# Patient Record
Sex: MALE | Race: BLACK OR AFRICAN AMERICAN | Employment: FULL TIME | URBAN - METROPOLITAN AREA
[De-identification: names, ages, dates, MRNs, and addresses within clinical notes are randomized per-mention and may not be internally consistent; named-entity substitution may affect disease eponyms.]

---

## 2017-07-13 ENCOUNTER — OFFICE VISIT (OUTPATIENT)
Dept: INTERNAL MEDICINE CLINIC | Age: 42
End: 2017-07-13

## 2017-07-13 VITALS
HEIGHT: 64 IN | DIASTOLIC BLOOD PRESSURE: 74 MMHG | WEIGHT: 213.3 LBS | OXYGEN SATURATION: 95 % | RESPIRATION RATE: 18 BRPM | SYSTOLIC BLOOD PRESSURE: 107 MMHG | BODY MASS INDEX: 36.41 KG/M2 | TEMPERATURE: 98 F | HEART RATE: 79 BPM

## 2017-07-13 DIAGNOSIS — G47.33 OBSTRUCTIVE SLEEP APNEA: ICD-10-CM

## 2017-07-13 DIAGNOSIS — E11.69 TYPE 2 DIABETES MELLITUS WITH OTHER SPECIFIED COMPLICATION (HCC): ICD-10-CM

## 2017-07-13 DIAGNOSIS — E29.1 HYPOGONADISM MALE: ICD-10-CM

## 2017-07-13 DIAGNOSIS — I25.10 ASHD (ARTERIOSCLEROTIC HEART DISEASE): ICD-10-CM

## 2017-07-13 DIAGNOSIS — E66.09 NON MORBID OBESITY DUE TO EXCESS CALORIES: Primary | ICD-10-CM

## 2017-07-13 DIAGNOSIS — I10 ESSENTIAL HYPERTENSION: ICD-10-CM

## 2017-07-13 DIAGNOSIS — E78.5 DYSLIPIDEMIA: ICD-10-CM

## 2017-07-13 DIAGNOSIS — Z00.00 PHYSICAL EXAM: ICD-10-CM

## 2017-07-13 RX ORDER — HYDROCHLOROTHIAZIDE 12.5 MG/1
12.5 CAPSULE ORAL DAILY
COMMUNITY
End: 2019-06-03 | Stop reason: SDUPTHER

## 2017-07-13 NOTE — PROGRESS NOTES
Chief Complaint   Patient presents with    Heart Problem     routine follow up     Medication Evaluation     1. Have you been to the ER, urgent care clinic since your last visit? Hospitalized since your last visit? No    2. Have you seen or consulted any other health care providers outside of the [address] [place] [address] since your last visit? Include any pap smears or colon screening.  No

## 2017-07-15 NOTE — PROGRESS NOTES
[facility] and [facility]  [place] [address]  [address] [place] [address]  Phone:  [phone]  Fax: [phone]       Chief Complaint   Patient presents with    Heart Problem     routine follow up     Medication Evaluation   . SUBJECTIVE:    Zoey Jeff is a 38 y.o. male [very poor audio quality - muffled/low volume - please read note carefully for potential errors and/or omissions]     comes in for return visit for general follow-up. Apparently he follows up with Cardiology and pulmonary on a fairly consistent basis. He does have obstructive sleep apnea and is in the process of getting a CPAP machine. He had obstructive sleep apnea several years ago but had a pharyngeal plasty which improved the condition but after weight gain the process returned as expected. He denies any chest pain or shortness of breath. Most recently an echocardiogram revealed a normal ejection fraction as well as a stress test which was entirely normal.     There is a history of steroid induced glucose intolerance. He has a past history of primary hypertension as well as dyslipidemia. Additionally he has a problem with hypogonadism. He was sent to an endocrinologist because he wanted to have additional children. He was told that all he has to do is lose weight which he never did. Current Outpatient Prescriptions   Medication Sig Dispense Refill    hydroCHLOROthiazide (MICROZIDE) 12.5 mg capsule Take 12.5 mg by mouth daily.  atorvastatin (LIPITOR) 20 mg tablet Take 1 Tab by mouth daily. 30 Tab 11    amLODIPine (NORVASC) 10 mg tablet Take 1 Tab by mouth daily. 30 Tab 11    aspirin delayed-release 81 mg tablet Take 1 Tab by mouth daily. 30 Tab 11    clopidogrel (PLAVIX) 75 mg tab Take 1 Tab by mouth daily. 30 Tab 11    spironolactone (ALDACTONE) 25 mg tablet Take 1 Tab by mouth daily.  30 Tab 11    metoprolol succinate (TOPROL-XL) 100 mg tablet Take 1 Tab by mouth daily. 30 Tab 11    omeprazole (PRILOSEC) 40 mg capsule Take 1 Cap by mouth daily. 30 Cap 11    lisinopril (PRINIVIL, ZESTRIL) 40 mg tablet Take 1 Tab by mouth daily. 30 Tab 11    VIAGRA 100 mg tablet       metFORMIN ER (GLUCOPHAGE XR) 500 mg tablet Take 2 Tabs by mouth daily (with dinner).  61 Tab 11     Past Medical History:   Diagnosis Date    Diabetes ([facility] [address]     Headache     Hypercholesterolemia     Hypertension     Hypogonadism male     Liver disease     NAFLD    Obstructive sleep apnea     Obstructive sleep apnea      Past Surgical History:   Procedure Laterality Date    HX HEENT      status post pharyngioplasty     No Known Allergies      REVIEW OF SYSTEMS:  General: negative for - chills or fever  ENT: negative for - headaches, nasal congestion or tinnitus  Respiratory: negative for - cough, hemoptysis, shortness of breath or wheezing  Cardiovascular : negative for - chest pain, edema, palpitations or shortness of breath  Gastrointestinal: negative for - abdominal pain, blood in stools, heartburn or nausea/vomiting  Genito-Urinary: no dysuria, trouble voiding, or hematuria  Musculoskeletal: negative for - gait disturbance, joint pain, joint stiffness or joint swelling  Neurological: no TIA or stroke symptoms  Hematologic: no bruises, no bleeding, no swollen glands  Integument: no lumps, mole changes, nail changes or rash  Endocrine: no malaise/lethargy or unexpected weight changes      Social History     Social History    Marital status:      Spouse name: N/A    Number of children: 2    Years of education: 12     Occupational History          Social History Main Topics    Smoking status: Never Smoker    Smokeless tobacco: Never Used    Alcohol use No    Drug use: No    Sexual activity: Yes     Partners: Female     Other Topics Concern    None     Social History Narrative     Family History   Problem Relation Age of Onset    Heart Disease Father        OBJECTIVE:    Visit Vitals    /74 (BP 1 Location: Right arm, BP Patient Position: Sitting)    Pulse 79    Temp 98 °F (36.7 °C) (Oral)    Resp 18    Ht 5' 4\" (1.626 m)    Wt 213 lb 4.8 oz (96.8 kg)    SpO2 95%    BMI 36.61 kg/m2     CONSTITUTIONAL: well , well nourished, appears age appropriate  EYES: perrla, eom intact  ENMT:moist mucous membranes, pharynx clear  NECK: supple. Thyroid normal  RESPIRATORY: Chest: clear to ascultation and percussion   CARDIOVASCULAR: Heart: regular rate and rhythm  GASTROINTESTINAL: Abdomen: soft, bowel sounds active  HEMATOLOGIC: no pathological lymph nodes palpated  MUSCULOSKELETAL: Extremities: no edema, pulse 1+   INTEGUMENT: No unusual rashes or suspicious skin lesions noted. Nails appear normal.  NEUROLOGIC: non-focal exam   MENTAL STATUS: alert and oriented, appropriate affect      ASSESSMENT:  1. Non morbid obesity due to excess calories    2. Hypogonadism male    3. Dyslipidemia    4. Essential hypertension    5. ASHD (arteriosclerotic heart disease)    6. Obstructive sleep apnea    7. Type 2 diabetes mellitus with other specified complication ([address]    8. Physical exam        PLAN:        1. I encourage the patient to lose weight. This is the driving force behind all of his comorbidities. He has significant insulin resistance as a direct result of his weight gain which is driving his cholesterol values higher. The entity of insulin resistance by itself is a major contributing factor to the development of his premature coronary artery disease. Carbohydrate restriction is emphasized and the avoidance of sweets, white bread, and white potatoes. 2. He does have hypogonadism and the recommendation of weight loss is quite appropriate I think. Unfortunately his insurance never paid for testosterone replacement. 3. He will continue his statin as prescribed and efficacy and compliance will be assessed. Ideally ApoB levels should be less than 70 and probably even lower.    4. His blood pressure is excellent today and no adjustments are made. 5. He does have a history of coronary artery disease having had stent placement. His predisposition to coronary artery disease is quite high and presumably driven primarily by his insulin resistance. .    6. He has obstructive sleep apnea and needs to follow-up with pulmonary. .    7. Historically his diabetes has been doing quite well. . His glucose is the last thing that will go aray in his insulin resistance cascade. .  Orders Placed This Encounter    MICROALB/CREAT RATIO, TIMED UR    LIPID PANEL    HEMOGLOBIN A1C WITH EAG    APOLIPOPROTEIN B    METABOLIC PANEL, BASIC    TESTOSTERONE, FREE & TOTAL    hydroCHLOROthiazide (MICROZIDE) 12.5 mg capsule         Follow-up Disposition:  Return in about 3 months (around 10/13/2017).       Michele Nicolas MD

## 2017-07-17 LAB
APO B SERPL-MCNC: 113 MG/DL (ref 52–135)
BUN SERPL-MCNC: 24 MG/DL (ref 6–24)
BUN/CREAT SERPL: 20 (ref 9–20)
CALCIUM SERPL-MCNC: 10 MG/DL (ref 8.7–10.2)
CHLORIDE SERPL-SCNC: 94 MMOL/L (ref 96–106)
CHOLEST SERPL-MCNC: 212 MG/DL (ref 100–199)
CO2 SERPL-SCNC: 24 MMOL/L (ref 18–29)
COMMENT: ABNORMAL
CREAT SERPL-MCNC: 1.23 MG/DL (ref 0.76–1.27)
EST. AVERAGE GLUCOSE BLD GHB EST-MCNC: 237 MG/DL
GLUCOSE SERPL-MCNC: 208 MG/DL (ref 65–99)
HBA1C MFR BLD: 9.9 % (ref 4.8–5.6)
HDLC SERPL-MCNC: 56 MG/DL
LDLC SERPL CALC-MCNC: 103 MG/DL (ref 0–99)
POTASSIUM SERPL-SCNC: 4.5 MMOL/L (ref 3.5–5.2)
SODIUM SERPL-SCNC: 137 MMOL/L (ref 134–144)
TESTOST FREE SERPL-MCNC: 14.6 PG/ML (ref 6.8–21.5)
TESTOST SERPL-MCNC: 214 NG/DL (ref 348–1197)
TRIGL SERPL-MCNC: 267 MG/DL (ref 0–149)
VLDLC SERPL CALC-MCNC: 53 MG/DL (ref 5–40)

## 2017-07-25 NOTE — TELEPHONE ENCOUNTER
Patient notified that blood sugars are out of control. We tell him per Dr. Uriarte Comes that he nust take metformin xr 500mg 2 tabs bid.

## 2017-07-26 RX ORDER — METFORMIN HYDROCHLORIDE 500 MG/1
TABLET, EXTENDED RELEASE ORAL
Qty: 120 TAB | Refills: 11 | Status: SHIPPED | OUTPATIENT
Start: 2017-07-26 | End: 2018-02-24 | Stop reason: CLARIF

## 2017-10-31 ENCOUNTER — OFFICE VISIT (OUTPATIENT)
Dept: INTERNAL MEDICINE CLINIC | Age: 42
End: 2017-10-31

## 2017-10-31 VITALS
HEIGHT: 64 IN | SYSTOLIC BLOOD PRESSURE: 149 MMHG | DIASTOLIC BLOOD PRESSURE: 105 MMHG | RESPIRATION RATE: 16 BRPM | HEART RATE: 86 BPM | BODY MASS INDEX: 34.72 KG/M2 | WEIGHT: 203.4 LBS | OXYGEN SATURATION: 95 % | TEMPERATURE: 98.2 F

## 2017-10-31 DIAGNOSIS — I10 ESSENTIAL HYPERTENSION: ICD-10-CM

## 2017-10-31 DIAGNOSIS — Z79.4 TYPE 2 DIABETES MELLITUS WITH COMPLICATION, WITH LONG-TERM CURRENT USE OF INSULIN (HCC): Primary | ICD-10-CM

## 2017-10-31 DIAGNOSIS — E29.1 HYPOGONADISM MALE: ICD-10-CM

## 2017-10-31 DIAGNOSIS — G47.33 OBSTRUCTIVE SLEEP APNEA: ICD-10-CM

## 2017-10-31 DIAGNOSIS — E78.5 DYSLIPIDEMIA: ICD-10-CM

## 2017-10-31 DIAGNOSIS — E11.8 TYPE 2 DIABETES MELLITUS WITH COMPLICATION, WITH LONG-TERM CURRENT USE OF INSULIN (HCC): Primary | ICD-10-CM

## 2017-10-31 DIAGNOSIS — E66.9 OBESITY (BMI 30.0-34.9): ICD-10-CM

## 2017-10-31 NOTE — PROGRESS NOTES
Chief Complaint   Patient presents with    Morbid Obesity     routine follow up      1. Have you been to the ER, urgent care clinic since your last visit? Hospitalized since your last visit? No    2. Have you seen or consulted any other health care providers outside of the [address] [place] [address] since your last visit? Include any pap smears or colon screening.  No

## 2017-10-31 NOTE — MR AVS SNAPSHOT
Visit Information Date & Time Provider Department Dept. Phone Encounter #  
 10/31/2017  3:45 PM MD [facility] [id number] Upcoming Health Maintenance Date Due MICROALBUMIN Q1 5/12/2016 EYE EXAM RETINAL OR DILATED Q1 5/12/2016 FOOT EXAM Q1 2/28/2018* HEMOGLOBIN A1C Q6M 1/14/2018 LIPID PANEL Q1 7/14/2018 DTaP/Tdap/Td series (2 - Td) 7/13/2027 *Topic was postponed. The date shown is not the original due date. Allergies as of 10/31/2017  Review Complete On: 10/31/2017 By: Brad Orozco No Known Allergies Current Immunizations  Never Reviewed No immunizations on file. Not reviewed this visit Vitals BP Pulse Temp Resp Height(growth percentile) Weight(growth percentile) (!) 149/105 (BP 1 Location: Left arm, BP Patient Position: Sitting) 86 98.2 °F (36.8 °C) (Oral) 16 5' 4\" (1.626 m) 203 lb 6.4 oz (92.3 kg) SpO2 BMI Smoking Status 95% 34.91 kg/m2 Never Smoker Vitals History BMI and BSA Data Body Mass Index Body Surface Area 34.91 kg/m 2 2.04 m 2 Preferred Pharmacy Pharmacy Name Phone [id number], [address] [place] [address] [phone] Your Updated Medication List  
  
   
This list is accurate as of: 10/31/17  4:39 PM.  Always use your most recent med list. amLODIPine 10 mg tablet Commonly known as:  Jimy Alves Take 1 Tab by mouth daily. aspirin delayed-release 81 mg tablet Take 1 Tab by mouth daily. atorvastatin 20 mg tablet Commonly known as:  LIPITOR Take 1 Tab by mouth daily. clopidogrel 75 mg Tab Commonly known as:  PLAVIX Take 1 Tab by mouth daily. hydroCHLOROthiazide 12.5 mg capsule Commonly known as:  Reyna Ewing Take 12.5 mg by mouth daily. lisinopril 40 mg tablet Commonly known as:  Yolette Woodruff  
 TAKE 1 TABLET BY MOUTH DAILY  
  
 metFORMIN  mg tablet Commonly known as:  GLUCOPHAGE XR Take 2 tablets with breakfast and dinner  
  
 metoprolol succinate 100 mg tablet Commonly known as:  TOPROL-XL Take 1 Tab by mouth daily. omeprazole 40 mg capsule Commonly known as:  PRILOSEC Take 1 Cap by mouth daily. spironolactone 25 mg tablet Commonly known as:  ALDACTONE Take 1 Tab by mouth daily. VIAGRA 100 mg tablet Generic drug:  sildenafil citrate Introducing [facility] SERVICES! Dear Shelby Viramontes: Thank you for requesting a [employer] account. Our records indicate that you already have an active [employer] account. You can access your account anytime at [email or web address] Did you know that you can access your hospital and ER discharge instructions at any time in [employer]? You can also review all of your test results from your hospital stay or ER visit. Additional Information If you have questions, please visit the Frequently Asked Questions section of the [employer] website at [email or web address] Remember, [employer] is NOT to be used for urgent needs. For medical emergencies, dial 911. Now available from your iPhone and Android! Please provide this summary of care documentation to your next provider. Your primary care clinician is listed as Kareem Dupree. If you have any questions after today's visit, please call [phone].

## 2017-11-03 LAB
APO B SERPL-MCNC: 120 MG/DL (ref 52–135)
BUN SERPL-MCNC: 11 MG/DL (ref 6–24)
BUN/CREAT SERPL: 10 (ref 9–20)
CALCIUM SERPL-MCNC: 9.1 MG/DL (ref 8.7–10.2)
CHLORIDE SERPL-SCNC: 99 MMOL/L (ref 96–106)
CO2 SERPL-SCNC: 22 MMOL/L (ref 18–29)
CREAT SERPL-MCNC: 1.09 MG/DL (ref 0.76–1.27)
EST. AVERAGE GLUCOSE BLD GHB EST-MCNC: 361 MG/DL
GFR SERPLBLD CREATININE-BSD FMLA CKD-EPI: 84 ML/MIN/1.73
GFR SERPLBLD CREATININE-BSD FMLA CKD-EPI: 98 ML/MIN/1.73
GLUCOSE SERPL-MCNC: 279 MG/DL (ref 65–99)
HBA1C MFR BLD: 14.2 % (ref 4.8–5.6)
POTASSIUM SERPL-SCNC: 4.1 MMOL/L (ref 3.5–5.2)
SODIUM SERPL-SCNC: 140 MMOL/L (ref 134–144)

## 2017-11-05 PROBLEM — E66.9 OBESITY (BMI 30.0-34.9): Status: ACTIVE | Noted: 2017-11-05

## 2017-11-05 NOTE — PROGRESS NOTES
[address] [place] [address] and Primary Care  [place] [address]  [address] [place] [address]  Phone:  [phone]  Fax: [phone]       Chief Complaint   Patient presents with    Morbid Obesity     routine follow up    . SUBJECTIVE:    Stephie Peralta is a 38 y.o. male Comes in for return visit stating that he has done fairly well. He has lost ten pounds since I last saw him and I congratulate him on this. He needs to continue to lose weight. Ideally at least another 20 to 30 pounds, given his height would be of great benefit as far as reversing his metabolic syndrome. He saw his cardiologist in the last month or so and is doing quite well from this perspective. He has premature cardiovascular disease presumably driven by his severe Insulin resistance. He also has obstructive sleep apnea and needs to follow with a polysomnogram, as ordered by his pulmonary physician. Historically his diabetes has not been a major problem, but will indeed become one if his weight continues to increase. Finally, he does have a past history of primary hypertension and dyslipidemia. His dyslipidemia is secondary in view of his history of coronary artery disease. Current Outpatient Prescriptions   Medication Sig Dispense Refill    lisinopril (PRINIVIL, ZESTRIL) 40 mg tablet TAKE 1 TABLET BY MOUTH DAILY 30 Tab 11    metFORMIN ER (GLUCOPHAGE XR) 500 mg tablet Take 2 tablets with breakfast and dinner 120 Tab 11    hydroCHLOROthiazide (MICROZIDE) 12.5 mg capsule Take 12.5 mg by mouth daily.  atorvastatin (LIPITOR) 20 mg tablet Take 1 Tab by mouth daily. 30 Tab 11    amLODIPine (NORVASC) 10 mg tablet Take 1 Tab by mouth daily. 30 Tab 11    aspirin delayed-release 81 mg tablet Take 1 Tab by mouth daily. 30 Tab 11    clopidogrel (PLAVIX) 75 mg tab Take 1 Tab by mouth daily. 30 Tab 11    spironolactone (ALDACTONE) 25 mg tablet Take 1 Tab by mouth daily.  30 Tab 11    metoprolol succinate (TOPROL-XL) 100 mg tablet Take 1 Tab by mouth daily. 30 Tab 11    omeprazole (PRILOSEC) 40 mg capsule Take 1 Cap by mouth daily.  30 Cap 11    VIAGRA 100 mg tablet        Past Medical History:   Diagnosis Date    Diabetes ([address]     Headache     Hypercholesterolemia     Hypertension     Hypogonadism male     Liver disease     NAFLD    Obstructive sleep apnea     Obstructive sleep apnea      Past Surgical History:   Procedure Laterality Date    HX HEENT      status post pharyngioplasty     No Known Allergies      REVIEW OF SYSTEMS:  General: negative for - chills or fever  ENT: negative for - headaches, nasal congestion or tinnitus  Respiratory: negative for - cough, hemoptysis, shortness of breath or wheezing  Cardiovascular : negative for - chest pain, edema, palpitations or shortness of breath  Gastrointestinal: negative for - abdominal pain, blood in stools, heartburn or nausea/vomiting  Genito-Urinary: no dysuria, trouble voiding, or hematuria  Musculoskeletal: negative for - gait disturbance, joint pain, joint stiffness or joint swelling  Neurological: no TIA or stroke symptoms  Hematologic: no bruises, no bleeding, no swollen glands  Integument: no lumps, mole changes, nail changes or rash  Endocrine: no malaise/lethargy or unexpected weight changes      Social History     Social History    Marital status:      Spouse name: N/A    Number of children: 2    Years of education: 12     Occupational History          Social History Main Topics    Smoking status: Never Smoker    Smokeless tobacco: Never Used    Alcohol use No    Drug use: No    Sexual activity: Yes     Partners: Female     Other Topics Concern    None     Social History Narrative     Family History   Problem Relation Age of Onset    Heart Disease Father        OBJECTIVE:    Visit Vitals    BP (!) 149/105 (BP 1 Location: Left arm, BP Patient Position: Sitting)    Pulse 86    Temp 98.2 °F (36.8 °C) (Oral)    Resp 16    Ht 5' 4\" (1.626 m)    Wt 203 lb 6.4 oz (92.3 kg)    SpO2 95%    BMI 34.91 kg/m2     CONSTITUTIONAL: well , well nourished, appears age appropriate  EYES: perrla, eom intact  ENMT:moist mucous membranes, pharynx clear  NECK: supple. Thyroid normal  RESPIRATORY: Chest: clear to ascultation and percussion   CARDIOVASCULAR: Heart: regular rate and rhythm  GASTROINTESTINAL: Abdomen: soft, bowel sounds active  HEMATOLOGIC: no pathological lymph nodes palpated  MUSCULOSKELETAL: Extremities: no edema, pulse 1+   INTEGUMENT: No unusual rashes or suspicious skin lesions noted. Nails appear normal.  NEUROLOGIC: non-focal exam   MENTAL STATUS: alert and oriented, appropriate affect      ASSESSMENT:  1. Type 2 diabetes mellitus with complication, with long-term current use of insulin ([address]    2. Dyslipidemia    3. Essential hypertension    4. Obesity (BMI 30.0-34.9)    5. Obstructive sleep apnea    6. Hypogonadism male        PLAN:    1. His diabetes will be assessed today. Previously flare ups only occurred with steroids. Again the duration of his obesity, the context of his glucose intolerance is probably pushing him into an overt diabetic range consistently. 2. He will continue statin as prescribed. Apo B level should be less than 70 in this high risk gentleman. 3. Blood pressure is excellent today. No adjustments are made. 4. Obesity continues to be the driving force behind all of his metabolic state. Emphasis is again placed on progressive weight loss via carbohydrate restriction. Importantly he has to eat meals, to minimize snacking, and he has to reduce his consumption of processed carbohydrates. 5. He needs to bundle branch block with his pulmonary physician regarding his obstructive sleep apnea. 6. As far as his hypergonadism is concerned, my suggestion, as was the suggestion of the endocrinologist was weight loss. He does not need any type of supplement particularly given his age.   The potential impression is spermatogenesis. .  Orders Placed This Encounter    HEMOGLOBIN A1C WITH EAG    METABOLIC PANEL, BASIC    APOLIPOPROTEIN B         Follow-up Disposition:  Return in about 3 months (around 1/31/2018).       Cameron Galloway MD

## 2017-11-07 ENCOUNTER — TELEPHONE (OUTPATIENT)
Dept: INTERNAL MEDICINE CLINIC | Age: 42
End: 2017-11-07

## 2017-11-07 NOTE — TELEPHONE ENCOUNTER
Per Dr. Fry Hopping patient notified by phone of elevated A1C 14.2. PATIENT IS TOLD HE IS TO START INSULIN WHICH DR. CRAWLEY WILL PUT IN THE ORDER.

## 2017-11-08 DIAGNOSIS — Z79.4 TYPE 2 DIABETES MELLITUS WITH COMPLICATION, WITH LONG-TERM CURRENT USE OF INSULIN (HCC): Primary | ICD-10-CM

## 2017-11-08 DIAGNOSIS — E11.8 TYPE 2 DIABETES MELLITUS WITH COMPLICATION, WITH LONG-TERM CURRENT USE OF INSULIN (HCC): Primary | ICD-10-CM

## 2017-11-08 RX ORDER — INSULIN GLARGINE 100 [IU]/ML
INJECTION, SOLUTION SUBCUTANEOUS
Qty: 2 PEN | Refills: 11 | Status: SHIPPED | OUTPATIENT
Start: 2017-11-08 | End: 2019-01-16 | Stop reason: SDUPTHER

## 2017-11-08 RX ORDER — PEN NEEDLE, DIABETIC 30 GX3/16"
NEEDLE, DISPOSABLE MISCELLANEOUS
Qty: 60 PACKAGE | Refills: 11 | Status: SHIPPED | OUTPATIENT
Start: 2017-11-08 | End: 2021-01-08 | Stop reason: SDUPTHER

## 2017-11-09 ENCOUNTER — TELEPHONE (OUTPATIENT)
Dept: INTERNAL MEDICINE CLINIC | Age: 42
End: 2017-11-09

## 2017-11-09 NOTE — TELEPHONE ENCOUNTER
After talking to patient about his out of control diabeteshe then tells me he never took his metformin or atorvastatin that was prescribed for him.

## 2018-02-15 ENCOUNTER — APPOINTMENT (OUTPATIENT)
Dept: GENERAL RADIOLOGY | Age: 43
DRG: 563 | End: 2018-02-15
Attending: EMERGENCY MEDICINE
Payer: COMMERCIAL

## 2018-02-15 ENCOUNTER — ANESTHESIA (OUTPATIENT)
Dept: SURGERY | Age: 43
DRG: 563 | End: 2018-02-15
Payer: COMMERCIAL

## 2018-02-15 ENCOUNTER — HOSPITAL ENCOUNTER (INPATIENT)
Age: 43
LOS: 1 days | Discharge: HOME | DRG: 563 | End: 2018-02-15
Attending: EMERGENCY MEDICINE | Admitting: ORTHOPAEDIC SURGERY
Payer: COMMERCIAL

## 2018-02-15 ENCOUNTER — APPOINTMENT (OUTPATIENT)
Dept: GENERAL RADIOLOGY | Age: 43
DRG: 563 | End: 2018-02-15
Attending: PHYSICIAN ASSISTANT
Payer: COMMERCIAL

## 2018-02-15 ENCOUNTER — ANESTHESIA EVENT (OUTPATIENT)
Dept: SURGERY | Age: 43
DRG: 563 | End: 2018-02-15
Payer: COMMERCIAL

## 2018-02-15 VITALS
SYSTOLIC BLOOD PRESSURE: 145 MMHG | HEIGHT: 64 IN | DIASTOLIC BLOOD PRESSURE: 97 MMHG | OXYGEN SATURATION: 94 % | WEIGHT: 209.19 LBS | HEART RATE: 94 BPM | TEMPERATURE: 97.6 F | RESPIRATION RATE: 17 BRPM | BODY MASS INDEX: 35.71 KG/M2

## 2018-02-15 DIAGNOSIS — G89.18 POSTOPERATIVE PAIN OF EXTREMITY: ICD-10-CM

## 2018-02-15 DIAGNOSIS — M79.609 POSTOPERATIVE PAIN OF EXTREMITY: ICD-10-CM

## 2018-02-15 DIAGNOSIS — S52.232B: Primary | ICD-10-CM

## 2018-02-15 PROBLEM — S52.92XB OPEN LEFT FOREARM FRACTURE: Status: ACTIVE | Noted: 2018-02-15

## 2018-02-15 LAB
ALBUMIN SERPL-MCNC: 3.8 G/DL (ref 3.5–5)
ALBUMIN/GLOB SERPL: 1 {RATIO} (ref 1.1–2.2)
ALP SERPL-CCNC: 93 U/L (ref 45–117)
ALT SERPL-CCNC: 37 U/L (ref 12–78)
ANION GAP SERPL CALC-SCNC: 7 MMOL/L (ref 5–15)
AST SERPL-CCNC: 22 U/L (ref 15–37)
ATRIAL RATE: 92 BPM
BASOPHILS # BLD: 0 K/UL (ref 0–0.1)
BASOPHILS NFR BLD: 0 % (ref 0–1)
BILIRUB SERPL-MCNC: 0.3 MG/DL (ref 0.2–1)
BUN SERPL-MCNC: 17 MG/DL (ref 6–20)
BUN/CREAT SERPL: 16 (ref 12–20)
CALCIUM SERPL-MCNC: 8.6 MG/DL (ref 8.5–10.1)
CALCULATED P AXIS, ECG09: 38 DEGREES
CALCULATED R AXIS, ECG10: 2 DEGREES
CALCULATED T AXIS, ECG11: -11 DEGREES
CHLORIDE SERPL-SCNC: 105 MMOL/L (ref 97–108)
CO2 SERPL-SCNC: 25 MMOL/L (ref 21–32)
CREAT SERPL-MCNC: 1.04 MG/DL (ref 0.7–1.3)
DIAGNOSIS, 93000: NORMAL
DIFFERENTIAL METHOD BLD: NORMAL
EOSINOPHIL # BLD: 0.1 K/UL (ref 0–0.4)
EOSINOPHIL NFR BLD: 1 % (ref 0–7)
ERYTHROCYTE [DISTWIDTH] IN BLOOD BY AUTOMATED COUNT: 13.3 % (ref 11.5–14.5)
GLOBULIN SER CALC-MCNC: 4 G/DL (ref 2–4)
GLUCOSE BLD STRIP.AUTO-MCNC: 114 MG/DL (ref 65–100)
GLUCOSE BLD STRIP.AUTO-MCNC: 152 MG/DL (ref 65–100)
GLUCOSE BLD STRIP.AUTO-MCNC: 99 MG/DL (ref 65–100)
GLUCOSE SERPL-MCNC: 133 MG/DL (ref 65–100)
HCT VFR BLD AUTO: 43.1 % (ref 36.6–50.3)
HGB BLD-MCNC: 14.3 G/DL (ref 12.1–17)
IMM GRANULOCYTES # BLD: 0 K/UL (ref 0–0.04)
IMM GRANULOCYTES NFR BLD AUTO: 0 % (ref 0–0.5)
LYMPHOCYTES # BLD: 1.7 K/UL (ref 0.8–3.5)
LYMPHOCYTES NFR BLD: 19 % (ref 12–49)
MCH RBC QN AUTO: 26.9 PG (ref 26–34)
MCHC RBC AUTO-ENTMCNC: 33.2 G/DL (ref 30–36.5)
MCV RBC AUTO: 81.2 FL (ref 80–99)
MONOCYTES # BLD: 0.7 K/UL (ref 0–1)
MONOCYTES NFR BLD: 8 % (ref 5–13)
NEUTS SEG # BLD: 6.5 K/UL (ref 1.8–8)
NEUTS SEG NFR BLD: 72 % (ref 32–75)
NRBC # BLD: 0 K/UL (ref 0–0.01)
NRBC BLD-RTO: 0 PER 100 WBC
P-R INTERVAL, ECG05: 138 MS
PLATELET # BLD AUTO: 196 K/UL (ref 150–400)
PMV BLD AUTO: 11.3 FL (ref 8.9–12.9)
POTASSIUM SERPL-SCNC: 3.9 MMOL/L (ref 3.5–5.1)
PROT SERPL-MCNC: 7.8 G/DL (ref 6.4–8.2)
Q-T INTERVAL, ECG07: 360 MS
QRS DURATION, ECG06: 96 MS
QTC CALCULATION (BEZET), ECG08: 445 MS
RBC # BLD AUTO: 5.31 M/UL (ref 4.1–5.7)
SERVICE CMNT-IMP: ABNORMAL
SERVICE CMNT-IMP: ABNORMAL
SERVICE CMNT-IMP: NORMAL
SODIUM SERPL-SCNC: 137 MMOL/L (ref 136–145)
VENTRICULAR RATE, ECG03: 92 BPM
WBC # BLD AUTO: 9.1 K/UL (ref 4.1–11.1)

## 2018-02-15 PROCEDURE — 71045 X-RAY EXAM CHEST 1 VIEW: CPT

## 2018-02-15 PROCEDURE — 74011000250 HC RX REV CODE- 250: Performed by: ORTHOPAEDIC SURGERY

## 2018-02-15 PROCEDURE — 76210000034 HC AMBSU PH I REC 0.5 TO 1 HR: Performed by: ORTHOPAEDIC SURGERY

## 2018-02-15 PROCEDURE — 74011250636 HC RX REV CODE- 250/636

## 2018-02-15 PROCEDURE — 85025 COMPLETE CBC W/AUTO DIFF WBC: CPT

## 2018-02-15 PROCEDURE — 93005 ELECTROCARDIOGRAM TRACING: CPT

## 2018-02-15 PROCEDURE — 96374 THER/PROPH/DIAG INJ IV PUSH: CPT

## 2018-02-15 PROCEDURE — 77030000032 HC CUF TRNQT ZIMM -B: Performed by: ORTHOPAEDIC SURGERY

## 2018-02-15 PROCEDURE — 77030026438 HC STYL ET INTUB CARD -A: Performed by: NURSE ANESTHETIST, CERTIFIED REGISTERED

## 2018-02-15 PROCEDURE — 77030008684 HC TU ET CUF COVD -B: Performed by: NURSE ANESTHETIST, CERTIFIED REGISTERED

## 2018-02-15 PROCEDURE — 0HBEXZZ EXCISION OF LEFT LOWER ARM SKIN, EXTERNAL APPROACH: ICD-10-PCS | Performed by: ORTHOPAEDIC SURGERY

## 2018-02-15 PROCEDURE — 77030002916 HC SUT ETHLN J&J -A: Performed by: ORTHOPAEDIC SURGERY

## 2018-02-15 PROCEDURE — 76210000057 HC AMBSU PH II REC 1 TO 1.5 HR: Performed by: ORTHOPAEDIC SURGERY

## 2018-02-15 PROCEDURE — 76060000062 HC AMB SURG ANES 1 TO 1.5 HR: Performed by: ORTHOPAEDIC SURGERY

## 2018-02-15 PROCEDURE — 77030028224 HC PDNG CST BSNM -A: Performed by: ORTHOPAEDIC SURGERY

## 2018-02-15 PROCEDURE — 80053 COMPREHEN METABOLIC PANEL: CPT

## 2018-02-15 PROCEDURE — 74011000258 HC RX REV CODE- 258: Performed by: EMERGENCY MEDICINE

## 2018-02-15 PROCEDURE — 74011000272 HC RX REV CODE- 272: Performed by: ORTHOPAEDIC SURGERY

## 2018-02-15 PROCEDURE — 74011000250 HC RX REV CODE- 250

## 2018-02-15 PROCEDURE — 74011250636 HC RX REV CODE- 250/636: Performed by: ORTHOPAEDIC SURGERY

## 2018-02-15 PROCEDURE — 90715 TDAP VACCINE 7 YRS/> IM: CPT | Performed by: EMERGENCY MEDICINE

## 2018-02-15 PROCEDURE — 65270000029 HC RM PRIVATE

## 2018-02-15 PROCEDURE — 74011250637 HC RX REV CODE- 250/637: Performed by: EMERGENCY MEDICINE

## 2018-02-15 PROCEDURE — 73090 X-RAY EXAM OF FOREARM: CPT

## 2018-02-15 PROCEDURE — 74011250637 HC RX REV CODE- 250/637: Performed by: PHYSICIAN ASSISTANT

## 2018-02-15 PROCEDURE — 82962 GLUCOSE BLOOD TEST: CPT

## 2018-02-15 PROCEDURE — 90471 IMMUNIZATION ADMIN: CPT

## 2018-02-15 PROCEDURE — 77030003849 HC BIT DRL S&N -C: Performed by: ORTHOPAEDIC SURGERY

## 2018-02-15 PROCEDURE — 76030000001 HC AMB SURG OR TIME 1 TO 1.5: Performed by: ORTHOPAEDIC SURGERY

## 2018-02-15 PROCEDURE — 36415 COLL VENOUS BLD VENIPUNCTURE: CPT

## 2018-02-15 PROCEDURE — 74011250636 HC RX REV CODE- 250/636: Performed by: EMERGENCY MEDICINE

## 2018-02-15 PROCEDURE — 99285 EMERGENCY DEPT VISIT HI MDM: CPT

## 2018-02-15 PROCEDURE — 77030018836 HC SOL IRR NACL ICUM -A: Performed by: ORTHOPAEDIC SURGERY

## 2018-02-15 RX ORDER — METOPROLOL SUCCINATE 50 MG/1
100 TABLET, EXTENDED RELEASE ORAL DAILY
Status: DISCONTINUED | OUTPATIENT
Start: 2018-02-16 | End: 2018-02-15 | Stop reason: HOSPADM

## 2018-02-15 RX ORDER — NALOXONE HYDROCHLORIDE 0.4 MG/ML
0.4 INJECTION, SOLUTION INTRAMUSCULAR; INTRAVENOUS; SUBCUTANEOUS AS NEEDED
Status: DISCONTINUED | OUTPATIENT
Start: 2018-02-15 | End: 2018-02-15 | Stop reason: HOSPADM

## 2018-02-15 RX ORDER — ASPIRIN 81 MG/1
81 TABLET ORAL DAILY
Status: DISCONTINUED | OUTPATIENT
Start: 2018-02-16 | End: 2018-02-15 | Stop reason: HOSPADM

## 2018-02-15 RX ORDER — FENTANYL CITRATE 50 UG/ML
INJECTION, SOLUTION INTRAMUSCULAR; INTRAVENOUS AS NEEDED
Status: DISCONTINUED | OUTPATIENT
Start: 2018-02-15 | End: 2018-02-15 | Stop reason: HOSPADM

## 2018-02-15 RX ORDER — LISINOPRIL 20 MG/1
40 TABLET ORAL DAILY
Status: DISCONTINUED | OUTPATIENT
Start: 2018-02-16 | End: 2018-02-15 | Stop reason: HOSPADM

## 2018-02-15 RX ORDER — HYDROCHLOROTHIAZIDE 12.5 MG/1
12.5 CAPSULE ORAL DAILY
Status: DISCONTINUED | OUTPATIENT
Start: 2018-02-16 | End: 2018-02-15 | Stop reason: HOSPADM

## 2018-02-15 RX ORDER — INSULIN LISPRO 100 [IU]/ML
INJECTION, SOLUTION INTRAVENOUS; SUBCUTANEOUS
Status: DISCONTINUED | OUTPATIENT
Start: 2018-02-15 | End: 2018-02-15 | Stop reason: HOSPADM

## 2018-02-15 RX ORDER — CLOPIDOGREL BISULFATE 75 MG/1
75 TABLET ORAL DAILY
Status: DISCONTINUED | OUTPATIENT
Start: 2018-02-16 | End: 2018-02-15 | Stop reason: HOSPADM

## 2018-02-15 RX ORDER — SODIUM CHLORIDE 9 MG/ML
75 INJECTION, SOLUTION INTRAVENOUS CONTINUOUS
Status: DISCONTINUED | OUTPATIENT
Start: 2018-02-15 | End: 2018-02-15 | Stop reason: HOSPADM

## 2018-02-15 RX ORDER — DEXAMETHASONE SODIUM PHOSPHATE 4 MG/ML
INJECTION, SOLUTION INTRA-ARTICULAR; INTRALESIONAL; INTRAMUSCULAR; INTRAVENOUS; SOFT TISSUE AS NEEDED
Status: DISCONTINUED | OUTPATIENT
Start: 2018-02-15 | End: 2018-02-15 | Stop reason: HOSPADM

## 2018-02-15 RX ORDER — MAGNESIUM SULFATE 100 %
4 CRYSTALS MISCELLANEOUS AS NEEDED
Status: DISCONTINUED | OUTPATIENT
Start: 2018-02-15 | End: 2018-02-15 | Stop reason: HOSPADM

## 2018-02-15 RX ORDER — HYDROCODONE BITARTRATE AND ACETAMINOPHEN 5; 325 MG/1; MG/1
1 TABLET ORAL
Qty: 25 TAB | Refills: 0 | Status: SHIPPED | OUTPATIENT
Start: 2018-02-15 | End: 2019-12-10 | Stop reason: CLARIF

## 2018-02-15 RX ORDER — SUCCINYLCHOLINE CHLORIDE 20 MG/ML
INJECTION INTRAMUSCULAR; INTRAVENOUS AS NEEDED
Status: DISCONTINUED | OUTPATIENT
Start: 2018-02-15 | End: 2018-02-15 | Stop reason: HOSPADM

## 2018-02-15 RX ORDER — SPIRONOLACTONE 25 MG/1
25 TABLET ORAL DAILY
Status: DISCONTINUED | OUTPATIENT
Start: 2018-02-16 | End: 2018-02-15 | Stop reason: HOSPADM

## 2018-02-15 RX ORDER — MIDAZOLAM HYDROCHLORIDE 1 MG/ML
INJECTION, SOLUTION INTRAMUSCULAR; INTRAVENOUS AS NEEDED
Status: DISCONTINUED | OUTPATIENT
Start: 2018-02-15 | End: 2018-02-15 | Stop reason: HOSPADM

## 2018-02-15 RX ORDER — ATORVASTATIN CALCIUM 20 MG/1
20 TABLET, FILM COATED ORAL DAILY
Status: DISCONTINUED | OUTPATIENT
Start: 2018-02-16 | End: 2018-02-15 | Stop reason: HOSPADM

## 2018-02-15 RX ORDER — PROPOFOL 10 MG/ML
INJECTION, EMULSION INTRAVENOUS AS NEEDED
Status: DISCONTINUED | OUTPATIENT
Start: 2018-02-15 | End: 2018-02-15 | Stop reason: HOSPADM

## 2018-02-15 RX ORDER — ONDANSETRON 2 MG/ML
INJECTION INTRAMUSCULAR; INTRAVENOUS AS NEEDED
Status: DISCONTINUED | OUTPATIENT
Start: 2018-02-15 | End: 2018-02-15 | Stop reason: HOSPADM

## 2018-02-15 RX ORDER — SODIUM CHLORIDE 0.9 % (FLUSH) 0.9 %
5-10 SYRINGE (ML) INJECTION EVERY 8 HOURS
Status: DISCONTINUED | OUTPATIENT
Start: 2018-02-15 | End: 2018-02-15 | Stop reason: HOSPADM

## 2018-02-15 RX ORDER — PANTOPRAZOLE SODIUM 40 MG/1
40 TABLET, DELAYED RELEASE ORAL
Status: DISCONTINUED | OUTPATIENT
Start: 2018-02-16 | End: 2018-02-15 | Stop reason: HOSPADM

## 2018-02-15 RX ORDER — IBUPROFEN 600 MG/1
600 TABLET ORAL
Status: COMPLETED | OUTPATIENT
Start: 2018-02-15 | End: 2018-02-15

## 2018-02-15 RX ORDER — HYDRALAZINE HYDROCHLORIDE 20 MG/ML
10 INJECTION INTRAMUSCULAR; INTRAVENOUS
Status: DISCONTINUED | OUTPATIENT
Start: 2018-02-15 | End: 2018-02-15 | Stop reason: HOSPADM

## 2018-02-15 RX ORDER — AMLODIPINE BESYLATE 5 MG/1
10 TABLET ORAL DAILY
Status: DISCONTINUED | OUTPATIENT
Start: 2018-02-16 | End: 2018-02-15 | Stop reason: HOSPADM

## 2018-02-15 RX ORDER — LIDOCAINE HYDROCHLORIDE 20 MG/ML
INJECTION, SOLUTION EPIDURAL; INFILTRATION; INTRACAUDAL; PERINEURAL AS NEEDED
Status: DISCONTINUED | OUTPATIENT
Start: 2018-02-15 | End: 2018-02-15 | Stop reason: HOSPADM

## 2018-02-15 RX ORDER — OXYCODONE HYDROCHLORIDE 5 MG/1
2.5 TABLET ORAL
Status: DISCONTINUED | OUTPATIENT
Start: 2018-02-15 | End: 2018-02-15 | Stop reason: HOSPADM

## 2018-02-15 RX ORDER — INSULIN GLARGINE 100 [IU]/ML
20 INJECTION, SOLUTION SUBCUTANEOUS
Status: DISCONTINUED | OUTPATIENT
Start: 2018-02-15 | End: 2018-02-15 | Stop reason: HOSPADM

## 2018-02-15 RX ORDER — AMOXICILLIN 250 MG
2 CAPSULE ORAL 2 TIMES DAILY
Status: DISCONTINUED | OUTPATIENT
Start: 2018-02-15 | End: 2018-02-15 | Stop reason: HOSPADM

## 2018-02-15 RX ORDER — CEFAZOLIN SODIUM/WATER 2 G/20 ML
2 SYRINGE (ML) INTRAVENOUS ONCE
Status: COMPLETED | OUTPATIENT
Start: 2018-02-15 | End: 2018-02-15

## 2018-02-15 RX ORDER — AMOXICILLIN AND CLAVULANATE POTASSIUM 875; 125 MG/1; MG/1
1 TABLET, FILM COATED ORAL EVERY 12 HOURS
Qty: 14 TAB | Refills: 0 | Status: SHIPPED | OUTPATIENT
Start: 2018-02-15 | End: 2019-10-03 | Stop reason: ALTCHOICE

## 2018-02-15 RX ORDER — MORPHINE SULFATE 2 MG/ML
4 INJECTION, SOLUTION INTRAMUSCULAR; INTRAVENOUS ONCE
Status: COMPLETED | OUTPATIENT
Start: 2018-02-15 | End: 2018-02-15

## 2018-02-15 RX ORDER — SODIUM CHLORIDE, SODIUM LACTATE, POTASSIUM CHLORIDE, CALCIUM CHLORIDE 600; 310; 30; 20 MG/100ML; MG/100ML; MG/100ML; MG/100ML
INJECTION, SOLUTION INTRAVENOUS
Status: DISCONTINUED | OUTPATIENT
Start: 2018-02-15 | End: 2018-02-15 | Stop reason: HOSPADM

## 2018-02-15 RX ORDER — OXYCODONE HYDROCHLORIDE 5 MG/1
5 TABLET ORAL
Status: DISCONTINUED | OUTPATIENT
Start: 2018-02-15 | End: 2018-02-15 | Stop reason: HOSPADM

## 2018-02-15 RX ORDER — METOPROLOL TARTRATE 5 MG/5ML
INJECTION INTRAVENOUS AS NEEDED
Status: DISCONTINUED | OUTPATIENT
Start: 2018-02-15 | End: 2018-02-15 | Stop reason: HOSPADM

## 2018-02-15 RX ORDER — ACETAMINOPHEN 325 MG/1
650 TABLET ORAL EVERY 6 HOURS
Status: DISCONTINUED | OUTPATIENT
Start: 2018-02-15 | End: 2018-02-15 | Stop reason: HOSPADM

## 2018-02-15 RX ORDER — SODIUM CHLORIDE 0.9 % (FLUSH) 0.9 %
5-10 SYRINGE (ML) INJECTION AS NEEDED
Status: DISCONTINUED | OUTPATIENT
Start: 2018-02-15 | End: 2018-02-15 | Stop reason: HOSPADM

## 2018-02-15 RX ORDER — AMOXICILLIN AND CLAVULANATE POTASSIUM 875; 125 MG/1; MG/1
1 TABLET, FILM COATED ORAL
Status: COMPLETED | OUTPATIENT
Start: 2018-02-15 | End: 2018-02-15

## 2018-02-15 RX ORDER — DEXTROSE 50 % IN WATER (D50W) INTRAVENOUS SYRINGE
12.5-25 AS NEEDED
Status: DISCONTINUED | OUTPATIENT
Start: 2018-02-15 | End: 2018-02-15 | Stop reason: HOSPADM

## 2018-02-15 RX ADMIN — MORPHINE SULFATE 4 MG: 2 INJECTION, SOLUTION INTRAMUSCULAR; INTRAVENOUS at 10:08

## 2018-02-15 RX ADMIN — ONDANSETRON 4 MG: 2 INJECTION INTRAMUSCULAR; INTRAVENOUS at 14:54

## 2018-02-15 RX ADMIN — SUCCINYLCHOLINE CHLORIDE 140 MG: 20 INJECTION INTRAMUSCULAR; INTRAVENOUS at 14:45

## 2018-02-15 RX ADMIN — OXYCODONE HYDROCHLORIDE 5 MG: 5 TABLET ORAL at 17:59

## 2018-02-15 RX ADMIN — AMPICILLIN SODIUM AND SULBACTAM SODIUM 3 G: 2; 1 INJECTION, POWDER, FOR SOLUTION INTRAMUSCULAR; INTRAVENOUS at 10:46

## 2018-02-15 RX ADMIN — DOCUSATE SODIUM AND SENNOSIDES 2 TABLET: 8.6; 5 TABLET, FILM COATED ORAL at 17:59

## 2018-02-15 RX ADMIN — MIDAZOLAM HYDROCHLORIDE 2 MG: 1 INJECTION, SOLUTION INTRAMUSCULAR; INTRAVENOUS at 14:43

## 2018-02-15 RX ADMIN — AMOXICILLIN AND CLAVULANATE POTASSIUM 1 TABLET: 875; 125 TABLET, FILM COATED ORAL at 08:02

## 2018-02-15 RX ADMIN — Medication 10 ML: at 16:47

## 2018-02-15 RX ADMIN — LIDOCAINE HYDROCHLORIDE 60 MG: 20 INJECTION, SOLUTION EPIDURAL; INFILTRATION; INTRACAUDAL; PERINEURAL at 14:45

## 2018-02-15 RX ADMIN — FENTANYL CITRATE 50 MCG: 50 INJECTION, SOLUTION INTRAMUSCULAR; INTRAVENOUS at 14:54

## 2018-02-15 RX ADMIN — DEXAMETHASONE SODIUM PHOSPHATE 8 MG: 4 INJECTION, SOLUTION INTRA-ARTICULAR; INTRALESIONAL; INTRAMUSCULAR; INTRAVENOUS; SOFT TISSUE at 14:54

## 2018-02-15 RX ADMIN — METOPROLOL TARTRATE 2 MG: 5 INJECTION INTRAVENOUS at 15:07

## 2018-02-15 RX ADMIN — FENTANYL CITRATE 50 MCG: 50 INJECTION, SOLUTION INTRAMUSCULAR; INTRAVENOUS at 14:45

## 2018-02-15 RX ADMIN — TETANUS TOXOID, REDUCED DIPHTHERIA TOXOID AND ACELLULAR PERTUSSIS VACCINE, ADSORBED 0.5 ML: 5; 2.5; 8; 8; 2.5 SUSPENSION INTRAMUSCULAR at 08:02

## 2018-02-15 RX ADMIN — ACETAMINOPHEN 650 MG: 325 TABLET ORAL at 17:59

## 2018-02-15 RX ADMIN — IBUPROFEN 600 MG: 600 TABLET, FILM COATED ORAL at 08:02

## 2018-02-15 RX ADMIN — Medication 2 G: at 15:10

## 2018-02-15 RX ADMIN — SODIUM CHLORIDE, SODIUM LACTATE, POTASSIUM CHLORIDE, CALCIUM CHLORIDE: 600; 310; 30; 20 INJECTION, SOLUTION INTRAVENOUS at 14:36

## 2018-02-15 RX ADMIN — PROPOFOL 150 MG: 10 INJECTION, EMULSION INTRAVENOUS at 14:45

## 2018-02-15 NOTE — ED NOTES
TRANSFER - OUT REPORT:    Verbal report given to [facility] (name) on Michael Ee  being transferred to Ortho (unit) for routine progression of care       Report consisted of patients Situation, Background, Assessment and   Recommendations(SBAR). Information from the following report(s) SBAR, Kardex, ED Summary, [facility] and Recent Results was reviewed with the receiving nurse. Lines:   Peripheral IV 02/15/18 Right Hand (Active)   Site Assessment Clean, dry, & intact 2/15/2018 12:10 PM   Phlebitis Assessment 0 2/15/2018 12:10 PM   Infiltration Assessment 0 2/15/2018 12:10 PM   Dressing Status Clean, dry, & intact 2/15/2018 12:10 PM   Hub Color/Line Status Blue 2/15/2018 12:10 PM        Opportunity for questions and clarification was provided.

## 2018-02-15 NOTE — BRIEF OP NOTE
BRIEF OPERATIVE NOTE    Date of Procedure: 2/15/2018   Preoperative Diagnosis:  Left Forearm Open Fracture  Postoperative Diagnosis:  Left Forearm Open Fracture    Procedure(s):  INCISION AND DRAINAGE LEFT FOREARM OPEN FRACTURE  Surgeon(s) and Role:     * Marily Lr MD - Primary         Assistant Staff: None      Surgical Staff:  Circ-1: Candance Quam  Circ-Relief: Michaelle Wiseman RN  Scrub Tech-1: Timbo Lynn  Scrub Tech-Relief: [place]  Event Time In   Incision Start 1507   Incision Close 1536     Anesthesia: General   Estimated Blood Loss: minimal  Specimens: * No specimens in log *   Findings: no open fracture   Complications: none  Implants: * No implants in log *
18

## 2018-02-15 NOTE — OP NOTES
[facility] OP NOTE    Bev Gibbons  MR#: [id number]  : 1975  ACCOUNT #: [de-identified]   DATE OF SERVICE: 02/15/2018    PREOPERATIVE DIAGNOSIS:  Open fracture, left ulna. POSTOPERATIVE DIAGNOSES:  1. Open wound, left forearm. 2.  Closed fracture, left ulnar shaft. PROCEDURE PERFORMED: Irrigation and debridement of open wound (dog bite), left forearm. SURGEON:  Becky Sampson MD     ANESTHESIA:  General anesthesia. ESTIMATED BLOOD LOSS:  Minimal.      SPECIMENS REMOVED: No specimens. COMPLICATIONS: No complications. IMPLANTS: No implant. SUMMARY:  Patient brought into the operating room, placed on the operating table in supine position and general anesthetic administered. Note that the operative site had been identified in preop holding, and appropriate preoperative antibiotic prophylaxis had been administered. The left upper extremity was prepped and draped in the usual manner. After time out, limb was elevated, exsanguinated with Esmarch bandage and tourniquet inflated to 250 mmHg. The open wound on the distal dorsal forearm was opened further medially and laterally. The skin edges of the wound were excised. The area was explored. There were two holes from tooth marks in the forearm fascia. However, after dividing the fascia and excising anything that appeared contaminated, the muscle itself had only very superficial holes in it which did not in any way communicate with the radius or ulna. Therefore, as indicated by the spiral nature of the fracture, this bone must have fractured secondary to stress from the bite and not the bite itself. Again, there was no communication of the bite wound with the bone. Therefore, the area was thoroughly irrigated with 1 liter of bacitracin irrigation. The wound was closed with 4-0 nylon suture, leaving the central portion of the wound open to allow for drainage if necessary.   Xeroform, 4 x 4's, sterile cast padding and a sugar tong splint immobilizing the forearm were placed. The tourniquet had been released prior to wound closure. Total tourniquet time had been 19 minutes. The patient tolerated the procedure well, was taken back to the PACU in stable condition.       MD VIKKI Tom / Shyanne Matson  D: 02/15/2018 16:11     T: 02/15/2018 17:06  JOB #: [id number]

## 2018-02-15 NOTE — ED NOTES
Left message with OSEAS jackson regarding IV access. Awaiting call back. Charge RN to come assess patient for IV site.

## 2018-02-15 NOTE — PROGRESS NOTES
Patient was educated on discharge instructions by ambulatory RN. Gave patient pain medications, removed IV and helped patient to the car.

## 2018-02-15 NOTE — H&P
ORTHOPAEDIC CONSULT/H&P NOTE    Subjective:     Date of Consultation:  February 15, 2018      Leandra Etienne (AUDREY) is a 38 y.o. male who is being seen for left forearm dog bite/fracture. Pt reports injury occurred this morning, states he was bitten breaking up his dog fighting. Workup has revealed - puncture wound left FA- ulnar fracture. Ambulates at baseline unassisted. Pt. Works as a . Pt. Is right hand dominant. Patient Active Problem List    Diagnosis Date Noted    Obesity (BMI 30.0-34.9) 11/05/2017    ASHD (arteriosclerotic heart disease- cath and stent x2) 06/07/2016    Pneumonitis 06/07/2016    Physical deconditioning 06/07/2016    Dyspepsia 06/07/2016    S/P pharyngoplasty 01/06/2015    Diabetes mellitus ([address] 09/30/2014    Hypertension 09/30/2014    Dyslipidemia 09/30/2014    Obstructive sleep apnea 09/30/2014    Hypogonadism male 09/30/2014     Family History   Problem Relation Age of Onset    Heart Disease Father       Social History   Substance Use Topics    Smoking status: Never Smoker    Smokeless tobacco: Never Used    Alcohol use No     Past Medical History:   Diagnosis Date    Diabetes ([address]     Headache     Hypercholesterolemia     Hypertension     Hypogonadism male     Liver disease     NAFLD    Obstructive sleep apnea     Obstructive sleep apnea       Past Surgical History:   Procedure Laterality Date    HX HEENT      status post pharyngioplasty      Prior to Admission medications    Medication Sig Start Date End Date Taking? Authorizing Provider   dulaglutide (TRULICITY) 1.5 OQ/0.8 mL sub-q pen 0.5 mL by SubCUTAneous route every seven (7) days.  11/8/17   Eun Burgos MD   Insulin Needles, Disposable, 31 gauge x 5/16\" ndle As directed 11/8/17   Eun Burgos MD   insulin glargine (LANTUS,BASAGLAR) 100 unit/mL (3 mL) inpn 20 units daily 11/8/17   Eun Burgos MD   lisinopril (PRINIVIL, ZESTRIL) 40 mg tablet TAKE 1 TABLET BY MOUTH DAILY 7/27/17   Jose Cruz Bradley Vania Cowden, MD   metFORMIN ER (GLUCOPHAGE XR) 500 mg tablet Take 2 tablets with breakfast and dinner 7/26/17   Hernesto Mcfadden MD   hydroCHLOROthiazide (MICROZIDE) 12.5 mg capsule Take 12.5 mg by mouth daily. Historical Provider   atorvastatin (LIPITOR) 20 mg tablet Take 1 Tab by mouth daily. 6/26/17   Hernesto Mcfadden MD   amLODIPine (NORVASC) 10 mg tablet Take 1 Tab by mouth daily. 6/23/17   Hernesto Mcfadden MD   aspirin delayed-release 81 mg tablet Take 1 Tab by mouth daily. 6/23/17   Hernesto Mcfadden MD   clopidogrel (PLAVIX) 75 mg tab Take 1 Tab by mouth daily. 6/23/17   Hernesto Mcfadden MD   spironolactone (ALDACTONE) 25 mg tablet Take 1 Tab by mouth daily. 6/23/17   Hernesto Mcfadden MD   metoprolol succinate (TOPROL-XL) 100 mg tablet Take 1 Tab by mouth daily. 6/23/17   Hernesto Mcfadden MD   omeprazole (PRILOSEC) 40 mg capsule Take 1 Cap by mouth daily. 6/7/16   Hernesto Mcfadden MD   VIAGRA 100 mg tablet  11/24/14   Historical Provider     Current Facility-Administered Medications   Medication Dose Route Frequency    ampicillin-sulbactam (UNASYN) 3 g in 0.9% sodium chloride 100 mL IVPB  3 g IntraVENous NOW    hydrALAZINE (APRESOLINE) 20 mg/mL injection 10 mg  10 mg IntraVENous Q6H PRN    insulin lispro (HUMALOG) injection   SubCUTAneous AC&HS    glucose chewable tablet 16 g  4 Tab Oral PRN    dextrose (D50W) injection syrg 12.5-25 g  12.5-25 g IntraVENous PRN    glucagon (GLUCAGEN) injection 1 mg  1 mg IntraMUSCular PRN     Current Outpatient Prescriptions   Medication Sig    dulaglutide (TRULICITY) 1.5 SC/1.3 mL sub-q pen 0.5 mL by SubCUTAneous route every seven (7) days.     Insulin Needles, Disposable, 31 gauge x 5/16\" ndle As directed    insulin glargine (LANTUS,BASAGLAR) 100 unit/mL (3 mL) inpn 20 units daily    lisinopril (PRINIVIL, ZESTRIL) 40 mg tablet TAKE 1 TABLET BY MOUTH DAILY    metFORMIN ER (GLUCOPHAGE XR) 500 mg tablet Take 2 tablets with breakfast and dinner    hydroCHLOROthiazide (MICROZIDE) 12.5 mg capsule Take 12.5 mg by mouth daily.  atorvastatin (LIPITOR) 20 mg tablet Take 1 Tab by mouth daily.  amLODIPine (NORVASC) 10 mg tablet Take 1 Tab by mouth daily.  aspirin delayed-release 81 mg tablet Take 1 Tab by mouth daily.  clopidogrel (PLAVIX) 75 mg tab Take 1 Tab by mouth daily.  spironolactone (ALDACTONE) 25 mg tablet Take 1 Tab by mouth daily.  metoprolol succinate (TOPROL-XL) 100 mg tablet Take 1 Tab by mouth daily.  omeprazole (PRILOSEC) 40 mg capsule Take 1 Cap by mouth daily.  VIAGRA 100 mg tablet       No Known Allergies     Review of Systems:  A comprehensive review of systems was negative except for that written in the HPI. Mental Status: no dementia    Objective:     Patient Vitals for the past 8 hrs:   BP Temp Pulse Resp SpO2 Height Weight   02/15/18 0721 126/89 97.9 °F (36.6 °C) 94 14 98 % 5' 4\" (1.626 m) 94.9 kg (209 lb 3.5 oz)     Temp (24hrs), Av.9 °F (36.6 °C), Min:97.9 °F (36.6 °C), Max:97.9 °F (36.6 °C)      Gen: Well-developed,  in no acute distress   HEENT: Pink conjunctivae, hearing intact to voice, moist mucous membranes   Neck: Supple  Resp: No respiratory distress   Card: RRR, palpable distal pulse-equal bilaterally, birsk cap refill all distal digits   Abd: Soft, non-distended  Musc: left FA with moderate swelling - dorsal puncture wound- composite fist, full flex/ext of the digits, neurovascular exam intact UE bilat.  right volar FA with superficial lacerations. Neuro: Cranial nerves are grossly intact, no focal motor weakness, follows commands appropriately   Psych: Good insight, oriented to person, place and time, alert    Imaging Review: XR FOREARM LT AP/LAT   INDICATION:   bite wound, left forearm swelling.  COMPARISON: None.   FINDINGS: Two views of the left radius and ulna demonstrate a spiral fracture of  the distal ulnar shaft with slight palmar displacement of the distal fracture  fragment by one third shaft width. Localized soft tissue swelling is noted  without radiopaque foreign body.   IMPRESSION:  Mildly displaced distal ulnar fracture. Labs:   Recent Results (from the past 24 hour(s))   CBC WITH AUTOMATED DIFF    Collection Time: 02/15/18  9:41 AM   Result Value Ref Range    WBC 9.1 4.1 - 11.1 K/uL    RBC 5.31 4.10 - 5.70 M/uL    HGB 14.3 12.1 - 17.0 g/dL    HCT 43.1 36.6 - 50.3 %    MCV 81.2 80.0 - 99.0 FL    MCH 26.9 26.0 - 34.0 PG    MCHC 33.2 30.0 - 36.5 g/dL    RDW 13.3 11.5 - 14.5 %    PLATELET 901 204 - 225 K/uL    MPV 11.3 8.9 - 12.9 FL    NRBC 0.0 0  WBC    ABSOLUTE NRBC 0.00 0.00 - 0.01 K/uL    NEUTROPHILS 72 32 - 75 %    LYMPHOCYTES 19 12 - 49 %    MONOCYTES 8 5 - 13 %    EOSINOPHILS 1 0 - 7 %    BASOPHILS 0 0 - 1 %    IMMATURE GRANULOCYTES 0 0.0 - 0.5 %    ABS. NEUTROPHILS 6.5 1.8 - 8.0 K/UL    ABS. LYMPHOCYTES 1.7 0.8 - 3.5 K/UL    ABS. MONOCYTES 0.7 0.0 - 1.0 K/UL    ABS. EOSINOPHILS 0.1 0.0 - 0.4 K/UL    ABS. BASOPHILS 0.0 0.0 - 0.1 K/UL    ABS. IMM. GRANS. 0.0 0.00 - 0.04 K/UL    DF AUTOMATED     METABOLIC PANEL, COMPREHENSIVE    Collection Time: 02/15/18  9:41 AM   Result Value Ref Range    Sodium 137 136 - 145 mmol/L    Potassium 3.9 3.5 - 5.1 mmol/L    Chloride 105 97 - 108 mmol/L    CO2 25 21 - 32 mmol/L    Anion gap 7 5 - 15 mmol/L    Glucose 133 (H) 65 - 100 mg/dL    BUN 17 6 - 20 MG/DL    Creatinine 1.04 0.70 - 1.30 MG/DL    BUN/Creatinine ratio 16 12 - 20      GFR est AA >60 >60 ml/min/1.73m2    GFR est non-AA >60 >60 ml/min/1.73m2    Calcium 8.6 8.5 - 10.1 MG/DL    Bilirubin, total 0.3 0.2 - 1.0 MG/DL    ALT (SGPT) 37 12 - 78 U/L    AST (SGOT) 22 15 - 37 U/L    Alk.  phosphatase 93 45 - 117 U/L    Protein, total 7.8 6.4 - 8.2 g/dL    Albumin 3.8 3.5 - 5.0 g/dL    Globulin 4.0 2.0 - 4.0 g/dL    A-G Ratio 1.0 (L) 1.1 - 2.2           Impression:     Patient Active Problem List    Diagnosis Date Noted    Open left forearm fracture 02/15/2018    Obesity (BMI 30.0-34.9) 11/05/2017    ASHD (arteriosclerotic heart disease) 06/07/2016    Pneumonitis 06/07/2016    Physical deconditioning 06/07/2016    Dyspepsia 06/07/2016    S/P pharyngoplasty 01/06/2015    Diabetes mellitus ([facility] 09/30/2014    Hypertension 09/30/2014    Dyslipidemia 09/30/2014    Obstructive sleep apnea 09/30/2014    Hypogonadism male 09/30/2014     Active Problems:    Open left forearm fracture (2/15/2018)        Plan:   Left forearm dog bite- open distal ulnar fracture. Recommend I&D   IV Unasyn   Hospitalist consult for pre-op eval/med mgt      Dr. Deion Mahoney aware and agrees with plan as above.         Elfego Matias PA-C  [employer]

## 2018-02-15 NOTE — ED TRIAGE NOTES
Chief Complaint: dog bite   Patient states dog tried to eat his other dog's food. Son tried to give dog to patient and dog bit patient  Onset 0632  Bites noted to left forearm, right wrist and forearm, right ankle and leg ankle. American bull dog pit mix. Patient's dog. Believes dogs' vaccines are up to date. Patient's wife has contacted [employer]. Pt arrived via EMS. Unknown last tetanus.

## 2018-02-15 NOTE — PROGRESS NOTES
Attempted to call ED 3 times to receive report on patient. Was unable to get on the phone with patient's nurse. Will attempt again.

## 2018-02-15 NOTE — DISCHARGE INSTRUCTIONS
Discharge Instructions for:    Odalis Rivers / [id number] : 1975    Admitted 2/15/2018 Discharged: 2/15/2018       Postoperative Hand Surgery Instructions    Call for appointment tomorrow 18 to be seen  or  and scheduled for Ulna Fracture Repair [phone]    Call and have sleep study done for CPAP    Elevation:  Elevation is one of the most important things to do following your surgery. Not only does it decrease swelling, but it also decreases pain. For hand or wrist surgery, keep the arm in your sling while up and about, with your hand above your elbow. When lying down, keep your arm propped up on a few pillows, so that your fingers are pointing towards the ceiling. Finger Motion:  **It is very important that you begin moving your fingers immediately after surgery, as often as you can, in order to prevent stiffness. Wiggling your fingers is not the same as moving them - moving your fingers involves bending them until they touch the dressing   (if possible). You should use your other hand to gently move the fingers on the operative hand if necessary. Dressings:  Please leave the surgical dressing in place until you are seen in the office for your first post-operative appointment with Dr Soo Car. The dressing helps to prevent infection and positions the extremity to protect the surgical procedure  that was performed. Bathing and showering are allowed as long as the dressing is kept dry. To keep your dressing dry while bathing, simply place a plastic bag (bread bag, newspaper bag, trash bag or subway sandwich bag) over the dressing and seal it with tape or a rubber band. Medications: You have likely been given a prescription for pain medication. Please follow the instructions closely.   It is safe to take up to 600mg of Ibuprofen (Advil or Motrin) along with the pain prescription as long you are not allergic to it, are not on blood thinners, and do not have gastric reflux or an ulcer.  However, do not take any additional tylenol/acetaminophen if your prescription contains tylenol. Note that my policy is to give only one prescription for pain medication at the time of the surgery - if you use it up quickly, then you will have no more pain medication left after only a few days, and I will not give you another prescription. So use your pain medication sparingly, and only when necessary! If you have new or increasing numbness after any numbing medicine wears off (12-24 hours for regional blocks, 3 hours for local), call the office immediately. If you experience nausea, vomiting or itching with the pain medication, please call the office during regular office hours. Refills for pain medication prescriptions ARE NOT given on the weekend or after regular office hours. If antibiotics have been prescribed, please be sure to complete the entire prescription. Post-Operative Appointments:  Dr. Paramjit Tejada likes to see you back in the office about 10-14 days following your surgery to change the post-operative dressing and remove any necessary sutures or staples. If you have not received a follow up appointment card please contact our office at [phone]. *If you have any questions or concerns or experience any sudden changes in your condition, please call our office at [phone]*      [address] [facility]    SURGICAL PATIENTS  Surgical patients are the #1 risk for DVT and PE. WHAT IS DVT? WHAT IS PE?  DVT is a serious condition where blood clots develop deep in the veins of the legs. PE occurs when a blood clot breaks loose from the wall of a vein and travels to the lungs blocking the pulmonary artery or one of its branches impairing blood flow from the heart, which could result in death.   RISK FACTORS   Surgery lasting longer than 45 minutes   History of inflammatory bowel disease   Oral contraceptive or hormone replacement therapy   Immobilization   Varicose veins / swollen legs   Smoking    CHF / Acute MI / Irregular heart beat   Family history of thrombosis   General anesthesia greater than 2 hours   Obesity   Infection of less than one month   Less than 1 month postpartum   COPD / Pneumonia   Arthroscopic surgery   Malignancy / cancer   Spine surgery   Blood abnormalities   Stroke / Paralysis / Coma    SIGNS AND SYMPTOMS OF DEEP VEIN TROMBOSIS  Usually occurs in one leg, above or below the knee   Swelling - one calf or thigh may be larger than the other   Feeling increased warmth in the area of the leg that is swollen or painful   Leg pain, which may increase when standing or walking   Swelling along the vein of the leg   When swollen areas is pressed with a finger, a depression may remain   Tenderness of the leg that may be confined to one area   Change in leg color (bluish or red)    SIGNS AND SYMPTOMS OF PULMONARY EMBOLUS   Chest pain that gets worse with deep breath, coughing or chest movement   Coughing up blood   Sweating   Shortness of breath or difficulty breathing   Rapid heart beat   Lightheadedness    HOW TO REDUCE THE POSSIBLE RISK OF DVT   Exercise - simple activities as rotating ankles and wrists, wiggling toes and fingers, tightening and relaxing muscles in calves and thighs promotes circulation while recovering from surgery. Please do these exercises every hour during waking hours      Take mediation as prescribed by your physician (Lovenox, Coumadin, Aspirin)   Resume your normal activities as soon as your doctor advises you to do so.  Remember, when traveling, to [place] your legs frequently. PATIENTS WHO BELIEVE THEY MAY BE EXPERIENCING SIGNS AND SYMPTOMS OF DVT OR PE SHOULD SEEK MEDICAL HELP IMMEDIATELY    DO NOT TAKE TYLENOL/ACETAMINOPHEN WITH PERCOCET, [address], [place] OR [place].     TAKE NARCOTIC PAIN MEDICATIONS WITH FOOD     If given 2 pain narcotics do NOT take together! Narcotics tend to be constipating, we suggest taking a stool softener such as Colace or Miralax (follow package instructions). DO NOT DRIVE WHILE TAKING NARCOTIC PAIN MEDICATIONS. DO NOT TAKE SLEEPING MEDICATIONS OR ANTIANXIETY MEDICATIONS WHILE TAKING NARCOTIC PAIN MEDICATIONS,  ESPECIALLY THE NIGHT OF ANESTHESIA. CPAP PATIENTS BE SURE TO WEAR MACHINE WHENEVER NAPPING OR SLEEPING. DISCHARGE SUMMARY from Nurse    The following personal items collected during your admission are returned to you:   Dental Appliance: Dental Appliances: None  Vision: Visual Aid: None  Hearing Aid:    Jewelry: Jewelry: None  Clothing: Clothing: Slippers  Other Valuables: Other Valuables: Cell Phone  Valuables sent to safe:        PATIENT INSTRUCTIONS:    After General Anesthesia or Intravenous Sedation, for 24 hours or while taking prescription Narcotics:        Someone should be with you for the next 24 hours. For your own safety, a responsible adult must drive you home. · Limit your activities  · Recommended activity: Rest today, up with assistance today. Do not climb stairs or shower unattended for the next 24 hours. · Please start with a soft bland diet and advance as tolerated (no nausea) to regular diet. · If you have a sore throat you should try the following: fluids, warm salt water gargles, or throat lozenges. If it does not improve after several days please follow up with your primary physician. · Do not drive and operate hazardous machinery  · Do not make important personal or business decisions  · Do  not drink alcoholic beverages  · If you have not urinated within 8 hours after discharge, please contact your surgeon on call.     Report the following to your surgeon:  · Excessive pain, swelling, redness or odor of or around the surgical area  · Temperature over 100.5  · Nausea and vomiting lasting longer than 4 hours or if unable to take medications  · Any signs of decreased circulation or nerve impairment to extremity: change in color, persistent  numbness, tingling, coldness or increase pain      · You will receive a Post Operative Call from one of the Recovery Room Nurses on the day after your surgery to check on you. It is very important for us to know how you are recovering after your surgery. If you have an issue or need to speak with someone, please call your surgeon, do not wait for the post operative call. · You may receive an e-mail or letter in the mail from [employer] regarding your experience with us in the Ambulatory Surgery Unit. Your feedback is valuable to us and we appreciate your participation in the survey. · If the above instructions are not adequate, please contact Jose Luis Kang RN, [facility] anesthesia Nurse Manager or our Anesthesiologist, at [phone]. If you are having problems after your surgery, call the physician at his office number. · We wish you a speedy recovery ? What to do at Home:      *  Please give a list of your current medications to your Primary Care Provider. *  Please update this list whenever your medications are discontinued, doses are      changed, or new medications (including over-the-counter products) are added. *  Please carry medication information at all times in case of emergency situations. These are general instructions for a healthy lifestyle:    No smoking/ No tobacco products/ Avoid exposure to second hand smoke    Surgeon General's Warning:  Quitting smoking now greatly reduces serious risk to your health.     Obesity, smoking, and sedentary lifestyle greatly increases your risk for illness    A healthy diet, regular physical exercise & weight monitoring are important for maintaining a healthy lifestyle    You may be retaining fluid if you have a history of heart failure or if you experience any of the following symptoms:  Weight gain of 3 pounds or more overnight or 5 pounds in a week, increased swelling in our hands or feet or shortness of breath while lying flat in bed. Please call your doctor as soon as you notice any of these symptoms; do not wait until your next office visit. Recognize signs and symptoms of STROKE:    B - Balance  E - Eyes    F-  Face looks uneven    A-  Arms unable to move or move even    S-  Speech slurred or non-existent    T-  Time-call 911 as soon as signs and symptoms begin-DO NOT go       Back to bed or wait to see if you get better-TIME IS BRAIN. If you have not received your influenza and/or pneumococcal vaccine, please follow up with your primary care physician. The discharge information has been reviewed with the patient and spouse. The patient and spouse verbalized understanding.

## 2018-02-15 NOTE — ED PROVIDER NOTES
EMERGENCY DEPARTMENT HISTORY AND PHYSICAL EXAM      Date: 2/15/2018  Patient Name: Leandra Etienne    History of Presenting Illness     Chief Complaint   Patient presents with    Animal Bite       History Provided By: Patient    Patient is a 39 y/o male presenting via EMS to the ER with BL arm and feet pain s/p dog bite approx one hour PTA. Patient reports the dog bit him when he was moving his food bowl. Patient reports being bit in the bilateral arms and feet. Patient notes pain is 8 out of 10 and primarily in the left forearm. He states the pain is constant and moderate to severe in severity. It is unlikely that a foreign body is present. He denies any exacerbating or alleviating factors. Pertinent negatives include no weakness or numbness. Patient reports the dogs shots are up to date. Tetanus status is unknown. Patient denies any weakness or paresthesias in effected extremities. Pt denies any other sxs at this time. He denies any recent fevers. PCP: Eun Burgos MD    There are no other complaints, changes, or physical findings at this time. Current Outpatient Prescriptions   Medication Sig Dispense Refill    HYDROcodone-acetaminophen (NORCO) 5-325 mg per tablet Take 1 Tab by mouth every four (4) hours as needed for Pain. Max Daily Amount: 6 Tabs. 25 Tab 0    amoxicillin-clavulanate (AUGMENTIN) 875-125 mg per tablet Take 1 Tab by mouth every twelve (12) hours. Indications: DOG BITE WOUND 14 Tab 0    dulaglutide (TRULICITY) 1.5 NX/9.8 mL sub-q pen 0.5 mL by SubCUTAneous route every seven (7) days. 4 Pen 11    insulin glargine (LANTUS,BASAGLAR) 100 unit/mL (3 mL) inpn 20 units daily 2 Pen 11    lisinopril (PRINIVIL, ZESTRIL) 40 mg tablet TAKE 1 TABLET BY MOUTH DAILY 30 Tab 11    metFORMIN ER (GLUCOPHAGE XR) 500 mg tablet Take 2 tablets with breakfast and dinner 120 Tab 11    hydroCHLOROthiazide (MICROZIDE) 12.5 mg capsule Take 12.5 mg by mouth daily.       atorvastatin (LIPITOR) 20 mg tablet Take 1 Tab by mouth daily. 30 Tab 11    aspirin delayed-release 81 mg tablet Take 1 Tab by mouth daily. 30 Tab 11    clopidogrel (PLAVIX) 75 mg tab Take 1 Tab by mouth daily. 30 Tab 11    spironolactone (ALDACTONE) 25 mg tablet Take 1 Tab by mouth daily. 30 Tab 11    metoprolol succinate (TOPROL-XL) 100 mg tablet Take 1 Tab by mouth daily. 30 Tab 11    omeprazole (PRILOSEC) 40 mg capsule Take 1 Cap by mouth daily. 30 Cap 11    Insulin Needles, Disposable, 31 gauge x 5/16\" ndle As directed 60 Package 11    amLODIPine (NORVASC) 10 mg tablet Take 1 Tab by mouth daily. 30 Tab 11    VIAGRA 100 mg tablet            Past Medical History:   Diagnosis Date    CAD (coronary artery disease)     2 stents 2016     Diabetes ([address]     Headache     Hypercholesterolemia     Hypertension     Hypogonadism male     Liver disease     NAFLD    Obstructive sleep apnea     Obstructive sleep apnea        Past Surgical History:   Procedure Laterality Date    HX HEENT      status post pharyngioplasty         Family History:   Problem Relation Age of Onset    Heart Disease Father        Social History     Social History    Marital status:      Spouse name: N/A    Number of children: 2    Years of education: 12     Occupational History          Social History Main Topics    Smoking status: Never Smoker    Smokeless tobacco: Never Used    Alcohol use No    Drug use: No    Sexual activity: Yes     Partners: Female     Other Topics Concern    Not on file     Social History Narrative         ALLERGIES: Review of patient's allergies indicates no known allergies. Review of Systems   Constitutional: Negative for fever. HENT: Negative. Eyes: Negative. Respiratory: Negative. Cardiovascular: Negative. Musculoskeletal: Negative. Skin: Positive for wound. Neurological: Negative for weakness and numbness.        Vitals:    02/15/18 1600 02/15/18 1605 02/15/18 1614 02/15/18 1630   BP: 117/75 134/86 147/69 (!) 145/97   Pulse: (!) 108 97 (!) 102 94   Resp: 19 20 17 17   Temp:  97.6 °F (36.4 °C)     SpO2: 98% 100% 100% 94%   Weight:       Height:                Physical Exam   Constitutional: He appears well-developed. HENT:   Head: Normocephalic and atraumatic. Neck: Normal range of motion. Cardiovascular: Normal rate and regular rhythm. Pulmonary/Chest: Effort normal and breath sounds normal. No respiratory distress. He has no wheezes. He has no rales. He exhibits no tenderness. Abdominal: Soft. Bowel sounds are normal. There is no tenderness. Musculoskeletal: Normal range of motion. He exhibits edema (left forearm) and tenderness. Skin:   3cm partial thickness wound to left forearm, mild swelling, no erythema  Two 2cm superficial linear bites to right forearm  Bilateral puncture wounds to feet consistent with bites   Vitals reviewed. MDM  Number of Diagnoses or Management Options  Type I or II open displaced oblique fracture of shaft of left ulna, initial encounter:   Diagnosis management comments: The patient presents with dog bite. Diff dx include animal bite, cellulitis, fracture. Given swelling to left forearm with obtain xray of forearm. Will not suture wound given high risk for infection. Will irrigate wounds with NS. Augmentin given. Ibuprofen for pain. Will discharge with Augmentin. ED Course       Procedures      Diagnostic Study Results     Labs -   No results found for this or any previous visit (from the past 12 hour(s)). Radiologic Studies -   XR FOREARM LT AP/LAT   Final Result   EXAM:  XR FOREARM LT AP/LAT     INDICATION:   bite wound, left forearm swelling.     COMPARISON: None.     FINDINGS: Two views of the left radius and ulna demonstrate a spiral fracture of  the distal ulnar shaft with slight palmar displacement of the distal fracture  fragment by one third shaft width.   Localized soft tissue swelling is noted  without radiopaque foreign body.     IMPRESSION  IMPRESSION:  Mildly displaced distal ulnar fracture. Medical Decision Making   I am the first provider for this patient. I reviewed the vital signs, available nursing notes, past medical history, past surgical history, family history and social history. The patient presents with dog bite. Diff dx include animal bite, cellulitis, fracture. Given swelling to left forearm with obtain xray of forearm. Will not suture wound given high risk for infection. Will irrigate wounds with NS. Augmentin given. Ibuprofen for pain. Will discharge with Augmentin. Vital Signs-Reviewed the patient's vital signs. No data found. EKG interpretation: (Preliminary) 11:58 AM  Rhythm: normal sinus rhythm; and regular . Rate (approx.): 92 bpm; Axis: normal; AL interval: normal; QRS interval: normal ; ST/T wave: normal; minimal voltage criteria for LVH/    Records Reviewed: Old Medical Records    Provider Notes (Medical Decision Making): The patient presents with dog bite. Diff dx include animal bite, cellulitis, fracture. Given swelling to left forearm with obtain xray of forearm. Will not suture wound given high risk for infection. Will irrigate wounds with NS. Augmentin given. Ibuprofen for pain. Will discharge with Augmentin. ED Course:   Initial assessment performed. The patients presenting problems have been discussed, and they are in agreement with the care plan formulated and outlined with them. I have encouraged them to ask questions as they arise throughout their visit. Xray should mildly displaced ulnar fracture. Will treat as open fracture given dog bite directly over fracture. Unasyn given. Morphine for pain control. Ortho consulted. 8:51 AM  Tigertexted OSEAS Colby and Angel Trevizo MD for a consult. Written by Robert De Luna for Salvador Morales MD.     CONSULT NOTE:   9:08 AM  Salvador Morales MD spoke with Duwayne Ormond  and Angel Trevizo MD,  Specialty: orthopedics. Discussed pt's hx, disposition, and available diagnostic and imaging results. Reviewed care plans. Consultant agrees with plans as outlined. Dr. Jose Johnson is currently in the OR and will call back. Written by Duane Orange, Scribe for Jaz Gonzalez MD.     9:46 AM  OSEAS Brumfield is at pt's bedside. I personally saw and examined the patient. I found the following on physical exam:    Open fracture, diabetic admit for ortho wash out    I have reviewed and agree with the Resident's findings, including all diagnostic interpretations, and plans as written. I was present during the key portions of separately billed procedures. Jaz Gonzalez MD        Disposition:  10:18 AM  Patient is being admitted to orthopedics by OSEAS Brumfield. The results of their tests and reasons for their admission have been discussed with them and/or available family. They convey agreement and understanding for the need to be admitted and for their admission diagnosis. Consultation has been made with the inpatient physician specialist for hospitalization. PLAN:  1. Admit to orthopedics      Diagnosis     Clinical Impression:   1. Type I or II open displaced oblique fracture of shaft of left ulna, initial encounter    2.  Postoperative pain of extremity           Attestations:    Betsy Cavanaugh MD

## 2018-02-15 NOTE — ED NOTES
Pt. Is a difficult stick. Staff attempted unsuccessfully to obtain IV access. Awaiting ultrasound IV placement.

## 2018-02-15 NOTE — ANESTHESIA PREPROCEDURE EVALUATION
Anesthetic History   No history of anesthetic complications            Review of Systems / Medical History  Patient summary reviewed, nursing notes reviewed and pertinent labs reviewed    Pulmonary        Sleep apnea: No treatment           Neuro/Psych   Within defined limits           Cardiovascular    Hypertension          CAD (s/p stents x2)    Exercise tolerance: >4 METS     GI/Hepatic/Renal           Liver disease (fatty liver)     Endo/Other    Diabetes: type 2    Morbid obesity     Other Findings              Physical Exam    Airway  Mallampati: I  TM Distance: > 6 cm  Neck ROM: normal range of motion   Mouth opening: Normal     Cardiovascular    Rhythm: regular  Rate: normal      Pertinent negatives: No murmur   Dental  No notable dental hx       Pulmonary  Breath sounds clear to auscultation               Abdominal  GI exam deferred       Other Findings            Anesthetic Plan    ASA: 2  Anesthesia type: general          Induction: Intravenous  Anesthetic plan and risks discussed with: Patient      preop glucose.   Took BB at 730 am yesterday

## 2018-02-15 NOTE — ED NOTES
Dr. Nacho Neal unsuccessful at placing EJ at this time. Will speak with Dr. Blaise Driscoll about obtaining IV access.

## 2018-02-15 NOTE — CONSULTS
Hospitalist Consultation Note    NAME:  Prentis Mcburney   :   1975   MRN:   [id number]     ATTENDING: Fernandez Bell MD  PCP:  Suni Dunn MD    Date/Time:  2/15/2018 11:04 AM      Recommendations/Plan:     CAD/HTN  -s/p PCI approx 2 years ago  -reports compliance with home meds. BP remains stable at 126/89  -will resume home medications to include: ASA/plavix/statin , BB, ACEi, spironolactone  -hydralazine prn    F4RL  -takes Trulicity every Tuesday  -cont' home lantus 20 units qhs  -SSI, diabetic diet when ok with primary team    Dog bites  -managed by primary team.  Scheduled for the OR today    This is a patient of Dr Ute Briceno. Orders placed to contact Dr Ute Briceno to take over care in the AM.  Thank you for the consulte      Code Status: Full  DVT Prophylaxis: per primary team          Subjective:   REQUESTING PHYSICIAN:  REASON FOR CONSULT:      Cary Morris is a 38 y.o.  male with pmhx significant for CAD s/p PCI, HTN, T2DM, HLD, present to ED for evaluation of b/l arm and feet pain after he suffered multiple bites from his jyoti bull dog. Primary c/o currently being managed by orthopedic team.  We were asked to manage his HTN/DM. Pt reports compliance with all his medications. He has not taken his morning meds due to this acute event. Past Medical History:   Diagnosis Date    Diabetes ([address]     Headache     Hypercholesterolemia     Hypertension     Hypogonadism male     Liver disease     NAFLD    Obstructive sleep apnea     Obstructive sleep apnea       Past Surgical History:   Procedure Laterality Date    HX HEENT      status post pharyngioplasty     Social History   Substance Use Topics    Smoking status: Never Smoker    Smokeless tobacco: Never Used    Alcohol use No      Family History   Problem Relation Age of Onset    Heart Disease Father        No Known Allergies   Prior to Admission medications    Medication Sig Start Date End Date Taking? Authorizing Provider   dulaglutide (TRULICITY) 1.5 QQ/4.9 mL sub-q pen 0.5 mL by SubCUTAneous route every seven (7) days. 11/8/17   Maribel Crump MD   Insulin Needles, Disposable, 31 gauge x 5/16\" ndle As directed 11/8/17   Maribel Crump MD   insulin glargine (LANTUS,BASAGLAR) 100 unit/mL (3 mL) inpn 20 units daily 11/8/17   Maribel Crump MD   lisinopril (PRINIVIL, ZESTRIL) 40 mg tablet TAKE 1 TABLET BY MOUTH DAILY 7/27/17   Maribel Crump MD   metFORMIN ER (GLUCOPHAGE XR) 500 mg tablet Take 2 tablets with breakfast and dinner 7/26/17   Maribel Crump MD   hydroCHLOROthiazide (MICROZIDE) 12.5 mg capsule Take 12.5 mg by mouth daily. Historical Provider   atorvastatin (LIPITOR) 20 mg tablet Take 1 Tab by mouth daily. 6/26/17   Maribel Crump MD   amLODIPine (NORVASC) 10 mg tablet Take 1 Tab by mouth daily. 6/23/17   Maribel Crump MD   aspirin delayed-release 81 mg tablet Take 1 Tab by mouth daily. 6/23/17   Maribel Crump MD   clopidogrel (PLAVIX) 75 mg tab Take 1 Tab by mouth daily. 6/23/17   Maribel Crump MD   spironolactone (ALDACTONE) 25 mg tablet Take 1 Tab by mouth daily. 6/23/17   Maribel Crump MD   metoprolol succinate (TOPROL-XL) 100 mg tablet Take 1 Tab by mouth daily. 6/23/17   Maribel Crump MD   omeprazole (PRILOSEC) 40 mg capsule Take 1 Cap by mouth daily. 6/7/16   Maribel Crump MD   VIAGRA 100 mg tablet  11/24/14   Historical Provider       REVIEW OF SYSTEMS:     Total of 12 systems reviewed as follows:   I am not able to complete the review of systems because:    The patient is intubated and sedated    The patient has altered mental status due to his acute medical problems    The patient has baseline aphasia from prior stroke(s)    The patient has baseline dementia and is not reliable historian                 POSITIVE= underlined text  Negative = text not underlined  General:  fever, chills, sweats, generalized weakness, weight loss/gain,      loss of appetite   Eyes:    blurred vision, eye pain, loss of vision, double vision  ENT:    rhinorrhea, pharyngitis   Respiratory:   cough, sputum production, SOB, wheezing, SABILLON, pleuritic pain   Cardiology:   chest pain, palpitations, orthopnea, PND, edema, syncope   Gastrointestinal:  abdominal pain , N/V, dysphagia, diarrhea, constipation, bleeding   Genitourinary:  frequency, urgency, dysuria, hematuria, incontinence   Muskuloskeletal :  arthralgia, myalgia,  Skin:    multiple dog bite marks on b/l arm and feet  Hematology:  easy bruising, nose or gum bleeding, lymphadenopathy   Dermatological: rash, ulceration, pruritis   Endocrine:   hot flashes or polydipsia   Neurological:  headache, dizziness, confusion, focal weakness, paresthesia,     Speech difficulties, memory loss, gait disturbance  Psychological: Feelings of anxiety, depression, agitation    Objective:   VITALS:    Visit Vitals    /89 (BP 1 Location: Left arm, BP Patient Position: At rest)    Pulse 94    Temp 97.9 °F (36.6 °C)    Resp 14    Ht 5' 4\" (1.626 m)    Wt 94.9 kg (209 lb 3.5 oz)    SpO2 98%    BMI 35.91 kg/m2     Temp (24hrs), Av.9 °F (36.6 °C), Min:97.9 °F (36.6 °C), Max:97.9 °F (36.6 °C)      PHYSICAL EXAM:   General:    Alert, cooperative, no distress, appears stated age. HEENT: Atraumatic, anicteric sclerae, pink conjunctivae     No oral ulcers, mucosa moist, throat clear  Neck:  Supple, symmetrical,  thyroid: non tender  Lungs:   Clear to auscultation bilaterally. No Wheezing or Rhonchi. No rales. Chest wall:  No tenderness  No Accessory muscle use. Heart:   Regular  rhythm,  No  murmur   No edema  Abdomen:   Soft, non-tender. Not distended. Bowel sounds normal  Extremities: No cyanosis. No clubbing  Skin:     Bite marks on b/l arms/feet  Psych:  Good insight. Not depressed. Not anxious or agitated. Neurologic: EOMs intact. No facial asymmetry. No aphasia or slurred speech. Symmetrical strength, Alert and oriented X 4. _______________________________________________________________________  Care Plan discussed with:    Comments   Patient x    Family  x wife   RN x    Care Manager                    Consultant:      ____________________________________________________________________  TOTAL TIME:     45 mins    Comments    x Reviewed previous records   >50% of visit spent in counseling and coordination of care x Discussion with patient and/or family and questions answered       Critical Care Provided     Minutes non procedure based  ________________________________________________________________________  Signed: Damaris Barragan MD      Procedures: see electronic medical records for all procedures/Xrays and details which were not copied into this note but were reviewed prior to creation of Plan. LAB DATA REVIEWED:    Recent Results (from the past 24 hour(s))   CBC WITH AUTOMATED DIFF    Collection Time: 02/15/18  9:41 AM   Result Value Ref Range    WBC 9.1 4.1 - 11.1 K/uL    RBC 5.31 4.10 - 5.70 M/uL    HGB 14.3 12.1 - 17.0 g/dL    HCT 43.1 36.6 - 50.3 %    MCV 81.2 80.0 - 99.0 FL    MCH 26.9 26.0 - 34.0 PG    MCHC 33.2 30.0 - 36.5 g/dL    RDW 13.3 11.5 - 14.5 %    PLATELET 254 724 - 310 K/uL    MPV 11.3 8.9 - 12.9 FL    NRBC 0.0 0  WBC    ABSOLUTE NRBC 0.00 0.00 - 0.01 K/uL    NEUTROPHILS 72 32 - 75 %    LYMPHOCYTES 19 12 - 49 %    MONOCYTES 8 5 - 13 %    EOSINOPHILS 1 0 - 7 %    BASOPHILS 0 0 - 1 %    IMMATURE GRANULOCYTES 0 0.0 - 0.5 %    ABS. NEUTROPHILS 6.5 1.8 - 8.0 K/UL    ABS. LYMPHOCYTES 1.7 0.8 - 3.5 K/UL    ABS. MONOCYTES 0.7 0.0 - 1.0 K/UL    ABS. EOSINOPHILS 0.1 0.0 - 0.4 K/UL    ABS. BASOPHILS 0.0 0.0 - 0.1 K/UL    ABS. IMM.  GRANS. 0.0 0.00 - 0.04 K/UL    DF AUTOMATED     METABOLIC PANEL, COMPREHENSIVE    Collection Time: 02/15/18  9:41 AM   Result Value Ref Range    Sodium 137 136 - 145 mmol/L    Potassium 3.9 3.5 - 5.1 mmol/L    Chloride 105 97 - 108 mmol/L    CO2 25 21 - 32 mmol/L    Anion gap 7 5 - 15 mmol/L    Glucose 133 (H) 65 - 100 mg/dL    BUN 17 6 - 20 MG/DL    Creatinine 1.04 0.70 - 1.30 MG/DL    BUN/Creatinine ratio 16 12 - 20      GFR est AA >60 >60 ml/min/1.73m2    GFR est non-AA >60 >60 ml/min/1.73m2    Calcium 8.6 8.5 - 10.1 MG/DL    Bilirubin, total 0.3 0.2 - 1.0 MG/DL    ALT (SGPT) 37 12 - 78 U/L    AST (SGOT) 22 15 - 37 U/L    Alk.  phosphatase 93 45 - 117 U/L    Protein, total 7.8 6.4 - 8.2 g/dL    Albumin 3.8 3.5 - 5.0 g/dL    Globulin 4.0 2.0 - 4.0 g/dL    A-G Ratio 1.0 (L) 1.1 - 2.2         _____________________________  Hospitalist: Charisma Hudson MD

## 2018-02-15 NOTE — PROGRESS NOTES
Primary Nurse Jordyn Dupree RN performed a dual skin assessment on this patient Impairment noted- see wound doc flow sheet  Artem score is 23

## 2018-02-15 NOTE — IP AVS SNAPSHOT
[address] [facility] 83. 
[phone] Patient: Odalis Rivers MRN: [id number] KSP:[id number] About your hospitalization You were admitted on:  February 15, 2018 You last received care in the:  [facility] PACU You were discharged on:  February 15, 2018 Why you were hospitalized Your primary diagnosis was:  Not on File Your diagnoses also included:  Open Left Forearm Fracture Follow-up Information Follow up With Details Comments Contact Info Benn Eisenmenger, MD   [facility] [address] [facility] 57 
[phone] Discharge Orders None A check verónica indicates which time of day the medication should be taken. My Medications START taking these medications Instructions Each Dose to Equal  
 Morning Noon Evening Bedtime  
 amoxicillin-clavulanate 875-125 mg per tablet Commonly known as:  AUGMENTIN Your last dose was: Your next dose is: Take 1 Tab by mouth every twelve (12) hours. Indications: DOG BITE WOUND  
 1 Tab HYDROcodone-acetaminophen 5-325 mg per tablet Commonly known as:  Ana Asher Your last dose was: Your next dose is: Take 1 Tab by mouth every four (4) hours as needed for Pain. Max Daily Amount: 6 Tabs. 1 Tab CONTINUE taking these medications Instructions Each Dose to Equal  
 Morning Noon Evening Bedtime  
 amLODIPine 10 mg tablet Commonly known as:  Gurdeep Gibson Your last dose was: Your next dose is: Take 1 Tab by mouth daily. 10 mg  
    
   
   
   
  
 aspirin delayed-release 81 mg tablet Your last dose was: Your next dose is: Take 1 Tab by mouth daily. 81 mg  
    
   
   
   
  
 atorvastatin 20 mg tablet Commonly known as:  LIPITOR Your last dose was: Your next dose is: Take 1 Tab by mouth daily. 20 mg  
    
   
   
   
  
 clopidogrel 75 mg Tab Commonly known as:  PLAVIX Your last dose was: Your next dose is: Take 1 Tab by mouth daily. 75 mg  
    
   
   
   
  
 dulaglutide 1.5 mg/0.5 mL sub-q pen Commonly known as:  TRULICITY Your last dose was: Your next dose is: 0.5 mL by SubCUTAneous route every seven (7) days. 1.5 mg  
    
   
   
   
  
 hydroCHLOROthiazide 12.5 mg capsule Commonly known as:  Justin Meadows Your last dose was: Your next dose is: Take 12.5 mg by mouth daily. 12.5 mg  
    
   
   
   
  
 insulin glargine 100 unit/mL (3 mL) Inpn Commonly known as:  Tameka Chiang Your last dose was: Your next dose is:    
   
   
 20 units daily Insulin Needles (Disposable) 31 gauge x 5/16\" Ndle Your last dose was: Your next dose is: As directed  
     
   
   
   
  
 lisinopril 40 mg tablet Commonly known as:  Marielena Grebe Your last dose was: Your next dose is: TAKE 1 TABLET BY MOUTH DAILY  
     
   
   
   
  
 metFORMIN  mg tablet Commonly known as:  GLUCOPHAGE XR Your last dose was: Your next dose is: Take 2 tablets with breakfast and dinner  
     
   
   
   
  
 metoprolol succinate 100 mg tablet Commonly known as:  TOPROL-XL Your last dose was: Your next dose is: Take 1 Tab by mouth daily. 100 mg  
    
   
   
   
  
 omeprazole 40 mg capsule Commonly known as:  PRILOSEC Your last dose was: Your next dose is: Take 1 Cap by mouth daily. 40 mg  
    
   
   
   
  
 spironolactone 25 mg tablet Commonly known as:  ALDACTONE Your last dose was: Your next dose is: Take 1 Tab by mouth daily.   
 25 mg  
    
   
   
   
  
 VIAGRA 100 mg tablet Generic drug:  sildenafil citrate Your last dose was: Your next dose is:    
   
   
      
   
   
   
  
  
  
Where to Get Your Medications [facility], [address]  [address] [facility], [address] [place] [id number] Hours:  24-hours Phone:  [phone]  
  amoxicillin-clavulanate 875-125 mg per tablet Information on where to get these meds will be given to you by the nurse or doctor. ! Ask your nurse or doctor about these medications HYDROcodone-acetaminophen 5-325 mg per tablet Discharge Instructions Discharge Instructions for:  
 Nima Jenny / [id number] : 1975 Admitted 2/15/2018 Discharged: 2/15/2018 Postoperative Hand Surgery Instructions Call for appointment tomorrow 18 to be seen  or  and scheduled for Ulna Fracture Repair [phone] Call and have sleep study done for CPAP Elevation: 
Elevation is one of the most important things to do following your surgery. Not only does it decrease swelling, but it also decreases pain. For hand or wrist surgery, keep the arm in your sling while up and about, with your hand above your elbow. When lying down, keep your arm propped up on a few pillows, so that your fingers are pointing towards the ceiling. Finger Motion: **It is very important that you begin moving your fingers immediately after surgery, as often as you can, in order to prevent stiffness. Wiggling your fingers is not the same as moving them - moving your fingers involves bending them until they touch the dressing  
(if possible). You should use your other hand to gently move the fingers on the operative hand if necessary. Dressings: 
Please leave the surgical dressing in place until you are seen in the office for your first post-operative appointment with Dr Claudio Cheng.   The dressing helps to prevent infection and positions the extremity to protect the surgical procedure  that was performed. Bathing and showering are allowed as long as the dressing is kept dry. To keep your dressing dry while bathing, simply place a plastic bag (bread bag, newspaper bag, trash bag or subway sandwich bag) over the dressing and seal it with tape or a rubber band. Medications: You have likely been given a prescription for pain medication. Please follow the instructions closely. It is safe to take up to 600mg of Ibuprofen (Advil or Motrin) along with the pain prescription as long you are not allergic to it, are not on blood thinners, and do not have gastric reflux or an ulcer. However, do not take any additional tylenol/acetaminophen if your prescription contains tylenol. Note that my policy is to give only one prescription for pain medication at the time of the surgery - if you use it up quickly, then you will have no more pain medication left after only a few days, and I will not give you another prescription. So use your pain medication sparingly, and only when necessary! If you have new or increasing numbness after any numbing medicine wears off (12-24 hours for regional blocks, 3 hours for local), call the office immediately. If you experience nausea, vomiting or itching with the pain medication, please call the office during regular office hours. Refills for pain medication prescriptions ARE NOT given on the weekend or after regular office hours. If antibiotics have been prescribed, please be sure to complete the entire prescription. Post-Operative Appointments: 
Dr. Lori Chavez likes to see you back in the office about 10-14 days following your surgery to change the post-operative dressing and remove any necessary sutures or staples. If you have not received a follow up appointment card please contact our office at [phone]. *If you have any questions or concerns or experience any sudden changes in your condition, please call our office at [phone]* Paulino  41. THROMBOSIS AND PULMONARY EMBOLUS 
 
SURGICAL PATIENTS Surgical patients are the #1 risk for DVT and PE. WHAT IS DVT? WHAT IS PE? 
DVT is a serious condition where blood clots develop deep in the veins of the legs. PE occurs when a blood clot breaks loose from the wall of a vein and travels to the lungs blocking the pulmonary artery or one of its branches impairing blood flow from the heart, which could result in death. RISK FACTORS 
? Surgery lasting longer than 45 minutes ? History of inflammatory bowel disease 
? Oral contraceptive or hormone replacement therapy ? Immobilization ? Varicose veins / swollen legs ? Smoking  
? CHF / Acute MI / Irregular heart beat ? Family history of thrombosis ? General anesthesia greater than 2 hours ? Obesity ? Infection of less than one month ? Less than 1 month postpartum 
? COPD / Pneumonia ? Arthroscopic surgery ? Malignancy / cancer ? Spine surgery ? Blood abnormalities ? Stroke / Paralysis / Coma SIGNS AND SYMPTOMS OF DEEP VEIN TROMBOSIS Usually occurs in one leg, above or below the knee ? Swelling  one calf or thigh may be larger than the other ? Feeling increased warmth in the area of the leg that is swollen or painful ? Leg pain, which may increase when standing or walking ? Swelling along the vein of the leg ? When swollen areas is pressed with a finger, a depression may remain ? Tenderness of the leg that may be confined to one area ? Change in leg color (bluish or red) SIGNS AND SYMPTOMS OF PULMONARY EMBOLUS 
? Chest pain that gets worse with deep breath, coughing or chest movement ? Coughing up blood ? Sweating ? Shortness of breath or difficulty breathing ? Rapid heart beat ? Lightheadedness HOW TO REDUCE THE POSSIBLE RISK OF DVT ? Exercise  simple activities as rotating ankles and wrists, wiggling toes and fingers, tightening and relaxing muscles in calves and thighs promotes circulation while recovering from surgery. Please do these exercises every hour during waking hours ? ? Take mediation as prescribed by your physician (Lovenox, Coumadin, Aspirin) ? Resume your normal activities as soon as your doctor advises you to do so. 
? Remember, when traveling, to [place] your legs frequently. PATIENTS WHO BELIEVE THEY MAY BE EXPERIENCING SIGNS AND SYMPTOMS OF DVT OR PE SHOULD SEEK MEDICAL HELP IMMEDIATELY 
 
DO NOT TAKE TYLENOL/ACETAMINOPHEN WITH PERCOCET, [address], [place] OR VICODEN. TAKE NARCOTIC PAIN MEDICATIONS WITH FOOD If given 2 pain narcotics do NOT take together! Narcotics tend to be constipating, we suggest taking a stool softener such as Colace or Miralax (follow package instructions). DO NOT DRIVE WHILE TAKING NARCOTIC PAIN MEDICATIONS. DO NOT TAKE SLEEPING MEDICATIONS OR ANTIANXIETY MEDICATIONS WHILE TAKING NARCOTIC PAIN MEDICATIONS,  ESPECIALLY THE NIGHT OF ANESTHESIA. CPAP PATIENTS BE SURE TO WEAR MACHINE WHENEVER NAPPING OR SLEEPING. DISCHARGE SUMMARY from Nurse The following personal items collected during your admission are returned to you:  
Dental Appliance: Dental Appliances: None Vision: Visual Aid: None Hearing Aid:   
Jewelry: Jewelry: None Clothing: Clothing: Slippers Other Valuables: Other Valuables: Cell Phone Valuables sent to safe:   
 
 
PATIENT INSTRUCTIONS: 
 
After General Anesthesia or Intravenous Sedation, for 24 hours or while taking prescription Narcotics: 
      Someone should be with you for the next 24 hours. For your own safety, a responsible adult must drive you home. · Limit your activities · Recommended activity: Rest today, up with assistance today. Do not climb stairs or shower unattended for the next 24 hours. · Please start with a soft bland diet and advance as tolerated (no nausea) to regular diet. · If you have a sore throat you should try the following: fluids, warm salt water gargles, or throat lozenges. If it does not improve after several days please follow up with your primary physician. · Do not drive and operate hazardous machinery · Do not make important personal or business decisions · Do  not drink alcoholic beverages · If you have not urinated within 8 hours after discharge, please contact your surgeon on call. Report the following to your surgeon: 
· Excessive pain, swelling, redness or odor of or around the surgical area · Temperature over 100.5 · Nausea and vomiting lasting longer than 4 hours or if unable to take medications · Any signs of decreased circulation or nerve impairment to extremity: change in color, persistent  numbness, tingling, coldness or increase pain · You will receive a Post Operative Call from one of the Recovery Room Nurses on the day after your surgery to check on you. It is very important for us to know how you are recovering after your surgery. If you have an issue or need to speak with someone, please call your surgeon, do not wait for the post operative call. · You may receive an e-mail or letter in the mail from [place] regarding your experience with us in the Ambulatory Surgery Unit. Your feedback is valuable to us and we appreciate your participation in the survey. · If the above instructions are not adequate, please contact Jose Luis Kang RN, [facility] anesthesia Nurse Manager or our Anesthesiologist, at [phone]. If you are having problems after your surgery, call the physician at his office number. · We wish you a speedy recovery ? What to do at Home: *  Please give a list of your current medications to your Primary Care Provider.  
 
*  Please update this list whenever your medications are discontinued, doses are 
 changed, or new medications (including over-the-counter products) are added. *  Please carry medication information at all times in case of emergency situations. These are general instructions for a healthy lifestyle: No smoking/ No tobacco products/ Avoid exposure to second hand smoke Surgeon General's Warning:  Quitting smoking now greatly reduces serious risk to your health. Obesity, smoking, and sedentary lifestyle greatly increases your risk for illness A healthy diet, regular physical exercise & weight monitoring are important for maintaining a healthy lifestyle You may be retaining fluid if you have a history of heart failure or if you experience any of the following symptoms:  Weight gain of 3 pounds or more overnight or 5 pounds in a week, increased swelling in our hands or feet or shortness of breath while lying flat in bed. Please call your doctor as soon as you notice any of these symptoms; do not wait until your next office visit. Recognize signs and symptoms of STROKE: 
 
B - Balance E - Eyes F-  Face looks uneven A-  Arms unable to move or move even S-  Speech slurred or non-existent T-  Time-call 911 as soon as signs and symptoms begin-DO NOT go Back to bed or wait to see if you get better-TIME IS BRAIN. If you have not received your influenza and/or pneumococcal vaccine, please follow up with your primary care physician. The discharge information has been reviewed with the patient and spouse. The patient and spouse verbalized understanding. Introducing [facility]! Dear Jewels Glaser: Thank you for requesting a gloStream account. Our records indicate that you already have an active gloStream account. You can access your account anytime at [email or web address] Did you know that you can access your hospital and ER discharge instructions at any time in [email or web address]? You can also review all of your test results from your hospital stay or ER visit. Additional Information If you have questions, please visit the Frequently Asked Questions section of the authorGEN website at [email or web address] Remember, MyChart is NOT to be used for urgent needs. For medical emergencies, dial 911. Now available from your iPhone and Android! Providers Seen During Your Hospitalization Provider Specialty Primary office phone Maribell Dean MD Emergency Medicine [phone] Ninette Severs, MD Emergency Medicine [phone] Andrey Britton MD Orthopedic Surgery [phone] Immunizations Administered for This Admission Name Date Tdap 2/15/2018 Your Primary Care Physician (PCP) Primary Care Physician Office Phone Office Fax [address] [facility] [phone] You are allergic to the following No active allergies Recent Documentation Height Weight BMI Smoking Status 1.626 m 94.9 kg 35.91 kg/m2 Never Smoker Emergency Contacts Name Discharge Info Relation Home Work Mobile Preeti Cisneros  Spouse [3]   [phone] Patient Belongings The following personal items are in your possession at time of discharge: 
  Dental Appliances: None  Visual Aid: None      Home Medications: None   Jewelry: None  Clothing: Slippers    Other Valuables: Cell Phone Discharge Instructions Attachments/References MEFS - HYDROCODONE/ACETAMINOPHEN (VICODIN, Anne-Marie Chain, LORTAB) - (BY MOUTH) (ENGLISH) Patient Handouts Hydrocodone/Acetaminophen (Vicodin, Norco, Lortab) - (By mouth) Why this medicine is used:  
Treats pain. Contact a nurse or doctor right away if you have: · Blistering, peeling, red skin rash · Fast or slow heartbeat, shallow breathing, blue lips, fingernails, or skin · Anxiety, restlessness, muscle spasms, twitching, seeing or hearing things that are not there · Dark urine or pale stools, yellow skin or eyes · Extreme weakness, sweating, seizures, cold or clammy skin · Lightheadedness, dizziness, fainting, fever, sweating Common side effects: 
· Constipation, nausea, vomiting, loss of appetite, stomach pain · Tiredness or sleepiness © 2017 [address] Information is for End User's use only and may not be sold, redistributed or otherwise used for commercial purposes. Please provide this summary of care documentation to your next provider. Signatures-by signing, you are acknowledging that this After Visit Summary has been reviewed with you and you have received a copy. Patient Signature:  ____________________________________________________________ Date:  ____________________________________________________________  
  
[facility] Provider Signature:  ____________________________________________________________ Date:  ____________________________________________________________

## 2018-02-15 NOTE — ED NOTES
Pt. Wounds cleaned at this time. Pt. Tolerated well. Pt. In position of comfort with call bell in reach. Family remains at bedside.

## 2018-02-15 NOTE — ANESTHESIA POSTPROCEDURE EVALUATION
Post-Anesthesia Evaluation and Assessment    Patient: Jalen Graves MRN: [id number]  SSN: [id number]    YOB: 1975  Age: 38 y.o. Sex: male       Cardiovascular Function/Vital Signs  Visit Vitals    BP (!) 145/97    Pulse 94    Temp 36.4 °C (97.6 °F)    Resp 17    Ht 5' 4\" (1.626 m)    Wt 94.9 kg (209 lb 3 oz)    SpO2 94%    BMI 35.91 kg/m2       Patient is status post general anesthesia for Procedure(s):  INCISION AND DRAINAGE LEFT FOREARM OPEN FRACTURE. Nausea/Vomiting: None    Postoperative hydration reviewed and adequate. Pain:  Pain Scale 1: Numeric (0 - 10) (02/15/18 1630)  Pain Intensity 1: 0 (02/15/18 1630)   Managed    Neurological Status:   Neuro (WDL): Exceptions to WDL (02/15/18 1630)  Neuro  LUE Motor Response: Spontaneous  (02/15/18 1600)  LLE Motor Response: Spontaneous  (02/15/18 1600)  RUE Motor Response: Spontaneous  (02/15/18 1600)  RLE Motor Response: Spontaneous  (02/15/18 1600)   At baseline    Mental Status and Level of Consciousness: Arousable    Pulmonary Status:   O2 Device: Room air (02/15/18 1630)   Adequate oxygenation and airway patent    Complications related to anesthesia: None    Post-anesthesia assessment completed.  No concerns    Signed By: Jaylin White MD     February 15, 2018

## 2018-02-19 NOTE — DISCHARGE SUMMARY
Date of Consultation:  February 15, 2018        Dayday MCNEIL) is a 38 y.o. male who is being seen for left forearm dog bite/fracture. Pt reports injury occurred this morning, states he was bitten breaking up his dog fighting. Workup has revealed - puncture wound left FA- ulnar fracture. Ambulates at baseline unassisted. Pt. Works as a . Pt. Is right hand dominant.          Patient Active Problem List     Diagnosis Date Noted    Obesity (BMI 30.0-34.9) 11/05/2017    ASHD (arteriosclerotic heart disease- cath and stent x2) 06/07/2016    Pneumonitis 06/07/2016    Physical deconditioning 06/07/2016    Dyspepsia 06/07/2016    S/P pharyngoplasty 01/06/2015    Diabetes mellitus ([facility] [address] 09/30/2014    Hypertension 09/30/2014    Dyslipidemia 09/30/2014    Obstructive sleep apnea 09/30/2014    Hypogonadism male 09/30/2014            Family History   Problem Relation Age of Onset    Heart Disease Father             Social History   Substance Use Topics    Smoking status: Never Smoker    Smokeless tobacco: Never Used    Alcohol use No           Past Medical History:   Diagnosis Date    Diabetes ([facility] [address]      Headache      Hypercholesterolemia      Hypertension      Hypogonadism male      Liver disease       NAFLD    Obstructive sleep apnea      Obstructive sleep apnea              Past Surgical History:   Procedure Laterality Date    HX HEENT         status post pharyngioplasty              Prior to Admission medications    Medication Sig Start Date End Date Taking? Authorizing Provider   dulaglutide (TRULICITY) 1.5 VM/1.6 mL sub-q pen 0.5 mL by SubCUTAneous route every seven (7) days.  11/8/17     Yuval Jo MD   Insulin Needles, Disposable, 31 gauge x 5/16\" ndle As directed 11/8/17     Yuval Jo MD   insulin glargine (LANTUS,BASAGLAR) 100 unit/mL (3 mL) inpn 20 units daily 11/8/17     Yuval Jo MD   lisinopril (PRINIVIL, ZESTRIL) 40 mg tablet TAKE 1 TABLET BY MOUTH DAILY 7/27/17     Sweetie Montez MD   metFORMIN ER (GLUCOPHAGE XR) 500 mg tablet Take 2 tablets with breakfast and dinner 7/26/17     Sweetie Montez MD   hydroCHLOROthiazide (MICROZIDE) 12.5 mg capsule Take 12.5 mg by mouth daily.       Historical Provider   atorvastatin (LIPITOR) 20 mg tablet Take 1 Tab by mouth daily. 6/26/17     Sweetie Montez MD   amLODIPine (NORVASC) 10 mg tablet Take 1 Tab by mouth daily. 6/23/17     Sweetie Montez MD   aspirin delayed-release 81 mg tablet Take 1 Tab by mouth daily. 6/23/17     Sweetie Montez MD   clopidogrel (PLAVIX) 75 mg tab Take 1 Tab by mouth daily. 6/23/17     Sweetie Montez MD   spironolactone (ALDACTONE) 25 mg tablet Take 1 Tab by mouth daily. 6/23/17     Sweetie Montez MD   metoprolol succinate (TOPROL-XL) 100 mg tablet Take 1 Tab by mouth daily. 6/23/17     Sweetie Montez MD   omeprazole (PRILOSEC) 40 mg capsule Take 1 Cap by mouth daily. 6/7/16     Sweetie Montez MD   VIAGRA 100 mg tablet   11/24/14     Historical Provider             Current Facility-Administered Medications   Medication Dose Route Frequency    ampicillin-sulbactam (UNASYN) 3 g in 0.9% sodium chloride 100 mL IVPB  3 g IntraVENous NOW    hydrALAZINE (APRESOLINE) 20 mg/mL injection 10 mg  10 mg IntraVENous Q6H PRN    insulin lispro (HUMALOG) injection    SubCUTAneous AC&HS    glucose chewable tablet 16 g  4 Tab Oral PRN    dextrose (D50W) injection syrg 12.5-25 g  12.5-25 g IntraVENous PRN    glucagon (GLUCAGEN) injection 1 mg  1 mg IntraMUSCular PRN           Current Outpatient Prescriptions   Medication Sig    dulaglutide (TRULICITY) 1.5 TH/2.9 mL sub-q pen 0.5 mL by SubCUTAneous route every seven (7) days.     Insulin Needles, Disposable, 31 gauge x 5/16\" ndle As directed    insulin glargine (LANTUS,BASAGLAR) 100 unit/mL (3 mL) inpn 20 units daily    lisinopril (PRINIVIL, ZESTRIL) 40 mg tablet TAKE 1 TABLET BY MOUTH DAILY    metFORMIN ER (GLUCOPHAGE XR) 500 mg tablet Take 2 tablets with breakfast and dinner    hydroCHLOROthiazide (MICROZIDE) 12.5 mg capsule Take 12.5 mg by mouth daily.  atorvastatin (LIPITOR) 20 mg tablet Take 1 Tab by mouth daily.  amLODIPine (NORVASC) 10 mg tablet Take 1 Tab by mouth daily.  aspirin delayed-release 81 mg tablet Take 1 Tab by mouth daily.  clopidogrel (PLAVIX) 75 mg tab Take 1 Tab by mouth daily.  spironolactone (ALDACTONE) 25 mg tablet Take 1 Tab by mouth daily.  metoprolol succinate (TOPROL-XL) 100 mg tablet Take 1 Tab by mouth daily.  omeprazole (PRILOSEC) 40 mg capsule Take 1 Cap by mouth daily.  VIAGRA 100 mg tablet        No Known Allergies      Review of Systems:  A comprehensive review of systems was negative except for that written in the HPI.     Mental Status: no dementia     Objective:      Patient Vitals for the past 8 hrs:    BP Temp Pulse Resp SpO2 Height Weight   02/15/18 0721 126/89 97.9 °F (36.6 °C) 94 14 98 % 5' 4\" (1.626 m) 94.9 kg (209 lb 3.5 oz)      Temp (24hrs), Av.9 °F (36.6 °C), Min:97.9 °F (36.6 °C), Max:97.9 °F (36.6 °C)        Gen: Well-developed,  in no acute distress   HEENT: Pink conjunctivae, hearing intact to voice, moist mucous membranes   Neck: Supple  Resp: No respiratory distress   Card: RRR, palpable distal pulse-equal bilaterally, birsk cap refill all distal digits   Abd: Soft, non-distended  Musc: left FA with moderate swelling - dorsal puncture wound- composite fist, full flex/ext of the digits, neurovascular exam intact UE bilat.  right volar FA with superficial lacerations. Neuro: Cranial nerves are grossly intact, no focal motor weakness, follows commands appropriately   Psych: Good insight, oriented to person, place and time, alert     Imaging Review: XR FOREARM LT AP/LAT   INDICATION:   bite wound, left forearm swelling.    COMPARISON: None.   FINDINGS: Two views of the left radius and ulna demonstrate a spiral fracture of  the distal ulnar shaft with slight palmar displacement of the distal fracture  fragment by one third shaft width.  Localized soft tissue swelling is noted  without radiopaque foreign body.   IMPRESSION:  Mildly displaced distal ulnar fracture. Labs:    Recent Results          Recent Results (from the past 24 hour(s))   CBC WITH AUTOMATED DIFF     Collection Time: 02/15/18  9:41 AM   Result Value Ref Range     WBC 9.1 4.1 - 11.1 K/uL     RBC 5.31 4.10 - 5.70 M/uL     HGB 14.3 12.1 - 17.0 g/dL     HCT 43.1 36.6 - 50.3 %     MCV 81.2 80.0 - 99.0 FL     MCH 26.9 26.0 - 34.0 PG     MCHC 33.2 30.0 - 36.5 g/dL     RDW 13.3 11.5 - 14.5 %     PLATELET 282 352 - 615 K/uL     MPV 11.3 8.9 - 12.9 FL     NRBC 0.0 0  WBC     ABSOLUTE NRBC 0.00 0.00 - 0.01 K/uL     NEUTROPHILS 72 32 - 75 %     LYMPHOCYTES 19 12 - 49 %     MONOCYTES 8 5 - 13 %     EOSINOPHILS 1 0 - 7 %     BASOPHILS 0 0 - 1 %     IMMATURE GRANULOCYTES 0 0.0 - 0.5 %     ABS. NEUTROPHILS 6.5 1.8 - 8.0 K/UL     ABS. LYMPHOCYTES 1.7 0.8 - 3.5 K/UL     ABS. MONOCYTES 0.7 0.0 - 1.0 K/UL     ABS. EOSINOPHILS 0.1 0.0 - 0.4 K/UL     ABS. BASOPHILS 0.0 0.0 - 0.1 K/UL     ABS. IMM. GRANS. 0.0 0.00 - 0.04 K/UL     DF AUTOMATED      METABOLIC PANEL, COMPREHENSIVE     Collection Time: 02/15/18  9:41 AM   Result Value Ref Range     Sodium 137 136 - 145 mmol/L     Potassium 3.9 3.5 - 5.1 mmol/L     Chloride 105 97 - 108 mmol/L     CO2 25 21 - 32 mmol/L     Anion gap 7 5 - 15 mmol/L     Glucose 133 (H) 65 - 100 mg/dL     BUN 17 6 - 20 MG/DL     Creatinine 1.04 0.70 - 1.30 MG/DL     BUN/Creatinine ratio 16 12 - 20       GFR est AA >60 >60 ml/min/1.73m2     GFR est non-AA >60 >60 ml/min/1.73m2     Calcium 8.6 8.5 - 10.1 MG/DL     Bilirubin, total 0.3 0.2 - 1.0 MG/DL     ALT (SGPT) 37 12 - 78 U/L     AST (SGOT) 22 15 - 37 U/L     Alk.  phosphatase 93 45 - 117 U/L     Protein, total 7.8 6.4 - 8.2 g/dL     Albumin 3.8 3.5 - 5.0 g/dL     Globulin 4.0 2.0 - 4.0 g/dL     A-G Ratio 1.0 (L) 1.1 - 2.2                 Impression:           Patient Active Problem List     Diagnosis Date Noted    Open left forearm fracture 02/15/2018    Obesity (BMI 30.0-34.9) 11/05/2017    ASHD (arteriosclerotic heart disease) 06/07/2016    Pneumonitis 06/07/2016    Physical deconditioning 06/07/2016    Dyspepsia 06/07/2016    S/P pharyngoplasty 01/06/2015    Diabetes mellitus ([facility] [address] 09/30/2014    Hypertension 09/30/2014    Dyslipidemia 09/30/2014    Obstructive sleep apnea 09/30/2014    Hypogonadism male 09/30/2014      Active Problems:  Closed left forearm fracture (2/15/2018)     Dog bite        Plan/Hospital course:   Left forearm dog bite and a closed distal ulnar fracture. He was taken to the OR for wound exploration and I&D, and the wound was found not to communicate with the fracture. The wound was loosely closed, and a sugar-tong splint applied. He will take oral Augmentin, and follow up in the office early next week for a wound check. If all appears well, we will likely book him for an ORIF of his ulna fracture.

## 2018-02-20 ENCOUNTER — OFFICE VISIT (OUTPATIENT)
Dept: INTERNAL MEDICINE CLINIC | Age: 43
End: 2018-02-20

## 2018-02-20 VITALS
RESPIRATION RATE: 16 BRPM | TEMPERATURE: 97.9 F | SYSTOLIC BLOOD PRESSURE: 114 MMHG | DIASTOLIC BLOOD PRESSURE: 78 MMHG | OXYGEN SATURATION: 96 % | BODY MASS INDEX: 35.78 KG/M2 | HEIGHT: 64 IN | HEART RATE: 78 BPM | WEIGHT: 209.6 LBS

## 2018-02-20 DIAGNOSIS — E11.8 TYPE 2 DIABETES MELLITUS WITH COMPLICATION, WITH LONG-TERM CURRENT USE OF INSULIN (HCC): Primary | ICD-10-CM

## 2018-02-20 DIAGNOSIS — Z79.4 TYPE 2 DIABETES MELLITUS WITH COMPLICATION, WITH LONG-TERM CURRENT USE OF INSULIN (HCC): Primary | ICD-10-CM

## 2018-02-20 DIAGNOSIS — I10 ESSENTIAL HYPERTENSION: ICD-10-CM

## 2018-02-20 DIAGNOSIS — I25.10 ASHD (ARTERIOSCLEROTIC HEART DISEASE): ICD-10-CM

## 2018-02-20 DIAGNOSIS — E78.5 DYSLIPIDEMIA: ICD-10-CM

## 2018-02-20 DIAGNOSIS — R53.81 PHYSICAL DECONDITIONING: ICD-10-CM

## 2018-02-20 DIAGNOSIS — E66.9 OBESITY (BMI 30.0-34.9): ICD-10-CM

## 2018-02-20 DIAGNOSIS — G47.33 OBSTRUCTIVE SLEEP APNEA: ICD-10-CM

## 2018-02-20 NOTE — PROGRESS NOTES
Chief Complaint   Patient presents with   [facility] ED Follow-up     dog bite on 2/15/18     1. Have you been to the ER, urgent care clinic since your last visit? Hospitalized since your last visit? Yes [facility]: [facility]15/18 [facility] Reason for visit: dog bie    2. Have you seen or consulted any other health care providers outside of the [address] [place] [address] since your last visit? Include any pap smears or colon screening.  No

## 2018-02-20 NOTE — MR AVS SNAPSHOT
Preeti Callejas 
 
 
 . [facility] [address] [id number] 
[phone] Patient: Marisela Kang MRN: [id number] Visit Information Date & Time Provider Department Dept. Phone Encounter #  
 2018 11:30 AM [facility] [place] [address] [id number] Upcoming Health Maintenance Date Due MICROALBUMIN Q1 2016 FOOT EXAM Q1 2018* HEMOGLOBIN A1C Q6M 2018 LIPID PANEL Q1 2018 EYE EXAM RETINAL OR DILATED Q1 2018 DTaP/Tdap/Td series (2 - Td) 2/15/2028 *Topic was postponed. The date shown is not the original due date. Allergies as of 2018  Review Complete On: 2018 By: Jaime Dominguez No Known Allergies Current Immunizations  Never Reviewed Name Date Tdap 2/15/2018  8:02 AM  
  
 Not reviewed this visit You Were Diagnosed With   
  
 Codes Comments Type 2 diabetes mellitus with complication, with long-term current use of insulin (HCC)    -  Primary ICD-10-CM: E11.8, Z79.4 ICD-9-CM: 250.90, V58.67 ASHD (arteriosclerotic heart disease)     ICD-10-CM: I25.10 ICD-9-CM: 414.00 Obesity (BMI 30.0-34.9)     ICD-10-CM: Z02.3 ICD-9-CM: 278.00 Physical deconditioning     ICD-10-CM: R53.81 ICD-9-CM: 799.3 Essential hypertension     ICD-10-CM: I10 
ICD-9-CM: 401.9 Dyslipidemia     ICD-10-CM: E78.5 ICD-9-CM: 272.4 Obstructive sleep apnea     ICD-10-CM: G47.33 
ICD-9-CM: 327.23 Vitals BP Pulse Temp Resp Height(growth percentile) Weight(growth percentile) 114/78 (BP 1 Location: Right arm, BP Patient Position: Sitting) 78 97.9 °F (36.6 °C) (Oral) 16 5' 4\" (1.626 m) 209 lb 9.6 oz (95.1 kg) SpO2 BMI Smoking Status 96% 35.98 kg/m2 Never Smoker BMI and BSA Data Body Mass Index Body Surface Area 35.98 kg/m 2 2.07 m 2 Preferred Pharmacy Pharmacy Name Phone [id number]- [place], [address] [place] [address] [phone] Your Updated Medication List  
  
   
This list is accurate as of: 2/20/18 12:58 PM.  Always use your most recent med list. amLODIPine 10 mg tablet Commonly known as:  Jah Free Take 1 Tab by mouth daily. amoxicillin-clavulanate 875-125 mg per tablet Commonly known as:  AUGMENTIN Take 1 Tab by mouth every twelve (12) hours. Indications: DOG BITE WOUND  
  
 aspirin delayed-release 81 mg tablet Take 1 Tab by mouth daily. atorvastatin 20 mg tablet Commonly known as:  LIPITOR Take 1 Tab by mouth daily. clopidogrel 75 mg Tab Commonly known as:  PLAVIX Take 1 Tab by mouth daily. dulaglutide 1.5 mg/0.5 mL sub-q pen Commonly known as:  TRULICITY  
0.5 mL by SubCUTAneous route every seven (7) days. hydroCHLOROthiazide 12.5 mg capsule Commonly known as:  Raynell Primrose Take 12.5 mg by mouth daily. HYDROcodone-acetaminophen 5-325 mg per tablet Commonly known as:  Day Records Take 1 Tab by mouth every four (4) hours as needed for Pain. Max Daily Amount: 6 Tabs. insulin glargine 100 unit/mL (3 mL) Inpn Commonly known as:  LANTUS,BASAGLAR  
20 units daily Insulin Needles (Disposable) 31 gauge x 5/16\" Ndle As directed  
  
 lisinopril 40 mg tablet Commonly known as:  PRINIVIL, ZESTRIL  
TAKE 1 TABLET BY MOUTH DAILY  
  
 metFORMIN  mg tablet Commonly known as:  GLUCOPHAGE XR Take 2 tablets with breakfast and dinner  
  
 metoprolol succinate 100 mg tablet Commonly known as:  TOPROL-XL Take 1 Tab by mouth daily. omeprazole 40 mg capsule Commonly known as:  PRILOSEC Take 1 Cap by mouth daily. spironolactone 25 mg tablet Commonly known as:  ALDACTONE Take 1 Tab by mouth daily. VIAGRA 100 mg tablet Generic drug:  sildenafil citrate We Performed the Following APOLIPOPROTEIN B [id number] CPT(R)] HEMOGLOBIN A1C WITH EAG [72320 CPT(R)] Introducing [facility] SERVICES! Dear Jonathan Hensley: Thank you for requesting a [employer] account. Our records indicate that you already have an active [employer] account. You can access your account anytime at [email or web address] Did you know that you can access your hospital and ER discharge instructions at any time in [employer]? You can also review all of your test results from your hospital stay or ER visit. Additional Information If you have questions, please visit the Frequently Asked Questions section of the [employer] website at [email or web address] Remember, [employer] is NOT to be used for urgent needs. For medical emergencies, dial 911. Now available from your iPhone and Android! Please provide this summary of care documentation to your next provider. Your primary care clinician is listed as Kareem Dupree. If you have any questions after today's visit, please call [phone].

## 2018-02-21 ENCOUNTER — ANESTHESIA EVENT (OUTPATIENT)
Dept: SURGERY | Age: 43
End: 2018-02-21
Payer: COMMERCIAL

## 2018-02-21 LAB
APO B SERPL-MCNC: 69 MG/DL (ref 52–135)
EST. AVERAGE GLUCOSE BLD GHB EST-MCNC: 243 MG/DL
HBA1C MFR BLD: 10.1 % (ref 4.8–5.6)

## 2018-02-21 RX ORDER — DIPHENHYDRAMINE HYDROCHLORIDE 50 MG/ML
12.5 INJECTION, SOLUTION INTRAMUSCULAR; INTRAVENOUS AS NEEDED
Status: CANCELLED | OUTPATIENT
Start: 2018-02-21 | End: 2018-02-21

## 2018-02-21 RX ORDER — HYDROMORPHONE HYDROCHLORIDE 1 MG/ML
.2-.5 INJECTION, SOLUTION INTRAMUSCULAR; INTRAVENOUS; SUBCUTANEOUS ONCE
Status: CANCELLED | OUTPATIENT
Start: 2018-02-21 | End: 2018-02-21

## 2018-02-21 RX ORDER — SODIUM CHLORIDE 0.9 % (FLUSH) 0.9 %
5-10 SYRINGE (ML) INJECTION AS NEEDED
Status: CANCELLED | OUTPATIENT
Start: 2018-02-21

## 2018-02-21 RX ORDER — MORPHINE SULFATE 10 MG/ML
2 INJECTION, SOLUTION INTRAMUSCULAR; INTRAVENOUS
Status: CANCELLED | OUTPATIENT
Start: 2018-02-21

## 2018-02-21 RX ORDER — FENTANYL CITRATE 50 UG/ML
25 INJECTION, SOLUTION INTRAMUSCULAR; INTRAVENOUS
Status: CANCELLED | OUTPATIENT
Start: 2018-02-21

## 2018-02-21 RX ORDER — SODIUM CHLORIDE, SODIUM LACTATE, POTASSIUM CHLORIDE, CALCIUM CHLORIDE 600; 310; 30; 20 MG/100ML; MG/100ML; MG/100ML; MG/100ML
25 INJECTION, SOLUTION INTRAVENOUS CONTINUOUS
Status: CANCELLED | OUTPATIENT
Start: 2018-02-21

## 2018-02-21 RX ORDER — OXYCODONE AND ACETAMINOPHEN 5; 325 MG/1; MG/1
1 TABLET ORAL
Status: CANCELLED | OUTPATIENT
Start: 2018-02-21

## 2018-02-21 NOTE — PERIOP NOTES
[facility]  Ambulatory Surgery Unit  Pre-operative Instructions    Surgery/Procedure Date  02/22/2018            Tentative Arrival Time 0800      1. On the day of your surgery/procedure, please report to the Ambulatory Surgery Unit Registration Desk and sign in at your designated time. The Ambulatory Surgery Unit is located in [facility] on the [facility] side of the [facility] across from the [address] [place] [address]. Please have all of your health insurance cards and a photo ID. 2. You must have someone with you to drive you home, as you should not drive a car for 24 hours following anesthesia. Please make arrangements for a responsible adult friend or family member to stay with you for at least the first 24 hours after your surgery. 3. Do not have anything to eat or drink (including water, gum, mints, coffee, juice) after midnight   02/21/2018. This may not apply to medications prescribed by your physician. (Please note below the special instructions with medications to take the morning of surgery, if applicable.)    4. We recommend you do not drink any alcoholic beverages for 24 hours before and after your surgery. 5. Contact your surgeons office for instructions on the following medications: non-steroidal anti-inflammatory drugs (i.e. Advil, Aleve), vitamins, and supplements. (Some surgeons will want you to stop these medications prior to surgery and others may allow you to take them)   **If you are currently taking Plavix, Coumadin, Aspirin and/or other blood-thinning agents, contact your surgeon for instructions. ** Your surgeon will partner with the physician prescribing these medications to determine if it is safe to stop or if you need to continue taking. Please do not stop taking these medications without instructions from your surgeon.     6. In an effort to help prevent surgical site infection, we ask that you shower with an anti-bacterial soap (i.e. Dial or Safeguard) for 3 days prior to and on the morning of surgery, using a fresh towel after each shower. (Please begin this process with fresh bed linens.) Do not apply any lotions, powders, or deodorants after the shower on the day of your procedure. If applicable, please do not shave the operative site for 48 hours prior to surgery. 7. Wear comfortable clothes. Wear glasses instead of contacts. Do not bring any jewelry or money (other than copays or fees as instructed). Do not wear make-up, particularly mascara, the morning of your surgery. Do not wear nail polish, particularly if you are having foot /hand surgery. Wear your hair loose or down, no ponytails, buns, vita pins or clips. All body piercings must be removed. 8. You should understand that if you do not follow these instructions your surgery may be cancelled. If your physical condition changes (i.e. fever, cold or flu) please contact your surgeon as soon as possible. 9. It is important that you be on time. If a situation occurs where you may be late, or if you have any questions or problems, please call [phone].    10. Your surgery time may be subject to change. You will receive a phone call the day prior to surgery to confirm your arrival time. 11. Pediatric patients: please bring a change of clothes, diapers, bottle/sippy cup, pacifier, etc.      Special Instructions: Take all medications and inhalers, as prescribed, on the morning of surgery with a sip of water EXCEPT: blood thinners, diabetic meds, and vitamins. I understand a pre-operative phone call will be made to verify my surgery time. In the event that I am not available, I give permission for a message to be left on my answering service and/or with another person? yes         ___________________      ___________________      ________________  Pt and wife verbalized understanding of preop instructions via telephone.   (Signature of Patient)          (Witness) (Date and Time)

## 2018-02-21 NOTE — PERIOP NOTES
Spoke with Renee Kraus with Dr. Michelle Martínez, pt is currently on ASA and Plavix and not advised to stop. Pt was here 02/15/2018 for I&D of Left arm and was not asked to stop blood thinners. Renee Kraus will contact Dr. Michelle Martínez to make sure this is ok to proceed with surgery tomorrow. Per phone call with Robert Almeida with Dr. Michelle Martínez pt is ok to proceed with tomorrows surgery. No plavix or ASA am of surgery. Pt advised.

## 2018-02-22 ENCOUNTER — ANESTHESIA (OUTPATIENT)
Dept: SURGERY | Age: 43
End: 2018-02-22
Payer: COMMERCIAL

## 2018-02-22 ENCOUNTER — HOSPITAL ENCOUNTER (OUTPATIENT)
Age: 43
Setting detail: OUTPATIENT SURGERY
Discharge: HOME | End: 2018-02-22
Attending: ORTHOPAEDIC SURGERY | Admitting: ORTHOPAEDIC SURGERY
Payer: COMMERCIAL

## 2018-02-22 VITALS
TEMPERATURE: 98.3 F | OXYGEN SATURATION: 97 % | DIASTOLIC BLOOD PRESSURE: 69 MMHG | BODY MASS INDEX: 35.17 KG/M2 | HEART RATE: 100 BPM | HEIGHT: 64 IN | WEIGHT: 206 LBS | SYSTOLIC BLOOD PRESSURE: 121 MMHG | RESPIRATION RATE: 17 BRPM

## 2018-02-22 DIAGNOSIS — M79.609 POSTOPERATIVE PAIN OF EXTREMITY: Primary | ICD-10-CM

## 2018-02-22 DIAGNOSIS — I25.10 ASHD (ARTERIOSCLEROTIC HEART DISEASE): ICD-10-CM

## 2018-02-22 DIAGNOSIS — G89.18 POSTOPERATIVE PAIN OF EXTREMITY: Primary | ICD-10-CM

## 2018-02-22 LAB
GLUCOSE BLD STRIP.AUTO-MCNC: 136 MG/DL (ref 65–100)
GLUCOSE BLD STRIP.AUTO-MCNC: 179 MG/DL (ref 65–100)
SERVICE CMNT-IMP: ABNORMAL
SERVICE CMNT-IMP: ABNORMAL

## 2018-02-22 PROCEDURE — C1713 ANCHOR/SCREW BN/BN,TIS/BN: HCPCS | Performed by: ORTHOPAEDIC SURGERY

## 2018-02-22 PROCEDURE — 77030032490 HC SLV COMPR SCD KNE COVD -B

## 2018-02-22 PROCEDURE — 74011250636 HC RX REV CODE- 250/636: Performed by: ORTHOPAEDIC SURGERY

## 2018-02-22 PROCEDURE — 76210000040 HC AMBSU PH I REC FIRST 0.5 HR: Performed by: ORTHOPAEDIC SURGERY

## 2018-02-22 PROCEDURE — 77030012890

## 2018-02-22 PROCEDURE — 76030000021 HC AMB SURG 2 TO 2.5 HR INTENSV-TIER 1: Performed by: ORTHOPAEDIC SURGERY

## 2018-02-22 PROCEDURE — 74011250636 HC RX REV CODE- 250/636

## 2018-02-22 PROCEDURE — 76210000050 HC AMBSU PH II REC 0.5 TO 1 HR: Performed by: ORTHOPAEDIC SURGERY

## 2018-02-22 PROCEDURE — 77030020782 HC GWN BAIR PAWS FLX 3M -B

## 2018-02-22 PROCEDURE — 74011250636 HC RX REV CODE- 250/636: Performed by: ANESTHESIOLOGY

## 2018-02-22 PROCEDURE — 77030028224 HC PDNG CST BSNM -A: Performed by: ORTHOPAEDIC SURGERY

## 2018-02-22 PROCEDURE — 76060000064 HC AMB SURG ANES 2 TO 2.5 HR: Performed by: ORTHOPAEDIC SURGERY

## 2018-02-22 PROCEDURE — 74011000250 HC RX REV CODE- 250

## 2018-02-22 PROCEDURE — 77030008841 HC WRE K MCRA -A: Performed by: ORTHOPAEDIC SURGERY

## 2018-02-22 PROCEDURE — 77030002916 HC SUT ETHLN J&J -A: Performed by: ORTHOPAEDIC SURGERY

## 2018-02-22 PROCEDURE — 82962 GLUCOSE BLOOD TEST: CPT

## 2018-02-22 PROCEDURE — 77030018836 HC SOL IRR NACL ICUM -A: Performed by: ORTHOPAEDIC SURGERY

## 2018-02-22 DEVICE — SCREW BNE L14MM DIA3.5MM CORT S STL ST LOK FULL THRD: Type: IMPLANTABLE DEVICE | Site: ULNA | Status: FUNCTIONAL

## 2018-02-22 DEVICE — 3.5MM CORTEX SCREW SELF-TAPPING 14MM: Type: IMPLANTABLE DEVICE | Site: ULNA | Status: FUNCTIONAL

## 2018-02-22 DEVICE — 2.7MM CORTEX SCREW SELF-TAPPING 12MM: Type: IMPLANTABLE DEVICE | Site: ULNA | Status: FUNCTIONAL

## 2018-02-22 DEVICE — PLATE BNE L72MM 5 H S STL LOK COMPR FOR 3.5MM SCR LCP: Type: IMPLANTABLE DEVICE | Site: ULNA | Status: FUNCTIONAL

## 2018-02-22 RX ORDER — SODIUM CHLORIDE 0.9 % (FLUSH) 0.9 %
5-10 SYRINGE (ML) INJECTION AS NEEDED
Status: DISCONTINUED | OUTPATIENT
Start: 2018-02-22 | End: 2018-02-22 | Stop reason: HOSPADM

## 2018-02-22 RX ORDER — MIDAZOLAM HYDROCHLORIDE 1 MG/ML
INJECTION, SOLUTION INTRAMUSCULAR; INTRAVENOUS
Status: COMPLETED
Start: 2018-02-22 | End: 2018-02-22

## 2018-02-22 RX ORDER — MIDAZOLAM HYDROCHLORIDE 1 MG/ML
INJECTION, SOLUTION INTRAMUSCULAR; INTRAVENOUS AS NEEDED
Status: DISCONTINUED | OUTPATIENT
Start: 2018-02-22 | End: 2018-02-22 | Stop reason: HOSPADM

## 2018-02-22 RX ORDER — METOPROLOL TARTRATE 5 MG/5ML
INJECTION INTRAVENOUS AS NEEDED
Status: DISCONTINUED | OUTPATIENT
Start: 2018-02-22 | End: 2018-02-22 | Stop reason: HOSPADM

## 2018-02-22 RX ORDER — LIDOCAINE HYDROCHLORIDE 10 MG/ML
0.1 INJECTION, SOLUTION EPIDURAL; INFILTRATION; INTRACAUDAL; PERINEURAL AS NEEDED
Status: DISCONTINUED | OUTPATIENT
Start: 2018-02-22 | End: 2018-02-22 | Stop reason: HOSPADM

## 2018-02-22 RX ORDER — ONDANSETRON 2 MG/ML
INJECTION INTRAMUSCULAR; INTRAVENOUS AS NEEDED
Status: DISCONTINUED | OUTPATIENT
Start: 2018-02-22 | End: 2018-02-22 | Stop reason: HOSPADM

## 2018-02-22 RX ORDER — CEFAZOLIN SODIUM/WATER 2 G/20 ML
2 SYRINGE (ML) INTRAVENOUS ONCE
Status: COMPLETED | OUTPATIENT
Start: 2018-02-22 | End: 2018-02-22

## 2018-02-22 RX ORDER — ACETAMINOPHEN 10 MG/ML
INJECTION, SOLUTION INTRAVENOUS AS NEEDED
Status: DISCONTINUED | OUTPATIENT
Start: 2018-02-22 | End: 2018-02-22 | Stop reason: HOSPADM

## 2018-02-22 RX ORDER — SODIUM CHLORIDE 0.9 % (FLUSH) 0.9 %
5-10 SYRINGE (ML) INJECTION EVERY 8 HOURS
Status: DISCONTINUED | OUTPATIENT
Start: 2018-02-22 | End: 2018-02-22 | Stop reason: HOSPADM

## 2018-02-22 RX ORDER — FENTANYL CITRATE 50 UG/ML
INJECTION, SOLUTION INTRAMUSCULAR; INTRAVENOUS AS NEEDED
Status: DISCONTINUED | OUTPATIENT
Start: 2018-02-22 | End: 2018-02-22 | Stop reason: HOSPADM

## 2018-02-22 RX ORDER — SODIUM CHLORIDE 9 MG/ML
INJECTION, SOLUTION INTRAVENOUS
Status: DISCONTINUED | OUTPATIENT
Start: 2018-02-22 | End: 2018-02-22 | Stop reason: HOSPADM

## 2018-02-22 RX ORDER — OXYCODONE AND ACETAMINOPHEN 5; 325 MG/1; MG/1
1 TABLET ORAL
Qty: 30 TAB | Refills: 0 | Status: SHIPPED | OUTPATIENT
Start: 2018-02-22 | End: 2019-12-10 | Stop reason: CLARIF

## 2018-02-22 RX ORDER — SODIUM CHLORIDE, SODIUM LACTATE, POTASSIUM CHLORIDE, CALCIUM CHLORIDE 600; 310; 30; 20 MG/100ML; MG/100ML; MG/100ML; MG/100ML
25 INJECTION, SOLUTION INTRAVENOUS CONTINUOUS
Status: DISCONTINUED | OUTPATIENT
Start: 2018-02-22 | End: 2018-02-22 | Stop reason: HOSPADM

## 2018-02-22 RX ORDER — PROPOFOL 10 MG/ML
INJECTION, EMULSION INTRAVENOUS
Status: DISCONTINUED | OUTPATIENT
Start: 2018-02-22 | End: 2018-02-22 | Stop reason: HOSPADM

## 2018-02-22 RX ADMIN — ACETAMINOPHEN 1000 MG: 10 INJECTION, SOLUTION INTRAVENOUS at 12:18

## 2018-02-22 RX ADMIN — FENTANYL CITRATE 25 MCG: 50 INJECTION, SOLUTION INTRAMUSCULAR; INTRAVENOUS at 11:36

## 2018-02-22 RX ADMIN — Medication 2 G: at 10:41

## 2018-02-22 RX ADMIN — MIDAZOLAM HYDROCHLORIDE 1 MG: 1 INJECTION, SOLUTION INTRAMUSCULAR; INTRAVENOUS at 10:50

## 2018-02-22 RX ADMIN — FENTANYL CITRATE 25 MCG: 50 INJECTION, SOLUTION INTRAMUSCULAR; INTRAVENOUS at 12:29

## 2018-02-22 RX ADMIN — PROPOFOL 50 MCG/KG/MIN: 10 INJECTION, EMULSION INTRAVENOUS at 10:40

## 2018-02-22 RX ADMIN — SODIUM CHLORIDE, SODIUM LACTATE, POTASSIUM CHLORIDE, AND CALCIUM CHLORIDE 25 ML/HR: 600; 310; 30; 20 INJECTION, SOLUTION INTRAVENOUS at 07:58

## 2018-02-22 RX ADMIN — FENTANYL CITRATE 25 MCG: 50 INJECTION, SOLUTION INTRAMUSCULAR; INTRAVENOUS at 10:48

## 2018-02-22 RX ADMIN — MIDAZOLAM HYDROCHLORIDE 3 MG: 1 INJECTION, SOLUTION INTRAMUSCULAR; INTRAVENOUS at 09:12

## 2018-02-22 RX ADMIN — ONDANSETRON 4 MG: 2 INJECTION INTRAMUSCULAR; INTRAVENOUS at 10:56

## 2018-02-22 RX ADMIN — FENTANYL CITRATE 25 MCG: 50 INJECTION, SOLUTION INTRAMUSCULAR; INTRAVENOUS at 10:40

## 2018-02-22 RX ADMIN — METOPROLOL TARTRATE 1 MG: 5 INJECTION INTRAVENOUS at 12:06

## 2018-02-22 RX ADMIN — MIDAZOLAM HYDROCHLORIDE 1 MG: 1 INJECTION, SOLUTION INTRAMUSCULAR; INTRAVENOUS at 10:31

## 2018-02-22 RX ADMIN — METOPROLOL TARTRATE 1 MG: 5 INJECTION INTRAVENOUS at 11:43

## 2018-02-22 NOTE — PERIOP NOTES
1245 Pt arrousable. VSS. Dressing on left arm CDI. Arm elevated. Fingers warm and pink. Denies pain. [address]  Dr. Chilo Barry saw pt.  [phone] Pt's wife brought back. Pt alert and oriented. Tolerating PO.  1315 Pt is on plavix. Called Dr. Chilo Barry and asked when to resume this med. He ordered to restart tomorrow, 1/23/18. Relayed message to pt and his wife. 1330 Discharge instructions reviewed with wife. All questions answered. 1358 Pts left arm placed in sling. Fingers warm and pink. Pt d/c'd via wheelchair to car.

## 2018-02-22 NOTE — ANESTHESIA PREPROCEDURE EVALUATION
Anesthetic History   No history of anesthetic complications            Review of Systems / Medical History  Patient summary reviewed, nursing notes reviewed and pertinent labs reviewed    Pulmonary        Sleep apnea: No treatment           Neuro/Psych   Within defined limits           Cardiovascular    Hypertension          CAD (s/p stents x2)    Exercise tolerance: >4 METS     GI/Hepatic/Renal           Liver disease (fatty liver)     Endo/Other    Diabetes: type 2    Morbid obesity     Other Findings            Physical Exam    Airway  Mallampati: I  TM Distance: > 6 cm  Neck ROM: normal range of motion   Mouth opening: Normal     Cardiovascular    Rhythm: regular  Rate: normal      Pertinent negatives: No murmur   Dental  No notable dental hx       Pulmonary  Breath sounds clear to auscultation               Abdominal  GI exam deferred       Other Findings            Anesthetic Plan    ASA: 2  Anesthesia type: regional, MAC and general - backup - supraclavicular block      Post-op pain plan if not by surgeon: peripheral nerve block single    Induction: Intravenous  Anesthetic plan and risks discussed with: Patient      preop glucose 136. Took BB yesterday.  Did not stop plavix

## 2018-02-22 NOTE — ANESTHESIA POSTPROCEDURE EVALUATION
Post-Anesthesia Evaluation and Assessment    Patient: Prentis Mcburney MRN: [id number]  SSN: [id number]    YOB: 1975  Age: 38 y.o. Sex: male       Cardiovascular Function/Vital Signs  Visit Vitals    /76    Pulse (!) 102    Temp 36.5 °C (97.7 °F)    Resp 16    Ht 5' 4\" (1.626 m)    Wt 93.4 kg (206 lb)    SpO2 97%    BMI 35.36 kg/m2       Patient is status post regional, MAC, general - backup anesthesia for Procedure(s):  OPEN REDUCTION INTERNAL FIXATION LEFT ULNA FRACTURE. Nausea/Vomiting: None    Postoperative hydration reviewed and adequate. Pain:  Pain Scale 1: Numeric (0 - 10) (02/22/18 1315)  Pain Intensity 1: 0 (02/22/18 1315)   Managed. Pt has supraclavicular block. Sling postop until block resolves. Neurological Status:   Neuro (WDL): Within Defined Limits (02/22/18 1315)   At baseline    Mental Status and Level of Consciousness: Arousable    Pulmonary Status:   O2 Device: Nasal cannula (02/22/18 1245)   Adequate oxygenation and airway patent    Complications related to anesthesia: None    Post-anesthesia assessment completed.  No concerns    Signed By: Dominick Griffith MD     February 22, 2018

## 2018-02-22 NOTE — PERIOP NOTES
Permission received to review discharge instructions and discuss private health information with wife Gregory Sanon

## 2018-02-22 NOTE — IP AVS SNAPSHOT
[address] [place] [address] 
[phone] Patient: Jalen Graves MRN: [id number] NMI:[id number] About your hospitalization You were admitted on:  February 22, 2018 You last received care in the:  [facility] PACU You were discharged on:  February 22, 2018 Why you were hospitalized Your primary diagnosis was:  Not on File Follow-up Information Follow up With Details Comments Contact Info Michael Rey MD   [address] [address] 
[phone] Discharge Orders None A check verónica indicates which time of day the medication should be taken. My Medications START taking these medications Instructions Each Dose to Equal  
 Morning Noon Evening Bedtime  
 oxyCODONE-acetaminophen 5-325 mg per tablet Commonly known as:  PERCOCET Your last dose was: Your next dose is: Take 1 Tab by mouth every four (4) hours as needed for Pain. Max Daily Amount: 6 Tabs. 1 Tab CHANGE how you take these medications Instructions Each Dose to Equal  
 Morning Noon Evening Bedtime  
 metFORMIN  mg tablet Commonly known as:  GLUCOPHAGE XR What changed:   
- how much to take 
- additional instructions Your last dose was: Your next dose is: Take 2 tablets with breakfast and dinner CONTINUE taking these medications Instructions Each Dose to Equal  
 Morning Noon Evening Bedtime  
 amLODIPine 10 mg tablet Commonly known as:  Jenna Herman Your last dose was: Your next dose is: Take 1 Tab by mouth daily. 10 mg  
    
   
   
   
  
 amoxicillin-clavulanate 875-125 mg per tablet Commonly known as:  AUGMENTIN Your last dose was: Your next dose is: Take 1 Tab by mouth every twelve (12) hours.  Indications: DOG BITE WOUND 1 Tab  
    
   
   
   
  
 aspirin delayed-release 81 mg tablet Your last dose was: Your next dose is: Take 1 Tab by mouth daily. 81 mg  
    
   
   
   
  
 atorvastatin 20 mg tablet Commonly known as:  LIPITOR Your last dose was: Your next dose is: Take 1 Tab by mouth daily. 20 mg  
    
   
   
   
  
 clopidogrel 75 mg Tab Commonly known as:  PLAVIX Your last dose was: Your next dose is: Take 1 Tab by mouth daily. 75 mg  
    
   
   
   
  
 dulaglutide 1.5 mg/0.5 mL sub-q pen Commonly known as:  TRULICITY Your last dose was: Your next dose is: 0.5 mL by SubCUTAneous route every seven (7) days. 1.5 mg  
    
   
   
   
  
 hydroCHLOROthiazide 12.5 mg capsule Commonly known as:  Sanket Sarah Your last dose was: Your next dose is: Take 12.5 mg by mouth daily. 12.5 mg  
    
   
   
   
  
 HYDROcodone-acetaminophen 5-325 mg per tablet Commonly known as:  Emelina Punt Your last dose was: Your next dose is: Take 1 Tab by mouth every four (4) hours as needed for Pain. Max Daily Amount: 6 Tabs. 1 Tab  
    
   
   
   
  
 insulin glargine 100 unit/mL (3 mL) Inpn Commonly known as:  Eze Fields Your last dose was: Your next dose is:    
   
   
 20 units daily Insulin Needles (Disposable) 31 gauge x 5/16\" Ndle Your last dose was: Your next dose is: As directed  
     
   
   
   
  
 lisinopril 40 mg tablet Commonly known as:  Ava Vargas Your last dose was: Your next dose is: TAKE 1 TABLET BY MOUTH DAILY  
     
   
   
   
  
 metoprolol succinate 100 mg tablet Commonly known as:  TOPROL-XL Your last dose was: Your next dose is: Take 1 Tab by mouth daily.   
 100 mg  
    
   
   
   
  
 omeprazole 40 mg capsule Commonly known as:  PRILOSEC Your last dose was: Your next dose is: Take 1 Cap by mouth daily. 40 mg  
    
   
   
   
  
 spironolactone 25 mg tablet Commonly known as:  ALDACTONE Your last dose was: Your next dose is: Take 1 Tab by mouth daily. 25 mg  
    
   
   
   
  
 VIAGRA 100 mg tablet Generic drug:  sildenafil citrate Your last dose was: Your next dose is:    
   
   
      
   
   
   
  
  
  
Where to Get Your Medications Information on where to get these meds will be given to you by the nurse or doctor. ! Ask your nurse or doctor about these medications  
  oxyCODONE-acetaminophen 5-325 mg per tablet Discharge Instructions Discharge Instructions for:  
 Garima Randall / [id number] : 1975 Admitted 2018 Discharged: 2018 Postoperative Hand Surgery Instructions Elevation: 
Elevation is one of the most important things to do following your surgery. Not only does it decrease swelling, but it also decreases pain. For hand or wrist surgery, keep the arm in your sling while up and about, with your hand above your elbow. When lying down, keep your arm propped up on a few pillows, so that your fingers are pointing towards the ceiling. Finger Motion: **It is very important that you begin moving your fingers immediately after surgery, as often as you can, in order to prevent stiffness. Wiggling your fingers is not the same as moving them - moving your fingers involves bending them until they touch the dressing  
(if possible). You should use your other hand to gently move the fingers on the operative hand if necessary. **DO NOT attempt to move your wrist. 
 
Dressings: 
Please leave the surgical dressing in place until you are seen in the office for your first post-operative appointment with Dr Leana Frye.   The dressing helps to prevent infection and positions the extremity to protect the surgical procedure  that was performed. Bathing and showering are allowed as long as the dressing is kept dry. To keep your dressing dry while bathing, simply place a plastic bag (bread bag, newspaper bag, trash bag or subway sandwich bag) over the dressing and seal it with tape or a rubber band. Medications: You have likely been given a prescription for pain medication. Please follow the instructions closely. It is safe to take up to 600mg of Ibuprofen (Advil or Motrin) along with the pain prescription as long you are not allergic to it, are not on blood thinners, and do not have gastric reflux or an ulcer. However, do not take any additional tylenol/acetaminophen if your prescription contains tylenol. Note that my policy is to give only one prescription for pain medication at the time of the surgery - if you use it up quickly, then you will have no more pain medication left after only a few days, and I will not give you another prescription. So use your pain medication sparingly, and only when necessary! If you have new or increasing numbness after any numbing medicine wears off (12-24 hours for regional blocks, 3 hours for local), call the office immediately. If you experience nausea, vomiting or itching with the pain medication, please call the office during regular office hours. Refills for pain medication prescriptions ARE NOT given on the weekend or after regular office hours. If antibiotics have been prescribed, please be sure to complete the entire prescription. Post-Operative Appointments: 
Dr. Lisa Mills likes to see you back in the office about 10-14 days following your surgery to change the post-operative dressing and remove any necessary sutures or staples. If you have not received a follow up appointment card please contact our office at [phone]. *If you have any questions or concerns or experience any sudden changes in your condition, please call our office at [phone]* 
 
>>>You received an IV form of Tylenol 1000mg during your surgery, you may take tylenol (or pain medication containing Tylenol or Acetaminophen) in 6 hours at 6pm. 
 
DO NOT TAKE TYLENOL/ACETAMINOPHEN WITH PERCOCET, [address], [address] TAKE NARCOTIC PAIN MEDICATIONS WITH FOOD If given 2 pain narcotics do NOT take together! Narcotics tend to be constipating, we suggest taking a stool softener such as Colace or Miralax (follow package instructions). DO NOT DRIVE WHILE TAKING NARCOTIC PAIN MEDICATIONS. DO NOT TAKE SLEEPING MEDICATIONS OR ANTIANXIETY MEDICATIONS WHILE TAKING NARCOTIC PAIN MEDICATIONS,  ESPECIALLY THE NIGHT OF ANESTHESIA. CPAP PATIENTS BE SURE TO WEAR MACHINE WHENEVER NAPPING OR SLEEPING. DISCHARGE SUMMARY from Nurse The following personal items collected during your admission are returned to you:  
Dental Appliance: Dental Appliances: None Vision: Visual Aid: Glasses (reading) Hearing Aid:   
Jewelry: Jewelry: None Clothing: Clothing: Shirt, With patient, Pants, Footwear Other Valuables: Other Valuables: Cell Phone Valuables sent to safe:   
 
 
PATIENT INSTRUCTIONS: 
 
 
B - Balance E - Eyes F-  Face looks uneven A-  Arms unable to move or move even S-  Speech slurred or non-existent T-  Time-call 911 as soon as signs and symptoms begin-DO NOT go Back to bed or wait to see if you get better-TIME IS BRAIN. If you have not received your influenza and/or pneumococcal vaccine, please follow up with your primary care physician. The discharge information has been reviewed with the patient and spouse. The patient and spouse verbalized understanding. Introducing [facility]! Dear Soraya Rivera: Thank you for requesting a [employer] account. Our records indicate that you already have an active [employer]works account. You can access your account anytime at [email or web address] Did you know that you can access your hospital and ER discharge instructions at any time in intro[employer]works? You can also review all of your test results from your hospital stay or ER visit. Additional Information If you have questions, please visit the Frequently Asked Questions section of the [employer]works website at [email or web address] Remember, [employer]works is NOT to be used for urgent needs. For medical emergencies, dial 911. Now available from your iPhone and Android! Providers Seen During Your Hospitalization Provider Specialty Primary office phone Thai White MD Orthopedic Surgery [phone] Your Primary Care Physician (PCP) Primary Care Physician Office Phone Office Fax [address] [phone] You are allergic to the following No active allergies Recent Documentation Height Weight BMI Smoking Status 1.626 m 93.4 kg 35.36 kg/m2 Never Smoker Emergency Contacts Name Discharge Info Relation Home Work Mobile Preeti Cisneros DISCHARGE CAREGIVER [3] Spouse [3] [phone] Patient Belongings The following personal items are in your possession at time of discharge: 
  Dental Appliances: None  Visual Aid: Glasses (reading)      Home Medications: None   Jewelry: None  Clothing: Shirt, With patient, Pants, Footwear    Other Valuables: Cell Phone Discharge Instructions Attachments/References MEFS - NARCOTIC-ANALGESIC/ACETAMINOPHEN (PERCOCET, Mckinley Daring, LORCET HD, LORTAB 10/325) - (BY MOUTH) (ENGLISH) Patient Handouts Narcotic-Analgesic/Acetaminophen (Percocet, Norco, Lorcet HD, Lortab 10/325) - (By mouth) Why this medicine is used:  
Relieves pain. Contact a nurse or doctor right away if you have: 
· Extreme weakness, shallow breathing, slow heartbeat · Severe confusion, lightheadedness, dizziness, fainting · Yellow skin or eyes, dark urine or pale stools · Severe constipation, severe stomach pain, nausea, vomiting, loss of appetite · Sweating or cold, clammy skin Common side effects: · Mild constipation, nausea, vomiting · Sleepiness, tiredness · Itching, rash © 2017 [facility] Information is for End User's use only and may not be sold, redistributed or otherwise used for commercial purposes. Please provide this summary of care documentation to your next provider. Signatures-by signing, you are acknowledging that this After Visit Summary has been reviewed with you and you have received a copy. Patient Signature:  ____________________________________________________________ Date:  ____________________________________________________________  
  
[facility] Provider Signature:  ____________________________________________________________ Date:  ____________________________________________________________

## 2018-02-22 NOTE — OP NOTES
[facility] OP NOTE    Mami Leroy  MR#: [id number]  : 1975  ACCOUNT #: [de-identified]   DATE OF SERVICE: 2018    PREOPERATIVE DIAGNOSIS:  Left ulnar fracture. POSTOPERATIVE DIAGNOSIS:  Left ulnar fracture. PROCEDURE PERFORMED:  Open reduction internal fixation, left ulnar shaft fracture. SURGEON:  Dr. Chasidy Voss. Georgi Gustafson    ASSISTANT:  None. ANESTHESIA:  General.    ESTIMATED BLOOD LOSS:  Minimal.    SPECIMENS:  None. COMPLICATIONS:  None. IMPLANTS:  A Synthes 5-hole pelvic reconstruction plate and 1 locking screw and 3 nonlocking screws, along with a 2.7 mm lag screw that was not placed through the plate. SUMMARY:  Patient brought into the operating room, placed on the operating table in supine position, general anesthetic administered. Note that the operative site had been identified and regional block administered in preop holding. Appropriate preoperative antibiotic prophylaxis had been administered. Left upper extremity was prepped and draped in the usual manner. After timeout, the limb was elevated and exsanguinated with Esmarch bandage and the tourniquet inflated to 250 mmHg. A longitudinal incision was made overlying the distal ulna. This was carried down through the fascia and the fascia between the ECU and FCU stripped from the ulna and the fracture site exposed. The fracture was then reduced under direct visualization and held with a bone holding clamp from the Synthes set. A 2.7 mm lag screw was then and perpendicular to the fracture. Now, a 5-hole plate was chosen. This was positioned and fixed to the volar surface of the ulna. The distal most screw was a nonlocking screw, but the next most proximal screw was a locking screw. The other 2 proximal screws were nonlocking. The one hole in the middle of the plate was left open.   Final fluoroscopic x-rays were taken showing excellent reduction of the fracture and excellent position of the internal fixation. The wound was therefore thoroughly irrigated. Closure was performed with 3-0 Vicryl in the fascial layer and 4-0 nylon in the skin. Xeroform, 4 x 4's, sterile cast padding and a short arm plaster splint were placed. The tourniquet was released. Total tourniquet time was 88 minutes. The patient tolerated the procedure well, was taken back to the PACU in stable condition.       MD VIKKI Lara / Chi Purdy  D: 02/22/2018 12:51     T: 02/22/2018 13:18  JOB #: [id number]

## 2018-02-22 NOTE — PERIOP NOTES
Dr. Adrián Chaudhry performed a left supraclavicular nerve block in pre-op with the U/S machine. Pt. Tolerated well. He was on cardiac monitoring, pulse oximetry and 3L NC O2. VSS. Pt received 3mg of versed IV for sedation . He was drowsy, but aroused when spoken to. Will continue to monitor.  VSS

## 2018-02-22 NOTE — BRIEF OP NOTE
BRIEF OPERATIVE NOTE    Date of Procedure: 2/22/2018   Preoperative Diagnosis: LEFT ULNA FRACTURE  Postoperative Diagnosis: LEFT ULNA FRACTURE    Procedure(s):  OPEN REDUCTION INTERNAL FIXATION LEFT ULNAR SHAFT FRACTURE  Surgeon(s) and Role:     * Jamel Mendez MD - Primary         Assistant Staff: None      Surgical Staff:  Circ-1: Tamia Dao  Circ-Relief: An Womack RN  Scrub Tech-1: Brian Fulling  Event Time In   Incision Start 1054   Incision Close 1236     Anesthesia: General   Estimated Blood Loss: min  Specimens: * No specimens in log *   Findings: fx   Complications: none  Implants:   Implant Name Type Inv.  Item Serial No.  Lot No. LRB No. Used Action   SCR BNE LCK ST T25 3.5X14MM SS --  - SNA  SCR BNE LCK ST T25 3.5X14MM SS --  NA SYNTHES [place] NA Left 1 Implanted   SCR BNE CRTX ST 2.7X12MM SS --  - SNA  SCR BNE CRTX ST 2.7X12MM SS --  NA SYNTHES [place] NA Left 1 Implanted   SCR BNE CRTX ST HEX 3.5X14MM -- SS - SNA  SCR BNE CRTX ST HEX 3.5X14MM -- SS NA SYNTHES [place] NA Left 1 Implanted   PLATE BNE LCP 5H [id number] --  - SNA   PLATE BNE LCP 5H [id number] --  NA SYNTHES [place] NA Left 1 Implanted

## 2018-02-22 NOTE — DISCHARGE INSTRUCTIONS
Discharge Instructions for:    Katerina Smith / [id number] : 1975    Admitted 2018 Discharged: 2018       Postoperative Hand Surgery Instructions      Elevation:  Elevation is one of the most important things to do following your surgery. Not only does it decrease swelling, but it also decreases pain. For hand or wrist surgery, keep the arm in your sling while up and about, with your hand above your elbow. When lying down, keep your arm propped up on a few pillows, so that your fingers are pointing towards the ceiling. Finger Motion:  **It is very important that you begin moving your fingers immediately after surgery, as often as you can, in order to prevent stiffness. Wiggling your fingers is not the same as moving them - moving your fingers involves bending them until they touch the dressing   (if possible). You should use your other hand to gently move the fingers on the operative hand if necessary. **DO NOT attempt to move your wrist.    Dressings:  Please leave the surgical dressing in place until you are seen in the office for your first post-operative appointment with Dr Candance Pan. The dressing helps to prevent infection and positions the extremity to protect the surgical procedure  that was performed. Bathing and showering are allowed as long as the dressing is kept dry. To keep your dressing dry while bathing, simply place a plastic bag (bread bag, newspaper bag, trash bag or subway sandwich bag) over the dressing and seal it with tape or a rubber band. Medications: You have likely been given a prescription for pain medication. Please follow the instructions closely. It is safe to take up to 600mg of Ibuprofen (Advil or Motrin) along with the pain prescription as long you are not allergic to it, are not on blood thinners, and do not have gastric reflux or an ulcer. However, do not take any additional tylenol/acetaminophen if your prescription contains tylenol.       Note that my policy is to give only one prescription for pain medication at the time of the surgery - if you use it up quickly, then you will have no more pain medication left after only a few days, and I will not give you another prescription. So use your pain medication sparingly, and only when necessary! If you have new or increasing numbness after any numbing medicine wears off (12-24 hours for regional blocks, 3 hours for local), call the office immediately. If you experience nausea, vomiting or itching with the pain medication, please call the office during regular office hours. Refills for pain medication prescriptions ARE NOT given on the weekend or after regular office hours. If antibiotics have been prescribed, please be sure to complete the entire prescription. Post-Operative Appointments:  Dr. Georgi Gustafson likes to see you back in the office about 10-14 days following your surgery to change the post-operative dressing and remove any necessary sutures or staples. If you have not received a follow up appointment card please contact our office at [phone]. *If you have any questions or concerns or experience any sudden changes in your condition, please call our office at [phone]*    >>>You received an IV form of Tylenol 1000mg during your surgery, you may take tylenol (or pain medication containing Tylenol or Acetaminophen) in 6 hours at 6pm.    DO NOT TAKE TYLENOL/ACETAMINOPHEN WITH PERCOCET, [address], [address] [address] TAKE NARCOTIC PAIN MEDICATIONS WITH FOOD     If given 2 pain narcotics do NOT take together! Narcotics tend to be constipating, we suggest taking a stool softener such as Colace or Miralax (follow package instructions). DO NOT DRIVE WHILE TAKING NARCOTIC PAIN MEDICATIONS. DO NOT TAKE SLEEPING MEDICATIONS OR ANTIANXIETY MEDICATIONS WHILE TAKING NARCOTIC PAIN MEDICATIONS,  ESPECIALLY THE NIGHT OF ANESTHESIA.     CPAP PATIENTS BE SURE TO WEAR MACHINE WHENEVER NAPPING OR SLEEPING. DISCHARGE SUMMARY from Nurse    The following personal items collected during your admission are returned to you:   Dental Appliance: Dental Appliances: None  Vision: Visual Aid: Glasses (reading)  Hearing Aid:    Jewelry: Jewelry: None  Clothing: Clothing: Shirt, With patient, Pants, Footwear  Other Valuables: Other Valuables: Cell Phone  Valuables sent to safe:        PATIENT INSTRUCTIONS:    After General Anesthesia or Intravenous Sedation, for 24 hours or while taking prescription Narcotics:        Someone should be with you for the next 24 hours. For your own safety, a responsible adult must drive you home. · Limit your activities  · Recommended activity: Rest today, up with assistance today. Do not climb stairs or shower unattended for the next 24 hours. · Please start with a soft bland diet and advance as tolerated (no nausea) to regular diet. · If you have a sore throat you should try the following: fluids, warm salt water gargles, or throat lozenges. If it does not improve after several days please follow up with your primary physician. · Do not drive and operate hazardous machinery  · Do not make important personal or business decisions  · Do  not drink alcoholic beverages  · If you have not urinated within 8 hours after discharge, please contact your surgeon on call. Report the following to your surgeon:  · Excessive pain, swelling, redness or odor of or around the surgical area  · Temperature over 100.5  · Nausea and vomiting lasting longer than 4 hours or if unable to take medications  · Any signs of decreased circulation or nerve impairment to extremity: change in color, persistent  numbness, tingling, coldness or increase pain      · You will receive a Post Operative Call from one of the Recovery Room Nurses on the day after your surgery to check on you. It is very important for us to know how you are recovering after your surgery.  If you have an issue or need to speak with someone, please call your surgeon, do not wait for the post operative call. · You may receive an e-mail or letter in the mail from [facility] regarding your experience with us in the Ambulatory Surgery Unit. Your feedback is valuable to us and we appreciate your participation in the survey. · If the above instructions are not adequate, please contact Ema Gauthier RN, [facility] anesthesia Nurse Manager or our Anesthesiologist, at [phone]. If you are having problems after your surgery, call the physician at their office number. · We wish you a speedy recovery ? [place] THROMBOSIS AND PULMONARY EMBOLUS    SURGICAL PATIENTS  Surgical patients are the #1 risk for DVT and PE. WHAT IS DVT? WHAT IS PE?  DVT is a serious condition where blood clots develop deep in the veins of the legs. PE occurs when a blood clot breaks loose from the wall of a vein and travels to the lungs blocking the pulmonary artery or one of its branches impairing blood flow from the heart, which could result in death.   RISK FACTORS   Surgery lasting longer than 45 minutes   History of inflammatory bowel disease   Oral contraceptive or hormone replacement therapy   Immobilization   Varicose veins / swollen legs   Smoking    CHF / Acute MI / Irregular heart beat   Family history of thrombosis   General anesthesia greater than 2 hours   Obesity   Infection of less than one month   Less than 1 month postpartum   COPD / Pneumonia   Arthroscopic surgery   Malignancy / cancer   Spine surgery   Blood abnormalities   Stroke / Paralysis / Coma    SIGNS AND SYMPTOMS OF DEEP VEIN TROMBOSIS  Usually occurs in one leg, above or below the knee   Swelling - one calf or thigh may be larger than the other   Feeling increased warmth in the area of the leg that is swollen or painful   Leg pain, which may increase when standing or walking   Swelling along the vein of the leg   When swollen areas is pressed with a finger, a depression may remain   Tenderness of the leg that may be confined to one area   Change in leg color (bluish or red)    SIGNS AND SYMPTOMS OF PULMONARY EMBOLUS   Chest pain that gets worse with deep breath, coughing or chest movement   Coughing up blood   Sweating   Shortness of breath or difficulty breathing   Rapid heart beat   Lightheadedness    HOW TO REDUCE THE POSSIBLE RISK OF DVT   Exercise - simple activities as rotating ankles and wrists, wiggling toes and fingers, tightening and relaxing muscles in calves and thighs promotes circulation while recovering from surgery. Please do these exercises every hour during waking hours   PADMA hose - If you have been given white support hose while having surgery, wear them home. You may remove them for half an hour every 8-hour period and stop wearing them 48 hours after surgery or as prescribed by your physician. PADMA hose may be reused for air travel or extended car travel   Take mediation as prescribed by your physician (Lovenox, Coumadin, Aspirin)   Resume your normal activities as soon as your doctor advises you to do so.  Remember, when traveling, to [place] your legs frequently. Wear non skid shoes when PADMA hose are on. They are very slippery! PATIENTS WHO BELIEVE THEY MAY BE EXPERIENCING SIGNS AND SYMPTOMS OF DVT OR PE SHOULD SEEK MEDICAL HELP IMMEDIATELY             What to do at Home:      *  Please give a list of your current medications to your Primary Care Provider. *  Please update this list whenever your medications are discontinued, doses are      changed, or new medications (including over-the-counter products) are added. *  Please carry medication information at all times in case of emergency situations.             These are general instructions for a healthy lifestyle:    No smoking/ No tobacco products/ Avoid exposure to second hand smoke    Surgeon General's Warning:  Quitting smoking now greatly reduces serious risk to your health. Obesity, smoking, and sedentary lifestyle greatly increases your risk for illness    A healthy diet, regular physical exercise & weight monitoring are important for maintaining a healthy lifestyle    You may be retaining fluid if you have a history of heart failure or if you experience any of the following symptoms:  Weight gain of 3 pounds or more overnight or 5 pounds in a week, increased swelling in our hands or feet or shortness of breath while lying flat in bed. Please call your doctor as soon as you notice any of these symptoms; do not wait until your next office visit. Recognize signs and symptoms of STROKE:    B - Balance  E - Eyes    F-  Face looks uneven    A-  Arms unable to move or move even    S-  Speech slurred or non-existent    T-  Time-call 911 as soon as signs and symptoms begin-DO NOT go       Back to bed or wait to see if you get better-TIME IS BRAIN. If you have not received your influenza and/or pneumococcal vaccine, please follow up with your primary care physician. The discharge information has been reviewed with the patient and spouse. The patient and spouse verbalized understanding.

## 2018-02-22 NOTE — ANESTHESIA PROCEDURE NOTES
Peripheral Block    Start time: 2/22/2018 9:09 AM  End time: 2/22/2018 9:23 AM  Performed by: Diamond Ross  Authorized by: Diamond Ross       Pre-procedure:    Indications: at surgeon's request and post-op pain management    Preanesthetic Checklist: patient identified, risks and benefits discussed, site marked, timeout performed, anesthesia consent given and patient being monitored    Timeout Time: 09:11          Block Type:   Block Type:  Supraclavicular  Laterality:  Left  Monitoring:  Standard ASA monitoring and responsive to questions  Injection Technique:  Single shot  Procedures: ultrasound guided    Patient Position: supine  Prep: chlorhexidine    Location:  Supraclavicular  Needle Type:  Stimuplex  Needle Gauge:  22 G  Needle Localization:  Ultrasound guidance  Medication Injected:  0.5%  ropivacaine  Volume (mL):  30    Assessment:  Number of attempts:  1  Injection Assessment:  Incremental injection every 5 mL, no paresthesia, ultrasound image on chart, local visualized surrounding nerve on ultrasound, negative aspiration for blood and no intravascular symptoms  Patient tolerance:  Patient tolerated the procedure well with no immediate complications

## 2018-02-23 ENCOUNTER — HOSPITAL ENCOUNTER (EMERGENCY)
Age: 43
Discharge: HOME | End: 2018-02-23
Attending: EMERGENCY MEDICINE
Payer: COMMERCIAL

## 2018-02-23 VITALS
BODY MASS INDEX: 36.02 KG/M2 | WEIGHT: 210.98 LBS | TEMPERATURE: 99 F | HEART RATE: 109 BPM | HEIGHT: 64 IN | RESPIRATION RATE: 17 BRPM | OXYGEN SATURATION: 100 % | SYSTOLIC BLOOD PRESSURE: 143 MMHG | DIASTOLIC BLOOD PRESSURE: 96 MMHG

## 2018-02-23 DIAGNOSIS — G89.18 ACUTE POST-OPERATIVE PAIN: Primary | ICD-10-CM

## 2018-02-23 DIAGNOSIS — R11.0 NAUSEA WITHOUT VOMITING: ICD-10-CM

## 2018-02-23 PROCEDURE — 74011250636 HC RX REV CODE- 250/636

## 2018-02-23 PROCEDURE — 74011250637 HC RX REV CODE- 250/637: Performed by: EMERGENCY MEDICINE

## 2018-02-23 PROCEDURE — 99283 EMERGENCY DEPT VISIT LOW MDM: CPT

## 2018-02-23 RX ORDER — ONDANSETRON 4 MG/1
4 TABLET, ORALLY DISINTEGRATING ORAL
Qty: 10 TAB | Refills: 0 | Status: SHIPPED | OUTPATIENT
Start: 2018-02-23 | End: 2020-11-24

## 2018-02-23 RX ORDER — MORPHINE SULFATE 2 MG/ML
6 INJECTION, SOLUTION INTRAMUSCULAR; INTRAVENOUS
Status: DISCONTINUED | OUTPATIENT
Start: 2018-02-23 | End: 2018-02-23 | Stop reason: HOSPADM

## 2018-02-23 RX ORDER — MORPHINE SULFATE 10 MG/ML
INJECTION, SOLUTION INTRAMUSCULAR; INTRAVENOUS
Status: COMPLETED
Start: 2018-02-23 | End: 2018-02-23

## 2018-02-23 RX ORDER — ONDANSETRON 4 MG/1
4 TABLET, ORALLY DISINTEGRATING ORAL
Status: COMPLETED | OUTPATIENT
Start: 2018-02-23 | End: 2018-02-23

## 2018-02-23 RX ADMIN — ONDANSETRON 4 MG: 4 TABLET, ORALLY DISINTEGRATING ORAL at 02:37

## 2018-02-23 RX ADMIN — MORPHINE SULFATE 10 MG: 10 INJECTION, SOLUTION INTRAVENOUS at 02:37

## 2018-02-23 NOTE — PERIOP NOTES
Pt came back to ED for pain last night. Pt says that the ED gave him a dose of morphine and got the pain under control and that is has been close to tolerable. Instructed pt to take Ibuprofen 600 mg along with his pain medication to help control the pain. If the pain gets bad again, to call Dr. Cynthia Rivero.

## 2018-02-23 NOTE — DISCHARGE INSTRUCTIONS
Acute Pain After Surgery: Care Instructions  Your Care Instructions    It's common to have some pain after surgery. Pain doesn't mean that something is wrong or that the surgery didn't go well. But when the pain is severe, it's important to work with your doctor to manage it. It's also important to be aware of a few facts about pain and pain medicine. · You are the only person who knows what your pain feels like. So be sure to tell your doctor when you are in pain or when the pain changes. Then he or she will know how to adjust your medicines. · Pain is often easier to control right after it starts. So it may be better to take regular doses of pain medicine and not wait until the pain gets bad. · Medicine can help control pain. But this doesn't mean you'll have no pain. Medicine works to keep the pain at a level you can live with. With time, you will feel better. Follow-up care is a key part of your treatment and safety. Be sure to make and go to all appointments, and call your doctor if you are having problems. It's also a good idea to know your test results and keep a list of the medicines you take. How can you care for yourself at home? · Be safe with medicines. Read and follow all instructions on the label. ¨ If the doctor gave you a prescription medicine for pain, take it as prescribed. ¨ If you are not taking a prescription pain medicine, ask your doctor if you can take an over-the-counter medicine. · If you take an over-the-counter pain medicine, such as acetaminophen (Tylenol), ibuprofen (Advil, Motrin), or naproxen (Aleve), read and follow all instructions on the label. · Do not take two or more pain medicines at the same time unless the doctor told you to. · Do not drink alcohol while you are taking pain medicines. · Try to walk each day if your doctor recommends it. Start by walking a little more than you did the day before. Bit by bit, increase the amount you walk.  Walking increases blood flow. It also helps prevent pneumonia and constipation. · To prevent constipation from opioid pain medicines:  ¨ Talk to your doctor about a laxative. ¨ Include fruits, vegetables, beans, and whole grains in your diet each day. These foods are high in fiber. ¨ Drink plenty of fluids, enough so that your urine is light yellow or clear like water. Drink water, fruit juice, or other drinks that do not contain caffeine or alcohol. If you have kidney, heart, or liver disease and have to limit fluids, talk with your doctor before you increase the amount of fluids you drink. ¨ Take a fiber supplement, such as Citrucel or Metamucil, every day if needed. Read and follow all instructions on the label. If you take pain medicine for more than a few days, talk to your doctor before you take fiber. When should you call for help? Call your doctor now or seek immediate medical care if:  ? · Your pain gets worse. ? · Your pain is not controlled by medicine. ? Watch closely for changes in your health, and be sure to contact your doctor if you have any problems. Where can you learn more? Go to http://kashif-ry.info/. Enter ([phone] [phone] in the search box to learn more about \"Acute Pain After Surgery: Care Instructions. \"  Current as of: March 20, 2017  Content Version: 11.4  © 9989-0142 [employer]. Care instructions adapted under license by [employer] (which disclaims liability or warranty for this information). If you have questions about a medical condition or this instruction, always ask your healthcare professional. [place] [address] any warranty or liability for your use of this information.

## 2018-02-24 RX ORDER — METFORMIN HYDROCHLORIDE 500 MG/1
1000 TABLET, EXTENDED RELEASE ORAL 2 TIMES DAILY WITH MEALS
Qty: 120 TAB | Refills: 11 | Status: SHIPPED | OUTPATIENT
Start: 2018-02-24 | End: 2019-06-03 | Stop reason: SDUPTHER

## 2018-02-24 NOTE — ASSESSMENT & PLAN NOTE
Stable, based on history, physical exam and review of pertinent labs, studies and medications; meds reconciled; lifestyle modifications recommended, medication compliance emphasized. Key Antihyperglycemic Medications             metFORMIN ER (GLUCOPHAGE XR) 500 mg tablet  (Taking) Take 2 Tabs by mouth two (2) times daily (with meals). dulaglutide (TRULICITY) 1.5 QI/0.5 mL sub-q pen  (Taking) 0.5 mL by SubCUTAneous route every seven (7) days. insulin glargine (LANTUS,BASAGLAR) 100 unit/mL (3 mL) inpn  (Taking) 20 units daily        Other Key Diabetic Medications             lisinopril (PRINIVIL, ZESTRIL) 40 mg tablet  (Taking) TAKE 1 TABLET BY MOUTH DAILY    atorvastatin (LIPITOR) 20 mg tablet  (Taking) Take 1 Tab by mouth daily.         Lab Results   Component Value Date/Time    Hemoglobin A1c 10.1 02/20/2018 12:53 PM    Glucose 133 02/15/2018 09:41 AM    Creatinine 1.04 02/15/2018 09:41 AM    Cholesterol, total 212 07/14/2017 05:00 PM    HDL Cholesterol 56 07/14/2017 05:00 PM    LDL, calculated 103 07/14/2017 05:00 PM    Triglyceride 267 07/14/2017 05:00 PM     Diabetic Foot and Eye Exam HM Status   Topic Date Due    Diabetic Foot Care  02/28/2018 (Originally 5/12/2016)   [address] [facility] Exam  11/27/2018

## 2018-02-24 NOTE — PROGRESS NOTES
[facility]  [facility]  [address] [place] [address]  Phone:  [phone]  Fax: [phone]       Chief Complaint   Patient presents with   [facility] ED Follow-up     dog bite on 2/15/18   . SUBJECTIVE:    Renuka Newsome is a 38 y.o. male comes in for return visit having been traumatized recently by his dog. Apparently a dog bite broke his left arm and there were a lot of bite marks on the contralateral one. He is seeing an orthopaedic physician for this. Surgical intervention will probably have to occur eventually once the infection has resolved. From a diabetic perspective, he is actually following directions. He is taking his Trulicity and his Lantus as ordered. His states his blood sugars on the rare times that he checks are in the 110-120 range. He has not had any interventional hypoglycemia. He has been out of his Trulicity for the last two weeks however. He has progressively gained weight. He denies any chest pain or shortness of breath. He is mildly symptomatic from his obstructive sleep apnea which has returned as a direct result of his weight gain. He has a past history of primary hypertension, dyslipidemia and coronary artery disease. Current Outpatient Prescriptions   Medication Sig Dispense Refill    metFORMIN ER (GLUCOPHAGE XR) 500 mg tablet Take 2 Tabs by mouth two (2) times daily (with meals). 120 Tab 11    HYDROcodone-acetaminophen (NORCO) 5-325 mg per tablet Take 1 Tab by mouth every four (4) hours as needed for Pain. Max Daily Amount: 6 Tabs. 25 Tab 0    amoxicillin-clavulanate (AUGMENTIN) 875-125 mg per tablet Take 1 Tab by mouth every twelve (12) hours. Indications: DOG BITE WOUND 14 Tab 0    dulaglutide (TRULICITY) 1.5 CE/8.8 mL sub-q pen 0.5 mL by SubCUTAneous route every seven (7) days.  4 Pen 11    Insulin Needles, Disposable, 31 gauge x 5/16\" ndle As directed 60 Package 11    insulin glargine (LANTUS,BASAGLAR) 100 unit/mL (3 mL) inpn 20 units daily 2 Pen 11    lisinopril (PRINIVIL, ZESTRIL) 40 mg tablet TAKE 1 TABLET BY MOUTH DAILY 30 Tab 11    hydroCHLOROthiazide (MICROZIDE) 12.5 mg capsule Take 12.5 mg by mouth daily.  atorvastatin (LIPITOR) 20 mg tablet Take 1 Tab by mouth daily. 30 Tab 11    amLODIPine (NORVASC) 10 mg tablet Take 1 Tab by mouth daily. 30 Tab 11    aspirin delayed-release 81 mg tablet Take 1 Tab by mouth daily. 30 Tab 11    clopidogrel (PLAVIX) 75 mg tab Take 1 Tab by mouth daily. 30 Tab 11    spironolactone (ALDACTONE) 25 mg tablet Take 1 Tab by mouth daily. 30 Tab 11    metoprolol succinate (TOPROL-XL) 100 mg tablet Take 1 Tab by mouth daily. 30 Tab 11    omeprazole (PRILOSEC) 40 mg capsule Take 1 Cap by mouth daily. 30 Cap 11    VIAGRA 100 mg tablet       ondansetron (ZOFRAN ODT) 4 mg disintegrating tablet Take 1 Tab by mouth every eight (8) hours as needed for Nausea. 10 Tab 0    oxyCODONE-acetaminophen (PERCOCET) 5-325 mg per tablet Take 1 Tab by mouth every four (4) hours as needed for Pain. Max Daily Amount: 6 Tabs.  27 Tab 0     Past Medical History:   Diagnosis Date    CAD (coronary artery disease)     2 stents 2016     Diabetes ([facility] [address]     Headache     Hypercholesterolemia     Hypertension     Hypogonadism male     Liver disease     NAFLD    Obstructive sleep apnea      Past Surgical History:   Procedure Laterality Date    HX HEENT      status post pharyngioplasty     No Known Allergies      REVIEW OF SYSTEMS:  General: negative for - chills or fever  ENT: negative for - headaches, nasal congestion or tinnitus  Respiratory: negative for - cough, hemoptysis, shortness of breath or wheezing  Cardiovascular : negative for - chest pain, edema, palpitations or shortness of breath  Gastrointestinal: negative for - abdominal pain, blood in stools, heartburn or nausea/vomiting  Genito-Urinary: no dysuria, trouble voiding, or hematuria  Musculoskeletal: negative for - gait disturbance, joint pain, joint stiffness or joint swelling  Neurological: no TIA or stroke symptoms  Hematologic: no bruises, no bleeding, no swollen glands  Integument: no lumps, mole changes, nail changes or rash  Endocrine: no malaise/lethargy or unexpected weight changes      Social History     Social History    Marital status:      Spouse name: N/A    Number of children: 2    Years of education: 12     Occupational History          Social History Main Topics    Smoking status: Never Smoker    Smokeless tobacco: Never Used    Alcohol use No    Drug use: No    Sexual activity: Yes     Partners: Female     Other Topics Concern    None     Social History Narrative     Family History   Problem Relation Age of Onset    Heart Disease Father        OBJECTIVE:    Visit Vitals    /78 (BP 1 Location: Right arm, BP Patient Position: Sitting)    Pulse 78    Temp 97.9 °F (36.6 °C) (Oral)    Resp 16    Ht 5' 4\" (1.626 m)    Wt 209 lb 9.6 oz (95.1 kg)    SpO2 96%    BMI 35.98 kg/m2     CONSTITUTIONAL: well , well nourished, appears age appropriate  EYES: perrla, eom intact  ENMT:moist mucous membranes, pharynx clear  NECK: supple. Thyroid normal  RESPIRATORY: Chest: clear to ascultation and percussion   CARDIOVASCULAR: Heart: regular rate and rhythm  GASTROINTESTINAL: Abdomen: soft, bowel sounds active  HEMATOLOGIC: no pathological lymph nodes palpated  MUSCULOSKELETAL: Extremities: no edema, pulse 1+, bandaged her left arm  INTEGUMENT: No unusual rashes or suspicious skin lesions noted. Nails appear normal.  Multiple bite marks right arm  NEUROLOGIC: non-focal exam   MENTAL STATUS: alert and oriented, appropriate affect      ASSESSMENT:  1. Type 2 diabetes mellitus with complication, with long-term current use of insulin ([address]    2. ASHD (arteriosclerotic heart disease)    3. Obesity (BMI 30.0-34.9)    4. Essential hypertension    5. Dyslipidemia    6.  Obstructive sleep apnea 7. Physical deconditioning      Diagnoses and all orders for this visit:    1. Type 2 diabetes mellitus with complication, with long-term current use of insulin (HCC)  Assessment & Plan:  Stable, based on history, physical exam and review of pertinent labs, studies and medications; meds reconciled; lifestyle modifications recommended, medication compliance emphasized. Key Antihyperglycemic Medications             metFORMIN ER (GLUCOPHAGE XR) 500 mg tablet  (Taking) Take 2 Tabs by mouth two (2) times daily (with meals). dulaglutide (TRULICITY) 1.5 TP/1.0 mL sub-q pen  (Taking) 0.5 mL by SubCUTAneous route every seven (7) days. insulin glargine (LANTUS,BASAGLAR) 100 unit/mL (3 mL) inpn  (Taking) 20 units daily        Other Key Diabetic Medications             lisinopril (PRINIVIL, ZESTRIL) 40 mg tablet  (Taking) TAKE 1 TABLET BY MOUTH DAILY    atorvastatin (LIPITOR) 20 mg tablet  (Taking) Take 1 Tab by mouth daily. Lab Results   Component Value Date/Time    Hemoglobin A1c 10.1 02/20/2018 12:53 PM    Glucose 133 02/15/2018 09:41 AM    Creatinine 1.04 02/15/2018 09:41 AM    Cholesterol, total 212 07/14/2017 05:00 PM    HDL Cholesterol 56 07/14/2017 05:00 PM    LDL, calculated 103 07/14/2017 05:00 PM    Triglyceride 267 07/14/2017 05:00 PM     Diabetic Foot and Eye Exam HM Status   Topic Date Due    Diabetic Foot Care  02/28/2018 (Originally 5/12/2016)   [address] Exam  11/27/2018       Orders:  -     HEMOGLOBIN A1C WITH EAG    2. ASHD (arteriosclerotic heart disease)    3. Obesity (BMI 30.0-34.9)    4. Essential hypertension    5. Dyslipidemia  -     APOLIPOPROTEIN B    6. Obstructive sleep apnea  -     REFERRAL TO SLEEP STUDIES    7. Physical deconditioning    Other orders  -     metFORMIN ER (GLUCOPHAGE XR) 500 mg tablet; Take 2 Tabs by mouth two (2) times daily (with meals). PLAN:    1.  He is doing well as far as the dog bites are concerned and he is following with orthopaedics for his fractured left arm. 2. From a diabetic perspective, I will await the results of the hemoglobin A1c. He is surprisingly consistent. Ideally he needs to be on metformin. This will lessen further weight gain. 3. His coronary artery disease appears to be stable. 4. Weight is his single biggest problem that is driving all of his existing comorbidities. I remind him the importance of eating meals, avoiding snacking and reducing the consumption of processed carbohydrates which is his biggest problem. 5. He needs to increase his physical activity. 6. Blood pressure is excellent today, no adjustments are made. 7. He will continue statin as prescribed, efficacy and compliance will be assessed. 8. He will make an appointment to see the neurologist for his obstructive sleep apnea. .  Orders Placed This Encounter    APOLIPOPROTEIN B    HEMOGLOBIN A1C WITH EAG    REFERRAL TO SLEEP STUDIES    metFORMIN ER (GLUCOPHAGE XR) 500 mg tablet         Follow-up Disposition:  Return in about 4 weeks (around 3/20/2018).       Sweetie Montez MD

## 2018-08-02 ENCOUNTER — HOSPITAL ENCOUNTER (EMERGENCY)
Age: 43
Discharge: HOME | End: 2018-08-02
Attending: EMERGENCY MEDICINE
Payer: COMMERCIAL

## 2018-08-02 ENCOUNTER — APPOINTMENT (OUTPATIENT)
Dept: CT IMAGING | Age: 43
End: 2018-08-02
Attending: EMERGENCY MEDICINE
Payer: COMMERCIAL

## 2018-08-02 VITALS
HEART RATE: 87 BPM | RESPIRATION RATE: 18 BRPM | DIASTOLIC BLOOD PRESSURE: 134 MMHG | SYSTOLIC BLOOD PRESSURE: 151 MMHG | HEIGHT: 64 IN | BODY MASS INDEX: 35.87 KG/M2 | OXYGEN SATURATION: 96 % | TEMPERATURE: 98.1 F | WEIGHT: 210.1 LBS

## 2018-08-02 DIAGNOSIS — R10.84 ABDOMINAL PAIN, GENERALIZED: Primary | ICD-10-CM

## 2018-08-02 DIAGNOSIS — R73.9 HYPERGLYCEMIA: ICD-10-CM

## 2018-08-02 LAB
ALBUMIN SERPL-MCNC: 3.7 G/DL (ref 3.5–5)
ALBUMIN/GLOB SERPL: 0.9 {RATIO} (ref 1.1–2.2)
ALP SERPL-CCNC: 138 U/L (ref 45–117)
ALT SERPL-CCNC: 43 U/L (ref 12–78)
ANION GAP SERPL CALC-SCNC: 7 MMOL/L (ref 5–15)
APPEARANCE UR: CLEAR
AST SERPL-CCNC: 21 U/L (ref 15–37)
BACTERIA URNS QL MICRO: NEGATIVE /HPF
BASOPHILS # BLD: 0 K/UL (ref 0–0.1)
BASOPHILS NFR BLD: 1 % (ref 0–1)
BILIRUB SERPL-MCNC: 0.4 MG/DL (ref 0.2–1)
BILIRUB UR QL: NEGATIVE
BUN SERPL-MCNC: 16 MG/DL (ref 6–20)
BUN/CREAT SERPL: 11 (ref 12–20)
CALCIUM SERPL-MCNC: 8.5 MG/DL (ref 8.5–10.1)
CHLORIDE SERPL-SCNC: 96 MMOL/L (ref 97–108)
CO2 SERPL-SCNC: 29 MMOL/L (ref 21–32)
COLOR UR: ABNORMAL
CREAT SERPL-MCNC: 1.48 MG/DL (ref 0.7–1.3)
DIFFERENTIAL METHOD BLD: NORMAL
EOSINOPHIL # BLD: 0.1 K/UL (ref 0–0.4)
EOSINOPHIL NFR BLD: 2 % (ref 0–7)
EPITH CASTS URNS QL MICRO: ABNORMAL /LPF
ERYTHROCYTE [DISTWIDTH] IN BLOOD BY AUTOMATED COUNT: 13.7 % (ref 11.5–14.5)
GLOBULIN SER CALC-MCNC: 3.9 G/DL (ref 2–4)
GLUCOSE BLD STRIP.AUTO-MCNC: 310 MG/DL (ref 65–100)
GLUCOSE SERPL-MCNC: 409 MG/DL (ref 65–100)
GLUCOSE UR STRIP.AUTO-MCNC: >1000 MG/DL
HCT VFR BLD AUTO: 42.3 % (ref 36.6–50.3)
HGB BLD-MCNC: 14.4 G/DL (ref 12.1–17)
HGB UR QL STRIP: NEGATIVE
HYALINE CASTS URNS QL MICRO: ABNORMAL /LPF (ref 0–5)
IMM GRANULOCYTES # BLD: 0 K/UL (ref 0–0.04)
IMM GRANULOCYTES NFR BLD AUTO: 0 % (ref 0–0.5)
KETONES UR QL STRIP.AUTO: NEGATIVE MG/DL
LEUKOCYTE ESTERASE UR QL STRIP.AUTO: NEGATIVE
LYMPHOCYTES # BLD: 2.7 K/UL (ref 0.8–3.5)
LYMPHOCYTES NFR BLD: 48 % (ref 12–49)
MCH RBC QN AUTO: 27.2 PG (ref 26–34)
MCHC RBC AUTO-ENTMCNC: 34 G/DL (ref 30–36.5)
MCV RBC AUTO: 80 FL (ref 80–99)
MONOCYTES # BLD: 0.5 K/UL (ref 0–1)
MONOCYTES NFR BLD: 9 % (ref 5–13)
NEUTS SEG # BLD: 2.3 K/UL (ref 1.8–8)
NEUTS SEG NFR BLD: 41 % (ref 32–75)
NITRITE UR QL STRIP.AUTO: NEGATIVE
NRBC # BLD: 0 K/UL (ref 0–0.01)
NRBC BLD-RTO: 0 PER 100 WBC
PH UR STRIP: 5 [PH] (ref 5–8)
PLATELET # BLD AUTO: 200 K/UL (ref 150–400)
PMV BLD AUTO: 11.5 FL (ref 8.9–12.9)
POTASSIUM SERPL-SCNC: 3.6 MMOL/L (ref 3.5–5.1)
PROT SERPL-MCNC: 7.6 G/DL (ref 6.4–8.2)
PROT UR STRIP-MCNC: NEGATIVE MG/DL
RBC # BLD AUTO: 5.29 M/UL (ref 4.1–5.7)
RBC #/AREA URNS HPF: ABNORMAL /HPF (ref 0–5)
SERVICE CMNT-IMP: ABNORMAL
SODIUM SERPL-SCNC: 132 MMOL/L (ref 136–145)
SP GR UR REFRACTOMETRY: >1.03 (ref 1–1.03)
UA: UC IF INDICATED,UAUC: ABNORMAL
UROBILINOGEN UR QL STRIP.AUTO: 0.2 EU/DL (ref 0.2–1)
WBC # BLD AUTO: 5.7 K/UL (ref 4.1–11.1)
WBC URNS QL MICRO: ABNORMAL /HPF (ref 0–4)

## 2018-08-02 PROCEDURE — 96374 THER/PROPH/DIAG INJ IV PUSH: CPT

## 2018-08-02 PROCEDURE — 96361 HYDRATE IV INFUSION ADD-ON: CPT

## 2018-08-02 PROCEDURE — 74176 CT ABD & PELVIS W/O CONTRAST: CPT

## 2018-08-02 PROCEDURE — 36415 COLL VENOUS BLD VENIPUNCTURE: CPT | Performed by: EMERGENCY MEDICINE

## 2018-08-02 PROCEDURE — 74011636637 HC RX REV CODE- 636/637: Performed by: EMERGENCY MEDICINE

## 2018-08-02 PROCEDURE — 81001 URINALYSIS AUTO W/SCOPE: CPT | Performed by: EMERGENCY MEDICINE

## 2018-08-02 PROCEDURE — 85025 COMPLETE CBC W/AUTO DIFF WBC: CPT | Performed by: EMERGENCY MEDICINE

## 2018-08-02 PROCEDURE — 74011250636 HC RX REV CODE- 250/636: Performed by: EMERGENCY MEDICINE

## 2018-08-02 PROCEDURE — 82962 GLUCOSE BLOOD TEST: CPT

## 2018-08-02 PROCEDURE — 99284 EMERGENCY DEPT VISIT MOD MDM: CPT

## 2018-08-02 PROCEDURE — 80053 COMPREHEN METABOLIC PANEL: CPT | Performed by: EMERGENCY MEDICINE

## 2018-08-02 RX ADMIN — HUMAN INSULIN 5 UNITS: 100 INJECTION, SOLUTION SUBCUTANEOUS at 17:21

## 2018-08-02 RX ADMIN — SODIUM CHLORIDE 1000 ML: 900 INJECTION, SOLUTION INTRAVENOUS at 17:53

## 2018-08-02 NOTE — ED PROVIDER NOTES
EMERGENCY DEPARTMENT HISTORY AND PHYSICAL EXAM      Date: 8/2/2018  Patient Name: Perri Gar    History of Presenting Illness     Chief Complaint   Patient presents with    Abdominal Pain     right sided abdominal pain radiating to back x three days with urinary frequency. Denies n/v/d       History Provided By: Patient    HPI: Perri Gar, 41 y.o. male with PMHx significant for NAFLD, CAD, HTN, DM, HCL, FREDO, presents ambulatory to the ED with cc of progressively worsening right upper abdominal pain with radiation the the right side of the back x 4 days. Pt reports that his pain was intermittent until this morning when it became constant. He notes that his pain is worse with palpation and deep breaths and has no alleviating factors. Pt states that his last BM was yesterday and normal. He notes a PSHx of cholecystectomy. Pt denies any OTC medications or any other modifying factors for his sxs. He specifically denies any fevers, chills, nausea, vomiting, chest pain, shortness of breath, headache, rash, diarrhea, hematuria, sweating or weight loss. There are no other complaints, changes, or physical findings at this time. Social Hx: - EtOH; - Smoker; - Illicit Drugs  Family Hx: Heart dz    PCP: Morelia Walters MD    Current Outpatient Prescriptions   Medication Sig Dispense Refill    metFORMIN ER (GLUCOPHAGE XR) 500 mg tablet Take 2 Tabs by mouth two (2) times daily (with meals). 120 Tab 11    ondansetron (ZOFRAN ODT) 4 mg disintegrating tablet Take 1 Tab by mouth every eight (8) hours as needed for Nausea. 10 Tab 0    oxyCODONE-acetaminophen (PERCOCET) 5-325 mg per tablet Take 1 Tab by mouth every four (4) hours as needed for Pain. Max Daily Amount: 6 Tabs. 30 Tab 0    HYDROcodone-acetaminophen (NORCO) 5-325 mg per tablet Take 1 Tab by mouth every four (4) hours as needed for Pain. Max Daily Amount: 6 Tabs.  25 Tab 0    amoxicillin-clavulanate (AUGMENTIN) 875-125 mg per tablet Take 1 Tab by mouth every twelve (12) hours. Indications: DOG BITE WOUND 14 Tab 0    dulaglutide (TRULICITY) 1.5 FY/3.3 mL sub-q pen 0.5 mL by SubCUTAneous route every seven (7) days. 4 Pen 11    Insulin Needles, Disposable, 31 gauge x 5/16\" ndle As directed 60 Package 11    insulin glargine (LANTUS,BASAGLAR) 100 unit/mL (3 mL) inpn 20 units daily 2 Pen 11    lisinopril (PRINIVIL, ZESTRIL) 40 mg tablet TAKE 1 TABLET BY MOUTH DAILY 30 Tab 11    hydroCHLOROthiazide (MICROZIDE) 12.5 mg capsule Take 12.5 mg by mouth daily.  atorvastatin (LIPITOR) 20 mg tablet Take 1 Tab by mouth daily. 30 Tab 11    amLODIPine (NORVASC) 10 mg tablet Take 1 Tab by mouth daily. 30 Tab 11    aspirin delayed-release 81 mg tablet Take 1 Tab by mouth daily. 30 Tab 11    clopidogrel (PLAVIX) 75 mg tab Take 1 Tab by mouth daily. 30 Tab 11    spironolactone (ALDACTONE) 25 mg tablet Take 1 Tab by mouth daily. 30 Tab 11    metoprolol succinate (TOPROL-XL) 100 mg tablet Take 1 Tab by mouth daily. 30 Tab 11    omeprazole (PRILOSEC) 40 mg capsule Take 1 Cap by mouth daily. 30 Cap 11    VIAGRA 100 mg tablet          Past History     Past Medical History:  Past Medical History:   Diagnosis Date    CAD (coronary artery disease)     2 stents 2016     Diabetes ([facility] [address]     Headache     Hypercholesterolemia     Hypertension     Hypogonadism male     Liver disease     NAFLD    Obstructive sleep apnea        Past Surgical History:  Past Surgical History:   Procedure Laterality Date    HX HEENT      status post pharyngioplasty       Family History:  Family History   Problem Relation Age of Onset    Heart Disease Father        Social History:  Social History   Substance Use Topics    Smoking status: Never Smoker    Smokeless tobacco: Never Used    Alcohol use No       Allergies:  No Known Allergies      Review of Systems   Review of Systems   Constitutional: Negative.   Negative for activity change, appetite change, chills, fatigue, fever and unexpected weight change. HENT: Negative. Negative for congestion, hearing loss, rhinorrhea, sneezing and voice change. Eyes: Negative. Negative for pain and visual disturbance. Respiratory: Negative. Negative for apnea, cough, choking, chest tightness and shortness of breath. Cardiovascular: Negative. Negative for chest pain and palpitations. Gastrointestinal: Positive for abdominal pain. Negative for abdominal distention, blood in stool, diarrhea, nausea and vomiting. Genitourinary: Negative. Negative for difficulty urinating, flank pain, frequency, hematuria and urgency. No discharge   Musculoskeletal: Negative. Negative for arthralgias, back pain, myalgias and neck stiffness. Skin: Negative. Negative for color change and rash. Neurological: Negative. Negative for dizziness, seizures, syncope, speech difficulty, weakness, numbness and headaches. Hematological: Negative for adenopathy. Psychiatric/Behavioral: Negative. Negative for agitation, behavioral problems, dysphoric mood and suicidal ideas. The patient is not nervous/anxious. Physical Exam   Physical Exam   Constitutional: He is oriented to person, place, and time. He appears well-developed and well-nourished. No distress. HENT:   Head: Normocephalic and atraumatic. Mouth/Throat: Oropharynx is clear and moist. No oropharyngeal exudate. Eyes: Conjunctivae and EOM are normal. Pupils are equal, round, and reactive to light. Right eye exhibits no discharge. Left eye exhibits no discharge. Neck: Normal range of motion. Neck supple. Cardiovascular: Normal rate, regular rhythm and intact distal pulses. Exam reveals no gallop and no friction rub. No murmur heard. Pulmonary/Chest: Effort normal and breath sounds normal. No respiratory distress. He has no wheezes. He has no rales. He exhibits no tenderness. Abdominal: Soft. Bowel sounds are normal. He exhibits no distension and no mass.  There is tenderness in the right upper quadrant. There is no rebound and no guarding. Musculoskeletal: Normal range of motion. He exhibits no edema. Lymphadenopathy:     He has no cervical adenopathy. Neurological: He is alert and oriented to person, place, and time. No cranial nerve deficit. Coordination normal.   Skin: Skin is warm and dry. No rash noted. No erythema. Psychiatric: He has a normal mood and affect. Nursing note and vitals reviewed. Diagnostic Study Results     Labs -     Recent Results (from the past 12 hour(s))   METABOLIC PANEL, COMPREHENSIVE    Collection Time: 08/02/18  4:32 PM   Result Value Ref Range    Sodium 132 (L) 136 - 145 mmol/L    Potassium 3.6 3.5 - 5.1 mmol/L    Chloride 96 (L) 97 - 108 mmol/L    CO2 29 21 - 32 mmol/L    Anion gap 7 5 - 15 mmol/L    Glucose 409 (H) 65 - 100 mg/dL    BUN 16 6 - 20 MG/DL    Creatinine 1.48 (H) 0.70 - 1.30 MG/DL    BUN/Creatinine ratio 11 (L) 12 - 20      GFR est AA >60 >60 ml/min/1.73m2    GFR est non-AA 52 (L) >60 ml/min/1.73m2    Calcium 8.5 8.5 - 10.1 MG/DL    Bilirubin, total 0.4 0.2 - 1.0 MG/DL    ALT (SGPT) 43 12 - 78 U/L    AST (SGOT) 21 15 - 37 U/L    Alk. phosphatase 138 (H) 45 - 117 U/L    Protein, total 7.6 6.4 - 8.2 g/dL    Albumin 3.7 3.5 - 5.0 g/dL    Globulin 3.9 2.0 - 4.0 g/dL    A-G Ratio 0.9 (L) 1.1 - 2.2     CBC WITH AUTOMATED DIFF    Collection Time: 08/02/18  4:32 PM   Result Value Ref Range    WBC 5.7 4.1 - 11.1 K/uL    RBC 5.29 4. 10 - 5.70 M/uL    HGB 14.4 12.1 - 17.0 g/dL    HCT 42.3 36.6 - 50.3 %    MCV 80.0 80.0 - 99.0 FL    MCH 27.2 26.0 - 34.0 PG    MCHC 34.0 30.0 - 36.5 g/dL    RDW 13.7 11.5 - 14.5 %    PLATELET 517 034 - 564 K/uL    MPV 11.5 8.9 - 12.9 FL    NRBC 0.0 0  WBC    ABSOLUTE NRBC 0.00 0.00 - 0.01 K/uL    NEUTROPHILS 41 32 - 75 %    LYMPHOCYTES 48 12 - 49 %    MONOCYTES 9 5 - 13 %    EOSINOPHILS 2 0 - 7 %    BASOPHILS 1 0 - 1 %    IMMATURE GRANULOCYTES 0 0.0 - 0.5 %    ABS. NEUTROPHILS 2.3 1.8 - 8.0 K/UL    ABS.  LYMPHOCYTES 2.7 0.8 - 3.5 K/UL    ABS. MONOCYTES 0.5 0.0 - 1.0 K/UL    ABS. EOSINOPHILS 0.1 0.0 - 0.4 K/UL    ABS. BASOPHILS 0.0 0.0 - 0.1 K/UL    ABS. IMM. GRANS. 0.0 0.00 - 0.04 K/UL    DF AUTOMATED     URINALYSIS W/ REFLEX CULTURE    Collection Time: 08/02/18  5:05 PM   Result Value Ref Range    Color YELLOW/STRAW      Appearance CLEAR CLEAR      Specific gravity >1.030 (H) 1.003 - 1.030    pH (UA) 5.0 5.0 - 8.0      Protein NEGATIVE  NEG mg/dL    Glucose >1000 (A) NEG mg/dL    Ketone NEGATIVE  NEG mg/dL    Bilirubin NEGATIVE  NEG      Blood NEGATIVE  NEG      Urobilinogen 0.2 0.2 - 1.0 EU/dL    Nitrites NEGATIVE  NEG      Leukocyte Esterase NEGATIVE  NEG      UA:UC IF INDICATED CULTURE NOT INDICATED BY UA RESULT CNI      WBC 0-4 0 - 4 /hpf    RBC 0-5 0 - 5 /hpf    Epithelial cells FEW FEW /lpf    Bacteria NEGATIVE  NEG /hpf    Hyaline cast 0-2 0 - 5 /lpf   GLUCOSE, POC    Collection Time: 08/02/18  6:31 PM   Result Value Ref Range    Glucose (POC) 310 (H) 65 - 100 mg/dL    Performed by Shilpi Phelan        Radiologic Studies -   CT ABD PELV WO CONT   Final Result        CT Results  (Last 48 hours)               08/02/18 1824  CT ABD PELV WO CONT Final result    Impression:  IMPRESSION:   1. No acute process in the abdomen or pelvis. 2. Hepatic steatosis. Narrative:  EXAM:  CT ABD PELV WO CONT       INDICATION: Abdominal pain       COMPARISON: None. CONTRAST:  None. TECHNIQUE:    Thin axial images were obtained through the abdomen and pelvis. Coronal and   sagittal reconstructions were generated. Oral contrast was not administered. CT   dose reduction was achieved through use of a standardized protocol tailored for   this examination and automatic exposure control for dose modulation. FINDINGS:    Lower Thorax:   Lung Bases: Clear. Heart: The heart is normal in size. No pericardial effusion.        Abdomen/Pelvis:   Evaluation of the solid organs is markedly limited without the use of IV contrast.       Liver:  Hepatic steatosis. Multiple hypodensities in the liver, largest   measuring 11 mm in the right hepatic lobe, statistically benign cysts. Biliary system: Gallbladder is surgically absent. Spleen: Normal.       Pancreas: Normal.       Kidneys/Ureters/Bladder: No renal masses. No renal or ureteral calculi. No   hydronephrosis or hydroureter. The bladder is normal.       Adrenals: Normal.       Stomach/bowel: No dilation or abnormal wall thickening is present. No free   intraperitoneal air noted. The appendix is normal.       Reproductive Organs: Prostate is normal in size. Vasculature: Normal caliber arteries. Nodes: No pathologically enlarged lymph nodes. Fluid: No free fluid. Bones/Soft Tissue: No acute fractures or aggressive osseous lesions are seen. CXR Results  (Last 48 hours)    None            Medical Decision Making   I am the first provider for this patient. I reviewed the vital signs, available nursing notes, past medical history, past surgical history, family history and social history. Vital Signs-Reviewed the patient's vital signs. Patient Vitals for the past 12 hrs:   Temp Pulse Resp BP SpO2   08/02/18 1612 98.1 °F (36.7 °C) 87 18 (!) 149/102 97 %       Pulse Oximetry Analysis - 97% on RA    Cardiac Monitor:   Rate: 87 bpm  Rhythm: Normal Sinus Rhythm      Records Reviewed: Nursing Notes, Old Medical Records, Previous Radiology Studies and Previous Laboratory Studies    Provider Notes (Medical Decision Making):   DDx: kidney stone, PUD, gastritis, UTI, constipation, choledocholithiasis     ED Course:   Initial assessment performed. The patients presenting problems have been discussed, and they are in agreement with the care plan formulated and outlined with them. I have encouraged them to ask questions as they arise throughout their visit.       Critical Care Time:   0 minutes    Disposition:  Discharge Note:  6:51 PM  The pt is ready for discharge. The pt's signs, symptoms, diagnosis, and discharge instructions have been discussed and pt has conveyed their understanding. The pt is to follow up as recommended or return to ER should their symptoms worsen. Plan has been discussed and pt is in agreement. PLAN:  1. Current Discharge Medication List        2. Follow-up Information     Follow up With Details Comments Contact Sara Shah MD Call in 2 days  [facility]  [address]  [phone]          Return to ED if worse     Diagnosis     Clinical Impression:   1. Abdominal pain, generalized    2. Hyperglycemia        Attestations: This note is prepared by Shine Narvaez. Hazel Caraballo, acting as Scribe for [employer] Richard Rivera MD: The scribe's documentation has been prepared under my direction and personally reviewed by me in its entirety. I confirm that the note above accurately reflects all work, treatment, procedures, and medical decision making performed by me.

## 2018-08-02 NOTE — ED NOTES
Patient has c/o R sided flank pain x4 days, started in R flank region and has moved around to his back

## 2018-08-02 NOTE — DISCHARGE INSTRUCTIONS
Abdominal Pain: Care Instructions  Your Care Instructions    Abdominal pain has many possible causes. Some aren't serious and get better on their own in a few days. Others need more testing and treatment. If your pain continues or gets worse, you need to be rechecked and may need more tests to find out what is wrong. You may need surgery to correct the problem. Don't ignore new symptoms, such as fever, nausea and vomiting, urination problems, pain that gets worse, and dizziness. These may be signs of a more serious problem. Your doctor may have recommended a follow-up visit in the next 8 to 12 hours. If you are not getting better, you may need more tests or treatment. The doctor has checked you carefully, but problems can develop later. If you notice any problems or new symptoms, get medical treatment right away. Follow-up care is a key part of your treatment and safety. Be sure to make and go to all appointments, and call your doctor if you are having problems. It's also a good idea to know your test results and keep a list of the medicines you take. How can you care for yourself at home? · Rest until you feel better. · To prevent dehydration, drink plenty of fluids, enough so that your urine is light yellow or clear like water. Choose water and other caffeine-free clear liquids until you feel better. If you have kidney, heart, or liver disease and have to limit fluids, talk with your doctor before you increase the amount of fluids you drink. · If your stomach is upset, eat mild foods, such as rice, dry toast or crackers, bananas, and applesauce. Try eating several small meals instead of two or three large ones. · Wait until 48 hours after all symptoms have gone away before you have spicy foods, alcohol, and drinks that contain caffeine. · Do not eat foods that are high in fat. · Avoid anti-inflammatory medicines such as aspirin, ibuprofen (Advil, Motrin), and naproxen (Aleve).  These can cause stomach upset. Talk to your doctor if you take daily aspirin for another health problem. When should you call for help? Call 911 anytime you think you may need emergency care. For example, call if:    · You passed out (lost consciousness).     · You pass maroon or very bloody stools.     · You vomit blood or what looks like coffee grounds.     · You have new, severe belly pain.    Call your doctor now or seek immediate medical care if:    · Your pain gets worse, especially if it becomes focused in one area of your belly.     · You have a new or higher fever.     · Your stools are black and look like tar, or they have streaks of blood.     · You have unexpected vaginal bleeding.     · You have symptoms of a urinary tract infection. These may include:  ¨ Pain when you urinate. ¨ Urinating more often than usual.  ¨ Blood in your urine.     · You are dizzy or lightheaded, or you feel like you may faint.    Watch closely for changes in your health, and be sure to contact your doctor if:    · You are not getting better after 1 day (24 hours). Where can you learn more? Go to http://kashif-ry.info/. Enter B465 in the search box to learn more about \"Abdominal Pain: Care Instructions. \"  Current as of: November 20, 2017  Content Version: 11.7  © 8912-0450 [employer]. Care instructions adapted under license by [employer] (which disclaims liability or warranty for this information). If you have questions about a medical condition or this instruction, always ask your healthcare professional. [place] [address] any warranty or liability for your use of this information. Learning About High Blood Sugar  What is high blood sugar? Your body turns the food you eat into glucose (sugar), which it uses for energy. But if your body isn't able to use the sugar right away, it can build up in your blood and lead to high blood sugar.   When the amount of sugar in your blood stays too high for too much of the time, you may have diabetes. Diabetes is a disease that can cause serious health problems. The good news is that lifestyle changes may help you get your blood sugar back to normal and avoid or delay diabetes. What causes high blood sugar? Sugar (glucose) can build up in your blood if you:  · Are overweight. · Have a family history of diabetes. · Take certain medicines, such as steroids. What are the symptoms? Having high blood sugar may not cause any symptoms at all. Or it may make you feel very thirsty or very hungry. You may also urinate more often than usual, have blurry vision, or lose weight without trying. How is high blood sugar treated? You can take steps to lower your blood sugar level if you understand what makes it get higher. Your doctor may want you to learn how to test your blood sugar level at home. Then you can see how illness, stress, or different kinds of food or medicine raise or lower your blood sugar level. Other tests may be needed to see if you have diabetes. How can you prevent high blood sugar? · Watch your weight. If you're overweight, losing just a small amount of weight may help. Reducing fat around your waist is most important. · Limit the amount of calories, sweets, and unhealthy fat you eat. Ask your doctor if a dietitian can help you. A registered dietitian can help you create meal plans that fit your lifestyle. · Get at least 30 minutes of exercise on most days of the week. Exercise helps control your blood sugar. It also helps you maintain a healthy weight. Walking is a good choice. You also may want to do other activities, such as running, swimming, cycling, or playing tennis or team sports. · If your doctor prescribed medicines, take them exactly as prescribed. Call your doctor if you think you are having a problem with your medicine. You will get more details on the specific medicines your doctor prescribes.   Follow-up care is a key part of your treatment and safety. Be sure to make and go to all appointments, and call your doctor if you are having problems. It's also a good idea to know your test results and keep a list of the medicines you take. Where can you learn more? Go to http://kashif-ry.info/. Enter O108 in the search box to learn more about \"Learning About High Blood Sugar. \"  Current as of: December 7, 2017  Content Version: 11.7  © 1849-5708 [employer]. Care instructions adapted under license by [employer] (which disclaims liability or warranty for this information). If you have questions about a medical condition or this instruction, always ask your healthcare professional. [address] any warranty or liability for your use of this information.

## 2019-01-11 ENCOUNTER — OFFICE VISIT (OUTPATIENT)
Dept: INTERNAL MEDICINE CLINIC | Age: 44
End: 2019-01-11

## 2019-01-11 VITALS
OXYGEN SATURATION: 95 % | RESPIRATION RATE: 18 BRPM | DIASTOLIC BLOOD PRESSURE: 83 MMHG | HEIGHT: 64 IN | TEMPERATURE: 98.3 F | BODY MASS INDEX: 34.76 KG/M2 | HEART RATE: 71 BPM | WEIGHT: 203.6 LBS | SYSTOLIC BLOOD PRESSURE: 137 MMHG

## 2019-01-11 DIAGNOSIS — E11.8 TYPE 2 DIABETES MELLITUS WITH COMPLICATION, WITH LONG-TERM CURRENT USE OF INSULIN (HCC): ICD-10-CM

## 2019-01-11 DIAGNOSIS — I10 ESSENTIAL HYPERTENSION: ICD-10-CM

## 2019-01-11 DIAGNOSIS — E78.5 DYSLIPIDEMIA: ICD-10-CM

## 2019-01-11 DIAGNOSIS — G47.33 OBSTRUCTIVE SLEEP APNEA: ICD-10-CM

## 2019-01-11 DIAGNOSIS — E66.9 OBESITY (BMI 30.0-34.9): ICD-10-CM

## 2019-01-11 DIAGNOSIS — I25.10 ASHD (ARTERIOSCLEROTIC HEART DISEASE): ICD-10-CM

## 2019-01-11 DIAGNOSIS — G62.9 NEUROPATHY: ICD-10-CM

## 2019-01-11 DIAGNOSIS — Z79.4 TYPE 2 DIABETES MELLITUS WITH COMPLICATION, WITH LONG-TERM CURRENT USE OF INSULIN (HCC): ICD-10-CM

## 2019-01-11 DIAGNOSIS — Z00.00 PHYSICAL EXAM: Primary | ICD-10-CM

## 2019-01-11 DIAGNOSIS — E29.1 HYPOGONADISM MALE: ICD-10-CM

## 2019-01-12 NOTE — PROGRESS NOTES
[facility]  [place] [address]  [address] [place] [address]  Phone:  [phone]  Fax: [phone]       Chief Complaint   Patient presents with    Diabetes     routine follow up    . SUBJECTIVE:    Danyel Savage is a 41 y.o. male Comes in for return visit stating that he has done fairly well. Most recently he developed vague chest pain and went to the emergency room, where he was discharged and it was not felt that this was cardiac in origin. He has done generally well. Unfortunately he does have dietary indiscretion and remains obese. For a period of time he was not taking any medication. He is taking everything that he is supposed to be taking currently. From a diabetic perspective he remains on his Metformin 1,000 mg b.i.d., along with his insulin, which is 20 units of Lantus daily. He is not taking his Trulicity, but plans to resume this. Current Outpatient Medications   Medication Sig Dispense Refill    metoprolol succinate (TOPROL-XL) 100 mg tablet TAKE 1 TAB BY MOUTH DAILY. 30 Tab 11    atorvastatin (LIPITOR) 20 mg tablet TAKE 1 TABLET BY MOUTH EVERY DAY 30 Tab 11    aspirin delayed-release 81 mg tablet TAKE 1 TABLET BY MOUTH EVERY DAY 30 Tab 11    clopidogrel (PLAVIX) 75 mg tab TAKE 1 TAB BY MOUTH DAILY. 30 Tab 11    lisinopril (PRINIVIL, ZESTRIL) 40 mg tablet TAKE 1 TABLET BY MOUTH ONCE DAILY 30 Tab 11    metFORMIN ER (GLUCOPHAGE XR) 500 mg tablet Take 2 Tabs by mouth two (2) times daily (with meals). 120 Tab 11    ondansetron (ZOFRAN ODT) 4 mg disintegrating tablet Take 1 Tab by mouth every eight (8) hours as needed for Nausea. 10 Tab 0    oxyCODONE-acetaminophen (PERCOCET) 5-325 mg per tablet Take 1 Tab by mouth every four (4) hours as needed for Pain. Max Daily Amount: 6 Tabs. 30 Tab 0    HYDROcodone-acetaminophen (NORCO) 5-325 mg per tablet Take 1 Tab by mouth every four (4) hours as needed for Pain. Max Daily Amount: 6 Tabs.  25 Tab 0  amoxicillin-clavulanate (AUGMENTIN) 875-125 mg per tablet Take 1 Tab by mouth every twelve (12) hours. Indications: DOG BITE WOUND 14 Tab 0    dulaglutide (TRULICITY) 1.5 YR/9.7 mL sub-q pen 0.5 mL by SubCUTAneous route every seven (7) days. 4 Pen 11    Insulin Needles, Disposable, 31 gauge x 5/16\" ndle As directed 60 Package 11    insulin glargine (LANTUS,BASAGLAR) 100 unit/mL (3 mL) inpn 20 units daily 2 Pen 11    hydroCHLOROthiazide (MICROZIDE) 12.5 mg capsule Take 12.5 mg by mouth daily.  amLODIPine (NORVASC) 10 mg tablet Take 1 Tab by mouth daily. 30 Tab 11    spironolactone (ALDACTONE) 25 mg tablet Take 1 Tab by mouth daily. 30 Tab 11    omeprazole (PRILOSEC) 40 mg capsule Take 1 Cap by mouth daily.  30 Cap 11    VIAGRA 100 mg tablet        Past Medical History:   Diagnosis Date    CAD (coronary artery disease)     2 stents 2016     Diabetes ([facility] [address]     Headache     Hypercholesterolemia     Hypertension     Hypogonadism male     Liver disease     NAFLD    Obstructive sleep apnea      Past Surgical History:   Procedure Laterality Date    HX HEENT      status post pharyngioplasty     No Known Allergies      REVIEW OF SYSTEMS:  General: negative for - chills or fever  ENT: negative for - headaches, nasal congestion or tinnitus  Respiratory: negative for - cough, hemoptysis, shortness of breath or wheezing  Cardiovascular : negative for - chest pain, edema, palpitations or shortness of breath  Gastrointestinal: negative for - abdominal pain, blood in stools, heartburn or nausea/vomiting  Genito-Urinary: no dysuria, trouble voiding, or hematuria  Musculoskeletal: negative for - gait disturbance, joint pain, joint stiffness or joint swelling  Neurological: no TIA or stroke symptoms  Hematologic: no bruises, no bleeding, no swollen glands  Integument: no lumps, mole changes, nail changes or rash  Endocrine: no malaise/lethargy or unexpected weight changes      Social History     Socioeconomic History    Marital status:      Spouse name: Not on file    Number of children: 2    Years of education: 12    Highest education level: Not on file   Occupational History    Occupation:    Tobacco Use    Smoking status: Never Smoker    Smokeless tobacco: Never Used   Substance and Sexual Activity    Alcohol use: No    Drug use: No    Sexual activity: Yes     Partners: Female     Family History   Problem Relation Age of Onset    Heart Disease Father        OBJECTIVE:    Visit Vitals  /83   Pulse 71   Temp 98.3 °F (36.8 °C) (Oral)   Resp 18   Ht 5' 4\" (1.626 m)   Wt 203 lb 9.6 oz (92.4 kg)   SpO2 95%   BMI 34.95 kg/m²     CONSTITUTIONAL: well , well nourished, appears age appropriate  EYES: perrla, eom intact  ENMT:moist mucous membranes, pharynx clear  NECK: supple. Thyroid normal  RESPIRATORY: Chest: clear to ascultation and percussion   CARDIOVASCULAR: Heart: regular rate and rhythm  GASTROINTESTINAL: Abdomen: soft, bowel sounds active  HEMATOLOGIC: no pathological lymph nodes palpated  MUSCULOSKELETAL: Extremities: no edema, pulse 1+   INTEGUMENT: No unusual rashes or suspicious skin lesions noted. Nails appear normal.  NEUROLOGIC: non-focal exam   MENTAL STATUS: alert and oriented, appropriate affect      ASSESSMENT:  1. Physical exam    2. Essential hypertension    3. ASHD (arteriosclerotic heart disease)    4. Type 2 diabetes mellitus with complication, with long-term current use of insulin ([address]    5. Hypogonadism male    6. Obstructive sleep apnea    7. Obesity (BMI 30.0-34.9)    8. Dyslipidemia    9. Neuropathy        PLAN:    1. He will continue his antihypertensive medication as prescribed. 2. His coronary artery disease appears to be stable. 3. From a diabetic perspective he will continue his insulin with a resumption of his GLP1 agonist and Metformin. 4. We talk about his hypogonadism at length.   I actually sent him to an endocrinologist, who made the same suggestion that I did, which was weight reduction. 5. He does have obstructive sleep apnea, for which he is currently symptomatic. He now wants something done. 6. His obesity continues to be a major driving force behind all of his existing comorbidities. Again, he needs to eat meals, eliminate snacks and avoid the consumption of processed carbohydrates. 7. He remains on a statin on a consistent basis. 8. He had mild numbness on his left side. It is unclear if this represented a TIA or not. In any event, it is gone. Observation for now. .  Orders Placed This Encounter    HEMOGLOBIN A1C    MICROALBUMIN, UR, RAND W/ MICROALB/CREAT RATIO    APOLIPOPROTEIN B    CBC WITH AUTOMATED DIFF    CRP, HIGH SENSITIVITY    LIPID PANEL    METABOLIC PANEL, COMPREHENSIVE    URINALYSIS W/ RFLX MICROSCOPIC    REFERRAL TO SLEEP STUDIES         Follow-up Disposition:  Return in about 3 months (around 4/11/2019).       Tomeka Wright MD

## 2019-01-14 LAB
ALBUMIN SERPL-MCNC: 4.8 G/DL (ref 3.5–5.5)
ALBUMIN/CREAT UR: 2 MG/G CREAT (ref 0–30)
ALBUMIN/GLOB SERPL: 1.7 {RATIO} (ref 1.2–2.2)
ALP SERPL-CCNC: 113 IU/L (ref 39–117)
ALT SERPL-CCNC: 51 IU/L (ref 0–44)
APO B SERPL-MCNC: 145 MG/DL (ref 52–135)
APPEARANCE UR: CLEAR
AST SERPL-CCNC: 43 IU/L (ref 0–40)
BASOPHILS # BLD AUTO: 0 X10E3/UL (ref 0–0.2)
BASOPHILS NFR BLD AUTO: 1 %
BILIRUB SERPL-MCNC: 0.5 MG/DL (ref 0–1.2)
BILIRUB UR QL STRIP: NEGATIVE
BUN SERPL-MCNC: 17 MG/DL (ref 6–24)
BUN/CREAT SERPL: 15 (ref 9–20)
CALCIUM SERPL-MCNC: 10.3 MG/DL (ref 8.7–10.2)
CHLORIDE SERPL-SCNC: 98 MMOL/L (ref 96–106)
CHOLEST SERPL-MCNC: 218 MG/DL (ref 100–199)
CO2 SERPL-SCNC: 24 MMOL/L (ref 20–29)
COLOR UR: YELLOW
CREAT SERPL-MCNC: 1.17 MG/DL (ref 0.76–1.27)
CREAT UR-MCNC: 259.4 MG/DL
CRP SERPL HS-MCNC: 4.81 MG/L (ref 0–3)
EOSINOPHIL # BLD AUTO: 0.1 X10E3/UL (ref 0–0.4)
EOSINOPHIL NFR BLD AUTO: 2 %
ERYTHROCYTE [DISTWIDTH] IN BLOOD BY AUTOMATED COUNT: 15.3 % (ref 12.3–15.4)
EST. AVERAGE GLUCOSE BLD GHB EST-MCNC: 381 MG/DL
GLOBULIN SER CALC-MCNC: 2.9 G/DL (ref 1.5–4.5)
GLUCOSE SERPL-MCNC: 183 MG/DL (ref 65–99)
GLUCOSE UR QL: NEGATIVE
HBA1C MFR BLD: 14.9 % (ref 4.8–5.6)
HCT VFR BLD AUTO: 44.4 % (ref 37.5–51)
HDLC SERPL-MCNC: 43 MG/DL
HGB BLD-MCNC: 15.4 G/DL (ref 13–17.7)
HGB UR QL STRIP: NEGATIVE
IMM GRANULOCYTES # BLD AUTO: 0 X10E3/UL (ref 0–0.1)
IMM GRANULOCYTES NFR BLD AUTO: 0 %
KETONES UR QL STRIP: ABNORMAL
LDLC SERPL CALC-MCNC: 140 MG/DL (ref 0–99)
LEUKOCYTE ESTERASE UR QL STRIP: NEGATIVE
LYMPHOCYTES # BLD AUTO: 2.6 X10E3/UL (ref 0.7–3.1)
LYMPHOCYTES NFR BLD AUTO: 48 %
MCH RBC QN AUTO: 27.1 PG (ref 26.6–33)
MCHC RBC AUTO-ENTMCNC: 34.7 G/DL (ref 31.5–35.7)
MCV RBC AUTO: 78 FL (ref 79–97)
MICRO URNS: ABNORMAL
MICROALBUMIN UR-MCNC: 5.2 UG/ML
MONOCYTES # BLD AUTO: 0.5 X10E3/UL (ref 0.1–0.9)
MONOCYTES NFR BLD AUTO: 9 %
NEUTROPHILS # BLD AUTO: 2.1 X10E3/UL (ref 1.4–7)
NEUTROPHILS NFR BLD AUTO: 40 %
NITRITE UR QL STRIP: NEGATIVE
PH UR STRIP: 5 [PH] (ref 5–7.5)
PLATELET # BLD AUTO: 246 X10E3/UL (ref 150–379)
POTASSIUM SERPL-SCNC: 4.7 MMOL/L (ref 3.5–5.2)
PROT SERPL-MCNC: 7.7 G/DL (ref 6–8.5)
PROT UR QL STRIP: NEGATIVE
RBC # BLD AUTO: 5.69 X10E6/UL (ref 4.14–5.8)
SODIUM SERPL-SCNC: 139 MMOL/L (ref 134–144)
SP GR UR: 1.02 (ref 1–1.03)
TRIGL SERPL-MCNC: 177 MG/DL (ref 0–149)
UROBILINOGEN UR STRIP-MCNC: 0.2 MG/DL (ref 0.2–1)
VLDLC SERPL CALC-MCNC: 35 MG/DL (ref 5–40)
WBC # BLD AUTO: 5.3 X10E3/UL (ref 3.4–10.8)

## 2019-01-16 RX ORDER — INSULIN GLARGINE 100 [IU]/ML
INJECTION, SOLUTION SUBCUTANEOUS
Qty: 2 PEN | Refills: 11 | Status: SHIPPED | OUTPATIENT
Start: 2019-01-16 | End: 2019-06-03 | Stop reason: SDUPTHER

## 2019-01-17 DIAGNOSIS — E78.5 DYSLIPIDEMIA: ICD-10-CM

## 2019-01-17 RX ORDER — ATORVASTATIN CALCIUM 40 MG/1
TABLET, FILM COATED ORAL
Qty: 30 TAB | Refills: 11 | Status: SHIPPED | OUTPATIENT
Start: 2019-01-17 | End: 2019-01-22 | Stop reason: SDUPTHER

## 2019-01-18 NOTE — PROGRESS NOTES
Asked patient if he was taking his insulin when he came to see me----if he was increase his insulin by 20 units.   Increase statin see me

## 2019-01-22 DIAGNOSIS — E78.5 DYSLIPIDEMIA: ICD-10-CM

## 2019-01-22 RX ORDER — ATORVASTATIN CALCIUM 80 MG/1
TABLET, FILM COATED ORAL
Qty: 90 TAB | Refills: 3 | Status: SHIPPED | OUTPATIENT
Start: 2019-01-22 | End: 2019-12-10 | Stop reason: SDUPTHER

## 2019-01-22 NOTE — TELEPHONE ENCOUNTER
Patient wife called in bs readings   1/19 1/20 1/21  125        152           142  125         127           -------  148 taking 30 units lantus. Per Dr. Geovanny Chang patient to increase to 38 units and call in bs readings on Friday. Patient states he has been taking atorvastatin 40mg for a while, we increase to atorvastatin 80mg daily.

## 2019-03-24 RX ORDER — LANCETS 33 GAUGE
EACH MISCELLANEOUS
Qty: 300 LANCET | Refills: 3 | Status: SHIPPED | OUTPATIENT
Start: 2019-03-24 | End: 2021-04-28

## 2019-03-24 RX ORDER — INSULIN PUMP SYRINGE, 3 ML
EACH MISCELLANEOUS
Qty: 1 KIT | Refills: 0 | Status: SHIPPED | OUTPATIENT
Start: 2019-03-24 | End: 2021-04-28

## 2019-05-30 ENCOUNTER — OFFICE VISIT (OUTPATIENT)
Dept: INTERNAL MEDICINE CLINIC | Age: 44
End: 2019-05-30

## 2019-05-30 VITALS
OXYGEN SATURATION: 95 % | DIASTOLIC BLOOD PRESSURE: 90 MMHG | TEMPERATURE: 98.5 F | SYSTOLIC BLOOD PRESSURE: 130 MMHG | HEIGHT: 64 IN | HEART RATE: 92 BPM | WEIGHT: 206.5 LBS | BODY MASS INDEX: 35.26 KG/M2

## 2019-05-30 DIAGNOSIS — E66.9 OBESITY (BMI 30.0-34.9): ICD-10-CM

## 2019-05-30 DIAGNOSIS — G47.33 OBSTRUCTIVE SLEEP APNEA: ICD-10-CM

## 2019-05-30 DIAGNOSIS — I10 ESSENTIAL HYPERTENSION: ICD-10-CM

## 2019-05-30 DIAGNOSIS — Z79.4 TYPE 2 DIABETES MELLITUS WITH COMPLICATION, WITH LONG-TERM CURRENT USE OF INSULIN (HCC): Primary | ICD-10-CM

## 2019-05-30 DIAGNOSIS — I25.10 ASHD (ARTERIOSCLEROTIC HEART DISEASE): ICD-10-CM

## 2019-05-30 DIAGNOSIS — E11.8 TYPE 2 DIABETES MELLITUS WITH COMPLICATION, WITH LONG-TERM CURRENT USE OF INSULIN (HCC): Primary | ICD-10-CM

## 2019-05-30 DIAGNOSIS — E78.5 DYSLIPIDEMIA: ICD-10-CM

## 2019-05-30 RX ORDER — SPIRONOLACTONE 25 MG/1
25 TABLET ORAL DAILY
Qty: 30 TAB | Refills: 11 | Status: SHIPPED | OUTPATIENT
Start: 2019-05-30 | End: 2019-12-10 | Stop reason: SDUPTHER

## 2019-05-30 RX ORDER — METOPROLOL SUCCINATE 100 MG/1
100 TABLET, EXTENDED RELEASE ORAL DAILY
Qty: 30 TAB | Refills: 11 | Status: SHIPPED | OUTPATIENT
Start: 2019-05-30 | End: 2019-12-10 | Stop reason: SDUPTHER

## 2019-05-30 RX ORDER — OMEPRAZOLE 40 MG/1
40 CAPSULE, DELAYED RELEASE ORAL DAILY
Qty: 30 CAP | Refills: 11 | Status: SHIPPED | OUTPATIENT
Start: 2019-05-30 | End: 2020-05-13 | Stop reason: SDUPTHER

## 2019-05-30 NOTE — PROGRESS NOTES
Chief Complaint   Patient presents with    Diabetes     Patient is here for 4 month follow up. 1. Have you been to the ER, urgent care clinic since your last visit? Hospitalized since your last visit? No    2. Have you seen or consulted any other health care providers outside of the [address] [place] [address] since your last visit? Include any pap smears or colon screening.  No

## 2019-05-31 NOTE — PROGRESS NOTES
[facility]  [facility]  [address] [place] [address]  Phone:  [phone]  Fax: [phone]       Chief Complaint   Patient presents with    Diabetes     Patient is here for 4 month follow up. .      SUBJECTIVE:    Yoan Briggs is a 41 y.o. male Comes in for return visit admitting to total noncompliance. He does not take his medicine most of the time, he consumes large amounts of processed carbohydrates, and no physical activity. From a diabetic standpoint, he does not check sugars so he has no idea how this is doing, and as I stated earlier, his compliance with insulin is quite sporadic. He is not taking his statin on a consistent basis, although he does have a history of coronary artery disease, indicating that he is in a secondary risk prevention mode. His BP medication is not taken on a consistent basis. He states he had a stress test done approximately three months ago that was negative for ischemia. Current Outpatient Medications   Medication Sig Dispense Refill    omeprazole (PRILOSEC) 40 mg capsule Take 1 Cap by mouth daily. 30 Cap 11    spironolactone (ALDACTONE) 25 mg tablet Take 1 Tab by mouth daily. 30 Tab 11    metoprolol succinate (TOPROL-XL) 100 mg tablet Take 1 Tab by mouth daily. 30 Tab 11    Blood-Glucose Meter (ONETOUCH ULTRA2) monitoring kit Use to test blood sugar three times a day 1 Kit 0    glucose blood VI test strips (ONETOUCH ULTRA BLUE TEST STRIP) strip Use to test blood sugar three times a day 300 Strip 3    lancets (ONE TOUCH DELICA) 33 gauge misc Use to test blood sugar three times a day. 300 Lancet 3    atorvastatin (LIPITOR) 80 mg tablet TAKE 1 TABLET BY MOUTH EVERY DAY 90 Tab 3    dulaglutide (TRULICITY) 1.5 XF/8.1 mL sub-q pen 0.5 mL by SubCUTAneous route every seven (7) days.  4 Pen 11    insulin glargine (LANTUS,BASAGLAR) 100 unit/mL (3 mL) inpn 20 units daily 2 Pen 11    aspirin delayed-release 81 mg tablet TAKE 1 TABLET BY MOUTH EVERY DAY 30 Tab 11    clopidogrel (PLAVIX) 75 mg tab TAKE 1 TAB BY MOUTH DAILY. 30 Tab 11    lisinopril (PRINIVIL, ZESTRIL) 40 mg tablet TAKE 1 TABLET BY MOUTH ONCE DAILY 30 Tab 11    HYDROcodone-acetaminophen (NORCO) 5-325 mg per tablet Take 1 Tab by mouth every four (4) hours as needed for Pain. Max Daily Amount: 6 Tabs. 25 Tab 0    Insulin Needles, Disposable, 31 gauge x 5/16\" ndle As directed 60 Package 11    hydroCHLOROthiazide (MICROZIDE) 12.5 mg capsule Take 12.5 mg by mouth daily.  amLODIPine (NORVASC) 10 mg tablet Take 1 Tab by mouth daily. 30 Tab 11    VIAGRA 100 mg tablet       metFORMIN ER (GLUCOPHAGE XR) 500 mg tablet Take 2 Tabs by mouth two (2) times daily (with meals). 120 Tab 11    ondansetron (ZOFRAN ODT) 4 mg disintegrating tablet Take 1 Tab by mouth every eight (8) hours as needed for Nausea. 10 Tab 0    oxyCODONE-acetaminophen (PERCOCET) 5-325 mg per tablet Take 1 Tab by mouth every four (4) hours as needed for Pain. Max Daily Amount: 6 Tabs. 30 Tab 0    amoxicillin-clavulanate (AUGMENTIN) 875-125 mg per tablet Take 1 Tab by mouth every twelve (12) hours.  Indications: DOG BITE WOUND 14 Tab 0     Past Medical History:   Diagnosis Date    CAD (coronary artery disease)     2 stents 2016     Diabetes ([address]     Headache     Hypercholesterolemia     Hypertension     Hypogonadism male     Liver disease     NAFLD    Obstructive sleep apnea      Past Surgical History:   Procedure Laterality Date    HX HEENT      status post pharyngioplasty     No Known Allergies      REVIEW OF SYSTEMS:  General: negative for - chills or fever  ENT: negative for - headaches, nasal congestion or tinnitus  Respiratory: negative for - cough, hemoptysis, shortness of breath or wheezing  Cardiovascular : negative for - chest pain, edema, palpitations or shortness of breath  Gastrointestinal: negative for - abdominal pain, blood in stools, heartburn or nausea/vomiting  Genito-Urinary: no dysuria, trouble voiding, or hematuria  Musculoskeletal: negative for - gait disturbance, joint pain, joint stiffness or joint swelling  Neurological: no TIA or stroke symptoms  Hematologic: no bruises, no bleeding, no swollen glands  Integument: no lumps, mole changes, nail changes or rash  Endocrine: no malaise/lethargy or unexpected weight changes      Social History     Socioeconomic History    Marital status:      Spouse name: Not on file    Number of children: 2    Years of education: 12    Highest education level: Not on file   Occupational History    Occupation:    Tobacco Use    Smoking status: Never Smoker    Smokeless tobacco: Never Used   Substance and Sexual Activity    Alcohol use: No    Drug use: No    Sexual activity: Yes     Partners: Female     Family History   Problem Relation Age of Onset    Heart Disease Father        OBJECTIVE:    Visit Vitals  /90   Pulse 92   Temp 98.5 °F (36.9 °C)   Ht 5' 4\" (1.626 m)   Wt 206 lb 8 oz (93.7 kg)   SpO2 95%   BMI 35.45 kg/m²     CONSTITUTIONAL: well , well nourished, appears age appropriate  EYES: perrla, eom intact  ENMT:moist mucous membranes, pharynx clear  NECK: supple. Thyroid normal  RESPIRATORY: Chest: clear to ascultation and percussion   CARDIOVASCULAR: Heart: regular rate and rhythm  GASTROINTESTINAL: Abdomen: soft, bowel sounds active  HEMATOLOGIC: no pathological lymph nodes palpated  MUSCULOSKELETAL: Extremities: no edema, pulse 1+   INTEGUMENT: No unusual rashes or suspicious skin lesions noted. Nails appear normal.  NEUROLOGIC: non-focal exam   MENTAL STATUS: alert and oriented, appropriate affect      ASSESSMENT:  1. Type 2 diabetes mellitus with complication, with long-term current use of insulin ([address]    2. ASHD (arteriosclerotic heart disease)    3. Essential hypertension    4. Dyslipidemia    5. Obesity (BMI 30.0-34.9)    6. Obstructive sleep apnea        PLAN:    1.  The patient needs to be compliant with his diabetes, particularly as it relates to medication, and even more importantly dietary aspect. He has to reduce his consumption of processed carbohydrates. 2. He does have history of CAD, having two stents placed sequentially in a single coronary artery. He is asymptomatic with no ischemic symptoms suggested. I explained to him that the likelihood of potential progression of the existing carotid plaquing is quite high over time if better compliance does not occur. 3. BP appears to be adequate for now. 4. He is not consistently taking a statin and I encouraged him to do so.  5. Obesity continues to be a problem, primarily because of his consumption of processed carbs. I also emphasized the importance of eating meals and eliminating snacks. 6. He does have FREDO, but he needs to have a CPAP machine. He has been lax at follow up with the neurologist.  He is mildly symptomatic. .  Orders Placed This Encounter    METABOLIC PANEL, BASIC    HEMOGLOBIN A1C WITH EAG    APOLIPOPROTEIN B    LIPID PANEL    MICROALBUMIN, UR, RAND W/ MICROALB/CREAT RATIO    REFERRAL TO SLEEP STUDIES    omeprazole (PRILOSEC) 40 mg capsule    spironolactone (ALDACTONE) 25 mg tablet    metoprolol succinate (TOPROL-XL) 100 mg tablet         Follow-up and Dispositions    · Return in about 3 months (around 8/30/2019).            Gilbert Piña MD

## 2019-06-03 LAB
ALBUMIN/CREAT UR: 11.2 MG/G CREAT (ref 0–30)
APO B SERPL-MCNC: 147 MG/DL
BUN SERPL-MCNC: 11 MG/DL (ref 6–24)
BUN/CREAT SERPL: 10 (ref 9–20)
CALCIUM SERPL-MCNC: 9.6 MG/DL (ref 8.7–10.2)
CHLORIDE SERPL-SCNC: 101 MMOL/L (ref 96–106)
CHOLEST SERPL-MCNC: 265 MG/DL (ref 100–199)
CO2 SERPL-SCNC: 24 MMOL/L (ref 20–29)
CREAT SERPL-MCNC: 1.14 MG/DL (ref 0.76–1.27)
CREAT UR-MCNC: 234.3 MG/DL
EST. AVERAGE GLUCOSE BLD GHB EST-MCNC: 352 MG/DL
GLUCOSE SERPL-MCNC: 316 MG/DL (ref 65–99)
HBA1C MFR BLD: 13.9 % (ref 4.8–5.6)
HDLC SERPL-MCNC: 49 MG/DL
LDLC SERPL CALC-MCNC: 153 MG/DL (ref 0–99)
MICROALBUMIN UR-MCNC: 26.3 UG/ML
POTASSIUM SERPL-SCNC: 4.4 MMOL/L (ref 3.5–5.2)
SODIUM SERPL-SCNC: 141 MMOL/L (ref 134–144)
TRIGL SERPL-MCNC: 316 MG/DL (ref 0–149)
VLDLC SERPL CALC-MCNC: 63 MG/DL (ref 5–40)

## 2019-06-03 RX ORDER — INSULIN GLARGINE 100 [IU]/ML
INJECTION, SOLUTION SUBCUTANEOUS
Qty: 2 PEN | Refills: 11 | Status: SHIPPED | OUTPATIENT
Start: 2019-06-03 | End: 2019-12-10 | Stop reason: SDUPTHER

## 2019-06-03 RX ORDER — LISINOPRIL 40 MG/1
TABLET ORAL
Qty: 30 TAB | Refills: 11 | Status: SHIPPED | OUTPATIENT
Start: 2019-06-03 | End: 2019-12-10 | Stop reason: SDUPTHER

## 2019-06-03 RX ORDER — AMLODIPINE BESYLATE 10 MG/1
10 TABLET ORAL DAILY
Qty: 30 TAB | Refills: 11 | Status: SHIPPED | OUTPATIENT
Start: 2019-06-03 | End: 2019-12-10 | Stop reason: SDUPTHER

## 2019-06-03 RX ORDER — HYDROCHLOROTHIAZIDE 12.5 MG/1
12.5 CAPSULE ORAL DAILY
Qty: 30 CAP | Refills: 11 | Status: SHIPPED | OUTPATIENT
Start: 2019-06-03 | End: 2019-12-10 | Stop reason: SDUPTHER

## 2019-06-03 RX ORDER — METFORMIN HYDROCHLORIDE 500 MG/1
1000 TABLET, EXTENDED RELEASE ORAL 2 TIMES DAILY WITH MEALS
Qty: 120 TAB | Refills: 11 | Status: SHIPPED | OUTPATIENT
Start: 2019-06-03 | End: 2019-12-10 | Stop reason: SDUPTHER

## 2019-10-03 ENCOUNTER — OFFICE VISIT (OUTPATIENT)
Dept: SLEEP MEDICINE | Age: 44
End: 2019-10-03

## 2019-10-03 VITALS
DIASTOLIC BLOOD PRESSURE: 118 MMHG | RESPIRATION RATE: 18 BRPM | OXYGEN SATURATION: 98 % | WEIGHT: 205 LBS | HEIGHT: 64 IN | HEART RATE: 107 BPM | BODY MASS INDEX: 35 KG/M2 | SYSTOLIC BLOOD PRESSURE: 174 MMHG

## 2019-10-03 DIAGNOSIS — I10 ESSENTIAL HYPERTENSION: ICD-10-CM

## 2019-10-03 DIAGNOSIS — G47.33 OSA (OBSTRUCTIVE SLEEP APNEA): Primary | ICD-10-CM

## 2019-10-03 NOTE — PROGRESS NOTES
217 [facility] [place]. [place], [address]  Tel.  [phone]  Fax. 100 [facility] 60  [place], [address] [facility]  Tel.  [phone]  Fax. [phone] [address]  Tel.  [phone]  Fax. [phone]         Subjective: Josefa Gonzales is an 45 y.o. male referred for evaluation for a sleep disorder. He complains of snoring associated with excessive daytime sleepiness, falling asleep while at work, driving, reading, watching television, during conversation. Symptoms began several years ago, gradually worsening since that time. He usually can fall asleep in 5 minutes. Family or house members note snoring, snorting, periods of not breathing. He denies completely or partially paralyzed while falling asleep or waking up. Josefa Gonzales does not wake up frequently at night. He is bothered by waking up too early and left unable to get back to sleep. He actually sleeps about 4 hours at night and wakes up about 3 times during the night. He does not work shifts:  . Luis Petty indicates he does get too little sleep at night. His bedtime is  . He awakens at  . He does take naps. He takes 2 naps a week lasting 45 min to an hour, Minute(s). He has the following observed behaviors: Loud snoring; No other observations. Broadus Sleepiness Score: 16 which reflect severe daytime drowsiness. No Known Allergies      Current Outpatient Medications:     metFORMIN ER (GLUCOPHAGE XR) 500 mg tablet, Take 2 Tabs by mouth two (2) times daily (with meals). , Disp: 120 Tab, Rfl: 11    lisinopril (PRINIVIL, ZESTRIL) 40 mg tablet, TAKE 1 TABLET BY MOUTH ONCE DAILY, Disp: 30 Tab, Rfl: 11    hydroCHLOROthiazide (MICROZIDE) 12.5 mg capsule, Take 1 Cap by mouth daily. , Disp: 30 Cap, Rfl: 11    amLODIPine (NORVASC) 10 mg tablet, Take 1 Tab by mouth daily. , Disp: 30 Tab, Rfl: 11    dulaglutide (TRULICITY) 1.5 OE/3.0 mL sub-q pen, 0.5 mL by SubCUTAneous route every seven (7) days. , Disp: 4 Pen, Rfl: 11    insulin glargine (LANTUS,BASAGLAR) 100 unit/mL (3 mL) inpn, 30 units daily, Disp: 2 Pen, Rfl: 11    spironolactone (ALDACTONE) 25 mg tablet, Take 1 Tab by mouth daily. , Disp: 30 Tab, Rfl: 11    metoprolol succinate (TOPROL-XL) 100 mg tablet, Take 1 Tab by mouth daily. , Disp: 30 Tab, Rfl: 11    Blood-Glucose Meter (ONETOUCH ULTRA2) monitoring kit, Use to test blood sugar three times a day, Disp: 1 Kit, Rfl: 0    glucose blood VI test strips (ONETOUCH ULTRA BLUE TEST STRIP) strip, Use to test blood sugar three times a day, Disp: 300 Strip, Rfl: 3    lancets (ONE TOUCH DELICA) 33 gauge misc, Use to test blood sugar three times a day., Disp: 300 Lancet, Rfl: 3    atorvastatin (LIPITOR) 80 mg tablet, TAKE 1 TABLET BY MOUTH EVERY DAY, Disp: 90 Tab, Rfl: 3    aspirin delayed-release 81 mg tablet, TAKE 1 TABLET BY MOUTH EVERY DAY, Disp: 30 Tab, Rfl: 11    clopidogrel (PLAVIX) 75 mg tab, TAKE 1 TAB BY MOUTH DAILY. , Disp: 30 Tab, Rfl: 11    Insulin Needles, Disposable, 31 gauge x 5/16\" ndle, As directed, Disp: 60 Package, Rfl: 11    omeprazole (PRILOSEC) 40 mg capsule, Take 1 Cap by mouth daily. , Disp: 30 Cap, Rfl: 11    ondansetron (ZOFRAN ODT) 4 mg disintegrating tablet, Take 1 Tab by mouth every eight (8) hours as needed for Nausea., Disp: 10 Tab, Rfl: 0    oxyCODONE-acetaminophen (PERCOCET) 5-325 mg per tablet, Take 1 Tab by mouth every four (4) hours as needed for Pain. Max Daily Amount: 6 Tabs., Disp: 30 Tab, Rfl: 0    HYDROcodone-acetaminophen (NORCO) 5-325 mg per tablet, Take 1 Tab by mouth every four (4) hours as needed for Pain. Max Daily Amount: 6 Tabs., Disp: 25 Tab, Rfl: 0    VIAGRA 100 mg tablet, , Disp: , Rfl:      He  has a past medical history of CAD (coronary artery disease), Diabetes ([address] Headache, Hypercholesterolemia, Hypertension, Hypogonadism male, Liver disease, and Obstructive sleep apnea.     He  has a past surgical history that includes hx heent. He family history includes Heart Disease in his father. He  reports that he has never smoked. He has never used smokeless tobacco. He reports that he does not drink alcohol or use drugs. Review of Systems:  Constitutional:  No significant weight loss or weight gain  Eyes:  No blurred vision  CVS:  No significant chest pain  Pulm:  No significant shortness of breath  GI:  No significant nausea or vomiting  :  No significant nocturia  Musculoskeletal:  No significant joint pain at night  Skin:  No significant rashes  Neuro:  No significant dizziness   Psych:  No active mood issues    Sleep Review of Systems: notable for no difficulty falling asleep; infrequent awakenings at night;  regular dreaming noted; no nightmares ; early morning headaches; memory problems; concentration issues; no history of any automobile or occupational accidents due to daytime drowsiness. Objective:     Visit Vitals  BP (!) 174/118 (BP 1 Location: Left arm, BP Patient Position: Sitting) Comment: Pt states he did not take medication today   Pulse (!) 107   Resp 18   Ht 5' 4\" (1.626 m)   Wt 205 lb (93 kg)   SpO2 98%   BMI 35.19 kg/m²         General:   Not in acute distress   Eyes:  Anicteric sclerae, no obvious strabismus   Nose:  No obvious nasal septum deviation    Oropharynx:   Class 4 oropharyngeal outlet, thick tongue base, uvula could not be seen due to low-lying soft palate, narrow tonsilo-pharyngeal pilars   Tonsils:   tonsils are not seen due to low-lying soft palate   Neck:   Neck circ. in \"inches\": 19.25; midline trachea   Chest/Lungs:  Equal lung expansion, clear on auscultation    CVS:  Normal rate, regular rhythm; no JVD   Skin:  Warm to touch; no obvious rashes   Neuro:  No focal deficits ; no obvious tremor    Psych:  Normal affect,  normal countenance;          Assessment:       ICD-10-CM ICD-9-CM    1. FREDO (obstructive sleep apnea) G47.33 327.23 SLEEP STUDY UNATTENDED, 4 CHANNEL   2.  Essential hypertension I10 401.9    3. BMI 40.0-44.9, adult [facility]) Z68.41 V85.41          Plan:     * The patient currently has a High Risk for having sleep apnea. STOP-BANG score 7.  * Sleep testing was ordered for initial evaluation. * He was provided information on sleep apnea including coresponding risk factors and the importance of proper treatment. * Treatment options if indicated were reviewed today. Patient agrees to a trial of PAP therapy if indicated. * Counseling was provided regarding proper sleep hygiene (including effect of light on sleep), sleep environment safety and safe driving. * Effect of sleep disturbance on weight was reviewed. We have recommended a dedicated weight loss through appropriate diet and an exercise regiment as significant weight reduction has been shown to reduce severity of obstructive sleep apnea. * Patient agrees to telephone [phone]  follow-up by myself or lead sleep technologist shortly after sleep study to review results and plan final management.     (patient has given permission for a message to be left regarding test results and further management if patient cannot be cannot be reached directly). Thank you for allowing us to participate in your patient's medical care. We'll keep you updated on these   investigations. * Patient was made aware of the dangerously elevated blood pressure noted at this visit. Signs and symptoms of hypertensive emergencies were reviewed with patient. Patient instructed to contact 911 and seek emergency medical attention if these were to occur. Jaylene Duran MD, [facility]  Electronically signed.  10/03/19

## 2019-10-03 NOTE — PATIENT INSTRUCTIONS
217 [facility] [place]. [place], [address]  Tel.  [phone]  Fax. 100 [facility] 60  [place], [address] [facility]  Tel.  [phone]  Fax. [phone] [address]  Tel.  [phone]  Fax. [phone]     Sleep Apnea: After Your Visit  Your Care Instructions  Sleep apnea occurs when you frequently stop breathing for 10 seconds or longer during sleep. It can be mild to severe, based on the number of times per hour that you stop breathing or have slowed breathing. Blocked or narrowed airways in your nose, mouth, or throat can cause sleep apnea. Your airway can become blocked when your throat muscles and tongue relax during sleep. Sleep apnea is common, occurring in 1 out of 20 individuals. Individuals having any of the following characteristics should be evaluated and treated right away due to high risk and detrimental consequences from untreated sleep apnea:  1. Obesity  2. Congestive Heart failure  3. Atrial Fibrillation  4. Uncontrolled Hypertension  5. Type II Diabetes  6. Night-time Arrhythmias  7. Stroke  8. Pulmonary Hypertension  9. High-risk Driving Populations (pilots, truck drivers, etc.)  10. Patients Considering Weight-loss Surgery    How do you know you have sleep apnea? You probably have sleep apnea if you answer 'yes' to 3 or more of the following questions:  S - Have you been told that you Snore? T - Are you often Tired during the day? O - Has anyone Observed you stop breathing while sleeping? P- Do you have (or are being treated for) high blood Pressure? B - Are you obese (Body Mass Index > 35)? A - Is your Age 48years old or older? N - Is your Neck size greater than 16 inches? G - Are you male Gender? A sleep physician can prescribe a breathing device that prevents tissues in the throat from blocking your airway.  Or your doctor may recommend using a dental device (oral breathing device) to help keep your airway open. In some cases, surgery may be needed to remove enlarged tissues in the throat. Follow-up care is a key part of your treatment and safety. Be sure to make and go to all appointments, and call your doctor if you are having problems. It's also a good idea to know your test results and keep a list of the medicines you take. How can you care for yourself at home? · Lose weight, if needed. It may reduce the number of times you stop breathing or have slowed breathing. · Go to bed at the same time every night. · Sleep on your side. It may stop mild apnea. If you tend to roll onto your back, sew a pocket in the back of your pajama top. Put a tennis ball into the pocket, and stitch the pocket shut. This will help keep you from sleeping on your back. · Avoid alcohol and medicines such as sleeping pills and sedatives before bed. · Do not smoke. Smoking can make sleep apnea worse. If you need help quitting, talk to your doctor about stop-smoking programs and medicines. These can increase your chances of quitting for good. · Prop up the head of your bed 4 to 6 inches by putting bricks under the legs of the bed. · Treat breathing problems, such as a stuffy nose, caused by a cold or allergies. · Use a continuous positive airway pressure (CPAP) breathing machine if lifestyle changes do not help your apnea and your doctor recommends it. The machine keeps your airway from closing when you sleep. · If CPAP does not help you, ask your doctor whether you should try other breathing machines. A bilevel positive airway pressure machine has two types of air pressureâone for breathing in and one for breathing out. Another device raises or lowers air pressure as needed while you breathe. · If your nose feels dry or bleeds when using one of these machines, talk with your doctor about increasing moisture in the air. A humidifier may help.   · If your nose is runny or stuffy from using a breathing machine, talk with your doctor about using decongestants or a corticosteroid nasal spray. When should you call for help? Watch closely for changes in your health, and be sure to contact your doctor if:  · You still have sleep apnea even though you have made lifestyle changes. · You are thinking of trying a device such as CPAP. · You are having problems using a CPAP or similar machine. Where can you learn more? Go to [employer] Mobile. Enter I664 in the search box to learn more about \"Sleep Apnea: After Your Visit. \"   © 4748-3369 Healthwise, Incorporated. Care instructions adapted under license by [facility] "[employer] (which disclaims liability or warranty for this information). This care instruction is for use with your licensed healthcare professional. If you have questions about a medical condition or this instruction, always ask your healthcare professional. Augustin Ply any warranty or liability for your use of this information. PROPER SLEEP HYGIENE    What to avoid  · Do not have drinks with caffeine, such as coffee or black tea, for 8 hours before bed. · Do not smoke or use other types of tobacco near bedtime. Nicotine is a stimulant and can keep you awake. · Avoid drinking alcohol late in the evening, because it can cause you to wake in the middle of the night. · Do not eat a big meal close to bedtime. If you are hungry, eat a light snack. · Do not drink a lot of water close to bedtime, because the need to urinate may wake you up during the night. · Do not read or watch TV in bed. Use the bed only for sleeping and sexual activity. What to try  · Go to bed at the same time every night, and wake up at the same time every morning. Do not take naps during the day. · Keep your bedroom quiet, dark, and cool. · Get regular exercise, but not within 3 to 4 hours of your bedtime. .  · Sleep on a comfortable pillow and mattress.   · If watching the clock makes you anxious, turn it facing away from you so you cannot see the time. · If you worry when you lie down, start a worry book. Well before bedtime, write down your worries, and then set the book and your concerns aside. · Try meditation or other relaxation techniques before you go to bed. · If you cannot fall asleep, get up and go to another room until you feel sleepy. Do something relaxing. Repeat your bedtime routine before you go to bed again. · Make your house quiet and calm about an hour before bedtime. Turn down the lights, turn off the TV, log off the computer, and turn down the volume on music. This can help you relax after a busy day. Drowsy Driving  The [address] [place] [address] Road Traffic Safety Administration cites drowsiness as a causing factor in more than 410,083 police reported crashes annually, resulting in 76,000 injuries and 1,500 deaths. Other surveys suggest 55% of people polled have driven while drowsy in the past year, 23% had fallen asleep but not crashed, 3% crashed, and 2% had and accident due to drowsy driving. Who is at risk? Young Drivers: One study of drowsy driving accidents states that 55% of the drivers were under 25 years. Of those, 75% were male. Shift Workers and Travelers: People who work overnight or travel across time zones frequently are at higher risk of experiencing Circadian Rhythm Disorders. They are trying to work and function when their body is programed to sleep. Sleep Deprived: Lack of sleep has a serious impact on your ability to pay attention or focus on a task. Consistently getting less than the average of 8 hours your body needs creates partial or cumulative sleep deprivation. Untreated Sleep Disorders: Sleep Apnea, Narcolepsy, R.L.S., and other sleep disorders (untreated) prevent a person from getting enough restful sleep. This leads to excessive daytime sleepiness and increases the risk for drowsy driving accidents by up to 7 times.   Medications / Alcohol: Even over the counter medications can cause drowsiness. Medications that impair a drivers attention should have a warning label. Alcohol naturally makes you sleepy and on its own can cause accidents. Combined with excessive drowsiness its effects are amplified. Signs of Drowsy Driving:   * You don't remember driving the last few miles   * You may drift out of your polo   * You are unable to focus and your thoughts wander   * You may yawn more often than normal   * You have difficulty keeping your eyes open / nodding off   * Missing traffic signs, speeding, or tailgating  Prevention-   Good sleep hygiene, lifestyle and behavioral choices have the most impact on drowsy driving. There is no substitute for sleep and the average person requires 8 hours nightly. If you find yourself driving drowsy, stop and sleep. Consider the sleep hygiene tips provided during your visit as well. Medication Refill Policy: Refills for all medications require 1 week advance notice. Please have your pharmacy fax a refill request. We are unable to fax, or call in \"controled substance\" medications and you will need to pick these prescriptions up from our office. CEINT Activation    Thank you for requesting access to CE[employer]. Please follow the instructions below to securely access and download your online medical record. CEINT allows you to send messages to your doctor, view your test results, renew your prescriptions, schedule appointments, and more. How Do I Sign Up? 1. In your internet browser, go to [email or web address]. 2. Click on the First Time User? Click Here link in the Sign In box. You will see the New Member Sign Up page. 3. Enter your CEINT Access Code exactly as it appears below. You will not need to use this code after youve completed the sign-up process. If you do not sign up before the expiration date, you must request a new code. CEINT Access Code:  Activation code not generated  Current CEINT Status: Active (This is the date your [employer] access code will )    4. Enter the last four digits of your Social Security Number (xxxx) and Date of Birth (mm/dd/yyyy) as indicated and click Submit. You will be taken to the next sign-up page. 5. Create a To8tot ID. This will be your [employer] login ID and cannot be changed, so think of one that is secure and easy to remember. 6. Create a [employer] password. You can change your password at any time. 7. Enter your Password Reset Question and Answer. This can be used at a later time if you forget your password. 8. Enter your e-mail address. You will receive e-mail notification when new information is available in [address] 9. Click Sign Up. You can now view and download portions of your medical record. 10. Click the Download Summary menu link to download a portable copy of your medical information. Additional Information    If you have questions, please call [phone]. Remember, [employer] is NOT to be used for urgent needs. For medical emergencies, dial 911.

## 2019-11-02 RX ORDER — ASPIRIN 81 MG/1
TABLET ORAL
Qty: 90 TAB | Refills: 3 | Status: SHIPPED | OUTPATIENT
Start: 2019-11-02 | End: 2019-12-10 | Stop reason: SDUPTHER

## 2019-11-04 ENCOUNTER — TELEPHONE (OUTPATIENT)
Dept: SLEEP MEDICINE | Age: 44
End: 2019-11-04

## 2019-11-04 NOTE — TELEPHONE ENCOUNTER
Patient called to verify authorization for a HSAT from [facility]  approval. [facility]

## 2019-11-05 ENCOUNTER — TELEPHONE (OUTPATIENT)
Dept: SLEEP MEDICINE | Age: 44
End: 2019-11-05

## 2019-11-05 NOTE — TELEPHONE ENCOUNTER
Called [employer]) and faxed Precert. Request Form , info. for approval  11/5/19  [employer] responsed to fax request is been processing. Called patient to let him know his Precert. Authorization is being processed.  11/5/19

## 2019-12-04 NOTE — PROGRESS NOTES
called [employer] at [phone], spoke with Tiago, completed LOCET. SW faxed PASRR, awaiting form 142 from State.     12/04/19 3627   Post-Acute Status   Post-Acute Authorization Placement   Post-Acute Placement Status Pending State Certification      1. Have you been to the ER, urgent care clinic since your last visit? Hospitalized since your last visit? Yes When: 8/2/18 Where: [facility] ER Reason for visit: abdominal pain     2. Have you seen or consulted any other health care providers outside of the [address] [place] [address] since your last visit? Include any pap smears or colon screening.  No

## 2019-12-10 ENCOUNTER — OFFICE VISIT (OUTPATIENT)
Dept: INTERNAL MEDICINE CLINIC | Age: 44
End: 2019-12-10

## 2019-12-10 VITALS
BODY MASS INDEX: 35.9 KG/M2 | SYSTOLIC BLOOD PRESSURE: 174 MMHG | WEIGHT: 210.3 LBS | HEART RATE: 78 BPM | RESPIRATION RATE: 16 BRPM | DIASTOLIC BLOOD PRESSURE: 108 MMHG | HEIGHT: 64 IN | TEMPERATURE: 98 F | OXYGEN SATURATION: 96 %

## 2019-12-10 DIAGNOSIS — E11.9 TYPE 2 DIABETES MELLITUS WITHOUT COMPLICATION, WITH LONG-TERM CURRENT USE OF INSULIN (HCC): ICD-10-CM

## 2019-12-10 DIAGNOSIS — E78.5 DYSLIPIDEMIA: ICD-10-CM

## 2019-12-10 DIAGNOSIS — I10 ESSENTIAL HYPERTENSION: Primary | ICD-10-CM

## 2019-12-10 DIAGNOSIS — Z79.4 TYPE 2 DIABETES MELLITUS WITHOUT COMPLICATION, WITH LONG-TERM CURRENT USE OF INSULIN (HCC): ICD-10-CM

## 2019-12-10 DIAGNOSIS — E29.1 HYPOGONADISM MALE: ICD-10-CM

## 2019-12-10 DIAGNOSIS — I25.10 ASHD (ARTERIOSCLEROTIC HEART DISEASE): ICD-10-CM

## 2019-12-10 DIAGNOSIS — G47.33 OBSTRUCTIVE SLEEP APNEA: ICD-10-CM

## 2019-12-10 DIAGNOSIS — E66.9 OBESITY (BMI 30.0-34.9): ICD-10-CM

## 2019-12-10 RX ORDER — AMLODIPINE BESYLATE 10 MG/1
10 TABLET ORAL DAILY
Qty: 30 TAB | Refills: 11 | Status: ON HOLD | OUTPATIENT
Start: 2019-12-10 | End: 2020-12-01 | Stop reason: SDUPTHER

## 2019-12-10 RX ORDER — LISINOPRIL 40 MG/1
TABLET ORAL
Qty: 30 TAB | Refills: 11 | Status: SHIPPED | OUTPATIENT
Start: 2019-12-10 | End: 2021-04-28

## 2019-12-10 RX ORDER — HYDROCHLOROTHIAZIDE 12.5 MG/1
12.5 CAPSULE ORAL DAILY
Qty: 30 CAP | Refills: 11 | Status: SHIPPED | OUTPATIENT
Start: 2019-12-10 | End: 2019-12-23 | Stop reason: SDUPTHER

## 2019-12-10 RX ORDER — ATORVASTATIN CALCIUM 80 MG/1
TABLET, FILM COATED ORAL
Qty: 90 TAB | Refills: 3 | Status: SHIPPED | OUTPATIENT
Start: 2019-12-10 | End: 2020-01-27 | Stop reason: CLARIF

## 2019-12-10 RX ORDER — ASPIRIN 81 MG/1
TABLET ORAL
Qty: 90 TAB | Refills: 3 | Status: SHIPPED | OUTPATIENT
Start: 2019-12-10 | End: 2021-05-03

## 2019-12-10 RX ORDER — SPIRONOLACTONE 25 MG/1
25 TABLET ORAL DAILY
Qty: 30 TAB | Refills: 11 | Status: SHIPPED | OUTPATIENT
Start: 2019-12-10 | End: 2020-12-08

## 2019-12-10 RX ORDER — METFORMIN HYDROCHLORIDE 500 MG/1
1000 TABLET, EXTENDED RELEASE ORAL 2 TIMES DAILY WITH MEALS
Qty: 120 TAB | Refills: 11 | Status: SHIPPED | OUTPATIENT
Start: 2019-12-10 | End: 2020-05-13 | Stop reason: SDUPTHER

## 2019-12-10 RX ORDER — METOPROLOL SUCCINATE 100 MG/1
100 TABLET, EXTENDED RELEASE ORAL DAILY
Qty: 30 TAB | Refills: 11 | Status: SHIPPED | OUTPATIENT
Start: 2019-12-10 | End: 2020-12-01

## 2019-12-10 RX ORDER — INSULIN GLARGINE 100 [IU]/ML
INJECTION, SOLUTION SUBCUTANEOUS
Qty: 2 PEN | Refills: 11 | Status: SHIPPED | OUTPATIENT
Start: 2019-12-10 | End: 2020-05-13 | Stop reason: SDUPTHER

## 2019-12-10 NOTE — PROGRESS NOTES
Chief Complaint   Patient presents with    Diabetes     6 month follow up      1. Have you been to the ER, urgent care clinic since your last visit? Hospitalized since your last visit? No    2. Have you seen or consulted any other health care providers outside of the [address] [place] [address] since your last visit? Include any pap smears or colon screening.  No

## 2019-12-11 LAB
ALBUMIN/CREAT UR: 6.7 MG/G CREAT (ref 0–30)
APO B SERPL-MCNC: 181 MG/DL
BUN SERPL-MCNC: 11 MG/DL (ref 6–24)
BUN/CREAT SERPL: 13 (ref 9–20)
CALCIUM SERPL-MCNC: 9.4 MG/DL (ref 8.7–10.2)
CHLORIDE SERPL-SCNC: 99 MMOL/L (ref 96–106)
CHOLEST SERPL-MCNC: 304 MG/DL (ref 100–199)
CO2 SERPL-SCNC: 25 MMOL/L (ref 20–29)
CREAT SERPL-MCNC: 0.86 MG/DL (ref 0.76–1.27)
CREAT UR-MCNC: 158.3 MG/DL
EST. AVERAGE GLUCOSE BLD GHB EST-MCNC: 332 MG/DL
GLUCOSE SERPL-MCNC: 274 MG/DL (ref 65–99)
HBA1C MFR BLD: 13.2 % (ref 4.8–5.6)
HDLC SERPL-MCNC: 45 MG/DL
LDLC SERPL CALC-MCNC: 182 MG/DL (ref 0–99)
MICROALBUMIN UR-MCNC: 10.6 UG/ML
POTASSIUM SERPL-SCNC: 4.3 MMOL/L (ref 3.5–5.2)
SODIUM SERPL-SCNC: 140 MMOL/L (ref 134–144)
TRIGL SERPL-MCNC: 385 MG/DL (ref 0–149)
VLDLC SERPL CALC-MCNC: 77 MG/DL (ref 5–40)

## 2019-12-11 NOTE — PROGRESS NOTES
[facility]  [place] [address]  [address]  [place] [address]  Phone:  [phone]  Fax: [phone]    Chief Complaint   Patient presents with    Diabetes     6 month follow up        SUBJECTIVE:    Shiloh Santoro is a 45 y.o. male Comes in for return visit after a long absence, stating he has not taken any of his medications for the last three _____________. No obvious reason was given. He does have mild polyuria currently. He has known diabetes and is currently on insulin, as well as GLP-1 agonist, neither of which he has been taking. He has a history of coronary artery disease, having had a stent placed in the past.  He has not been taking _______________ cardioactive medications. There has been no meaningful change in his weight. He does have a history of obstructive sleep apnea and does use a CPAP machine. Current Outpatient Medications   Medication Sig Dispense Refill    spironolactone (ALDACTONE) 25 mg tablet Take 1 Tab by mouth daily. 30 Tab 11    metoprolol succinate (TOPROL-XL) 100 mg tablet Take 1 Tab by mouth daily. 30 Tab 11    metFORMIN ER (GLUCOPHAGE XR) 500 mg tablet Take 2 Tabs by mouth two (2) times daily (with meals). 120 Tab 11    lisinopril (PRINIVIL, ZESTRIL) 40 mg tablet TAKE 1 TABLET BY MOUTH ONCE DAILY 30 Tab 11    insulin glargine (LANTUS,BASAGLAR) 100 unit/mL (3 mL) inpn 30 units daily 2 Pen 11    hydroCHLOROthiazide (MICROZIDE) 12.5 mg capsule Take 1 Cap by mouth daily. 30 Cap 11    dulaglutide (TRULICITY) 1.5 RS/3.1 mL sub-q pen 0.5 mL by SubCUTAneous route every seven (7) days. 4 Pen 11    atorvastatin (LIPITOR) 80 mg tablet TAKE 1 TABLET BY MOUTH EVERY DAY 90 Tab 3    amLODIPine (NORVASC) 10 mg tablet Take 1 Tab by mouth daily. 30 Tab 11    aspirin delayed-release 81 mg tablet TAKE 1 TABLET BY MOUTH EVERY DAY 90 Tab 3    omeprazole (PRILOSEC) 40 mg capsule Take 1 Cap by mouth daily.  30 Cap 11    Blood-Glucose Meter [facility] ULTRA2) monitoring kit Use to test blood sugar three times a day 1 Kit 0    glucose blood VI test strips (ONETOUCH ULTRA BLUE TEST STRIP) strip Use to test blood sugar three times a day 300 Strip 3    lancets (ONE TOUCH DELICA) 33 gauge misc Use to test blood sugar three times a day. 300 Lancet 3    clopidogrel (PLAVIX) 75 mg tab TAKE 1 TAB BY MOUTH DAILY. 30 Tab 11    ondansetron (ZOFRAN ODT) 4 mg disintegrating tablet Take 1 Tab by mouth every eight (8) hours as needed for Nausea.  10 Tab 0    Insulin Needles, Disposable, 31 gauge x 5/16\" ndle As directed 60 Package 11    VIAGRA 100 mg tablet        Past Medical History:   Diagnosis Date    CAD (coronary artery disease)     2 stents 2016     Diabetes ([facility] [address]     Headache     Hypercholesterolemia     Hypertension     Hypogonadism male     Liver disease     NAFLD    Obstructive sleep apnea      Past Surgical History:   Procedure Laterality Date    HX HEENT      status post pharyngioplasty     No Known Allergies    REVIEW OF SYSTEMS:  General: negative for - chills or fever  ENT: negative for - headaches, nasal congestion or tinnitus  Respiratory: negative for - cough, hemoptysis, shortness of breath or wheezing  Cardiovascular : negative for - chest pain, edema, palpitations or shortness of breath  Gastrointestinal: negative for - abdominal pain, blood in stools, heartburn or nausea/vomiting  Genito-Urinary: no dysuria, trouble voiding, or hematuria  Musculoskeletal: negative for - gait disturbance, joint pain, joint stiffness or joint swelling  Neurological: no TIA or stroke symptoms  Hematologic: no bruises, no bleeding, no swollen glands  Integument: no lumps, mole changes, nail changes or rash  Endocrine:no malaise/lethargy or unexpected weight changes      Social History     Socioeconomic History    Marital status:      Spouse name: Not on file    Number of children: 2    Years of education: 12    Highest education level: Not on file   Occupational History    Occupation:    Tobacco Use    Smoking status: Never Smoker    Smokeless tobacco: Never Used   Substance and Sexual Activity    Alcohol use: No    Drug use: No    Sexual activity: Yes     Partners: Female     Family History   Problem Relation Age of Onset    Heart Disease Father        OBJECTIVE:     Visit Vitals  BP (!) 174/108   Pulse 78   Temp 98 °F (36.7 °C) (Oral)   Resp 16   Ht 5' 4\" (1.626 m)   Wt 210 lb 4.8 oz (95.4 kg)   SpO2 96%   BMI 36.10 kg/m²     CONSTITUTIONAL: well , well nourished, appears age appropriate  EYES: perrla, eom intact  ENMT:moist mucous membranes, pharynx clear  NECK: supple. Thyroid normal  RESPIRATORY: Chest: clear to ascultation and percussion   CARDIOVASCULAR: Heart: regular rate and rhythm  GASTROINTESTINAL: Abdomen: soft, bowel sounds active  HEMATOLOGIC: no pathological lymph nodes palpated  MUSCULOSKELETAL: Extremities: no edema, pulse 1+   INTEGUMENT: No unusual rashes or suspicious skin lesions noted. Nails appear normal.  NEUROLOGIC: non-focal exam   MENTAL STATUS: alert and oriented, appropriate affect     ASSESSMENT:   1. Essential hypertension    2. Type 2 diabetes mellitus without complication, with long-term current use of insulin ([address]    3. Dyslipidemia    4. ASHD (arteriosclerotic heart disease)    5. Obesity (BMI 30.0-34.9)    6. Hypogonadism male    7. Obstructive sleep apnea        PLAN:    1. The patient remains totally noncompliant. I have told him the same potential adverse effects that will occur if he does not take his medication repetitively. He remains noncompliant as he has been since I have been following him well over a decade. 2. One of his biggest  complaints, as is always the case, is decreased libido and ED, all of which are secondary to his existing comorbidities. 3. He will resume his diabetic medication, specifically his basal insulin, as well as GLP-1 agonist, which is Trulicity.   4. He needs to remain on a statin. 5. Weight reduction is mandatory. He has tremendous insulin resistance, which is leading to his cardiovascular disease, as well as his steatosis. 6. His hypogonadism is secondary to morbid obesity. 7. He does have FREDO and really needs to use his CPAP machine. He is inconsistent with this, as well as overall follow up with his neurologist and cardiologist.    .  Orders Placed This Encounter    HEMOGLOBIN A1C WITH EAG    MICROALBUMIN, UR, RAND W/ MICROALB/CREAT RATIO    METABOLIC PANEL, BASIC    APOLIPOPROTEIN B    LIPID PANEL    spironolactone (ALDACTONE) 25 mg tablet    metoprolol succinate (TOPROL-XL) 100 mg tablet    metFORMIN ER (GLUCOPHAGE XR) 500 mg tablet    lisinopril (PRINIVIL, ZESTRIL) 40 mg tablet    insulin glargine (LANTUS,BASAGLAR) 100 unit/mL (3 mL) inpn    hydroCHLOROthiazide (MICROZIDE) 12.5 mg capsule    dulaglutide (TRULICITY) 1.5 EY/5.7 mL sub-q pen    atorvastatin (LIPITOR) 80 mg tablet    amLODIPine (NORVASC) 10 mg tablet    aspirin delayed-release 81 mg tablet         ATTENTION:   This medical record was transcribed using an electronic medical records system. Although proofread, it may and can contain electronic and spelling errors. Other human spelling and other errors may be present. Corrections may be executed at a later time. Please feel free to contact us for any clarifications as needed. Follow-up and Dispositions    · Return in about 4 weeks (around 1/7/2020).            Gabe Richardson MD

## 2019-12-24 ENCOUNTER — TELEPHONE (OUTPATIENT)
Dept: INTERNAL MEDICINE CLINIC | Age: 44
End: 2019-12-24

## 2019-12-24 NOTE — TELEPHONE ENCOUNTER
Patient contacted via phone advised to increase Lantus insulin to 30 units daily and to contact office in 1 week with fbs readings per Dr. Suzanna Gates. Pt verbalized understanding.

## 2019-12-25 RX ORDER — CLOPIDOGREL BISULFATE 75 MG/1
TABLET ORAL
Qty: 30 TAB | Refills: 11 | Status: SHIPPED | OUTPATIENT
Start: 2019-12-25 | End: 2020-12-01

## 2020-01-27 RX ORDER — ROSUVASTATIN CALCIUM 40 MG/1
40 TABLET, COATED ORAL DAILY
Qty: 30 TAB | Refills: 11 | Status: SHIPPED | OUTPATIENT
Start: 2020-01-27 | End: 2020-05-13 | Stop reason: SDUPTHER

## 2020-05-13 ENCOUNTER — VIRTUAL VISIT (OUTPATIENT)
Dept: INTERNAL MEDICINE CLINIC | Age: 45
End: 2020-05-13

## 2020-05-13 DIAGNOSIS — E66.9 OBESITY (BMI 30.0-34.9): ICD-10-CM

## 2020-05-13 DIAGNOSIS — E78.5 DYSLIPIDEMIA: ICD-10-CM

## 2020-05-13 DIAGNOSIS — I10 ESSENTIAL HYPERTENSION: ICD-10-CM

## 2020-05-13 DIAGNOSIS — G47.33 OBSTRUCTIVE SLEEP APNEA: Primary | ICD-10-CM

## 2020-05-13 DIAGNOSIS — E11.9 TYPE 2 DIABETES MELLITUS WITHOUT COMPLICATION, WITH LONG-TERM CURRENT USE OF INSULIN (HCC): ICD-10-CM

## 2020-05-13 DIAGNOSIS — E11.65 UNCONTROLLED TYPE 2 DIABETES MELLITUS WITH HYPERGLYCEMIA (HCC): ICD-10-CM

## 2020-05-13 DIAGNOSIS — Z91.199 HISTORY OF NONCOMPLIANCE WITH MEDICAL TREATMENT: ICD-10-CM

## 2020-05-13 DIAGNOSIS — Z79.4 TYPE 2 DIABETES MELLITUS WITHOUT COMPLICATION, WITH LONG-TERM CURRENT USE OF INSULIN (HCC): ICD-10-CM

## 2020-05-13 DIAGNOSIS — N52.9 ERECTILE DYSFUNCTION, UNSPECIFIED ERECTILE DYSFUNCTION TYPE: ICD-10-CM

## 2020-05-13 RX ORDER — OMEPRAZOLE 40 MG/1
40 CAPSULE, DELAYED RELEASE ORAL DAILY
Qty: 30 CAP | Refills: 11 | Status: SHIPPED | OUTPATIENT
Start: 2020-05-13 | End: 2020-05-19 | Stop reason: CLARIF

## 2020-05-13 RX ORDER — DULAGLUTIDE 1.5 MG/.5ML
1.5 INJECTION, SOLUTION SUBCUTANEOUS
Qty: 4 SYRINGE | Refills: 11 | Status: SHIPPED | OUTPATIENT
Start: 2020-05-13 | End: 2020-09-28 | Stop reason: CLARIF

## 2020-05-13 RX ORDER — METFORMIN HYDROCHLORIDE 500 MG/1
500 TABLET, EXTENDED RELEASE ORAL 2 TIMES DAILY WITH MEALS
Qty: 60 TAB | Refills: 11 | Status: SHIPPED | OUTPATIENT
Start: 2020-05-13 | End: 2020-11-24

## 2020-05-13 RX ORDER — INSULIN GLARGINE 100 [IU]/ML
INJECTION, SOLUTION SUBCUTANEOUS
Qty: 3 PEN | Refills: 11 | Status: SHIPPED | OUTPATIENT
Start: 2020-05-13 | End: 2021-01-08 | Stop reason: SDUPTHER

## 2020-05-13 RX ORDER — SILDENAFIL 100 MG/1
100 TABLET, FILM COATED ORAL AS NEEDED
Qty: 6 TAB | Refills: 11 | Status: SHIPPED | OUTPATIENT
Start: 2020-05-13 | End: 2020-12-01

## 2020-05-13 RX ORDER — ROSUVASTATIN CALCIUM 40 MG/1
40 TABLET, COATED ORAL DAILY
Qty: 30 TAB | Refills: 11 | Status: SHIPPED | OUTPATIENT
Start: 2020-05-13 | End: 2021-05-28 | Stop reason: SDUPTHER

## 2020-05-13 NOTE — PROGRESS NOTES
Chief Complaint   Patient presents with    Diabetes     routine follow up     1. Have you been to the ER, urgent care clinic since your last visit? Hospitalized since your last visit? No    2. Have you seen or consulted any other health care providers outside of the [address] [place] [address] since your last visit? Include any pap smears or colon screening.  No

## 2020-05-14 NOTE — PROGRESS NOTES
Ac Jolley is a 45 y.o. male who was seen by synchronous (real-time) audio-video technology on 5/13/2020. Consent: Ac Jolley, who was seen by synchronous (real-time) audio-video technology, and/or his healthcare decision maker, is aware that this patient-initiated, Telehealth encounter on 5/13/2020 is a billable service, with coverage as determined by his insurance carrier. He is aware that he may receive a bill and has provided verbal consent to proceed: Yes. Assessment & Plan:   Diagnoses and all orders for this visit:    1. Obstructive sleep apnea  -     REFERRAL TO SLEEP STUDIES    2. Uncontrolled type 2 diabetes mellitus with hyperglycemia ([facility] [address]    3. Essential hypertension    4. Dyslipidemia  -     rosuvastatin (CRESTOR) 40 mg tablet; Take 1 Tab by mouth daily. 5. Obesity (BMI 30.0-34.9)  -     omeprazole (PRILOSEC) 40 mg capsule; Take 1 Cap by mouth daily. 6. History of noncompliance with medical treatment    7. Type 2 diabetes mellitus without complication, with long-term current use of insulin (HCC)  -     metFORMIN ER (GLUCOPHAGE XR) 500 mg tablet; Take 1 Tab by mouth two (2) times daily (with meals). -     insulin glargine (LANTUS,BASAGLAR) 100 unit/mL (3 mL) inpn; 30 units daily  -     dulaglutide (Trulicity) 1.5 VC/8.6 mL sub-q pen; 0.5 mL by SubCUTAneous route every seven (7) days. 8. Erectile dysfunction, unspecified erectile dysfunction type  -     sildenafil citrate (Viagra) 100 mg tablet; Take 1 Tab by mouth as needed for Erectile Dysfunction. Indications: the inability to have an erection    1. The patient has severe obstructive sleep apnea as he has had for years. Approximately 8 years ago, he had a pharyngoplasty which helped to a limited extent all but with regaining of his weight, he is returned to his symptomatic obstructive sleep apnea. He needs to have a CPAP machine.   I have attempted this in the past but importantly, something happened to the appointment, and he will have to have another one. 2. He remains totally noncompliant to his diabetic regimen. His excuse about his GLP-1 agonist, which is Trulicity, in that it follows G. I. symptoms which is quite possible that he never called such that a change could be made to minimize this. He would stop taking his insulin for an extended period of time. For no reason, he then starts his metformin back and he had problems taking 2 tablets b.i.d. with abdominal cramping and diarrhea. He will resume his Lantus 30 units daily along with the GLP-1 agonist which will be Trulicity, and I will see how he tolerates this. He will reduce his metformin extended-release to 500 mg b.i.d. with meals. 3. He states that he is taking his antihypertensive medication that I have no way of knowing this. He does not take his blood pressure. 4.  He is in secondary risk prevention needs to remain on a statin. He states that he is taking the medication. Efficacy and compliance will have to be assessed. 5. His severe obesity continues to be a driving force behind his dysmetabolic status. I strongly suggest eating meals, illuminating snacks, and avoiding the consumption of processed carbohydrates. 6. He has a history of medical noncompliance which has existed since I have been following the patient. He appears to understand the consequences with what will occur with his continued noncompliance. One example of this is the development of coronary artery disease. Subjective: Susan Winters is a 45 y.o. male who was seen for Diabetes (routine follow up)  He is seen today after noncompliance. He states that he is quite symptomatic as far as his sleep apnea is concerned. He has difficulties at night and is extremely hypersomnolent during the daytime as would be expected.   He has known obstructive sleep apnea and needs to see a neurologist.       His diabetes has not been doing well because he has not been taking his insulin and for that matter, his GLP-1 agonist.    He has a past history of primary hypertension, dyslipidemia, and erectile dysfunction. Additionally, he does have his or dyspepsia and takes omeprazole. He would like to continue this. Finally, his obesity continues to be a driving force behind his medical problems. We talk about the need for improving this at length as I have done each time I see him. Prior to Admission medications    Medication Sig Start Date End Date Taking? Authorizing Provider   omeprazole (PRILOSEC) 40 mg capsule Take 1 Cap by mouth daily. 5/13/20  Yes Viraj Berry MD   sildenafil citrate (Viagra) 100 mg tablet Take 1 Tab by mouth as needed for Erectile Dysfunction. Indications: the inability to have an erection 5/13/20  Yes Viraj Berry MD   rosuvastatin (CRESTOR) 40 mg tablet Take 1 Tab by mouth daily. 5/13/20  Yes Viraj Berry MD   metFORMIN ER (GLUCOPHAGE XR) 500 mg tablet Take 1 Tab by mouth two (2) times daily (with meals). 5/13/20  Yes Viraj Berry MD   insulin glargine (LANTUS,BASAGLAR) 100 unit/mL (3 mL) inpn 30 units daily 5/13/20  Yes Viraj Berry MD   dulaglutide (Trulicity) 1.5 KJ/5.2 mL sub-q pen 0.5 mL by SubCUTAneous route every seven (7) days. 5/13/20  Yes Viraj Berry MD   clopidogrel (PLAVIX) 75 mg tab TAKE 1 TAB BY MOUTH DAILY. 12/25/19  Yes Viraj Berry MD   hydroCHLOROthiazide (MICROZIDE) 12.5 mg capsule Take 1 Cap by mouth daily. 12/23/19  Yes Viraj Berry MD   spironolactone (ALDACTONE) 25 mg tablet Take 1 Tab by mouth daily. 12/10/19  Yes Viraj Berry MD   metoprolol succinate (TOPROL-XL) 100 mg tablet Take 1 Tab by mouth daily. 12/10/19  Yes Viraj Berry MD   lisinopril (PRINIVIL, ZESTRIL) 40 mg tablet TAKE 1 TABLET BY MOUTH ONCE DAILY 12/10/19  Yes Viraj Berry MD   amLODIPine (NORVASC) 10 mg tablet Take 1 Tab by mouth daily.  12/10/19  Yes Viraj Berry MD   aspirin delayed-release 81 mg tablet TAKE 1 TABLET BY MOUTH EVERY DAY 12/10/19  Yes Shelli Maza MD   Blood-Glucose Meter [facility]) monitoring kit Use to test blood sugar three times a day 3/24/19  Yes Shelli Maza MD   glucose blood VI test strips (ONETOUCH ULTRA BLUE TEST STRIP) strip Use to test blood sugar three times a day 3/24/19  Yes Shelli Maza MD   lancets ([facility]) 33 gauge misc Use to test blood sugar three times a day. 3/24/19  Yes Shelli Maza MD   Insulin Needles, Disposable, 31 gauge x 5/16\" ndle As directed 11/8/17  Yes Shelli Maza MD   ondansetron (ZOFRAN ODT) 4 mg disintegrating tablet Take 1 Tab by mouth every eight (8) hours as needed for Nausea. 2/23/18   Raquel Matthew MD     No Known Allergies    Current Outpatient Medications   Medication Sig Dispense Refill    omeprazole (PRILOSEC) 40 mg capsule Take 1 Cap by mouth daily. 30 Cap 11    sildenafil citrate (Viagra) 100 mg tablet Take 1 Tab by mouth as needed for Erectile Dysfunction. Indications: the inability to have an erection 6 Tab 11    rosuvastatin (CRESTOR) 40 mg tablet Take 1 Tab by mouth daily. 30 Tab 11    metFORMIN ER (GLUCOPHAGE XR) 500 mg tablet Take 1 Tab by mouth two (2) times daily (with meals). 60 Tab 11    insulin glargine (LANTUS,BASAGLAR) 100 unit/mL (3 mL) inpn 30 units daily 3 Pen 11    dulaglutide (Trulicity) 1.5 VD/3.9 mL sub-q pen 0.5 mL by SubCUTAneous route every seven (7) days. 4 Syringe 11    clopidogrel (PLAVIX) 75 mg tab TAKE 1 TAB BY MOUTH DAILY. 30 Tab 11    hydroCHLOROthiazide (MICROZIDE) 12.5 mg capsule Take 1 Cap by mouth daily. 30 Cap 11    spironolactone (ALDACTONE) 25 mg tablet Take 1 Tab by mouth daily. 30 Tab 11    metoprolol succinate (TOPROL-XL) 100 mg tablet Take 1 Tab by mouth daily. 30 Tab 11    lisinopril (PRINIVIL, ZESTRIL) 40 mg tablet TAKE 1 TABLET BY MOUTH ONCE DAILY 30 Tab 11    amLODIPine (NORVASC) 10 mg tablet Take 1 Tab by mouth daily.  30 Tab 11    aspirin delayed-release 81 mg tablet TAKE 1 TABLET BY MOUTH EVERY DAY 90 Tab 3    Blood-Glucose Meter (ONETOUCH ULTRA2) monitoring kit Use to test blood sugar three times a day 1 Kit 0    glucose blood VI test strips (ONETOUCH ULTRA BLUE TEST STRIP) strip Use to test blood sugar three times a day 300 Strip 3    lancets (ONE TOUCH DELICA) 33 gauge misc Use to test blood sugar three times a day. 300 Lancet 3    Insulin Needles, Disposable, 31 gauge x 5/16\" ndle As directed 60 Package 11    ondansetron (ZOFRAN ODT) 4 mg disintegrating tablet Take 1 Tab by mouth every eight (8) hours as needed for Nausea. 10 Tab 0     No Known Allergies  Past Medical History:   Diagnosis Date    CAD (coronary artery disease)     2 stents 2016     Headache     Hypercholesterolemia     Hypertension     Hypogonadism male     Liver disease     NAFLD    Obstructive sleep apnea      Past Surgical History:   Procedure Laterality Date    HX HEENT      status post pharyngioplasty     Family History   Problem Relation Age of Onset    Heart Disease Father      Social History     Tobacco Use    Smoking status: Never Smoker    Smokeless tobacco: Never Used   Substance Use Topics    Alcohol use: No       ROS:14 point ROS neg. Objective:   Vital Signs: (As obtained by patient/caregiver at home)  There were no vitals taken for this visit.      [INSTRUCTIONS:  \"[x]\" Indicates a positive item  \"[]\" Indicates a negative item  -- DELETE ALL ITEMS NOT EXAMINED]    Constitutional: [x] Appears well-developed and well-nourished [x] No apparent distress      [] Abnormal -     Mental status: [x] Alert and awake  [x] Oriented to person/place/time [x] Able to follow commands    [] Abnormal -     Eyes:   EOM    [x]  Normal    [] Abnormal -   Sclera  [x]  Normal    [] Abnormal -          Discharge [x]  None visible   [] Abnormal -     HENT: [x] Normocephalic, atraumatic  [] Abnormal -   [x] Mouth/Throat: Mucous membranes are moist    External Ears [x] Normal  [] Abnormal -    Neck: [x] No visualized mass [] Abnormal -     Pulmonary/Chest: [x] Respiratory effort normal   [x] No visualized signs of difficulty breathing or respiratory distress        [] Abnormal -      Musculoskeletal:   [x] Normal gait with no signs of ataxia         [x] Normal range of motion of neck        [] Abnormal -     Neurological:        [x] No Facial Asymmetry (Cranial nerve 7 motor function) (limited exam due to video visit)          [x] No gaze palsy        [] Abnormal -          Skin:        [x] No significant exanthematous lesions or discoloration noted on facial skin         [] Abnormal -            Psychiatric:       [x] Normal Affect [] Abnormal -        [x] No Hallucinations    Other pertinent observable physical exam findings:-        We discussed the expected course, resolution and complications of the diagnosis(es) in detail. Medication risks, benefits, costs, interactions, and alternatives were discussed as indicated. I advised him to contact the office if his condition worsens, changes or fails to improve as anticipated. He expressed understanding with the diagnosis(es) and plan. Evette Aj is a 45 y.o. male who was evaluated by a video visit encounter for concerns as above. Patient identification was verified prior to start of the visit. A caregiver was present when appropriate. Due to this being a TeleHealth encounter (During GWLUD-48 public health emergency), evaluation of the following organ systems was limited: Vitals/Constitutional/EENT/Resp/CV/GI//MS/Neuro/Skin/Heme-Lymph-Imm. Pursuant to the emergency declaration under the [facility], [facility] waiver authority and the [employer] Act, this Virtual  Visit was conducted, with patient's (and/or legal guardian's) consent, to reduce the patient's risk of exposure to COVID-19 and provide necessary medical care.      Services were provided through a video synchronous discussion virtually to substitute for in-person clinic visit. Patient and provider were located at their individual homes. Please note that this dictation was completed with [employer], the computer voice recognition software. Quite often unanticipated grammatical, syntax, homophones, and other interpretive errors are inadvertently transcribed by the computer software. Please disregard these errors. Please excuse any errors that have escaped final proofreading. Thank you.     Dione Ng MD

## 2020-05-19 RX ORDER — FAMOTIDINE 40 MG/1
40 TABLET, FILM COATED ORAL DAILY
Qty: 30 TAB | Refills: 11 | Status: SHIPPED | OUTPATIENT
Start: 2020-05-19 | End: 2021-05-04

## 2020-08-25 DIAGNOSIS — G47.33 OSA (OBSTRUCTIVE SLEEP APNEA): Primary | ICD-10-CM

## 2020-09-03 ENCOUNTER — TELEPHONE (OUTPATIENT)
Dept: SLEEP MEDICINE | Age: 45
End: 2020-09-03

## 2020-09-03 NOTE — TELEPHONE ENCOUNTER
Patient wife Darcy Pinedo called in to inquire about the HSAT that Cuong Flores was supposed to get back in Nov.  It was denied by the insurance company on November 2019. I called and spoke with Gerald Maya. Intake ID #[id number] at [facility] and she stated the time frame to do the peer to peer was over. Dr. Freddy Bowles requested the patient do a virtual appointment and then we will move forward with the HSAT. Already Scheduled the patient for vv appointment.

## 2020-09-21 ENCOUNTER — VIRTUAL VISIT (OUTPATIENT)
Dept: SLEEP MEDICINE | Age: 45
End: 2020-09-21
Payer: COMMERCIAL

## 2020-09-21 DIAGNOSIS — I10 ESSENTIAL HYPERTENSION: ICD-10-CM

## 2020-09-21 DIAGNOSIS — G47.33 OSA (OBSTRUCTIVE SLEEP APNEA): Primary | ICD-10-CM

## 2020-09-21 PROCEDURE — 99214 OFFICE O/P EST MOD 30 MIN: CPT | Performed by: INTERNAL MEDICINE

## 2020-09-21 NOTE — PROGRESS NOTES
217 [facility] [place]. [place], [address]  Tel.  [phone]  Fax. 100 [facility] 60  [address] [facility] [address], [address]  Tel.  [phone]  Fax. [phone] 9[address] [facility] [place]  Tel.  [phone]  Fax. [phone]         Subjective: Ita Funk is a 39 y.o. male who was seen by synchronous (real-time) audio-video technology on 9/21/2020. Consent:  He is aware that this patient-initiated Telehealth encounter is a billable service, with coverage as determined by his insurance carrier. He is aware that he may receive a bill and has provided verbal consent to proceed: Yes    I was at home while conducting this encounter. Patient verified with 's license. He complains of snoring associated with snorting, periods of not breathing, tossing and turning, awakening in the middle of the night because of headaches, SOB, heartburn and for urination. Symptoms began 5 years ago, unchanged since that time. He usually can fall asleep in 15-20 minutes. Family or house members note snoring. He denies completely or partially paralyzed while falling asleep or waking up. Ita Funk does wake up frequently at night. He is bothered by waking up too early and left unable to get back to sleep. He actually sleeps about 6 hours at night and wakes up about 2-3 times during the night. He does not work shifts. [place] Sleepiness Score: 10       No Known Allergies      Current Outpatient Medications:     famotidine (PEPCID) 40 mg tablet, Take 1 Tab by mouth daily. This is to replace the omeprazole., Disp: 30 Tab, Rfl: 11    rosuvastatin (CRESTOR) 40 mg tablet, Take 1 Tab by mouth daily. , Disp: 30 Tab, Rfl: 11    insulin glargine (LANTUS,BASAGLAR) 100 unit/mL (3 mL) inpn, 30 units daily, Disp: 3 Pen, Rfl: 11    dulaglutide (Trulicity) 1.5 DM/4.9 mL sub-q pen, 0.5 mL by SubCUTAneous route every seven (7) days. , Disp: 4 Syringe, Rfl: 11    clopidogrel (PLAVIX) 75 mg tab, TAKE 1 TAB BY MOUTH DAILY. , Disp: 30 Tab, Rfl: 11    hydroCHLOROthiazide (MICROZIDE) 12.5 mg capsule, Take 1 Cap by mouth daily. , Disp: 30 Cap, Rfl: 11    spironolactone (ALDACTONE) 25 mg tablet, Take 1 Tab by mouth daily. , Disp: 30 Tab, Rfl: 11    metoprolol succinate (TOPROL-XL) 100 mg tablet, Take 1 Tab by mouth daily. , Disp: 30 Tab, Rfl: 11    lisinopril (PRINIVIL, ZESTRIL) 40 mg tablet, TAKE 1 TABLET BY MOUTH ONCE DAILY, Disp: 30 Tab, Rfl: 11    amLODIPine (NORVASC) 10 mg tablet, Take 1 Tab by mouth daily. , Disp: 30 Tab, Rfl: 11    aspirin delayed-release 81 mg tablet, TAKE 1 TABLET BY MOUTH EVERY DAY, Disp: 90 Tab, Rfl: 3    Blood-Glucose Meter (ONETOUCH ULTRA2) monitoring kit, Use to test blood sugar three times a day, Disp: 1 Kit, Rfl: 0    glucose blood VI test strips (ONETOUCH ULTRA BLUE TEST STRIP) strip, Use to test blood sugar three times a day, Disp: 300 Strip, Rfl: 3    lancets (ONE TOUCH DELICA) 33 gauge misc, Use to test blood sugar three times a day., Disp: 300 Lancet, Rfl: 3    Insulin Needles, Disposable, 31 gauge x 5/16\" ndle, As directed, Disp: 60 Package, Rfl: 11    sildenafil citrate (Viagra) 100 mg tablet, Take 1 Tab by mouth as needed for Erectile Dysfunction. Indications: the inability to have an erection, Disp: 6 Tab, Rfl: 11    metFORMIN ER (GLUCOPHAGE XR) 500 mg tablet, Take 1 Tab by mouth two (2) times daily (with meals). , Disp: 60 Tab, Rfl: 11    ondansetron (ZOFRAN ODT) 4 mg disintegrating tablet, Take 1 Tab by mouth every eight (8) hours as needed for Nausea., Disp: 10 Tab, Rfl: 0     He  has a past medical history of CAD (coronary artery disease), Headache, Hypercholesterolemia, Hypertension, Hypogonadism male, Liver disease, and Obstructive sleep apnea. He  has a past surgical history that includes hx heent. He family history includes Heart Disease in his father. He  reports that he has never smoked.  He has never used smokeless tobacco. He reports that he does not drink alcohol or use drugs. Review of Systems:  Constitutional:  No significant weight loss or weight gain  Eyes:  No blurred vision  CVS:  No significant chest pain  Pulm:  No significant shortness of breath  GI:  No significant nausea or vomiting  :  No significant nocturia  Musculoskeletal:  No significant joint pain at night  Skin:  No significant rashes  Neuro:  No significant dizziness   Psych:  No active mood issues    Sleep Review of Systems: notable for no difficulty falling asleep; frequent awakenings at night;  regular dreaming noted; no nightmares ; early morning headaches; no memory problems; no concentration issues; no history of any automobile or occupational accidents due to daytime drowsiness. Objective:     Patient-Reported Vitals 9/21/2020   Patient-Reported Weight 204 lbs       Physical Exam completed by visual and auditory observation of patient with verbal input from patient. General:   Alert, oriented, not in acute distress   Eyes:  Anicteric Sclerae; no obvious strabismus   Nose:  No obvious nasal septum deviation    Neck:   Midline trachea, no visible mass   Chest/Lungs:  Respiratory effort normal, no visualized signs of difficulty breathing or respiratory distress   CVS:  No JVD   Extremities:  No obvious rashes noted on face, neck, or hands   Neuro:  No facial asymmetry, no focal deficits; no obvious tremor    Psych:  Normal affect,  normal countenance       Assessment:       ICD-10-CM ICD-9-CM    1. FREDO (obstructive sleep apnea)  G47.33 327.23 SLEEP STUDY UNATTENDED, 4 CHANNEL   2. Essential hypertension  I10 401.9    3. BMI 36.0-36.9,adult  Z68.36 V85.36          Plan:        Sleep testing was ordered for initial evaluation.  He was provided information on sleep apnea including coresponding risk factors and the importance of proper treatment.  Treatment options if indicated were reviewed today.  Patient agrees to a trial of PAP therapy if indicated.  Counseling was provided regarding proper sleep hygiene, appropriate sleep schedule, need for sleep environment safety and safe driving.  Recommended a dedicated weight loss program through appropriate diet and exercise regimen as significant weight reduction has been shown to reduce severity of obstructive sleep apnea. Patient's phone number [phone] (mobile)  was reviewed and confirmed for accuracy. He gives permission for messages regarding results and appointments to be left at that number. Office visit exceeded 25 minutes with counseling and direction of care taking up more than 50% of the allotted time. Pursuant to the emergency declaration under the [facility], [facility] waiver authority and the [employer] Act, this Virtual Visit was conducted, with patient's consent, to reduce the patient's risk of exposure to COVID-19 and provide continuity of care for an established patient. Services were provided through a video synchronous discussion virtually to substitute for in-person clinic visit. Spenser Bishop MD, FAASM  Electronically signed.  09/21/20

## 2020-09-21 NOTE — PATIENT INSTRUCTIONS
217 [facility] [place]. 1668 [facility], [address] Tel.  [phone] Fax. 100 [facility] 60 [place], [address] [facility] Tel.  [phone] Fax. [phone] [address] [employer] [place] Tel.  [phone] Fax. [phone] Sleep Apnea: After Your Visit Your Care Instructions Sleep apnea occurs when you frequently stop breathing for 10 seconds or longer during sleep. It can be mild to severe, based on the number of times per hour that you stop breathing or have slowed breathing. Blocked or narrowed airways in your nose, mouth, or throat can cause sleep apnea. Your airway can become blocked when your throat muscles and tongue relax during sleep. Sleep apnea is common, occurring in 1 out of 20 individuals. Individuals having any of the following characteristics should be evaluated and treated right away due to high risk and detrimental consequences from untreated sleep apnea: 
1. Obesity 2. Congestive Heart failure 3. Atrial Fibrillation 4. Uncontrolled Hypertension 5. Type II Diabetes 6. Night-time Arrhythmias 7. Stroke 8. Pulmonary Hypertension 9. High-risk Driving Populations (pilots, truck drivers, etc.) 10. Patients Considering Weight-loss Surgery How do you know you have sleep apnea? You probably have sleep apnea if you answer 'yes' to 3 or more of the following questions: S - Have you been told that you Snore? T - Are you often Tired during the day? O - Has anyone Observed you stop breathing while sleeping? P- Do you have (or are being treated for) high blood Pressure? B - Are you obese (Body Mass Index > 35)? A - Is your Age 48years old or older? N - Is your Neck size greater than 16 inches? G - Are you male Gender? A sleep physician can prescribe a breathing device that prevents tissues in the throat from blocking your airway.  Or your doctor may recommend using a dental device (oral breathing device) to help keep your airway open. In some cases, surgery may be needed to remove enlarged tissues in the throat. Follow-up care is a key part of your treatment and safety. Be sure to make and go to all appointments, and call your doctor if you are having problems. It's also a good idea to know your test results and keep a list of the medicines you take. How can you care for yourself at home? · Lose weight, if needed. It may reduce the number of times you stop breathing or have slowed breathing. · Go to bed at the same time every night. · Sleep on your side. It may stop mild apnea. If you tend to roll onto your back, sew a pocket in the back of your pajama top. Put a tennis ball into the pocket, and stitch the pocket shut. This will help keep you from sleeping on your back. · Avoid alcohol and medicines such as sleeping pills and sedatives before bed. · Do not smoke. Smoking can make sleep apnea worse. If you need help quitting, talk to your doctor about stop-smoking programs and medicines. These can increase your chances of quitting for good. · Prop up the head of your bed 4 to 6 inches by putting bricks under the legs of the bed. · Treat breathing problems, such as a stuffy nose, caused by a cold or allergies. · Use a continuous positive airway pressure (CPAP) breathing machine if lifestyle changes do not help your apnea and your doctor recommends it. The machine keeps your airway from closing when you sleep. · If CPAP does not help you, ask your doctor whether you should try other breathing machines. A bilevel positive airway pressure machine has two types of air pressureâone for breathing in and one for breathing out. Another device raises or lowers air pressure as needed while you breathe. · If your nose feels dry or bleeds when using one of these machines, talk with your doctor about increasing moisture in the air. A humidifier may help.  
· If your nose is runny or stuffy from using a breathing machine, talk with your doctor about using decongestants or a corticosteroid nasal spray. When should you call for help? Watch closely for changes in your health, and be sure to contact your doctor if: 
· You still have sleep apnea even though you have made lifestyle changes. · You are thinking of trying a device such as CPAP. · You are having problems using a CPAP or similar machine. Where can you learn more? Go to Glimpse.com. Enter Y617 in the search box to learn more about \"Sleep Apnea: After Your Visit. \"  
© 6778-9673 Healthwise, Incorporated. Care instructions adapted under license by [facility] (which disclaims liability or warranty for this information). This care instruction is for use with your licensed healthcare professional. If you have questions about a medical condition or this instruction, always ask your healthcare professional. Angela Fulling any warranty or liability for your use of this information. PROPER SLEEP HYGIENE What to avoid · Do not have drinks with caffeine, such as coffee or black tea, for 8 hours before bed. · Do not smoke or use other types of tobacco near bedtime. Nicotine is a stimulant and can keep you awake. · Avoid drinking alcohol late in the evening, because it can cause you to wake in the middle of the night. · Do not eat a big meal close to bedtime. If you are hungry, eat a light snack. · Do not drink a lot of water close to bedtime, because the need to urinate may wake you up during the night. · Do not read or watch TV in bed. Use the bed only for sleeping and sexual activity. What to try · Go to bed at the same time every night, and wake up at the same time every morning. Do not take naps during the day. · Keep your bedroom quiet, dark, and cool. · Get regular exercise, but not within 3 to 4 hours of your bedtime. [place] · Sleep on a comfortable pillow and mattress. · If watching the clock makes you anxious, turn it facing away from you so you cannot see the time. · If you worry when you lie down, start a worry book. Well before bedtime, write down your worries, and then set the book and your concerns aside. · Try meditation or other relaxation techniques before you go to bed. · If you cannot fall asleep, get up and go to another room until you feel sleepy. Do something relaxing. Repeat your bedtime routine before you go to bed again. · Make your house quiet and calm about an hour before bedtime. Turn down the lights, turn off the TV, log off the computer, and turn down the volume on music. This can help you relax after a busy day. Drowsy Driving The [employer] cites drowsiness as a causing factor in more than 245,831 police reported crashes annually, resulting in 76,000 injuries and 1,500 deaths. Other surveys suggest 55% of people polled have driven while drowsy in the past year, 23% had fallen asleep but not crashed, 3% crashed, and 2% had and accident due to drowsy driving. Who is at risk? Young Drivers: One study of drowsy driving accidents states that 55% of the drivers were under 25 years. Of those, 75% were male. Shift Workers and Travelers: People who work overnight or travel across time zones frequently are at higher risk of experiencing Circadian Rhythm Disorders. They are trying to work and function when their body is programed to sleep. Sleep Deprived: Lack of sleep has a serious impact on your ability to pay attention or focus on a task. Consistently getting less than the average of 8 hours your body needs creates partial or cumulative sleep deprivation. Untreated Sleep Disorders: Sleep Apnea, Narcolepsy, R.L.S., and other sleep disorders (untreated) prevent a person from getting enough restful sleep.  This leads to excessive daytime sleepiness and increases the risk for drowsy driving accidents by up to 7 times. Medications / Alcohol: Even over the counter medications can cause drowsiness. Medications that impair a drivers attention should have a warning label. Alcohol naturally makes you sleepy and on its own can cause accidents. Combined with excessive drowsiness its effects are amplified. Signs of Drowsy Driving: * You don't remember driving the last few miles * You may drift out of your polo * You are unable to focus and your thoughts wander * You may yawn more often than normal 
 * You have difficulty keeping your eyes open / nodding off * Missing traffic signs, speeding, or tailgating Prevention-  
Good sleep hygiene, lifestyle and behavioral choices have the most impact on drowsy driving. There is no substitute for sleep and the average person requires 8 hours nightly. If you find yourself driving drowsy, stop and sleep. Consider the sleep hygiene tips provided during your visit as well. Medication Refill Policy: Refills for all medications require 1 week advance notice. Please have your pharmacy fax a refill request. We are unable to fax, or call in \"controled substance\" medications and you will need to pick these prescriptions up from our office. [email or web address] Activation Thank you for requesting access to [email or web address]. Please follow the instructions below to securely access and download your online medical record. [email or web address] allows you to send messages to your doctor, view your test results, renew your prescriptions, schedule appointments, and more. How Do I Sign Up? 1. In your internet browser, go to [email or web address]. 2. Click on the First Time User? Click Here link in the Sign In box. You will see the New Member Sign Up page. 3. Enter your [email or web address] Access Code exactly as it appears below. You will not need to use this code after youve completed the sign-up process.  If you do not sign up before the expiration date, you must request a new code. [facility] Access Code: Activation code not generated Current [facility] Status: Active (This is the date your [facility] [facility] access code will ) 4. Enter the last four digits of your Social Security Number (xxxx) and Date of Birth (mm/dd/yyyy) as indicated and click Submit. You will be taken to the next sign-up page. 5. Create a Avazt ID. This will be your [facility] login ID and cannot be changed, so think of one that is secure and easy to remember. 6. Create a [facility] [facility] password. You can change your password at any time. 7. Enter your Password Reset Question and Answer. This can be used at a later time if you forget your password. 8. Enter your e-mail address. You will receive e-mail notification when new information is available in [address] 9. Click Sign Up. You can now view and download portions of your medical record. 10. Click the Download Summary menu link to download a portable copy of your medical information. Additional Information If you have questions, please call [phone]. Remember, [facility] is NOT to be used for urgent needs. For medical emergencies, dial 911.

## 2020-09-22 ENCOUNTER — DOCUMENTATION ONLY (OUTPATIENT)
Dept: SLEEP MEDICINE | Age: 45
End: 2020-09-22

## 2020-09-23 ENCOUNTER — DOCUMENTATION ONLY (OUTPATIENT)
Dept: SLEEP MEDICINE | Age: 45
End: 2020-09-23

## 2020-09-28 ENCOUNTER — OFFICE VISIT (OUTPATIENT)
Dept: INTERNAL MEDICINE CLINIC | Age: 45
End: 2020-09-28
Payer: COMMERCIAL

## 2020-09-28 VITALS
HEIGHT: 64 IN | TEMPERATURE: 98.2 F | SYSTOLIC BLOOD PRESSURE: 146 MMHG | RESPIRATION RATE: 16 BRPM | BODY MASS INDEX: 35.37 KG/M2 | HEART RATE: 86 BPM | WEIGHT: 207.2 LBS | OXYGEN SATURATION: 97 % | DIASTOLIC BLOOD PRESSURE: 92 MMHG

## 2020-09-28 DIAGNOSIS — I25.10 ASHD (ARTERIOSCLEROTIC HEART DISEASE): Primary | ICD-10-CM

## 2020-09-28 DIAGNOSIS — E11.65 UNCONTROLLED TYPE 2 DIABETES MELLITUS WITH HYPERGLYCEMIA (HCC): ICD-10-CM

## 2020-09-28 DIAGNOSIS — E66.9 OBESITY (BMI 30.0-34.9): ICD-10-CM

## 2020-09-28 DIAGNOSIS — I10 ESSENTIAL HYPERTENSION: ICD-10-CM

## 2020-09-28 DIAGNOSIS — G47.33 OBSTRUCTIVE SLEEP APNEA: ICD-10-CM

## 2020-09-28 DIAGNOSIS — E78.5 DYSLIPIDEMIA: ICD-10-CM

## 2020-09-28 DIAGNOSIS — Z23 NEEDS FLU SHOT: ICD-10-CM

## 2020-09-28 DIAGNOSIS — Z91.199 HISTORY OF NONCOMPLIANCE WITH MEDICAL TREATMENT: ICD-10-CM

## 2020-09-28 PROCEDURE — 99215 OFFICE O/P EST HI 40 MIN: CPT | Performed by: INTERNAL MEDICINE

## 2020-09-28 RX ORDER — SEMAGLUTIDE 1.34 MG/ML
INJECTION, SOLUTION SUBCUTANEOUS
Qty: 1 BOX | Refills: 0 | Status: SHIPPED | OUTPATIENT
Start: 2020-09-28 | End: 2020-10-20

## 2020-09-28 NOTE — PROGRESS NOTES
[address] [place] [address] and Primary Care  [place] [address]  [address] [facility] [address]  Phone:  [phone]  Fax: [phone]       Chief Complaint   Patient presents with    Diabetes     routine follow up    . SUBJECTIVE:    Fariha Gaines is a 39 y.o. male Comes in after a long absence. His absence has been filled with noncompliance of medication. Unfortunately, he developed chest discomfort and went to the cardiologist, who subsequently hospitalized him, did a cardiac cath and placed two additional stents, making a total of four stents in this 61-year-old gentleman. He has a statin, but has been noncompliant with this. His overall diabetic control has been poor because he does not take his medication as prescribed. Specifically, he is not taking a GLP-1 agonist.  He does not check blood sugars either. He is intolerant of Metformin because of GI side effects. He has had no meaningful weight loss since I last saw him. Current Outpatient Medications   Medication Sig Dispense Refill    semaglutide (Ozempic) 0.25 mg/0.2 mL (2 mg/1.5 mL) sub-q pen 0.25 mg weekly for 2 weeks then 0.5 mg weekly for 2 weeks 1 Box 0    famotidine (PEPCID) 40 mg tablet Take 1 Tab by mouth daily. This is to replace the omeprazole. 30 Tab 11    sildenafil citrate (Viagra) 100 mg tablet Take 1 Tab by mouth as needed for Erectile Dysfunction. Indications: the inability to have an erection 6 Tab 11    rosuvastatin (CRESTOR) 40 mg tablet Take 1 Tab by mouth daily. 30 Tab 11    metFORMIN ER (GLUCOPHAGE XR) 500 mg tablet Take 1 Tab by mouth two (2) times daily (with meals). 60 Tab 11    insulin glargine (LANTUS,BASAGLAR) 100 unit/mL (3 mL) inpn 30 units daily 3 Pen 11    spironolactone (ALDACTONE) 25 mg tablet Take 1 Tab by mouth daily. 30 Tab 11    metoprolol succinate (TOPROL-XL) 100 mg tablet Take 1 Tab by mouth daily.  30 Tab 11    lisinopril (PRINIVIL, ZESTRIL) 40 mg tablet TAKE 1 TABLET BY MOUTH ONCE DAILY 30 Tab 11    amLODIPine (NORVASC) 10 mg tablet Take 1 Tab by mouth daily. 30 Tab 11    aspirin delayed-release 81 mg tablet TAKE 1 TABLET BY MOUTH EVERY DAY 90 Tab 3    Blood-Glucose Meter (ONETOUCH ULTRA2) monitoring kit Use to test blood sugar three times a day 1 Kit 0    glucose blood VI test strips (ONETOUCH ULTRA BLUE TEST STRIP) strip Use to test blood sugar three times a day 300 Strip 3    lancets (ONE TOUCH DELICA) 33 gauge misc Use to test blood sugar three times a day. 300 Lancet 3    ondansetron (ZOFRAN ODT) 4 mg disintegrating tablet Take 1 Tab by mouth every eight (8) hours as needed for Nausea. 10 Tab 0    Insulin Needles, Disposable, 31 gauge x 5/16\" ndle As directed 60 Package 11    clopidogrel (PLAVIX) 75 mg tab TAKE 1 TAB BY MOUTH DAILY. 30 Tab 11    hydroCHLOROthiazide (MICROZIDE) 12.5 mg capsule Take 1 Cap by mouth daily.  27 Cap 11     Past Medical History:   Diagnosis Date    CAD (coronary artery disease)     2 stents 2016     Headache     Hypercholesterolemia     Hypertension     Hypogonadism male     Liver disease     NAFLD    Obstructive sleep apnea      Past Surgical History:   Procedure Laterality Date    HX HEENT      status post pharyngioplasty     No Known Allergies      REVIEW OF SYSTEMS:  General: negative for - chills or fever  ENT: negative for - headaches, nasal congestion or tinnitus  Respiratory: negative for - cough, hemoptysis, shortness of breath or wheezing  Cardiovascular : negative for - chest pain, edema, palpitations or shortness of breath  Gastrointestinal: negative for - abdominal pain, blood in stools, heartburn or nausea/vomiting  Genito-Urinary: no dysuria, trouble voiding, or hematuria  Musculoskeletal: negative for - gait disturbance, joint pain, joint stiffness or joint swelling  Neurological: no TIA or stroke symptoms  Hematologic: no bruises, no bleeding, no swollen glands  Integument: no lumps, mole changes, nail changes or rash  Endocrine: no malaise/lethargy or unexpected weight changes      Social History     Socioeconomic History    Marital status:      Spouse name: Not on file    Number of children: 2    Years of education: 12    Highest education level: Not on file   Occupational History    Occupation:    Tobacco Use    Smoking status: Never Smoker    Smokeless tobacco: Never Used   Substance and Sexual Activity    Alcohol use: No    Drug use: No    Sexual activity: Yes     Partners: Female     Family History   Problem Relation Age of Onset    Heart Disease Father        OBJECTIVE:    Visit Vitals  BP (!) 146/92   Pulse 86   Temp 98.2 °F (36.8 °C) (Oral)   Resp 16   Ht 5' 4\" (1.626 m)   Wt 207 lb 3.2 oz (94 kg)   SpO2 97%   BMI 35.57 kg/m²     CONSTITUTIONAL: well , well nourished, appears age appropriate  EYES: perrla, eom intact  ENMT:moist mucous membranes, pharynx clear  NECK: supple. Thyroid normal  RESPIRATORY: Chest: clear to ascultation and percussion   CARDIOVASCULAR: Heart: regular rate and rhythm  GASTROINTESTINAL: Abdomen: soft, bowel sounds active  HEMATOLOGIC: no pathological lymph nodes palpated  MUSCULOSKELETAL: Extremities: no edema, pulse 1+   INTEGUMENT: No unusual rashes or suspicious skin lesions noted. Nails appear normal.  NEUROLOGIC: non-focal exam   MENTAL STATUS: alert and oriented, appropriate affect      ASSESSMENT:  1. ASHD (arteriosclerotic heart disease)    2. Essential hypertension    3. Uncontrolled type 2 diabetes mellitus with hyperglycemia ([address]    4. Obstructive sleep apnea    5. Dyslipidemia    6. Obesity (BMI 30.0-34.9)    7. History of noncompliance with medical treatment        PLAN:    1. The patient has significant coronary artery disease in such a young gentleman. This is primarily related to his noncompliance, which is exacerbated by his extreme insulin resistance. 2. His blood pressure is elevated.   He remains on Amlodipine 10 mg daily, along with Metoprolol 100 mg daily. Additional medication is needed, but he was most recently given a diuretic by his cardiologist.  I will therefore wait to see what his pressure is on his return visit. 3. His diabetes is poorly controlled. He will continue Lantus at 50 units, which obviously will not be adequate because of dietary noncompliance, and I will try him on Ozempic since he is somewhat intolerant to Trulicity in that it caused dyspeptic type symptoms. 4. He is following up with his neurologist for his obstructive sleep apnea. 5. I have no idea how well his cholesterol is controlled because he always comes in, having not taken it for weeks to months. He states that for the last at least month and a half, he has been consistently taking it. I will therefore assess his values and make a determination if he would not be a candidate for PCSK9 derivative. 6. He needs to lose weight. This can be accomplished by eating meals, eliminating snacks and avoiding the consumption of processed carbohydrates. 7. He is physically inactive. I emphasized the importance of improving his history of noncompliance because the consequences are extreme. .  Orders Placed This Encounter    MICROALBUMIN, UR, RAND W/ MICROALB/CREAT RATIO    HEMOGLOBIN A1C WITH EAG    APOLIPOPROTEIN B    CRP, HIGH SENSITIVITY    LIPID PANEL    URINALYSIS W/ RFLX MICROSCOPIC    METABOLIC PANEL, COMPREHENSIVE    semaglutide (Ozempic) 0.25 mg/0.2 mL (2 mg/1.5 mL) sub-q pen         Follow-up and Dispositions    · Return in about 4 weeks (around 10/26/2020).            Fer Hermosillo MD

## 2020-09-28 NOTE — PROGRESS NOTES
Chief Complaint   Patient presents with    Diabetes     routine follow up        1. Have you been to the ER, urgent care clinic since your last visit? Hospitalized since your last visit? Yes When: 9/2/2020 Where: [facility]  Reason for visit: heart Cath    2. Have you seen or consulted any other health care providers outside of the [address] [place] [address] since your last visit? Include any pap smears or colon screening.  No

## 2020-09-29 ENCOUNTER — OFFICE VISIT (OUTPATIENT)
Dept: SLEEP MEDICINE | Age: 45
End: 2020-09-29

## 2020-09-29 ENCOUNTER — HOSPITAL ENCOUNTER (OUTPATIENT)
Dept: SLEEP MEDICINE | Age: 45
Discharge: HOME | End: 2020-09-29
Payer: COMMERCIAL

## 2020-09-29 DIAGNOSIS — G47.33 OSA (OBSTRUCTIVE SLEEP APNEA): Primary | ICD-10-CM

## 2020-09-29 LAB
ALBUMIN SERPL-MCNC: 4.7 G/DL (ref 4–5)
ALBUMIN/CREAT UR: 68 MG/G CREAT (ref 0–29)
ALBUMIN/GLOB SERPL: 1.6 {RATIO} (ref 1.2–2.2)
ALP SERPL-CCNC: 136 IU/L (ref 39–117)
ALT SERPL-CCNC: 32 IU/L (ref 0–44)
APO B SERPL-MCNC: 113 MG/DL
APPEARANCE UR: CLEAR
AST SERPL-CCNC: 21 IU/L (ref 0–40)
BILIRUB SERPL-MCNC: 0.3 MG/DL (ref 0–1.2)
BILIRUB UR QL STRIP: NEGATIVE
BUN SERPL-MCNC: 16 MG/DL (ref 6–24)
BUN/CREAT SERPL: 12 (ref 9–20)
CALCIUM SERPL-MCNC: 9.7 MG/DL (ref 8.7–10.2)
CHLORIDE SERPL-SCNC: 98 MMOL/L (ref 96–106)
CHOLEST SERPL-MCNC: 203 MG/DL (ref 100–199)
CO2 SERPL-SCNC: 26 MMOL/L (ref 20–29)
COLOR UR: YELLOW
CREAT SERPL-MCNC: 1.29 MG/DL (ref 0.76–1.27)
CREAT UR-MCNC: 68.7 MG/DL
CRP SERPL HS-MCNC: 5.22 MG/L (ref 0–3)
EST. AVERAGE GLUCOSE BLD GHB EST-MCNC: >398 MG/DL
GLOBULIN SER CALC-MCNC: 3 G/DL (ref 1.5–4.5)
GLUCOSE SERPL-MCNC: 398 MG/DL (ref 65–99)
GLUCOSE UR QL: ABNORMAL
HBA1C MFR BLD: >15.5 % (ref 4.8–5.6)
HDLC SERPL-MCNC: 53 MG/DL
HGB UR QL STRIP: NEGATIVE
KETONES UR QL STRIP: NEGATIVE
LDLC SERPL CALC-MCNC: 109 MG/DL (ref 0–99)
LEUKOCYTE ESTERASE UR QL STRIP: NEGATIVE
MICRO URNS: ABNORMAL
MICROALBUMIN UR-MCNC: 46.4 UG/ML
NITRITE UR QL STRIP: NEGATIVE
PH UR STRIP: 5.5 [PH] (ref 5–7.5)
POTASSIUM SERPL-SCNC: 4.1 MMOL/L (ref 3.5–5.2)
PROT SERPL-MCNC: 7.7 G/DL (ref 6–8.5)
PROT UR QL STRIP: ABNORMAL
SODIUM SERPL-SCNC: 137 MMOL/L (ref 134–144)
SP GR UR: >=1.03 (ref 1–1.03)
TRIGL SERPL-MCNC: 238 MG/DL (ref 0–149)
UROBILINOGEN UR STRIP-MCNC: 0.2 MG/DL (ref 0.2–1)
VLDLC SERPL CALC-MCNC: 41 MG/DL (ref 5–40)

## 2020-09-29 PROCEDURE — 95806 SLEEP STUDY UNATT&RESP EFFT: CPT | Performed by: INTERNAL MEDICINE

## 2020-09-29 NOTE — PROGRESS NOTES
[address] [place], [address]  Tel.  [phone]  Fax. [phone] [facility], [address] [facility]  Tel.  [phone]  Fax. [phone] [address] [facility]   Tel.  [phone]  Fax. [phone]       S>Ami Lemus is a 39 y.o. male seen today to receive a home sleep testing unit (HST). · Patient was educated on proper hookup and operation of the HST. · Instruction forms and documentation were reviewed and signed. · The patient demonstrated good understanding of the HST.    O>    There were no vitals taken for this visit. A>  No diagnosis found. P>  · General information regarding operations and maintenance of the device was provided. · He was provided information on sleep apnea including coresponding risk factors and the importance of proper treatment. · Follow-up appointment was made to return the HST. He will be contacted once the results have been reviewed. · He was asked to contact our office for any problems regarding his home sleep test study.

## 2020-10-20 DIAGNOSIS — E11.65 UNCONTROLLED TYPE 2 DIABETES MELLITUS WITH HYPERGLYCEMIA (HCC): ICD-10-CM

## 2020-10-20 RX ORDER — SEMAGLUTIDE 1.34 MG/ML
INJECTION, SOLUTION SUBCUTANEOUS
Qty: 1 BOX | Refills: 0 | Status: SHIPPED | OUTPATIENT
Start: 2020-10-20 | End: 2020-10-20

## 2020-10-20 RX ORDER — SEMAGLUTIDE 1.34 MG/ML
1 INJECTION, SOLUTION SUBCUTANEOUS
Qty: 2 PEN | Refills: 11 | Status: SHIPPED | OUTPATIENT
Start: 2020-10-20 | End: 2021-04-28

## 2020-10-28 ENCOUNTER — OFFICE VISIT (OUTPATIENT)
Dept: INTERNAL MEDICINE CLINIC | Age: 45
End: 2020-10-28
Payer: COMMERCIAL

## 2020-10-28 VITALS
HEART RATE: 83 BPM | OXYGEN SATURATION: 96 % | TEMPERATURE: 98.3 F | RESPIRATION RATE: 16 BRPM | SYSTOLIC BLOOD PRESSURE: 107 MMHG | WEIGHT: 198.8 LBS | HEIGHT: 64 IN | BODY MASS INDEX: 33.94 KG/M2 | DIASTOLIC BLOOD PRESSURE: 76 MMHG

## 2020-10-28 DIAGNOSIS — G47.33 OBSTRUCTIVE SLEEP APNEA: ICD-10-CM

## 2020-10-28 DIAGNOSIS — I10 ESSENTIAL HYPERTENSION: ICD-10-CM

## 2020-10-28 DIAGNOSIS — Z91.199 HISTORY OF NONCOMPLIANCE WITH MEDICAL TREATMENT: ICD-10-CM

## 2020-10-28 DIAGNOSIS — E11.65 UNCONTROLLED TYPE 2 DIABETES MELLITUS WITH HYPERGLYCEMIA (HCC): Primary | ICD-10-CM

## 2020-10-28 DIAGNOSIS — E78.5 DYSLIPIDEMIA: ICD-10-CM

## 2020-10-28 DIAGNOSIS — E66.9 OBESITY (BMI 30.0-34.9): ICD-10-CM

## 2020-10-28 PROCEDURE — 99215 OFFICE O/P EST HI 40 MIN: CPT | Performed by: INTERNAL MEDICINE

## 2020-10-28 NOTE — PROGRESS NOTES
Chief Complaint   Patient presents with    Hypertension     Patient is here for a follow up. 1. Have you been to the ER, urgent care clinic since your last visit? Hospitalized since your last visit? No    2. Have you seen or consulted any other health care providers outside of the [facility] since your last visit? Include any pap smears or colon screening.  No

## 2020-11-23 ENCOUNTER — TELEPHONE (OUTPATIENT)
Dept: SLEEP MEDICINE | Age: 45
End: 2020-11-23

## 2020-11-24 ENCOUNTER — OFFICE VISIT (OUTPATIENT)
Dept: CARDIOLOGY CLINIC | Age: 45
End: 2020-11-24
Payer: COMMERCIAL

## 2020-11-24 ENCOUNTER — TELEPHONE (OUTPATIENT)
Dept: SLEEP MEDICINE | Age: 45
End: 2020-11-24

## 2020-11-24 VITALS
HEIGHT: 64 IN | BODY MASS INDEX: 34.66 KG/M2 | DIASTOLIC BLOOD PRESSURE: 80 MMHG | WEIGHT: 203 LBS | SYSTOLIC BLOOD PRESSURE: 126 MMHG | HEART RATE: 90 BPM | OXYGEN SATURATION: 98 % | RESPIRATION RATE: 16 BRPM

## 2020-11-24 DIAGNOSIS — R07.9 CHEST PAIN, UNSPECIFIED TYPE: Primary | ICD-10-CM

## 2020-11-24 DIAGNOSIS — E66.01 SEVERE OBESITY (HCC): ICD-10-CM

## 2020-11-24 PROCEDURE — 93000 ELECTROCARDIOGRAM COMPLETE: CPT | Performed by: INTERNAL MEDICINE

## 2020-11-24 PROCEDURE — 99205 OFFICE O/P NEW HI 60 MIN: CPT | Performed by: INTERNAL MEDICINE

## 2020-11-24 NOTE — LETTER
11/29/20 Patient: Rashaun Singh YOB: 1975 Date of Visit: 11/24/2020 Syed Polk MD 
[address] [place] [id number] VIA In Basket Dear Syed Polk MD, Thank you for referring Mr. Rashaun Singh to [facility] for evaluation. My notes for this consultation are attached. If you have questions, please do not hesitate to call me. I look forward to following your patient along with you.  
 
 
Sincerely, 
 
Beba Moon MD

## 2020-11-24 NOTE — TELEPHONE ENCOUNTER
Spoke to patient concerning HSAT return. Someone was supposed to return equipment for him. He will call back later 11/24 or 11/25 to advice of status.

## 2020-11-27 ENCOUNTER — APPOINTMENT (OUTPATIENT)
Dept: GENERAL RADIOLOGY | Age: 45
DRG: 247 | End: 2020-11-27
Attending: EMERGENCY MEDICINE
Payer: COMMERCIAL

## 2020-11-27 ENCOUNTER — HOSPITAL ENCOUNTER (INPATIENT)
Age: 45
LOS: 3 days | Discharge: HOME | DRG: 247 | End: 2020-12-01
Attending: EMERGENCY MEDICINE | Admitting: INTERNAL MEDICINE
Payer: COMMERCIAL

## 2020-11-27 DIAGNOSIS — I10 ESSENTIAL HYPERTENSION: ICD-10-CM

## 2020-11-27 DIAGNOSIS — I20.0 UNSTABLE ANGINA (HCC): Primary | ICD-10-CM

## 2020-11-27 DIAGNOSIS — R07.9 CHEST PAIN, UNSPECIFIED TYPE: ICD-10-CM

## 2020-11-27 DIAGNOSIS — I25.110 CORONARY ARTERY DISEASE INVOLVING NATIVE CORONARY ARTERY OF NATIVE HEART WITH UNSTABLE ANGINA PECTORIS (HCC): ICD-10-CM

## 2020-11-27 DIAGNOSIS — E78.5 DYSLIPIDEMIA: ICD-10-CM

## 2020-11-27 LAB
ALBUMIN SERPL-MCNC: 3.8 G/DL (ref 3.5–5)
ALBUMIN/GLOB SERPL: 1 {RATIO} (ref 1.1–2.2)
ALP SERPL-CCNC: 105 U/L (ref 45–117)
ALT SERPL-CCNC: 38 U/L (ref 12–78)
ANION GAP SERPL CALC-SCNC: 4 MMOL/L (ref 5–15)
AST SERPL-CCNC: 17 U/L (ref 15–37)
BASOPHILS # BLD: 0.1 K/UL (ref 0–0.1)
BASOPHILS NFR BLD: 1 % (ref 0–1)
BILIRUB SERPL-MCNC: 0.4 MG/DL (ref 0.2–1)
BUN SERPL-MCNC: 17 MG/DL (ref 6–20)
BUN/CREAT SERPL: 12 (ref 12–20)
CALCIUM SERPL-MCNC: 9 MG/DL (ref 8.5–10.1)
CHLORIDE SERPL-SCNC: 107 MMOL/L (ref 97–108)
CK MB CFR SERPL CALC: NORMAL % (ref 0–2.5)
CK MB SERPL-MCNC: <1 NG/ML (ref 5–25)
CK SERPL-CCNC: 156 U/L (ref 39–308)
CO2 SERPL-SCNC: 25 MMOL/L (ref 21–32)
CREAT SERPL-MCNC: 1.39 MG/DL (ref 0.7–1.3)
DIFFERENTIAL METHOD BLD: ABNORMAL
EOSINOPHIL # BLD: 0.1 K/UL (ref 0–0.4)
EOSINOPHIL NFR BLD: 2 % (ref 0–7)
ERYTHROCYTE [DISTWIDTH] IN BLOOD BY AUTOMATED COUNT: 15 % (ref 11.5–14.5)
GLOBULIN SER CALC-MCNC: 4 G/DL (ref 2–4)
GLUCOSE SERPL-MCNC: 287 MG/DL (ref 65–100)
HCT VFR BLD AUTO: 35.6 % (ref 36.6–50.3)
HGB BLD-MCNC: 11.9 G/DL (ref 12.1–17)
IMM GRANULOCYTES # BLD AUTO: 0.1 K/UL (ref 0–0.04)
IMM GRANULOCYTES NFR BLD AUTO: 1 % (ref 0–0.5)
LYMPHOCYTES # BLD: 1.7 K/UL (ref 0.8–3.5)
LYMPHOCYTES NFR BLD: 20 % (ref 12–49)
MCH RBC QN AUTO: 26.9 PG (ref 26–34)
MCHC RBC AUTO-ENTMCNC: 33.4 G/DL (ref 30–36.5)
MCV RBC AUTO: 80.4 FL (ref 80–99)
MONOCYTES # BLD: 0.7 K/UL (ref 0–1)
MONOCYTES NFR BLD: 8 % (ref 5–13)
NEUTS SEG # BLD: 6 K/UL (ref 1.8–8)
NEUTS SEG NFR BLD: 68 % (ref 32–75)
NRBC # BLD: 0 K/UL (ref 0–0.01)
NRBC BLD-RTO: 0 PER 100 WBC
PLATELET # BLD AUTO: 205 K/UL (ref 150–400)
PMV BLD AUTO: 10.7 FL (ref 8.9–12.9)
POTASSIUM SERPL-SCNC: 4.1 MMOL/L (ref 3.5–5.1)
PROT SERPL-MCNC: 7.8 G/DL (ref 6.4–8.2)
RBC # BLD AUTO: 4.43 M/UL (ref 4.1–5.7)
SODIUM SERPL-SCNC: 136 MMOL/L (ref 136–145)
TROPONIN I SERPL-MCNC: 0.08 NG/ML
TROPONIN I SERPL-MCNC: <0.05 NG/ML
WBC # BLD AUTO: 8.6 K/UL (ref 4.1–11.1)

## 2020-11-27 PROCEDURE — 85025 COMPLETE CBC W/AUTO DIFF WBC: CPT

## 2020-11-27 PROCEDURE — 74011250637 HC RX REV CODE- 250/637: Performed by: NURSE PRACTITIONER

## 2020-11-27 PROCEDURE — 93005 ELECTROCARDIOGRAM TRACING: CPT

## 2020-11-27 PROCEDURE — 96376 TX/PRO/DX INJ SAME DRUG ADON: CPT

## 2020-11-27 PROCEDURE — 36415 COLL VENOUS BLD VENIPUNCTURE: CPT

## 2020-11-27 PROCEDURE — 71045 X-RAY EXAM CHEST 1 VIEW: CPT

## 2020-11-27 PROCEDURE — 99223 1ST HOSP IP/OBS HIGH 75: CPT | Performed by: INTERNAL MEDICINE

## 2020-11-27 PROCEDURE — 74011250637 HC RX REV CODE- 250/637: Performed by: EMERGENCY MEDICINE

## 2020-11-27 PROCEDURE — 74011250636 HC RX REV CODE- 250/636: Performed by: NURSE PRACTITIONER

## 2020-11-27 PROCEDURE — 74011000250 HC RX REV CODE- 250: Performed by: EMERGENCY MEDICINE

## 2020-11-27 PROCEDURE — 96374 THER/PROPH/DIAG INJ IV PUSH: CPT

## 2020-11-27 PROCEDURE — 99218 HC RM OBSERVATION: CPT

## 2020-11-27 PROCEDURE — 96372 THER/PROPH/DIAG INJ SC/IM: CPT

## 2020-11-27 PROCEDURE — 99285 EMERGENCY DEPT VISIT HI MDM: CPT

## 2020-11-27 PROCEDURE — 82550 ASSAY OF CK (CPK): CPT

## 2020-11-27 PROCEDURE — 96375 TX/PRO/DX INJ NEW DRUG ADDON: CPT

## 2020-11-27 PROCEDURE — 84484 ASSAY OF TROPONIN QUANT: CPT

## 2020-11-27 PROCEDURE — 74011250636 HC RX REV CODE- 250/636: Performed by: EMERGENCY MEDICINE

## 2020-11-27 PROCEDURE — 80053 COMPREHEN METABOLIC PANEL: CPT

## 2020-11-27 RX ORDER — SODIUM CHLORIDE 0.9 % (FLUSH) 0.9 %
5-40 SYRINGE (ML) INJECTION EVERY 8 HOURS
Status: DISCONTINUED | OUTPATIENT
Start: 2020-11-27 | End: 2020-12-01 | Stop reason: HOSPADM

## 2020-11-27 RX ORDER — ACETAMINOPHEN 650 MG/1
650 SUPPOSITORY RECTAL
Status: DISCONTINUED | OUTPATIENT
Start: 2020-11-27 | End: 2020-12-01 | Stop reason: HOSPADM

## 2020-11-27 RX ORDER — SODIUM CHLORIDE 0.9 % (FLUSH) 0.9 %
5-40 SYRINGE (ML) INJECTION AS NEEDED
Status: DISCONTINUED | OUTPATIENT
Start: 2020-11-27 | End: 2020-12-01 | Stop reason: HOSPADM

## 2020-11-27 RX ORDER — MORPHINE SULFATE 2 MG/ML
4 INJECTION, SOLUTION INTRAMUSCULAR; INTRAVENOUS
Status: COMPLETED | OUTPATIENT
Start: 2020-11-27 | End: 2020-11-27

## 2020-11-27 RX ORDER — ENOXAPARIN SODIUM 100 MG/ML
1 INJECTION SUBCUTANEOUS EVERY 12 HOURS
Status: DISCONTINUED | OUTPATIENT
Start: 2020-11-27 | End: 2020-12-01

## 2020-11-27 RX ORDER — PROMETHAZINE HYDROCHLORIDE 25 MG/1
12.5 TABLET ORAL
Status: DISCONTINUED | OUTPATIENT
Start: 2020-11-27 | End: 2020-12-01 | Stop reason: HOSPADM

## 2020-11-27 RX ORDER — ROSUVASTATIN CALCIUM 40 MG/1
40 TABLET, COATED ORAL DAILY
Status: DISCONTINUED | OUTPATIENT
Start: 2020-11-28 | End: 2020-12-01 | Stop reason: HOSPADM

## 2020-11-27 RX ORDER — KETOROLAC TROMETHAMINE 30 MG/ML
30 INJECTION, SOLUTION INTRAMUSCULAR; INTRAVENOUS
Status: COMPLETED | OUTPATIENT
Start: 2020-11-27 | End: 2020-11-27

## 2020-11-27 RX ORDER — AMLODIPINE BESYLATE 5 MG/1
10 TABLET ORAL DAILY
Status: DISCONTINUED | OUTPATIENT
Start: 2020-11-27 | End: 2020-11-28

## 2020-11-27 RX ORDER — ACETAMINOPHEN 325 MG/1
650 TABLET ORAL
Status: DISCONTINUED | OUTPATIENT
Start: 2020-11-27 | End: 2020-12-01 | Stop reason: HOSPADM

## 2020-11-27 RX ORDER — CLOPIDOGREL BISULFATE 75 MG/1
75 TABLET ORAL DAILY
Status: CANCELLED | OUTPATIENT
Start: 2020-11-28

## 2020-11-27 RX ORDER — MORPHINE SULFATE 2 MG/ML
6 INJECTION, SOLUTION INTRAMUSCULAR; INTRAVENOUS
Status: COMPLETED | OUTPATIENT
Start: 2020-11-27 | End: 2020-11-27

## 2020-11-27 RX ORDER — POLYETHYLENE GLYCOL 3350 17 G/17G
17 POWDER, FOR SOLUTION ORAL DAILY PRN
Status: DISCONTINUED | OUTPATIENT
Start: 2020-11-27 | End: 2020-12-01 | Stop reason: HOSPADM

## 2020-11-27 RX ORDER — ONDANSETRON 2 MG/ML
4 INJECTION INTRAMUSCULAR; INTRAVENOUS
Status: DISCONTINUED | OUTPATIENT
Start: 2020-11-27 | End: 2020-12-01 | Stop reason: HOSPADM

## 2020-11-27 RX ADMIN — Medication 10 ML: at 17:46

## 2020-11-27 RX ADMIN — KETOROLAC TROMETHAMINE 30 MG: 30 INJECTION, SOLUTION INTRAMUSCULAR at 12:33

## 2020-11-27 RX ADMIN — MORPHINE SULFATE 6 MG: 2 INJECTION, SOLUTION INTRAMUSCULAR; INTRAVENOUS at 11:00

## 2020-11-27 RX ADMIN — LIDOCAINE HYDROCHLORIDE 40 ML: 20 SOLUTION ORAL; TOPICAL at 10:28

## 2020-11-27 RX ADMIN — MORPHINE SULFATE 4 MG: 2 INJECTION, SOLUTION INTRAMUSCULAR; INTRAVENOUS at 13:53

## 2020-11-27 RX ADMIN — SODIUM CHLORIDE 1000 ML: 900 INJECTION, SOLUTION INTRAVENOUS at 11:00

## 2020-11-27 RX ADMIN — AMLODIPINE BESYLATE 10 MG: 5 TABLET ORAL at 13:52

## 2020-11-27 RX ADMIN — Medication 10 ML: at 22:00

## 2020-11-27 RX ADMIN — ENOXAPARIN SODIUM 90 MG: 100 INJECTION SUBCUTANEOUS at 13:52

## 2020-11-27 RX ADMIN — NITROGLYCERIN 0.5 INCH: 20 OINTMENT TOPICAL at 12:34

## 2020-11-27 NOTE — H&P
Hospitalist Admission Note    NAME: Tereza Brandt   :  1975   MRN:  [id number]     Date/Time:  2020 3:22 PM    Patient PCP: Arabella Roberts MD  ______________________________________________________________________  Given the patient's current clinical presentation, I have a high level of concern for decompensation if discharged from the emergency department. Complex decision making was performed, which includes reviewing the patient's available past medical records, laboratory results, and x-ray films. My assessment of this patient's clinical condition and my plan of care is as follows. Assessment / Plan:  unstable angina  -admit  for observation  -Follow-up serial troponin  -echocardiogram  -start patient on full dose Lovenox  -nitro as needed  -pain medication as needed  -Continue aspirin and Brilinta     CAD  -Continue home medication brilinta and  aspirin    Hypertension-continue home antihypertensive medication    Hyperkalemia-continue Crestor    DM  -Hold oral hypoglycemic agent-start patient on sliding scale    Code Status: Full   Surrogate Decision Maker:    DVT Prophylaxis: Lovenox   GI Prophylaxis: not indicated          Subjective:   CHIEF COMPLAINT: chest pain     HISTORY OF PRESENT ILLNESS:     39years old male from home with past medical history significant for DM, hypertension, chronic kidney disease FREDO presented to the hospital for evaluation of left-sided chest pain started since last night nonradiated, denies any nausea vomiting denies any shortness of breath denies any dizziness, patient stated his chest pain sometime worsening with minimal exertion, troponin was checked was found to be less than 0.05, EKG was done and show ST depression in the lateral lead. We were asked to admit for work up and evaluation of the above problems.      Past Medical History:   Diagnosis Date    CAD (coronary artery disease)     2 stents      Headache     Hypercholesterolemia     Hypertension     Hypogonadism male     Liver disease     NAFLD    Obstructive sleep apnea         Past Surgical History:   Procedure Laterality Date    HX HEENT      status post pharyngioplasty       Social History     Tobacco Use    Smoking status: Never Smoker    Smokeless tobacco: Never Used   Substance Use Topics    Alcohol use: No        Family History   Problem Relation Age of Onset    Heart Disease Father      No Known Allergies     Prior to Admission medications    Medication Sig Start Date End Date Taking? Authorizing Provider   ticagrelor (BRILINTA) 90 mg tablet Take 90 mg by mouth two (2) times a day. 9/2/20   Provider, Historical   glucose blood VI test strips (OneTouch Ultra Blue Test Strip) strip Use to test blood sugar three times a day 10/28/20   Taylor Masters MD   semaglutide (Ozempic) 1 mg/dose (2 mg/1.5 mL) sub-q pen 1 mg by SubCUTAneous route every seven (7) days. 10/20/20   Taylor Masters MD   famotidine (PEPCID) 40 mg tablet Take 1 Tab by mouth daily. This is to replace the omeprazole. 5/19/20   Taylor Masters MD   sildenafil citrate (Viagra) 100 mg tablet Take 1 Tab by mouth as needed for Erectile Dysfunction. Indications: the inability to have an erection 5/13/20   Taylor Masters MD   rosuvastatin (CRESTOR) 40 mg tablet Take 1 Tab by mouth daily. 5/13/20   Taylor Masters MD   insulin glargine (LANTUS,BASAGLAR) 100 unit/mL (3 mL) inpn 30 units daily 5/13/20   Taylor Masters MD   clopidogrel (PLAVIX) 75 mg tab TAKE 1 TAB BY MOUTH DAILY. 12/25/19   Taylor Masters MD   hydroCHLOROthiazide (MICROZIDE) 12.5 mg capsule Take 1 Cap by mouth daily. 12/23/19   Taylor Masters MD   spironolactone (ALDACTONE) 25 mg tablet Take 1 Tab by mouth daily. 12/10/19   Taylor Masters MD   metoprolol succinate (TOPROL-XL) 100 mg tablet Take 1 Tab by mouth daily.  12/10/19   Taylor Masters MD   lisinopril (PRINIVIL, ZESTRIL) 40 mg tablet TAKE 1 TABLET BY MOUTH ONCE DAILY 12/10/19   Abbi Cordova MD   amLODIPine (NORVASC) 10 mg tablet Take 1 Tab by mouth daily. 12/10/19   Abbi Cordova MD   aspirin delayed-release 81 mg tablet TAKE 1 TABLET BY MOUTH EVERY DAY 12/10/19   Abbi Cordova MD   Blood-Glucose Meter [facility]) monitoring kit Use to test blood sugar three times a day 3/24/19   Abbi Cordova MD   lancets ([facility]) 33 gauge misc Use to test blood sugar three times a day. 3/24/19   Abbi Cordova MD   Insulin Needles, Disposable, 31 gauge x 5/16\" ndle As directed 11/8/17   Abbi Cordova MD       REVIEW OF SYSTEMS:     I am not able to complete the review of systems because:    The patient is intubated and sedated    The patient has altered mental status due to his acute medical problems    The patient has baseline aphasia from prior stroke(s)    The patient has baseline dementia and is not reliable historian    The patient is in acute medical distress and unable to provide information           Total of 12 systems reviewed as follows:       POSITIVE= underlined text  Negative = text not underlined  General:  fever, chills, sweats, generalized weakness, weight loss/gain,      loss of appetite   Eyes:    blurred vision, eye pain, loss of vision, double vision  ENT:    rhinorrhea, pharyngitis   Respiratory:   cough, sputum production, SOB, SABILLON, wheezing, pleuritic pain   Cardiology:   chest pain, palpitations, orthopnea, PND, edema, syncope   Gastrointestinal:  abdominal pain , N/V, diarrhea, dysphagia, constipation, bleeding   Genitourinary:  frequency, urgency, dysuria, hematuria, incontinence   Muskuloskeletal :  arthralgia, myalgia, back pain  Hematology:  easy bruising, nose or gum bleeding, lymphadenopathy   Dermatological: rash, ulceration, pruritis, color change / jaundice  Endocrine:   hot flashes or polydipsia   Neurological:  headache, dizziness, confusion, focal weakness, paresthesia,     Speech difficulties, memory loss, gait difficulty  Psychological: Feelings of anxiety, depression, agitation    Objective:   VITALS:    Visit Vitals  BP (!) 137/97   Pulse 90   Temp 97.3 °F (36.3 °C)   Resp 20   Ht 5' 4\" (1.626 m)   Wt 92.7 kg (204 lb 5.9 oz)   SpO2 100%   BMI 35.08 kg/m²       PHYSICAL EXAM:    General:    Alert, cooperative, no distress, appears stated age. HEENT: Atraumatic, anicteric sclerae, pink conjunctivae     No oral ulcers, mucosa moist, throat clear, dentition fair  Neck:  Supple, symmetrical,  thyroid: non tender  Lungs:   Clear to auscultation bilaterally. No Wheezing or Rhonchi. No rales. Chest wall:  No tenderness  No Accessory muscle use. Heart:   Regular  rhythm,  No  murmur   No edema  Abdomen:   Soft, non-tender. Not distended. Bowel sounds normal  Extremities: No cyanosis. No clubbing,      Skin turgor normal, Capillary refill normal, Radial dial pulse 2+  Skin:     Not pale. Not Jaundiced  No rashes   Psych:  Good insight. Not depressed. Not anxious or agitated. Neurologic: EOMs intact. No facial asymmetry. No aphasia or slurred speech. Symmetrical strength, Sensation grossly intact. Alert and oriented X 4.     _______________________________________________________________________  Care Plan discussed with:    Comments   Patient y    Family      RN y    Care Manager                    Consultant:      _______________________________________________________________________  Expected  Disposition:   Home with Family y   HH/PT/OT/RN    SNF/LTC    FREDDY    ________________________________________________________________________  TOTAL TIME: 54 Minutes    Critical Care Provided     Minutes non procedure based      Comments    y Reviewed previous records   >50% of visit spent in counseling and coordination of care y Discussion with patient and/or family and questions answered       ________________________________________________________________________  Signed:  Gerardo Asencio MD    Procedures: see electronic medical records for all procedures/Xrays and details which were not copied into this note but were reviewed prior to creation of Plan. LAB DATA REVIEWED:    Recent Results (from the past 24 hour(s))   EKG, 12 LEAD, INITIAL    Collection Time: 11/27/20  9:37 AM   Result Value Ref Range    Ventricular Rate 85 BPM    Atrial Rate 85 BPM    P-R Interval 140 ms    QRS Duration 92 ms    Q-T Interval 376 ms    QTC Calculation (Bezet) 447 ms    Calculated P Axis 46 degrees    Calculated T Axis -71 degrees    Diagnosis       Sinus rhythm with premature supraventricular complexes  ST & T wave abnormality, consider inferolateral ischemia  When compared with ECG of 15-FEB-2018 11:58,  premature supraventricular complexes are now present  Non-specific change in ST segment in Lateral leads  T wave inversion now evident in Lateral leads     CBC WITH AUTOMATED DIFF    Collection Time: 11/27/20 10:54 AM   Result Value Ref Range    WBC 8.6 4.1 - 11.1 K/uL    RBC 4.43 4.10 - 5.70 M/uL    HGB 11.9 (L) 12.1 - 17.0 g/dL    HCT 35.6 (L) 36.6 - 50.3 %    MCV 80.4 80.0 - 99.0 FL    MCH 26.9 26.0 - 34.0 PG    MCHC 33.4 30.0 - 36.5 g/dL    RDW 15.0 (H) 11.5 - 14.5 %    PLATELET 480 421 - 503 K/uL    MPV 10.7 8.9 - 12.9 FL    NRBC 0.0 0  WBC    ABSOLUTE NRBC 0.00 0.00 - 0.01 K/uL    NEUTROPHILS 68 32 - 75 %    LYMPHOCYTES 20 12 - 49 %    MONOCYTES 8 5 - 13 %    EOSINOPHILS 2 0 - 7 %    BASOPHILS 1 0 - 1 %    IMMATURE GRANULOCYTES 1 (H) 0.0 - 0.5 %    ABS. NEUTROPHILS 6.0 1.8 - 8.0 K/UL    ABS. LYMPHOCYTES 1.7 0.8 - 3.5 K/UL    ABS. MONOCYTES 0.7 0.0 - 1.0 K/UL    ABS. EOSINOPHILS 0.1 0.0 - 0.4 K/UL    ABS. BASOPHILS 0.1 0.0 - 0.1 K/UL    ABS. IMM.  GRANS. 0.1 (H) 0.00 - 0.04 K/UL    DF AUTOMATED     METABOLIC PANEL, COMPREHENSIVE    Collection Time: 11/27/20 10:54 AM   Result Value Ref Range    Sodium 136 136 - 145 mmol/L    Potassium 4.1 3.5 - 5.1 mmol/L    Chloride 107 97 - 108 mmol/L    CO2 25 21 - 32 mmol/L    Anion gap 4 (L) 5 - 15 mmol/L    Glucose 287 (H) 65 - 100 mg/dL    BUN 17 6 - 20 MG/DL    Creatinine 1.39 (H) 0.70 - 1.30 MG/DL    BUN/Creatinine ratio 12 12 - 20      GFR est AA >60 >60 ml/min/1.73m2    GFR est non-AA 56 (L) >60 ml/min/1.73m2    Calcium 9.0 8.5 - 10.1 MG/DL    Bilirubin, total 0.4 0.2 - 1.0 MG/DL    ALT (SGPT) 38 12 - 78 U/L    AST (SGOT) 17 15 - 37 U/L    Alk.  phosphatase 105 45 - 117 U/L    Protein, total 7.8 6.4 - 8.2 g/dL    Albumin 3.8 3.5 - 5.0 g/dL    Globulin 4.0 2.0 - 4.0 g/dL    A-G Ratio 1.0 (L) 1.1 - 2.2     TROPONIN I    Collection Time: 11/27/20 10:54 AM   Result Value Ref Range    Troponin-I, Qt. <0.05 <0.05 ng/mL   CK W/ CKMB & INDEX    Collection Time: 11/27/20 10:54 AM   Result Value Ref Range    CK - MB <1.0 <3.6 NG/ML    CK-MB Index Cannot be calculated 0.0 - 2.5       39 - 308 U/L

## 2020-11-27 NOTE — Clinical Note
TRANSFER - OUT REPORT:     Verbal report given to: Thaddeus Presley. Report consisted of patient's Situation, Background, Assessment and   Recommendations(SBAR). Opportunity for questions and clarification was provided. Patient transported with a Registered Nurse. Patient transported to: IVCU.

## 2020-11-27 NOTE — ED NOTES
TRANSFER - OUT REPORT:    Verbal report given to WALDO Painting(name) on Arnie Gaitan  being transferred to [facility](unit) for routine progression of care       Report consisted of patients Situation, Background, Assessment and   Recommendations(SBAR). Information from the following report(s) SBAR, ED Summary, [facility] and Recent Results was reviewed with the receiving nurse. Lines:   Peripheral IV 11/27/20 Right Antecubital (Active)   Site Assessment Clean, dry, & intact 11/27/20 1053   Phlebitis Assessment 0 11/27/20 1053   Infiltration Assessment 0 11/27/20 1053   Dressing Status Clean, dry, & intact 11/27/20 1053   Hub Color/Line Status Pink 11/27/20 1053   Action Taken Catheter retaped;Blood drawn 11/27/20 1053        Opportunity for questions and clarification was provided.       Patient transported with:   8bit

## 2020-11-27 NOTE — PROGRESS NOTES
Problem: Falls - Risk of  Goal: *Absence of Falls  Description: Document Kan Watts Fall Risk and appropriate interventions in the flowsheet.   Outcome: Progressing Towards Goal  Note: Fall Risk Interventions:            Medication Interventions: Bed/chair exit alarm, Patient to call before getting OOB, Teach patient to arise slowly

## 2020-11-27 NOTE — ED PROVIDER NOTES
EMERGENCY DEPARTMENT HISTORY AND PHYSICAL EXAM      Date: 11/27/2020  Patient Name: Lila Dave  Patient Age and Sex: 39 y.o. male    History of Presenting Illness     Chief Complaint   Patient presents with    Chest Pain     high left chest pain on left side chest onset one hour ago; has taken nitro pta he took 5; has history of cardiac stents 2016 -2; and two in 2020       History Provided By: Patient    Ability to gather history was limited by:     HPI: Lila Dave, 39 y.o. male with history of diabetes, coronary artery disease status post 4 stents, including 2 stents placed about 2 months ago, hypertension, complains of left-sided chest pain. Pain is described as aching and stabbing, severe intensity. Started last night and was more mild last night, this morning pain has been quite severe and he has taken 4 nitroglycerin tablets at home without relief. No significant nausea or vomiting. Minimal shortness of breath. Pain is focal in the left anterior chest, nonradiating. Location:    Quality:      Severity:    Duration:   Timing:      Context:    Modifying factors:   Associated symptoms:       The patient's medical, surgical, family, and social history on file were reviewed by me today.       Past Medical History:   Diagnosis Date    CAD (coronary artery disease)     2 stents 2016     Headache     Hypercholesterolemia     Hypertension     Hypogonadism male     Liver disease     NAFLD    Obstructive sleep apnea      Past Surgical History:   Procedure Laterality Date    HX HEENT      status post pharyngioplasty       PCP: Elier Mcmullen MD    Past History     Past Medical History:  Past Medical History:   Diagnosis Date    CAD (coronary artery disease)     2 stents 2016     Headache     Hypercholesterolemia     Hypertension     Hypogonadism male     Liver disease     NAFLD    Obstructive sleep apnea        Past Surgical History:  Past Surgical History:   Procedure Laterality Date    HX HEENT      status post pharyngioplasty       Family History:  Family History   Problem Relation Age of Onset    Heart Disease Father        Social History:  Social History     Tobacco Use    Smoking status: Never Smoker    Smokeless tobacco: Never Used   Substance Use Topics    Alcohol use: No    Drug use: No       Allergies:  No Known Allergies    Current Medications:  No current facility-administered medications on file prior to encounter. Current Outpatient Medications on File Prior to Encounter   Medication Sig Dispense Refill    ticagrelor (BRILINTA) 90 mg tablet Take 90 mg by mouth two (2) times a day.  glucose blood VI test strips (OneTouch Ultra Blue Test Strip) strip Use to test blood sugar three times a day 300 Strip 3    semaglutide (Ozempic) 1 mg/dose (2 mg/1.5 mL) sub-q pen 1 mg by SubCUTAneous route every seven (7) days. 2 Pen 11    famotidine (PEPCID) 40 mg tablet Take 1 Tab by mouth daily. This is to replace the omeprazole. 30 Tab 11    sildenafil citrate (Viagra) 100 mg tablet Take 1 Tab by mouth as needed for Erectile Dysfunction. Indications: the inability to have an erection 6 Tab 11    rosuvastatin (CRESTOR) 40 mg tablet Take 1 Tab by mouth daily. 30 Tab 11    insulin glargine (LANTUS,BASAGLAR) 100 unit/mL (3 mL) inpn 30 units daily 3 Pen 11    clopidogrel (PLAVIX) 75 mg tab TAKE 1 TAB BY MOUTH DAILY. 30 Tab 11    hydroCHLOROthiazide (MICROZIDE) 12.5 mg capsule Take 1 Cap by mouth daily. 30 Cap 11    spironolactone (ALDACTONE) 25 mg tablet Take 1 Tab by mouth daily. 30 Tab 11    metoprolol succinate (TOPROL-XL) 100 mg tablet Take 1 Tab by mouth daily. 30 Tab 11    lisinopril (PRINIVIL, ZESTRIL) 40 mg tablet TAKE 1 TABLET BY MOUTH ONCE DAILY 30 Tab 11    amLODIPine (NORVASC) 10 mg tablet Take 1 Tab by mouth daily.  30 Tab 11    aspirin delayed-release 81 mg tablet TAKE 1 TABLET BY MOUTH EVERY DAY 90 Tab 3    Blood-Glucose Meter (ONETOUCH ULTRA2) monitoring kit Use to test blood sugar three times a day 1 Kit 0    lancets (ONE TOUCH DELICA) 33 gauge misc Use to test blood sugar three times a day. 300 Lancet 3    Insulin Needles, Disposable, 31 gauge x 5/16\" ndle As directed 60 Package 11       Review of Systems   Review of Systems   Constitutional: Negative for fever. Respiratory: Negative for shortness of breath. Cardiovascular: Positive for chest pain. Gastrointestinal: Negative for abdominal pain. All other systems reviewed and are negative. Physical Exam   Vital Signs  Patient Vitals for the past 8 hrs:   Temp Pulse Resp BP SpO2   11/27/20 1511 -- 90 20 (!) 137/97 100 %   11/27/20 1430 -- (!) 113 (!) 37 (!) 140/98 97 %   11/27/20 1415 -- 88 28 (!) 154/103 97 %   11/27/20 1400 -- 99 19 (!) 144/104 99 %   11/27/20 1352 -- 95 -- (!) 165/119 --   11/27/20 1345 -- 99 19 (!) 165/119 100 %   11/27/20 1330 -- 93 18 (!) 143/112 99 %   11/27/20 1315 -- 99 21 (!) 169/122 99 %   11/27/20 1300 -- 89 20 (!) 158/111 97 %   11/27/20 1245 -- (!) 105 18 (!) 159/126 99 %   11/27/20 1233 -- 95 -- (!) 143/107 --   11/27/20 1230 -- 91 23 (!) 143/107 99 %   11/27/20 1215 -- 87 20 (!) 129/104 100 %   11/27/20 1200 -- 98 21 (!) 151/111 99 %   11/27/20 1145 -- 90 15 (!) 136/96 96 %   11/27/20 1130 -- 91 18 (!) 159/109 96 %   11/27/20 1115 -- (!) 104 19 (!) 174/154 100 %   11/27/20 1100 -- (!) 112 21 -- 100 %   11/27/20 1045 -- 89 26 (!) 154/109 100 %   11/27/20 1030 -- 84 26 (!) 137/96 98 %   11/27/20 1015 -- (!) 103 20 (!) 149/122 100 %   11/27/20 1001 -- -- -- -- 100 %   11/27/20 1000 -- 94 21 (!) 138/99 100 %   11/27/20 0934 97.3 °F (36.3 °C) 94 16 (!) 132/91 100 %          Physical Exam  Vitals signs and nursing note reviewed. Constitutional:       General: He is not in acute distress. Appearance: Normal appearance. He is well-developed. He is not ill-appearing. HENT:      Head: Normocephalic and atraumatic. Eyes:      General:         Right eye: No discharge.          Left eye: No discharge. Conjunctiva/sclera: Conjunctivae normal.   Neck:      Musculoskeletal: Normal range of motion and neck supple. Cardiovascular:      Rate and Rhythm: Normal rate and regular rhythm. Heart sounds: Normal heart sounds. No murmur. Pulmonary:      Effort: Pulmonary effort is normal. No respiratory distress. Breath sounds: Normal breath sounds. No wheezing. Abdominal:      General: There is no distension. Palpations: Abdomen is soft. Tenderness: There is no abdominal tenderness. Musculoskeletal: Normal range of motion. General: No deformity. Skin:     General: Skin is warm and dry. Findings: No rash. Neurological:      General: No focal deficit present. Mental Status: He is alert and oriented to person, place, and time. Psychiatric:         Mood and Affect: Mood normal.         Behavior: Behavior normal.         Thought Content:  Thought content normal.         Diagnostic Study Results   Labs  Recent Results (from the past 24 hour(s))   EKG, 12 LEAD, INITIAL    Collection Time: 11/27/20  9:37 AM   Result Value Ref Range    Ventricular Rate 85 BPM    Atrial Rate 85 BPM    P-R Interval 140 ms    QRS Duration 92 ms    Q-T Interval 376 ms    QTC Calculation (Bezet) 447 ms    Calculated P Axis 46 degrees    Calculated T Axis -71 degrees    Diagnosis       Sinus rhythm with premature supraventricular complexes  ST & T wave abnormality, consider inferolateral ischemia  When compared with ECG of 15-FEB-2018 11:58,  premature supraventricular complexes are now present  Non-specific change in ST segment in Lateral leads  T wave inversion now evident in Lateral leads     CBC WITH AUTOMATED DIFF    Collection Time: 11/27/20 10:54 AM   Result Value Ref Range    WBC 8.6 4.1 - 11.1 K/uL    RBC 4.43 4.10 - 5.70 M/uL    HGB 11.9 (L) 12.1 - 17.0 g/dL    HCT 35.6 (L) 36.6 - 50.3 %    MCV 80.4 80.0 - 99.0 FL    MCH 26.9 26.0 - 34.0 PG    MCHC 33.4 30.0 - 36.5 g/dL RDW 15.0 (H) 11.5 - 14.5 %    PLATELET 784 685 - 279 K/uL    MPV 10.7 8.9 - 12.9 FL    NRBC 0.0 0  WBC    ABSOLUTE NRBC 0.00 0.00 - 0.01 K/uL    NEUTROPHILS 68 32 - 75 %    LYMPHOCYTES 20 12 - 49 %    MONOCYTES 8 5 - 13 %    EOSINOPHILS 2 0 - 7 %    BASOPHILS 1 0 - 1 %    IMMATURE GRANULOCYTES 1 (H) 0.0 - 0.5 %    ABS. NEUTROPHILS 6.0 1.8 - 8.0 K/UL    ABS. LYMPHOCYTES 1.7 0.8 - 3.5 K/UL    ABS. MONOCYTES 0.7 0.0 - 1.0 K/UL    ABS. EOSINOPHILS 0.1 0.0 - 0.4 K/UL    ABS. BASOPHILS 0.1 0.0 - 0.1 K/UL    ABS. IMM. GRANS. 0.1 (H) 0.00 - 0.04 K/UL    DF AUTOMATED     METABOLIC PANEL, COMPREHENSIVE    Collection Time: 11/27/20 10:54 AM   Result Value Ref Range    Sodium 136 136 - 145 mmol/L    Potassium 4.1 3.5 - 5.1 mmol/L    Chloride 107 97 - 108 mmol/L    CO2 25 21 - 32 mmol/L    Anion gap 4 (L) 5 - 15 mmol/L    Glucose 287 (H) 65 - 100 mg/dL    BUN 17 6 - 20 MG/DL    Creatinine 1.39 (H) 0.70 - 1.30 MG/DL    BUN/Creatinine ratio 12 12 - 20      GFR est AA >60 >60 ml/min/1.73m2    GFR est non-AA 56 (L) >60 ml/min/1.73m2    Calcium 9.0 8.5 - 10.1 MG/DL    Bilirubin, total 0.4 0.2 - 1.0 MG/DL    ALT (SGPT) 38 12 - 78 U/L    AST (SGOT) 17 15 - 37 U/L    Alk. phosphatase 105 45 - 117 U/L    Protein, total 7.8 6.4 - 8.2 g/dL    Albumin 3.8 3.5 - 5.0 g/dL    Globulin 4.0 2.0 - 4.0 g/dL    A-G Ratio 1.0 (L) 1.1 - 2.2     TROPONIN I    Collection Time: 11/27/20 10:54 AM   Result Value Ref Range    Troponin-I, Qt. <0.05 <0.05 ng/mL   CK W/ CKMB & INDEX    Collection Time: 11/27/20 10:54 AM   Result Value Ref Range    CK - MB <1.0 <3.6 NG/ML    CK-MB Index Cannot be calculated 0.0 - 2.5       39 - 308 U/L       Radiologic Studies  XR CHEST PORT   Final Result   IMPRESSION: No Acute Disease. CT Results  (Last 48 hours)    None        CXR Results  (Last 48 hours)               11/27/20 1150  XR CHEST PORT Final result    Impression:  IMPRESSION: No Acute Disease. Narrative:  EXAM: Portable CXR.  121 [facility] hours         INDICATION: left chest pain, history of CAD       FINDINGS:   The lungs appear clear. Heart is normal in size. There is no pulmonary edema. There is no evident pneumothorax, adenopathy or pleural effusion. Procedures   EKG    Date/Time: 11/27/2020 10:23 AM  Performed by: Priscilla Lubin MD  Authorized by: Priscilla Lubin MD     ECG reviewed by ED Physician in the absence of a cardiologist: yes    Interpretation:     Interpretation: non-specific    Rate:     ECG rate assessment: normal    Rhythm:     Rhythm: sinus rhythm    Ectopy:     Ectopy: none    QRS:     QRS axis:  Normal  ST segments:     ST segments:  Non-specific    Depression:  V6, V5 and V4  T waves:     T waves: inverted      Inverted:  V6, V5 and V4    Critical Care  Performed by: Priscilla Lubin MD  Authorized by: Priscilla Lubin MD     Critical care provider statement:     Critical care time (minutes):  30    Critical care time was exclusive of:  Separately billable procedures and treating other patients    Critical care was necessary to treat or prevent imminent or life-threatening deterioration of the following conditions:  Cardiac failure    Critical care was time spent personally by me on the following activities:  Ordering and review of laboratory studies, ordering and review of radiographic studies, pulse oximetry, re-evaluation of patient's condition, examination of patient, evaluation of patient's response to treatment, ordering and performing treatments and interventions, review of old charts and discussions with consultants        Medical Decision Making     I reviewed the patient's most recent Emergency Dept notes and diagnostic tests  in formulating my MDM on today's visit.     Provider Notes (Medical Decision Making):   79-year-old male with history of coronary artery disease and multiple cardiac stents complaining of focal left-sided chest pain starting last night, severe this morning, not improved with nitroglycerin. On examination he appears uncomfortable, borderline tachycardia. Lungs are clear. EKG with some flipped T waves and ST depressions inferior laterally. These changes appear acute, different from prior EKG. No STEMI. Concern for unstable angina and myocardial ischemia. Patient declines additional nitroglycerin at this time as he just took 4 tablets over the last few hours at home. Will administer morphine and fluids, check troponin of the laboratories, portable chest x-ray. Also GI cocktail and clinically reevaluate. Differential diagnosis includes ACS, MI, unstable angina, cardiac ischemia, pericarditis, pneumothorax, acid reflux. Jackelyn Nelson MD  10:21 AM    Patient developed worsening chest pain while he was here in the emergency department, despite Toradol and GI cocktail. H&P is concerning for unstable angina. I consulted cardiology, spoke with Dr. Mira Mcarthur, who recommended admission to medicine, telemetry monitoring, serial troponins, likely cardiac cath in the next 24 hours for unstable angina. I applied nitroglycerin ointment and administered morphine.     Consults: Cardiology (spoke with Dr. Michelet Lieberman)    Social History     Tobacco Use    Smoking status: Never Smoker    Smokeless tobacco: Never Used   Substance Use Topics    Alcohol use: No    Drug use: No     Patient Vitals for the past 4 hrs:   Pulse Resp BP SpO2   11/27/20 1511 90 20 (!) 137/97 100 %   11/27/20 1430 (!) 113 (!) 37 (!) 140/98 97 %   11/27/20 1415 88 28 (!) 154/103 97 %   11/27/20 1400 99 19 (!) 144/104 99 %   11/27/20 1352 95 -- (!) 165/119 --   11/27/20 1345 99 19 (!) 165/119 100 %   11/27/20 1330 93 18 (!) 143/112 99 %   11/27/20 1315 99 21 (!) 169/122 99 %   11/27/20 1300 89 20 (!) 158/111 97 %   11/27/20 1245 (!) 105 18 (!) 159/126 99 %   11/27/20 1233 95 -- (!) 143/107 --   11/27/20 1230 91 23 (!) 143/107 99 %   11/27/20 1215 87 20 (!) 129/104 100 %   11/27/20 1200 98 21 (!) 151/111 99 %   11/27/20 1145 90 15 (!) 136/96 96 %   11/27/20 1130 91 18 (!) 159/109 96 %          Prescriptions from today's ED visit:  Current Discharge Medication List           Medications Administered during ED course:  Medications   enoxaparin (LOVENOX) injection 90 mg (90 mg SubCUTAneous Given 11/27/20 1352)   amLODIPine (NORVASC) tablet 10 mg (10 mg Oral Given 11/27/20 1352)   sodium chloride (NS) flush 5-40 mL (has no administration in time range)   sodium chloride (NS) flush 5-40 mL (has no administration in time range)   acetaminophen (TYLENOL) tablet 650 mg (has no administration in time range)     Or   acetaminophen (TYLENOL) suppository 650 mg (has no administration in time range)   polyethylene glycol (MIRALAX) packet 17 g (has no administration in time range)   promethazine (PHENERGAN) tablet 12.5 mg (has no administration in time range)     Or   ondansetron (ZOFRAN) injection 4 mg (has no administration in time range)   maalox/viscous lidocaine (COV GI COCKTAIL) (40 mL Oral Given 11/27/20 1028)   morphine injection 6 mg (6 mg IntraVENous Given 11/27/20 1100)   sodium chloride 0.9 % bolus infusion 1,000 mL (0 mL IntraVENous IV Completed 11/27/20 1434)   ketorolac (TORADOL) injection 30 mg (30 mg IntraVENous Given 11/27/20 1233)   nitroglycerin (NITROBID) 2 % ointment 0.5 Inch (0.5 Inches Topical Given 11/27/20 1234)   morphine injection 4 mg (4 mg IntraVENous Given 11/27/20 1353)          Diagnosis and Disposition     Disposition:  Admitted    Clinical Impression:   1. Unstable angina (HCC)        Attestation:  I personally performed the services described in this documentation on this date 11/27/2020 for patient Smitha Wilson. Saji Mcgee MD        I was the first provider for this patient on this visit. To the best of my ability I reviewed relevant prior medical records, electrocardiograms, laboratories, and radiologic studies.   The patient's presenting problems were discussed, and the patient was in agreement with the care plan formulated and outlined with them. Jackelyn Nelson MD    Please note that this dictation was completed with Dragon voice recognition software. Quite often unanticipated grammatical, syntax, homophones, and other interpretive errors are inadvertently transcribed by the computer software. Please disregard these errors and excuse any errors that have escaped final proofreading.

## 2020-11-27 NOTE — Clinical Note
Lesion: Located in the Mid Cx. Stent inserted. First inflation pressure = 14 rei; inflation time: 15 sec.

## 2020-11-27 NOTE — PROGRESS NOTES
1500: TRANSFER - IN REPORT:    Verbal report received from WALDO Hernandez(name) on Jordi Reno  being received from ED(unit) for routine progression of care      Report consisted of patients Situation, Background, Assessment and   Recommendations(SBAR). Information from the following report(s) SBAR was reviewed with the receiving nurse. Opportunity for questions and clarification was provided. Assessment completed upon patients arrival to unit and care assumed. [phone]: Request for medical records faxed to [facility]. Confirmation fax sheet, cover, sheet, and request form in pt's chart.

## 2020-11-27 NOTE — Clinical Note
Lesion located in the Mid Cx. Balloon inserted. Balloon inflated using multiple inflations inflation technique. Lesion #1: Pressure = 15 rei; Duration = 16 sec. Inflation 2: Pressure = 16 rei; Duration = 14 sec.

## 2020-11-27 NOTE — CONSULTS
[address] [facility]     Date of  Admission: 11/27/2020  9:41 AM     Admission type:Emergency    Consult for: Unstable angina   Consult by: Dr. Suzy Garcia: India Banuelos is a 39 y.o. male with a significant PMH for CAD with multiple stents the patient reports x 4 most recent in Sept. 2020, HTN, DM II, HLD admitted for observation of unstable angina. Per ED provider note, the patient presented with complaints of left sided chest pain which began this am. The patient took 5 nitro, with little to no reported relief. He present to ED with wife via private vehicle. Upon assessment, the patient endorses the above. He described the pain as sharp, to the left side, non-radiating, rating it a 9/10 still. He was given toradol in ED, he states it did not relieve the symptoms. He is requesting morphine. Paitent denies any associated diaphoresis, shortness of breath, leg edema, fever, chills, recent travel, nausea, vomiting, diarrhea. He states he takes all medications as prescribed, including brilinta. He denies smoking or ETOH use. Patient also reports stress test a few weeks ago, which was negative. Previous treatment/evaluation includes Percutaneous Coronary Intervention, echocardiogram, exercise treadmill test, stress thallium and cardiac catheterization . Prior patient of [facility], here seeking second opinion. Cardiac risk factors: family history, dyslipidemia, diabetes mellitus, obesity, sedentary life style, male gender, hypertension, stress.     Patient Active Problem List    Diagnosis Date Noted    History of noncompliance with medical treatment 05/13/2020    Uncontrolled type 2 diabetes mellitus with hyperglycemia ([facility] [address] 05/13/2020    Open left forearm fracture 02/15/2018    Obesity (BMI 30.0-34.9) 11/05/2017    CAD (coronary artery disease) 06/07/2016    Physical deconditioning 06/07/2016    Dyspepsia 06/07/2016    S/P pharyngoplasty 01/06/2015    Diabetes mellitus ([facility] [address] 09/30/2014    Hypertension 09/30/2014    Dyslipidemia 09/30/2014    Obstructive sleep apnea 09/30/2014    Hypogonadism male 09/30/2014      Taylor Masters MD  Past Medical History:   Diagnosis Date    CAD (coronary artery disease)     2 stents 2016     Headache     Hypercholesterolemia     Hypertension     Hypogonadism male     Liver disease     NAFLD    Obstructive sleep apnea       Social History     Socioeconomic History    Marital status:      Spouse name: Not on file    Number of children: 2    Years of education: 12    Highest education level: Not on file   Occupational History    Occupation:    Tobacco Use    Smoking status: Never Smoker    Smokeless tobacco: Never Used   Substance and Sexual Activity    Alcohol use: No    Drug use: No    Sexual activity: Yes     Partners: Female     No Known Allergies   Family History   Problem Relation Age of Onset    Heart Disease Father       Current Facility-Administered Medications   Medication Dose Route Frequency    enoxaparin (LOVENOX) injection 90 mg  1 mg/kg SubCUTAneous Q12H    amLODIPine (NORVASC) tablet 10 mg  10 mg Oral DAILY     Current Outpatient Medications   Medication Sig    ticagrelor (BRILINTA) 90 mg tablet Take 90 mg by mouth two (2) times a day.  glucose blood VI test strips (OneTouch Ultra Blue Test Strip) strip Use to test blood sugar three times a day    semaglutide (Ozempic) 1 mg/dose (2 mg/1.5 mL) sub-q pen 1 mg by SubCUTAneous route every seven (7) days.  famotidine (PEPCID) 40 mg tablet Take 1 Tab by mouth daily. This is to replace the omeprazole.  sildenafil citrate (Viagra) 100 mg tablet Take 1 Tab by mouth as needed for Erectile Dysfunction. Indications: the inability to have an erection    rosuvastatin (CRESTOR) 40 mg tablet Take 1 Tab by mouth daily.     insulin glargine (LANTUS,BASAGLAR) 100 unit/mL (3 mL) inpn 30 units daily    clopidogrel (PLAVIX) 75 mg tab TAKE 1 TAB BY MOUTH DAILY.  hydroCHLOROthiazide (MICROZIDE) 12.5 mg capsule Take 1 Cap by mouth daily.  spironolactone (ALDACTONE) 25 mg tablet Take 1 Tab by mouth daily.  metoprolol succinate (TOPROL-XL) 100 mg tablet Take 1 Tab by mouth daily.  lisinopril (PRINIVIL, ZESTRIL) 40 mg tablet TAKE 1 TABLET BY MOUTH ONCE DAILY    amLODIPine (NORVASC) 10 mg tablet Take 1 Tab by mouth daily.  aspirin delayed-release 81 mg tablet TAKE 1 TABLET BY MOUTH EVERY DAY    Blood-Glucose Meter (ONETOUCH ULTRA2) monitoring kit Use to test blood sugar three times a day    lancets (ONE TOUCH DELICA) 33 gauge misc Use to test blood sugar three times a day.     Insulin Needles, Disposable, 31 gauge x 5/16\" ndle As directed        Review of Symptoms:   11 systems reviewed, negative other than as stated in the HPI        Objective:      Visit Vitals  BP (!) 165/119   Pulse 95   Temp 97.3 °F (36.3 °C)   Resp 19   Ht 5' 4\" (1.626 m)   Wt 92.7 kg (204 lb 5.9 oz)   SpO2 100%   BMI 35.08 kg/m²       Physical:   General: obese, AA male in NAD   Heart: RRR, no m/S3/JVD, no carotid bruits   Lungs: clear throughout   Abdomen: Soft, +BS, NTND   Extremities: LE ha +DP/PT, no edema   Neurologic: Grossly normal, A x O x 3, anxious     Data Review:   Recent Labs     11/27/20  1054   WBC 8.6   HGB 11.9*   HCT 35.6*        Recent Labs     11/27/20  1054      K 4.1      CO2 25   *   BUN 17   CREA 1.39*   CA 9.0   ALB 3.8   TBILI 0.4   ALT 38       Recent Labs     11/27/20  1054   TROIQ <0.05      CKMB <1.0       No intake or output data in the 24 hours ending 11/27/20 1407     Cardiographics    Telemetry: NSR/ST 80-low 100's   ECG: NSR, non-specific ST changes   Echocardiogram: pending   CXRAY: \"no acute disease\"        Assessment:       Active Problems:    Hypertension (9/30/2014)      Dyslipidemia (9/30/2014)      Obstructive sleep apnea (9/30/2014)      CAD (coronary artery disease) (6/7/2016) Overview: Status post stenting right coronary arterywith 2 sequential stents--2016      Obesity (BMI 30.0-34.9) (11/5/2017)      Uncontrolled type 2 diabetes mellitus with hyperglycemia ([address] (5/13/2020)         Plan: Neeru Rojo is a 39 y.o. male with a significant PMH for CAD with multiple stents the patient reports x 4 most recent in Sept. 2020, HTN, DM II, FREDO admitted for observation of unstable angina. Cardiology consulted for [place]. No acute changes seen on EKG, intial cardiac enzymes negative. WBC 8.6, H/H 11.9/35.6, plt 205, K 4.1, Cr 1.39 (baseline 0.86-1.14). His hgb A1C 9/2020 was greater than 15.5, trend is around 13.  on last check 12/2019. Unstable angina:  -Patient with recent history of cardiac stents. Will obtain medical records from [facility] office.   - Initial troponin negative, no acute changes to EKG  -Obtain ECHO  -Please admit to tele under hospitalist service for observation  -Trend troponin  -Nitro paste to chest  -Start SQ lovenox 1mg/kg q12h  -PRN morphine  -supplemental O2 as needed    -The cardiac enzyme trend, clinical symptoms will determine inpatient cardiology work-up. Low threshold for cardiac cath given patient reports recent stress test and multiple risk factors. Discussed this with patient and wife. -Patient reports he cannot take BB-was DC'd by PCP in 2019. Hx CAD with stents:  -Patient not on BB  -Taking brilinta and ASA    HTN:  -currently uncontrolled. -Continue nitropaste  -Restart home norvasc 10 mg PO daily  -No ACEi d/t GARRET, elevated Cr at 1.39. Uncontrolled DM II:   -most recent A1C greater than 15, prior to that trended around 13.   -manage per internal medicine.   -Consult [facility]. HLD: last .  -Obtain FLP  -Continue statin     Thank you for consulting [facility]. Will follow. Ashok Canela, YARITZA   DNP,RN,AG-ACNP-BC    Patient seen and examined by me with nurse practitioner.   I personally performed all components of the history, physical, and medical decision making and agree with the assessment and plan as noted. D/w patient and family.      Governor MD Madai

## 2020-11-27 NOTE — Clinical Note
Sheath: left in place. Site secured by Tegaderm and suture. Sheath connected to heparin pressure bag. Hemostasis achieved.

## 2020-11-27 NOTE — ED NOTES
Pt arrives from home reporting chest pain that began this am and became worse at 0800. Pt endorses a hx of 4 stents-2 of which were placed this Sept. Pt denies SOB, fever.

## 2020-11-28 ENCOUNTER — APPOINTMENT (OUTPATIENT)
Dept: NON INVASIVE DIAGNOSTICS | Age: 45
DRG: 247 | End: 2020-11-28
Attending: NURSE PRACTITIONER
Payer: COMMERCIAL

## 2020-11-28 PROBLEM — I21.4 NSTEMI (NON-ST ELEVATED MYOCARDIAL INFARCTION) (HCC): Status: ACTIVE | Noted: 2020-11-28

## 2020-11-28 LAB
ANION GAP SERPL CALC-SCNC: 4 MMOL/L (ref 5–15)
ATRIAL RATE: 85 BPM
BUN SERPL-MCNC: 16 MG/DL (ref 6–20)
BUN/CREAT SERPL: 14 (ref 12–20)
CALCIUM SERPL-MCNC: 9.6 MG/DL (ref 8.5–10.1)
CALCULATED P AXIS, ECG09: 46 DEGREES
CALCULATED T AXIS, ECG11: -71 DEGREES
CHLORIDE SERPL-SCNC: 108 MMOL/L (ref 97–108)
CHOLEST SERPL-MCNC: 125 MG/DL
CO2 SERPL-SCNC: 27 MMOL/L (ref 21–32)
CREAT SERPL-MCNC: 1.16 MG/DL (ref 0.7–1.3)
DIAGNOSIS, 93000: NORMAL
ERYTHROCYTE [DISTWIDTH] IN BLOOD BY AUTOMATED COUNT: 15 % (ref 11.5–14.5)
GLUCOSE SERPL-MCNC: 139 MG/DL (ref 65–100)
HCT VFR BLD AUTO: 34 % (ref 36.6–50.3)
HDLC SERPL-MCNC: 52 MG/DL
HDLC SERPL: 2.4 {RATIO} (ref 0–5)
HGB BLD-MCNC: 11.4 G/DL (ref 12.1–17)
LDLC SERPL CALC-MCNC: 33.4 MG/DL (ref 0–100)
LIPID PROFILE,FLP: ABNORMAL
MCH RBC QN AUTO: 26.7 PG (ref 26–34)
MCHC RBC AUTO-ENTMCNC: 33.5 G/DL (ref 30–36.5)
MCV RBC AUTO: 79.6 FL (ref 80–99)
NRBC # BLD: 0 K/UL (ref 0–0.01)
NRBC BLD-RTO: 0 PER 100 WBC
P-R INTERVAL, ECG05: 140 MS
PLATELET # BLD AUTO: 227 K/UL (ref 150–400)
PMV BLD AUTO: 10.8 FL (ref 8.9–12.9)
POTASSIUM SERPL-SCNC: 3.6 MMOL/L (ref 3.5–5.1)
Q-T INTERVAL, ECG07: 376 MS
QRS DURATION, ECG06: 92 MS
QTC CALCULATION (BEZET), ECG08: 447 MS
RBC # BLD AUTO: 4.27 M/UL (ref 4.1–5.7)
SODIUM SERPL-SCNC: 139 MMOL/L (ref 136–145)
TRIGL SERPL-MCNC: 198 MG/DL (ref ?–150)
TROPONIN I SERPL-MCNC: 0.31 NG/ML
VENTRICULAR RATE, ECG03: 85 BPM
VLDLC SERPL CALC-MCNC: 39.6 MG/DL
WBC # BLD AUTO: 8.3 K/UL (ref 4.1–11.1)

## 2020-11-28 PROCEDURE — 96375 TX/PRO/DX INJ NEW DRUG ADDON: CPT

## 2020-11-28 PROCEDURE — 99233 SBSQ HOSP IP/OBS HIGH 50: CPT | Performed by: INTERNAL MEDICINE

## 2020-11-28 PROCEDURE — 96372 THER/PROPH/DIAG INJ SC/IM: CPT

## 2020-11-28 PROCEDURE — 93306 TTE W/DOPPLER COMPLETE: CPT | Performed by: INTERNAL MEDICINE

## 2020-11-28 PROCEDURE — 74011250637 HC RX REV CODE- 250/637: Performed by: INTERNAL MEDICINE

## 2020-11-28 PROCEDURE — 36415 COLL VENOUS BLD VENIPUNCTURE: CPT

## 2020-11-28 PROCEDURE — 80061 LIPID PANEL: CPT

## 2020-11-28 PROCEDURE — 65660000000 HC RM CCU STEPDOWN

## 2020-11-28 PROCEDURE — 85027 COMPLETE CBC AUTOMATED: CPT

## 2020-11-28 PROCEDURE — 74011250636 HC RX REV CODE- 250/636: Performed by: INTERNAL MEDICINE

## 2020-11-28 PROCEDURE — 80048 BASIC METABOLIC PNL TOTAL CA: CPT

## 2020-11-28 PROCEDURE — 99218 HC RM OBSERVATION: CPT

## 2020-11-28 PROCEDURE — 74011250636 HC RX REV CODE- 250/636: Performed by: NURSE PRACTITIONER

## 2020-11-28 PROCEDURE — 84484 ASSAY OF TROPONIN QUANT: CPT

## 2020-11-28 PROCEDURE — 74011250637 HC RX REV CODE- 250/637: Performed by: NURSE PRACTITIONER

## 2020-11-28 PROCEDURE — 93306 TTE W/DOPPLER COMPLETE: CPT

## 2020-11-28 RX ORDER — ASPIRIN 81 MG/1
81 TABLET ORAL DAILY
Status: DISCONTINUED | OUTPATIENT
Start: 2020-11-28 | End: 2020-12-01 | Stop reason: HOSPADM

## 2020-11-28 RX ORDER — METOPROLOL TARTRATE 25 MG/1
25 TABLET, FILM COATED ORAL EVERY 12 HOURS
Status: DISCONTINUED | OUTPATIENT
Start: 2020-11-28 | End: 2020-11-30

## 2020-11-28 RX ORDER — AMLODIPINE BESYLATE 5 MG/1
5 TABLET ORAL DAILY
Status: DISCONTINUED | OUTPATIENT
Start: 2020-11-29 | End: 2020-12-01 | Stop reason: HOSPADM

## 2020-11-28 RX ADMIN — ENOXAPARIN SODIUM 90 MG: 100 INJECTION SUBCUTANEOUS at 01:36

## 2020-11-28 RX ADMIN — ONDANSETRON 4 MG: 2 INJECTION INTRAMUSCULAR; INTRAVENOUS at 01:52

## 2020-11-28 RX ADMIN — ENOXAPARIN SODIUM 90 MG: 100 INJECTION SUBCUTANEOUS at 13:06

## 2020-11-28 RX ADMIN — ROSUVASTATIN CALCIUM 40 MG: 40 TABLET, FILM COATED ORAL at 09:18

## 2020-11-28 RX ADMIN — AMLODIPINE BESYLATE 10 MG: 5 TABLET ORAL at 09:18

## 2020-11-28 RX ADMIN — Medication 10 ML: at 21:05

## 2020-11-28 RX ADMIN — Medication 10 ML: at 05:57

## 2020-11-28 RX ADMIN — TICAGRELOR 90 MG: 90 TABLET ORAL at 09:18

## 2020-11-28 RX ADMIN — Medication 10 ML: at 13:07

## 2020-11-28 RX ADMIN — ASPIRIN 81 MG: 81 TABLET, COATED ORAL at 09:18

## 2020-11-28 RX ADMIN — TICAGRELOR 90 MG: 90 TABLET ORAL at 20:59

## 2020-11-28 NOTE — PROGRESS NOTES
1900: End of Shift Note    Bedside shift change report given to Avtar Tovar RN (oncoming nurse) by Luisana Marks (offgoing nurse). Report included the following information SBAR    Shift worked:  7a-7p     Shift summary and any significant changes:     Echo completed, plan for cardiac cath monday     Concerns for physician to address:       Zone phone for oncoming shift:   [phone]       Activity:  Activity Level: Up with Assistance  Number times ambulated in hallways past shift: 0  Number of times OOB to chair past shift: 1    Cardiac:   Cardiac Monitoring: Yes      Cardiac Rhythm: Normal sinus rhythm    Access:   Current line(s): PIV     Genitourinary:   Urinary status: voiding    Respiratory:   O2 Device: Room air  Chronic home O2 use?: NO  Incentive spirometer at bedside: NO     GI:  Last Bowel Movement Date: 11/27/20  Current diet:  DIET CARDIAC Regular  Passing flatus: YES  Tolerating current diet: YES       Pain Management:   Patient states pain is manageable on current regimen: YES    Skin:  Artem Score: 21  Interventions: speciality bed, increase time out of bed, PT/OT consult, internal/external urinary devices and nutritional support     Patient Safety:  Fall Score:  Total Score: 1  Interventions: bed/chair alarm, assistive device (walker, cane, etc), gripper socks and pt to call before getting OOB       Length of Stay:  Expected LOS: - - -  Actual LOS: 0      Luisana Marks

## 2020-11-28 NOTE — PROGRESS NOTES
Hospitalist Progress Note    NAME: Pushpa Baker   :  1975   MRN:  [id number]       Assessment / Plan:    NSTEMI, POA  Coronary artery disease, POA  Hypertension, POA  Hyperlipidemia, POA  Diabetes mellitus type 2 uncontrolled, POA    -Admitted with chest pain, troponin has been trending up. Cardiology was consulted. Started on therapeutic dose Lovenox, continue. Echo is pending  -Continue aspirin and ticagrelor  -Continue amlodipine, Crestor  -Most recent hemoglobin A1c 2020 more than 15.5  -Start correction scale      30.0 - 39.9 Obese / Body mass index is 35.08 kg/m². Code status: Full  Prophylaxis: Lovenox  Recommended Disposition: Home w/Family     Subjective:     Chief Complaint / Reason for Physician Visit  NSTEMI  Discussed with RN events overnight. Patient was seen and examined. No acute events overnight. \" Doing fine\"    Review of Systems:  Symptom Y/N Comments  Symptom Y/N Comments   Fever/Chills n   Chest Pain n    Poor Appetite    Edema     Cough n   Abdominal Pain n    Sputum    Joint Pain     SOB/SABILLON n   Pruritis/Rash     Nausea/vomit n   Tolerating PT/OT     Diarrhea    Tolerating Diet y    Constipation    Other       Could NOT obtain due to:          Objective:     VITALS:   Last 24hrs VS reviewed since prior progress note.  Most recent are:  Patient Vitals for the past 24 hrs:   Temp Pulse Resp BP SpO2   20 0918 -- 85 -- 124/89 --   20 0715 98.6 °F (37 °C) 83 18 (!) 138/93 98 %   20 0600 98.6 °F (37 °C) 85 18 127/89 97 %   20 0400 -- 91 18 -- 94 %   20 0120 98.4 °F (36.9 °C) 86 18 (!) 138/101 98 %   20 1853 98.7 °F (37.1 °C) 87 20 (!) 146/104 99 %   20 1511 97.8 °F (36.6 °C) 90 20 (!) 137/97 100 %   20 1430 -- (!) 113 (!) 37 (!) 140/98 97 %   20 1415 -- 88 28 (!) 154/103 97 %   20 1400 -- 99 19 (!) 144/104 99 %   20 1352 -- 95 -- (!) 165/119 --   20 1345 -- 99 19 (!) 165/119 100 %   20 1330 -- 93 18 (!) 143/112 99 %   11/27/20 1315 -- 99 21 (!) 169/122 99 %   11/27/20 1300 -- 89 20 (!) 158/111 97 %   11/27/20 1245 -- (!) 105 18 (!) 159/126 99 %   11/27/20 1233 -- 95 -- (!) 143/107 --   11/27/20 1230 -- 91 23 (!) 143/107 99 %   11/27/20 1215 -- 87 20 (!) 129/104 100 %   11/27/20 1200 -- 98 21 (!) 151/111 99 %       Intake/Output Summary (Last 24 hours) at 11/28/2020 1148  Last data filed at 11/27/2020 1434  Gross per 24 hour   Intake 1000 ml   Output --   Net 1000 ml        I had a face to face encounter and independently examined this patient on 11/28/2020, as outlined below:  PHYSICAL EXAM:  General: WD, WN. Alert, cooperative, no acute distress    EENT:  EOMI. Anicteric sclerae. MMM  Resp:  CTA bilaterally, no wheezing or rales. No accessory muscle use  CV:  Regular  rhythm,  No edema  GI:  Soft, Non distended, Non tender. +Bowel sounds  Neurologic:  Alert and oriented X 3, normal speech,   Psych:   Good insight. Not anxious nor agitated  Skin:  No rashes. No jaundice    Reviewed most current lab test results and cultures  YES  Reviewed most current radiology test results   YES  Review and summation of old records today    NO  Reviewed patient's current orders and MAR    YES  PMH/SH reviewed - no change compared to H&P  ________________________________________________________________________  Care Plan discussed with:    Comments   Patient x    Family      RN x    Care Manager     Consultant                        Multidiciplinary team rounds were held today with , nursing, pharmacist and clinical coordinator. Patient's plan of care was discussed; medications were reviewed and discharge planning was addressed.      ________________________________________________________________________  Total NON critical care TIME:  35   Minutes    Total CRITICAL CARE TIME Spent:   Minutes non procedure based      Comments   >50% of visit spent in counseling and coordination of care ________________________________________________________________________  Blayne Cole MD     Procedures: see electronic medical records for all procedures/Xrays and details which were not copied into this note but were reviewed prior to creation of Plan. LABS:  I reviewed today's most current labs and imaging studies.   Pertinent labs include:  Recent Labs     11/28/20 0138 11/27/20  1054   WBC 8.3 8.6   HGB 11.4* 11.9*   HCT 34.0* 35.6*    205     Recent Labs     11/28/20 0138 11/27/20  1054    136   K 3.6 4.1    107   CO2 27 25   * 287*   BUN 16 17   CREA 1.16 1.39*   CA 9.6 9.0   ALB  --  3.8   TBILI  --  0.4   ALT  --  38       Signed: Blayne Cole MD

## 2020-11-28 NOTE — PROGRESS NOTES
0700: Bedside shift change report given to Eulogio Tarango RN (oncoming nurse) by Quan Nava RN (offgoing nurse). Report included the following information SBAR.     1310: Pt refused metoprolol due to creatinine elevation in the past. Dr. Patricia Pantoja notified.

## 2020-11-28 NOTE — PROGRESS NOTES
Cardiology Progress Note              [address]  [phone]    11/28/2020 12:02 PM    Admit Date: 11/27/2020    Admit Diagnosis: Chest pain [R07.9]    Subjective: Selena Wolf   denies chest pain.     Visit Vitals  BP (!) 130/91   Pulse 87   Temp 97.8 °F (36.6 °C)   Resp 18   Ht 5' 4\" (1.626 m)   Wt 204 lb 5.9 oz (92.7 kg)   SpO2 97%   BMI 35.08 kg/m²     Current Facility-Administered Medications   Medication Dose Route Frequency    aspirin delayed-release tablet 81 mg  81 mg Oral DAILY    ticagrelor (BRILINTA) tablet 90 mg  90 mg Oral BID    enoxaparin (LOVENOX) injection 90 mg  1 mg/kg SubCUTAneous Q12H    amLODIPine (NORVASC) tablet 10 mg  10 mg Oral DAILY    sodium chloride (NS) flush 5-40 mL  5-40 mL IntraVENous Q8H    sodium chloride (NS) flush 5-40 mL  5-40 mL IntraVENous PRN    acetaminophen (TYLENOL) tablet 650 mg  650 mg Oral Q6H PRN    Or    acetaminophen (TYLENOL) suppository 650 mg  650 mg Rectal Q6H PRN    polyethylene glycol (MIRALAX) packet 17 g  17 g Oral DAILY PRN    promethazine (PHENERGAN) tablet 12.5 mg  12.5 mg Oral Q6H PRN    Or    ondansetron (ZOFRAN) injection 4 mg  4 mg IntraVENous Q6H PRN    rosuvastatin (CRESTOR) tablet 40 mg  40 mg Oral DAILY         Objective:      Visit Vitals  BP (!) 130/91   Pulse 87   Temp 97.8 °F (36.6 °C)   Resp 18   Ht 5' 4\" (1.626 m)   Wt 204 lb 5.9 oz (92.7 kg)   SpO2 97%   BMI 35.08 kg/m²       Physical Exam:  Abdomen: soft, non-tender  Extremities: extremities normal  Heart: regular rate and rhythm  Lungs: clear to auscultation bilaterally  Pulses: 2+ and symmetric    Data Review:   Labs:    Recent Labs     11/28/20 0138 11/27/20  1054   WBC 8.3 8.6   HGB 11.4* 11.9*   HCT 34.0* 35.6*    205     Recent Labs     11/28/20  0138 11/27/20  1054    136   K 3.6 4.1    107   CO2 27 25   * 287*   BUN 16 17   CREA 1.16 1.39*   CA 9.6 9.0   ALB  --  3.8   TBILI  --  0.4   ALT  --  38 Recent Labs     11/28/20  0138 11/27/20  1714 11/27/20  1054   TROIQ 0.31* 0.08* <0.05   CPK  --   --  156   CKMB  --   --  <1.0         Intake/Output Summary (Last 24 hours) at 11/28/2020 1202  Last data filed at 11/27/2020 1434  Gross per 24 hour   Intake 1000 ml   Output --   Net 1000 ml        Telemetry: nsr    Assessment:     Active Problems:    Hypertension (9/30/2014)      Dyslipidemia (9/30/2014)      Obstructive sleep apnea (9/30/2014)      CAD (coronary artery disease) (6/7/2016)      Overview: Status post stenting right coronary arterywith 2 sequential stents--2016      Obesity (BMI 30.0-34.9) (11/5/2017)      Uncontrolled type 2 diabetes mellitus with hyperglycemia ([facility] [address] (5/13/2020)      Chest pain (11/27/2020)        Plan: Rodney Mask is doing well. Trop bumped to 0.31. for cardiac cath on Monday. Cont dapt. Cont lovenox.  Will add low dose bb    Katty Soto MD, [facility]    11/28/2020

## 2020-11-28 NOTE — PROGRESS NOTES
Problem: Falls - Risk of  Goal: *Absence of Falls  Description: Document Ana Cristina Boogie Fall Risk and appropriate interventions in the flowsheet.   Outcome: Progressing Towards Goal  Note: Fall Risk Interventions:            Medication Interventions: Bed/chair exit alarm, Patient to call before getting OOB, Teach patient to arise slowly

## 2020-11-28 NOTE — PROGRESS NOTES
1900: End of Shift Note    Bedside shift change report given to [place], RN (oncoming nurse) by Marco Antonio Bland (offgoing nurse). Report included the following information SBAR    Shift worked:  7a-7p     Shift summary and any significant changes:     BP elevated     Concerns for physician to address:  BP     Zone phone for oncoming shift:   [phone]       Activity:  Activity Level: Up with Assistance  Number times ambulated in hallways past shift: 0  Number of times OOB to chair past shift: 0    Cardiac:   Cardiac Monitoring: Yes      Cardiac Rhythm: Normal sinus rhythm    Access:   Current line(s): PIV     Genitourinary:   Urinary status: voiding    Respiratory:   O2 Device: Room air  Chronic home O2 use?: NO  Incentive spirometer at bedside: NO     GI:  Last Bowel Movement Date: 11/27/20  Current diet:  DIET CARDIAC Regular  Passing flatus: YES  Tolerating current diet: YES       Pain Management:   Patient states pain is manageable on current regimen: YES    Skin:  Artem Score: 21  Interventions: speciality bed, increase time out of bed and nutritional support     Patient Safety:  Fall Score:  Total Score: 1  Interventions: bed/chair alarm, assistive device (walker, cane, etc), gripper socks and pt to call before getting OOB       Length of Stay:  Expected LOS: - - -  Actual LOS: 0      Marco Antonio Bland

## 2020-11-29 PROBLEM — E66.01 SEVERE OBESITY (HCC): Status: ACTIVE | Noted: 2020-11-29

## 2020-11-29 LAB
ANION GAP SERPL CALC-SCNC: 5 MMOL/L (ref 5–15)
BUN SERPL-MCNC: 17 MG/DL (ref 6–20)
BUN/CREAT SERPL: 15 (ref 12–20)
CALCIUM SERPL-MCNC: 9.3 MG/DL (ref 8.5–10.1)
CHLORIDE SERPL-SCNC: 107 MMOL/L (ref 97–108)
CO2 SERPL-SCNC: 28 MMOL/L (ref 21–32)
CREAT SERPL-MCNC: 1.15 MG/DL (ref 0.7–1.3)
ECHO AO ROOT DIAM: 2.78 CM
ECHO AV AREA PEAK VELOCITY: 2.27 CM2
ECHO AV AREA/BSA PEAK VELOCITY: 1.1 CM2/M2
ECHO AV CUSP MM: 1.44 CM
ECHO AV PEAK GRADIENT: 9.31 MMHG
ECHO AV PEAK VELOCITY: 152.6 CM/S
ECHO EST RA PRESSURE: 10 MMHG
ECHO LA AREA 4C: 16.28 CM2
ECHO LA TO AORTIC ROOT RATIO: 1.03
ECHO LA TO AORTIC ROOT RATIO: 1.03
ECHO LA VOL 2C: 26.7 ML (ref 18–58)
ECHO LA VOL 4C: 38.38 ML (ref 18–58)
ECHO LA VOL BP: 38.08 ML (ref 18–58)
ECHO LA VOL/BSA BIPLANE: 19.29 ML/M2 (ref 16–28)
ECHO LA VOLUME INDEX A2C: 13.53 ML/M2 (ref 16–28)
ECHO LA VOLUME INDEX A4C: 19.44 ML/M2 (ref 16–28)
ECHO LV E' LATERAL VELOCITY: 8.8 CM/S
ECHO LV E' SEPTAL VELOCITY: 5.8 CM/S
ECHO LV INTERNAL DIMENSION DIASTOLIC MMODE: 4.81 CM
ECHO LV INTERNAL DIMENSION DIASTOLIC: 5.43 CM (ref 4.2–5.9)
ECHO LV INTERNAL DIMENSION SYSTOLIC MMODE: 3.98 CM
ECHO LV INTERNAL DIMENSION SYSTOLIC: 4.4 CM
ECHO LV IVSD MMODE: 1.3 CM
ECHO LV IVSD: 1 CM (ref 0.6–1)
ECHO LV IVSS MMODE: 1.73 CM
ECHO LV IVSS: 1.59 CM
ECHO LV MASS 2D: 260.7 G (ref 88–224)
ECHO LV MASS INDEX 2D: 132.1 G/M2 (ref 49–115)
ECHO LV POSTERIOR WALL DIASTOLIC MMODE: 1.56 CM
ECHO LV POSTERIOR WALL DIASTOLIC: 1.36 CM (ref 0.6–1)
ECHO LV POSTERIOR WALL SYSTOLIC: 1.83 CM
ECHO LVOT DIAM: 2.08 CM
ECHO LVOT PEAK GRADIENT: 4.15 MMHG
ECHO LVOT PEAK VELOCITY: 101.91 CM/S
ECHO MV A VELOCITY: 87.53 CM/S
ECHO MV E DECELERATION TIME (DT): 217.44 MS
ECHO MV E VELOCITY: 95.24 CM/S
ECHO MV E/A RATIO: 1.09
ECHO MV E/E' LATERAL: 10.82
ECHO MV E/E' RATIO (AVERAGED): 13.62
ECHO MV E/E' SEPTAL: 16.42
ECHO PV MAX VELOCITY: 94.55 CM/S
ECHO PV PEAK INSTANTANEOUS GRADIENT SYSTOLIC: 3.59 MMHG
ECHO TV REGURGITANT MAX VELOCITY: 323.9 CM/S
ERYTHROCYTE [DISTWIDTH] IN BLOOD BY AUTOMATED COUNT: 14.7 % (ref 11.5–14.5)
GLUCOSE BLD STRIP.AUTO-MCNC: 108 MG/DL (ref 65–100)
GLUCOSE BLD STRIP.AUTO-MCNC: 111 MG/DL (ref 65–100)
GLUCOSE BLD STRIP.AUTO-MCNC: 208 MG/DL (ref 65–100)
GLUCOSE SERPL-MCNC: 128 MG/DL (ref 65–100)
HCT VFR BLD AUTO: 31.9 % (ref 36.6–50.3)
HGB BLD-MCNC: 10.9 G/DL (ref 12.1–17)
MCH RBC QN AUTO: 27.3 PG (ref 26–34)
MCHC RBC AUTO-ENTMCNC: 34.2 G/DL (ref 30–36.5)
MCV RBC AUTO: 79.8 FL (ref 80–99)
NRBC # BLD: 0 K/UL (ref 0–0.01)
NRBC BLD-RTO: 0 PER 100 WBC
PLATELET # BLD AUTO: 214 K/UL (ref 150–400)
PMV BLD AUTO: 10.7 FL (ref 8.9–12.9)
POTASSIUM SERPL-SCNC: 3.3 MMOL/L (ref 3.5–5.1)
RBC # BLD AUTO: 4 M/UL (ref 4.1–5.7)
SERVICE CMNT-IMP: ABNORMAL
SODIUM SERPL-SCNC: 140 MMOL/L (ref 136–145)
WBC # BLD AUTO: 6.9 K/UL (ref 4.1–11.1)

## 2020-11-29 PROCEDURE — 85027 COMPLETE CBC AUTOMATED: CPT

## 2020-11-29 PROCEDURE — 94760 N-INVAS EAR/PLS OXIMETRY 1: CPT

## 2020-11-29 PROCEDURE — 82962 GLUCOSE BLOOD TEST: CPT

## 2020-11-29 PROCEDURE — 74011250637 HC RX REV CODE- 250/637: Performed by: INTERNAL MEDICINE

## 2020-11-29 PROCEDURE — 74011636637 HC RX REV CODE- 636/637: Performed by: INTERNAL MEDICINE

## 2020-11-29 PROCEDURE — 74011250636 HC RX REV CODE- 250/636: Performed by: NURSE PRACTITIONER

## 2020-11-29 PROCEDURE — 36415 COLL VENOUS BLD VENIPUNCTURE: CPT

## 2020-11-29 PROCEDURE — 99233 SBSQ HOSP IP/OBS HIGH 50: CPT | Performed by: INTERNAL MEDICINE

## 2020-11-29 PROCEDURE — 65660000000 HC RM CCU STEPDOWN

## 2020-11-29 PROCEDURE — 80048 BASIC METABOLIC PNL TOTAL CA: CPT

## 2020-11-29 RX ORDER — INSULIN LISPRO 100 [IU]/ML
INJECTION, SOLUTION INTRAVENOUS; SUBCUTANEOUS
Status: DISCONTINUED | OUTPATIENT
Start: 2020-11-29 | End: 2020-12-01

## 2020-11-29 RX ORDER — INSULIN GLARGINE 100 [IU]/ML
30 INJECTION, SOLUTION SUBCUTANEOUS DAILY
Status: DISCONTINUED | OUTPATIENT
Start: 2020-11-30 | End: 2020-11-29

## 2020-11-29 RX ORDER — DEXTROSE 50 % IN WATER (D50W) INTRAVENOUS SYRINGE
12.5-25 AS NEEDED
Status: DISCONTINUED | OUTPATIENT
Start: 2020-11-29 | End: 2020-12-01 | Stop reason: HOSPADM

## 2020-11-29 RX ORDER — INSULIN GLARGINE 100 [IU]/ML
30 INJECTION, SOLUTION SUBCUTANEOUS DAILY
Status: DISCONTINUED | OUTPATIENT
Start: 2020-11-29 | End: 2020-12-01 | Stop reason: HOSPADM

## 2020-11-29 RX ORDER — INSULIN GLARGINE 100 [IU]/ML
50 INJECTION, SOLUTION SUBCUTANEOUS DAILY
Status: DISCONTINUED | OUTPATIENT
Start: 2020-11-29 | End: 2020-11-29

## 2020-11-29 RX ORDER — MAGNESIUM SULFATE 100 %
4 CRYSTALS MISCELLANEOUS AS NEEDED
Status: DISCONTINUED | OUTPATIENT
Start: 2020-11-29 | End: 2020-12-01 | Stop reason: HOSPADM

## 2020-11-29 RX ADMIN — Medication 10 ML: at 23:54

## 2020-11-29 RX ADMIN — ASPIRIN 81 MG: 81 TABLET, COATED ORAL at 09:28

## 2020-11-29 RX ADMIN — Medication 10 ML: at 17:15

## 2020-11-29 RX ADMIN — AMLODIPINE BESYLATE 5 MG: 5 TABLET ORAL at 09:28

## 2020-11-29 RX ADMIN — Medication 10 ML: at 06:05

## 2020-11-29 RX ADMIN — TICAGRELOR 90 MG: 90 TABLET ORAL at 09:29

## 2020-11-29 RX ADMIN — ROSUVASTATIN CALCIUM 40 MG: 40 TABLET, FILM COATED ORAL at 09:29

## 2020-11-29 RX ADMIN — ENOXAPARIN SODIUM 90 MG: 100 INJECTION SUBCUTANEOUS at 13:19

## 2020-11-29 RX ADMIN — INSULIN GLARGINE 30 UNITS: 100 INJECTION, SOLUTION SUBCUTANEOUS at 13:20

## 2020-11-29 RX ADMIN — TICAGRELOR 90 MG: 90 TABLET ORAL at 20:06

## 2020-11-29 RX ADMIN — ENOXAPARIN SODIUM 90 MG: 100 INJECTION SUBCUTANEOUS at 01:23

## 2020-11-29 NOTE — PROGRESS NOTES
Spiritual Care Assessment/Progress Note  [facility]      NAME: Génesis Baker      MRN: [id number]  AGE: 39 y.o.  SEX: male  Confucianism Affiliation: Protestant   Language: English     11/29/2020     Total Time (in minutes): 35     Spiritual Assessment begun in [facility] through conversation with:         [x]Patient        [] Family    [] Friend(s)        Reason for Consult: Initial/Spiritual assessment, patient floor     Spiritual beliefs: (Please include comment if needed)     [x] Identifies with a bradford tradition:         [] Supported by a bradford community:            [] Claims no spiritual orientation:           [] Seeking spiritual identity:                [] Adheres to an individual form of spirituality:           [] Not able to assess:                           Identified resources for coping:      [x] Prayer                               [x] Music                  [] Guided Imagery     [x] Family/friends                 [] Pet visits     [x] Devotional reading                         [] Unknown     [] Other:                                               Interventions offered during this visit: (See comments for more details)    Patient Interventions: Affirmation of emotions/emotional suffering, Affirmation of bradford, Catharsis/review of pertinent events in supportive environment, Initial/Spiritual assessment, patient floor, Life review/legacy, Prayer (actual), Prayer (assurance of)           Plan of Care:     [x] Support spiritual and/or cultural needs    [] Support AMD and/or advance care planning process      [] Support grieving process   [] Coordinate Rites and/or Rituals    [] Coordination with community clergy   [] No spiritual needs identified at this time   [] Detailed Plan of Care below (See Comments)  [] Make referral to Music Therapy  [] Make referral to Pet Therapy     [] Make referral to Addiction services  [] Make referral to [facility]  [] Make referral to Spiritual Care Partner  [] No future visits requested        [x] Follow up upon further referrals     Comments: Provided support for this pt in [address]. Facilitated life review to assess potential support needs or coping strategies. Pt offered review of current situation. Engaged pt in meaning making and reframing as pt processed events leading to this point in pt's life. Pt is the [place] of a [employer]. 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 Pt affirmed the need to reassess how life should be governed and see's this admission and this pending procedure to be a new beginning. Offered pastoral support through prayer. Assured pt of prayers and affirmed ongoing availability of support. Carrie Raya MDiv.  Staff   Request  Support/Spiritual Care Services via [address] [place] [address]

## 2020-11-29 NOTE — ROUTINE PROCESS
TRANSFER - OUT REPORT:    Verbal report given to Thaddeus Montalvo (name) on Tereza Brandt  being transferred to [facility] (unit) for routine progression of care       Report consisted of patients Situation, Background, Assessment and   Recommendations(SBAR). Information from the following report(s) SBAR and Recent Results was reviewed with the receiving nurse. Lines:   Peripheral IV 11/27/20 Right Antecubital (Active)   Site Assessment Clean, dry, & intact 11/29/20 0815   Phlebitis Assessment 0 11/29/20 0815   Infiltration Assessment 0 11/29/20 0330   Dressing Status Clean, dry, & intact 11/29/20 0330   Dressing Type Transparent 11/29/20 0330   Hub Color/Line Status Pink 11/29/20 0330   Action Taken Catheter retaped;Blood drawn 11/27/20 1053        Opportunity for questions and clarification was provided.       Patient transported with:   Tech          \

## 2020-11-29 NOTE — ROUTINE PROCESS
Report received from [facility] , [address] [place] [address]. SBAR were discussed.     Erin Gibbons RN

## 2020-11-29 NOTE — PROGRESS NOTES
Hospitalist Progress Note    NAME: Selena Wolf   :  1975   MRN:  [id number]       Assessment / Plan:    NSTEMI, POA  Coronary artery disease, POA  Hypertension, POA  Hyperlipidemia, POA  Diabetes mellitus type 2 uncontrolled, POA  Hypokalemia     -Admitted with chest pain, troponin has been trending up. Cardiology was consulted. Started on therapeutic dose Lovenox, continue. Echo is pending. Plans for cardiac cath on Monday  -Cont to be chest pain free  -Continue aspirin and ticagrelor  -Continue amlodipine, Crestor  -Most recent hemoglobin A1c 2020 more than 15.5  -cont  correction scale, BS at [facility]. Resume home lantus (he is on 50 units at home, will resume her at 30 since he will be npo)  -replace K       30.0 - 39.9 Obese / Body mass index is 35.08 kg/m². Code status: Full  Prophylaxis: Lovenox  Recommended Disposition: Home w/Family     Subjective:     Chief Complaint / Reason for Physician Visit  NSTEMI  Discussed with RN events overnight. Patient was seen and examined. No acute events overnight. \"feeling very good\"    Review of Systems:  Symptom Y/N Comments  Symptom Y/N Comments   Fever/Chills n   Chest Pain n    Poor Appetite    Edema     Cough n   Abdominal Pain n    Sputum    Joint Pain     SOB/SABILLON n   Pruritis/Rash     Nausea/vomit n   Tolerating PT/OT     Diarrhea    Tolerating Diet y    Constipation    Other       Could NOT obtain due to:          Objective:     VITALS:   Last 24hrs VS reviewed since prior progress note.  Most recent are:  Patient Vitals for the past 24 hrs:   Temp Pulse Resp BP SpO2   20 0927 -- 95 -- (!) 130/91 --   20 0700 98.6 °F (37 °C) 74 18 132/87 95 %   20 0100 98.6 °F (37 °C) 72 18 128/85 98 %   20 1840 98.7 °F (37.1 °C) 93 18 124/78 98 %   20 1439 97.8 °F (36.6 °C) 80 18 (!) 128/94 100 %   20 1155 97.8 °F (36.6 °C) 87 18 (!) 130/91 97 %     No intake or output data in the 24 hours ending 20 1014     I had a face to face encounter and independently examined this patient on 11/29/2020, as outlined below:  PHYSICAL EXAM:  General: WD, WN. Alert, cooperative, no acute distress    EENT:  EOMI. Anicteric sclerae. MMM  Resp:  CTA bilaterally, no wheezing or rales. No accessory muscle use  CV:  Regular  rhythm,  No edema  GI:  Soft, Non distended, Non tender. +Bowel sounds  Neurologic:  Alert and oriented X 3, normal speech,   Psych:   Good insight. Not anxious nor agitated  Skin:  No rashes. No jaundice    Reviewed most current lab test results and cultures  YES  Reviewed most current radiology test results   YES  Review and summation of old records today    NO  Reviewed patient's current orders and MAR    YES  PMH/SH reviewed - no change compared to H&P  ________________________________________________________________________  Care Plan discussed with:    Comments   Patient x    Family      RN x    Care Manager     Consultant                        Multidiciplinary team rounds were held today with , nursing, pharmacist and clinical coordinator. Patient's plan of care was discussed; medications were reviewed and discharge planning was addressed. ________________________________________________________________________  Total NON critical care TIME:  35   Minutes    Total CRITICAL CARE TIME Spent:   Minutes non procedure based      Comments   >50% of visit spent in counseling and coordination of care     ________________________________________________________________________  Everton Calderon MD     Procedures: see electronic medical records for all procedures/Xrays and details which were not copied into this note but were reviewed prior to creation of Plan. LABS:  I reviewed today's most current labs and imaging studies.   Pertinent labs include:  Recent Labs     11/29/20  0125 11/28/20  0138 11/27/20  1054   WBC 6.9 8.3 8.6   HGB 10.9* 11.4* 11.9*   HCT 31.9* 34.0* 35.6*    227 205 Recent Labs     11/29/20  0125 11/28/20  0138 11/27/20  1054    139 136   K 3.3* 3.6 4.1    108 107   CO2 28 27 25   * 139* 287*   BUN 17 16 17   CREA 1.15 1.16 1.39*   CA 9.3 9.6 9.0   ALB  --   --  3.8   TBILI  --   --  0.4   ALT  --   --  38       Signed: Nasim Munguia MD

## 2020-11-29 NOTE — PROGRESS NOTES
Cardiology Progress Note              [address] [facility] [address], [place], [address] [facility]  [phone]    11/29/2020 10:47 AM    Admit Date: 11/27/2020    Admit Diagnosis: Chest pain [R07.9];NSTEMI (non-ST elevated myocardial infarction) ([facility] [I21.4]    Subjective: Selena Wolf   denies chest pain.     Visit Vitals  BP (!) 130/91   Pulse 95   Temp 98.6 °F (37 °C)   Resp 18   Ht 5' 4\" (1.626 m)   Wt 204 lb 5.9 oz (92.7 kg)   SpO2 95%   BMI 35.08 kg/m²     Current Facility-Administered Medications   Medication Dose Route Frequency    insulin glargine (LANTUS) injection 50 Units  50 Units SubCUTAneous DAILY    aspirin delayed-release tablet 81 mg  81 mg Oral DAILY    ticagrelor (BRILINTA) tablet 90 mg  90 mg Oral BID    amLODIPine (NORVASC) tablet 5 mg  5 mg Oral DAILY    metoprolol tartrate (LOPRESSOR) tablet 25 mg  25 mg Oral Q12H    enoxaparin (LOVENOX) injection 90 mg  1 mg/kg SubCUTAneous Q12H    sodium chloride (NS) flush 5-40 mL  5-40 mL IntraVENous Q8H    sodium chloride (NS) flush 5-40 mL  5-40 mL IntraVENous PRN    acetaminophen (TYLENOL) tablet 650 mg  650 mg Oral Q6H PRN    Or    acetaminophen (TYLENOL) suppository 650 mg  650 mg Rectal Q6H PRN    polyethylene glycol (MIRALAX) packet 17 g  17 g Oral DAILY PRN    promethazine (PHENERGAN) tablet 12.5 mg  12.5 mg Oral Q6H PRN    Or    ondansetron (ZOFRAN) injection 4 mg  4 mg IntraVENous Q6H PRN    rosuvastatin (CRESTOR) tablet 40 mg  40 mg Oral DAILY         Objective:      Visit Vitals  BP (!) 130/91   Pulse 95   Temp 98.6 °F (37 °C)   Resp 18   Ht 5' 4\" (1.626 m)   Wt 204 lb 5.9 oz (92.7 kg)   SpO2 95%   BMI 35.08 kg/m²       Physical Exam:  Abdomen: soft, non-tender  Extremities: extremities normal  Heart: regular rate and rhythm  Lungs: clear to auscultation bilaterally  Pulses: 2+ and symmetric    Data Review:   Labs:    Recent Labs     11/29/20  0125 11/28/20  0138 11/27/20  1054   WBC 6.9 8.3 8.6   HGB 10.9* 11.4* 11.9*   HCT 31.9* 34.0* 35.6*    227 205     Recent Labs     11/29/20  0125 11/28/20  0138 11/27/20  1054    139 136   K 3.3* 3.6 4.1    108 107   CO2 28 27 25   * 139* 287*   BUN 17 16 17   CREA 1.15 1.16 1.39*   CA 9.3 9.6 9.0   ALB  --   --  3.8   TBILI  --   --  0.4   ALT  --   --  38       Recent Labs     11/28/20  0138 11/27/20  1714 11/27/20  1054   TROIQ 0.31* 0.08* <0.05   CPK  --   --  156   CKMB  --   --  <1.0       No intake or output data in the 24 hours ending 11/29/20 1047     Telemetry: nsr    Assessment:     Active Problems:    Hypertension (9/30/2014)      Dyslipidemia (9/30/2014)      Obstructive sleep apnea (9/30/2014)      CAD (coronary artery disease) (6/7/2016)      Overview: Status post stenting right coronary arterywith 2 sequential stents--2016      Obesity (BMI 30.0-34.9) (11/5/2017)      Uncontrolled type 2 diabetes mellitus with hyperglycemia ([facility] (5/13/2020)      Chest pain (11/27/2020)      [facility]STEM[facility]non-ST elevated [facility] (11/28/2020)        Plan: [facility] is hemodynamically stable. He refuses to take BB - states that his pcp told him not to. Cont dapt.  Npo p mn for lhc/possible pci tmrw for nstemi    Elena Cedeno MD, [facility]    11/29/2020

## 2020-11-30 LAB
ACT BLD: 142 SECS (ref 79–138)
ACT BLD: 169 SECS (ref 79–138)
ACT BLD: 175 SECS (ref 79–138)
ACT BLD: 323 SECS (ref 79–138)
ANION GAP SERPL CALC-SCNC: 4 MMOL/L (ref 5–15)
BUN SERPL-MCNC: 16 MG/DL (ref 6–20)
BUN/CREAT SERPL: 14 (ref 12–20)
CALCIUM SERPL-MCNC: 9 MG/DL (ref 8.5–10.1)
CHLORIDE SERPL-SCNC: 108 MMOL/L (ref 97–108)
CO2 SERPL-SCNC: 26 MMOL/L (ref 21–32)
CREAT SERPL-MCNC: 1.13 MG/DL (ref 0.7–1.3)
ERYTHROCYTE [DISTWIDTH] IN BLOOD BY AUTOMATED COUNT: 14.8 % (ref 11.5–14.5)
GLUCOSE BLD STRIP.AUTO-MCNC: 111 MG/DL (ref 65–100)
GLUCOSE BLD STRIP.AUTO-MCNC: 122 MG/DL (ref 65–100)
GLUCOSE BLD STRIP.AUTO-MCNC: 141 MG/DL (ref 65–100)
GLUCOSE BLD STRIP.AUTO-MCNC: 156 MG/DL (ref 65–100)
GLUCOSE SERPL-MCNC: 106 MG/DL (ref 65–100)
HCT VFR BLD AUTO: 33.2 % (ref 36.6–50.3)
HGB BLD-MCNC: 11 G/DL (ref 12.1–17)
MCH RBC QN AUTO: 26.4 PG (ref 26–34)
MCHC RBC AUTO-ENTMCNC: 33.1 G/DL (ref 30–36.5)
MCV RBC AUTO: 79.6 FL (ref 80–99)
NRBC # BLD: 0 K/UL (ref 0–0.01)
NRBC BLD-RTO: 0 PER 100 WBC
PLATELET # BLD AUTO: 218 K/UL (ref 150–400)
PMV BLD AUTO: 10.7 FL (ref 8.9–12.9)
POTASSIUM SERPL-SCNC: 3.3 MMOL/L (ref 3.5–5.1)
RBC # BLD AUTO: 4.17 M/UL (ref 4.1–5.7)
SERVICE CMNT-IMP: ABNORMAL
SODIUM SERPL-SCNC: 138 MMOL/L (ref 136–145)
WBC # BLD AUTO: 7.7 K/UL (ref 4.1–11.1)

## 2020-11-30 PROCEDURE — 77030028837 HC SYR ANGI PWR INJ COEU -A: Performed by: INTERNAL MEDICINE

## 2020-11-30 PROCEDURE — 77030015766: Performed by: INTERNAL MEDICINE

## 2020-11-30 PROCEDURE — 65660000000 HC RM CCU STEPDOWN

## 2020-11-30 PROCEDURE — 74011250637 HC RX REV CODE- 250/637: Performed by: INTERNAL MEDICINE

## 2020-11-30 PROCEDURE — C1769 GUIDE WIRE: HCPCS | Performed by: INTERNAL MEDICINE

## 2020-11-30 PROCEDURE — 92928 PRQ TCAT PLMT NTRAC ST 1 LES: CPT | Performed by: INTERNAL MEDICINE

## 2020-11-30 PROCEDURE — C1894 INTRO/SHEATH, NON-LASER: HCPCS | Performed by: INTERNAL MEDICINE

## 2020-11-30 PROCEDURE — 85347 COAGULATION TIME ACTIVATED: CPT

## 2020-11-30 PROCEDURE — 027034Z DILATION OF CORONARY ARTERY, ONE ARTERY WITH DRUG-ELUTING INTRALUMINAL DEVICE, PERCUTANEOUS APPROACH: ICD-10-PCS | Performed by: INTERNAL MEDICINE

## 2020-11-30 PROCEDURE — C1725 CATH, TRANSLUMIN NON-LASER: HCPCS | Performed by: INTERNAL MEDICINE

## 2020-11-30 PROCEDURE — 93458 L HRT ARTERY/VENTRICLE ANGIO: CPT | Performed by: INTERNAL MEDICINE

## 2020-11-30 PROCEDURE — B2151ZZ FLUOROSCOPY OF LEFT HEART USING LOW OSMOLAR CONTRAST: ICD-10-PCS | Performed by: INTERNAL MEDICINE

## 2020-11-30 PROCEDURE — 99153 MOD SED SAME PHYS/QHP EA: CPT | Performed by: INTERNAL MEDICINE

## 2020-11-30 PROCEDURE — 80048 BASIC METABOLIC PNL TOTAL CA: CPT

## 2020-11-30 PROCEDURE — 77030008543 HC TBNG MON PRSS MRTM -A: Performed by: INTERNAL MEDICINE

## 2020-11-30 PROCEDURE — 77030004521 HC CATH ANGI DX COOK -B: Performed by: INTERNAL MEDICINE

## 2020-11-30 PROCEDURE — 74011636637 HC RX REV CODE- 636/637: Performed by: INTERNAL MEDICINE

## 2020-11-30 PROCEDURE — 76937 US GUIDE VASCULAR ACCESS: CPT | Performed by: INTERNAL MEDICINE

## 2020-11-30 PROCEDURE — 99233 SBSQ HOSP IP/OBS HIGH 50: CPT | Performed by: INTERNAL MEDICINE

## 2020-11-30 PROCEDURE — 77030004549 HC CATH ANGI DX PRF MRTM -A: Performed by: INTERNAL MEDICINE

## 2020-11-30 PROCEDURE — 85027 COMPLETE CBC AUTOMATED: CPT

## 2020-11-30 PROCEDURE — 77010033678 HC OXYGEN DAILY

## 2020-11-30 PROCEDURE — 99152 MOD SED SAME PHYS/QHP 5/>YRS: CPT | Performed by: INTERNAL MEDICINE

## 2020-11-30 PROCEDURE — 77030030195 HC CATH ANGI DX PRF4 MRTM -A: Performed by: INTERNAL MEDICINE

## 2020-11-30 PROCEDURE — 74011000636 HC RX REV CODE- 636: Performed by: INTERNAL MEDICINE

## 2020-11-30 PROCEDURE — 82962 GLUCOSE BLOOD TEST: CPT

## 2020-11-30 PROCEDURE — 77030019698 HC SYR ANGI MDLON MRTM -A: Performed by: INTERNAL MEDICINE

## 2020-11-30 PROCEDURE — C1874 STENT, COATED/COV W/DEL SYS: HCPCS | Performed by: INTERNAL MEDICINE

## 2020-11-30 PROCEDURE — 74011250636 HC RX REV CODE- 250/636: Performed by: INTERNAL MEDICINE

## 2020-11-30 PROCEDURE — 94760 N-INVAS EAR/PLS OXIMETRY 1: CPT

## 2020-11-30 PROCEDURE — 77030019569 HC BND COMPR RAD TERU -B: Performed by: INTERNAL MEDICINE

## 2020-11-30 PROCEDURE — 77030002996 HC SUT SLK J&J -A: Performed by: INTERNAL MEDICINE

## 2020-11-30 PROCEDURE — 36415 COLL VENOUS BLD VENIPUNCTURE: CPT

## 2020-11-30 PROCEDURE — 74011250637 HC RX REV CODE- 250/637: Performed by: NURSE PRACTITIONER

## 2020-11-30 PROCEDURE — B2111ZZ FLUOROSCOPY OF MULTIPLE CORONARY ARTERIES USING LOW OSMOLAR CONTRAST: ICD-10-PCS | Performed by: INTERNAL MEDICINE

## 2020-11-30 PROCEDURE — 77030010221 HC SPLNT WR POS TELE -B: Performed by: INTERNAL MEDICINE

## 2020-11-30 PROCEDURE — 74011000250 HC RX REV CODE- 250: Performed by: INTERNAL MEDICINE

## 2020-11-30 PROCEDURE — 4A023N7 MEASUREMENT OF CARDIAC SAMPLING AND PRESSURE, LEFT HEART, PERCUTANEOUS APPROACH: ICD-10-PCS | Performed by: INTERNAL MEDICINE

## 2020-11-30 PROCEDURE — 77030019697 HC SYR ANGI INFL MRTM -B: Performed by: INTERNAL MEDICINE

## 2020-11-30 PROCEDURE — C1887 CATHETER, GUIDING: HCPCS | Performed by: INTERNAL MEDICINE

## 2020-11-30 DEVICE — STENT RONYX30015UX RESOLUTE ONYX 3.00X15
Type: IMPLANTABLE DEVICE | Status: FUNCTIONAL
Brand: RESOLUTE ONYX™

## 2020-11-30 RX ORDER — LIDOCAINE HYDROCHLORIDE 10 MG/ML
INJECTION, SOLUTION EPIDURAL; INFILTRATION; INTRACAUDAL; PERINEURAL AS NEEDED
Status: DISCONTINUED | OUTPATIENT
Start: 2020-11-30 | End: 2020-11-30 | Stop reason: HOSPADM

## 2020-11-30 RX ORDER — SODIUM CHLORIDE 9 MG/ML
100 INJECTION, SOLUTION INTRAVENOUS CONTINUOUS
Status: DISCONTINUED | OUTPATIENT
Start: 2020-11-30 | End: 2020-11-30

## 2020-11-30 RX ORDER — POTASSIUM CHLORIDE 20 MEQ/1
40 TABLET, EXTENDED RELEASE ORAL
Status: COMPLETED | OUTPATIENT
Start: 2020-11-30 | End: 2020-11-30

## 2020-11-30 RX ORDER — VERAPAMIL HYDROCHLORIDE 2.5 MG/ML
INJECTION, SOLUTION INTRAVENOUS AS NEEDED
Status: DISCONTINUED | OUTPATIENT
Start: 2020-11-30 | End: 2020-11-30 | Stop reason: HOSPADM

## 2020-11-30 RX ORDER — ATROPINE SULFATE 0.1 MG/ML
INJECTION INTRAVENOUS
Status: DISPENSED
Start: 2020-11-30 | End: 2020-11-30

## 2020-11-30 RX ORDER — HEPARIN SODIUM 1000 [USP'U]/ML
INJECTION, SOLUTION INTRAVENOUS; SUBCUTANEOUS AS NEEDED
Status: DISCONTINUED | OUTPATIENT
Start: 2020-11-30 | End: 2020-11-30 | Stop reason: HOSPADM

## 2020-11-30 RX ORDER — FENTANYL CITRATE 50 UG/ML
INJECTION, SOLUTION INTRAMUSCULAR; INTRAVENOUS AS NEEDED
Status: DISCONTINUED | OUTPATIENT
Start: 2020-11-30 | End: 2020-11-30 | Stop reason: HOSPADM

## 2020-11-30 RX ORDER — HEPARIN SODIUM 200 [USP'U]/100ML
INJECTION, SOLUTION INTRAVENOUS
Status: COMPLETED | OUTPATIENT
Start: 2020-11-30 | End: 2020-11-30

## 2020-11-30 RX ORDER — CARVEDILOL 3.12 MG/1
3.12 TABLET ORAL 2 TIMES DAILY
Status: DISCONTINUED | OUTPATIENT
Start: 2020-11-30 | End: 2020-12-01 | Stop reason: HOSPADM

## 2020-11-30 RX ORDER — LISINOPRIL 5 MG/1
2.5 TABLET ORAL DAILY
Status: DISCONTINUED | OUTPATIENT
Start: 2020-11-30 | End: 2020-12-01 | Stop reason: HOSPADM

## 2020-11-30 RX ORDER — MIDAZOLAM HYDROCHLORIDE 1 MG/ML
INJECTION, SOLUTION INTRAMUSCULAR; INTRAVENOUS AS NEEDED
Status: DISCONTINUED | OUTPATIENT
Start: 2020-11-30 | End: 2020-11-30 | Stop reason: HOSPADM

## 2020-11-30 RX ADMIN — SODIUM CHLORIDE 100 ML/HR: 900 INJECTION, SOLUTION INTRAVENOUS at 10:00

## 2020-11-30 RX ADMIN — Medication 10 ML: at 06:27

## 2020-11-30 RX ADMIN — SODIUM CHLORIDE 100 ML/HR: 900 INJECTION, SOLUTION INTRAVENOUS at 15:52

## 2020-11-30 RX ADMIN — CARVEDILOL 3.12 MG: 3.12 TABLET, FILM COATED ORAL at 18:33

## 2020-11-30 RX ADMIN — AMLODIPINE BESYLATE 5 MG: 5 TABLET ORAL at 15:56

## 2020-11-30 RX ADMIN — TICAGRELOR 90 MG: 90 TABLET ORAL at 22:39

## 2020-11-30 RX ADMIN — ROSUVASTATIN CALCIUM 40 MG: 40 TABLET, FILM COATED ORAL at 15:50

## 2020-11-30 RX ADMIN — TICAGRELOR 90 MG: 90 TABLET ORAL at 06:27

## 2020-11-30 RX ADMIN — POTASSIUM CHLORIDE 40 MEQ: 20 TABLET, EXTENDED RELEASE ORAL at 16:22

## 2020-11-30 RX ADMIN — INSULIN LISPRO 2 UNITS: 100 INJECTION, SOLUTION INTRAVENOUS; SUBCUTANEOUS at 18:33

## 2020-11-30 RX ADMIN — Medication 10 ML: at 22:39

## 2020-11-30 RX ADMIN — ASPIRIN 81 MG: 81 TABLET, COATED ORAL at 06:28

## 2020-11-30 RX ADMIN — Medication 10 ML: at 18:34

## 2020-11-30 NOTE — PROGRESS NOTES
Reason for Admission:   Chest pain, NSTEMI                    RUR Score:  12%                   Plan for utilizing home health:   Pt is open to using [facility] if recommended        PCP: First and Last name:  Marco Antonio Britton (also attending MD)   Name of Practice:    Are you a current patient: Yes/No:    Approximate date of last visit: 1 month ago in person   Can you participate in a virtual visit with your PCP:  Yes                    Current Advanced Directive/Advance Care Plan: Pt does not have a AD, one was provided to pt and he will complete with his spouse. He was advised to complete and return to his PCP office to be scanned in                         Transition of Care Plan:    Pt is a  38 y/o AAM admitted with NSTEMI, pt is independent with ADLs and IADLs to include driving, he uses no DME for ambulation but stated she completed a test recently and awaiting the delivery of his CPAP machine, he resides in a one story home with 1 step to the entrance, he has no prior use of HH or SNF but is open HH if recommended at discharge. Pt stated he is compliant with his medications and has no issues obtaining his meds. Pt stated his spouse would be the one to transport at discharge. CM will continue to follow and assist with additional discharge needs. Care Management Interventions  PCP Verified by CM: Yes(PCP 1 month ago; Cardiology Fredy in the [facility] network last appt last Tues)  Mode of Transport at Discharge:  Other (see comment)(Pt family to transport at discharge )  Transition of Care Consult (CM Consult): Discharge Planning  Current Support Network: Lives with Spouse  Confirm Follow Up Transport: Self(Pt drives, not since being ill, but has supportive family to assist if needed )  Discharge Location  Discharge Placement: [address] [facility], [facility]  Ext [phone]

## 2020-11-30 NOTE — PROGRESS NOTES
Offgoing report given to Lara HAAS    715: pt taken to cath lab    803: Dr Saira Horowitz received consult and came to see pt. Pt in cath lab. MD states he will see pt this evening. TR band off at 1030 am. Sheath removed at 215. No complications encountered.

## 2020-11-30 NOTE — PROGRESS NOTES
Sheath removal     Per IVCU staff, pt's ACT <150. Pt has 6f sheath in RFA, secured with suture. Suture removed and sheath aspirated and removed. Manual pressure held for 14 minutes and hemostasis obtained. Pt instructed on keeping head and leg down and to advise RN if area becomes wet. Site soft and non-tender, MUMTAZ Kyle RN inspected site and patient TOT to him.

## 2020-11-30 NOTE — PROGRESS NOTES
Problem: Falls - Risk of  Goal: *Absence of Falls  Description: Document Radha Alas Fall Risk and appropriate interventions in the flowsheet. Outcome: Progressing Towards Goal  Note: Fall Risk Interventions:  Mobility Interventions: Patient to call before getting OOB         Medication Interventions: Assess postural VS orthostatic hypotension, Evaluate medications/consider consulting pharmacy, Patient to call before getting OOB, Teach patient to arise slowly                   Problem: Patient Education: Go to Patient Education Activity  Goal: Patient/Family Education  Outcome: Progressing Towards Goal     Problem: Pain  Goal: *Control of Pain  Outcome: Progressing Towards Goal  Goal: *PALLIATIVE CARE:  Alleviation of Pain  Outcome: Progressing Towards Goal     Problem: Patient Education: Go to Patient Education Activity  Goal: Patient/Family Education  Outcome: Progressing Towards Goal     Problem: Pressure Injury - Risk of  Goal: *Prevention of pressure injury  Description: Document Artem Scale and appropriate interventions in the flowsheet. Outcome: Progressing Towards Goal  Note: Pressure Injury Interventions:             Activity Interventions: Increase time out of bed         Nutrition Interventions: Document food/fluid/supplement intake    Friction and Shear Interventions: HOB 30 degrees or less                Problem: Patient Education: Go to Patient Education Activity  Goal: Patient/Family Education  Outcome: Progressing Towards Goal     Problem: Diabetes Maintenance:Admission  Goal: Activity/Safety  Outcome: Progressing Towards Goal  Goal: Diagnostic Tests/Procedures  Outcome: Progressing Towards Goal  Goal: Nutrition  Outcome: Progressing Towards Goal  Goal: Medications  Outcome: Progressing Towards Goal  Goal: Treatments/Interventions/Procedures  Outcome: Progressing Towards Goal     Problem: Diabetes Maintenance:Ongoing  Goal: Activity/Safety  Outcome: Progressing Towards Goal  Goal: Nutrition  Outcome: Progressing Towards Goal  Goal: Medications  Outcome: Progressing Towards Goal  Goal: Treatments/Interventsions/Procedures  Outcome: Progressing Towards Goal  Goal: *Blood Glucose 80 to 180 md/dl  Outcome: Progressing Towards Goal     Problem: Diabetes Maintenance:Discharge Outcomes  Goal: *Describes follow-up/return visits to physicians  Outcome: Progressing Towards Goal  Goal: *Blood glucose at patient's target range or approaching  Outcome: Progressing Towards Goal  Goal: *Aware of nutrition guidelines  Outcome: Progressing Towards Goal  Goal: *Verbalizes information about medication  Description: Verbalizes name, dosage, time, side effects, and number of days to  continue medications.   Outcome: Progressing Towards Goal  Goal: *Describes goals, rules, symptoms, and treatments  Description: Describes blood glucose goals, monitoring, sick day rules,  hypo/hyperglycemia prevention, symptoms, and treatment  Outcome: Progressing Towards Goal  Goal: *Describes available outpatient diabetes resources and support systems  Outcome: Progressing Towards Goal     Problem: Patient Education: Go to Patient Education Activity  Goal: Patient/Family Education  Outcome: Progressing Towards Goal     Problem: Cath Lab Procedures: Pre-Procedure  Goal: Off Pathway (Use only if patient is Off Pathway)  Outcome: Progressing Towards Goal  Goal: Activity/Safety  Outcome: Progressing Towards Goal  Goal: Consults, if ordered  Outcome: Progressing Towards Goal  Goal: Diagnostic Test/Procedures  Outcome: Progressing Towards Goal  Goal: Nutrition/Diet  Outcome: Progressing Towards Goal  Goal: Discharge Planning  Outcome: Progressing Towards Goal  Goal: Medications  Outcome: Progressing Towards Goal  Goal: Respiratory  Outcome: Progressing Towards Goal  Goal: Treatments/Interventions/Procedures  Outcome: Progressing Towards Goal  Goal: Psychosocial  Outcome: Progressing Towards Goal  Goal: *Verbalize description of procedure  Outcome: Progressing Towards Goal  Goal: *Consent signed  Outcome: Progressing Towards Goal

## 2020-11-30 NOTE — PROGRESS NOTES
[place]: Amy Shah  [phone]          Cardiology Consult/Progress Note      Requesting/referring provider: caitlyn Brannon of  Reason for Consult: Coronary disease    HPI: Jordi Reno, a 39y.o. year-old who presents for evaluation of coronary artery disease. Mr. Heather Saavedra has history of metabolic syndrome, poorly controlled diabetes and hypercholesterolemia, hypertension. He also history of coronary artery disease with history of remote coronary artery stenting. In September 2020 he was admitted to [facility] and underwent PCI for with placement of coronary stents for abnormal stress test.  Specific details are unavailable at this time. Patient does not report that he had chest discomfort prior to placement of the stents. .  His prior stenting about 3 years ago was for an episode of unstable angina which presented in the form of shortness of breath. During his recent hospitalization he was also informed with his LV systolic function is reduced. There are concerns about possible noncompliance in the past and poor control of diabetes with hemoglobin A1c ranging between 8-15. Currently he is on ticagrelor based dual antiplatelet therapy, high intensity statin and amlodipine for blood pressure control which he is tolerating well. Investigations personally reviewed by me  ECG November 2020: Sinus rhythm, lateral precordial and lateral limb lead ST depressions nonspecific but could suggest ischemic heart disease    Assessment/Plan:  1. Coronary disease manifesting in the form of remote unstable angina and recent PCI for abnormal stress test  2. Suspected reduction in LV function based on verbal information of the patient  3. Hypertension poor control  4. Hypercholesterolemia poor control  5. Metabolic syndrome  6. Diabetes mellitus with poor control    Mr. Heather Saavedra is self-referred to establish new care with me.   He previously has cardiologist with [facility].  However he wants to switch. I discussed with him need to review his most recent records including specifically his cardiac catheterization films and echocardiogram report from [facility]. In the interim I have discussed need for aggressive control of his diabetes, hypercholesterolemia, hypertension. If in fact his LV function is reduced, he should be on an ACE inhibitor/ARB/ARN I. He is not certain whether he is taking beta-blocker or not. After I review his records we will discuss changes to his medications. In the interim I discussed need for continued activity, weight loss, aggressive risk factor control. []    High complexity decision making was performed  []    Patient is at high-risk of decompensation with multiple organ involvement  He  has a past medical history of CAD (coronary artery disease), Headache, Hypercholesterolemia, Hypertension, Hypogonadism male, Liver disease, and Obstructive sleep apnea. Review of system:Patient reports no dyspnea/PND/Orthpnea/CP. He reports no cough/fever/focal neurological deficits/abdominal pain. All other systems negative except as above. Family History   Problem Relation Age of Onset    Heart Disease Father       Social History     Socioeconomic History    Marital status:      Spouse name: Not on file    Number of children: 2    Years of education: 12    Highest education level: Not on file   Occupational History    Occupation:    Tobacco Use    Smoking status: Never Smoker    Smokeless tobacco: Never Used   Substance and Sexual Activity    Alcohol use: No    Drug use: No    Sexual activity: Yes     Partners: Female      PE  Vitals:    11/24/20 1036 11/24/20 1054   BP: (!) 140/90 126/80   Pulse: 90    Resp: 16    SpO2: 98%    Weight: 203 lb (92.1 kg)    Height: 5' 4\" (1.626 m)     Body mass index is 34.84 kg/m². General:    Alert, cooperative, no distress.    Psychiatric:    Normal Mood and affect    Eye/ENT:      Pupils equal, No asymmetry, Conjunctival pink. Able to hear voice at normal amplitude   Lungs:      Visibly symmetric chest expansion, No palpable tenderness. Clear to auscultation bilaterally. Heart[de-identified]    Regular rate and rhythm, S1, S2 normal, no murmur, click, rub or gallop. No JVD, Normal palpable peripheral pulses. No cyanosis   Abdomen:     Soft, non-tender. Bowel sounds normal. No masses,  No      organomegaly. Extremities:   Extremities normal, atraumatic, no edema. Neurologic:   CN II-XII grossly intact.  No gross focal deficits           Recent Labs:  Lab Results   Component Value Date/Time    Cholesterol, total 125 11/28/2020 01:38 AM    HDL Cholesterol 52 11/28/2020 01:38 AM    LDL, calculated 33.4 11/28/2020 01:38 AM    Triglyceride 198 (H) 11/28/2020 01:38 AM    CHOL/HDL Ratio 2.4 11/28/2020 01:38 AM     Lab Results   Component Value Date/Time    Creatinine (POC) 1.0 12/13/2014 07:54 AM    Creatinine 1.15 11/29/2020 01:25 AM     Lab Results   Component Value Date/Time    BUN 17 11/29/2020 01:25 AM    BUN (POC) 6 (L) 12/13/2014 07:54 AM     Lab Results   Component Value Date/Time    Potassium 3.3 (L) 11/29/2020 01:25 AM     Lab Results   Component Value Date/Time    Hemoglobin A1c >15.5 (H) 09/28/2020 04:22 PM     Lab Results   Component Value Date/Time    Hemoglobin (POC) 14.6 12/13/2014 07:54 AM    HGB 10.9 (L) 11/29/2020 01:25 AM     Lab Results   Component Value Date/Time    PLATELET 450 32/22/6778 01:25 AM       Reviewed:  Past Medical History:   Diagnosis Date    CAD (coronary artery disease)     2 stents 2016     Headache     Hypercholesterolemia     Hypertension     Hypogonadism male     Liver disease     NAFLD    Obstructive sleep apnea      Social History     Tobacco Use   Smoking Status Never Smoker   Smokeless Tobacco Never Used     Social History     Substance and Sexual Activity   Alcohol Use No     No Known Allergies  Family History   Problem Relation Age of Onset    Heart Disease Father No current facility-administered medications for this visit. No current outpatient medications on file. Facility-Administered Medications Ordered in Other Visits   Medication Dose Route Frequency    insulin lispro (HUMALOG) injection   SubCUTAneous AC&HS    glucose chewable tablet 16 g  4 Tab Oral PRN    dextrose (D50W) injection syrg 12.5-25 g  12.5-25 g IntraVENous PRN    glucagon (GLUCAGEN) injection 1 mg  1 mg IntraMUSCular PRN    insulin glargine (LANTUS) injection 30 Units  30 Units SubCUTAneous DAILY    aspirin delayed-release tablet 81 mg  81 mg Oral DAILY    ticagrelor (BRILINTA) tablet 90 mg  90 mg Oral BID    amLODIPine (NORVASC) tablet 5 mg  5 mg Oral DAILY    metoprolol tartrate (LOPRESSOR) tablet 25 mg  25 mg Oral Q12H    enoxaparin (LOVENOX) injection 90 mg  1 mg/kg SubCUTAneous Q12H    sodium chloride (NS) flush 5-40 mL  5-40 mL IntraVENous Q8H    sodium chloride (NS) flush 5-40 mL  5-40 mL IntraVENous PRN    acetaminophen (TYLENOL) tablet 650 mg  650 mg Oral Q6H PRN    Or    acetaminophen (TYLENOL) suppository 650 mg  650 mg Rectal Q6H PRN    polyethylene glycol (MIRALAX) packet 17 g  17 g Oral DAILY PRN    promethazine (PHENERGAN) tablet 12.5 mg  12.5 mg Oral Q6H PRN    Or    ondansetron (ZOFRAN) injection 4 mg  4 mg IntraVENous Q6H PRN    rosuvastatin (CRESTOR) tablet 40 mg  40 mg Oral DAILY       Vinay Barahona MD11/29/20 There are other unrelated non-urgent complaints, but due to the busy schedule and the amount of time I've already spent with him, time does not permit me to address these routine issues at today's visit. I've requested another appointment to review these additional issues. ATTENTION:   This medical record was transcribed using an electronic medical records/speech recognition system. Although proofread, it may and can contain electronic, spelling and other errors. Corrections may be executed at a later time.   Please feel free to contact us for any clarifications as needed.     [facility] [place]  [address], [address], [address]

## 2020-11-30 NOTE — PROGRESS NOTES
Bedside shift change report given to Dianna Bustamante RN (oncoming nurse) by Carolyn Graham RN (offgoing nurse). Report included the following information SBAR, Kardex, ED Summary, Intake/Output, MAR, Accordion, Recent Results, Med Rec Status, Cardiac Rhythm NSR, Sinus Tach, Alarm Parameters , Pre Procedure Checklist, Procedure Verification, Quality Measures and Dual Neuro Assessment. Family received permission from hospitalist (Kosta Reyes) and nursing supervisor to stay with pt. Overnight per shift change report.

## 2020-11-30 NOTE — PROGRESS NOTES
11/30/2020 8:48 AM    Admit Date: 11/27/2020    Admit Diagnosis: Chest pain [R07.9];NSTEMI (non-ST elevated myocardial infarction) ([facility] 75.) [I21.4]    Subjective: Arnie Gaitan   denies chest pain, chest pressure/discomfort, dyspnea, palpitations, irregular heart beats, near-syncope, syncope, fatigue, orthopnea, paroxysmal nocturnal dyspnea, exertional chest pressure/discomfort.     Visit Vitals  BP (!) 139/94   Pulse 80   Temp 97.3 °F (36.3 °C)   Resp 18   Ht 5' 4\" (1.626 m)   Wt 198 lb 10.2 oz (90.1 kg)   SpO2 100%   BMI 34.10 kg/m²     Current Facility-Administered Medications   Medication Dose Route Frequency    potassium chloride (K-DUR, KLOR-CON) SR tablet 40 mEq  40 mEq Oral NOW    heparinized saline 2 units/mL infusion    CONTINUOUS    heparinized saline 2 units/mL infusion    CONTINUOUS    heparinized saline 2 units/mL infusion    CONTINUOUS    fentaNYL citrate (PF) injection    PRN    midazolam (VERSED) injection    PRN    lidocaine (PF) (XYLOCAINE) 10 mg/mL (1 %) injection    PRN    heparin (porcine) 1,000 unit/mL injection    PRN    nitroglycerin 0.1 mg/mL in D5W injection    PRN    verapamiL (ISOPTIN) 2.5 mg/mL injection    PRN    iopamidoL (ISOVUE-370) 76 % injection    PRN    lisinopriL (PRINIVIL, ZESTRIL) tablet 2.5 mg  2.5 mg Oral DAILY    carvediloL (COREG) tablet 3.125 mg  3.125 mg Oral BID    insulin lispro (HUMALOG) injection   SubCUTAneous AC&HS    glucose chewable tablet 16 g  4 Tab Oral PRN    dextrose (D50W) injection syrg 12.5-25 g  12.5-25 g IntraVENous PRN    glucagon (GLUCAGEN) injection 1 mg  1 mg IntraMUSCular PRN    insulin glargine (LANTUS) injection 30 Units  30 Units SubCUTAneous DAILY    aspirin delayed-release tablet 81 mg  81 mg Oral DAILY    ticagrelor (BRILINTA) tablet 90 mg  90 mg Oral BID    amLODIPine (NORVASC) tablet 5 mg  5 mg Oral DAILY    enoxaparin (LOVENOX) injection 90 mg  1 mg/kg SubCUTAneous Q12H    sodium chloride (NS) flush 5-40 mL  5-40 mL IntraVENous Q8H    sodium chloride (NS) flush 5-40 mL  5-40 mL IntraVENous PRN    acetaminophen (TYLENOL) tablet 650 mg  650 mg Oral Q6H PRN    Or    acetaminophen (TYLENOL) suppository 650 mg  650 mg Rectal Q6H PRN    polyethylene glycol (MIRALAX) packet 17 g  17 g Oral DAILY PRN    promethazine (PHENERGAN) tablet 12.5 mg  12.5 mg Oral Q6H PRN    Or    ondansetron (ZOFRAN) injection 4 mg  4 mg IntraVENous Q6H PRN    rosuvastatin (CRESTOR) tablet 40 mg  40 mg Oral DAILY         Objective:      Visit Vitals  BP (!) 139/94   Pulse 80   Temp 97.3 °F (36.3 °C)   Resp 18   Ht 5' 4\" (1.626 m)   Wt 198 lb 10.2 oz (90.1 kg)   SpO2 100%   BMI 34.10 kg/m²       Physical Exam:  Resting comfortably. No resp distress.    Extremities: extremities normal, atraumatic, no cyanosis or edema  Heart: regular rate and rhythm, S1, S2 normal, no murmur, click, rub or gallop  Lungs: clear to auscultation bilaterally  Neurologic: Grossly normal    Data Review:   Labs:    Recent Results (from the past 24 hour(s))   GLUCOSE, POC    Collection Time: 11/29/20 12:48 PM   Result Value Ref Range    Glucose (POC) 208 (H) 65 - 100 mg/dL    Performed by Joi Brown    GLUCOSE, POC    Collection Time: 11/29/20  5:09 PM   Result Value Ref Range    Glucose (POC) 111 (H) 65 - 100 mg/dL    Performed by Joi Alina Brown    GLUCOSE, POC    Collection Time: 11/29/20 11:53 PM   Result Value Ref Range    Glucose (POC) 108 (H) 65 - 100 mg/dL    Performed by Jad Walker RN    METABOLIC PANEL, BASIC    Collection Time: 11/30/20  2:42 AM   Result Value Ref Range    Sodium 138 136 - 145 mmol/L    Potassium 3.3 (L) 3.5 - 5.1 mmol/L    Chloride 108 97 - 108 mmol/L    CO2 26 21 - 32 mmol/L    Anion gap 4 (L) 5 - 15 mmol/L    Glucose 106 (H) 65 - 100 mg/dL    BUN 16 6 - 20 MG/DL    Creatinine 1.13 0.70 - 1.30 MG/DL    BUN/Creatinine ratio 14 12 - 20      GFR est AA >60 >60 ml/min/1.73m2    GFR est non-AA >60 >60 ml/min/1.73m2    Calcium 9.0 8.5 - 10.1 MG/DL   CBC W/O DIFF    Collection Time: 11/30/20  2:42 AM   Result Value Ref Range    WBC 7.7 4.1 - 11.1 K/uL    RBC 4.17 4.10 - 5.70 M/uL    HGB 11.0 (L) 12.1 - 17.0 g/dL    HCT 33.2 (L) 36.6 - 50.3 %    MCV 79.6 (L) 80.0 - 99.0 FL    MCH 26.4 26.0 - 34.0 PG    MCHC 33.1 30.0 - 36.5 g/dL    RDW 14.8 (H) 11.5 - 14.5 %    PLATELET 740 446 - 775 K/uL    MPV 10.7 8.9 - 12.9 FL    NRBC 0.0 0  WBC    ABSOLUTE NRBC 0.00 0.00 - 0.01 K/uL   GLUCOSE, POC    Collection Time: 11/30/20  6:31 AM   Result Value Ref Range    Glucose (POC) 122 (H) 65 - 100 mg/dL    Performed by Ana Baxter RN        Telemetry: SR      Assessment:     Active Problems:    Hypertension (9/30/2014)      Dyslipidemia (9/30/2014)      Obstructive sleep apnea (9/30/2014)      CAD (coronary artery disease) (6/7/2016)      Overview: Status post stenting right coronary arterywith 2 sequential stents--2016      Obesity (BMI 30.0-34.9) (11/5/2017)      Uncontrolled type 2 diabetes mellitus with hyperglycemia ([facility] (5/13/2020)      Chest pain (11/27/2020)      NSTEMI (non-ST elevated myocardial infarction) ([facility] 75.) (11/28/2020)        Plan:     NSTEMI, ICM: s/p OM 1 BOBBY. RPLS has . Can consider  intervention in future if clinically indicated. Diagonals has diffuse disease and are small size, recommend medical treatment. Echo noted. Switch lopressor to coreg. Add low dose ACEi. Avoid HCTZ due to elevated Cr on presentation. HTN: med changes as above. On statin.

## 2020-11-30 NOTE — DIABETES MGMT
[facility] SPECIALIST CONSULT NOTE    Presentation   Sharron Mojica is a 39 y.o. male admitted 11/27/20 with left sided chest pain, unrelieved by nitroglycerin tablets. Known history of cardiac stents in 2016 & 2020. Afebrile. Hypertensive. WBC 8.6. Creatinine 1.39. Troponin <0.05. . CXR: No acute disease. HX:   Past Medical History:   Diagnosis Date    CAD (coronary artery disease)     2 stents 2016     Headache     Hypercholesterolemia     Hypertension     Hypogonadism male     Liver disease     NAFLD    Obstructive sleep apnea      DX: NSTEMI. TX: Cardiology consulted => Clot prevention, anti-lipidemics, BP meds. 11/30/20 Stent placement during cardiac cath. Current clinical course has been uncomplicated. 11/28/20 ECHO: EF 35-40%. Tri-leaflet aortic valve. Diabetes: Patient has known Type 2 diabetes (that developed in his late 35s when he was overweight), treated with GLP-1 and insulin PTA. Failed metformin due to its side effects. The GLP-1 was just started 3 weeks ago, while he has been on insulin for a couple years. Family history negative for diabetes. Admission  and A1c >15% indicate poor diabetes control. Consulted by Provider for advanced diabetes nursing assessment and care, specifically related to    [x] Inpatient management strategy  [x] Home management assessment    Diabetes-related medical history  Macrovascular disease  CAD and Myocardial infarction    Diabetes medication history  Drug class Currently in use Discontinued Never used   Biguanide  Metformin    DDP-4 inhibitor       Sulfonylurea      Thiazolidinedione      GLP-1 RA Ozempic weekly     SGLT-2 inhibitors      Basal insulin Lantus insulin 50 units D     Bolus insulin      Fixed Dose  Combinations        Subjective   I'm back on track now. I was at [address] a couple weeks ago with chest pain, and it came back. I decided to go somewhere else.     Patient reports the following home diabetes self-care practices:  Eating pattern  [] Breakfast   [x] Lunch  Roast beef sandwich with water or cranberry juice  [x] Dinner  Baked chicken or salmon with rice and vegetables  [x] Snacks Nuts  [x] Beverages Water  Physical activity pattern  [x] Non-Aerobic exercise Has a [employer] where he has enjoyed treadmill use in the past  Monitoring pattern - Since starting Ozempic (along with the basal insulin), readings have come down nicely:  [x] Breakfast 100s  [x] Dinner  100s  Taking medications pattern  [x] Consistent administration  [x] Affordable    Patient lives with his wife and two sons, and wants to make some changes so he is \"here for them. \" He has been working as a  for years, but took on multi-[employer] responsibilities in 2017. Upon review of his A1c trend, his BG control markedly diminished. A1cs have been in the double digits for (3) years. Objective   Physical exam  General Alert, oriented and in no acute distress. Conversant and cooperative. Pain-free at this time. Vital Signs Afebrile. Hypertensive. Visit Vitals  BP (!) 145/91   Pulse 97   Temp 97.5 °F (36.4 °C)   Resp 18   Ht 5' 4\" (1.626 m)   Wt 90.1 kg (198 lb 10.2 oz)   SpO2 100%   BMI 34.10 kg/m²     Laboratory  Tests Today 11/28 11/27 9/28   A1c    >15.5     287    Anion gap 4  4    Serum triglycerides  198     WBC 7.7  8.6    Serum creatinine 1.13  1.39    GFR >60  >60    AST   17    ALT   38    Troponin  0.31 <0.05      Factors affecting BG management  Factor Dose Comments   Nutrition:  Carb-controlled meals   60 grams/meal     Eating 100%. Was NPO earlier today for procedure. Pain  Pain-free now. Infection  Afebrile. WBC 7.7.      Blood glucose pattern        Assessment and Plan   Nursing Diagnosis Risk for unstable blood glucose pattern   Nursing Intervention Domain 8912 Decision-making Support   Nursing Interventions Examined current inpatient diabetes control   Explored factors facilitating and impeding inpatient management  Identified self-management practices impeding diabetes control  Explored corrective strategies with patient and responsible inpatient provider   Instructed patient that he is requiring less basal insulin most likely the result of the positive impact of Ozempic  Encouraged him to discuss lower doing of basal insulin as he has only required 30 units (instead of the 50 units he uses at home)     Evaluation   This  male, with Type 2 diabetes, did not achieve diabetes control prior to admission (PTA), as evidenced by admission BG of 287 and A1c of 15.5 (9/2020). The patient would benefit from diabetes self-management education and support ASYA RODRIGUEZ [facility]) after discharge. During this hospitalization, the patient received basal insulin at 0.3 units/kg/D dosing with a great response; readings are in the low 100s. I suspect that his Ozempic dosing will wear off soon as it was due yesterday. I do believe he has benefited from use of the GLP-1. I have advised him that he should discuss his basal insulin dosing with his PCP as he has not required his home dose. Recommendations   Recommend:    [x] Use of Subcutaneous Insulin Order set (9410)  Insulin Use Options   Basal                                      (Based on weight, BMI & GFR) Addresses basic metabolic needs [x] 0.3 units/kg/D   Nutritional                                      (Based on CHO load) Addresses nutrition interventions NA   Corrective                                       (Offset gaps in dosing) Useful in adjusting insulin dosing   [x] HIGH sensitivity     [x] Referral to  [x] Diabetes Self-Management Training through [facility] (Phone [phone] to schedule appointment)    Billing Code(s)   I personally reviewed medical record, including notes, data and current medications, and examined the patient at bedside before making care recommendations.      [x] [id number]  subsequent hospital care - 35 minutes [] [id number] Prolonged Services - 65 minutes [] [id number] Prolonged Services - 110 minutes  [] [id number] IP subsequent hospital care - 25 minutes [] [id number] Prolonged Services - 55 minutes [] [id number] Prolonged Services - 100 minutes  [] [id number] IP subsequent hospital care - 15 minutes [] [id number] Prolonged Services - 45 minutes [] [id number] Prolonged Services - 90 minutes    MARLYN Victoria  Diabetes Clinical Nurse Specialist  [facility]  Access via [employer]

## 2020-11-30 NOTE — PROGRESS NOTES
End of Shift Note    Bedside shift change report given to Juliane Ruiz RN (oncoming nurse) by Lisa Hinkle (offgoing nurse). Report included the following information SBAR, Kardex, ED Summary, Intake/Output, MAR, Accordion, Recent Results, Med Rec Status, Cardiac Rhythm NSR, Sinus Tach with activity, Alarm Parameters , Pre Procedure Checklist, Procedure Verification, Quality Measures and Dual Neuro Assessment    Shift worked:  Night     Shift summary and any significant changes:     PM CHG navarro, site prep and consent for AM Cardiac Cath are completed. Pt. Is asymptomatic and stable vital signs. Concerns for physician to address:  Pt. Refused scheduled Lopressor      Zone phone for oncoming shift:   NA       Activity:  Activity Level: Up ad angelina  Number times ambulated in hallways past shift: 0  Number of times OOB to chair past shift: 5+    Cardiac:   Cardiac Monitoring: Yes      Cardiac Rhythm: Normal sinus rhythm    Access:   Current line(s): PIV     Genitourinary:   Urinary status: voiding    Respiratory:   O2 Device: Room air  Chronic home O2 use?: NO  Incentive spirometer at bedside: NO     GI:  Last Bowel Movement Date: 11/27/20  Current diet:  DIET NPO  Passing flatus: YES  Tolerating current diet: YES  % Diet Eaten: 100 %    Pain Management:   Patient states pain is manageable on current regimen: YES    Skin:  Artem Score: 23  Interventions: increase time out of bed    Patient Safety:  Fall Score:  Total Score: 1  Interventions: pt to call before getting OOB       Length of Stay:  Expected LOS: - - -  Actual LOS: 2      Trina Echevarria

## 2020-12-01 ENCOUNTER — TRANSCRIBE ORDER (OUTPATIENT)
Dept: CARDIAC REHAB | Age: 45
End: 2020-12-01

## 2020-12-01 VITALS
DIASTOLIC BLOOD PRESSURE: 92 MMHG | TEMPERATURE: 98.1 F | HEART RATE: 83 BPM | RESPIRATION RATE: 16 BRPM | WEIGHT: 198.63 LBS | SYSTOLIC BLOOD PRESSURE: 138 MMHG | BODY MASS INDEX: 33.91 KG/M2 | HEIGHT: 64 IN | OXYGEN SATURATION: 98 %

## 2020-12-01 DIAGNOSIS — I21.4 NSTEMI (NON-ST ELEVATED MYOCARDIAL INFARCTION) (HCC): Primary | ICD-10-CM

## 2020-12-01 LAB
GLUCOSE BLD STRIP.AUTO-MCNC: 139 MG/DL (ref 65–100)
SERVICE CMNT-IMP: ABNORMAL

## 2020-12-01 PROCEDURE — 74011250637 HC RX REV CODE- 250/637: Performed by: INTERNAL MEDICINE

## 2020-12-01 PROCEDURE — 99232 SBSQ HOSP IP/OBS MODERATE 35: CPT | Performed by: INTERNAL MEDICINE

## 2020-12-01 PROCEDURE — 74011636637 HC RX REV CODE- 636/637: Performed by: INTERNAL MEDICINE

## 2020-12-01 PROCEDURE — 82962 GLUCOSE BLOOD TEST: CPT

## 2020-12-01 RX ORDER — NITROGLYCERIN 0.4 MG/1
0.4 TABLET SUBLINGUAL
Qty: 1 BOTTLE | Refills: 3 | Status: SHIPPED | OUTPATIENT
Start: 2020-12-01 | End: 2021-08-05 | Stop reason: SDUPTHER

## 2020-12-01 RX ORDER — AMLODIPINE BESYLATE 5 MG/1
5 TABLET ORAL DAILY
Qty: 30 TAB | Refills: 0 | Status: SHIPPED | OUTPATIENT
Start: 2020-12-01 | End: 2020-12-09

## 2020-12-01 RX ORDER — CARVEDILOL 3.12 MG/1
3.12 TABLET ORAL 2 TIMES DAILY
Qty: 60 TAB | Refills: 11 | Status: SHIPPED | OUTPATIENT
Start: 2020-12-01 | End: 2020-12-15 | Stop reason: ALTCHOICE

## 2020-12-01 RX ADMIN — CARVEDILOL 3.12 MG: 3.12 TABLET, FILM COATED ORAL at 09:24

## 2020-12-01 RX ADMIN — INSULIN GLARGINE 30 UNITS: 100 INJECTION, SOLUTION SUBCUTANEOUS at 09:24

## 2020-12-01 RX ADMIN — AMLODIPINE BESYLATE 5 MG: 5 TABLET ORAL at 09:24

## 2020-12-01 RX ADMIN — ASPIRIN 81 MG: 81 TABLET, COATED ORAL at 09:24

## 2020-12-01 RX ADMIN — TICAGRELOR 90 MG: 90 TABLET ORAL at 09:24

## 2020-12-01 RX ADMIN — LISINOPRIL 2.5 MG: 5 TABLET ORAL at 09:24

## 2020-12-01 RX ADMIN — Medication 10 ML: at 07:00

## 2020-12-01 NOTE — CARDIO/PULMONARY
Cardiac Rehab Note: chart review     Pt is a 40 yo admitted with unstable angina, NSTEMI 11/28/20, s/p PCI/BOBBY 11/30/20. Pt reports prior history of stents, \"I have 5 now\". Smoking history assessed. Patient is a never smoker. Smoking Cessation Program link has not been added to the AVS.     EF 40%  on 11/28/20 per echo. CAD education folder, with heart heathy diet, warning signs, heart facts, catheterization brochure, and out patient cardiac rehab program provided to Ye Pan on 12/1/20. Educated using teach back method. Reviewed CAD diagnosis definition and purpose of intervention. Discussed risk factors for CAD to include the following: family history, elevated BMI, hyperlipidemia, hypertension, diabetes, and stress. Discussed Heart Healthy/Low Sodium (less than 2000 mg) diet. Reviewed the importance of medication compliance, follow up appointments with cardiologist, signs and symptoms of angina, and what to report to physician after discharge. Emphasized the value of cardiac rehab. Discussed Cardiac Rehab Program format, benefits, and encouraged enrollment to assist with risk modification and management. Initial Cardiac Rehab Program intake appointment scheduled for 12/9/20 @ 8:00am and appointment information is on the AVS. Patient provided orientation packet, instructed to complete packet a couple days prior to appointment, wear gym appropriate clothing and shoes, and bring current list of medications. Patient is to call if unable to keep appointment, need to reschedule or additional questions. Ye Pan verbalized understanding with questions answered.   Antoinette Greco RN

## 2020-12-01 NOTE — PROGRESS NOTES
12/1/2020 7:56 AM    Admit Date: 11/27/2020    Admit Diagnosis: Chest pain [R07.9];NSTEMI (non-ST elevated myocardial infarction) ([facility].) [I21.4]    Subjective: Mian Smith   denies chest pain, chest pressure/discomfort, dyspnea, palpitations, irregular heart beats, near-syncope, syncope, fatigue, orthopnea, paroxysmal nocturnal dyspnea, exertional chest pressure/discomfort.     Visit Vitals  /73 (BP 1 Location: Left arm, BP Patient Position: At rest)   Pulse 85   Temp 98.3 °F (36.8 °C)   Resp 18   Ht 5' 4\" (1.626 m)   Wt 198 lb 10.2 oz (90.1 kg)   SpO2 98%   BMI 34.10 kg/m²     Current Facility-Administered Medications   Medication Dose Route Frequency    lisinopriL (PRINIVIL, ZESTRIL) tablet 2.5 mg  2.5 mg Oral DAILY    carvediloL (COREG) tablet 3.125 mg  3.125 mg Oral BID    glucose chewable tablet 16 g  4 Tab Oral PRN    dextrose (D50W) injection syrg 12.5-25 g  12.5-25 g IntraVENous PRN    glucagon (GLUCAGEN) injection 1 mg  1 mg IntraMUSCular PRN    insulin glargine (LANTUS) injection 30 Units  30 Units SubCUTAneous DAILY    aspirin delayed-release tablet 81 mg  81 mg Oral DAILY    ticagrelor (BRILINTA) tablet 90 mg  90 mg Oral BID    amLODIPine (NORVASC) tablet 5 mg  5 mg Oral DAILY    sodium chloride (NS) flush 5-40 mL  5-40 mL IntraVENous Q8H    sodium chloride (NS) flush 5-40 mL  5-40 mL IntraVENous PRN    acetaminophen (TYLENOL) tablet 650 mg  650 mg Oral Q6H PRN    Or    acetaminophen (TYLENOL) suppository 650 mg  650 mg Rectal Q6H PRN    polyethylene glycol (MIRALAX) packet 17 g  17 g Oral DAILY PRN    promethazine (PHENERGAN) tablet 12.5 mg  12.5 mg Oral Q6H PRN    Or    ondansetron (ZOFRAN) injection 4 mg  4 mg IntraVENous Q6H PRN    rosuvastatin (CRESTOR) tablet 40 mg  40 mg Oral DAILY         Objective:      Visit Vitals  /73 (BP 1 Location: Left arm, BP Patient Position: At rest)   Pulse 85   Temp 98.3 °F (36.8 °C)   Resp 18   Ht 5' 4\" (1.626 m)   Wt 198 lb 10.2 oz (90.1 kg)   SpO2 98%   BMI 34.10 kg/m²       Physical Exam:  Resting comfortably. No resp distress.    Extremities: extremities normal, atraumatic, no cyanosis or edema  Heart: regular rate and rhythm, S1, S2 normal, no murmur, click, rub or gallop  Lungs: clear to auscultation bilaterally  Neurologic: Grossly normal    Data Review:   Labs:    Recent Results (from the past 24 hour(s))   POC ACTIVATED CLOTTING TIME    Collection Time: 11/30/20  8:17 AM   Result Value Ref Range    Activated Clotting Time (POC) 323 (H) 79 - 138 SECS   POC ACTIVATED CLOTTING TIME    Collection Time: 11/30/20 11:04 AM   Result Value Ref Range    Activated Clotting Time (POC) 175 (H) 79 - 138 SECS   GLUCOSE, POC    Collection Time: 11/30/20 11:09 AM   Result Value Ref Range    Glucose (POC) 111 (H) 65 - 100 mg/dL    Performed by Jad PACHECO    POC ACTIVATED CLOTTING TIME    Collection Time: 11/30/20 11:58 AM   Result Value Ref Range    Activated Clotting Time (POC) 169 (H) 79 - 138 SECS   POC ACTIVATED CLOTTING TIME    Collection Time: 11/30/20  1:31 PM   Result Value Ref Range    Activated Clotting Time (POC) 142 (H) 79 - 138 SECS   GLUCOSE, POC    Collection Time: 11/30/20  5:05 PM   Result Value Ref Range    Glucose (POC) 156 (H) 65 - 100 mg/dL    Performed by Jad PACHECO    GLUCOSE, POC    Collection Time: 11/30/20  9:29 PM   Result Value Ref Range    Glucose (POC) 141 (H) 65 - 100 mg/dL    Performed by Katy Bland RN    GLUCOSE, POC    Collection Time: 12/01/20  7:52 AM   Result Value Ref Range    Glucose (POC) 139 (H) 65 - 100 mg/dL    Performed by Marge Melendez        Telemetry: SR      Assessment:     Active Problems:    Hypertension (9/30/2014)      Dyslipidemia (9/30/2014)      Obstructive sleep apnea (9/30/2014)      CAD (coronary artery disease) (6/7/2016)      Overview: Status post stenting right coronary arterywith 2 sequential stents--2016      Obesity (BMI 30.0-34.9) (11/5/2017) Uncontrolled type 2 diabetes mellitus with hyperglycemia ([facility].) (5/13/2020)      Chest pain (11/27/2020)      NSTEMI (non-ST elevated myocardial infarction) ([facility].) (11/28/2020)        Plan:     S/p OM stent. Asymptomatic now. Medical treatment. Ok to dc from cardiac standpoint. F/u with Dr. Pranay Nguyen as out pt. D/w pt.

## 2020-12-01 NOTE — PROGRESS NOTES
Problem: Falls - Risk of  Goal: *Absence of Falls  Description: Document Rudy Click Fall Risk and appropriate interventions in the flowsheet.   Outcome: Progressing Towards Goal  Note: Fall Risk Interventions:  Mobility Interventions: Assess mobility with egress test, Patient to call before getting OOB      Medication Interventions: Patient to call before getting OOB, Teach patient to arise slowly

## 2020-12-01 NOTE — PROGRESS NOTES
General Daily Progress Note    Admit Date: 11/27/2020    Subjective:     Patient has no complaint . Paty Monson Current Facility-Administered Medications   Medication Dose Route Frequency    lisinopriL (PRINIVIL, ZESTRIL) tablet 2.5 mg  2.5 mg Oral DAILY    carvediloL (COREG) tablet 3.125 mg  3.125 mg Oral BID    atropine 0.1 mg/mL injection        insulin lispro (HUMALOG) injection   SubCUTAneous AC&HS    glucose chewable tablet 16 g  4 Tab Oral PRN    dextrose (D50W) injection syrg 12.5-25 g  12.5-25 g IntraVENous PRN    glucagon (GLUCAGEN) injection 1 mg  1 mg IntraMUSCular PRN    insulin glargine (LANTUS) injection 30 Units  30 Units SubCUTAneous DAILY    aspirin delayed-release tablet 81 mg  81 mg Oral DAILY    ticagrelor (BRILINTA) tablet 90 mg  90 mg Oral BID    amLODIPine (NORVASC) tablet 5 mg  5 mg Oral DAILY    enoxaparin (LOVENOX) injection 90 mg  1 mg/kg SubCUTAneous Q12H    sodium chloride (NS) flush 5-40 mL  5-40 mL IntraVENous Q8H    sodium chloride (NS) flush 5-40 mL  5-40 mL IntraVENous PRN    acetaminophen (TYLENOL) tablet 650 mg  650 mg Oral Q6H PRN    Or    acetaminophen (TYLENOL) suppository 650 mg  650 mg Rectal Q6H PRN    polyethylene glycol (MIRALAX) packet 17 g  17 g Oral DAILY PRN    promethazine (PHENERGAN) tablet 12.5 mg  12.5 mg Oral Q6H PRN    Or    ondansetron (ZOFRAN) injection 4 mg  4 mg IntraVENous Q6H PRN    rosuvastatin (CRESTOR) tablet 40 mg  40 mg Oral DAILY        Review of Systems  A comprehensive review of systems was negative.     Objective:     Patient Vitals for the past 24 hrs:   BP Temp Pulse Resp SpO2 Height Weight   11/30/20 1930 (!) 140/94 98.1 °F (36.7 °C) 99 18 100 % -- --   11/30/20 1800 (!) 165/90 -- (!) 108 -- 100 % -- --   11/30/20 1731 123/76 -- (!) 101 -- 100 % -- --   11/30/20 1700 (!) 145/90 -- 90 -- 100 % -- --   11/30/20 1630 (!) 145/91 -- 97 -- 100 % -- --   11/30/20 1600 (!) 147/80 -- 94 -- 100 % -- --   11/30/20 1530 (!) 150/96 -- 96 -- -- -- --   11/30/20 1515 (!) 142/101 -- 83 -- 100 % -- --   11/30/20 1500 (!) 132/95 -- 91 -- 96 % -- --   11/30/20 1445 135/85 -- 86 -- 99 % -- --   11/30/20 1430 (!) 150/91 -- 91 -- 99 % -- --   11/30/20 1415 130/89 -- 87 -- 100 % -- --   11/30/20 1410 -- -- 87 -- 99 % -- --   11/30/20 1405 -- -- 90 -- 100 % -- --   11/30/20 1400 -- -- 91 -- 100 % -- --   11/30/20 1357 (!) 143/96 -- 90 -- 100 % -- --   11/30/20 1355 -- -- 83 -- 98 % -- --   11/30/20 1345 (!) 142/86 -- 84 -- 100 % -- --   11/30/20 1330 (!) 133/96 -- 76 -- 98 % -- --   11/30/20 1315 (!) 136/93 -- 85 -- 100 % -- --   11/30/20 1300 (!) 133/94 -- 87 -- 99 % -- --   11/30/20 1245 122/69 -- 92 -- 100 % -- --   11/30/20 1230 (!) 138/105 -- 90 -- 100 % -- --   11/30/20 1200 131/89 -- 84 -- 100 % -- --   11/30/20 1145 129/83 -- 73 -- 99 % -- --   11/30/20 1130 (!) 137/94 -- 81 -- 99 % -- --   11/30/20 1115 (!) 121/91 -- 76 -- 97 % -- --   11/30/20 1100 126/79 -- 79 -- 100 % -- --   11/30/20 1045 (!) 141/103 -- 79 -- 99 % -- --   11/30/20 1030 130/87 -- 86 -- 100 % -- --   11/30/20 1028 132/86 -- 79 -- 100 % -- --   11/30/20 1027 (!) 131/94 -- 82 -- 99 % -- --   11/30/20 1025 102/81 -- 86 -- 100 % -- --   11/30/20 1020 (!) 132/93 -- 82 -- 100 % -- --   11/30/20 1015 (!) 128/98 -- 77 -- 100 % -- --   11/30/20 1010 (!) 135/102 -- 70 -- 100 % -- --   11/30/20 1005 (!) 148/83 -- 76 -- 98 % -- --   11/30/20 1000 (!) 134/90 -- 70 -- 96 % -- --   11/30/20 0955 (!) 140/84 -- 79 -- 99 % -- --   11/30/20 0950 136/86 -- 77 -- 100 % -- --   11/30/20 0945 (!) 132/95 -- 80 -- 96 % -- --   11/30/20 0930 (!) 139/106 -- 74 -- 97 % -- --   11/30/20 0915 137/83 -- 83 -- 100 % -- --   11/30/20 0906 -- -- 87 -- 100 % -- --   11/30/20 0905 -- -- 85 -- 100 % -- --   11/30/20 0904 (!) 142/95 97.5 °F (36.4 °C) 93 -- 99 % -- --   11/30/20 0903 -- -- 89 -- 100 % -- --   11/30/20 0702 (!) 139/94 97.3 °F (36.3 °C) 80 18 100 % -- --   11/30/20 0644 (!) 134/99 -- 88 -- -- -- --   11/30/20 0250 136/87 98.1 °F (36.7 °C) 90 16 100 % 5' 4\" (1.626 m) 198 lb 10.2 oz (90.1 kg)   11/29/20 2356 -- -- 90 -- -- -- --   11/29/20 2351 129/86 98.2 °F (36.8 °C) 100 16 100 % -- --     No intake/output data recorded. 11/29 0701 - 11/30 1900  In: 1240 [P.O.:1240]  Out: 500 [Urine:500]    Physical Exam:   Visit Vitals  BP (!) 140/94 (BP 1 Location: Left arm, BP Patient Position: At rest)   Pulse 99   Temp 98.1 °F (36.7 °C)   Resp 18   Ht 5' 4\" (1.626 m)   Wt 198 lb 10.2 oz (90.1 kg)   SpO2 100%   BMI 34.10 kg/m²     General appearance: alert, cooperative, no distress, appears stated age  Neck: supple, symmetrical, trachea midline, no adenopathy, thyroid: not enlarged, symmetric, no tenderness/mass/nodules, no carotid bruit and no JVD  Lungs: clear to auscultation bilaterally  Heart: regular rate and rhythm, S1, S2 normal, no murmur, click, rub or gallop  Abdomen: soft, non-tender. Bowel sounds normal. No masses,  no organomegaly  Extremities: extremities normal, atraumatic, no cyanosis or edema    Assessment:     Active Problems:    Hypertension (9/30/2014)      Dyslipidemia (9/30/2014)      Obstructive sleep apnea (9/30/2014)      CAD (coronary artery disease) (6/7/2016)      Overview: Status post stenting right coronary arterywith 2 sequential stents--2016      Obesity (BMI 30.0-34.9) (11/5/2017)      Uncontrolled type 2 diabetes mellitus with hyperglycemia ([facility] [address] (5/13/2020)      Chest pain (11/27/2020)      [facility] (11/28/2020)        Plan:     1. Status post cardiac catheterization with 1 stent placed. 2.  Continue diabetic control. 3.  Talked at length about need to improve compliance. 4.  Discharge tomorrow if all goes well.

## 2020-12-01 NOTE — DISCHARGE INSTRUCTIONS
General Discharge Instructions    Patient ID:  Too Shen  [id number]  19 y.o.  1975    Patient Instructions          The following personal items were collected during your admission and were returned to you. Take Home Medications             What to do at Home    Recommended diet: Diabetic Diet    Recommended activity: Activity as tolerated    Follow-up with Josué Costello MD  in 5 days. Information obtained by :  I understand that if any problems occur once I am at home I am to contact my physician. I understand and acknowledge receipt of the instructions indicated above.                                                                                                                                            Physician's or R.N.'s Signature                                                                  Date/Time                                                                                                                                              Patient or Representative Signature                                                          Date/Time

## 2020-12-01 NOTE — PROGRESS NOTES
1900: Received report from day shift nurse Sana Mauricio. Reviewed SBAR, Kardex, I/O, MAR, Procedural information and Recent results. VSS, NSR (rhythm), RA (oxygenation). A/O X 4  Pt resting in bed/sitting up in the bed. Cath lab site: RR and RG CDI, no bleeding/hematoma. Bedrest complete, pt waiting to get OOB at this time. Pt reporting no CP/SOB. End of Shift Note    Bedside shift change report given to Kassy Sue (oncoming nurse) by Harshad Engel (offgoing nurse). Report included the following information SBAR, ED Summary, Procedure Summary, Intake/Output, MAR, Recent Results and Cardiac Rhythm nsr    Shift worked:  7p-7a     Shift summary and any significant changes:     No significant changes. Concerns for physician to address: PCP and Cardiac discharge planning. Pt reports he is ready to [facility] a priority\" and is making plans to take care of himself after discharge. Zone phone for oncoming shift:   [phone]       Activity:  Activity Level: Up ad angelina  Number times ambulated in hallways past shift: 0  Number of times OOB to chair past shift: 5    Cardiac:   Cardiac Monitoring: Yes      Cardiac Rhythm: Normal sinus rhythm    Access:   Current line(s): PIV     Genitourinary:   Urinary status: voiding    Respiratory:   O2 Device: Room air  Chronic home O2 use?: NO  Incentive spirometer at bedside: NO     GI:  Last Bowel Movement Date: 11/27/20  Current diet:  DIET DIABETIC CONSISTENT CARB Regular  DIET ONE TIME MESSAGE  Passing flatus: YES  Tolerating current diet: YES  % Diet Eaten: 100 %    Pain Management:   Patient states pain is manageable on current regimen: YES    Skin:  Artem Score: 22  Interventions: increase time out of bed    Patient Safety:  Fall Score:  Total Score: 1  Interventions: gripper socks and pt to call before getting OOB       Length of Stay:  Expected LOS: 2d 2h  Actual LOS: [address]

## 2020-12-01 NOTE — ROUTINE PROCESS
Discharge instructions reviewed with patient. IV removed. Site CDI. Pt discharged via wheelchair to care of wife.

## 2020-12-01 NOTE — PROGRESS NOTES
PAT: Home with follow up PCP and Cardiology appointments   Family to transport home    Follow up appointment set up for patient for PCP follow up. Pt will need to call to arrange Cardiology appointment with Dr. Estefani Landrum. Pt states that he had an appointment previously scheduled for January, but will call and get something sooner. Pt's wife is present in room and will be transporting him home today. No other CM needs identified for patient at this time. Pt is ready for discharge from a CM standpoint. Care Management Interventions  PCP Verified by CM: Yes(PCP 1 month ago; Cardiology Fredy in the [facility] network last appt last Tues)  Mode of Transport at Discharge: Other (see comment)(Pt family to transport at discharge )  Transition of Care Consult (CM Consult): Discharge Planning  Current Support Network: Lives with Spouse  Confirm Follow Up Transport: Self(Pt drives, not since being ill, but has supportive family to assist if needed )  Discharge Location  [facility] [address] - ED [facility]  [employer] ACP Facilitator  Zone Phone: [phone]

## 2020-12-01 NOTE — PROGRESS NOTES
Hospital follow-up PCP transitional care appointment has been scheduled with Dr. Jas Deal for Friday, 12/4/20 at 1:00 p.m. Tevin Tomlinson Pending patient discharge.   Albert Mckeon, Care Management Specialist.

## 2020-12-02 ENCOUNTER — PATIENT OUTREACH (OUTPATIENT)
Dept: CASE MANAGEMENT | Age: 45
End: 2020-12-02

## 2020-12-02 NOTE — TELEPHONE ENCOUNTER
Spoke w/ patients wife. She will bring device to the ED [facility] location 12/2/20 @ 1p. She was reminded of the cost incurred if the device is not returned.

## 2020-12-02 NOTE — PROGRESS NOTES
Patient was admitted to [facility] on 2020 and discharged on 2020 for NSTEMI/DM. Outreach made within 2 business days of discharge: Yes    Top Discharge Challenges to be reviewed by the provider   Additional needs identified to be addressed with provider no  medications- Coreg  Discussed COVID-19 related testing which was not done at this time. Test results were not done. Patient informed of results, if available? n/a   Method of communication with provider : chart routing       Advance Care Planning:   Does patient have an Advance Directive:  not on file    Inpatient Readmission Risk score:   Was this a readmission? no   Patient stated reason for the admission: chest pain  Patients top risk factors for readmission: medical condition  Interventions to address risk factors: schedule and attend follow up appointments, monitor BS, monitor for S&S of infection    Care Transition Nurse (CTN) contacted the patient by telephone to perform post hospital discharge assessment. Verified name and  with patient as identifiers. Provided introduction to self, and explanation of the CTN role. CTN reviewed discharge instructions, medical action plan and red flags with patient who verbalized understanding. Patient given an opportunity to ask questions and does not have any further questions or concerns at this time. The patient agrees to contact the PCP office for questions related to their healthcare. Medication reconciliation was performed with patient, who verbalizes understanding of administration of home medications. Advised obtaining a 90-day supply of all daily and as-needed medications.    Referral to Pharm D needed: no     Home Health/Outpatient orders at discharge: [address]: n/a  Date of initial visit: [facility] ordered at discharge: none  [address] received: n/a    Covid Risk Education    Patient has following risk factors of: diabetes. Education provided regarding infection prevention, and signs and symptoms of COVID-19 and when to seek medical attention with patient who verbalized understanding. Discussed exposure protocols and quarantine From CDC: Are you at higher risk for severe illness?  and given an opportunity for questions and concerns. The patient agrees to contact the COVID-19 hotline 330-762-5191 or PCP office for questions related to COVID-19. For more information on steps you can take to protect yourself, see CDC's How to Protect Yourself     Patient/family/caregiver given information for [employer] and agrees to enroll n/a  Patient's preferred e-mail: n/a  Patient's preferred phone number: n/a    Discussed follow-up appointments. If no appointment was previously scheduled, appointment scheduling offered: [facility] follow up appointment(s):   Future Appointments   Date Time Provider Richard Olivier   12/4/2020  1:00 PM Lucy Ulrich MD [facility] MAIN BS AMB   12/9/2020  8:00 AM [facility] CARDIOPULM SPECIALTY [facility]   12/14/2020 10:00 AM Lucy Ulrich MD [facility] MAIN BS AMB   1/26/2021  9:00 AM Levi Fabry, MD [facility] BS AMB     Non-[facility] follow up appointment(s)  Plan for follow-up call in 7-10 days based on severity of symptoms and risk factors. CTN provided contact information for future needs. Goals Addressed                 This Visit's Progress     Prevent complications post hospitalization. 12/2/2020 Patient reports taking all medications, monitoring BS, PCP appointment on 12/4/2020. Denies chest pain, SOB, fever, N/V/D. Patient is to monitor BS and record, move cardiology appointment to this week from Jan., will report attendance of appointments and changes to plan of care at next week outreach.  DAVID

## 2020-12-04 ENCOUNTER — DOCUMENTATION ONLY (OUTPATIENT)
Dept: SLEEP MEDICINE | Age: 45
End: 2020-12-04

## 2020-12-07 ENCOUNTER — TELEPHONE (OUTPATIENT)
Dept: SLEEP MEDICINE | Age: 45
End: 2020-12-07

## 2020-12-07 DIAGNOSIS — G47.33 OSA (OBSTRUCTIVE SLEEP APNEA): Primary | ICD-10-CM

## 2020-12-07 NOTE — TELEPHONE ENCOUNTER
Rodney Mask is to be contacted by lead sleep technologist regarding results of Sleep Testing which was indicative of an average AHI of 4.3 per hour with an SpO2 estrada of 88% and SpO2 of < 88% being 0 minutes. Patient should return for repeat home sleep apnea testing due to presence of significant risk factors for Obstructive Sleep Apnea. Encounter Diagnosis   Name Primary?     FREDO (obstructive sleep apnea) Yes     Orders Placed This Encounter    POLYSOMNOGRAPHY 1 NIGHT     Standing Status:   Future     Standing Expiration Date:   6/7/2021     Order Specific Question:   Reason for Exam     Answer:   FREDO

## 2020-12-08 ENCOUNTER — OFFICE VISIT (OUTPATIENT)
Dept: INTERNAL MEDICINE CLINIC | Age: 45
End: 2020-12-08
Payer: COMMERCIAL

## 2020-12-08 VITALS
DIASTOLIC BLOOD PRESSURE: 71 MMHG | HEART RATE: 91 BPM | BODY MASS INDEX: 34.5 KG/M2 | SYSTOLIC BLOOD PRESSURE: 104 MMHG | TEMPERATURE: 98.1 F | OXYGEN SATURATION: 95 % | HEIGHT: 64 IN | RESPIRATION RATE: 16 BRPM | WEIGHT: 202.1 LBS

## 2020-12-08 DIAGNOSIS — Z79.4 TYPE 2 DIABETES MELLITUS WITHOUT COMPLICATION, WITH LONG-TERM CURRENT USE OF INSULIN (HCC): ICD-10-CM

## 2020-12-08 DIAGNOSIS — E66.9 OBESITY (BMI 30.0-34.9): ICD-10-CM

## 2020-12-08 DIAGNOSIS — I21.4 NSTEMI (NON-ST ELEVATED MYOCARDIAL INFARCTION) (HCC): ICD-10-CM

## 2020-12-08 DIAGNOSIS — I25.110 CORONARY ARTERY DISEASE INVOLVING NATIVE CORONARY ARTERY OF NATIVE HEART WITH UNSTABLE ANGINA PECTORIS (HCC): Primary | ICD-10-CM

## 2020-12-08 DIAGNOSIS — E11.9 TYPE 2 DIABETES MELLITUS WITHOUT COMPLICATION, WITH LONG-TERM CURRENT USE OF INSULIN (HCC): ICD-10-CM

## 2020-12-08 DIAGNOSIS — E78.5 DYSLIPIDEMIA: ICD-10-CM

## 2020-12-08 DIAGNOSIS — Z09 HOSPITAL DISCHARGE FOLLOW-UP: ICD-10-CM

## 2020-12-08 DIAGNOSIS — G47.33 OBSTRUCTIVE SLEEP APNEA: ICD-10-CM

## 2020-12-08 DIAGNOSIS — Z91.199 HISTORY OF NONCOMPLIANCE WITH MEDICAL TREATMENT: ICD-10-CM

## 2020-12-08 PROCEDURE — 99213 OFFICE O/P EST LOW 20 MIN: CPT | Performed by: INTERNAL MEDICINE

## 2020-12-08 PROCEDURE — 1111F DSCHRG MED/CURRENT MED MERGE: CPT | Performed by: INTERNAL MEDICINE

## 2020-12-08 NOTE — PROGRESS NOTES
Chief Complaint   Patient presents with   [facility] Follow Up     Patient admitted to [facility] on 11/27/2020 for chest pain/heart attack. 1. Have you been to the ER, urgent care clinic since your last visit? Hospitalized since your last visit? Yes When: 11/27/2020 Where: [facility]  Reason for visit: chest pain    2. Have you seen or consulted any other health care providers outside of the [address] [place] [address] since your last visit? Include any pap smears or colon screening.  No

## 2020-12-09 ENCOUNTER — HOSPITAL ENCOUNTER (OUTPATIENT)
Dept: CARDIAC REHAB | Age: 45
Discharge: HOME | End: 2020-12-09
Attending: INTERNAL MEDICINE
Payer: COMMERCIAL

## 2020-12-09 VITALS — WEIGHT: 200 LBS | BODY MASS INDEX: 34.33 KG/M2

## 2020-12-09 LAB
GLUCOSE BLD STRIP.AUTO-MCNC: 132 MG/DL (ref 65–100)
SERVICE CMNT-IMP: ABNORMAL

## 2020-12-09 PROCEDURE — 93798 PHYS/QHP OP CAR RHAB W/ECG: CPT

## 2020-12-09 NOTE — DISCHARGE SUMMARY
[address] SUMMARY    Name:  Marisela Ibanez  MR#:  [id number]  :  1975  ACCOUNT #:  [de-identified]  ADMIT DATE:  2020  DISCHARGE DATE:  2020    HISTORY OF PRESENT ILLNESS:  The patient is a 81-year-old gentleman who has a history of coronary artery disease presented to the emergency room after having 2 days of substernal chest tightness which was responsive to sublingual nitroglycerin. Mostly prior to admission, the sensation was rather persistent and was unresponsive to nitroglycerin. He presented to the emergency room and his troponin level was elevated. He was seen by Cardiology and subsequently admitted. He has a history of noncompliance. He has a past history of primary hypertension, dyslipidemia, morbid obesity significant insulin resistance and poorly controlled diabetes. Medical compliance historically has been quite poor. Previous hospitalizations have been at [address] [place] [address].    Past medical history, social history, review of systems, family history, physical examination is as in admitting H and P.    LABORATORY VALUES:  Initial abnormalities on the comprehensive profile were limited to a blood sugar of 287. The CK-MB was negative. The troponin level was also negative initially, but serial troponin levels demonstrated progressive increase. At the time of discharge, BUN and creatinine were 16 and 1.13. Hemoglobin was 11.9, white count 8.6, MCV was 88.4, platelet count 195 with a normal differential.    RADIOGRAPHS:  Chest x-ray was negative. Echocardiogram demonstrated an ejection fraction of 35%-40% with segmentally reduced systolic function and left ventricular diastolic dysfunction. CONSULTATIONS:  One consultation obtained with Dr. Abdiel Albert. His comments will be elaborated on the hospital course. HOSPITAL COURSE:  The patient was admitted and became obvious as time progressed that he probably had non-STEMI.   The troponin level progressively increased. Because of this the patient was subsequently taken to the cath lab with an ejection fraction of 35%-40%. He had a LAD stent in place which was patent and 3 diagonal branches. The second and third being severely disease. Circumflex was moderately diseased with the first obtuse marginal artery being 95% stenotic. Right coronary artery demonstrated 20% mid lesion with a disease in the right posterior descending artery which was felt to be moderate and there was 100% occlusion of the right posterior lateral branch off of the right coronary artery. The first obtuse marginal of the left circumflex was dilated and stented successfully. Post cath, he had no complications and was asymptomatic at the time of discharge. Hospital course was otherwise uneventful. Blood sugars remained reasonable throughout. FINAL DIAGNOSES:  1.  Moderate coronary artery disease. 2.  Non ST-elevation myocardial infarction. 3.  Ischemic cardiomyopathy with ejection fraction 35%-40%. 4.  Obesity. 5.  Primary hypertension. 6.  Dyslipidemia. 7.  Diabetes mellitus. 8.  Insulin resistance. 9.  Obstructive sleep apnea. Operations and procedures undergone this admission, cardiac catheterization with stenting with a drug-eluting stent. There was only one stent placed during this hospital stay. DISPOSITION:  1. The patient is discharged home ambulatory on an ADA diet with bedtime snack. 2.  Discharge medications include the following:  Aspirin 81 mg daily, famotidine 40 mg daily, Lantus 30 units daily, lisinopril 40 mg daily, Ozempic 1 mg q. weekly, Brilinta 90 mg b.i.d. and nitroglycerin 1/150 sublingually p.r.n.  3.  The patient remains a full code. 4.  Follow up with his cardiologist in the next week. 5.  I encouraged him to be more compliant with his current regimen.       MD JEN [id number]  D:  12/08/2020 22:27  T:  12/09/2020 5:02  JOB #:  [id number]

## 2020-12-09 NOTE — CARDIO/PULMONARY
Cardiopulmonary Rehab Orientation:     Met with Curtis Shah \"Andrea\" Tara Ness and his son for the initial session. Mr. Tara Ness is a 39year-old patient of Dr. Hernesto Young, who presents to rehab for cardiac conditioning and strengthening, S/P NSTEMI/ stent; LVEF 40%. History also includes CAD, DM II, previous MI, previous stents. NKDA. Immunization for influenza vaccine denied. Pt is a nonsmoker. Lungs were CTA; denied any cough. No LE edema was present. Exercise at home includes none  Limitations: none. Patient was given an educational notebook. Psychosocial: Pt scored a 3 on PHQ-9 screening tool, and denies stress. Pt is  with caring support system. His son accompanied him to the appointment and is supportive of patient's education and exercise. Pt is a  in a [employer] and prays for stress relief. BMI 34.4, based on height of 5'4\" and weight of 200 lbs. Tele NSR/ST/BBB/rare PVC's    Pt completed 6MW without difficulty on the treadmill with 430 meters, METS 3.2  Goal METS: 4.5 METS      Patient's identified goals are:   1. lose weight, long term weight 175 lbs  2. Exercise regularly  3.  Improve lipid panel

## 2020-12-09 NOTE — PROGRESS NOTES
[facility]  [facility]  [address] [place] [address]  Phone:  [phone]  Fax: [phone]       Chief Complaint   Patient presents with   [facility] Follow Up     Patient admitted to [facility] on 11/27/2020 for chest pain/heart attack. .      SUBJECTIVE:    Jordi Reno is a 39 y.o. male Comes in for return visit, having been most recently hospitalized at [facility] for a non STEMI. This culminated in cardiac catheterization and one drug eluting stent placed in the first obtuse marginal branch of the left circumflex. He had moderate disease with patent stents in other areas and apparently 100% occlusion of the right posterolateral branch of the right coronary artery. The latter was obviously not addressed. Post discharge he has done extremely well.  he has had no chest pain. Unfortunately, he did have a significant reduction in his LV function with ejection fraction of 35-40%. This is felt to be related to ischemic cardiomyopathy. Hopefully this will indeed improve. He, however, gives no symptoms of dyspnea on exertion, orthopnea or PND currently. He is also not on a diuretic. His diabetes has been doing quite well and he is taking 30 units of Lantus along with Ozempic 1 mg q.d. He is on antihypertensive medication as prescribed, as well as a statin, which he is on maximum doses of currently. He has lost weight over the last year, but there has been no meaningful weight loss since he was discharged from the hospital.           Current Outpatient Medications   Medication Sig Dispense Refill    amLODIPine (NORVASC) 5 mg tablet Take 1 Tab by mouth daily. 30 Tab 0    carvediloL (COREG) 3.125 mg tablet Take 1 Tab by mouth two (2) times a day. 60 Tab 11    nitroglycerin (NITROSTAT) 0.4 mg SL tablet 1 Tab by SubLINGual route every five (5) minutes as needed for Chest Pain (for unstable angina, take up to 3 tabs q 5mins. If chest pain unresolved call 911.).  Up to 3 doses. 1 Bottle 3    ticagrelor (BRILINTA) 90 mg tablet Take 90 mg by mouth two (2) times a day.  glucose blood VI test strips (OneTouch Ultra Blue Test Strip) strip Use to test blood sugar three times a day 300 Strip 3    semaglutide (Ozempic) 1 mg/dose (2 mg/1.5 mL) sub-q pen 1 mg by SubCUTAneous route every seven (7) days. 2 Pen 11    famotidine (PEPCID) 40 mg tablet Take 1 Tab by mouth daily. This is to replace the omeprazole. 30 Tab 11    rosuvastatin (CRESTOR) 40 mg tablet Take 1 Tab by mouth daily. 30 Tab 11    insulin glargine (LANTUS,BASAGLAR) 100 unit/mL (3 mL) inpn 30 units daily (Patient taking differently: 50 Units by SubCUTAneous route daily. 30 units daily  Indications: type 2 diabetes mellitus) 3 Pen 11    lisinopril (PRINIVIL, ZESTRIL) 40 mg tablet TAKE 1 TABLET BY MOUTH ONCE DAILY 30 Tab 11    aspirin delayed-release 81 mg tablet TAKE 1 TABLET BY MOUTH EVERY DAY 90 Tab 3    Blood-Glucose Meter (ONETOUCH ULTRA2) monitoring kit Use to test blood sugar three times a day 1 Kit 0    lancets (ONE TOUCH DELICA) 33 gauge misc Use to test blood sugar three times a day.  300 Lancet 3    Insulin Needles, Disposable, 31 gauge x 5/16\" ndle As directed 61 Package 11     Past Medical History:   Diagnosis Date    CAD (coronary artery disease)     2 stents 2016     Headache     Hypercholesterolemia     Hypertension     Hypogonadism male     Liver disease     NAFLD    Obstructive sleep apnea      Past Surgical History:   Procedure Laterality Date    HX HEENT      status post pharyngioplasty     No Known Allergies      REVIEW OF SYSTEMS:  General: negative for - chills or fever  ENT: negative for - headaches, nasal congestion or tinnitus  Respiratory: negative for - cough, hemoptysis, shortness of breath or wheezing  Cardiovascular : negative for - chest pain, edema, palpitations or shortness of breath  Gastrointestinal: negative for - abdominal pain, blood in stools, heartburn or nausea/vomiting  Genito-Urinary: no dysuria, trouble voiding, or hematuria  Musculoskeletal: negative for - gait disturbance, joint pain, joint stiffness or joint swelling  Neurological: no TIA or stroke symptoms  Hematologic: no bruises, no bleeding, no swollen glands  Integument: no lumps, mole changes, nail changes or rash  Endocrine: no malaise/lethargy or unexpected weight changes      Social History     Socioeconomic History    Marital status:      Spouse name: Not on file    Number of children: 2    Years of education: 12    Highest education level: Not on file   Occupational History    Occupation:    Tobacco Use    Smoking status: Never Smoker    Smokeless tobacco: Never Used   Substance and Sexual Activity    Alcohol use: No    Drug use: No    Sexual activity: Yes     Partners: Female     Family History   Problem Relation Age of Onset    Heart Disease Father        OBJECTIVE:    Visit Vitals  /71   Pulse 91   Temp 98.1 °F (36.7 °C) (Oral)   Resp 16   Ht 5' 4\" (1.626 m)   Wt 202 lb 1.6 oz (91.7 kg)   SpO2 95%   BMI 34.69 kg/m²     CONSTITUTIONAL: well , well nourished, appears age appropriate  EYES: perrla, eom intact  ENMT:moist mucous membranes, pharynx clear  NECK: supple. Thyroid normal  RESPIRATORY: Chest: clear to ascultation and percussion   CARDIOVASCULAR: Heart: regular rate and rhythm  GASTROINTESTINAL: Abdomen: soft, bowel sounds active  HEMATOLOGIC: no pathological lymph nodes palpated  MUSCULOSKELETAL: Extremities: no edema, pulse 1+   INTEGUMENT: No unusual rashes or suspicious skin lesions noted. Nails appear normal.  NEUROLOGIC: non-focal exam   MENTAL STATUS: alert and oriented, appropriate affect      ASSESSMENT:  1. Coronary artery disease involving native coronary artery of native heart with unstable angina pectoris ([address]    2. NSTEMI (non-ST elevated myocardial infarction) ([facility]    3.  Type 2 diabetes mellitus without complication, with long-term current use of insulin ([facility] [address]    4. Dyslipidemia    5. Obesity (BMI 30.0-34.9)    6. History of noncompliance with medical treatment    7. Obstructive sleep apnea        PLAN:    1. The patient's coronary artery disease appears to be reasonably stable. As I stated earlier, he had one drug eluting stent placed. He has been asymptomatic since being discharged. He did have a mild myocardial infarction. 2. He also has an ischemic cardiomyopathy with an ejection fraction of 35-40%, which will hopefully improve over time. 3. His diabetes is doing well based on his comments regarding blood sugars, which are consistently less than 110. Fortunately, he has not had any interventional hypoglycemia. 4. He remains on a statin. He is on maximum doses of Rosuvastatin. He has had no adverse effects from this. 5. He needs to lose weight, as we have discussed repetitively. This can be accomplished by eating meals, eliminating snacks and avoiding the consumption of processed carbohydrates. 6. I emphasized the importance of compliance. Hopefully he will do so after these rather catastrophic events that have occurred as relates to his heart. 7. He does have obstructive sleep apnea and needs to follow with the neurologist for CPAP device. Follow-up and Dispositions    · Return in about 6 weeks (around 1/19/2021).            Jayna Marks MD

## 2020-12-15 ENCOUNTER — OFFICE VISIT (OUTPATIENT)
Dept: CARDIOLOGY CLINIC | Age: 45
End: 2020-12-15
Payer: COMMERCIAL

## 2020-12-15 ENCOUNTER — HOSPITAL ENCOUNTER (OUTPATIENT)
Dept: CARDIAC REHAB | Age: 45
Discharge: HOME | End: 2020-12-15
Attending: INTERNAL MEDICINE
Payer: COMMERCIAL

## 2020-12-15 VITALS
BODY MASS INDEX: 33.8 KG/M2 | SYSTOLIC BLOOD PRESSURE: 122 MMHG | RESPIRATION RATE: 18 BRPM | DIASTOLIC BLOOD PRESSURE: 80 MMHG | HEART RATE: 94 BPM | HEIGHT: 64 IN | WEIGHT: 198 LBS | OXYGEN SATURATION: 99 %

## 2020-12-15 VITALS — BODY MASS INDEX: 33.99 KG/M2 | WEIGHT: 198 LBS

## 2020-12-15 DIAGNOSIS — Z09 HOSPITAL DISCHARGE FOLLOW-UP: Primary | ICD-10-CM

## 2020-12-15 PROCEDURE — 99215 OFFICE O/P EST HI 40 MIN: CPT | Performed by: INTERNAL MEDICINE

## 2020-12-15 PROCEDURE — 1111F DSCHRG MED/CURRENT MED MERGE: CPT | Performed by: INTERNAL MEDICINE

## 2020-12-15 PROCEDURE — 93798 PHYS/QHP OP CAR RHAB W/ECG: CPT

## 2020-12-15 RX ORDER — METOPROLOL TARTRATE 100 MG/1
100 TABLET ORAL DAILY
COMMUNITY
End: 2020-12-15 | Stop reason: ALTCHOICE

## 2020-12-15 RX ORDER — METOPROLOL SUCCINATE 100 MG/1
100 TABLET, EXTENDED RELEASE ORAL DAILY
Qty: 30 TAB | Refills: 5 | Status: SHIPPED | OUTPATIENT
Start: 2020-12-15 | End: 2021-01-14

## 2020-12-15 NOTE — LETTER
12/15/20 Patient: India Banuelos YOB: 1975 Date of Visit: 12/15/2020 Julia Dang MD 
[facility] [address] [facility] [address] [id number] VIA In Basket Dear Julia Dang MD, Thank you for referring Mr. nIdia Banuelos to [facility] for evaluation. My notes for this consultation are attached. If you have questions, please do not hesitate to call me. I look forward to following your patient along with you.  
 
 
Sincerely, 
 
Federico Heller MD

## 2020-12-15 NOTE — PROGRESS NOTES
[facility] [place]: Megan Joiner  [phone]          Cardiology Consult/Progress Note      Requesting/referring provider: Dr. Rosalinda Castillo,  Reason for Consult: Coronary disease    HPI: Tobi Porter, a 39y.o. year-old who presents for evaluation of coronary artery disease. Mr. Adelaide Astorga has history of metabolic syndrome, poorly controlled diabetes and hypercholesterolemia, hypertension. He also history of coronary artery disease with history of remote coronary artery stenting. In September 2020 he was admitted to [facility] and underwent PCI for with placement of coronary stents for abnormal stress test.  Subsequently evaluated during morning [facility] 1460 with acute onset chest November. Elevated troponins and ECG changes. He underwent cardiac catheterization demonstrating mid circumflex/marginal treated with drug-eluting stent EF during this hospitalization was noted to be globally reduced 35 to 40%    Investigations personally reviewed by me  ECG November 2020: Sinus rhythm, lateral precordial and lateral limb lead ST depressions nonspecific but could suggest ischemic heart disease  Echocardiogram November 2020 elevated LV systolic function of 35 to 40%. Mild regurgitation. Cardiac catheterization November 2020 patent stents in RCA and LAD. Diffuse small vessel disease involving diagonal branches. Right posterolateral branch is jailed chronically occluded. Mid circumflex/major obtuse marginal with 90 stenosis treated with drug-eluting stent. LDL November 2020: 2020. Triglycerides 190  HbA1c November 2020:  Assessment/Plan:  1. Coronary disease manifesting in the form of remote unstable angina and recent PCI for abnormal stress test  2. Suspected reduction in LV function based on verbal information of the patient  3. Hypertension poor control  4. Hypercholesterolemia. Good LDL control  5. Metabolic syndrome elevated triglycerides  6.   Diabetes mellitus with poor control    Mr. Van Carney is self-referred to establish new care with me. He was recently admitted to the hospital with acute coronary syndrome and underwent PCI to his circumflex. Previously he has stents placed intravenously was repeated. He has diffuse small vessel disease left anterior right posterolateral.  He has mild persistent angina angina which has improved since intervention. His blood pressure is well controlled and he was taken off his amlodipine. LVEF is significantly reduced but he does not demonstrate any symptoms of congestive heart    He needs aggressive control of risk factors to prevent future acute coronary symptoms. Dual antiplatelet therapy should be continued. He is on high intensity statin therapy with Crestor 40 mg daily and his recent LDL was 33. His triglycerides are elevated and I have recommended better diabetes control, continued exercise and cardiac rehab and weight loss to help with better triglyceride control. If in 6 months it continues to be high, I would suggest consideration for adding Vascepa. He has Ischemic cardiomyopathy with moderately reduced LV systolic function. I have switched his metoprolol tartrate to succinate 100 mg daily which should be used in conjunction with lisinopril 40 mg. Due to some concerns regarding hypotension, I have decided to wait before switching to [facility]. He had prior issues with spironolactone which was withheld due to worsening creatinine. His most recent creatinine is relatively stable. During his next visit I would consider reinitiation of spironolactone. I spent about 30 minutes Discussing disease process and answering questions both patient and his wife. We will follow him 2 months with a repeat ECG.       []    High complexity decision making was performed  []    Patient is at high-risk of decompensation with multiple organ involvement  He  has a past medical history of CAD (coronary artery disease), Headache, Hypercholesterolemia, Hypertension, Hypogonadism male, Liver disease, and Obstructive sleep apnea. Review of system:Patient reports no dyspnea/PND/Orthpnea/CP. He reports no cough/fever/focal neurological deficits/abdominal pain. All other systems negative except as above. Family History   Problem Relation Age of Onset    Heart Disease Father       Social History     Socioeconomic History    Marital status:      Spouse name: Not on file    Number of children: 2    Years of education: 12    Highest education level: Not on file   Occupational History    Occupation:    Tobacco Use    Smoking status: Never Smoker    Smokeless tobacco: Never Used   Substance and Sexual Activity    Alcohol use: No    Drug use: No    Sexual activity: Yes     Partners: Female      PE  Vitals:    12/15/20 1135   BP: 122/80   Pulse: 94   Resp: 18   SpO2: 99%   Weight: 198 lb (89.8 kg)   Height: 5' 4\" (1.626 m)    Body mass index is 33.99 kg/m². General:    Alert, cooperative, no distress. Psychiatric:    Normal Mood and affect    Eye/ENT:      Pupils equal, No asymmetry, Conjunctival pink. Able to hear voice at normal amplitude   Lungs:      Visibly symmetric chest expansion, No palpable tenderness. Clear to auscultation bilaterally. Heart[de-identified]    Regular rate and rhythm, S1, S2 normal, no murmur, click, rub or gallop. No JVD, Normal palpable peripheral pulses. No cyanosis   Abdomen:     Soft, non-tender. Bowel sounds normal. No masses,  No      organomegaly. Extremities:   Extremities normal, atraumatic, no edema. Neurologic:   CN II-XII grossly intact.  No gross focal deficits           Recent Labs:  Lab Results   Component Value Date/Time    Cholesterol, total 125 11/28/2020 01:38 AM    HDL Cholesterol 52 11/28/2020 01:38 AM    LDL, calculated 33.4 11/28/2020 01:38 AM    Triglyceride 198 (H) 11/28/2020 01:38 AM    CHOL/HDL Ratio 2.4 11/28/2020 01:38 AM     Lab Results   Component Value Date/Time Creatinine (POC) 1.0 12/13/2014 07:54 AM    Creatinine 1.13 11/30/2020 02:42 AM     Lab Results   Component Value Date/Time    BUN 16 11/30/2020 02:42 AM    BUN (POC) 6 (L) 12/13/2014 07:54 AM     Lab Results   Component Value Date/Time    Potassium 3.3 (L) 11/30/2020 02:42 AM     Lab Results   Component Value Date/Time    Hemoglobin A1c >15.5 (H) 09/28/2020 04:22 PM     Lab Results   Component Value Date/Time    Hemoglobin (POC) 14.6 12/13/2014 07:54 AM    HGB 11.0 (L) 11/30/2020 02:42 AM     Lab Results   Component Value Date/Time    PLATELET 132 91/66/7217 02:42 AM       Reviewed:  Past Medical History:   Diagnosis Date    CAD (coronary artery disease)     2 stents 2016     Headache     Hypercholesterolemia     Hypertension     Hypogonadism male     Liver disease     NAFLD    Obstructive sleep apnea      Social History     Tobacco Use   Smoking Status Never Smoker   Smokeless Tobacco Never Used     Social History     Substance and Sexual Activity   Alcohol Use No     No Known Allergies  Family History   Problem Relation Age of Onset    Heart Disease Father         Current Outpatient Medications   Medication Sig    metoprolol succinate (TOPROL-XL) 100 mg tablet Take 1 Tab by mouth daily for 30 days.  nitroglycerin (NITROSTAT) 0.4 mg SL tablet 1 Tab by SubLINGual route every five (5) minutes as needed for Chest Pain (for unstable angina, take up to 3 tabs q 5mins. If chest pain unresolved call 911.). Up to 3 doses.  ticagrelor (BRILINTA) 90 mg tablet Take 90 mg by mouth two (2) times a day.  glucose blood VI test strips (OneTouch Ultra Blue Test Strip) strip Use to test blood sugar three times a day    semaglutide (Ozempic) 1 mg/dose (2 mg/1.5 mL) sub-q pen 1 mg by SubCUTAneous route every seven (7) days.  famotidine (PEPCID) 40 mg tablet Take 1 Tab by mouth daily. This is to replace the omeprazole.  rosuvastatin (CRESTOR) 40 mg tablet Take 1 Tab by mouth daily.     insulin glargine (LANTUS,BASAGLAR) 100 unit/mL (3 mL) inpn 30 units daily (Patient taking differently: 50 Units by SubCUTAneous route daily. 30 units daily  Indications: type 2 diabetes mellitus)    lisinopril (PRINIVIL, ZESTRIL) 40 mg tablet TAKE 1 TABLET BY MOUTH ONCE DAILY    aspirin delayed-release 81 mg tablet TAKE 1 TABLET BY MOUTH EVERY DAY    Blood-Glucose Meter (ONETOUCH ULTRA2) monitoring kit Use to test blood sugar three times a day    lancets (ONE TOUCH DELICA) 33 gauge misc Use to test blood sugar three times a day.  Insulin Needles, Disposable, 31 gauge x 5/16\" ndle As directed     No current facility-administered medications for this visit. Kai Santos MD12/15/20   ATTENTION:   This medical record was transcribed using an electronic medical records/speech recognition system. Although proofread, it may and can contain electronic, spelling and other errors. Corrections may be executed at a later time. Please feel free to contact us for any clarifications as needed.     [facility] [place]  [address], [address] [place] [address]

## 2020-12-17 ENCOUNTER — HOSPITAL ENCOUNTER (OUTPATIENT)
Dept: CARDIAC REHAB | Age: 45
Discharge: HOME | End: 2020-12-17
Attending: INTERNAL MEDICINE
Payer: COMMERCIAL

## 2020-12-17 VITALS — WEIGHT: 203.6 LBS | BODY MASS INDEX: 34.95 KG/M2

## 2020-12-17 PROCEDURE — 93798 PHYS/QHP OP CAR RHAB W/ECG: CPT

## 2020-12-18 ENCOUNTER — HOSPITAL ENCOUNTER (OUTPATIENT)
Dept: CARDIAC REHAB | Age: 45
Discharge: HOME | End: 2020-12-18
Attending: INTERNAL MEDICINE
Payer: COMMERCIAL

## 2020-12-18 VITALS — WEIGHT: 201.6 LBS | BODY MASS INDEX: 34.6 KG/M2

## 2020-12-18 PROCEDURE — 93797 PHYS/QHP OP CAR RHAB WO ECG: CPT | Performed by: DIETITIAN, REGISTERED

## 2020-12-18 PROCEDURE — 93798 PHYS/QHP OP CAR RHAB W/ECG: CPT

## 2020-12-18 NOTE — PROGRESS NOTES
Cardiac Rehab Nutrition Assessment - 1:1 Evaluation       NAME: Heavenly Robison : 1975 AGE: 39 y.o. GENDER: male  CARDIAC REHAB ADMITTING DIAGNOSIS: NSTEMI, stent    Relevant Comorbidites: HTN, dyslipidemia, DM  LABS:   Lab Results   Component Value Date/Time    Hemoglobin A1c >15.5 (H) 2020 04:22 PM     Lab Results   Component Value Date/Time    Cholesterol, total 125 2020 01:38 AM    HDL Cholesterol 52 2020 01:38 AM    LDL, calculated 33.4 2020 01:38 AM    VLDL, calculated 39.6 2020 01:38 AM    Triglyceride 198 (H) 2020 01:38 AM    CHOL/HDL Ratio 2.4 2020 01:38 AM         MEDICATIONS/SUPPLEMENTS:   [unfilled]  Prior to Admission medications    Medication Sig Start Date End Date Taking? Authorizing Provider   metoprolol succinate (TOPROL-XL) 100 mg tablet Take 1 Tab by mouth daily for 30 days. 12/15/20 1/14/21  Demetri Reyna MD   nitroglycerin (NITROSTAT) 0.4 mg SL tablet 1 Tab by SubLINGual route every five (5) minutes as needed for Chest Pain (for unstable angina, take up to 3 tabs q 5mins. If chest pain unresolved call 911.). Up to 3 doses. 20   Necia Snellen, NP   ticagrelor (BRILINTA) 90 mg tablet Take 90 mg by mouth two (2) times a day. 20   Provider, Historical   glucose blood VI test strips (OneTouch Ultra Blue Test Strip) strip Use to test blood sugar three times a day 10/28/20   Clari Saleem MD   semaglutide (Ozempic) 1 mg/dose (2 mg/1.5 mL) sub-q pen 1 mg by SubCUTAneous route every seven (7) days. 10/20/20   Clari Saleem MD   famotidine (PEPCID) 40 mg tablet Take 1 Tab by mouth daily. This is to replace the omeprazole. 20   Clari Saleem MD   rosuvastatin (CRESTOR) 40 mg tablet Take 1 Tab by mouth daily. 20   Clari Saleem MD   insulin glargine (LANTUS,BASAGLAR) 100 unit/mL (3 mL) inpn 30 units daily  Patient taking differently: 50 Units by SubCUTAneous route daily.  30 units daily  Indications: type 2 diabetes mellitus 5/13/20   Renny Gamez MD   lisinopril (PRINIVIL, ZESTRIL) 40 mg tablet TAKE 1 TABLET BY MOUTH ONCE DAILY 12/10/19   Renny Gamez MD   aspirin delayed-release 81 mg tablet TAKE 1 TABLET BY MOUTH EVERY DAY 12/10/19   Renny Gamez MD   Blood-Glucose Meter [facility]) monitoring kit Use to test blood sugar three times a day 3/24/19   Renny Gamez MD   lancets ([facility]) 33 gauge misc Use to test blood sugar three times a day. 3/24/19   Renny Gamez MD   Insulin Needles, Disposable, 31 gauge x 5/16\" ndle As directed 11/8/17   Renny Gamez MD         ANTHROPOMETRICS:    Ht Readings from Last 1 Encounters:   12/15/20 5' 4\" (1.626 m)      Wt Readings from Last 1 Encounters:   12/18/20 91.4 kg (201 lb 9.6 oz)      IBW: 130 # +/- 10%  %IBW: 155 % +/- 10%    BMI: 34.6 kg/M2 Category: Obesity Class I  Waist: 37 inches    Reported Wt Hx:  Wt Readings from Last 10 Encounters:   12/18/20 91.4 kg (201 lb 9.6 oz)   12/17/20 92.4 kg (203 lb 9.6 oz)   12/15/20 89.8 kg (198 lb)   12/15/20 89.8 kg (198 lb)   12/09/20 90.7 kg (200 lb)   12/08/20 91.7 kg (202 lb 1.6 oz)   11/30/20 90.1 kg (198 lb 10.2 oz)   11/24/20 92.1 kg (203 lb)   10/28/20 90.2 kg (198 lb 12.8 oz)   09/28/20 94 kg (207 lb 3.2 oz)     Reported Diet Hx:    Rate Your Plate Score: 56  (Score 58-72: Making many healthy choices; 41-57: Some choices need improving 24-40: many choices need improving)     24 Hour Diet Recall  Breakfast Egg, oatmeal or skips   Lunch Salad or [employer] roast beef sandwich or chickfila chicken soup   Dinner Baked chicken or salmon, brown rice, green beans   Snacks nuts   Beverages water         Environmental/Social:  Pt is ; lives with wife and sons      NUTRITION INTERVENTION:  Nutrition 60 minute one-on-one education & goal setting with Tia Vin    Reviewed with Tia Vin relevant labs compared to ideals.     Reviewed weight history and patient's verbalized weight goal as well as any real or perceived barriers to obtaining the goal. Collaborated with patient to set a specific short and long term weight goal.     Reviewed Rate Your Plate and conducted a verbal diet recall. Assessed for environmental, financial, psychosocial, physical and comorbidities that may impact the food and eating patterns / behaviors of Gerald Hendrickson    Discussed sources of carbs and limiting carbs to 45-60g per meal, 15-20g per snack. Collaborated with patient to set specific nutrient goals as well as specific food / behavior changes that will help patient meet the overall goal of following a heart healthy eating pattern (using guidelines as set forth by the American Heart Association and modeled after healthful eating patterns as recognized by the USDA Dietary Guidelines such as DASH, Mediterranean or plant-based). Briefly reviewed with Gerald Hendrickson the nutrition information in the Cardiac Rehab patient education book and encouraged Gerald Hendrickson to read thoroughly, ask questions as needed, and use for future reference for heart healthy nutrition information. Gerald Hendrickson  is scheduled to participate in Cardiac Rehab group nutrition classes. PATIENT GOALS:    Weight Goals:  Short Term Weight Goal:190 lbs  Long Term Weight Goal:175 lbs    Nutrition Goals:  Daily Recommendations:  Calories: 1225-7333 /day  (using Refugia Givens with AF x1.3-1.4-500)    Saturated Fat: no more than 12-13 g/day  Trans Fat: 0 g/day  Sodium: no more than 3199-8796 mg/day  Fruit: 2-3 cups / day  Vegetables: 3-4 cups/day    Other:  1. Limit carbs to 45-60g per meal; 15-20g per snack  2. Read food labels for sodium and saturated fat and limit to above recommendations    Keeping a food diary was recommended. Questions addressed. Follow-up plans discussed. Gerald Hendrickson verbalized understanding.             Juli Ortiz RD

## 2020-12-22 ENCOUNTER — APPOINTMENT (OUTPATIENT)
Dept: CARDIAC REHAB | Age: 45
End: 2020-12-22
Attending: INTERNAL MEDICINE
Payer: COMMERCIAL

## 2020-12-24 ENCOUNTER — APPOINTMENT (OUTPATIENT)
Dept: CARDIAC REHAB | Age: 45
End: 2020-12-24
Attending: INTERNAL MEDICINE
Payer: COMMERCIAL

## 2020-12-29 ENCOUNTER — HOSPITAL ENCOUNTER (OUTPATIENT)
Dept: CARDIAC REHAB | Age: 45
Discharge: HOME | End: 2020-12-29
Attending: INTERNAL MEDICINE
Payer: COMMERCIAL

## 2020-12-29 VITALS — WEIGHT: 199 LBS | BODY MASS INDEX: 34.16 KG/M2

## 2020-12-29 PROCEDURE — 93798 PHYS/QHP OP CAR RHAB W/ECG: CPT

## 2021-01-03 NOTE — MR AVS SNAPSHOT
Problem: Falls - Risk of  Goal: *Absence of Falls  Description: Document Tylor Cease Fall Risk and appropriate interventions in the flowsheet. Outcome: Progressing Towards Goal  Note: Fall Risk Interventions:  Mobility Interventions: Bed/chair exit alarm, Communicate number of staff needed for ambulation/transfer, Patient to call before getting OOB, PT Consult for mobility concerns, PT Consult for assist device competence, Strengthening exercises (ROM-active/passive), Utilize walker, cane, or other assistive device    Mentation Interventions: Adequate sleep, hydration, pain control, Bed/chair exit alarm, More frequent rounding, Room close to nurse's station    Medication Interventions: Bed/chair exit alarm    Elimination Interventions: Bed/chair exit alarm, Call light in reach    History of Falls Interventions: Bed/chair exit alarm     Problem: Pressure Injury - Risk of  Goal: *Prevention of pressure injury  Description: Document Tyrone Scale and appropriate interventions in the flowsheet. Outcome: Progressing Towards Goal  Note: Pressure Injury Interventions:  Sensory Interventions: Assess changes in LOC, Avoid rigorous massage over bony prominences, Check visual cues for pain, Float heels, Keep linens dry and wrinkle-free, Maintain/enhance activity level, Minimize linen layers, Pressure redistribution bed/mattress (bed type), Turn and reposition approx. every two hours (pillows and wedges if needed)    Moisture Interventions: Absorbent underpads, Minimize layers    Activity Interventions: Increase time out of bed, Pressure redistribution bed/mattress(bed type), PT/OT evaluation    Mobility Interventions: Pressure redistribution bed/mattress (bed type), PT/OT evaluation, Turn and reposition approx.  every two hours(pillow and wedges)    Nutrition Interventions: Discuss nutritional consult with provider(NPO)    Friction and Shear Interventions: Apply protective barrier, creams and emollients, Foam dressings/transparent Visit Information Date & Time Provider Department Dept. Phone Encounter #  
 7/13/2017  4:00 PM Arvind Shetty MD [facility] [phone] [id number] Upcoming Health Maintenance Date Due HEMOGLOBIN A1C Q6M 11/12/2015 FOOT EXAM Q1 5/12/2016 MICROALBUMIN Q1 5/12/2016 LIPID PANEL Q1 5/12/2016 EYE EXAM RETINAL OR DILATED Q1 10/13/2017* INFLUENZA AGE 9 TO ADULT 8/1/2017 DTaP/Tdap/Td series (2 - Td) 7/13/2027 *Topic was postponed. The date shown is not the original due date. Allergies as of 7/13/2017  Review Complete On: 7/13/2017 By: Twyla Caceres No Known Allergies Current Immunizations  Never Reviewed No immunizations on file. Not reviewed this visit Vitals BP Pulse Temp Resp Height(growth percentile) Weight(growth percentile) 107/74 (BP 1 Location: Right arm, BP Patient Position: Sitting) 79 98 °F (36.7 °C) (Oral) 18 5' 4\" (1.626 m) 213 lb 4.8 oz (96.8 kg) SpO2 BMI Smoking Status 95% 36.61 kg/m2 Never Smoker Vitals History BMI and BSA Data Body Mass Index Body Surface Area  
 36.61 kg/m 2 2.09 m 2 Preferred Pharmacy Pharmacy Name Phone [id number] [address] [phone] Your Updated Medication List  
  
   
This list is accurate as of: 7/13/17  5:41 PM.  Always use your most recent med list. amLODIPine 10 mg tablet Commonly known as:  Federico Reynoso Take 1 Tab by mouth daily. aspirin delayed-release 81 mg tablet Take 1 Tab by mouth daily. atorvastatin 20 mg tablet Commonly known as:  LIPITOR Take 1 Tab by mouth daily. clopidogrel 75 mg Tab Commonly known as:  PLAVIX Take 1 Tab by mouth daily. hydroCHLOROthiazide 12.5 mg capsule Commonly known as:  Damion Dose Take 12.5 mg by mouth daily. lisinopril 40 mg tablet Commonly known as:  Therisa Semen  
 Take 1 Tab by mouth daily. metFORMIN  mg tablet Commonly known as:  GLUCOPHAGE XR Take 2 Tabs by mouth daily (with dinner). metoprolol succinate 100 mg tablet Commonly known as:  TOPROL-XL Take 1 Tab by mouth daily. omeprazole 40 mg capsule Commonly known as:  PRILOSEC Take 1 Cap by mouth daily. spironolactone 25 mg tablet Commonly known as:  ALDACTONE Take 1 Tab by mouth daily. VIAGRA 100 mg tablet Generic drug:  sildenafil citrate Introducing [facility]! Dear Yue Humphrey: Thank you for requesting a XGraph account. Our records indicate that you already have an active XGraph account. You can access your account anytime at [email or web address] Did you know that you can access your hospital and ER discharge instructions at any time in XGraph? You can also review all of your test results from your hospital stay or ER visit. Additional Information If you have questions, please visit the Frequently Asked Questions section of the XGraph website at [email or web address] Remember, XGraph is NOT to be used for urgent needs. For medical emergencies, dial 911. Now available from your iPhone and Android! Please provide this summary of care documentation to your next provider. Your primary care clinician is listed as Kareem Dupree. If you have any questions after today's visit, please call [phone]. film/skin sealants, Minimize layers

## 2021-01-04 ENCOUNTER — OFFICE VISIT (OUTPATIENT)
Dept: URGENT CARE | Age: 46
End: 2021-01-04
Payer: COMMERCIAL

## 2021-01-04 VITALS — TEMPERATURE: 98.9 F | HEART RATE: 88 BPM | OXYGEN SATURATION: 98 % | RESPIRATION RATE: 16 BRPM

## 2021-01-04 DIAGNOSIS — Z20.822 EXPOSURE TO COVID-19 VIRUS: Primary | ICD-10-CM

## 2021-01-04 PROCEDURE — 99202 OFFICE O/P NEW SF 15 MIN: CPT | Performed by: FAMILY MEDICINE

## 2021-01-04 NOTE — PROGRESS NOTES
This patient was seen at [address] [place] [address] Urgent Care while in their vehicle due to COVID-19 pandemic with PPE and focused examination in order to decrease community viral transmission. The patient/guardian gave verbal consent to treat. The history is provided by the patient. Asymptomatic  Exposed to his wife's sister who tested positive for covid today  exposure was on day of kaitlin.             Past Medical History:   Diagnosis Date    CAD (coronary artery disease)     2 stents 2016     Headache     Hypercholesterolemia     Hypertension     Hypogonadism male     Liver disease     NAFLD    Obstructive sleep apnea         Past Surgical History:   Procedure Laterality Date    HX HEENT      status post pharyngioplasty         Family History   Problem Relation Age of Onset    Heart Disease Father         Social History     Socioeconomic History    Marital status:      Spouse name: Not on file    Number of children: 2    Years of education: 12    Highest education level: Not on file   Occupational History    Occupation:    Social Needs    Financial resource strain: Not on file    Food insecurity     Worry: Not on file     Inability: Not on file   [employer] needs     Medical: Not on file     Non-medical: Not on file   Tobacco Use    Smoking status: Never Smoker    Smokeless tobacco: Never Used   Substance and Sexual Activity    Alcohol use: No    Drug use: No    Sexual activity: Yes     Partners: Female   Lifestyle    Physical activity     Days per week: Not on file     Minutes per session: Not on file    Stress: Not on file   Relationships    Social connections     Talks on phone: Not on file     Gets together: Not on file     Attends Taoist service: Not on file     Active member of club or organization: Not on file     Attends meetings of clubs or organizations: Not on file     Relationship status: Not on file    Intimate partner violence     Fear of current or ex partner: Not on file     Emotionally abused: Not on file     Physically abused: Not on file     Forced sexual activity: Not on file   Other Topics Concern    Not on file   Social History Narrative    Not on file                ALLERGIES: Patient has no known allergies. Review of Systems   All other systems reviewed and are negative. Vitals:    01/04/21 1714   Pulse: 88   Resp: 16   Temp: 98.9 °F (37.2 °C)   SpO2: 98%       Physical Exam  Vitals signs and nursing note reviewed. Constitutional:       General: He is not in acute distress. Appearance: He is not ill-appearing. Pulmonary:      Effort: Pulmonary effort is normal. No respiratory distress. Breath sounds: Normal breath sounds. MDM    Procedures      ICD-10-CM ICD-9-CM    1. Exposure to COVID-19 virus  Z20.822 V01.79 NOVEL CORONAVIRUS (COVID-19)     No orders of the defined types were placed in this encounter. No results found for any visits on 01/04/21. The patients condition was discussed with the patient and they understand. The patient is to follow up with primary care doctor. If signs and symptoms become worse the pt is to go to the ER. The patient is to take medications as prescribed.

## 2021-01-05 ENCOUNTER — APPOINTMENT (OUTPATIENT)
Dept: CARDIAC REHAB | Age: 46
End: 2021-01-05
Attending: INTERNAL MEDICINE
Payer: COMMERCIAL

## 2021-01-06 ENCOUNTER — HOSPITAL ENCOUNTER (OUTPATIENT)
Dept: SLEEP MEDICINE | Age: 46
Discharge: HOME | End: 2021-01-06
Attending: INTERNAL MEDICINE
Payer: COMMERCIAL

## 2021-01-06 ENCOUNTER — PATIENT OUTREACH (OUTPATIENT)
Dept: CASE MANAGEMENT | Age: 46
End: 2021-01-06

## 2021-01-06 VITALS
HEART RATE: 100 BPM | BODY MASS INDEX: 33.97 KG/M2 | OXYGEN SATURATION: 96 % | WEIGHT: 199 LBS | TEMPERATURE: 99.1 F | DIASTOLIC BLOOD PRESSURE: 89 MMHG | SYSTOLIC BLOOD PRESSURE: 132 MMHG | HEIGHT: 64 IN

## 2021-01-06 DIAGNOSIS — G47.33 OSA (OBSTRUCTIVE SLEEP APNEA): ICD-10-CM

## 2021-01-06 LAB — SARS-COV-2, NAA: NOT DETECTED

## 2021-01-06 PROCEDURE — 95810 POLYSOM 6/> YRS 4/> PARAM: CPT | Performed by: INTERNAL MEDICINE

## 2021-01-06 NOTE — PROGRESS NOTES
Patient has graduated from the Transitions of Care Coordination  program on 1/6/21. Patient/family has the ability to self-manage at this time Care management goals have been completed. Patient was not referred to the [facility] team for further management. Goals Addressed                 This Visit's Progress     COMPLETED: Prevent complications post hospitalization. 12/2/2020 Patient reports taking all medications, monitoring BS, PCP appointment on 12/4/2020. Denies chest pain, SOB, fever, N/V/D. Patient is to monitor BS and record, move cardiology appointment to this week from Jan., will report attendance of appointments and changes to plan of care at next week outreach. DAVID    1/6/21 Per review of chart patient attended follow up appointments. Patient has had no ED or hospital admissions 30 days post discharge. Goal complete. [facility]             Patient has Care Transition Nurse's contact information for any further questions, concerns, or needs.   Patients upcoming visits:    Future Appointments   Date Time Provider Richard Olivier   1/6/2021  8:30 PM BEDRM 1 [facility] [place] 1 SLEEP LAB ME   1/7/2021  9:00 AM [facility] CARDIOPULM EXERCISE [facility] REG   1/8/2021  9:00 AM [facility] CARDIOPULM EXERCISE [facility] REG   1/8/2021 10:00 AM  [facility]   1/12/2021  9:00 AM [facility] CARDIOPULM EXERCISE [facility] REG   1/14/2021  9:00 AM [facility] CARDIOPULM EXERCISE [facility] REG   1/15/2021  9:00 AM [facility] CARDIOPULM EXERCISE [facility] REG   1/15/2021 10:00 AM  [facility] REG   1/19/2021  9:00 AM [facility] CARDIOPULM EXERCISE [facility] REG   1/19/2021 11:30 AM Katina Mcknight MD [facility] MAIN BS AMB   1/21/2021  9:00 AM [facility] CARDIOPULM EXERCISE [facility] REG   1/22/2021  9:00 AM [facility] CARDIOPULM EXERCISE [facility] REG   1/22/2021 10:00 AM  [facility]   1/26/2021  9:00 AM MD DARREL Resendiz BS AMB   1/28/2021  9:00 AM [facility] CARDIOPULM EXERCISE [facility] REG   1/29/2021  9:00 AM [facility] CARDIOPULM EXERCISE [facility] REG   1/29/2021 10:00 [facility]   2/2/2021  9:00 AM [facility] CARDIOPULM EXERCISE [facility] REG   2/4/2021  8:00 AM [facility] CARDIOPULM EXERCISE [facility] REG   2/16/2021 11:20 AM MD DARREL Ruffin AMB

## 2021-01-08 DIAGNOSIS — Z79.4 TYPE 2 DIABETES MELLITUS WITHOUT COMPLICATION, WITH LONG-TERM CURRENT USE OF INSULIN (HCC): ICD-10-CM

## 2021-01-08 DIAGNOSIS — E11.9 TYPE 2 DIABETES MELLITUS WITHOUT COMPLICATION, WITH LONG-TERM CURRENT USE OF INSULIN (HCC): ICD-10-CM

## 2021-01-09 RX ORDER — INSULIN GLARGINE 100 [IU]/ML
INJECTION, SOLUTION SUBCUTANEOUS
Qty: 3 PEN | Refills: 11 | Status: SHIPPED | OUTPATIENT
Start: 2021-01-09 | End: 2022-02-02 | Stop reason: SDUPTHER

## 2021-01-09 RX ORDER — PEN NEEDLE, DIABETIC 30 GX3/16"
NEEDLE, DISPOSABLE MISCELLANEOUS
Qty: 60 PACKAGE | Refills: 11 | Status: SHIPPED | OUTPATIENT
Start: 2021-01-09 | End: 2021-04-28

## 2021-01-12 ENCOUNTER — TELEPHONE (OUTPATIENT)
Dept: SLEEP MEDICINE | Age: 46
End: 2021-01-12

## 2021-01-12 ENCOUNTER — TELEPHONE (OUTPATIENT)
Dept: CARDIAC REHAB | Age: 46
End: 2021-01-12

## 2021-01-12 DIAGNOSIS — G47.33 OSA (OBSTRUCTIVE SLEEP APNEA): Primary | ICD-10-CM

## 2021-01-12 DIAGNOSIS — I10 ESSENTIAL HYPERTENSION: ICD-10-CM

## 2021-01-12 NOTE — TELEPHONE ENCOUNTER
Cardiopulmonary Rehab Nursing Entry:    Phone call to check on status of enrollment in out-patient Cardiac Rehab Program.  Pt has not been in attendance since 12/29/20. Left message on voicemail.

## 2021-01-13 ENCOUNTER — DOCUMENTATION ONLY (OUTPATIENT)
Dept: SLEEP MEDICINE | Age: 46
End: 2021-01-13

## 2021-01-13 NOTE — TELEPHONE ENCOUNTER
Mark Solomon is to be contacted by lead sleep technologist regarding results of Sleep Testing which was indicative of an average AHI of 19.3 per hour with an SpO2 estrada of 86% and SpO2 of < 88% being 0.20 minutes. An APAP prescription has been written and patient will be contacted by office staff regarding follow-up  in 2-3 months after initiation of therapy. Encounter Diagnoses   Name Primary?  FREDO (obstructive sleep apnea) Yes    Essential hypertension        Orders Placed This Encounter    AMB SUPPLY ORDER     Diagnosis: (G47.33) FREDO (obstructive sleep apnea)  (primary encounter diagnosis)     Respironics DreamStation ( Unit - Auto Mode) / Heated Humidifier :    Positive Airway Pressure Therapy: Duration of need: 99 months. Auto - PAP: 4 - 20 cmH2O; Optistart enabled. Ramp Time: 30 Minutes; Flex: 2. Remote monitoring enrollment.  Nasal Cushion (Replace) 2 per month.  Nasal Interface Mask 1 every 3 months.  Headgear 1 every 6 months.  Filter(s) Disposable 2 per month.  Filter(s) Non-Disposable 1 every 6 months. [address] for Lockyaeled Andres (Replace) 1 every 6 months.  Tubing with heating element 1 every 3 months. Perform Mask Fitting per patient preference and comfort - replace as above. Mitali Payne MD, FAASM; NPI: [id number]  Electronically signed. 01/12/21

## 2021-01-15 ENCOUNTER — HOSPITAL ENCOUNTER (OUTPATIENT)
Dept: CARDIAC REHAB | Age: 46
End: 2021-01-15
Attending: INTERNAL MEDICINE
Payer: COMMERCIAL

## 2021-01-19 ENCOUNTER — APPOINTMENT (OUTPATIENT)
Dept: CARDIAC REHAB | Age: 46
End: 2021-01-19
Attending: INTERNAL MEDICINE
Payer: COMMERCIAL

## 2021-01-26 ENCOUNTER — APPOINTMENT (OUTPATIENT)
Dept: CARDIAC REHAB | Age: 46
End: 2021-01-26
Attending: INTERNAL MEDICINE
Payer: COMMERCIAL

## 2021-01-28 ENCOUNTER — HOSPITAL ENCOUNTER (OUTPATIENT)
Dept: CARDIAC REHAB | Age: 46
Discharge: HOME | End: 2021-01-28
Attending: INTERNAL MEDICINE
Payer: COMMERCIAL

## 2021-01-28 VITALS — WEIGHT: 196 LBS | BODY MASS INDEX: 33.64 KG/M2

## 2021-01-28 PROCEDURE — 93798 PHYS/QHP OP CAR RHAB W/ECG: CPT

## 2021-01-29 ENCOUNTER — HOSPITAL ENCOUNTER (EMERGENCY)
Age: 46
Discharge: HOME | End: 2021-01-29
Attending: EMERGENCY MEDICINE
Payer: COMMERCIAL

## 2021-01-29 VITALS
OXYGEN SATURATION: 96 % | WEIGHT: 195.77 LBS | RESPIRATION RATE: 18 BRPM | BODY MASS INDEX: 33.42 KG/M2 | SYSTOLIC BLOOD PRESSURE: 137 MMHG | HEIGHT: 64 IN | TEMPERATURE: 98 F | DIASTOLIC BLOOD PRESSURE: 88 MMHG

## 2021-01-29 DIAGNOSIS — R07.89 MUSCULOSKELETAL CHEST PAIN: Primary | ICD-10-CM

## 2021-01-29 LAB
ALBUMIN SERPL-MCNC: 4 G/DL (ref 3.5–5)
ALBUMIN/GLOB SERPL: 1 {RATIO} (ref 1.1–2.2)
ALP SERPL-CCNC: 90 U/L (ref 45–117)
ALT SERPL-CCNC: 36 U/L (ref 12–78)
ANION GAP SERPL CALC-SCNC: 5 MMOL/L (ref 5–15)
AST SERPL-CCNC: 20 U/L (ref 15–37)
ATRIAL RATE: 76 BPM
BASOPHILS # BLD: 0 K/UL (ref 0–0.1)
BASOPHILS NFR BLD: 1 % (ref 0–1)
BILIRUB SERPL-MCNC: 0.2 MG/DL (ref 0.2–1)
BUN SERPL-MCNC: 20 MG/DL (ref 6–20)
BUN/CREAT SERPL: 15 (ref 12–20)
CALCIUM SERPL-MCNC: 9.4 MG/DL (ref 8.5–10.1)
CALCULATED P AXIS, ECG09: 26 DEGREES
CALCULATED R AXIS, ECG10: -8 DEGREES
CALCULATED T AXIS, ECG11: 11 DEGREES
CHLORIDE SERPL-SCNC: 108 MMOL/L (ref 97–108)
CO2 SERPL-SCNC: 28 MMOL/L (ref 21–32)
CREAT SERPL-MCNC: 1.34 MG/DL (ref 0.7–1.3)
DIAGNOSIS, 93000: NORMAL
DIFFERENTIAL METHOD BLD: ABNORMAL
EOSINOPHIL # BLD: 0.2 K/UL (ref 0–0.4)
EOSINOPHIL NFR BLD: 2 % (ref 0–7)
ERYTHROCYTE [DISTWIDTH] IN BLOOD BY AUTOMATED COUNT: 13.5 % (ref 11.5–14.5)
GLOBULIN SER CALC-MCNC: 4.1 G/DL (ref 2–4)
GLUCOSE SERPL-MCNC: 92 MG/DL (ref 65–100)
HCT VFR BLD AUTO: 40 % (ref 36.6–50.3)
HGB BLD-MCNC: 12.7 G/DL (ref 12.1–17)
IMM GRANULOCYTES # BLD AUTO: 0 K/UL (ref 0–0.04)
IMM GRANULOCYTES NFR BLD AUTO: 0 % (ref 0–0.5)
LYMPHOCYTES # BLD: 2.3 K/UL (ref 0.8–3.5)
LYMPHOCYTES NFR BLD: 35 % (ref 12–49)
MCH RBC QN AUTO: 25.6 PG (ref 26–34)
MCHC RBC AUTO-ENTMCNC: 31.8 G/DL (ref 30–36.5)
MCV RBC AUTO: 80.6 FL (ref 80–99)
MONOCYTES # BLD: 0.6 K/UL (ref 0–1)
MONOCYTES NFR BLD: 10 % (ref 5–13)
NEUTS SEG # BLD: 3.4 K/UL (ref 1.8–8)
NEUTS SEG NFR BLD: 52 % (ref 32–75)
NRBC # BLD: 0 K/UL (ref 0–0.01)
NRBC BLD-RTO: 0 PER 100 WBC
P-R INTERVAL, ECG05: 130 MS
PLATELET # BLD AUTO: 235 K/UL (ref 150–400)
PMV BLD AUTO: 11.4 FL (ref 8.9–12.9)
POTASSIUM SERPL-SCNC: 3.9 MMOL/L (ref 3.5–5.1)
PROT SERPL-MCNC: 8.1 G/DL (ref 6.4–8.2)
Q-T INTERVAL, ECG07: 384 MS
QRS DURATION, ECG06: 96 MS
QTC CALCULATION (BEZET), ECG08: 432 MS
RBC # BLD AUTO: 4.96 M/UL (ref 4.1–5.7)
SODIUM SERPL-SCNC: 141 MMOL/L (ref 136–145)
TROPONIN I SERPL-MCNC: <0.05 NG/ML
VENTRICULAR RATE, ECG03: 76 BPM
WBC # BLD AUTO: 6.5 K/UL (ref 4.1–11.1)

## 2021-01-29 PROCEDURE — 96374 THER/PROPH/DIAG INJ IV PUSH: CPT

## 2021-01-29 PROCEDURE — 84484 ASSAY OF TROPONIN QUANT: CPT

## 2021-01-29 PROCEDURE — 74011250636 HC RX REV CODE- 250/636: Performed by: EMERGENCY MEDICINE

## 2021-01-29 PROCEDURE — 99282 EMERGENCY DEPT VISIT SF MDM: CPT

## 2021-01-29 PROCEDURE — 80053 COMPREHEN METABOLIC PANEL: CPT

## 2021-01-29 PROCEDURE — 85025 COMPLETE CBC W/AUTO DIFF WBC: CPT

## 2021-01-29 PROCEDURE — 93005 ELECTROCARDIOGRAM TRACING: CPT

## 2021-01-29 PROCEDURE — 36415 COLL VENOUS BLD VENIPUNCTURE: CPT

## 2021-01-29 RX ORDER — KETOROLAC TROMETHAMINE 30 MG/ML
15 INJECTION, SOLUTION INTRAMUSCULAR; INTRAVENOUS
Status: COMPLETED | OUTPATIENT
Start: 2021-01-29 | End: 2021-01-29

## 2021-01-29 RX ORDER — METHOCARBAMOL 500 MG/1
500 TABLET, FILM COATED ORAL 4 TIMES DAILY
Qty: 20 TAB | Refills: 0 | Status: SHIPPED | OUTPATIENT
Start: 2021-01-29 | End: 2021-04-28

## 2021-01-29 RX ORDER — ACETAMINOPHEN 325 MG/1
650 TABLET ORAL
Qty: 20 TAB | Refills: 0 | Status: SHIPPED | OUTPATIENT
Start: 2021-01-29 | End: 2021-05-02 | Stop reason: CLARIF

## 2021-01-29 RX ORDER — LIDOCAINE 4 G/100G
1 PATCH TOPICAL EVERY 12 HOURS
Qty: 10 PATCH | Refills: 0 | Status: SHIPPED | OUTPATIENT
Start: 2021-01-29 | End: 2021-04-28

## 2021-01-29 RX ADMIN — KETOROLAC TROMETHAMINE 15 MG: 30 INJECTION, SOLUTION INTRAMUSCULAR at 16:52

## 2021-01-29 NOTE — ED PROVIDER NOTES
EMERGENCY DEPARTMENT HISTORY AND PHYSICAL EXAM      Date: 1/29/2021  Patient Name: Jo Richardson  Patient Age and Sex: 39 y.o. male     History of Presenting Illness     Chief Complaint   Patient presents with    Chest Pain     onset a few days with a heart HX       History Provided By: Patient    HPI: Jo Richardson is a 51-year-old male presenting with left-sided chest pain. Patient has a history of CAD last year and had a cardiac cath done last year. States that over the past 2 days has been having some pain in his left chest rating to his left shoulder as well as some pain in his left neck. States that every time he lays on that side it hurts but denies any shortness of breath, diaphoresis, nausea, vomiting. Patient states that he took nitro with no relief. States that this does feel different than when he had the heart attack last year. His cardiologist is Chandrakant Roy at [facility]. There are no other complaints, changes, or physical findings at this time. PCP: Ricky Nelson MD    No current facility-administered medications on file prior to encounter. Current Outpatient Medications on File Prior to Encounter   Medication Sig Dispense Refill    Insulin Needles, Disposable, 31 gauge x 5/16\" ndle As directed 60 Package 11    insulin glargine (LANTUS,BASAGLAR) 100 unit/mL (3 mL) inpn 30 units daily 3 Pen 11    amLODIPine (NORVASC) 5 mg tablet TAKE 1 TABLET BY MOUTH EVERY DAY 30 Tab 11    nitroglycerin (NITROSTAT) 0.4 mg SL tablet 1 Tab by SubLINGual route every five (5) minutes as needed for Chest Pain (for unstable angina, take up to 3 tabs q 5mins. If chest pain unresolved call 911.). Up to 3 doses. 1 Bottle 3    ticagrelor (BRILINTA) 90 mg tablet Take 90 mg by mouth two (2) times a day.       glucose blood VI test strips (OneTouch Ultra Blue Test Strip) strip Use to test blood sugar three times a day 300 Strip 3    semaglutide (Ozempic) 1 mg/dose (2 mg/1.5 mL) sub-q pen 1 mg by SubCUTAneous route every seven (7) days. 2 Pen 11    famotidine (PEPCID) 40 mg tablet Take 1 Tab by mouth daily. This is to replace the omeprazole. 30 Tab 11    rosuvastatin (CRESTOR) 40 mg tablet Take 1 Tab by mouth daily. 30 Tab 11    lisinopril (PRINIVIL, ZESTRIL) 40 mg tablet TAKE 1 TABLET BY MOUTH ONCE DAILY 30 Tab 11    aspirin delayed-release 81 mg tablet TAKE 1 TABLET BY MOUTH EVERY DAY 90 Tab 3    Blood-Glucose Meter (ONETOUCH ULTRA2) monitoring kit Use to test blood sugar three times a day 1 Kit 0    lancets (ONE TOUCH DELICA) 33 gauge misc Use to test blood sugar three times a day. 300 Lancet 3       Past History     Past Medical History:  Past Medical History:   Diagnosis Date    CAD (coronary artery disease)     2 stents 2016     Headache     Hypercholesterolemia     Hypertension     Hypogonadism male     Liver disease     NAFLD    Obstructive sleep apnea        Past Surgical History:  Past Surgical History:   Procedure Laterality Date    HX HEENT      status post pharyngioplasty       Family History:  Family History   Problem Relation Age of Onset    Heart Disease Father        Social History:  Social History     Tobacco Use    Smoking status: Never Smoker    Smokeless tobacco: Never Used   Substance Use Topics    Alcohol use: No    Drug use: No       Allergies:  No Known Allergies      Review of Systems   Review of Systems   Constitutional: Negative for chills and fever. Respiratory: Negative for cough and shortness of breath. Cardiovascular: Positive for chest pain. Gastrointestinal: Negative for abdominal pain, constipation, diarrhea, nausea and vomiting. Genitourinary: Negative for dysuria, frequency and hematuria. Musculoskeletal: Positive for myalgias and neck pain. Neurological: Negative for weakness and numbness. All other systems reviewed and are negative. Physical Exam   Physical Exam  Vitals signs and nursing note reviewed.    Constitutional: Appearance: He is well-developed. HENT:      Head: Normocephalic and atraumatic. Nose: Nose normal.      Mouth/Throat:      Mouth: Mucous membranes are moist.   Eyes:      Extraocular Movements: Extraocular movements intact. Conjunctiva/sclera: Conjunctivae normal.   Neck:      Musculoskeletal: Normal range of motion and neck supple. Cardiovascular:      Rate and Rhythm: Normal rate and regular rhythm. Pulmonary:      Effort: Pulmonary effort is normal. No respiratory distress. Breath sounds: Normal breath sounds. Chest:      Chest wall: Tenderness present. Comments: Patient is tender over his left pectoralis muscle, trapezius muscle lateral neck muscles and over his deltoid. Pain elicited with some movement. Abdominal:      General: There is no distension. Palpations: Abdomen is soft. Tenderness: There is no abdominal tenderness. Musculoskeletal: Normal range of motion. Skin:     General: Skin is warm and dry. Neurological:      General: No focal deficit present. Mental Status: He is alert and oriented to person, place, and time. Mental status is at baseline.    Psychiatric:         Mood and Affect: Mood normal.          Diagnostic Study Results     Labs -     Recent Results (from the past 12 hour(s))   EKG, 12 LEAD, INITIAL    Collection Time: 01/29/21  3:13 PM   Result Value Ref Range    Ventricular Rate 76 BPM    Atrial Rate 76 BPM    P-R Interval 130 ms    QRS Duration 96 ms    Q-T Interval 384 ms    QTC Calculation (Bezet) 432 ms    Calculated P Axis 26 degrees    Calculated R Axis -8 degrees    Calculated T Axis 11 degrees    Diagnosis       Normal sinus rhythm  Minimal voltage criteria for LVH, may be normal variant  Nonspecific T wave abnormality    Confirmed by Edna Colbert MD, Pepe Goldberg ([id number]) on 1/29/2021 4:34:36 PM     CBC WITH AUTOMATED DIFF    Collection Time: 01/29/21  3:17 PM   Result Value Ref Range    WBC 6.5 4.1 - 11.1 K/uL    RBC 4.96 4.10 - 5.70 M/uL HGB 12.7 12.1 - 17.0 g/dL    HCT 40.0 36.6 - 50.3 %    MCV 80.6 80.0 - 99.0 FL    MCH 25.6 (L) 26.0 - 34.0 PG    MCHC 31.8 30.0 - 36.5 g/dL    RDW 13.5 11.5 - 14.5 %    PLATELET 571 089 - 435 K/uL    MPV 11.4 8.9 - 12.9 FL    NRBC 0.0 0  WBC    ABSOLUTE NRBC 0.00 0.00 - 0.01 K/uL    NEUTROPHILS 52 32 - 75 %    LYMPHOCYTES 35 12 - 49 %    MONOCYTES 10 5 - 13 %    EOSINOPHILS 2 0 - 7 %    BASOPHILS 1 0 - 1 %    IMMATURE GRANULOCYTES 0 0.0 - 0.5 %    ABS. NEUTROPHILS 3.4 1.8 - 8.0 K/UL    ABS. LYMPHOCYTES 2.3 0.8 - 3.5 K/UL    ABS. MONOCYTES 0.6 0.0 - 1.0 K/UL    ABS. EOSINOPHILS 0.2 0.0 - 0.4 K/UL    ABS. BASOPHILS 0.0 0.0 - 0.1 K/UL    ABS. IMM. GRANS. 0.0 0.00 - 0.04 K/UL    DF AUTOMATED     METABOLIC PANEL, COMPREHENSIVE    Collection Time: 01/29/21  3:17 PM   Result Value Ref Range    Sodium 141 136 - 145 mmol/L    Potassium 3.9 3.5 - 5.1 mmol/L    Chloride 108 97 - 108 mmol/L    CO2 28 21 - 32 mmol/L    Anion gap 5 5 - 15 mmol/L    Glucose 92 65 - 100 mg/dL    BUN 20 6 - 20 MG/DL    Creatinine 1.34 (H) 0.70 - 1.30 MG/DL    BUN/Creatinine ratio 15 12 - 20      GFR est AA >60 >60 ml/min/1.73m2    GFR est non-AA 58 (L) >60 ml/min/1.73m2    Calcium 9.4 8.5 - 10.1 MG/DL    Bilirubin, total 0.2 0.2 - 1.0 MG/DL    ALT (SGPT) 36 12 - 78 U/L    AST (SGOT) 20 15 - 37 U/L    Alk. phosphatase 90 45 - 117 U/L    Protein, total 8.1 6.4 - 8.2 g/dL    Albumin 4.0 3.5 - 5.0 g/dL    Globulin 4.1 (H) 2.0 - 4.0 g/dL    A-G Ratio 1.0 (L) 1.1 - 2.2     TROPONIN I    Collection Time: 01/29/21  3:17 PM   Result Value Ref Range    Troponin-I, Qt. <0.05 <0.05 ng/mL       Radiologic Studies -   No orders to display     CT Results  (Last 48 hours)    None        CXR Results  (Last 48 hours)    None            Medical Decision Making   I am the first provider for this patient. I reviewed the vital signs, available nursing notes, past medical history, past surgical history, family history and social history.     Vital Signs-Reviewed the patient's vital signs. Patient Vitals for the past 12 hrs:   Temp Resp BP SpO2   01/29/21 1504 98 °F (36.7 °C) 18 137/88 96 %       Records Reviewed: Nursing Notes and Old Medical Records    Provider Notes (Medical Decision Making):   Patient with a heart history presenting with left-sided chest pain. While most likely musculoskeletal given his cardiac history we will get EKG and troponin. ED Course:   Initial assessment performed. The patients presenting problems have been discussed, and they are in agreement with the care plan formulated and outlined with them. I have encouraged them to ask questions as they arise throughout their visit. Critical Care Time:   0    Disposition:  Discharge Note:  The patient has been re-evaluated and is ready for discharge. Reviewed available results with patient. Counseled patient on diagnosis and care plan. Patient has expressed understanding, and all questions have been answered. Patient agrees with plan and agrees to follow up as recommended, or to return to the ED if their symptoms worsen. Discharge instructions have been provided and explained to the patient, along with reasons to return to the ED. PLAN:  Current Discharge Medication List      START taking these medications    Details   lidocaine 4 % patch 1 Patch by TransDERmal route every twelve (12) hours every twelve (12) hours. Qty: 10 Patch, Refills: 0      methocarbamoL (Robaxin) 500 mg tablet Take 1 Tab by mouth four (4) times daily. Qty: 20 Tab, Refills: 0      acetaminophen (TYLENOL) 325 mg tablet Take 2 Tabs by mouth every four (4) hours as needed for Pain. Qty: 20 Tab, Refills: 0           2. Follow-up Information     Follow up With Specialties Details Why Contact Info    Ирина Conley MD Cardiology Schedule an appointment as soon as possible for a visit   [address]   [address] [facility] [address]  [place] [address] [phone]          3.   Return to ED if worse     Diagnosis     Clinical Impression:   1. Musculoskeletal chest pain        Attestations:    Barbie Pagan M.D.        Please note that this dictation was completed with [employer], the computer voice recognition software.  Quite often unanticipated grammatical, syntax, homophones, and other interpretive errors are inadvertently transcribed by the computer software.  Please disregard these errors.  Please excuse any errors that have escaped final proofreading.  Thank you.

## 2021-02-02 ENCOUNTER — OFFICE VISIT (OUTPATIENT)
Dept: INTERNAL MEDICINE CLINIC | Age: 46
End: 2021-02-02
Payer: COMMERCIAL

## 2021-02-02 VITALS
TEMPERATURE: 97.9 F | BODY MASS INDEX: 33.61 KG/M2 | OXYGEN SATURATION: 96 % | SYSTOLIC BLOOD PRESSURE: 139 MMHG | DIASTOLIC BLOOD PRESSURE: 98 MMHG | WEIGHT: 196.9 LBS | HEIGHT: 64 IN | RESPIRATION RATE: 18 BRPM | HEART RATE: 82 BPM

## 2021-02-02 DIAGNOSIS — Z79.4 TYPE 2 DIABETES MELLITUS WITHOUT COMPLICATION, WITH LONG-TERM CURRENT USE OF INSULIN (HCC): Primary | ICD-10-CM

## 2021-02-02 DIAGNOSIS — E66.9 OBESITY (BMI 30.0-34.9): ICD-10-CM

## 2021-02-02 DIAGNOSIS — I25.110 CORONARY ARTERY DISEASE INVOLVING NATIVE CORONARY ARTERY OF NATIVE HEART WITH UNSTABLE ANGINA PECTORIS (HCC): ICD-10-CM

## 2021-02-02 DIAGNOSIS — R53.81 PHYSICAL DECONDITIONING: ICD-10-CM

## 2021-02-02 DIAGNOSIS — I10 ESSENTIAL HYPERTENSION: ICD-10-CM

## 2021-02-02 DIAGNOSIS — E11.9 TYPE 2 DIABETES MELLITUS WITHOUT COMPLICATION, WITH LONG-TERM CURRENT USE OF INSULIN (HCC): Primary | ICD-10-CM

## 2021-02-02 DIAGNOSIS — G47.33 OBSTRUCTIVE SLEEP APNEA: ICD-10-CM

## 2021-02-02 DIAGNOSIS — E78.5 DYSLIPIDEMIA: ICD-10-CM

## 2021-02-02 PROCEDURE — 99215 OFFICE O/P EST HI 40 MIN: CPT | Performed by: INTERNAL MEDICINE

## 2021-02-02 PROCEDURE — 3051F HG A1C>EQUAL 7.0%<8.0%: CPT | Performed by: INTERNAL MEDICINE

## 2021-02-02 NOTE — PROGRESS NOTES
Chief Complaint   Patient presents with   [facility] ED Follow-up     Patient seen at [facility] ED on 1/29/21 for chest pain. 1. Have you been to the ER, urgent care clinic since your last visit? Hospitalized since your last visit? Yes When: 1/29/21 Where: [facility] ED Reason for visit: chest paint    2. Have you seen or consulted any other health care providers outside of the [address] [place] [address] since your last visit? Include any pap smears or colon screening.  No

## 2021-02-03 LAB
APO B SERPL-MCNC: 50 MG/DL
EST. AVERAGE GLUCOSE BLD GHB EST-MCNC: 154 MG/DL
HBA1C MFR BLD: 7 % (ref 4.8–5.6)

## 2021-02-07 RX ORDER — CARVEDILOL 6.25 MG/1
6.25 TABLET ORAL 2 TIMES DAILY WITH MEALS
Qty: 60 TAB | Refills: 11 | Status: SHIPPED | OUTPATIENT
Start: 2021-02-07 | End: 2021-02-16

## 2021-02-07 NOTE — PROGRESS NOTES
[address] [facility]  [place] [address]  [address] [place] [address]  Phone:  [phone]  Fax: [phone]       Chief Complaint   Patient presents with   [facility] ED Follow-up     Patient seen at [facility] ED on 1/29/21 for chest pain. .      SUBJECTIVE:    Dennis Gallardo is a 39 y.o. male comes in for a return visit stating that he has done reasonably well. He most recently went to the Emergency Room because of vague, nondescript chest pain. It was deemed not to be cardiac in origin. He is quite sensitive to this now. The last time he had chest pain, it was indeed ischemic related and he had to have a stent placed. This has motivated him to be a bit more compliant with his regimen. He states his blood sugar has been excellent. He has not had any interventional hypoglycemia. He has a past history of primary hypertension, dyslipidemia, obstructive sleep apnea, as well as obesity. He emphasizes that he is compliant with all the medications pertinent to each of these diagnoses. Current Outpatient Medications   Medication Sig Dispense Refill    carvediloL (COREG) 6.25 mg tablet Take 1 Tab by mouth two (2) times daily (with meals). 60 Tab 11    lidocaine 4 % patch 1 Patch by TransDERmal route every twelve (12) hours every twelve (12) hours. 10 Patch 0    methocarbamoL (Robaxin) 500 mg tablet Take 1 Tab by mouth four (4) times daily. 20 Tab 0    acetaminophen (TYLENOL) 325 mg tablet Take 2 Tabs by mouth every four (4) hours as needed for Pain. 20 Tab 0    Insulin Needles, Disposable, 31 gauge x 5/16\" ndle As directed 60 Package 11    insulin glargine (LANTUS,BASAGLAR) 100 unit/mL (3 mL) inpn 30 units daily 3 Pen 11    amLODIPine (NORVASC) 5 mg tablet TAKE 1 TABLET BY MOUTH EVERY DAY 30 Tab 11    nitroglycerin (NITROSTAT) 0.4 mg SL tablet 1 Tab by SubLINGual route every five (5) minutes as needed for Chest Pain (for unstable angina, take up to 3 tabs q 5mins.  If chest pain unresolved call 911.). Up to 3 doses. 1 Bottle 3    ticagrelor (BRILINTA) 90 mg tablet Take 90 mg by mouth two (2) times a day.  glucose blood VI test strips (OneTouch Ultra Blue Test Strip) strip Use to test blood sugar three times a day 300 Strip 3    semaglutide (Ozempic) 1 mg/dose (2 mg/1.5 mL) sub-q pen 1 mg by SubCUTAneous route every seven (7) days. 2 Pen 11    famotidine (PEPCID) 40 mg tablet Take 1 Tab by mouth daily. This is to replace the omeprazole. 30 Tab 11    rosuvastatin (CRESTOR) 40 mg tablet Take 1 Tab by mouth daily. 30 Tab 11    lisinopril (PRINIVIL, ZESTRIL) 40 mg tablet TAKE 1 TABLET BY MOUTH ONCE DAILY 30 Tab 11    aspirin delayed-release 81 mg tablet TAKE 1 TABLET BY MOUTH EVERY DAY 90 Tab 3    Blood-Glucose Meter (ONETOUCH ULTRA2) monitoring kit Use to test blood sugar three times a day 1 Kit 0    lancets (ONE TOUCH DELICA) 33 gauge misc Use to test blood sugar three times a day.  [address]     Past Medical History:   Diagnosis Date    CAD (coronary artery disease)     2 stents 2016     Headache     Hypercholesterolemia     Hypertension     Hypogonadism male     Liver disease     NAFLD    Obstructive sleep apnea      Past Surgical History:   Procedure Laterality Date    HX HEENT      status post pharyngioplasty     No Known Allergies      REVIEW OF SYSTEMS:  General: negative for - chills or fever  ENT: negative for - headaches, nasal congestion or tinnitus  Respiratory: negative for - cough, hemoptysis, shortness of breath or wheezing  Cardiovascular : negative for - chest pain, edema, palpitations or shortness of breath  Gastrointestinal: negative for - abdominal pain, blood in stools, heartburn or nausea/vomiting  Genito-Urinary: no dysuria, trouble voiding, or hematuria  Musculoskeletal: negative for - gait disturbance, joint pain, joint stiffness or joint swelling  Neurological: no TIA or stroke symptoms  Hematologic: no bruises, no bleeding, no swollen glands  Integument: no lumps, mole changes, nail changes or rash  Endocrine: no malaise/lethargy or unexpected weight changes      Social History     Socioeconomic History    Marital status:      Spouse name: Not on file    Number of children: 2    Years of education: 12    Highest education level: Not on file   Occupational History    Occupation:    Tobacco Use    Smoking status: Never Smoker    Smokeless tobacco: Never Used   Substance and Sexual Activity    Alcohol use: No    Drug use: No    Sexual activity: Yes     Partners: Female     Family History   Problem Relation Age of Onset    Heart Disease Father        OBJECTIVE:    Visit Vitals  BP (!) 139/98   Pulse 82   Temp 97.9 °F (36.6 °C) (Oral)   Resp 18   Ht 5' 4\" (1.626 m)   Wt 196 lb 14.4 oz (89.3 kg)   SpO2 96%   BMI 33.80 kg/m²     CONSTITUTIONAL: well , well nourished, appears age appropriate  EYES: perrla, eom intact  ENMT:moist mucous membranes, pharynx clear  NECK: supple. Thyroid normal  RESPIRATORY: Chest: clear to ascultation and percussion   CARDIOVASCULAR: Heart: regular rate and rhythm  GASTROINTESTINAL: Abdomen: soft, bowel sounds active  HEMATOLOGIC: no pathological lymph nodes palpated  MUSCULOSKELETAL: Extremities: no edema, pulse 1+   INTEGUMENT: No unusual rashes or suspicious skin lesions noted. Nails appear normal.  NEUROLOGIC: non-focal exam   MENTAL STATUS: alert and oriented, appropriate affect      ASSESSMENT:  1. Type 2 diabetes mellitus without complication, with long-term current use of insulin ([address]    2. Dyslipidemia    3. Essential hypertension    4. Coronary artery disease involving native coronary artery of native heart with unstable angina pectoris ([address]    5. Obstructive sleep apnea    6. Physical deconditioning    7. Obesity (BMI 30.0-34. 9)        PLAN:  1. Hopefully, his diabetes is doing well.   I will await the results of his hemoglobin A1c.  2. He states he is compliant with his statin and efficacy and compliance will be assessed. 3. Blood pressure is reasonable. Carvedilol will, however, be increased to 6.25 mg b.i.d.  4. His coronary artery disease is being actively followed by Cardiology. He is doing quite well from this perspective. Improved compliance with his existing comorbidities should indeed help this along with a more physical lifestyle. 5. He has obstructive sleep apnea and needs to use a CPAP machine on a consistent basis. 6. He is physically deconditioned and needs to increase his physical activity on a consistent basis. I think this would be the biggest and best thing he could do for his cardiovascular disease. 7. Obviously, he needs to lose weight and he is making an active effort to do so. Again, this can be accomplished by eating meals, eliminating snacks, and avoiding the consumption of processed carbohydrates. .  Orders Placed This Encounter    HEMOGLOBIN A1C WITH EAG    APOLIPOPROTEIN B    carvediloL (COREG) 6.25 mg tablet         Follow-up and Dispositions    · Return in about 6 weeks (around 3/16/2021).            Helen Lugo MD

## 2021-02-16 ENCOUNTER — OFFICE VISIT (OUTPATIENT)
Dept: CARDIOLOGY CLINIC | Age: 46
End: 2021-02-16
Payer: COMMERCIAL

## 2021-02-16 VITALS
DIASTOLIC BLOOD PRESSURE: 108 MMHG | RESPIRATION RATE: 16 BRPM | SYSTOLIC BLOOD PRESSURE: 160 MMHG | HEIGHT: 64 IN | BODY MASS INDEX: 34.15 KG/M2 | HEART RATE: 94 BPM | OXYGEN SATURATION: 98 % | WEIGHT: 200 LBS

## 2021-02-16 DIAGNOSIS — R07.9 CHEST PAIN, UNSPECIFIED TYPE: Primary | ICD-10-CM

## 2021-02-16 PROCEDURE — 99214 OFFICE O/P EST MOD 30 MIN: CPT | Performed by: INTERNAL MEDICINE

## 2021-02-16 PROCEDURE — 93000 ELECTROCARDIOGRAM COMPLETE: CPT | Performed by: INTERNAL MEDICINE

## 2021-02-16 RX ORDER — CARVEDILOL 12.5 MG/1
12.5 TABLET ORAL 2 TIMES DAILY WITH MEALS
Qty: 180 TAB | Refills: 1 | Status: SHIPPED | OUTPATIENT
Start: 2021-02-16 | End: 2021-04-20

## 2021-02-16 NOTE — PROGRESS NOTES
[place]: Bear [place]van  [phone]          Cardiology Consult/Progress Note      Requesting/referring provider: Dr. Glenny Burris,  Reason for Consult: Coronary disease    HPI: Rakesh Weir, a 39y.o. year-old who presents for evaluation of coronary artery disease. Mr. Geovanny Trevino has history of metabolic syndrome, poorly controlled diabetes and hypercholesterolemia, hypertension. He also history of coronary artery disease with history of remote coronary artery stenting. In November 2020, he was evaluated at [facility] with acute onset chest November. Elevated troponins and ECG changes. He underwent cardiac catheterization demonstrating mid circumflex/marginal treated with drug-eluting stent EF during this hospitalization was noted to be globally reduced 35 to 40%. Off-and-on he has had atypical chest discomfort requiring further ER visits. Notably he had small vessel disease on his cardiac catheterization which was not amenable to PCI and all major epicardial vessels were likely patient  With stents. On his recent ER visit he was not noticed to have any troponin elevation and ECG was unremarkable. Pain is reported as intermittently exertional and sometimes lasts for no longer and appears to radiate to the left shoulder and left arm. Investigations personally reviewed by me  ECG November 2020: Sinus rhythm, lateral precordial and lateral limb lead ST depressions nonspecific but could suggest ischemic heart disease  Echocardiogram November 2020 elevated LV systolic function of 35 to 40%. Mild regurgitation. Cardiac catheterization November 2020 patent stents in RCA and LAD. Diffuse small vessel disease involving diagonal branches. Right posterolateral branch is jailed chronically occluded. Mid circumflex/major obtuse marginal with 90 stenosis treated with drug-eluting stent. LDL November 2020: 2020.   Triglycerides 190  Most recent HbA1c is less than 7. ECG performed today): Sinus rhythm, minor nonspecific ST-T changes  Assessment/Plan:  1. Coronary disease manifesting in the form of remote unstable angina and recent PCI for abnormal stress test  2. Suspected reduction in LV function based on verbal information of the patient  3. Hypertension poor control  4. Hypercholesterolemia. Good LDL control  5. Metabolic syndrome elevated triglycerides  6. Diabetes mellitus with poor control    Mr. Bill Horne is self-referred to establish new care with me. He was recently admitted to the hospital with acute coronary syndrome and underwent PCI to his circumflex. Previously he has stents placed intravenously was repeated. He has diffuse small vessel disease left anterior right posterolateral.  He has mild persistent angina angina which has improved since intervention. His blood pressure is well controlled and he was taken off his amlodipine. LVEF is significantly reduced but he does not demonstrate any symptoms of congestive heart    He needs aggressive control of risk factors to prevent future acute coronary symptoms. Dual antiplatelet therapy should be continued. He is on high intensity statin therapy with Crestor 40 mg daily and his recent LDL was 33. He has Ischemic cardiomyopathy with moderately reduced LV systolic function. I h he is currently on carvedilol 6.25 twice a day. Additionally he is on ACE inhibitor's. Blood pressure is mildly elevated today. I will plan to reduce his carvedilol to 12.5 mg twice a day after performing an echo stress test to further rule out significant burden of ischemic heart disease given his history of recurrent PCI in the past.    We also discussed regarding addition of Vascepa on next visit.       []    High complexity decision making was performed  []    Patient is at high-risk of decompensation with multiple organ involvement  He  has a past medical history of CAD (coronary artery disease), Headache, Hypercholesterolemia, Hypertension, Hypogonadism male, Liver disease, and Obstructive sleep apnea. Review of system:Patient reports no dyspnea/PND/Orthpnea/CP. He reports no cough/fever/focal neurological deficits/abdominal pain. All other systems negative except as above. Family History   Problem Relation Age of Onset    Heart Disease Father       Social History     Socioeconomic History    Marital status:      Spouse name: Not on file    Number of children: 2    Years of education: 12    Highest education level: Not on file   Occupational History    Occupation:    Tobacco Use    Smoking status: Never Smoker    Smokeless tobacco: Never Used   Substance and Sexual Activity    Alcohol use: No    Drug use: No    Sexual activity: Yes     Partners: Female      PE  Vitals:    02/16/21 1311   BP: (!) 160/108   Pulse: 94   Resp: 16   SpO2: 98%   Weight: 200 lb (90.7 kg)   Height: 5' 4\" (1.626 m)    Body mass index is 34.33 kg/m². General:    Alert, cooperative, no distress. Psychiatric:    Normal Mood and affect    Eye/ENT:      Pupils equal, No asymmetry, Conjunctival pink. Able to hear voice at normal amplitude   Lungs:      Visibly symmetric chest expansion, No palpable tenderness. Clear to auscultation bilaterally. Heart[de-identified]    Regular rate and rhythm, S1, S2 normal, no murmur, click, rub or gallop. No JVD, Normal palpable peripheral pulses. No cyanosis   Abdomen:     Soft, non-tender. Bowel sounds normal. No masses,  No      organomegaly. Extremities:   Extremities normal, atraumatic, no edema. Neurologic:   CN II-XII grossly intact.  No gross focal deficits           Recent Labs:  Lab Results   Component Value Date/Time    Cholesterol, total 125 11/28/2020 01:38 AM    HDL Cholesterol 52 11/28/2020 01:38 AM    LDL, calculated 33.4 11/28/2020 01:38 AM    Triglyceride 198 (H) 11/28/2020 01:38 AM    CHOL/HDL Ratio 2.4 11/28/2020 01:38 AM     Lab Results   Component Value Date/Time Creatinine (POC) 1.0 12/13/2014 07:54 AM    Creatinine 1.34 (H) 01/29/2021 03:17 PM     Lab Results   Component Value Date/Time    BUN 20 01/29/2021 03:17 PM    BUN (POC) 6 (L) 12/13/2014 07:54 AM     Lab Results   Component Value Date/Time    Potassium 3.9 01/29/2021 03:17 PM     Lab Results   Component Value Date/Time    Hemoglobin A1c 7.0 (H) 02/02/2021 12:00 AM     Lab Results   Component Value Date/Time    Hemoglobin (POC) 14.6 12/13/2014 07:54 AM    HGB 12.7 01/29/2021 03:17 PM     Lab Results   Component Value Date/Time    PLATELET 023 41/03/6832 03:17 PM       Reviewed:  Past Medical History:   Diagnosis Date    CAD (coronary artery disease)     2 stents 2016     Headache     Hypercholesterolemia     Hypertension     Hypogonadism male     Liver disease     NAFLD    Obstructive sleep apnea      Social History     Tobacco Use   Smoking Status Never Smoker   Smokeless Tobacco Never Used     Social History     Substance and Sexual Activity   Alcohol Use No     No Known Allergies  Family History   Problem Relation Age of Onset    Heart Disease Father         Current Outpatient Medications   Medication Sig    carvediloL (COREG) 6.25 mg tablet Take 1 Tab by mouth two (2) times daily (with meals).  methocarbamoL (Robaxin) 500 mg tablet Take 1 Tab by mouth four (4) times daily.  Insulin Needles, Disposable, 31 gauge x 5/16\" ndle As directed    insulin glargine (LANTUS,BASAGLAR) 100 unit/mL (3 mL) inpn 30 units daily    amLODIPine (NORVASC) 5 mg tablet TAKE 1 TABLET BY MOUTH EVERY DAY    nitroglycerin (NITROSTAT) 0.4 mg SL tablet 1 Tab by SubLINGual route every five (5) minutes as needed for Chest Pain (for unstable angina, take up to 3 tabs q 5mins. If chest pain unresolved call 911.). Up to 3 doses.  ticagrelor (BRILINTA) 90 mg tablet Take 90 mg by mouth two (2) times a day.     glucose blood VI test strips (OneTouch Ultra Blue Test Strip) strip Use to test blood sugar three times a day    semaglutide (Ozempic) 1 mg/dose (2 mg/1.5 mL) sub-q pen 1 mg by SubCUTAneous route every seven (7) days.  famotidine (PEPCID) 40 mg tablet Take 1 Tab by mouth daily. This is to replace the omeprazole.  rosuvastatin (CRESTOR) 40 mg tablet Take 1 Tab by mouth daily.  lisinopril (PRINIVIL, ZESTRIL) 40 mg tablet TAKE 1 TABLET BY MOUTH ONCE DAILY    aspirin delayed-release 81 mg tablet TAKE 1 TABLET BY MOUTH EVERY DAY    Blood-Glucose Meter (ONETOUCH ULTRA2) monitoring kit Use to test blood sugar three times a day    lancets (ONE TOUCH DELICA) 33 gauge misc Use to test blood sugar three times a day.  lidocaine 4 % patch 1 Patch by TransDERmal route every twelve (12) hours every twelve (12) hours.  acetaminophen (TYLENOL) 325 mg tablet Take 2 Tabs by mouth every four (4) hours as needed for Pain. No current facility-administered medications for this visit. Talia Gallegos MD02/16/21   ATTENTION:   This medical record was transcribed using an electronic medical records/speech recognition system. Although proofread, it may and can contain electronic, spelling and other errors. Corrections may be executed at a later time. Please feel free to contact us for any clarifications as needed.     [facility] [place]  [address]  [address]

## 2021-02-16 NOTE — LETTER
2/16/2021 Patient: Darnell Marshall YOB: 1975 Date of Visit: 2/16/2021 Alisson Guerin MD 
[facility] [address] [facility] [address] [address] [address] [employer] Dear Alisson Guerin MD, Thank you for referring Mr. Darnell Marshall to [address]  [facility] for evaluation. My notes for this consultation are attached. If you have questions, please do not hesitate to call me. I look forward to following your patient along with you.  
 
 
Sincerely, 
 
Stephanie Shafer MD

## 2021-02-18 ENCOUNTER — ANCILLARY PROCEDURE (OUTPATIENT)
Dept: CARDIOLOGY CLINIC | Age: 46
End: 2021-02-18
Payer: COMMERCIAL

## 2021-02-18 VITALS
SYSTOLIC BLOOD PRESSURE: 134 MMHG | BODY MASS INDEX: 34.15 KG/M2 | DIASTOLIC BLOOD PRESSURE: 94 MMHG | HEIGHT: 64 IN | WEIGHT: 200 LBS

## 2021-02-18 DIAGNOSIS — R07.9 CHEST PAIN, UNSPECIFIED TYPE: ICD-10-CM

## 2021-02-18 PROCEDURE — 93351 STRESS TTE COMPLETE: CPT | Performed by: INTERNAL MEDICINE

## 2021-02-20 LAB
ECHO LV EDV A2C: 105.82 ML
ECHO LV EDV A4C: 180.25 ML
ECHO LV EDV BP: 141.78 ML (ref 67–155)
ECHO LV EDV INDEX A4C: 92.2 ML/M2
ECHO LV EDV INDEX BP: 72.5 ML/M2
ECHO LV EDV NDEX A2C: 54.1 ML/M2
ECHO LV EJECTION FRACTION A2C: 46 PERCENT
ECHO LV EJECTION FRACTION A4C: 29 PERCENT
ECHO LV EJECTION FRACTION BIPLANE: 37.8 PERCENT (ref 55–100)
ECHO LV ESV A2C: 57.06 ML
ECHO LV ESV A4C: 128.06 ML
ECHO LV ESV BP: 88.21 ML (ref 22–58)
ECHO LV ESV INDEX A2C: 29.2 ML/M2
ECHO LV ESV INDEX A4C: 65.5 ML/M2
ECHO LV ESV INDEX BP: 45.1 ML/M2
STRESS ANGINA INDEX: 0
STRESS BASELINE DIAS BP: 94 MMHG
STRESS BASELINE HR: 93 BPM
STRESS BASELINE SYS BP: 134 MMHG
STRESS ESTIMATED WORKLOAD: 10.1 METS
STRESS EXERCISE DUR MIN: ABNORMAL MIN:SEC
STRESS PEAK DIAS BP: 110 MMHG
STRESS PEAK SYS BP: 184 MMHG
STRESS PERCENT HR ACHIEVED: 85 %
STRESS POST PEAK HR: 151 BPM
STRESS RATE PRESSURE PRODUCT: ABNORMAL BPM*MMHG
STRESS SR DUKE TREADMILL SCORE: 1
STRESS ST DEPRESSION: 1.5 MM
STRESS TARGET HR: 177 BPM

## 2021-02-24 RX ORDER — SPIRONOLACTONE 25 MG/1
12.5 TABLET ORAL DAILY
Qty: 45 TAB | Refills: 1 | Status: SHIPPED | OUTPATIENT
Start: 2021-02-24 | End: 2021-09-17

## 2021-02-24 NOTE — TELEPHONE ENCOUNTER
Requested Prescriptions     Signed Prescriptions Disp Refills    spironolactone (ALDACTONE) 25 mg tablet 45 Tab 1     Sig: Take 0.5 Tabs by mouth daily.      Authorizing Provider: Cristobal Davila     Ordering User: Lindwood Negri Per Dr. Cy Hammans   Date Time Provider Richard Olivier   3/9/2021 11:20 AM Luis Bhatti MD [facility] BS AMB   3/17/2021  3:30 PM Vivian Vo MD [facility] MAIN BS AMB   4/20/2021 11:20 AM MD DARREL Davenport BS AMB

## 2021-03-30 ENCOUNTER — DOCUMENTATION ONLY (OUTPATIENT)
Dept: SLEEP MEDICINE | Age: 46
End: 2021-03-30

## 2021-03-30 NOTE — PROGRESS NOTES
Spoke to [place] with [employer] - patient has been setup and has not used machine. Adrián RT followed up with patient on 3/9/21 and he stated he has not had a chance to use the device. Patient was advised to start using the machine by [employer].

## 2021-04-13 ENCOUNTER — TELEPHONE (OUTPATIENT)
Dept: CARDIAC REHAB | Age: 46
End: 2021-04-13

## 2021-04-13 NOTE — TELEPHONE ENCOUNTER
Called patient concerning outpatient cardiac rehab appointments. Patient only attended 7 sessions and has not been for appointment since 1/28/21. Patient states he is not going to be returning. He is able to exercise at his own gym that has more suitable hours for his schedule.

## 2021-04-15 ENCOUNTER — TELEPHONE (OUTPATIENT)
Dept: SLEEP MEDICINE | Age: 46
End: 2021-04-15

## 2021-04-15 NOTE — TELEPHONE ENCOUNTER
Per [facility], patient has not used device yet. LVM for patient concerning usage and to reschedule appt.

## 2021-04-15 NOTE — TELEPHONE ENCOUNTER
LVM to notify the patient that his 4/23 vv was canceled. Return call requested upon receiving PAP device to schedule adherence visit.

## 2021-04-20 ENCOUNTER — OFFICE VISIT (OUTPATIENT)
Dept: CARDIOLOGY CLINIC | Age: 46
End: 2021-04-20
Payer: COMMERCIAL

## 2021-04-20 VITALS
BODY MASS INDEX: 34.66 KG/M2 | OXYGEN SATURATION: 97 % | SYSTOLIC BLOOD PRESSURE: 110 MMHG | WEIGHT: 203 LBS | HEIGHT: 64 IN | DIASTOLIC BLOOD PRESSURE: 70 MMHG | RESPIRATION RATE: 18 BRPM | HEART RATE: 79 BPM

## 2021-04-20 DIAGNOSIS — I25.110 CORONARY ARTERY DISEASE INVOLVING NATIVE CORONARY ARTERY OF NATIVE HEART WITH UNSTABLE ANGINA PECTORIS (HCC): Primary | ICD-10-CM

## 2021-04-20 PROCEDURE — 99214 OFFICE O/P EST MOD 30 MIN: CPT | Performed by: INTERNAL MEDICINE

## 2021-04-20 RX ORDER — CARVEDILOL 25 MG/1
25 TABLET ORAL 2 TIMES DAILY WITH MEALS
Qty: 180 TAB | Refills: 1 | Status: SHIPPED | OUTPATIENT
Start: 2021-04-20 | End: 2022-03-18

## 2021-04-20 NOTE — LETTER
4/20/2021 Patient: Staci Villareal YOB: 1975 Date of Visit: 4/20/2021 Kaylah Kumar MD 
[address] [place] [id number] [address] [employer] Dear Kaylah Kumar MD, Thank you for referring Mr. Staci Villareal to [facility] for evaluation. My notes for this consultation are attached. If you have questions, please do not hesitate to call me. I look forward to following your patient along with you.  
 
 
Sincerely, 
 
Paradise Robledo MD

## 2021-04-20 NOTE — PATIENT INSTRUCTIONS
Please arrive 15 minutes prior to your scheduled appointment time to allow time for registration.        [facility] [place] Draw Site:  [address],   [place], [address] [facility] [address]  Phone: [phone]  Monday- Friday 7:00 am- 5:00 pm    [place] Draw Site:  [facility], [address] [facility]  Phone: [phone]  Monday-Friday 7:00 am- 5:00 pm    [facility] Draw Site:  [address] [facility] [address], [address]  Phone: [phone]  Monday-Friday 7:30 am- 4:30 pm   *Closed for lunch 1-2 pm*    [facility], [facility]. Draw Site:  [address] [facility], [address] [facility]  [address]  Phone: [phone]  Monday-Friday 8:00 am- 5:00 pm  *closed for lunch 12:30 pm-1:30 pmCOMM[facility]:  [address], [place] [address], [address]  Phone: [phone]  Fax: [phone]  Monday- Friday 7:00 am- 5:00 pm    [facility] Draw site:  [address], [address], [address]  [place], [address]  Phone: [phone]  Fax: [phone]  Monday- Friday 7:30 am - 4:30 pm

## 2021-04-20 NOTE — PROGRESS NOTES
[address]: Genie Trevino  [phone]          Cardiology Consult/Progress Note      Requesting/referring provider: Dr. Stef Urrutia,  Reason for Consult: Coronary disease    HPI: Vishal Carrera, a 39y.o. year-old who presents for evaluation of coronary artery disease. Mr. Bobby Meade has history of metabolic syndrome, poorly controlled diabetes and hypercholesterolemia, hypertension. He also history of coronary artery disease with history of remote coronary artery stenting. In November 2020, he was evaluated at [facility] with acute onset chest November. Elevated troponins and ECG changes. He underwent cardiac catheterization demonstrating mid circumflex/marginal treated with drug-eluting stent EF during this hospitalization was noted to be globally reduced 35 to 40%. Off-and-on he has had atypical chest discomfort requiring further ER visits. Notably he had small vessel disease on his cardiac catheterization which was not amenable to PCI and all major epicardial vessels were likely patient  With stents. On his recent ER visit he was not noticed to have any troponin elevation and ECG was unremarkable. Pain is reported as intermittently exertional and sometimes lasts for no longer and appears to radiate to the left shoulder and left arm. Investigations personally reviewed by me  ECG November 2020: Sinus rhythm, lateral precordial and lateral limb lead ST depressions nonspecific but could suggest ischemic heart disease  Echocardiogram November 2020 elevated LV systolic function of 35 to 40%. Mild regurgitation. Cardiac catheterization November 2020 patent stents in RCA and LAD. Diffuse small vessel disease involving diagonal branches. Right posterolateral branch is jailed chronically occluded. Mid circumflex/major obtuse marginal with 90 stenosis treated with drug-eluting stent. LDL November 2020: 2020.   Triglycerides 190  Most recent HbA1c is less than 7. ECG performed today): Sinus rhythm, minor nonspecific ST-T changes  Assessment/Plan:  1. Coronary disease manifesting in the form of remote unstable angina and recent PCI for abnormal stress test  2. Suspected reduction in LV function based on verbal information of the patient  3. Hypertension poor control  4. Hypercholesterolemia. Good LDL control  5. Metabolic syndrome elevated triglycerides  6. Diabetes mellitus with poor control    Mr. Carole Ladd is self-referred to establish new care with me. He was recently admitted to the hospital with acute coronary syndrome and underwent PCI to his circumflex. Previously he has stents placed intravenously was repeated. He has diffuse small vessel disease left anterior right posterolateral.  He has mild persistent angina angina which has improved since intervention. His blood pressure is well controlled and he was taken off his amlodipine. LVEF is significantly reduced but he does not demonstrate any symptoms of congestive heart    He needs aggressive control of risk factors to prevent future acute coronary symptoms. Dual antiplatelet therapy should be continued. He is on high intensity statin therapy with Crestor 40 mg daily and his recent LDL was 33. He has Ischemic cardiomyopathy with moderately reduced LV systolic function. Echo stress test earlier this year ECG-based ischemia but was low risk based on echocardiographic changes. Notably he does have downstream diffuse disease in branches of RCA. Given his diabetes and reduced ejection fraction, I have also initiate him on SGLT2 inhibitors. His blood pressures are now much better controlled after increasing his Coreg. He had some concerns about creatinine bump with spironolactone previously. Hence we will recheck his BMP after initiation of SGLT2 inhibitors for evaluation of potassium and creatinine. May consider Vascepa in future.     []    High complexity decision making was performed  []    Patient is at high-risk of decompensation with multiple organ involvement  He  has a past medical history of CAD (coronary artery disease), Headache, Hypercholesterolemia, Hypertension, Hypogonadism male, Liver disease, and Obstructive sleep apnea. Review of system:Patient reports no dyspnea/PND/Orthpnea/CP. He reports no cough/fever/focal neurological deficits/abdominal pain. All other systems negative except as above. Family History   Problem Relation Age of Onset    Heart Disease Father       Social History     Socioeconomic History    Marital status:      Spouse name: Not on file    Number of children: 2    Years of education: 12    Highest education level: Not on file   Occupational History    Occupation:    Tobacco Use    Smoking status: Never Smoker    Smokeless tobacco: Never Used   Substance and Sexual Activity    Alcohol use: No    Drug use: No    Sexual activity: Yes     Partners: Female      PE  Vitals:    04/20/21 1124   BP: 110/70   Pulse: 79   Resp: 18   SpO2: 97%   Weight: 203 lb (92.1 kg)   Height: 5' 4\" (1.626 m)    Body mass index is 34.84 kg/m². General:    Alert, cooperative, no distress. Psychiatric:    Normal Mood and affect    Eye/ENT:      Pupils equal, No asymmetry, Conjunctival pink. Able to hear voice at normal amplitude   Lungs:      Visibly symmetric chest expansion, No palpable tenderness. Clear to auscultation bilaterally. Heart[de-identified]    Regular rate and rhythm, S1, S2 normal, no murmur, click, rub or gallop. No JVD, Normal palpable peripheral pulses. No cyanosis   Abdomen:     Soft, non-tender. Bowel sounds normal. No masses,  No      organomegaly. Extremities:   Extremities normal, atraumatic, no edema. Neurologic:   CN II-XII grossly intact.  No gross focal deficits           Recent Labs:  Lab Results   Component Value Date/Time    Cholesterol, total 125 11/28/2020 01:38 AM    HDL Cholesterol 52 11/28/2020 01:38 AM    LDL, calculated 33.4 11/28/2020 01:38 AM    Triglyceride 198 (H) 11/28/2020 01:38 AM    CHOL/HDL Ratio 2.4 11/28/2020 01:38 AM     Lab Results   Component Value Date/Time    Creatinine (POC) 1.0 12/13/2014 07:54 AM    Creatinine 1.34 (H) 01/29/2021 03:17 PM     Lab Results   Component Value Date/Time    BUN 20 01/29/2021 03:17 PM    BUN (POC) 6 (L) 12/13/2014 07:54 AM     Lab Results   Component Value Date/Time    Potassium 3.9 01/29/2021 03:17 PM     Lab Results   Component Value Date/Time    Hemoglobin A1c 7.0 (H) 02/02/2021 12:00 AM     Lab Results   Component Value Date/Time    Hemoglobin (POC) 14.6 12/13/2014 07:54 AM    HGB 12.7 01/29/2021 03:17 PM     Lab Results   Component Value Date/Time    PLATELET 986 51/96/2957 03:17 PM       Reviewed:  Past Medical History:   Diagnosis Date    CAD (coronary artery disease)     2 stents 2016     Headache     Hypercholesterolemia     Hypertension     Hypogonadism male     Liver disease     NAFLD    Obstructive sleep apnea      Social History     Tobacco Use   Smoking Status Never Smoker   Smokeless Tobacco Never Used     Social History     Substance and Sexual Activity   Alcohol Use No     No Known Allergies  Family History   Problem Relation Age of Onset    Heart Disease Father         Current Outpatient Medications   Medication Sig    carvediloL (COREG) 25 mg tablet Take 1 Tab by mouth two (2) times daily (with meals).  dapagliflozin (Farxiga) 5 mg tab tablet Take 1 Tab by mouth daily.  Insulin Needles, Disposable, 31 gauge x 5/16\" ndle As directed    insulin glargine (LANTUS,BASAGLAR) 100 unit/mL (3 mL) inpn 30 units daily    amLODIPine (NORVASC) 5 mg tablet TAKE 1 TABLET BY MOUTH EVERY DAY    nitroglycerin (NITROSTAT) 0.4 mg SL tablet 1 Tab by SubLINGual route every five (5) minutes as needed for Chest Pain (for unstable angina, take up to 3 tabs q 5mins. If chest pain unresolved call 911.). Up to 3 doses.     ticagrelor (BRILINTA) 90 mg tablet Take 90 mg by mouth two (2) times a day.  glucose blood VI test strips (OneTouch Ultra Blue Test Strip) strip Use to test blood sugar three times a day    semaglutide (Ozempic) 1 mg/dose (2 mg/1.5 mL) sub-q pen 1 mg by SubCUTAneous route every seven (7) days.  rosuvastatin (CRESTOR) 40 mg tablet Take 1 Tab by mouth daily.  lisinopril (PRINIVIL, ZESTRIL) 40 mg tablet TAKE 1 TABLET BY MOUTH ONCE DAILY    aspirin delayed-release 81 mg tablet TAKE 1 TABLET BY MOUTH EVERY DAY    Blood-Glucose Meter (ONETOUCH ULTRA2) monitoring kit Use to test blood sugar three times a day    lancets (ONE TOUCH DELICA) 33 gauge misc Use to test blood sugar three times a day.  spironolactone (ALDACTONE) 25 mg tablet Take 0.5 Tabs by mouth daily.  lidocaine 4 % patch 1 Patch by TransDERmal route every twelve (12) hours every twelve (12) hours.  methocarbamoL (Robaxin) 500 mg tablet Take 1 Tab by mouth four (4) times daily.  acetaminophen (TYLENOL) 325 mg tablet Take 2 Tabs by mouth every four (4) hours as needed for Pain.  famotidine (PEPCID) 40 mg tablet Take 1 Tab by mouth daily. This is to replace the omeprazole. No current facility-administered medications for this visit. Saturnino Petty MD04/20/21   ATTENTION:   This medical record was transcribed using an electronic medical records/speech recognition system. Although proofread, it may and can contain electronic, spelling and other errors. Corrections may be executed at a later time. Please feel free to contact us for any clarifications as needed.     [facility] [place]  [address], [address] [place] [address]

## 2021-04-28 ENCOUNTER — APPOINTMENT (OUTPATIENT)
Dept: GENERAL RADIOLOGY | Age: 46
End: 2021-04-28
Attending: EMERGENCY MEDICINE
Payer: COMMERCIAL

## 2021-04-28 ENCOUNTER — TELEPHONE (OUTPATIENT)
Dept: CARDIOLOGY CLINIC | Age: 46
End: 2021-04-28

## 2021-04-28 ENCOUNTER — HOSPITAL ENCOUNTER (OUTPATIENT)
Age: 46
Setting detail: OBSERVATION
Discharge: HOME | End: 2021-04-29
Attending: EMERGENCY MEDICINE | Admitting: INTERNAL MEDICINE
Payer: COMMERCIAL

## 2021-04-28 DIAGNOSIS — I20.0 UNSTABLE ANGINA (HCC): ICD-10-CM

## 2021-04-28 LAB
ALBUMIN SERPL-MCNC: 4.1 G/DL (ref 3.5–5)
ALBUMIN/GLOB SERPL: 1 {RATIO} (ref 1.1–2.2)
ALP SERPL-CCNC: 114 U/L (ref 45–117)
ALT SERPL-CCNC: 56 U/L (ref 12–78)
ANION GAP SERPL CALC-SCNC: 6 MMOL/L (ref 5–15)
AST SERPL-CCNC: 24 U/L (ref 15–37)
ATRIAL RATE: 83 BPM
BASOPHILS # BLD: 0 K/UL (ref 0–0.1)
BASOPHILS NFR BLD: 1 % (ref 0–1)
BILIRUB SERPL-MCNC: 0.4 MG/DL (ref 0.2–1)
BUN SERPL-MCNC: 20 MG/DL (ref 6–20)
BUN/CREAT SERPL: 14 (ref 12–20)
CALCIUM SERPL-MCNC: 9.5 MG/DL (ref 8.5–10.1)
CALCULATED P AXIS, ECG09: 35 DEGREES
CALCULATED R AXIS, ECG10: 30 DEGREES
CALCULATED T AXIS, ECG11: -20 DEGREES
CHLORIDE SERPL-SCNC: 108 MMOL/L (ref 97–108)
CO2 SERPL-SCNC: 25 MMOL/L (ref 21–32)
COMMENT, HOLDF: NORMAL
CREAT SERPL-MCNC: 1.41 MG/DL (ref 0.7–1.3)
D DIMER PPP FEU-MCNC: <0.19 MG/L FEU (ref 0–0.65)
DIAGNOSIS, 93000: NORMAL
DIFFERENTIAL METHOD BLD: ABNORMAL
EOSINOPHIL # BLD: 0.1 K/UL (ref 0–0.4)
EOSINOPHIL NFR BLD: 2 % (ref 0–7)
ERYTHROCYTE [DISTWIDTH] IN BLOOD BY AUTOMATED COUNT: 15.5 % (ref 11.5–14.5)
GLOBULIN SER CALC-MCNC: 4.2 G/DL (ref 2–4)
GLUCOSE BLD STRIP.AUTO-MCNC: 135 MG/DL (ref 65–100)
GLUCOSE BLD STRIP.AUTO-MCNC: 193 MG/DL (ref 65–100)
GLUCOSE SERPL-MCNC: 170 MG/DL (ref 65–100)
HCT VFR BLD AUTO: 42.3 % (ref 36.6–50.3)
HGB BLD-MCNC: 13.4 G/DL (ref 12.1–17)
IMM GRANULOCYTES # BLD AUTO: 0 K/UL (ref 0–0.04)
IMM GRANULOCYTES NFR BLD AUTO: 0 % (ref 0–0.5)
LYMPHOCYTES # BLD: 2.3 K/UL (ref 0.8–3.5)
LYMPHOCYTES NFR BLD: 36 % (ref 12–49)
MCH RBC QN AUTO: 25 PG (ref 26–34)
MCHC RBC AUTO-ENTMCNC: 31.7 G/DL (ref 30–36.5)
MCV RBC AUTO: 78.9 FL (ref 80–99)
MONOCYTES # BLD: 0.7 K/UL (ref 0–1)
MONOCYTES NFR BLD: 12 % (ref 5–13)
NEUTS SEG # BLD: 3.1 K/UL (ref 1.8–8)
NEUTS SEG NFR BLD: 49 % (ref 32–75)
NRBC # BLD: 0 K/UL (ref 0–0.01)
NRBC BLD-RTO: 0 PER 100 WBC
P-R INTERVAL, ECG05: 138 MS
PLATELET # BLD AUTO: 165 K/UL (ref 150–400)
PMV BLD AUTO: 11 FL (ref 8.9–12.9)
POTASSIUM SERPL-SCNC: 4 MMOL/L (ref 3.5–5.1)
PROT SERPL-MCNC: 8.3 G/DL (ref 6.4–8.2)
Q-T INTERVAL, ECG07: 378 MS
QRS DURATION, ECG06: 102 MS
QTC CALCULATION (BEZET), ECG08: 444 MS
RBC # BLD AUTO: 5.36 M/UL (ref 4.1–5.7)
SAMPLES BEING HELD,HOLD: NORMAL
SERVICE CMNT-IMP: ABNORMAL
SERVICE CMNT-IMP: ABNORMAL
SODIUM SERPL-SCNC: 139 MMOL/L (ref 136–145)
TROPONIN I SERPL-MCNC: <0.05 NG/ML
VENTRICULAR RATE, ECG03: 83 BPM
WBC # BLD AUTO: 6.3 K/UL (ref 4.1–11.1)

## 2021-04-28 PROCEDURE — 85379 FIBRIN DEGRADATION QUANT: CPT

## 2021-04-28 PROCEDURE — 71046 X-RAY EXAM CHEST 2 VIEWS: CPT

## 2021-04-28 PROCEDURE — 82962 GLUCOSE BLOOD TEST: CPT

## 2021-04-28 PROCEDURE — 74011250637 HC RX REV CODE- 250/637: Performed by: PHYSICIAN ASSISTANT

## 2021-04-28 PROCEDURE — 99218 HC RM OBSERVATION: CPT

## 2021-04-28 PROCEDURE — 36415 COLL VENOUS BLD VENIPUNCTURE: CPT

## 2021-04-28 PROCEDURE — 85025 COMPLETE CBC W/AUTO DIFF WBC: CPT

## 2021-04-28 PROCEDURE — 93005 ELECTROCARDIOGRAM TRACING: CPT

## 2021-04-28 PROCEDURE — 99220 PR INITIAL OBSERVATION CARE/DAY 70 MINUTES: CPT | Performed by: INTERNAL MEDICINE

## 2021-04-28 PROCEDURE — 84484 ASSAY OF TROPONIN QUANT: CPT

## 2021-04-28 PROCEDURE — 99285 EMERGENCY DEPT VISIT HI MDM: CPT

## 2021-04-28 PROCEDURE — 80053 COMPREHEN METABOLIC PANEL: CPT

## 2021-04-28 RX ORDER — SODIUM CHLORIDE 0.9 % (FLUSH) 0.9 %
5-40 SYRINGE (ML) INJECTION EVERY 8 HOURS
Status: DISCONTINUED | OUTPATIENT
Start: 2021-04-28 | End: 2021-04-29 | Stop reason: HOSPADM

## 2021-04-28 RX ORDER — ROSUVASTATIN CALCIUM 40 MG/1
40 TABLET, COATED ORAL DAILY
Status: DISCONTINUED | OUTPATIENT
Start: 2021-04-29 | End: 2021-04-29 | Stop reason: HOSPADM

## 2021-04-28 RX ORDER — MAGNESIUM SULFATE 100 %
4 CRYSTALS MISCELLANEOUS AS NEEDED
Status: DISCONTINUED | OUTPATIENT
Start: 2021-04-28 | End: 2021-04-29 | Stop reason: HOSPADM

## 2021-04-28 RX ORDER — CARVEDILOL 12.5 MG/1
25 TABLET ORAL 2 TIMES DAILY WITH MEALS
Status: DISCONTINUED | OUTPATIENT
Start: 2021-04-28 | End: 2021-04-29 | Stop reason: HOSPADM

## 2021-04-28 RX ORDER — SODIUM CHLORIDE 0.9 % (FLUSH) 0.9 %
5-40 SYRINGE (ML) INJECTION AS NEEDED
Status: DISCONTINUED | OUTPATIENT
Start: 2021-04-28 | End: 2021-04-29 | Stop reason: HOSPADM

## 2021-04-28 RX ORDER — LISINOPRIL 20 MG/1
20 TABLET ORAL DAILY
COMMUNITY
End: 2021-08-09 | Stop reason: SDUPTHER

## 2021-04-28 RX ORDER — INSULIN LISPRO 100 [IU]/ML
INJECTION, SOLUTION INTRAVENOUS; SUBCUTANEOUS
Status: DISCONTINUED | OUTPATIENT
Start: 2021-04-28 | End: 2021-04-29 | Stop reason: HOSPADM

## 2021-04-28 RX ORDER — DEXTROSE 50 % IN WATER (D50W) INTRAVENOUS SYRINGE
12.5-25 AS NEEDED
Status: DISCONTINUED | OUTPATIENT
Start: 2021-04-28 | End: 2021-04-29 | Stop reason: HOSPADM

## 2021-04-28 RX ORDER — METHOCARBAMOL 500 MG/1
500 TABLET, FILM COATED ORAL 4 TIMES DAILY
Status: DISCONTINUED | OUTPATIENT
Start: 2021-04-28 | End: 2021-04-28

## 2021-04-28 RX ORDER — LISINOPRIL 10 MG/1
40 TABLET ORAL DAILY
Status: DISCONTINUED | OUTPATIENT
Start: 2021-04-29 | End: 2021-04-29 | Stop reason: HOSPADM

## 2021-04-28 RX ORDER — FAMOTIDINE 20 MG/1
40 TABLET, FILM COATED ORAL DAILY
Status: DISCONTINUED | OUTPATIENT
Start: 2021-04-29 | End: 2021-04-29 | Stop reason: HOSPADM

## 2021-04-28 RX ORDER — ASPIRIN 81 MG/1
81 TABLET ORAL DAILY
Status: DISCONTINUED | OUTPATIENT
Start: 2021-04-29 | End: 2021-04-29 | Stop reason: HOSPADM

## 2021-04-28 RX ORDER — INSULIN GLARGINE 100 [IU]/ML
30 INJECTION, SOLUTION SUBCUTANEOUS DAILY
Status: DISCONTINUED | OUTPATIENT
Start: 2021-04-29 | End: 2021-04-29 | Stop reason: HOSPADM

## 2021-04-28 RX ORDER — SPIRONOLACTONE 25 MG/1
12.5 TABLET ORAL DAILY
Status: DISCONTINUED | OUTPATIENT
Start: 2021-04-29 | End: 2021-04-29 | Stop reason: HOSPADM

## 2021-04-28 RX ORDER — AMLODIPINE BESYLATE 5 MG/1
5 TABLET ORAL DAILY
Status: DISCONTINUED | OUTPATIENT
Start: 2021-04-29 | End: 2021-04-29 | Stop reason: HOSPADM

## 2021-04-28 RX ADMIN — Medication 10 ML: at 21:26

## 2021-04-28 RX ADMIN — CARVEDILOL 25 MG: 12.5 TABLET, FILM COATED ORAL at 21:27

## 2021-04-28 RX ADMIN — TICAGRELOR 90 MG: 90 TABLET ORAL at 21:27

## 2021-04-28 NOTE — ED NOTES
Spoke with [facility] on 6W states nurse is in room and will call back in approx. 5 minutes.

## 2021-04-28 NOTE — ED NOTES
Verbal shift change report given to Marianne Siemens, RN (oncoming nurse) by Rosalinda Ross RN (offgoing nurse). Report included the following information SBAR, ED Summary, MAR and Recent Results. Card[facility] at [facility].  Pt to be NPO after midnight for scheduled cath tomorrow AM.

## 2021-04-28 NOTE — Clinical Note
Lesion located in the R SUKHDEEP. Balloon inserted. Balloon inflated using multiple inflation technique. Lesion #1: Pressure = 8 rei; Duration = 30 sec. Inflation 2: Pressure = 8 rei; Duration = 60 sec. Inflation 3: Pressure = 12 rei; Duration = 60 sec.

## 2021-04-28 NOTE — Clinical Note
Lesion located in the Proximal RCA. Balloon inserted. Balloon inflated using multiple inflation technique. Lesion #1: Pressure = 8 rei; Duration = 6 sec. Inflation 2: Pressure = 10 rei; Duration = 20 sec. Inflation 3: Pressure = 10 rei; Duration = 16 sec.

## 2021-04-28 NOTE — Clinical Note
Lesion located in the Proximal RCA. Balloon inserted. Balloon inflated using multiple inflation technique. Lesion #1: Pressure = 20 rei; Duration = 24 sec. Inflation 2: Pressure = 20 rei; Duration = 6 sec.

## 2021-04-28 NOTE — PROGRESS NOTES
[facility] [address] [facility] [address], [address]   Jared Guzman   [phone]  Sherry Chiu  4/28/2021      Assessment/Plan:    1. Chest pressure - typical with few atypical features   - Troponin pending   - EKG with suggested inferior changes, T wave changes   - describes pressure over left chest to left flank   - SOB associated with pressure   - started on Sunday, intermittent, lasting 15-20 minutes    - 5/10 at worst    - SL NTG did not help   - notes this pain is not similar to his prior CP with MI/ interventions  2. CAD   - Cath 2016 - BOBBY to mid RCA and BOBBY to distal RCA   - Cath 2020 - BOBBY to LCx   - LAD minimal dz, 1 diag mod dz, 2-3 diag with severe dz, mid RCA 20%,  1st %. - Coreg, ASA, Brilinta and Crestor  3. HTN   - Coreg, Norvasc, Lisinopril  4. HLD  Cholesterol, total 125 11/28/2020 01:38 AM   HDL Cholesterol 52 11/28/2020 01:38 AM   LDL, calculated 33.4 11/28/2020 01:38 AM   VLDL, calculated 39.6 11/28/2020 01:38 AM   Triglyceride 198 (H) 11/28/2020 01:38 AM   CHOL/HDL Ratio 2.4 11/28/2020 01:38 AM    - Crestor 40 mg  5. FREDO   - has CPAP, not using yet  6. BMI 34.84   - Obese. Discussed diet and exercise    HPI:  Presents for chest pressure and SOB since Sunday. Notes pressure is left sided chest to under his left arm. Associated SOB. No aggravating or relieving factors. Tried his SL NTG which was not helpful. He admits to doing very little activity since Sunday, worried about causing more chest pressure. He does admit his current symptoms are not like his symptoms which lead to prior MI and stents. Medical history:  HTN, HLD, DM, Obese, CAD, FREDO, HA, hypogonadism male. Raúl Wren MD to see this patient, as well, and provide full consult note.       Debbie Casillas PA-C

## 2021-04-28 NOTE — CONSULTS
[facility] [address]: [facility]  [phone]          Cardiology Consult/Progress Note      Requesting/referring provider: Dr. Shelby Christiansen,  Reason for Consult: Coronary disease    HPI: Rebecca Dang, a 39y.o. year-old who presents for evaluation of coronary artery disease. Mr. Gabe Whyte has history of metabolic syndrome, poorly controlled diabetes and hypercholesterolemia, hypertension. He also history of coronary artery disease with history of remote coronary artery stenting. In November 2020, he was evaluated at [facility] with acute onset chest November. Elevated troponins and ECG changes. He underwent cardiac catheterization demonstrating mid circumflex/marginal treated with drug-eluting stent EF during this hospitalization was noted to be globally reduced 35 to 40%. Off-and-on he has had atypical chest discomfort requiring further ER visits. Notably he had small vessel disease on his cardiac catheterization which was not amenable to PCI and all major epicardial vessels were likely patient  With stents. On his recent ER visit he was not noticed to have any troponin elevation and ECG was unremarkable. Pain is reported as intermittently exertional and sometimes lasts for no longer and appears to radiate to the left shoulder and left arm. Investigations personally reviewed by me  ECG November 2020: Sinus rhythm, lateral precordial and lateral limb lead ST depressions nonspecific but could suggest ischemic heart disease  Echocardiogram November 2020 elevated LV systolic function of 35 to 40%. Mild regurgitation. Cardiac catheterization November 2020 patent stents in RCA and LAD. Diffuse small vessel disease involving diagonal branches. Right posterolateral branch is jailed chronically occluded. Mid circumflex/major obtuse marginal with 90 stenosis treated with drug-eluting stent. LDL November 2020: 2020.   Triglycerides 190  Most recent HbA1c is less than 7. ECG performed 4/28/2021: Sinus rhythm, ST-T changes in the form of ST depression in inferolateral leads    Assessment/Plan:  1. Coronary disease manifesting in the form of unstable angina  2. Suspected reduction in LV function based on verbal information of the patient  3. Hypertension poor control  4. Hypercholesterolemia. Good LDL control  5. Metabolic syndrome elevated triglycerides  6. Diabetes mellitus with poor control        He has mild persistent angina which has now become more frequent and yesterday had resting chest discomfort. His recent stress test had shown ECG-based ischemia but was low risk based on echocardiographic changes. Now that he is having worsening chest discomfort he would be reasonable to proceed with cardiac catheterization. I discussed the risks/benefits/alternatives of the procedure with the patient. Risks include (but are not limited to) bleeding, infection,stroke,heart attack, need for emergency surgery/pericardiocentesis, need for dialysis or death. The patient understands and agrees to proceed. We will continue him on ticagrelor based dual antiplatelet therapy as well as rest of the guideline directed medical therapy for CAD as well as ischemic cardiomyopathy. He has Ischemic cardiomyopathy with moderately reduced LV systolic function. Echo stress test earlier this yeaHe needs aggressive control of risk factors to prevent future acute coronary symptoms. Dual antiplatelet therapy should be continued. He is on high intensity statin therapy with Crestor 40 mg daily and his recent LDL was 33. Given his diabetes and reduced ejection fraction, I have also initiate him on SGLT2 inhibitors. His blood pressures are now much better controlled after increasing his Coreg. He had some concerns about creatinine bump with spironolactone previously.   Hence we will recheck his BMP after initiation of SGLT2 inhibitors for evaluation of potassium and creatinine. May consider Vascepa at discharge. []    High complexity decision making was performed  []    Patient is at high-risk of decompensation with multiple organ involvement  He  has a past medical history of CAD (coronary artery disease), Headache, Hypercholesterolemia, Hypertension, Hypogonadism male, Liver disease, and Obstructive sleep apnea. Review of system:Patient reports no dyspnea/PND/Orthpnea/CP. He reports no cough/fever/focal neurological deficits/abdominal pain. All other systems negative except as above. Family History   Problem Relation Age of Onset    Heart Disease Father       Social History     Socioeconomic History    Marital status:      Spouse name: Not on file    Number of children: 2    Years of education: 12    Highest education level: Not on file   Occupational History    Occupation:    Tobacco Use    Smoking status: Never Smoker    Smokeless tobacco: Never Used   Substance and Sexual Activity    Alcohol use: No    Drug use: No    Sexual activity: Yes     Partners: Female      PE  Vitals:    04/28/21 1520 04/28/21 1530 04/28/21 1545 04/28/21 1601   BP: 124/85 (!) 121/90 99/86 112/75   Pulse: 90 89 85 83   Resp: 21 19 20 21   Temp:       SpO2: 98% 96% 96% 96%    There is no height or weight on file to calculate BMI. General:    Alert, cooperative, no distress. Psychiatric:    Normal Mood and affect    Eye/ENT:      Pupils equal, No asymmetry, Conjunctival pink. Able to hear voice at normal amplitude   Lungs:      Visibly symmetric chest expansion, No palpable tenderness. Clear to auscultation bilaterally. Heart[de-identified]    Regular rate and rhythm, S1, S2 normal, no murmur, click, rub or gallop. No JVD, Normal palpable peripheral pulses. No cyanosis   Abdomen:     Soft, non-tender. Bowel sounds normal. No masses,  No      organomegaly. Extremities:   Extremities normal, atraumatic, no edema. Neurologic:   CN II-XII grossly intact.  No gross focal deficits           Recent Labs:  Lab Results   Component Value Date/Time    Cholesterol, total 125 11/28/2020 01:38 AM    HDL Cholesterol 52 11/28/2020 01:38 AM    LDL, calculated 33.4 11/28/2020 01:38 AM    Triglyceride 198 (H) 11/28/2020 01:38 AM    CHOL/HDL Ratio 2.4 11/28/2020 01:38 AM     Lab Results   Component Value Date/Time    Creatinine (POC) 1.0 12/13/2014 07:54 AM    Creatinine 1.41 (H) 04/28/2021 11:49 AM     Lab Results   Component Value Date/Time    BUN 20 04/28/2021 11:49 AM    BUN (POC) 6 (L) 12/13/2014 07:54 AM     Lab Results   Component Value Date/Time    Potassium 4.0 04/28/2021 11:49 AM     Lab Results   Component Value Date/Time    Hemoglobin A1c 7.0 (H) 02/02/2021 12:00 AM     Lab Results   Component Value Date/Time    Hemoglobin (POC) 14.6 12/13/2014 07:54 AM    HGB 13.4 04/28/2021 11:49 AM     Lab Results   Component Value Date/Time    PLATELET 165 76/43/8093 11:49 AM       Reviewed:  Past Medical History:   Diagnosis Date    CAD (coronary artery disease)     2 stents 2016     Headache     Hypercholesterolemia     Hypertension     Hypogonadism male     Liver disease     NAFLD    Obstructive sleep apnea      Social History     Tobacco Use   Smoking Status Never Smoker   Smokeless Tobacco Never Used     Social History     Substance and Sexual Activity   Alcohol Use No     No Known Allergies  Family History   Problem Relation Age of Onset    Heart Disease Father         Current Facility-Administered Medications   Medication Dose Route Frequency    sodium chloride (NS) flush 5-40 mL  5-40 mL IntraVENous Q8H    sodium chloride (NS) flush 5-40 mL  5-40 mL IntraVENous PRN    [START ON 4/29/2021] amLODIPine (NORVASC) tablet 5 mg  5 mg Oral DAILY    [START ON 4/29/2021] aspirin delayed-release tablet 81 mg  81 mg Oral DAILY    carvediloL (COREG) tablet 25 mg  25 mg Oral BID WITH MEALS    [START ON 4/29/2021] famotidine (PEPCID) tablet 40 mg  40 mg Oral DAILY    [START ON 4/29/2021] lisinopriL (PRINIVIL, ZESTRIL) tablet 40 mg  40 mg Oral DAILY    methocarbamoL (ROBAXIN) tablet 500 mg  500 mg Oral QID    [START ON 4/29/2021] rosuvastatin (CRESTOR) tablet 40 mg  40 mg Oral DAILY    [START ON 4/29/2021] spironolactone (ALDACTONE) tablet 12.5 mg  12.5 mg Oral DAILY    ticagrelor (BRILINTA) tablet 90 mg  90 mg Oral BID    insulin lispro (HUMALOG) injection   SubCUTAneous AC&HS    glucose chewable tablet 16 g  4 Tab Oral PRN    dextrose (D50W) injection syrg 12.5-25 g  12.5-25 g IntraVENous PRN    glucagon (GLUCAGEN) injection 1 mg  1 mg IntraMUSCular PRN     Current Outpatient Medications   Medication Sig    carvediloL (COREG) 25 mg tablet Take 1 Tab by mouth two (2) times daily (with meals).  dapagliflozin (Farxiga) 5 mg tab tablet Take 1 Tab by mouth daily.  spironolactone (ALDACTONE) 25 mg tablet Take 0.5 Tabs by mouth daily.  lidocaine 4 % patch 1 Patch by TransDERmal route every twelve (12) hours every twelve (12) hours.  acetaminophen (TYLENOL) 325 mg tablet Take 2 Tabs by mouth every four (4) hours as needed for Pain.  insulin glargine (LANTUS,BASAGLAR) 100 unit/mL (3 mL) inpn 30 units daily    amLODIPine (NORVASC) 5 mg tablet TAKE 1 TABLET BY MOUTH EVERY DAY    nitroglycerin (NITROSTAT) 0.4 mg SL tablet 1 Tab by SubLINGual route every five (5) minutes as needed for Chest Pain (for unstable angina, take up to 3 tabs q 5mins. If chest pain unresolved call 911.). Up to 3 doses.  ticagrelor (BRILINTA) 90 mg tablet Take 90 mg by mouth two (2) times a day.  famotidine (PEPCID) 40 mg tablet Take 1 Tab by mouth daily. This is to replace the omeprazole.  rosuvastatin (CRESTOR) 40 mg tablet Take 1 Tab by mouth daily.     lisinopril (PRINIVIL, ZESTRIL) 40 mg tablet TAKE 1 TABLET BY MOUTH ONCE DAILY    aspirin delayed-release 81 mg tablet TAKE 1 TABLET BY MOUTH EVERY DAY       Apple Hicks MD04/28/21   ATTENTION:   This medical record was transcribed using an electronic medical records/speech recognition system. Although proofread, it may and can contain electronic, spelling and other errors. Corrections may be executed at a later time. Please feel free to contact us for any clarifications as needed.     [facility] [place]  [address], [address], [address] [place] [address] Avenue

## 2021-04-28 NOTE — Clinical Note
Lesion: Located in the Proximal RCA. Stent inserted. Stent deployed. First inflation pressure = 18 rei; inflation time: 45 sec.

## 2021-04-28 NOTE — Clinical Note
Lesion located in the R PDA. Balloon removed. Balloon inflated using multiple inflation technique. Lesion #1: Pressure = 12 rei; Duration = 30 sec. Inflation 2: Pressure = 12 rei; Duration = 30 sec.

## 2021-04-28 NOTE — PROGRESS NOTES
The following SGLT2 inhibitor has been discontinued per [facility] approved policy:    Dapagliflozin 5 mg daily    Please reorder upon discharge if appropriate. on [email or web address]

## 2021-04-28 NOTE — Clinical Note
Lesion: Located in the Proximal RCA. First inflation pressure = 20 rei; inflation time: 20 sec.  STENT BALLOON

## 2021-04-28 NOTE — TELEPHONE ENCOUNTER
Patient and his wife called in with c/o chest pain and trouble breathing. Pt wife stated that the patient hasnt felt good since Sunday and has had chest pain. Pt is now having increased SOB. Wife stated that the patient is fatigued as well. Pt was informed that he should go to the ER for evaluation. Pt and his wife verbalized understanding and denies any further questions at this time.      Future Appointments   Date Time Provider Richard Olivier   7/20/2021 11:00 AM JONA CATALAN   7/20/2021 11:40 AM MD DARREL Marroquin AMB

## 2021-04-28 NOTE — ROUTINE PROCESS
TRANSFER - OUT REPORT:    Verbal report given to Mo, RN) on Steve Hopson  being transferred to [facility](unit) for routine progression of care       Report consisted of patients Situation, Background, Assessment and   Recommendations(SBAR). Information from the following report(s) SBAR, ED Summary, [facility] and Recent Results was reviewed with the receiving nurse. Lines:   Peripheral IV 04/28/21 Right Forearm (Active)   Site Assessment Clean, dry, & intact 04/28/21 1152   Phlebitis Assessment 0 04/28/21 1152   Infiltration Assessment 0 04/28/21 1152   Dressing Status Clean, dry, & intact 04/28/21 1152   Dressing Type Transparent 04/28/21 1152   Hub Color/Line Status Pink 04/28/21 1152   Action Taken Blood drawn 04/28/21 1152        Opportunity for questions and clarification was provided.       Patient transported with:   Monitor

## 2021-04-28 NOTE — ED TRIAGE NOTES
Pt c/o left sided chest pain radiating down left arm since Sunday, intermittent, +nauseas, +sob, denies fever or chills, 2nd vaccine on Saturday , denies cough, denies left swelling , history of MI last year, took ntg without relief

## 2021-04-28 NOTE — ED NOTES
Bedside and Verbal shift change report given to Jenna Lin RN (oncoming nurse) by Cony King RN (offgoing nurse). Report given with SBAR, Kardex, Intake/Output, MAR and Recent Results.

## 2021-04-28 NOTE — ED NOTES
Patient ambulated up to bathroom without difficulty. Pt states he ordered meal. Pt denies any other needs.

## 2021-04-28 NOTE — PROGRESS NOTES
Admission Medication Reconciliation:      Spoke with wife Ashley Tong by telephone @ [phone], unable to speak with patient face to face at this time due to general isolation precautions in the ED related to COVID-19 pandemic. Wife is a reliable historian. RX query is available at this time. PS text to Pipe Melo PA-C to update re: changes to PTA med list.    Jose Bolus included questions regarding use of:  PTA medications including prescription/OTC, vitamins, supplements, inhaled, topical, injectable, otic and ophthalmic medications    Notes:  COVID vaccine [employer]) #2 administered 4/24/21  Dapagliflozin: new therapy prescribed last week    Medication changes (since last review):  Revised:  Lisinopril to 20 mg    Deleted:  Ozempic: wife states stopped taking because \"it made him feel bad. \"  Lidocaine patches    Thank you for allowing me to participate in the care of your patient. Ezra Simpson PharmD, RN # [phone]       [facility] pharmacy benefit data reflects medications filled and processed through the patient's insurance, however   this data does NOT capture whether the medication was picked up or is currently being taken by the patient. Allergies:  Patient has no known allergies. Significant PMH/Disease States:   Past Medical History:   Diagnosis Date    CAD (coronary artery disease)     2 stents 2016     Headache     Hypercholesterolemia     Hypertension     Hypogonadism male     Liver disease     NAFLD    Obstructive sleep apnea      Chief Complaint for this Admission:    Chief Complaint   Patient presents with    Chest Pain     Prior to Admission Medications:   Prior to Admission Medications   Prescriptions Last Dose Informant Taking?   acetaminophen (TYLENOL) 325 mg tablet   Yes   Sig: Take 2 Tabs by mouth every four (4) hours as needed for Pain.    amLODIPine (NORVASC) 5 mg tablet   Yes   Sig: TAKE 1 TABLET BY MOUTH EVERY DAY   aspirin delayed-release 81 mg tablet   Yes   Sig: TAKE 1 TABLET BY MOUTH EVERY DAY   carvediloL (COREG) 25 mg tablet   Yes   Sig: Take 1 Tab by mouth two (2) times daily (with meals). dapagliflozin (Farxiga) 5 mg tab tablet   Yes   Sig: Take 1 Tab by mouth daily. famotidine (PEPCID) 40 mg tablet   Yes   Sig: Take 1 Tab by mouth daily. This is to replace the omeprazole. insulin glargine (LANTUS,BASAGLAR) 100 unit/mL (3 mL) inpn   Yes   Si units daily   lisinopriL (PRINIVIL, ZESTRIL) 20 mg tablet   Yes   Sig: Take 20 mg by mouth daily. nitroglycerin (NITROSTAT) 0.4 mg SL tablet   Yes   Si Tab by SubLINGual route every five (5) minutes as needed for Chest Pain (for unstable angina, take up to 3 tabs q 5mins. If chest pain unresolved call 911.). Up to 3 doses. rosuvastatin (CRESTOR) 40 mg tablet   Yes   Sig: Take 1 Tab by mouth daily. spironolactone (ALDACTONE) 25 mg tablet   Yes   Sig: Take 0.5 Tabs by mouth daily. ticagrelor (BRILINTA) 90 mg tablet   Yes   Sig: Take 90 mg by mouth two (2) times a day. Facility-Administered Medications: None     Please contact the main inpatient pharmacy with any questions or concerns at [phone] and we will direct you to the clinical pharmacist covering this patient's care while in-house.    OLGA Guy

## 2021-04-29 ENCOUNTER — TRANSCRIBE ORDER (OUTPATIENT)
Dept: CARDIAC REHAB | Age: 46
End: 2021-04-29

## 2021-04-29 VITALS
WEIGHT: 196.21 LBS | DIASTOLIC BLOOD PRESSURE: 88 MMHG | BODY MASS INDEX: 33.68 KG/M2 | SYSTOLIC BLOOD PRESSURE: 124 MMHG | RESPIRATION RATE: 15 BRPM | HEART RATE: 89 BPM | OXYGEN SATURATION: 95 % | TEMPERATURE: 97.8 F

## 2021-04-29 DIAGNOSIS — Z95.5 STENTED CORONARY ARTERY: Primary | ICD-10-CM

## 2021-04-29 LAB
ACT BLD: 312 SECS (ref 79–138)
ATRIAL RATE: 94 BPM
CALCULATED P AXIS, ECG09: 44 DEGREES
CALCULATED T AXIS, ECG11: -11 DEGREES
DIAGNOSIS, 93000: NORMAL
GLUCOSE BLD STRIP.AUTO-MCNC: 136 MG/DL (ref 65–100)
P-R INTERVAL, ECG05: 144 MS
Q-T INTERVAL, ECG07: 368 MS
QRS DURATION, ECG06: 102 MS
QTC CALCULATION (BEZET), ECG08: 460 MS
SERVICE CMNT-IMP: ABNORMAL
VENTRICULAR RATE, ECG03: 94 BPM

## 2021-04-29 PROCEDURE — 77030004532 HC CATH ANGI DX IMP BSC -A: Performed by: INTERNAL MEDICINE

## 2021-04-29 PROCEDURE — 74011250637 HC RX REV CODE- 250/637: Performed by: INTERNAL MEDICINE

## 2021-04-29 PROCEDURE — 74011000250 HC RX REV CODE- 250: Performed by: INTERNAL MEDICINE

## 2021-04-29 PROCEDURE — 77030013715 HC INFL SYS MRTM -B: Performed by: INTERNAL MEDICINE

## 2021-04-29 PROCEDURE — C1887 CATHETER, GUIDING: HCPCS | Performed by: INTERNAL MEDICINE

## 2021-04-29 PROCEDURE — 99152 MOD SED SAME PHYS/QHP 5/>YRS: CPT | Performed by: INTERNAL MEDICINE

## 2021-04-29 PROCEDURE — 99218 HC RM OBSERVATION: CPT

## 2021-04-29 PROCEDURE — 93458 L HRT ARTERY/VENTRICLE ANGIO: CPT | Performed by: INTERNAL MEDICINE

## 2021-04-29 PROCEDURE — C1725 CATH, TRANSLUMIN NON-LASER: HCPCS | Performed by: INTERNAL MEDICINE

## 2021-04-29 PROCEDURE — 74011000636 HC RX REV CODE- 636: Performed by: INTERNAL MEDICINE

## 2021-04-29 PROCEDURE — 77030019569 HC BND COMPR RAD TERU -B: Performed by: INTERNAL MEDICINE

## 2021-04-29 PROCEDURE — 77030013744: Performed by: INTERNAL MEDICINE

## 2021-04-29 PROCEDURE — 92928 PRQ TCAT PLMT NTRAC ST 1 LES: CPT | Performed by: INTERNAL MEDICINE

## 2021-04-29 PROCEDURE — 99153 MOD SED SAME PHYS/QHP EA: CPT | Performed by: INTERNAL MEDICINE

## 2021-04-29 PROCEDURE — 74011250636 HC RX REV CODE- 250/636: Performed by: INTERNAL MEDICINE

## 2021-04-29 PROCEDURE — C1874 STENT, COATED/COV W/DEL SYS: HCPCS | Performed by: INTERNAL MEDICINE

## 2021-04-29 PROCEDURE — 82962 GLUCOSE BLOOD TEST: CPT

## 2021-04-29 PROCEDURE — 93005 ELECTROCARDIOGRAM TRACING: CPT

## 2021-04-29 PROCEDURE — 77030010221 HC SPLNT WR POS TELE -B: Performed by: INTERNAL MEDICINE

## 2021-04-29 PROCEDURE — 85347 COAGULATION TIME ACTIVATED: CPT

## 2021-04-29 PROCEDURE — 99217 PR OBSERVATION CARE DISCHARGE MANAGEMENT: CPT | Performed by: INTERNAL MEDICINE

## 2021-04-29 PROCEDURE — C1894 INTRO/SHEATH, NON-LASER: HCPCS | Performed by: INTERNAL MEDICINE

## 2021-04-29 PROCEDURE — 77030012468 HC VLV BLEEDBK CNTRL ABBT -B: Performed by: INTERNAL MEDICINE

## 2021-04-29 PROCEDURE — C1769 GUIDE WIRE: HCPCS | Performed by: INTERNAL MEDICINE

## 2021-04-29 DEVICE — XIENCE SIERRA™ EVEROLIMUS ELUTING CORONARY STENT SYSTEM 3.50 MM X 12 MM / RAPID-EXCHANGE
Type: IMPLANTABLE DEVICE | Status: FUNCTIONAL
Brand: XIENCE SIERRA™

## 2021-04-29 RX ORDER — FENTANYL CITRATE 50 UG/ML
INJECTION, SOLUTION INTRAMUSCULAR; INTRAVENOUS AS NEEDED
Status: DISCONTINUED | OUTPATIENT
Start: 2021-04-29 | End: 2021-04-29 | Stop reason: HOSPADM

## 2021-04-29 RX ORDER — GUAIFENESIN 100 MG/5ML
LIQUID (ML) ORAL AS NEEDED
Status: DISCONTINUED | OUTPATIENT
Start: 2021-04-29 | End: 2021-04-29 | Stop reason: HOSPADM

## 2021-04-29 RX ORDER — HEPARIN SODIUM 200 [USP'U]/100ML
INJECTION, SOLUTION INTRAVENOUS
Status: COMPLETED | OUTPATIENT
Start: 2021-04-29 | End: 2021-04-29

## 2021-04-29 RX ORDER — MIDAZOLAM HYDROCHLORIDE 1 MG/ML
INJECTION, SOLUTION INTRAMUSCULAR; INTRAVENOUS AS NEEDED
Status: DISCONTINUED | OUTPATIENT
Start: 2021-04-29 | End: 2021-04-29 | Stop reason: HOSPADM

## 2021-04-29 RX ORDER — HEPARIN SODIUM 1000 [USP'U]/ML
INJECTION, SOLUTION INTRAVENOUS; SUBCUTANEOUS AS NEEDED
Status: DISCONTINUED | OUTPATIENT
Start: 2021-04-29 | End: 2021-04-29 | Stop reason: HOSPADM

## 2021-04-29 RX ORDER — LIDOCAINE HYDROCHLORIDE 10 MG/ML
INJECTION INFILTRATION; PERINEURAL AS NEEDED
Status: DISCONTINUED | OUTPATIENT
Start: 2021-04-29 | End: 2021-04-29 | Stop reason: HOSPADM

## 2021-04-29 RX ADMIN — Medication 10 ML: at 06:05

## 2021-04-29 NOTE — PROGRESS NOTES
TRANSFER - IN REPORT:    Verbal report received from Samira Frias RN(name) on Ignacio [place]  being received from [facility](unit) for ordered procedure      Report consisted of patients Situation, Background, Assessment and   Recommendations(SBAR). Information from the following report(s) SBAR was reviewed with the receiving nurse. Opportunity for questions and clarification was provided. Assessment completed upon patients arrival to unit and care assumed.

## 2021-04-29 NOTE — PROGRESS NOTES
Discharge instructions reviewed with the patient; written copy to patient. Patient ambulated with steady gait. Patient changed into clothing from home; peripheral IV removed and dressing placed to site. Patient discharged via wheelchair in the care of his wife, Amy Craig.

## 2021-04-29 NOTE — DISCHARGE SUMMARY
Cardiology Discharge Summary     Patient ID:  Vishal Carrera  [id number]  65 y.o.  1975    Admit Date: 4/28/2021    Discharge Date: 4/29/2021     Admitting Physician: Zaria Porras MD     Discharge Physician: Zaria Porras MD    Admission Diagnoses: Unstable angina pectoris [facility]) [I20.0]    Discharge Diagnoses: Active Problems:    Unstable angina pectoris ([address] (4/28/2021)        Discharge Condition: [address]    Cardiology Procedures this Admission:    04/28/21   CARDIAC PROCEDURE 04/29/2021 4/29/2021    Narrative Findings:  1)Severe 1 vessel disease  2)Patent stents in apical LAD as well as mid Lcx  3)50% ISR in mid RCA stent with de zac lesion in proximal RCA about 75%  4) Normal LVEDP  5)PCI of proximal RCA with 3.5 by 12 mm Xience BOBBY post dilated with 4.5   mm NC at 20 PRASANNA    Access: Right radial no issues  Contrast 85 cc    Recommendations  1)GDMT for CAD  2)Ticagrelor based DAPT  3)Add Vascepa to conventional lipid therapy. 4)Follow up with me in 4 weeks     Signed by: Rome Miller MD         Hospital Course: Admitted with unstable angina. Cath performed--proximal RCA 75% stenosis new de zac lesion with distal branches rPDA  And rPAV/rPL with 90% stenosis and diffuse disease. Managed with PCI of proximal RCA and undersized balloon angioplasty for distal branch vessel disease. Consults: None  Visit Vitals  BP (!) (P) 123/92 (BP 1 Location: Left arm, BP Patient Position: At rest)   Pulse 85   Temp 97.8 °F (36.6 °C)   Resp 16   Wt 196 lb 3.4 oz (89 kg)   SpO2 95%   BMI 33.68 kg/m²       Discharge Exam:     Visit Vitals  BP (!) (P) 123/92 (BP 1 Location: Left arm, BP Patient Position: At rest)   Pulse 85   Temp 97.8 °F (36.6 °C)   Resp 16   Wt 196 lb 3.4 oz (89 kg)   SpO2 95%   BMI 33.68 kg/m²     General Appearance:  Well developed, well nourished,alert and oriented x 3, and individual in no acute distress. Ears/Nose/Mouth/Throat:   Hearing grossly normal.         Neck: Supple.    Chest:   Lungs clear to auscultation bilaterally. Cardiovascular:  Regular rate and rhythm, S1, S2 normal, no murmur. Abdomen:   Soft, non-tender, bowel sounds are active. Extremities: No edema bilaterally. Skin: Warm and dry. Recent Results (from the past 24 hour(s))   CBC WITH AUTOMATED DIFF    Collection Time: 04/28/21 11:49 AM   Result Value Ref Range    WBC 6.3 4.1 - 11.1 K/uL    RBC 5.36 4.10 - 5.70 M/uL    HGB 13.4 12.1 - 17.0 g/dL    HCT 42.3 36.6 - 50.3 %    MCV 78.9 (L) 80.0 - 99.0 FL    MCH 25.0 (L) 26.0 - 34.0 PG    MCHC 31.7 30.0 - 36.5 g/dL    RDW 15.5 (H) 11.5 - 14.5 %    PLATELET 725 927 - 578 K/uL    MPV 11.0 8.9 - 12.9 FL    NRBC 0.0 0  WBC    ABSOLUTE NRBC 0.00 0.00 - 0.01 K/uL    NEUTROPHILS 49 32 - 75 %    LYMPHOCYTES 36 12 - 49 %    MONOCYTES 12 5 - 13 %    EOSINOPHILS 2 0 - 7 %    BASOPHILS 1 0 - 1 %    IMMATURE GRANULOCYTES 0 0.0 - 0.5 %    ABS. NEUTROPHILS 3.1 1.8 - 8.0 K/UL    ABS. LYMPHOCYTES 2.3 0.8 - 3.5 K/UL    ABS. MONOCYTES 0.7 0.0 - 1.0 K/UL    ABS. EOSINOPHILS 0.1 0.0 - 0.4 K/UL    ABS. BASOPHILS 0.0 0.0 - 0.1 K/UL    ABS. IMM. GRANS. 0.0 0.00 - 0.04 K/UL    DF AUTOMATED     METABOLIC PANEL, COMPREHENSIVE    Collection Time: 04/28/21 11:49 AM   Result Value Ref Range    Sodium 139 136 - 145 mmol/L    Potassium 4.0 3.5 - 5.1 mmol/L    Chloride 108 97 - 108 mmol/L    CO2 25 21 - 32 mmol/L    Anion gap 6 5 - 15 mmol/L    Glucose 170 (H) 65 - 100 mg/dL    BUN 20 6 - 20 MG/DL    Creatinine 1.41 (H) 0.70 - 1.30 MG/DL    BUN/Creatinine ratio 14 12 - 20      GFR est AA >60 >60 ml/min/1.73m2    GFR est non-AA 55 (L) >60 ml/min/1.73m2    Calcium 9.5 8.5 - 10.1 MG/DL    Bilirubin, total 0.4 0.2 - 1.0 MG/DL    ALT (SGPT) 56 12 - 78 U/L    AST (SGOT) 24 15 - 37 U/L    Alk.  phosphatase 114 45 - 117 U/L    Protein, total 8.3 (H) 6.4 - 8.2 g/dL    Albumin 4.1 3.5 - 5.0 g/dL    Globulin 4.2 (H) 2.0 - 4.0 g/dL    A-G Ratio 1.0 (L) 1.1 - 2.2     TROPONIN I    Collection Time: 04/28/21 11:49 AM Result Value Ref Range    Troponin-I, Qt. <0.05 <0.05 ng/mL   SAMPLES BEING HELD    Collection Time: 04/28/21 11:49 AM   Result Value Ref Range    SAMPLES BEING HELD 1RED     COMMENT        Add-on orders for these samples will be processed based on acceptable specimen integrity and analyte stability, which may vary by analyte. D DIMER    Collection Time: 04/28/21 11:49 AM   Result Value Ref Range    D-dimer <0.19 0.00 - 0.65 mg/L FEU   GLUCOSE, POC    Collection Time: 04/28/21  5:48 PM   Result Value Ref Range    Glucose (POC) 135 (H) 65 - 100 mg/dL    Performed by Milagro pal RN    GLUCOSE, POC    Collection Time: 04/28/21  9:46 PM   Result Value Ref Range    Glucose (POC) 193 (H) 65 - 100 mg/dL    Performed by Asad Moser    GLUCOSE, POC    Collection Time: 04/29/21  6:29 AM   Result Value Ref Range    Glucose (POC) 136 (H) 65 - 100 mg/dL    Performed by Xiang Cole        Disposition: home         Patient Instructions:   Current Discharge Medication List      CONTINUE these medications which have NOT CHANGED    Details   lisinopriL (PRINIVIL, ZESTRIL) 20 mg tablet Take 20 mg by mouth daily. carvediloL (COREG) 25 mg tablet Take 1 Tab by mouth two (2) times daily (with meals). Qty: 180 Tab, Refills: 1      dapagliflozin (Farxiga) 5 mg tab tablet Take 1 Tab by mouth daily. Qty: 90 Tab, Refills: 1      spironolactone (ALDACTONE) 25 mg tablet Take 0.5 Tabs by mouth daily. Qty: 45 Tab, Refills: 1      acetaminophen (TYLENOL) 325 mg tablet Take 2 Tabs by mouth every four (4) hours as needed for Pain.   Qty: 20 Tab, Refills: 0      insulin glargine (LANTUS,BASAGLAR) 100 unit/mL (3 mL) inpn 30 units daily  Qty: 3 Pen, Refills: 11    Associated Diagnoses: Type 2 diabetes mellitus without complication, with long-term current use of insulin (HCC)      amLODIPine (NORVASC) 5 mg tablet TAKE 1 TABLET BY MOUTH EVERY DAY  Qty: 30 Tab, Refills: 11    Associated Diagnoses: Essential hypertension nitroglycerin (NITROSTAT) 0.4 mg SL tablet 1 Tab by SubLINGual route every five (5) minutes as needed for Chest Pain (for unstable angina, take up to 3 tabs q 5mins. If chest pain unresolved call 911.). Up to 3 doses. Qty: 1 Bottle, Refills: 3      ticagrelor (BRILINTA) 90 mg tablet Take 90 mg by mouth two (2) times a day. famotidine (PEPCID) 40 mg tablet Take 1 Tab by mouth daily. This is to replace the omeprazole. Qty: 30 Tab, Refills: 11      rosuvastatin (CRESTOR) 40 mg tablet Take 1 Tab by mouth daily. Qty: 30 Tab, Refills: 11    Associated Diagnoses: Dyslipidemia      aspirin delayed-release 81 mg tablet TAKE 1 TABLET BY MOUTH EVERY DAY  Qty: 90 Tab, Refills: 3    Comments: PLEASE REFILL  Associated Diagnoses: ASHD (arteriosclerotic heart disease)             Referenced discharge instructions provided by nursing for diet and activity.     Follow-up with  Lamont Gudino MDin 4 weeks       Signed:  Lamont Gudino MD  4/29/2021  10:37 AM

## 2021-04-29 NOTE — DISCHARGE INSTRUCTIONS
Radial Cardiac Catheterization / Angiography Discharge Instructions    It is normal to feel tired the first couple days. Take it easy and follow the physicians instructions. CHECK THE CATHETER INSERTION SITE DAILY:  Remove the wrist dressing 24 hours after the procedure. You may shower 24 hours after the procedure. Wash with soap and water and pat dry. Gentle cleaning of the site with soap and water is sufficient, cover with a dry clean dressing or bandage. Do not apply creams or powders to the area. No soaking the wrist for 3 days. Leave the puncture site open to air after 24 hours post-procedure. CALL THE PHYSICIAN:  If the site becomes red, swollen or feels warm to the touch. If there is bleeding or drainage or if there is unusual pain at the radial site. If there is any minor oozing, you may apply a band-aid and remove after 12 hours. If the bleeding continues, hold pressure with the middle finger against the puncture site and the thumb against the back of the wrist, call 911 to be transported to the hospital.  DO NOT DRIVE YOURSELF, [place] [address]. ACTIVITY:  For the first 24 hours do not manipulate the wrist.  No lifting, pushing or pulling over 3-5 pounds with the affected wrist for 7 days and no straining the insertion site. Do not lift grocery bags or the garbage can, do not run the vacuum  or  for 7 days. Start with short walks as in the hospital and gradually increase as tolerated each day. It is recommended to walk 30 minutes 5-7 days per week. Follow your physicians instructions on activity. Avoid walking outside in extremes of heat or cold. Walk inside when it is cold and windy or hot and humid. THINGS TO KEEP IN MIND:  No driving for at least 24 hours, or as designated by your physician. Limit the number of times you go up and down the stairs  Take rests and pace yourself with activity.   Be careful and do not strain with bowel movements. MEDICATIONS:  Take all medications as prescribed. Call your physician if you have any questions. Keep an updated list of your medications with you at all times and give a list to your physician and pharmacist.    SIGNS AND SYMPTOMS:  Be cautions of symptoms of angina or recurrent symptoms such as chest discomfort, unusual shortness of breath or fatigue. These could be symptoms of restenosis, a new blockage or a heart attack. If your symptoms are relieved with rest it is still recommended that you notify your physician of recurrent chest pain or discomfort. For CHEST PAIN or symptoms of angina not relieved with rest:  If the discomfort is not relieved with rest and you have been prescribed Nitroglycerin, take as directed (taken under the tongue, one at a time 5 minutes apart for a total of 3 doses). If the discomfort is not relieved after the 3rd nitroglycerin, call 911. AFTER CARE:  Follow up with you physician as instructed. Follow a heart healthy diet with proper portion control, daily stress management, daily      exercise, blood pressure and cholesterol control, and smoking cessation.

## 2021-04-29 NOTE — CARDIO/PULMONARY
Cardiac Rehab: August Lima is s/p cardiac stenting and has done cardiac rehab at [facility] in the past. [facility] contact information is on the AVS with instructions to call to schedule an appointment. Referral for enrollment also initiated.  Allen Holstein, RN

## 2021-04-29 NOTE — PROCEDURES
Findings:  1)Severe 1 vessel disease  2)Patent stents in apical LAD as well as mid Lcx  3)50% ISR in mid RCA stent with de zac lesion in proximal RCA about 75%  4) Normal LVEDP  5)PCI of proximal RCA with 3.5 by 12 mm Xience BOBBY post dilated with 4.5 mm NC at 20 PRASANNA    Access: Right radial no issues  Contrast 85 cc    Recommendations  1)GDMT for CAD  2)Ticagrelor based DAPT  3)Add Vascepa to conventional lipid therapy.   4)Follow up with me in 4 weeks

## 2021-04-29 NOTE — PROGRESS NOTES
Cardiac Cath Lab Procedure Area Arrival Note:    Charlotte Mckay arrived to Cardiac Cath Lab, Procedure Area. Patient identifiers verified with NAME and DATE OF BIRTH. Procedure verified with patient. Consent forms verified. Allergies verified. Patient informed of procedure and plan of care. Questions answered with review. Patient voiced understanding of procedure and plan of care. Patient on cardiac monitor, non-invasive blood pressure, SPO2 monitor. On room air then placed on O2 @ 2 lpm via nc. IV of normal saline on pump at 75 ml/hr. Patient status doing well without problems. Patient is A&Ox 4. Patient reports no pain. Patient medicated during procedure with orders obtained and verified by Dr. Alvin Rodriguez. Refer to patients Cardiac Cath Lab PROCEDURE REPORT for vital signs, assessment, status, and response during procedure, printed at end of case. Printed report on chart or scanned into chart.

## 2021-04-29 NOTE — PROGRESS NOTES
Bedside and Verbal shift change report given to Ricardo Banda (oncoming nurse) by Alpa Lr (offgoing nurse). Report included the following information SBAR, Kardex, Intake/Output, MAR, Recent Results and Cardiac Rhythm NSR BBB.

## 2021-04-29 NOTE — PROGRESS NOTES
Problem: Falls - Risk of  Goal: *Absence of Falls  Description: Document Tano [place] Fall Risk and appropriate interventions in the flowsheet.   Outcome: Progressing Towards Goal  Note: Fall Risk Interventions:            Medication Interventions: Evaluate medications/consider consulting pharmacy, Patient to call before getting OOB

## 2021-04-29 NOTE — PROGRESS NOTES
TRANSFER - IN REPORT:    Verbal report received from [place] on Katy Pérez  being received from procedure for routine progression of care. Report consisted of patients Situation, Background, Assessment and Recommendations(SBAR). Information from the following report(s) Procedure Summary was reviewed with the receiving clinician. Opportunity for questions and clarification was provided. Assessment completed upon patients arrival to [address] [place] [address]. Cardiac Cath Lab Recovery Arrival Note:    Katy Pérez arrived to [facility] recovery area. Patient procedure= cardiac catheterization & stent placement. Patient on cardiac monitor, non-invasive blood pressure, SPO2 monitor. On room air. IV  of 0.9% normal saline on pump at 75 ml/hr. Patient status doing well without problems. Patient is A&Ox 4. Patient reports no complaints. PROCEDURE SITE CHECK:    Procedure site:without any bleeding or hematoma, no pain/discomfort reported at procedure site. No change in patient status. Continue to monitor patient and status.

## 2021-04-29 NOTE — PROGRESS NOTES
[place]: Marshall Strong  [phone]          Cardiology Consult/Progress Note      Requesting/referring provider: Dr. Lorena Avery,  Reason for Consult: Coronary disease    Subjective: NO active chest pain    Assessment/Plan:  1. Coronary disease manifesting in the form of unstable angina  2. Suspected reduction in LV function based on verbal information of the patient  3. Hypertension poor control  4. Hypercholesterolemia. Good LDL control  5. Metabolic syndrome elevated triglycerides  6. Diabetes mellitus with poor control        Underwent cath today with PCI to RCA and PTCA to PDA> Potentially can be discharged on current medical therapy later today with follow up with me in 4 weeks. We will continue him on ticagrelor based dual antiplatelet therapy as well as rest of the guideline directed medical therapy for CAD as well as ischemic cardiomyopathy. He has Ischemic cardiomyopathy with moderately reduced LV systolic function. Echo stress test earlier this yeaHe needs aggressive control of risk factors to prevent future acute coronary symptoms. Dual antiplatelet therapy should be continued. He is on high intensity statin therapy with Crestor 40 mg daily and his recent LDL was 33. Given his diabetes and reduced ejection fraction, I have also initiate him on SGLT2 inhibitors. His blood pressures are now much better controlled after increasing his Coreg. He had some concerns about creatinine bump with spironolactone previously. Hence we will recheck his BMP after initiation of SGLT2 inhibitors for evaluation of potassium and creatinine. May consider Vascepa at discharge. []    High complexity decision making was performed  []    Patient is at high-risk of decompensation with multiple organ involvement    HPI: Jay Jay Mena, a 39y.o. year-old who presents for evaluation of coronary artery disease.   Mr. Iniguez Anjanageorges has history of metabolic syndrome, poorly controlled diabetes and hypercholesterolemia, hypertension. He also history of coronary artery disease with history of remote coronary artery stenting. In November 2020, he was evaluated at [facility] with acute onset chest November. Elevated troponins and ECG changes. He underwent cardiac catheterization demonstrating mid circumflex/marginal treated with drug-eluting stent EF during this hospitalization was noted to be globally reduced 35 to 40%. Off-and-on he has had atypical chest discomfort requiring further ER visits. Notably he had small vessel disease on his cardiac catheterization which was not amenable to PCI and all major epicardial vessels were likely patient  With stents. On his recent ER visit he was not noticed to have any troponin elevation and ECG was unremarkable. Pain is reported as intermittently exertional and sometimes lasts for no longer and appears to radiate to the left shoulder and left arm. Investigations personally reviewed by me  ECG November 2020: Sinus rhythm, lateral precordial and lateral limb lead ST depressions nonspecific but could suggest ischemic heart disease  Echocardiogram November 2020 elevated LV systolic function of 35 to 40%. Mild regurgitation. Cardiac catheterization November 2020 patent stents in RCA and LAD. Diffuse small vessel disease involving diagonal branches. Right posterolateral branch is jailed chronically occluded. Mid circumflex/major obtuse marginal with 90 stenosis treated with drug-eluting stent. LDL November 2020: 2020. Triglycerides 190  Most recent HbA1c is less than 7. ECG performed 4/28/2021: Sinus rhythm, ST-T changes in the form of ST depression in inferolateral leads    He  has a past medical history of CAD (coronary artery disease), Headache, Hypercholesterolemia, Hypertension, Hypogonadism male, Liver disease, and Obstructive sleep apnea.   Review of system:Patient reports no dyspnea/PND/Orthpnea/CP. He reports no cough/fever/focal neurological deficits/abdominal pain. All other systems negative except as above. Family History   Problem Relation Age of Onset    Heart Disease Father       Social History     Socioeconomic History    Marital status:      Spouse name: Not on file    Number of children: 2    Years of education: 12    Highest education level: Not on file   Occupational History    Occupation:    Tobacco Use    Smoking status: Never Smoker    Smokeless tobacco: Never Used   Substance and Sexual Activity    Alcohol use: No    Drug use: No    Sexual activity: Yes     Partners: Female      PE  Vitals:    04/29/21 0413 04/29/21 0818 04/29/21 0823 04/29/21 0830   BP: 110/67 (!) 117/53 116/75 120/76   Pulse: 94 90 94 90   Resp: 16 22 16 17   Temp: 98 °F (36.7 °C)  97.8 °F (36.6 °C)    SpO2: 95% 93% 93% 92%   Weight: 196 lb 3.4 oz (89 kg)       Body mass index is 33.68 kg/m². General:    Alert, cooperative, no distress. Psychiatric:    Normal Mood and affect    Eye/ENT:      Pupils equal, No asymmetry, Conjunctival pink. Able to hear voice at normal amplitude   Lungs:      Visibly symmetric chest expansion, No palpable tenderness. Clear to auscultation bilaterally. Heart[de-identified]    Regular rate and rhythm, S1, S2 normal, no murmur, click, rub or gallop. No JVD, Normal palpable peripheral pulses. No cyanosis   Abdomen:     Soft, non-tender. Bowel sounds normal. No masses,  No      organomegaly. Extremities:   Extremities normal, atraumatic, no edema. Neurologic:   CN II-XII grossly intact.  No gross focal deficits           Recent Labs:  Lab Results   Component Value Date/Time    Cholesterol, total 125 11/28/2020 01:38 AM    HDL Cholesterol 52 11/28/2020 01:38 AM    LDL, calculated 33.4 11/28/2020 01:38 AM    Triglyceride 198 (H) 11/28/2020 01:38 AM    CHOL/HDL Ratio 2.4 11/28/2020 01:38 AM     Lab Results   Component Value Date/Time    Creatinine (POC) 1.0 12/13/2014 07:54 AM    Creatinine 1.41 (H) 04/28/2021 11:49 AM     Lab Results   Component Value Date/Time    BUN 20 04/28/2021 11:49 AM    BUN (POC) 6 (L) 12/13/2014 07:54 AM     Lab Results   Component Value Date/Time    Potassium 4.0 04/28/2021 11:49 AM     Lab Results   Component Value Date/Time    Hemoglobin A1c 7.0 (H) 02/02/2021 12:00 AM     Lab Results   Component Value Date/Time    Hemoglobin (POC) 14.6 12/13/2014 07:54 AM    HGB 13.4 04/28/2021 11:49 AM     Lab Results   Component Value Date/Time    PLATELET 677 54/96/6003 11:49 AM       Reviewed:  Past Medical History:   Diagnosis Date    CAD (coronary artery disease)     2 stents 2016     Headache     Hypercholesterolemia     Hypertension     Hypogonadism male     Liver disease     NAFLD    Obstructive sleep apnea      Social History     Tobacco Use   Smoking Status Never Smoker   Smokeless Tobacco Never Used     Social History     Substance and Sexual Activity   Alcohol Use No     No Known Allergies  Family History   Problem Relation Age of Onset    Heart Disease Father         Current Facility-Administered Medications   Medication Dose Route Frequency    sodium chloride (NS) flush 5-40 mL  5-40 mL IntraVENous Q8H    sodium chloride (NS) flush 5-40 mL  5-40 mL IntraVENous PRN    amLODIPine (NORVASC) tablet 5 mg  5 mg Oral DAILY    aspirin delayed-release tablet 81 mg  81 mg Oral DAILY    carvediloL (COREG) tablet 25 mg  25 mg Oral BID WITH MEALS    famotidine (PEPCID) tablet 40 mg  40 mg Oral DAILY    lisinopriL (PRINIVIL, ZESTRIL) tablet 40 mg  40 mg Oral DAILY    rosuvastatin (CRESTOR) tablet 40 mg  40 mg Oral DAILY    spironolactone (ALDACTONE) tablet 12.5 mg  12.5 mg Oral DAILY    ticagrelor (BRILINTA) tablet 90 mg  90 mg Oral BID    insulin lispro (HUMALOG) injection   SubCUTAneous AC&HS    glucose chewable tablet 16 g  4 Tab Oral PRN    dextrose (D50W) injection syrg 12.5-25 g  12.5-25 g IntraVENous PRN    glucagon (GLUCAGEN) injection 1 mg  1 mg IntraMUSCular PRN    insulin glargine (LANTUS) injection 30 Units  30 Units SubCUTAneous DAILY       Zaria Porras MD04/29/21   ATTENTION:   This medical record was transcribed using an electronic medical records/speech recognition system. Although proofread, it may and can contain electronic, spelling and other errors. Corrections may be executed at a later time. Please feel free to contact us for any clarifications as needed.     [facility]  [address], [address], [address]

## 2021-04-30 ENCOUNTER — OFFICE VISIT (OUTPATIENT)
Dept: INTERNAL MEDICINE CLINIC | Age: 46
End: 2021-04-30
Payer: COMMERCIAL

## 2021-04-30 ENCOUNTER — PATIENT OUTREACH (OUTPATIENT)
Dept: CASE MANAGEMENT | Age: 46
End: 2021-04-30

## 2021-04-30 VITALS
BODY MASS INDEX: 34.19 KG/M2 | OXYGEN SATURATION: 95 % | HEART RATE: 90 BPM | RESPIRATION RATE: 17 BRPM | WEIGHT: 200.3 LBS | DIASTOLIC BLOOD PRESSURE: 75 MMHG | SYSTOLIC BLOOD PRESSURE: 112 MMHG | HEIGHT: 64 IN | TEMPERATURE: 98.1 F

## 2021-04-30 DIAGNOSIS — E78.5 DYSLIPIDEMIA: ICD-10-CM

## 2021-04-30 DIAGNOSIS — Z79.4 CONTROLLED TYPE 2 DIABETES MELLITUS WITHOUT COMPLICATION, WITH LONG-TERM CURRENT USE OF INSULIN (HCC): ICD-10-CM

## 2021-04-30 DIAGNOSIS — E11.9 CONTROLLED TYPE 2 DIABETES MELLITUS WITHOUT COMPLICATION, WITH LONG-TERM CURRENT USE OF INSULIN (HCC): ICD-10-CM

## 2021-04-30 DIAGNOSIS — I25.110 CORONARY ARTERY DISEASE INVOLVING NATIVE CORONARY ARTERY OF NATIVE HEART WITH UNSTABLE ANGINA PECTORIS (HCC): Primary | ICD-10-CM

## 2021-04-30 DIAGNOSIS — I10 ESSENTIAL HYPERTENSION: ICD-10-CM

## 2021-04-30 DIAGNOSIS — G47.33 OBSTRUCTIVE SLEEP APNEA: ICD-10-CM

## 2021-04-30 DIAGNOSIS — E66.9 OBESITY (BMI 30.0-34.9): ICD-10-CM

## 2021-04-30 DIAGNOSIS — N52.9 ERECTILE DYSFUNCTION, UNSPECIFIED ERECTILE DYSFUNCTION TYPE: ICD-10-CM

## 2021-04-30 PROCEDURE — 3051F HG A1C>EQUAL 7.0%<8.0%: CPT | Performed by: INTERNAL MEDICINE

## 2021-04-30 PROCEDURE — 99215 OFFICE O/P EST HI 40 MIN: CPT | Performed by: INTERNAL MEDICINE

## 2021-04-30 NOTE — PROGRESS NOTES
Chief Complaint   Patient presents with   [facility] Follow Up     Patient is here for a follow up. 1. Have you been to the ER, urgent care clinic since your last visit? Hospitalized since your last visit? Yes Reason for visit: shortness of breath     2. Have you seen or consulted any other health care providers outside of the [address] [place] [address] since your last visit? Include any pap smears or colon screening.  No

## 2021-05-02 DIAGNOSIS — I25.10 ASHD (ARTERIOSCLEROTIC HEART DISEASE): ICD-10-CM

## 2021-05-02 PROBLEM — N52.9 ERECTILE DYSFUNCTION: Status: ACTIVE | Noted: 2021-05-02

## 2021-05-02 RX ORDER — TADALAFIL 20 MG/1
20 TABLET ORAL AS NEEDED
Qty: 12 TAB | Refills: 11 | Status: SHIPPED | OUTPATIENT
Start: 2021-05-02 | End: 2022-05-15

## 2021-05-02 NOTE — PROGRESS NOTES
[address] [place] [address] and Primary Care  [place] [address]  [address] [facility] [address]  Phone:  [phone]  Fax: [phone]       Chief Complaint   Patient presents with   [facility] Follow Up     Patient is here for a follow up. .      SUBJECTIVE:    Mariana Moser is a 39 y.o. male Patient comes in for followup. He was most recently hospitalized at [facility] for atypical chest pain. He had a cardiac cath and apparently had a small clot, which was not occlusive, in the right coronary artery, culminating in a PTCA and stenting. I comment that this probably was not the etiology of his chest discomfort because he continues to have that sensation intermittently. The suggestion is more musculoskeletal since there is no consistent precipitating factor enhancing this. His diabetic control has been excellent based on a hemoglobin A1c. He has been compliant with his statin. His particle count was 50, well within an acceptable range. He was placed on Vascepa by his cardiologist and I am not entirely sure of the rationale for doing so, although the added reduction that occurs with history of coronary artery disease. His triglycerides were minimally elevated. He has a past history of primary hypertension, dyslipidemia, obstructive sleep apnea, and obesity. He is making every effort to do everything possible to maintain his health. Current Outpatient Medications   Medication Sig Dispense Refill    tadalafiL (Cialis) 20 mg tablet Take 1 Tab by mouth as needed for Erectile Dysfunction. 12 Tab 11    lisinopriL (PRINIVIL, ZESTRIL) 20 mg tablet Take 20 mg by mouth daily.  carvediloL (COREG) 25 mg tablet Take 1 Tab by mouth two (2) times daily (with meals). 180 Tab 1    dapagliflozin (Farxiga) 5 mg tab tablet Take 1 Tab by mouth daily. 90 Tab 1    spironolactone (ALDACTONE) 25 mg tablet Take 0.5 Tabs by mouth daily.  45 Tab 1    insulin glargine (LANTUS,BASAGLAR) 100 unit/mL (3 mL) inpn 30 units daily 3 Pen 11    amLODIPine (NORVASC) 5 mg tablet TAKE 1 TABLET BY MOUTH EVERY DAY 30 Tab 11    nitroglycerin (NITROSTAT) 0.4 mg SL tablet 1 Tab by SubLINGual route every five (5) minutes as needed for Chest Pain (for unstable angina, take up to 3 tabs q 5mins. If chest pain unresolved call 911.). Up to 3 doses. 1 Bottle 3    ticagrelor (BRILINTA) 90 mg tablet Take 90 mg by mouth two (2) times a day.  famotidine (PEPCID) 40 mg tablet Take 1 Tab by mouth daily. This is to replace the omeprazole. 30 Tab 11    rosuvastatin (CRESTOR) 40 mg tablet Take 1 Tab by mouth daily.  30 Tab 11    aspirin delayed-release 81 mg tablet TAKE 1 TABLET BY MOUTH EVERY DAY 90 Tab 3     Past Medical History:   Diagnosis Date    CAD (coronary artery disease)     2 stents 2016     Headache     Hypercholesterolemia     Hypertension     Hypogonadism male     Liver disease     NAFLD    Obstructive sleep apnea      Past Surgical History:   Procedure Laterality Date    HX HEENT      status post pharyngioplasty     No Known Allergies      REVIEW OF SYSTEMS:  General: negative for - chills or fever  ENT: negative for - headaches, nasal congestion or tinnitus  Respiratory: negative for - cough, hemoptysis, shortness of breath or wheezing  Cardiovascular : negative for - chest pain, edema, palpitations or shortness of breath  Gastrointestinal: negative for - abdominal pain, blood in stools, heartburn or nausea/vomiting  Genito-Urinary: no dysuria, trouble voiding, or hematuria  Musculoskeletal: negative for - gait disturbance, joint pain, joint stiffness or joint swelling  Neurological: no TIA or stroke symptoms  Hematologic: no bruises, no bleeding, no swollen glands  Integument: no lumps, mole changes, nail changes or rash  Endocrine: no malaise/lethargy or unexpected weight changes      Social History     Socioeconomic History    Marital status:      Spouse name: Not on file    Number of children: 2  Years of education: 12    Highest education level: Not on file   Occupational History    Occupation:    Tobacco Use    Smoking status: Never Smoker    Smokeless tobacco: Never Used   Substance and Sexual Activity    Alcohol use: No    Drug use: No    Sexual activity: Yes     Partners: Female     Family History   Problem Relation Age of Onset    Heart Disease Father        OBJECTIVE:    Visit Vitals  /75   Pulse 90   Temp 98.1 °F (36.7 °C)   Resp 17   Ht 5' 4\" (1.626 m)   Wt 200 lb 4.8 oz (90.9 kg)   SpO2 95%   BMI 34.38 kg/m²     CONSTITUTIONAL: well , well nourished, appears age appropriate  EYES: perrla, eom intact  ENMT:moist mucous membranes, pharynx clear  NECK: supple. Thyroid normal  RESPIRATORY: Chest: clear to ascultation and percussion   CARDIOVASCULAR: Heart: regular rate and rhythm  GASTROINTESTINAL: Abdomen: soft, bowel sounds active  HEMATOLOGIC: no pathological lymph nodes palpated  MUSCULOSKELETAL: Extremities: no edema, pulse 1+   INTEGUMENT: No unusual rashes or suspicious skin lesions noted. Nails appear normal.  NEUROLOGIC: non-focal exam   MENTAL STATUS: alert and oriented, appropriate affect      ASSESSMENT:  1. Coronary artery disease involving native coronary artery of native heart with unstable angina pectoris ([address]    2. Controlled type 2 diabetes mellitus without complication, with long-term current use of insulin ([address]    3. Essential hypertension    4. Obstructive sleep apnea    5. Obesity (BMI 30.0-34.9)    6. Dyslipidemia    7. Erectile dysfunction, unspecified erectile dysfunction type        PLAN:  1. He will follow up with his cardiologist as relates to his coronary artery disease at this point. 2. His diabetes is doing remarkably well. Hemoglobin A1c will be checked on his return visit. 3. Blood pressure is excellent. No adjustments are made. 4. He does have obstructive sleep apnea and uses a CPAP machine on a consistent basis.   5. Weight reduction is still needed. This can be accomplished by eating meals, eliminating snacks, and avoiding the consumption of processed carbohydrates. 6. His lipid values are more of an optimal at this point. I make this comment based on his ApoB level, which is 50. He is well below the 70 verónica. ADDENDUM:  His cardiologist added [place] to his regimen of coronary artery disease, which is reasonable but obviously we have to watch for side effects of this drug. He remains on his Lantus 30 units daily and his carvedilol was increased to 25 mg b.i.d. with a reduction in his spironolactone to 12.5 mg daily. For his erectile dysfunction, since there was no contraindication, more specifically he is not taking any type of nitroglycerin products, he will be given Cialis. Addendum: Just to reiterate the patient is indeed aware of the contraindication of nitroglycerin preparations and inhibitors. He will not use nitroglycerin within a 48-hour usage of his Cialis. Follow-up and Dispositions    · Return in about 4 weeks (around 5/28/2021).            Chan Valderrama MD

## 2021-05-03 ENCOUNTER — TELEPHONE (OUTPATIENT)
Dept: CARDIOLOGY CLINIC | Age: 46
End: 2021-05-03

## 2021-05-03 RX ORDER — ASPIRIN 81 MG/1
TABLET ORAL
Qty: 90 TAB | Refills: 3 | Status: SHIPPED | OUTPATIENT
Start: 2021-05-03 | End: 2022-07-08

## 2021-05-03 NOTE — PROGRESS NOTES
Care Transitions Initial Follow Up Call    Call within 2 business days of discharge: Yes     Patient: Javy Bustillos Patient : 1975 MRN: [id number]    Last Discharge [address]       Complaint Diagnosis Description Type Department Provider    21 Chest Pain Unstable angina [facility]) ED to Hosp-Admission (Discharged) (ADMIT) Yajaira Vo MD; Chika Campos. .. Was this an external facility discharge? No     Challenges to be reviewed by the provider     - unable to reach patient for [facility] call            Method of communication with provider : chart routing    Discussed COVID-19 related testing which was not done at this time. Test results were not done. Patient informed of results, if available?n/a    Advance Care Planning:   Does patient have an Advance Directive:not on file     Inpatient Readmission Risk score: Unplanned Readmit Risk Score: 6    Was this a readmission? no       Patients top risk factors for readmission: lack of knowledge about disease and utilization of services, over weight, unable to reach patient for [facility] [employer] call      Home Health/Outpatient orders at [facility] ordered at discharge: none      [facility] follow up appointment(s):   Future Appointments   Date Time Provider Richard Olivier   2021  3:00 PM MD DARREL Reyes BS AMB   2021 11:00 AM Jesus Haas MD [facility] MAIN BS AMB   2021 11:00 AM JONA CATALAN BS AMB   2021 11:40 AM MD DARREL Reyes BS AMB     Non-[facility] follow up appointment(s): n/a    21  Unable to reach patient for [facility] [employer] call. Patient did attend PCP appt. Ctn to attempt to reach patient in 5 business days.  AR

## 2021-05-03 NOTE — TELEPHONE ENCOUNTER
Pharmacy needs verification the patients prescription for metoprolol and carvedilol, please call [facility] pharmacy [phone]

## 2021-05-03 NOTE — TELEPHONE ENCOUNTER
Spoke with pharmacy. Informed per our medication list patient is only on carvedilol. Pharmacy verbalized understanding and denies any further questions at this time.      Future Appointments   Date Time Provider Richard Charline   5/25/2021  3:00 PM Piotr Coyle MD [facility] BS AMB   6/2/2021 11:00 AM Roman Gutierres MD [facility] MAIN BS AMB   7/20/2021 11:00 AM JONA CATALAN BS AMB   7/20/2021 11:40 AM MD DARREL Saldaña Arm BS AMB

## 2021-05-04 ENCOUNTER — HOSPITAL ENCOUNTER (EMERGENCY)
Age: 46
Discharge: HOME | End: 2021-05-05
Attending: EMERGENCY MEDICINE
Payer: COMMERCIAL

## 2021-05-04 ENCOUNTER — APPOINTMENT (OUTPATIENT)
Dept: GENERAL RADIOLOGY | Age: 46
End: 2021-05-04
Attending: EMERGENCY MEDICINE
Payer: COMMERCIAL

## 2021-05-04 DIAGNOSIS — R07.89 ATYPICAL CHEST PAIN: Primary | ICD-10-CM

## 2021-05-04 LAB
ALBUMIN SERPL-MCNC: 3.9 G/DL (ref 3.5–5)
ALBUMIN/GLOB SERPL: 0.8 {RATIO} (ref 1.1–2.2)
ALP SERPL-CCNC: 107 U/L (ref 45–117)
ALT SERPL-CCNC: 47 U/L (ref 12–78)
ANION GAP SERPL CALC-SCNC: 2 MMOL/L (ref 5–15)
AST SERPL-CCNC: 17 U/L (ref 15–37)
BASOPHILS # BLD: 0.1 K/UL (ref 0–0.1)
BASOPHILS NFR BLD: 1 % (ref 0–1)
BILIRUB SERPL-MCNC: 0.4 MG/DL (ref 0.2–1)
BUN SERPL-MCNC: 22 MG/DL (ref 6–20)
BUN/CREAT SERPL: 16 (ref 12–20)
CALCIUM SERPL-MCNC: 9.3 MG/DL (ref 8.5–10.1)
CHLORIDE SERPL-SCNC: 106 MMOL/L (ref 97–108)
CO2 SERPL-SCNC: 29 MMOL/L (ref 21–32)
CREAT SERPL-MCNC: 1.37 MG/DL (ref 0.7–1.3)
DIFFERENTIAL METHOD BLD: ABNORMAL
EOSINOPHIL # BLD: 0.1 K/UL (ref 0–0.4)
EOSINOPHIL NFR BLD: 2 % (ref 0–7)
ERYTHROCYTE [DISTWIDTH] IN BLOOD BY AUTOMATED COUNT: 15.7 % (ref 11.5–14.5)
GLOBULIN SER CALC-MCNC: 4.6 G/DL (ref 2–4)
GLUCOSE SERPL-MCNC: 145 MG/DL (ref 65–100)
HCT VFR BLD AUTO: 42 % (ref 36.6–50.3)
HGB BLD-MCNC: 13.7 G/DL (ref 12.1–17)
IMM GRANULOCYTES # BLD AUTO: 0 K/UL (ref 0–0.04)
IMM GRANULOCYTES NFR BLD AUTO: 1 % (ref 0–0.5)
LIPASE SERPL-CCNC: 140 U/L (ref 73–393)
LYMPHOCYTES # BLD: 3.4 K/UL (ref 0.8–3.5)
LYMPHOCYTES NFR BLD: 44 % (ref 12–49)
MCH RBC QN AUTO: 25.5 PG (ref 26–34)
MCHC RBC AUTO-ENTMCNC: 32.6 G/DL (ref 30–36.5)
MCV RBC AUTO: 78.1 FL (ref 80–99)
MONOCYTES # BLD: 0.8 K/UL (ref 0–1)
MONOCYTES NFR BLD: 10 % (ref 5–13)
NEUTS SEG # BLD: 3.4 K/UL (ref 1.8–8)
NEUTS SEG NFR BLD: 42 % (ref 32–75)
NRBC # BLD: 0 K/UL (ref 0–0.01)
NRBC BLD-RTO: 0 PER 100 WBC
PLATELET # BLD AUTO: 215 K/UL (ref 150–400)
PMV BLD AUTO: 11.2 FL (ref 8.9–12.9)
POTASSIUM SERPL-SCNC: 3.8 MMOL/L (ref 3.5–5.1)
PROT SERPL-MCNC: 8.5 G/DL (ref 6.4–8.2)
RBC # BLD AUTO: 5.38 M/UL (ref 4.1–5.7)
SODIUM SERPL-SCNC: 137 MMOL/L (ref 136–145)
TROPONIN I SERPL-MCNC: <0.05 NG/ML
WBC # BLD AUTO: 7.9 K/UL (ref 4.1–11.1)

## 2021-05-04 PROCEDURE — 71045 X-RAY EXAM CHEST 1 VIEW: CPT

## 2021-05-04 PROCEDURE — 80053 COMPREHEN METABOLIC PANEL: CPT

## 2021-05-04 PROCEDURE — 74011000250 HC RX REV CODE- 250: Performed by: EMERGENCY MEDICINE

## 2021-05-04 PROCEDURE — 85025 COMPLETE CBC W/AUTO DIFF WBC: CPT

## 2021-05-04 PROCEDURE — 74011250637 HC RX REV CODE- 250/637: Performed by: EMERGENCY MEDICINE

## 2021-05-04 PROCEDURE — C9113 INJ PANTOPRAZOLE SODIUM, VIA: HCPCS | Performed by: EMERGENCY MEDICINE

## 2021-05-04 PROCEDURE — 99284 EMERGENCY DEPT VISIT MOD MDM: CPT

## 2021-05-04 PROCEDURE — 83690 ASSAY OF LIPASE: CPT

## 2021-05-04 PROCEDURE — 84484 ASSAY OF TROPONIN QUANT: CPT

## 2021-05-04 PROCEDURE — 93005 ELECTROCARDIOGRAM TRACING: CPT

## 2021-05-04 PROCEDURE — 96374 THER/PROPH/DIAG INJ IV PUSH: CPT

## 2021-05-04 PROCEDURE — 74011250636 HC RX REV CODE- 250/636: Performed by: EMERGENCY MEDICINE

## 2021-05-04 PROCEDURE — 36415 COLL VENOUS BLD VENIPUNCTURE: CPT

## 2021-05-04 RX ORDER — PANTOPRAZOLE SODIUM 40 MG/10ML
40 INJECTION, POWDER, LYOPHILIZED, FOR SOLUTION INTRAVENOUS
Status: COMPLETED | OUTPATIENT
Start: 2021-05-04 | End: 2021-05-04

## 2021-05-04 RX ADMIN — ALUMINUM HYDROXIDE AND MAGNESIUM HYDROXIDE 40 ML: 200; 200 SUSPENSION ORAL at 22:28

## 2021-05-04 RX ADMIN — PANTOPRAZOLE SODIUM 40 MG: 40 INJECTION, POWDER, FOR SOLUTION INTRAVENOUS at 22:30

## 2021-05-05 VITALS
OXYGEN SATURATION: 94 % | BODY MASS INDEX: 34.63 KG/M2 | TEMPERATURE: 98.2 F | WEIGHT: 201.72 LBS | DIASTOLIC BLOOD PRESSURE: 84 MMHG | SYSTOLIC BLOOD PRESSURE: 116 MMHG | RESPIRATION RATE: 22 BRPM | HEART RATE: 92 BPM

## 2021-05-05 LAB
ATRIAL RATE: 85 BPM
CALCULATED P AXIS, ECG09: 44 DEGREES
CALCULATED R AXIS, ECG10: 16 DEGREES
CALCULATED T AXIS, ECG11: 17 DEGREES
DIAGNOSIS, 93000: NORMAL
P-R INTERVAL, ECG05: 142 MS
Q-T INTERVAL, ECG07: 368 MS
QRS DURATION, ECG06: 110 MS
QTC CALCULATION (BEZET), ECG08: 437 MS
TROPONIN I SERPL-MCNC: <0.05 NG/ML
VENTRICULAR RATE, ECG03: 85 BPM

## 2021-05-05 RX ORDER — PANTOPRAZOLE SODIUM 40 MG/1
40 TABLET, DELAYED RELEASE ORAL DAILY
Qty: 20 TAB | Refills: 0 | Status: SHIPPED | OUTPATIENT
Start: 2021-05-05 | End: 2021-05-25

## 2021-05-05 NOTE — ED PROVIDER NOTES
EMERGENCY DEPARTMENT HISTORY AND PHYSICAL EXAM     ----------------------------------------------------------------------------  Please note that this dictation was completed with Immediately, the computer voice recognition software. Quite often unanticipated grammatical, syntax, homophones, and other interpretive errors are inadvertently transcribed by the computer software. Please disregard these errors. Please excuse any errors that have escaped final proofreading  ----------------------------------------------------------------------------      Date: 5/4/2021  Patient Name: Bran Kaye    History of Presenting Illness     Chief Complaint   Patient presents with    Chest Pain (Angina)     pt reports a burning on the L side of his chest since Sunday. Had cardiac stent placed last week       History Provided By:  Patient    HPI: Bran Kaye is a 39 y.o. male, with significant pmhx of hypertension, cholesterol, headaches, CAD with recent cardiac stenting last week, who presents via private vehicle to the ED with c/o intermittent continual chest pain since his stenting a week ago. Notes burning sensation with radiation to the left from his sternum. Denies taking any medication to alleviate his symptoms. Notes that his symptoms acutely worsened tonight causing him to take Mylanta and then 2 sublingual nitro without relief. Denies leg pain, swelling, nausea, vomiting or shortness of breath. Reports being compliant with his Brilinta and aspirin. Patient also specifically denies any associated fevers, chills, SOB, nausea, vomiting, diarrhea, abd pain, changes in BM, urinary sxs, or headache. Social Hx: denies tobacco  denies EtOH , denies Illicit Drugs    There are no other complaints, changes, or physical findings at this time.      PCP: Michael Mirza MD    No Known Allergies    Current Outpatient Medications   Medication Sig Dispense Refill    pantoprazole (Protonix) 40 mg tablet Take 1 Tab by mouth daily for 20 days. 20 Tab 0    aspirin delayed-release 81 mg tablet TAKE 1 TABLET BY MOUTH EVERY DAY 90 Tab 3    tadalafiL (Cialis) 20 mg tablet Take 1 Tab by mouth as needed for Erectile Dysfunction. 12 Tab 11    lisinopriL (PRINIVIL, ZESTRIL) 20 mg tablet Take 20 mg by mouth daily.  carvediloL (COREG) 25 mg tablet Take 1 Tab by mouth two (2) times daily (with meals). 180 Tab 1    dapagliflozin (Farxiga) 5 mg tab tablet Take 1 Tab by mouth daily. 90 Tab 1    spironolactone (ALDACTONE) 25 mg tablet Take 0.5 Tabs by mouth daily. 45 Tab 1    insulin glargine (LANTUS,BASAGLAR) 100 unit/mL (3 mL) inpn 30 units daily 3 Pen 11    amLODIPine (NORVASC) 5 mg tablet TAKE 1 TABLET BY MOUTH EVERY DAY 30 Tab 11    nitroglycerin (NITROSTAT) 0.4 mg SL tablet 1 Tab by SubLINGual route every five (5) minutes as needed for Chest Pain (for unstable angina, take up to 3 tabs q 5mins. If chest pain unresolved call 911.). Up to 3 doses. 1 Bottle 3    ticagrelor (BRILINTA) 90 mg tablet Take 90 mg by mouth two (2) times a day.  rosuvastatin (CRESTOR) 40 mg tablet Take 1 Tab by mouth daily. 27 Tab 11       Past History     Past Medical History:  Past Medical History:   Diagnosis Date    CAD (coronary artery disease)     2 stents 2016     Headache     Hypercholesterolemia     Hypertension     Hypogonadism male     Liver disease     NAFLD    Obstructive sleep apnea        Past Surgical History:  Past Surgical History:   Procedure Laterality Date    HX HEENT      status post pharyngioplasty       Family History:  Family History   Problem Relation Age of Onset    Heart Disease Father        Social History:  Social History     Tobacco Use    Smoking status: Never Smoker    Smokeless tobacco: Never Used   Substance Use Topics    Alcohol use: No    Drug use: No       Allergies:  No Known Allergies      Review of Systems   Review of Systems   Constitutional: Negative for chills and fever. HENT: Negative.     Eyes: Negative. Respiratory: Negative for cough, chest tightness and shortness of breath. Cardiovascular: Positive for chest pain. Negative for leg swelling. Gastrointestinal: Positive for abdominal pain. Negative for diarrhea, nausea and vomiting. Endocrine: Negative. Genitourinary: Negative for difficulty urinating and dysuria. Musculoskeletal: Negative for myalgias. Skin: Negative. Neurological: Negative. Psychiatric/Behavioral: Negative. All other systems reviewed and are negative. Physical Exam   Physical Exam  Vitals signs and nursing note reviewed. Constitutional:       General: He is not in acute distress. Appearance: He is well-developed. He is not diaphoretic. HENT:      Head: Normocephalic and atraumatic. Nose: Nose normal.      Mouth/Throat:      Pharynx: No oropharyngeal exudate. Eyes:      Conjunctiva/sclera: Conjunctivae normal.      Pupils: Pupils are equal, round, and reactive to light. Neck:      Musculoskeletal: Normal range of motion and neck supple. Vascular: No JVD. Cardiovascular:      Rate and Rhythm: Normal rate and regular rhythm. Heart sounds: Normal heart sounds. No murmur. No friction rub. Pulmonary:      Effort: Pulmonary effort is normal. No respiratory distress. Breath sounds: Normal breath sounds. No stridor. No wheezing or rales. Chest:      Chest wall: No swelling, tenderness or crepitus. Abdominal:      General: Bowel sounds are normal. There is no distension. Palpations: Abdomen is soft. Tenderness: There is no abdominal tenderness. There is no rebound. Musculoskeletal: Normal range of motion. General: No tenderness. Skin:     General: Skin is warm and dry. Findings: No rash. Neurological:      Mental Status: He is alert and oriented to person, place, and time. Cranial Nerves: No cranial nerve deficit.    Psychiatric:         Speech: Speech normal.         Behavior: Behavior normal.         Thought Content: Thought content normal.         Judgment: Judgment normal.           Diagnostic Study Results     Labs -     Recent Results (from the past 12 hour(s))   EKG, 12 LEAD, INITIAL    Collection Time: 05/04/21  9:20 PM   Result Value Ref Range    Ventricular Rate 85 BPM    Atrial Rate 85 BPM    P-R Interval 142 ms    QRS Duration 110 ms    Q-T Interval 368 ms    QTC Calculation (Bezet) 437 ms    Calculated P Axis 44 degrees    Calculated R Axis 16 degrees    Calculated T Axis 17 degrees    Diagnosis       Normal sinus rhythm  Normal ECG  When compared with ECG of 29-APR-2021 08:29,  Nonspecific T wave abnormality has replaced inverted T waves in Inferior   leads  Nonspecific T wave abnormality now evident in Lateral leads     CBC WITH AUTOMATED DIFF    Collection Time: 05/04/21  9:34 PM   Result Value Ref Range    WBC 7.9 4.1 - 11.1 K/uL    RBC 5.38 4.10 - 5.70 M/uL    HGB 13.7 12.1 - 17.0 g/dL    HCT 42.0 36.6 - 50.3 %    MCV 78.1 (L) 80.0 - 99.0 FL    MCH 25.5 (L) 26.0 - 34.0 PG    MCHC 32.6 30.0 - 36.5 g/dL    RDW 15.7 (H) 11.5 - 14.5 %    PLATELET 002 841 - 602 K/uL    MPV 11.2 8.9 - 12.9 FL    NRBC 0.0 0  WBC    ABSOLUTE NRBC 0.00 0.00 - 0.01 K/uL    NEUTROPHILS 42 32 - 75 %    LYMPHOCYTES 44 12 - 49 %    MONOCYTES 10 5 - 13 %    EOSINOPHILS 2 0 - 7 %    BASOPHILS 1 0 - 1 %    IMMATURE GRANULOCYTES 1 (H) 0.0 - 0.5 %    ABS. NEUTROPHILS 3.4 1.8 - 8.0 K/UL    ABS. LYMPHOCYTES 3.4 0.8 - 3.5 K/UL    ABS. MONOCYTES 0.8 0.0 - 1.0 K/UL    ABS. EOSINOPHILS 0.1 0.0 - 0.4 K/UL    ABS. BASOPHILS 0.1 0.0 - 0.1 K/UL    ABS. IMM.  GRANS. 0.0 0.00 - 0.04 K/UL    DF AUTOMATED     METABOLIC PANEL, COMPREHENSIVE    Collection Time: 05/04/21  9:34 PM   Result Value Ref Range    Sodium 137 136 - 145 mmol/L    Potassium 3.8 3.5 - 5.1 mmol/L    Chloride 106 97 - 108 mmol/L    CO2 29 21 - 32 mmol/L    Anion gap 2 (L) 5 - 15 mmol/L    Glucose 145 (H) 65 - 100 mg/dL    BUN 22 (H) 6 - 20 MG/DL Creatinine 1.37 (H) 0.70 - 1.30 MG/DL    BUN/Creatinine ratio 16 12 - 20      GFR est AA >60 >60 ml/min/1.73m2    GFR est non-AA 57 (L) >60 ml/min/1.73m2    Calcium 9.3 8.5 - 10.1 MG/DL    Bilirubin, total 0.4 0.2 - 1.0 MG/DL    ALT (SGPT) 47 12 - 78 U/L    AST (SGOT) 17 15 - 37 U/L    Alk. phosphatase 107 45 - 117 U/L    Protein, total 8.5 (H) 6.4 - 8.2 g/dL    Albumin 3.9 3.5 - 5.0 g/dL    Globulin 4.6 (H) 2.0 - 4.0 g/dL    A-G Ratio 0.8 (L) 1.1 - 2.2     TROPONIN I    Collection Time: 05/04/21  9:34 PM   Result Value Ref Range    Troponin-I, Qt. <0.05 <0.05 ng/mL   LIPASE    Collection Time: 05/04/21  9:34 PM   Result Value Ref Range    Lipase 140 73 - 393 U/L   TROPONIN I    Collection Time: 05/04/21 11:25 PM   Result Value Ref Range    Troponin-I, Qt. <0.05 <0.05 ng/mL       Radiologic Studies -   XR CHEST PORT   Final Result   No evidence of acute cardiopulmonary process. CT Results  (Last 48 hours)    None        CXR Results  (Last 48 hours)               05/04/21 2215  XR CHEST PORT Final result    Impression:  No evidence of acute cardiopulmonary process. Narrative:  INDICATION:  chest pain        COMPARISON: April 28       FINDINGS: Single AP portable view of the chest obtained at 2200 demonstrates a   stable cardiomediastinal silhouette. The lungs are clear bilaterally. No osseous   abnormalities are seen. Medical Decision Making   I am the first provider for this patient. I reviewed the vital signs, available nursing notes, past medical history, past surgical history, family history and social history. Vital Signs-Reviewed the patient's vital signs.   Patient Vitals for the past 12 hrs:   Temp Pulse Resp BP SpO2   05/05/21 0015 -- 92 22 116/84 94 %   05/04/21 2300 -- 94 18 102/80 94 %   05/04/21 2245 -- 96 16 (!) 80/52 94 %   05/04/21 2230 -- 92 18 121/87 96 %   05/04/21 2215 -- 91 17 123/82 96 %   05/04/21 2200 -- 89 19 121/85 96 %   05/04/21 2145 -- 91 18 122/60 96 %   05/04/21 2116 98.2 °F (36.8 °C) 87 15 127/87 100 %       Pulse Oximetry Analysis - 94% on RA, normal  Rate: 87 bpm  Rhythm: nsr      Provider Notes (Medical Decision Making):     DDX:  Acs, arrhythmia gerd, panceratitis    Plan:  Ekg, labs, cxr, gi cocktail    Impression:  Atypical chest pain    ED Course:   Initial assessment performed. The patients presenting problems have been discussed, and they are in agreement with the care plan formulated and outlined with them. I have encouraged them to ask questions as they arise throughout their visit. I reviewed the nursing notes and and vital signs from today's visit, as well as the electronic medical record system for any past medical records that were available that may contribute to the patients current condition, including recent hospitalization for cardiac cath    Nursing notes will be reviewed as they become available in realtime while the pt has been in the ED. Emiliano Chavis MD      EKG interpretation 2120: NSR, nl Axis, rate 85; , , QTc 437; NO STEMI; interpreted by Emiliano Chavis MD    I personally reviewed/interpreted pt's imaging. Agree with official read by radiology as noted above. Emiliano Chavis MD    PROGRESS NOTE:  12:06 AM  Pt feel much improved after GI cocktail. Waiting second troponin. Will likely discharge if negative  Emiliano Chavis MD      12:35 AM  Progress note:  Pt noted to be feeling better ready for discharge. Discussed lab and imaging findings with pt, specifically noting negative troponin. Pt will follow up with cardiology and GI as instructed. All questions have been answered, pt voiced understanding and agreement with plan. Specific return precautions provided in addition to instructions for pt to return to the ED immediately should sx worsen at any time. Emiliano Chavis MD           Critical Care Time:     none      Diagnosis     Clinical Impression:   1. Atypical chest pain        PLAN:  1. Current Discharge Medication List      START taking these medications    Details   pantoprazole (Protonix) 40 mg tablet Take 1 Tab by mouth daily for 20 days. Qty: 20 Tab, Refills: 0           2. Follow-up Information     Follow up With Specialties Details Why Contact Info    Nicole Cotton MD Internal Medicine Schedule an appointment as soon as possible for a visit in 2 days  [place] [address]  [address] [phone]          Return to ED if worse     Disposition:  12:35 AM  The patient's results have been reviewed with family and/or caregiver. They verbally convey their understanding and agreement of the patient's signs, symptoms, diagnosis, treatment and prognosis and additionally agree to follow up as recommended in the discharge instructions or to return to the Emergency Room should the patient's condition change prior to their follow-up appointment. The family and/or caregiver verbally agrees with the care-plan and all of their questions have been answered. The discharge instructions have also been provided to the them with educational information regarding the patient's diagnosis as well a list of reasons why the patient would want to return to the ER prior to their follow-up appointment should their condition change.   Ivan Jj MD

## 2021-05-05 NOTE — ED NOTES
Dr. Jane Moe reviewed discharge instructions with the patient. The patient verbalized understanding. All questions and concerns were addressed. The patient declined a wheelchair and is discharged ambulatory in the care of family members with instructions and prescriptions in hand. Pt is alert and oriented x 4. Respirations are clear and unlabored.

## 2021-05-11 ENCOUNTER — PATIENT OUTREACH (OUTPATIENT)
Dept: CASE MANAGEMENT | Age: 46
End: 2021-05-11

## 2021-05-18 ENCOUNTER — HOSPITAL ENCOUNTER (EMERGENCY)
Age: 46
Discharge: HOME | End: 2021-05-18
Attending: EMERGENCY MEDICINE
Payer: COMMERCIAL

## 2021-05-18 ENCOUNTER — APPOINTMENT (OUTPATIENT)
Dept: GENERAL RADIOLOGY | Age: 46
End: 2021-05-18
Attending: EMERGENCY MEDICINE
Payer: COMMERCIAL

## 2021-05-18 VITALS
OXYGEN SATURATION: 95 % | HEART RATE: 89 BPM | SYSTOLIC BLOOD PRESSURE: 130 MMHG | BODY MASS INDEX: 34.31 KG/M2 | HEIGHT: 64 IN | RESPIRATION RATE: 18 BRPM | WEIGHT: 201 LBS | DIASTOLIC BLOOD PRESSURE: 87 MMHG | TEMPERATURE: 98.3 F

## 2021-05-18 DIAGNOSIS — R07.9 CHEST PAIN, UNSPECIFIED TYPE: Primary | ICD-10-CM

## 2021-05-18 LAB
ALBUMIN SERPL-MCNC: 4 G/DL (ref 3.5–5)
ALBUMIN/GLOB SERPL: 1 {RATIO} (ref 1.1–2.2)
ALP SERPL-CCNC: 85 U/L (ref 45–117)
ALT SERPL-CCNC: 41 U/L (ref 12–78)
ANION GAP SERPL CALC-SCNC: 4 MMOL/L (ref 5–15)
AST SERPL-CCNC: 21 U/L (ref 15–37)
BASOPHILS # BLD: 0 K/UL (ref 0–0.1)
BASOPHILS NFR BLD: 0 % (ref 0–1)
BILIRUB SERPL-MCNC: 0.5 MG/DL (ref 0.2–1)
BUN SERPL-MCNC: 18 MG/DL (ref 6–20)
BUN/CREAT SERPL: 12 (ref 12–20)
CALCIUM SERPL-MCNC: 9.1 MG/DL (ref 8.5–10.1)
CHLORIDE SERPL-SCNC: 105 MMOL/L (ref 97–108)
CO2 SERPL-SCNC: 29 MMOL/L (ref 21–32)
CREAT SERPL-MCNC: 1.52 MG/DL (ref 0.7–1.3)
D DIMER PPP FEU-MCNC: <0.19 MG/L FEU (ref 0–0.65)
DIFFERENTIAL METHOD BLD: ABNORMAL
EOSINOPHIL # BLD: 0.1 K/UL (ref 0–0.4)
EOSINOPHIL NFR BLD: 1 % (ref 0–7)
ERYTHROCYTE [DISTWIDTH] IN BLOOD BY AUTOMATED COUNT: 16.2 % (ref 11.5–14.5)
GLOBULIN SER CALC-MCNC: 3.9 G/DL (ref 2–4)
GLUCOSE SERPL-MCNC: 250 MG/DL (ref 65–100)
HCT VFR BLD AUTO: 40.5 % (ref 36.6–50.3)
HGB BLD-MCNC: 12.9 G/DL (ref 12.1–17)
IMM GRANULOCYTES # BLD AUTO: 0 K/UL (ref 0–0.04)
IMM GRANULOCYTES NFR BLD AUTO: 0 % (ref 0–0.5)
LYMPHOCYTES # BLD: 1.9 K/UL (ref 0.8–3.5)
LYMPHOCYTES NFR BLD: 27 % (ref 12–49)
MCH RBC QN AUTO: 25.7 PG (ref 26–34)
MCHC RBC AUTO-ENTMCNC: 31.9 G/DL (ref 30–36.5)
MCV RBC AUTO: 80.7 FL (ref 80–99)
MONOCYTES # BLD: 0.6 K/UL (ref 0–1)
MONOCYTES NFR BLD: 9 % (ref 5–13)
NEUTS SEG # BLD: 4.3 K/UL (ref 1.8–8)
NEUTS SEG NFR BLD: 63 % (ref 32–75)
NRBC # BLD: 0 K/UL (ref 0–0.01)
NRBC BLD-RTO: 0 PER 100 WBC
PLATELET # BLD AUTO: 181 K/UL (ref 150–400)
PMV BLD AUTO: 11.4 FL (ref 8.9–12.9)
POTASSIUM SERPL-SCNC: 4 MMOL/L (ref 3.5–5.1)
PROT SERPL-MCNC: 7.9 G/DL (ref 6.4–8.2)
RBC # BLD AUTO: 5.02 M/UL (ref 4.1–5.7)
SODIUM SERPL-SCNC: 138 MMOL/L (ref 136–145)
TROPONIN I SERPL-MCNC: <0.05 NG/ML
TROPONIN I SERPL-MCNC: <0.05 NG/ML
WBC # BLD AUTO: 6.9 K/UL (ref 4.1–11.1)

## 2021-05-18 PROCEDURE — 85025 COMPLETE CBC W/AUTO DIFF WBC: CPT

## 2021-05-18 PROCEDURE — 71045 X-RAY EXAM CHEST 1 VIEW: CPT

## 2021-05-18 PROCEDURE — 85379 FIBRIN DEGRADATION QUANT: CPT

## 2021-05-18 PROCEDURE — 84484 ASSAY OF TROPONIN QUANT: CPT

## 2021-05-18 PROCEDURE — 74011000250 HC RX REV CODE- 250: Performed by: EMERGENCY MEDICINE

## 2021-05-18 PROCEDURE — 36415 COLL VENOUS BLD VENIPUNCTURE: CPT

## 2021-05-18 PROCEDURE — 74011250637 HC RX REV CODE- 250/637: Performed by: EMERGENCY MEDICINE

## 2021-05-18 PROCEDURE — 93005 ELECTROCARDIOGRAM TRACING: CPT

## 2021-05-18 PROCEDURE — 99285 EMERGENCY DEPT VISIT HI MDM: CPT

## 2021-05-18 PROCEDURE — 80053 COMPREHEN METABOLIC PANEL: CPT

## 2021-05-18 RX ORDER — SODIUM CHLORIDE 0.9 % (FLUSH) 0.9 %
5-40 SYRINGE (ML) INJECTION AS NEEDED
Status: DISCONTINUED | OUTPATIENT
Start: 2021-05-18 | End: 2021-05-19 | Stop reason: HOSPADM

## 2021-05-18 RX ORDER — SODIUM CHLORIDE 0.9 % (FLUSH) 0.9 %
5-40 SYRINGE (ML) INJECTION EVERY 8 HOURS
Status: DISCONTINUED | OUTPATIENT
Start: 2021-05-18 | End: 2021-05-19 | Stop reason: HOSPADM

## 2021-05-18 RX ORDER — LIDOCAINE HYDROCHLORIDE 20 MG/ML
10 SOLUTION OROPHARYNGEAL AS NEEDED
Qty: 1 BOTTLE | Refills: 0 | Status: SHIPPED | OUTPATIENT
Start: 2021-05-18 | End: 2021-11-16

## 2021-05-18 RX ADMIN — ALUMINUM HYDROXIDE AND MAGNESIUM HYDROXIDE 40 ML: 200; 200 SUSPENSION ORAL at 21:25

## 2021-05-18 RX ADMIN — Medication 5 ML: at 21:26

## 2021-05-19 LAB
ATRIAL RATE: 79 BPM
CALCULATED P AXIS, ECG09: 37 DEGREES
CALCULATED R AXIS, ECG10: 2 DEGREES
CALCULATED T AXIS, ECG11: 6 DEGREES
DIAGNOSIS, 93000: NORMAL
P-R INTERVAL, ECG05: 134 MS
Q-T INTERVAL, ECG07: 384 MS
QRS DURATION, ECG06: 92 MS
QTC CALCULATION (BEZET), ECG08: 440 MS
VENTRICULAR RATE, ECG03: 79 BPM

## 2021-05-19 NOTE — ED NOTES
Bedside and Verbal shift change report given to Hebert Wen RN (oncoming nurse) by Any Ramirez RN (offgoing nurse). Report included the following information SBAR, ED Summary, Recent Results and Cardiac Rhythm NSR.

## 2021-05-20 NOTE — ED PROVIDER NOTES
EMERGENCY DEPARTMENT HISTORY AND PHYSICAL EXAM      Date: 5/18/2021  Patient Name: Luca Gamez    History of Presenting Illness     Chief Complaint   Patient presents with    Shortness of Breath     7/10 pain left chest pain, constant and sharp, feels like his pervious MI       History Provided By: Patient    HPI: Luca Gamez, 39 y.o. male presents to the ED with cc of chest pain. Pt has cardiac hx with 2 caths in the past 6 month with a total of 2 stents. He states he noted CP that started a couple of days ago, waxing and wanes. Currently pain 2/10, dull pressure in the center of his chest, He states the pain had not gone away and with his history came to the ED for evaluation. There has been no fever or chills. He denies cough and cold symptoms. He has not been short of breath. He has hx of GERD and has noted some increase in indigestion. He is on NTG and had used today but it did not improve his pain. There has been no n/v/d. There has been no pain or swelling in the legs. There are no other complaints, changes, or physical findings at this time. PCP: Merlene Ryder MD    No current facility-administered medications on file prior to encounter. Current Outpatient Medications on File Prior to Encounter   Medication Sig Dispense Refill    pantoprazole (Protonix) 40 mg tablet Take 1 Tab by mouth daily for 20 days. 20 Tab 0    aspirin delayed-release 81 mg tablet TAKE 1 TABLET BY MOUTH EVERY DAY 90 Tab 3    tadalafiL (Cialis) 20 mg tablet Take 1 Tab by mouth as needed for Erectile Dysfunction. 12 Tab 11    lisinopriL (PRINIVIL, ZESTRIL) 20 mg tablet Take 20 mg by mouth daily.  carvediloL (COREG) 25 mg tablet Take 1 Tab by mouth two (2) times daily (with meals). 180 Tab 1    dapagliflozin (Farxiga) 5 mg tab tablet Take 1 Tab by mouth daily. 90 Tab 1    spironolactone (ALDACTONE) 25 mg tablet Take 0.5 Tabs by mouth daily.  45 Tab 1    insulin glargine (LANTUS,BASAGLAR) 100 unit/mL (3 mL) inpn 30 units daily 3 Pen 11    amLODIPine (NORVASC) 5 mg tablet TAKE 1 TABLET BY MOUTH EVERY DAY 30 Tab 11    nitroglycerin (NITROSTAT) 0.4 mg SL tablet 1 Tab by SubLINGual route every five (5) minutes as needed for Chest Pain (for unstable angina, take up to 3 tabs q 5mins. If chest pain unresolved call 911.). Up to 3 doses. 1 Bottle 3    ticagrelor (BRILINTA) 90 mg tablet Take 90 mg by mouth two (2) times a day.  rosuvastatin (CRESTOR) 40 mg tablet Take 1 Tab by mouth daily. 27 Tab 11       Past History     Past Medical History:  Past Medical History:   Diagnosis Date    CAD (coronary artery disease)     2 stents 2016     Headache     Hypercholesterolemia     Hypertension     Hypogonadism male     Liver disease     NAFLD    Obstructive sleep apnea        Past Surgical History:  Past Surgical History:   Procedure Laterality Date    HX HEENT      status post pharyngioplasty       Family History:  Family History   Problem Relation Age of Onset    Heart Disease Father        Social History:  Social History     Tobacco Use    Smoking status: Never Smoker    Smokeless tobacco: Never Used   Vaping Use    Vaping Use: Never used   Substance Use Topics    Alcohol use: No    Drug use: No       Allergies:  No Known Allergies      Review of Systems   Review of Systems   Constitutional: Negative. Negative for appetite change, chills, fatigue and fever. HENT: Negative. Negative for congestion, rhinorrhea, sinus pressure and sore throat. Eyes: Negative. Respiratory: Negative. Negative for cough, choking, chest tightness, shortness of breath and wheezing. Cardiovascular: Positive for chest pain. Negative for palpitations and leg swelling. Gastrointestinal: Positive for abdominal pain. Negative for constipation, diarrhea, nausea and vomiting. Endocrine: Negative. Genitourinary: Negative. Negative for difficulty urinating, dysuria, flank pain and urgency. Musculoskeletal: Negative.     Skin: Negative. Neurological: Negative. Negative for dizziness, speech difficulty, weakness, light-headedness, numbness and headaches. Psychiatric/Behavioral: Negative. All other systems reviewed and are negative. Physical Exam   Physical Exam  Vitals and nursing note reviewed. Constitutional:       General: He is not in acute distress. Appearance: He is well-developed. He is not diaphoretic. HENT:      Head: Normocephalic and atraumatic. Mouth/Throat:      Pharynx: No oropharyngeal exudate. Eyes:      Conjunctiva/sclera: Conjunctivae normal.      Pupils: Pupils are equal, round, and reactive to light. Neck:      Vascular: No JVD. Trachea: No tracheal deviation. Cardiovascular:      Rate and Rhythm: Normal rate and regular rhythm. Heart sounds: Normal heart sounds. No murmur heard. Pulmonary:      Effort: Pulmonary effort is normal. No respiratory distress. Breath sounds: Normal breath sounds. No stridor. No wheezing or rales. Chest:      Chest wall: No tenderness. Abdominal:      General: There is no distension. Palpations: Abdomen is soft. Tenderness: There is no abdominal tenderness. There is no guarding or rebound. Musculoskeletal:         General: No tenderness. Normal range of motion. Cervical back: Normal range of motion and neck supple. Right lower leg: No edema. Left lower leg: No edema. Skin:     General: Skin is warm and dry. Capillary Refill: Capillary refill takes less than 2 seconds. Neurological:      Mental Status: He is alert and oriented to person, place, and time. Cranial Nerves: No cranial nerve deficit.       Comments: No gross motor or sensory deficits    Psychiatric:         Mood and Affect: Mood normal.         Behavior: Behavior normal.         Diagnostic Study Results     Labs -     TROPONIN I (Final result)   Component (Lab Inquiry)  Collection Time Result Time TROIQ   05/18/21 21:37:00 05/18/21 22:17:42 <0.05         Final result                          D DIMER (Final result)   Component (Lab Inquiry)  Collection Time Result Time DDIMSQ   05/18/21 20:36:00 05/18/21 20:55:33 <0.19   (NOTE)   The combinatio. ..         Final result                          Contains abnormal data METABOLIC PANEL, COMPREHENSIVE (Final result)   Component (Lab Inquiry)  Collection Time Result Time NA K CL CO2 AGAP   05/18/21 18:59:00 05/18/21 19:55:40 138 4.0 105 29 4Low        Collection Time Result Time GLU BUN CREA BUCR GFRAA   05/18/21 18:59:00 05/18/21 19:55:40 250High  18 1. 52High  12 >60       Collection Time Result Time GFRNA CA TBIL GPT SGOT   05/18/21 18:59:00 05/18/21 19:55:40 50   Estimated GFR is calcu. Benita Severance Benita Severance Low  9.1 0.5 41 21       Collection Time Result Time AP TP ALB GLOB AGRAT   05/18/21 18:59:00 05/18/21 19:55:40 85 7.9 4.0 3.9 1.0Low        Final result                        Contains abnormal data CBC WITH AUTOMATED DIFF (Final result)   Component (Lab Inquiry)  Collection Time Result Time WBC RBC HGB HCT MCV   05/18/21 18:59:00 05/18/21 19:20:14 6.9 5.02 12.9 40.5 80.7       Collection Time Result Time MCH MCHC RDW PLT MPLV   05/18/21 18:59:00 05/18/21 19:20:14 25.7Low  31.9 16.2High  181 11.4       Collection Time Result Time NRBC ANRBC GRANS LYMPH MONOS   05/18/21 18:59:00 05/18/21 19:20:14 0.0 0.00 63 27 9       Collection Time Result Time EOS BASOS IG ABG ABL   05/18/21 18:59:00 05/18/21 19:20:14 1 0 0 4.3 1.9       Collection Time Result Time ABM JANIE ABB AIG DF   05/18/21 18:59:00 05/18/21 19:20:14 0.6 0.1 0.0 0.0 AUTOMATED       Final result                        TROPONIN I (Final result)   Component (Lab Inquiry)  Collection Time Result Time TROIQ   05/18/21 18:59:00 05/18/21 19:55:40 <0.05   The presence of detect. ..         Final result                        Radiologic Studies -   XR CHEST PORT   Final Result      No acute process on portable chest. No change.            CT Results  (Last 48 hours) None        CXR Results  (Last 48 hours)               05/18/21 1925  XR CHEST PORT Final result    Impression:      No acute process on portable chest. No change. Narrative:  EXAM: XR CHEST PORT       INDICATION: Chest pain and shortness of breath. Chronic hypertension and   coronary artery disease. COMPARISON: Chest views on 5/4/2021 and 11/27/2020       TECHNIQUE: Portable chest AP view       FINDINGS: The cardiomediastinal and hilar contours are within normal limits. The   pulmonary vasculature is within normal limits. The lungs and pleural spaces are clear. The visualized bones and upper abdomen   are age-appropriate. Medical Decision Making   I am the first provider for this patient. I reviewed the vital signs, available nursing notes, past medical history, past surgical history, family history and social history. Vital Signs-Reviewed the patient's vital signs. EKG interpretation: (Preliminary)  NSR, rate 79, normal axis/pr/qrs, no acute ST changes, Geraldo Pee, DO      Records Reviewed: Nursing Notes, Old Medical Records, Previous electrocardiograms, Previous Radiology Studies and Previous Laboratory Studies    Provider Notes (Medical Decision Making):   DC- GERD< ACS, Angina, chest wall pain       ED Course:   Initial assessment performed. The patients presenting problems have been discussed, and they are in agreement with the care plan formulated and outlined with them. I have encouraged them to ask questions as they arise throughout their visit. EKG unremarkable, 2 neg Trop, pt did have relief with GI cocktail. He has been on Birlinta and has been compliant. Disposition:  DC f/u with his cardiologist    DISCHARGE PLAN:  1. Discharge Medication List as of 5/18/2021 11:09 PM      START taking these medications    Details   lidocaine (XYLOCAINE) 2 % solution Take 10 mL by mouth as needed for Pain.  Indications: administration of local anesthetic drug, Normal, Disp-1 Bottle, R-0         CONTINUE these medications which have NOT CHANGED    Details   pantoprazole (Protonix) 40 mg tablet Take 1 Tab by mouth daily for 20 days. , Normal, Disp-20 Tab, R-0      aspirin delayed-release 81 mg tablet TAKE 1 TABLET BY MOUTH EVERY DAY, Normal, Disp-90 Tab, R-3DX Code Needed  . tadalafiL (Cialis) 20 mg tablet Take 1 Tab by mouth as needed for Erectile Dysfunction. , Normal, Disp-12 Tab, R-11      lisinopriL (PRINIVIL, ZESTRIL) 20 mg tablet Take 20 mg by mouth daily. , Historical Med      carvediloL (COREG) 25 mg tablet Take 1 Tab by mouth two (2) times daily (with meals). , Normal, Disp-180 Tab, R-1      dapagliflozin (Farxiga) 5 mg tab tablet Take 1 Tab by mouth daily. , Normal, Disp-90 Tab, R-1      spironolactone (ALDACTONE) 25 mg tablet Take 0.5 Tabs by mouth daily. , Normal, Disp-45 Tab, R-1      insulin glargine (LANTUS,BASAGLAR) 100 unit/mL (3 mL) inpn 30 units daily, Normal, Disp-3 Pen, R-11      amLODIPine (NORVASC) 5 mg tablet TAKE 1 TABLET BY MOUTH EVERY DAY, Normal, Disp-30 Tab, R-11      nitroglycerin (NITROSTAT) 0.4 mg SL tablet 1 Tab by SubLINGual route every five (5) minutes as needed for Chest Pain (for unstable angina, take up to 3 tabs q 5mins. If chest pain unresolved call 911.). Up to 3 doses. , Normal, Disp-1 Bottle,R-3      ticagrelor (BRILINTA) 90 mg tablet Take 90 mg by mouth two (2) times a day., Historical Med      rosuvastatin (CRESTOR) 40 mg tablet Take 1 Tab by mouth daily. , Normal, Disp-30 Tab,R-11           2. Follow-up Information     Follow up With Specialties Details Why Contact Info    Katheryn Asencio MD Cardiology   [address]  [address]      Jaim Rowe MD Internal Medicine  As needed [id number] [address]  [place] [address] [phone]          3. Return to ED if worse     Diagnosis     Clinical Impression:   1.  Chest pain, unspecified type        Attestations:    Maria Elena Barahona, DO    Please note that this dictation was completed with [employer], the computer voice recognition software. Quite often unanticipated grammatical, syntax, homophones, and other interpretive errors are inadvertently transcribed by the computer software. Please disregard these errors. Please excuse any errors that have escaped final proofreading. Thank you.

## 2021-05-25 ENCOUNTER — OFFICE VISIT (OUTPATIENT)
Dept: CARDIOLOGY CLINIC | Age: 46
End: 2021-05-25
Payer: COMMERCIAL

## 2021-05-25 VITALS
OXYGEN SATURATION: 97 % | HEIGHT: 64 IN | BODY MASS INDEX: 34.31 KG/M2 | RESPIRATION RATE: 18 BRPM | HEART RATE: 82 BPM | WEIGHT: 201 LBS | DIASTOLIC BLOOD PRESSURE: 60 MMHG | SYSTOLIC BLOOD PRESSURE: 110 MMHG

## 2021-05-25 DIAGNOSIS — Z09 HOSPITAL DISCHARGE FOLLOW-UP: Primary | ICD-10-CM

## 2021-05-25 DIAGNOSIS — E78.5 DYSLIPIDEMIA: ICD-10-CM

## 2021-05-25 PROCEDURE — 99214 OFFICE O/P EST MOD 30 MIN: CPT | Performed by: INTERNAL MEDICINE

## 2021-05-25 PROCEDURE — 1111F DSCHRG MED/CURRENT MED MERGE: CPT | Performed by: INTERNAL MEDICINE

## 2021-05-25 NOTE — PROGRESS NOTES
[facility] [place]  [phone]          Cardiology Consult/Progress Note      Requesting/referring provider: Dr. Madie Torres,  Reason for Consult: Coronary disease    Assessment/Plan:  1. Coronary disease manifesting in the form of unstable angina  2. Suspected reduction in LV function based on verbal information of the patient  3. Hypertension poor control  4. Hypercholesterolemia. Good LDL control  5. Metabolic syndrome elevated triglycerides  6. Diabetes mellitus with poor control    Mr. Gavi Myers had never been to the emergency room for chest pain. He had no troponin elevation with that. I informed him that I am not convinced that his chest pain episode is cardiac in nature. I also informed him that when he was in the hospital recently and had a stent to his proximal RCA it was for progressive/unstable lesion but the degree of stenosis was again not consistent enough to cause his resting chest pain and is located in his left upper chest.  While he has got aggressive pro atherosclerotic vascular disease, at the same time I do feel that his chest pain is noncardiac. I reassured him that if he has similar episodes compared to what he has now I does not necessarily mean that he is having a myocardial infarction particularly as these episodes last for many hours without progression and then self dissipate. Nonetheless he needs aggressive management of his diabetes hypertension and lipids. He is now on [place] and tolerating it well. Blood pressure appears to be well controlled on combination of amlodipine, lisinopril, carvedilol. Ticagrelor based DAPT is being continued. Crestor has been maximized to 40 mg daily. Since his last known triglycerides were greater than 150(198 mg/dL) He would be a candidate for use of Vascepa. He will discuss this with Dr. Chris Levin again and if interested I will initiate him on this medication on his next visit.     [] High complexity decision making was performed  []    Patient is at high-risk of decompensation with multiple organ involvement      Investigations personally reviewed by me  ECG November 2020: Sinus rhythm, lateral precordial and lateral limb lead ST depressions nonspecific but could suggest ischemic heart disease  Echocardiogram November 2020 elevated LV systolic function of 35 to 40%. Mild regurgitation. Cardiac catheterization November 2020 patent stents in RCA and LAD. Diffuse small vessel disease involving diagonal branches. Right posterolateral branch is jailed chronically occluded. Mid circumflex/major obtuse marginal with 90 stenosis treated with drug-eluting stent. LDL November 2020: 2020. Triglycerides 190  Most recent HbA1c is less than 7. ECG performed 4/28/2021: Sinus rhythm, ST-T changes in the form of ST depression in inferolateral leads        HPI: Ignacio Cho, a 39y.o. year-old who presents for evaluation of coronary artery disease. Mr. Vernell Ch has history of metabolic syndrome, poorly controlled diabetes and hypercholesterolemia, hypertension. He also history of coronary artery disease with history of remote coronary artery stenting. In November 2020, he was evaluated at [facility] with acute onset chest November. Elevated troponins and ECG changes. He underwent cardiac catheterization demonstrating mid circumflex/marginal treated with drug-eluting stent EF during this hospitalization was noted to be globally reduced 35 to 40%. Off-and-on he has had atypical chest discomfort requiring further ER visits. Recently had a visit with progressive symptoms to [facility] emergency room. His proximal RCA which has now new de zac lesion was stented. He also has diffuse distal right posterior AV groove branch which was disease and was treated with balloon angioplasty alone. Since his discharge he has generally done well.   Occasionally he continues to have same left upper chest discomfort which she has had in the past which is likely noncardiac and is not related to activity or exertion. He  has a past medical history of CAD (coronary artery disease), Headache, Hypercholesterolemia, Hypertension, Hypogonadism male, Liver disease, and Obstructive sleep apnea. Review of system:Patient reports no dyspnea/PND/Orthpnea/CP. He reports no cough/fever/focal neurological deficits/abdominal pain. All other systems negative except as above. Family History   Problem Relation Age of Onset    Heart Disease Father       Social History     Socioeconomic History    Marital status:      Spouse name: Not on file    Number of children: 2    Years of education: 12    Highest education level: Not on file   Occupational History    Occupation:    Tobacco Use    Smoking status: Never Smoker    Smokeless tobacco: Never Used   Vaping Use    Vaping Use: Never used   Substance and Sexual Activity    Alcohol use: No    Drug use: No    Sexual activity: Yes     Partners: Female     Social Determinants of Health     Financial Resource Strain:     Difficulty of Paying Living Expenses:    Food Insecurity:     Worried About Running Out of Food in the Last Year:     Ran Out of Food in the Last Year:    Transportation Needs:     Lack of Transportation (Medical):      Lack of Transportation (Non-Medical):    Physical Activity:     Days of Exercise per Week:     Minutes of Exercise per Session:    Stress:     Feeling of Stress :    Social Connections:     Frequency of Communication with Friends and Family:     Frequency of Social Gatherings with Friends and Family:     Attends Church Services:     Active Member of Clubs or Organizations:     Attends Club or Organization Meetings:     Marital Status:       PE  Vitals:    05/25/21 1503   BP: 110/60   Pulse: 82   Resp: 18   SpO2: 97%   Weight: 201 lb (91.2 kg)   Height: 5' 4\" (1.626 m)    Body mass index is 34.5 kg/m². General:    Alert, cooperative, no distress. Psychiatric:    Normal Mood and affect    Eye/ENT:      Pupils equal, No asymmetry, Conjunctival pink. Able to hear voice at normal amplitude   Lungs:      Visibly symmetric chest expansion, No palpable tenderness. Clear to auscultation bilaterally. Heart[de-identified]    Regular rate and rhythm, S1, S2 normal, no murmur, click, rub or gallop. No JVD, Normal palpable peripheral pulses. No cyanosis   Abdomen:     Soft, non-tender. Bowel sounds normal. No masses,  No      organomegaly. Extremities:   Extremities normal, atraumatic, no edema. Neurologic:   CN II-XII grossly intact.  No gross focal deficits           Recent Labs:  Lab Results   Component Value Date/Time    Cholesterol, total 125 11/28/2020 01:38 AM    HDL Cholesterol 52 11/28/2020 01:38 AM    LDL, calculated 33.4 11/28/2020 01:38 AM    Triglyceride 198 (H) 11/28/2020 01:38 AM    CHOL/HDL Ratio 2.4 11/28/2020 01:38 AM     Lab Results   Component Value Date/Time    Creatinine (POC) 1.0 12/13/2014 07:54 AM    Creatinine 1.52 (H) 05/18/2021 06:59 PM     Lab Results   Component Value Date/Time    BUN 18 05/18/2021 06:59 PM    BUN (POC) 6 (L) 12/13/2014 07:54 AM     Lab Results   Component Value Date/Time    Potassium 4.0 05/18/2021 06:59 PM     Lab Results   Component Value Date/Time    Hemoglobin A1c 10.1 (H) 05/18/2021 12:00 AM     Lab Results   Component Value Date/Time    Hemoglobin (POC) 14.6 12/13/2014 07:54 AM    HGB 12.9 05/18/2021 06:59 PM     Lab Results   Component Value Date/Time    PLATELET 846 36/65/3179 06:59 PM       Reviewed:  Past Medical History:   Diagnosis Date    CAD (coronary artery disease)     2 stents 2016     Headache     Hypercholesterolemia     Hypertension     Hypogonadism male     Liver disease     NAFLD    Obstructive sleep apnea      Social History     Tobacco Use   Smoking Status Never Smoker   Smokeless Tobacco Never Used     Social History     Substance and Sexual Activity   Alcohol Use No     No Known Allergies  Family History   Problem Relation Age of Onset    Heart Disease Father         Current Outpatient Medications   Medication Sig    pantoprazole (Protonix) 40 mg tablet Take 1 Tab by mouth daily for 20 days.  aspirin delayed-release 81 mg tablet TAKE 1 TABLET BY MOUTH EVERY DAY    tadalafiL (Cialis) 20 mg tablet Take 1 Tab by mouth as needed for Erectile Dysfunction.  lisinopriL (PRINIVIL, ZESTRIL) 20 mg tablet Take 20 mg by mouth daily.  carvediloL (COREG) 25 mg tablet Take 1 Tab by mouth two (2) times daily (with meals).  dapagliflozin (Farxiga) 5 mg tab tablet Take 1 Tab by mouth daily.  spironolactone (ALDACTONE) 25 mg tablet Take 0.5 Tabs by mouth daily.  insulin glargine (LANTUS,BASAGLAR) 100 unit/mL (3 mL) inpn 30 units daily    amLODIPine (NORVASC) 5 mg tablet TAKE 1 TABLET BY MOUTH EVERY DAY    nitroglycerin (NITROSTAT) 0.4 mg SL tablet 1 Tab by SubLINGual route every five (5) minutes as needed for Chest Pain (for unstable angina, take up to 3 tabs q 5mins. If chest pain unresolved call 911.). Up to 3 doses.  ticagrelor (BRILINTA) 90 mg tablet Take 90 mg by mouth two (2) times a day.  rosuvastatin (CRESTOR) 40 mg tablet Take 1 Tab by mouth daily.  lidocaine (XYLOCAINE) 2 % solution Take 10 mL by mouth as needed for Pain. Indications: administration of local anesthetic drug (Patient not taking: Reported on 5/25/2021)     No current facility-administered medications for this visit. Yadiel Ocampo MD05/25/21   ATTENTION:   This medical record was transcribed using an electronic medical records/speech recognition system. Although proofread, it may and can contain electronic, spelling and other errors. Corrections may be executed at a later time. Please feel free to contact us for any clarifications as needed.     [facility] [place]  [address], [address] [place] [address]

## 2021-05-25 NOTE — LETTER
5/27/2021 Patient: Rebecca Dang YOB: 1975 Date of Visit: 5/25/2021 Danny Packer MD 
[facility] [address] [facility] [address] [id number] [address] [employer] Dear Danny Packer MD, Thank you for referring Mr. Rebecca Dang to [facility] for evaluation. My notes for this consultation are attached. If you have questions, please do not hesitate to call me. I look forward to following your patient along with you.  
 
 
Sincerely, 
 
Anuradha Mercado MD

## 2021-05-28 DIAGNOSIS — E78.5 DYSLIPIDEMIA: ICD-10-CM

## 2021-05-28 RX ORDER — ROSUVASTATIN CALCIUM 40 MG/1
40 TABLET, COATED ORAL DAILY
Qty: 30 TABLET | Refills: 11 | Status: SHIPPED | OUTPATIENT
Start: 2021-05-28 | End: 2022-06-28

## 2021-06-07 ENCOUNTER — OFFICE VISIT (OUTPATIENT)
Dept: INTERNAL MEDICINE CLINIC | Age: 46
End: 2021-06-07
Payer: COMMERCIAL

## 2021-06-07 VITALS
WEIGHT: 201 LBS | BODY MASS INDEX: 34.31 KG/M2 | TEMPERATURE: 97.8 F | DIASTOLIC BLOOD PRESSURE: 84 MMHG | SYSTOLIC BLOOD PRESSURE: 129 MMHG | HEART RATE: 84 BPM | RESPIRATION RATE: 16 BRPM | OXYGEN SATURATION: 96 % | HEIGHT: 64 IN

## 2021-06-07 DIAGNOSIS — R07.89 ATYPICAL CHEST PAIN: Primary | ICD-10-CM

## 2021-06-07 DIAGNOSIS — E11.65 UNCONTROLLED TYPE 2 DIABETES MELLITUS WITH HYPERGLYCEMIA (HCC): ICD-10-CM

## 2021-06-07 DIAGNOSIS — I10 ESSENTIAL HYPERTENSION: ICD-10-CM

## 2021-06-07 DIAGNOSIS — G47.33 OBSTRUCTIVE SLEEP APNEA: ICD-10-CM

## 2021-06-07 DIAGNOSIS — Z91.199 HISTORY OF NONCOMPLIANCE WITH MEDICAL TREATMENT: ICD-10-CM

## 2021-06-07 DIAGNOSIS — E78.5 DYSLIPIDEMIA: ICD-10-CM

## 2021-06-07 DIAGNOSIS — E66.01 SEVERE OBESITY (HCC): ICD-10-CM

## 2021-06-07 DIAGNOSIS — I25.110 CORONARY ARTERY DISEASE INVOLVING NATIVE CORONARY ARTERY OF NATIVE HEART WITH UNSTABLE ANGINA PECTORIS (HCC): ICD-10-CM

## 2021-06-07 PROBLEM — K76.0 STEATOSIS OF LIVER: Status: ACTIVE | Noted: 2021-06-07

## 2021-06-07 PROCEDURE — 99215 OFFICE O/P EST HI 40 MIN: CPT | Performed by: INTERNAL MEDICINE

## 2021-06-07 NOTE — PROGRESS NOTES
Chief Complaint   Patient presents with    Chest Pain     Patient is here for chest tightness. Patient states he has had pain for a couple weeks        1. Have you been to the ER, urgent care clinic since your last visit? Hospitalized since your last visit? No    2. Have you seen or consulted any other health care providers outside of the [address] [place] [address] since your last visit? Include any pap smears or colon screening.  No

## 2021-06-08 NOTE — PROGRESS NOTES
[facility]  [place] [address]  [address] [place] [address]  Phone:  [phone]  Fax: [phone]       Chief Complaint   Patient presents with    Chest Pain     Patient is here for chest tightness. Patient states he has had pain for a couple weeks    . SUBJECTIVE:    Timmy Bryant is a 42 y.o. male comes in for return visit complaining of left-sided chest pain. He states that eating often times aggravates it, also does taking his pills or drinking water. It remains episodic. He is preoccupied with his angina. He states that he had a stent placed on a previous visit with a similar presentation. He then tells me that the cardiologist basically told him that his symptoms were noncardiac in origin, but in spite of that he had a cardiac cath and a stent placed in his right coronary artery. He says that his diabetes is improving, although he admits that he had not followed his directions on a consistent basis. He has had no meaningful weight loss. He has a past history of primary hypertension, dyslipidemia. Current Outpatient Medications   Medication Sig Dispense Refill    rosuvastatin (CRESTOR) 40 mg tablet Take 1 Tablet by mouth daily. 30 Tablet 11    lidocaine (XYLOCAINE) 2 % solution Take 10 mL by mouth as needed for Pain. Indications: administration of local anesthetic drug (Patient not taking: Reported on 5/25/2021) 1 Bottle 0    aspirin delayed-release 81 mg tablet TAKE 1 TABLET BY MOUTH EVERY DAY 90 Tab 3    tadalafiL (Cialis) 20 mg tablet Take 1 Tab by mouth as needed for Erectile Dysfunction. 12 Tab 11    lisinopriL (PRINIVIL, ZESTRIL) 20 mg tablet Take 20 mg by mouth daily.  carvediloL (COREG) 25 mg tablet Take 1 Tab by mouth two (2) times daily (with meals). 180 Tab 1    dapagliflozin (Farxiga) 5 mg tab tablet Take 1 Tab by mouth daily. 90 Tab 1    spironolactone (ALDACTONE) 25 mg tablet Take 0.5 Tabs by mouth daily.  45 Tab 1    insulin glargine (LANTUS,BASAGLAR) 100 unit/mL (3 mL) inpn 30 units daily 3 Pen 11    amLODIPine (NORVASC) 5 mg tablet TAKE 1 TABLET BY MOUTH EVERY DAY 30 Tab 11    nitroglycerin (NITROSTAT) 0.4 mg SL tablet 1 Tab by SubLINGual route every five (5) minutes as needed for Chest Pain (for unstable angina, take up to 3 tabs q 5mins. If chest pain unresolved call 911.). Up to 3 doses. 1 Bottle 3    ticagrelor (BRILINTA) 90 mg tablet Take 90 mg by mouth two (2) times a day.        Past Medical History:   Diagnosis Date    CAD (coronary artery disease)     2 stents 2016     Headache     Hypercholesterolemia     Hypertension     Hypogonadism male     Obstructive sleep apnea      Past Surgical History:   Procedure Laterality Date    HX HEENT      status post pharyngioplasty     No Known Allergies      REVIEW OF SYSTEMS:  General: negative for - chills or fever  ENT: negative for - headaches, nasal congestion or tinnitus  Respiratory: negative for - cough, hemoptysis, shortness of breath or wheezing  Cardiovascular : negative for - chest pain, edema, palpitations or shortness of breath  Gastrointestinal: negative for - abdominal pain, blood in stools, heartburn or nausea/vomiting  Genito-Urinary: no dysuria, trouble voiding, or hematuria  Musculoskeletal: negative for - gait disturbance, joint pain, joint stiffness or joint swelling  Neurological: no TIA or stroke symptoms  Hematologic: no bruises, no bleeding, no swollen glands  Integument: no lumps, mole changes, nail changes or rash  Endocrine: no malaise/lethargy or unexpected weight changes      Social History     Socioeconomic History    Marital status:      Spouse name: Not on file    Number of children: 2    Years of education: 12    Highest education level: Not on file   Occupational History    Occupation:    Tobacco Use    Smoking status: Never Smoker    Smokeless tobacco: Never Used   Vaping Use    Vaping Use: Never used   Substance and Sexual Activity    Alcohol use: No    Drug use: No    Sexual activity: Yes     Partners: Female     Social Determinants of Health     Financial Resource Strain:     Difficulty of Paying Living Expenses:    Food Insecurity:     Worried About Running Out of Food in the Last Year:     [address] in the Last Year:    Transportation Needs:     Lack of Transportation (Medical):  Lack of Transportation (Non-Medical):    Physical Activity:     Days of Exercise per Week:     Minutes of Exercise per Session:    Stress:     Feeling of Stress :    Social Connections:     Frequency of Communication with Friends and Family:     Frequency of Social Gatherings with Friends and Family:     Attends [employer] Services:     Active Member of Clubs or Organizations:     Attends Club or Organization Meetings:     Marital Status:      Family History   Problem Relation Age of Onset    Heart Disease Father        OBJECTIVE:    Visit Vitals  /84   Pulse 84   Temp 97.8 °F (36.6 °C)   Resp 16   Ht 5' 4\" (1.626 m)   Wt 201 lb (91.2 kg)   SpO2 96%   BMI 34.50 kg/m²     CONSTITUTIONAL: well , well nourished, appears age appropriate  EYES: perrla, eom intact  ENMT:moist mucous membranes, pharynx clear  NECK: supple. Thyroid normal  RESPIRATORY: Chest: clear to ascultation and percussion   CARDIOVASCULAR: Heart: regular rate and rhythm  GASTROINTESTINAL: Abdomen: soft, bowel sounds active  HEMATOLOGIC: no pathological lymph nodes palpated  MUSCULOSKELETAL: Extremities: no edema, pulse 1+   INTEGUMENT: No unusual rashes or suspicious skin lesions noted. Nails appear normal.  NEUROLOGIC: non-focal exam   MENTAL STATUS: alert and oriented, appropriate affect      ASSESSMENT:  1. Atypical chest pain    2. Coronary artery disease involving native coronary artery of native heart with unstable angina pectoris ([address]    3. Essential hypertension    4. Uncontrolled type 2 diabetes mellitus with hyperglycemia ([facility] [address]    5. Obstructive sleep apnea    6. Severe obesity ([address]    7. Dyslipidemia    8. History of noncompliance with medical treatment        PLAN:  1. The patient has atypical chest pain. Nothing needs to be done. Unfortunately, he is preoccupied with chest discomfort, regardless of the location or quality or character. If it gets any worse, he needs to follow up with his cardiologist.  2. He has existing coronary artery disease and currently he has three stents. 3. Blood pressure is reasonably stable. 4. His diabetes is not as well controlled as it should be primarily because of his dietary indiscretion. 5. He needs to remain on his CPAP machine and hopefully he is doing so. 6. He needs to lose weight. This can be accomplished by eating meals, eliminating snacks, and avoiding the consumption of processed carbohydrates. 7. He remains on a statin in view of his history of coronary artery disease. He is in a secondary risk prevention mode. 8. I reviewed the records from his cardiologist and at this point no further adjustments have been made in his medication. I also emphasized the importance of improving his compliance. Follow-up and Dispositions    · Return in about 4 weeks (around 7/5/2021).            Sowmya Mccartney MD

## 2021-07-05 ENCOUNTER — HOSPITAL ENCOUNTER (EMERGENCY)
Age: 46
Discharge: HOME | End: 2021-07-06
Attending: EMERGENCY MEDICINE
Payer: COMMERCIAL

## 2021-07-05 ENCOUNTER — APPOINTMENT (OUTPATIENT)
Dept: GENERAL RADIOLOGY | Age: 46
End: 2021-07-05
Attending: EMERGENCY MEDICINE
Payer: COMMERCIAL

## 2021-07-05 DIAGNOSIS — R07.9 CHEST PAIN, UNSPECIFIED TYPE: Primary | ICD-10-CM

## 2021-07-05 LAB
ALBUMIN SERPL-MCNC: 4.1 G/DL (ref 3.5–5)
ALBUMIN/GLOB SERPL: 1 {RATIO} (ref 1.1–2.2)
ALP SERPL-CCNC: 103 U/L (ref 45–117)
ALT SERPL-CCNC: 47 U/L (ref 12–78)
ANION GAP SERPL CALC-SCNC: 5 MMOL/L (ref 5–15)
AST SERPL-CCNC: 24 U/L (ref 15–37)
BASOPHILS # BLD: 0.1 K/UL (ref 0–0.1)
BASOPHILS NFR BLD: 1 % (ref 0–1)
BILIRUB SERPL-MCNC: 0.4 MG/DL (ref 0.2–1)
BUN SERPL-MCNC: 15 MG/DL (ref 6–20)
BUN/CREAT SERPL: 11 (ref 12–20)
CALCIUM SERPL-MCNC: 8.9 MG/DL (ref 8.5–10.1)
CHLORIDE SERPL-SCNC: 107 MMOL/L (ref 97–108)
CO2 SERPL-SCNC: 28 MMOL/L (ref 21–32)
CREAT SERPL-MCNC: 1.31 MG/DL (ref 0.7–1.3)
DIFFERENTIAL METHOD BLD: ABNORMAL
EOSINOPHIL # BLD: 0.1 K/UL (ref 0–0.4)
EOSINOPHIL NFR BLD: 2 % (ref 0–7)
ERYTHROCYTE [DISTWIDTH] IN BLOOD BY AUTOMATED COUNT: 15 % (ref 11.5–14.5)
GLOBULIN SER CALC-MCNC: 4.3 G/DL (ref 2–4)
GLUCOSE SERPL-MCNC: 116 MG/DL (ref 65–100)
HCT VFR BLD AUTO: 45.6 % (ref 36.6–50.3)
HGB BLD-MCNC: 14.8 G/DL (ref 12.1–17)
IMM GRANULOCYTES # BLD AUTO: 0 K/UL (ref 0–0.04)
IMM GRANULOCYTES NFR BLD AUTO: 0 % (ref 0–0.5)
LYMPHOCYTES # BLD: 2.9 K/UL (ref 0.8–3.5)
LYMPHOCYTES NFR BLD: 43 % (ref 12–49)
MCH RBC QN AUTO: 26.2 PG (ref 26–34)
MCHC RBC AUTO-ENTMCNC: 32.5 G/DL (ref 30–36.5)
MCV RBC AUTO: 80.7 FL (ref 80–99)
MONOCYTES # BLD: 0.8 K/UL (ref 0–1)
MONOCYTES NFR BLD: 11 % (ref 5–13)
NEUTS SEG # BLD: 2.9 K/UL (ref 1.8–8)
NEUTS SEG NFR BLD: 43 % (ref 32–75)
NRBC # BLD: 0 K/UL (ref 0–0.01)
NRBC BLD-RTO: 0 PER 100 WBC
PLATELET # BLD AUTO: 215 K/UL (ref 150–400)
PMV BLD AUTO: 11.3 FL (ref 8.9–12.9)
POTASSIUM SERPL-SCNC: 4 MMOL/L (ref 3.5–5.1)
PROT SERPL-MCNC: 8.4 G/DL (ref 6.4–8.2)
RBC # BLD AUTO: 5.65 M/UL (ref 4.1–5.7)
SODIUM SERPL-SCNC: 140 MMOL/L (ref 136–145)
TROPONIN I SERPL-MCNC: <0.05 NG/ML
WBC # BLD AUTO: 6.7 K/UL (ref 4.1–11.1)

## 2021-07-05 PROCEDURE — 71045 X-RAY EXAM CHEST 1 VIEW: CPT

## 2021-07-05 PROCEDURE — 36415 COLL VENOUS BLD VENIPUNCTURE: CPT

## 2021-07-05 PROCEDURE — 80053 COMPREHEN METABOLIC PANEL: CPT

## 2021-07-05 PROCEDURE — 99284 EMERGENCY DEPT VISIT MOD MDM: CPT

## 2021-07-05 PROCEDURE — 84484 ASSAY OF TROPONIN QUANT: CPT

## 2021-07-05 PROCEDURE — 93005 ELECTROCARDIOGRAM TRACING: CPT

## 2021-07-05 PROCEDURE — 85025 COMPLETE CBC W/AUTO DIFF WBC: CPT

## 2021-07-06 VITALS
DIASTOLIC BLOOD PRESSURE: 86 MMHG | SYSTOLIC BLOOD PRESSURE: 132 MMHG | OXYGEN SATURATION: 97 % | RESPIRATION RATE: 18 BRPM | BODY MASS INDEX: 34.63 KG/M2 | TEMPERATURE: 98.5 F | HEART RATE: 92 BPM | HEIGHT: 64 IN | WEIGHT: 202.82 LBS

## 2021-07-06 LAB
ATRIAL RATE: 96 BPM
CALCULATED P AXIS, ECG09: 53 DEGREES
CALCULATED R AXIS, ECG10: 44 DEGREES
CALCULATED T AXIS, ECG11: -52 DEGREES
DIAGNOSIS, 93000: NORMAL
P-R INTERVAL, ECG05: 134 MS
Q-T INTERVAL, ECG07: 352 MS
QRS DURATION, ECG06: 84 MS
QTC CALCULATION (BEZET), ECG08: 444 MS
TROPONIN I BLD-MCNC: <0.04 NG/ML (ref 0–0.08)
VENTRICULAR RATE, ECG03: 96 BPM

## 2021-07-06 NOTE — ED NOTES
Discharge instructions given to patient by Dr. Coco Payne. Verbalized understanding of instructions. Patient discharged without difficulty. Patient discharged in stable condition ambulatory.

## 2021-07-06 NOTE — ED PROVIDER NOTES
EMERGENCY DEPARTMENT HISTORY AND PHYSICAL EXAM      Date: 7/5/2021  Patient Name: Al Servin    History of Presenting Illness     Chief Complaint   Patient presents with    Chest Pain     x 1 day, cardiac history sees cardio associates, took 81 mg aspirin, took sublingual nitro at 6 pm, chest pain did not subside, came to ED, history of LHC with 4 stents       History Provided By: Patient    HPI: Al Servin, 42 y.o. male  With past medical history of CAD and stents presenting today with chest pain. The patient says that he has been having intermittent chest pressure in the substernal area of the chest.  He also notes that he is been having some increase in belching. He is being evaluated by both cardiology and GI. He was admitted and April of this year and had a left heart cath requiring stenting. The patient notes that he was off of his medications for a couple of days prior to the endoscopy which was on Wednesday of last week. He is now back on all of his medications. No fever or chills. The patient has no cough. Denies any chest pain at this time. He has not take any medication for symptoms. There are no other complaints, changes, or physical findings at this time. PCP: Cleaster Bumpers, MD    No current facility-administered medications on file prior to encounter. Current Outpatient Medications on File Prior to Encounter   Medication Sig Dispense Refill    rosuvastatin (CRESTOR) 40 mg tablet Take 1 Tablet by mouth daily. 30 Tablet 11    lidocaine (XYLOCAINE) 2 % solution Take 10 mL by mouth as needed for Pain. Indications: administration of local anesthetic drug (Patient not taking: Reported on 5/25/2021) 1 Bottle 0    aspirin delayed-release 81 mg tablet TAKE 1 TABLET BY MOUTH EVERY DAY 90 Tab 3    tadalafiL (Cialis) 20 mg tablet Take 1 Tab by mouth as needed for Erectile Dysfunction. 12 Tab 11    lisinopriL (PRINIVIL, ZESTRIL) 20 mg tablet Take 20 mg by mouth daily.       carvediloL (COREG) 25 mg tablet Take 1 Tab by mouth two (2) times daily (with meals). 180 Tab 1    dapagliflozin (Farxiga) 5 mg tab tablet Take 1 Tab by mouth daily. 90 Tab 1    spironolactone (ALDACTONE) 25 mg tablet Take 0.5 Tabs by mouth daily. 45 Tab 1    insulin glargine (LANTUS,BASAGLAR) 100 unit/mL (3 mL) inpn 30 units daily 3 Pen 11    amLODIPine (NORVASC) 5 mg tablet TAKE 1 TABLET BY MOUTH EVERY DAY 30 Tab 11    nitroglycerin (NITROSTAT) 0.4 mg SL tablet 1 Tab by SubLINGual route every five (5) minutes as needed for Chest Pain (for unstable angina, take up to 3 tabs q 5mins. If chest pain unresolved call 911.). Up to 3 doses. 1 Bottle 3    ticagrelor (BRILINTA) 90 mg tablet Take 90 mg by mouth two (2) times a day.          Past History     Past Medical History:  Past Medical History:   Diagnosis Date    CAD (coronary artery disease)     2 stents 2016     Headache     Hypercholesterolemia     Hypertension     Hypogonadism male     Obstructive sleep apnea        Past Surgical History:  Past Surgical History:   Procedure Laterality Date    HX HEENT      status post pharyngioplasty       Family History:  Family History   Problem Relation Age of Onset    Heart Disease Father        Social History:  Social History     Tobacco Use    Smoking status: Never Smoker    Smokeless tobacco: Never Used   Vaping Use    Vaping Use: Never used   Substance Use Topics    Alcohol use: No    Drug use: No       Allergies:  No Known Allergies      Review of Systems   Constitutional: No  fever  Skin: No  rash  HEENT: No  nasal congestion  Resp: No cough  CV: + chest pain  GI: No vomiting  : No dysuria  MSK: No joint pain  Neuro: No numbness  Psych: No anxiety      Physical Exam   .  Patient Vitals for the past 12 hrs:   Temp Pulse Resp BP SpO2   07/05/21 2330 -- -- -- 132/86 97 %   07/05/21 2119 98.5 °F (36.9 °C) 92 18 (!) 132/90 98 %     General: alert, No acute distress  Eyes: EOMI, normal conjunctiva  ENT: moist mucous membranes. Neck: Active, full ROM of neck. Skin: No rashes. no jaundice              Lungs: Equal chest expansion. no respiratory distress. clear to auscultation bilaterally No accessory muscle usage  Heart: regular rate     no peripheral edema   2+ radial pulses and DPs bilaterally  Abd:  non distended soft, nontender. No rebound tenderness. No guarding  Back: Full ROM  MSK: Full, active ROM in all 4 extremities. Neuro: Alert and oriented to Person, Place, Time, and Situation; normal speech;   Psych: Cooperative with exam; Appropriate mood and affect             Diagnostic Study Results     Labs -     Recent Results (from the past 12 hour(s))   EKG, 12 LEAD, INITIAL    Collection Time: 07/05/21  9:10 PM   Result Value Ref Range    Ventricular Rate 96 BPM    Atrial Rate 96 BPM    P-R Interval 134 ms    QRS Duration 84 ms    Q-T Interval 352 ms    QTC Calculation (Bezet) 444 ms    Calculated P Axis 53 degrees    Calculated R Axis 44 degrees    Calculated T Axis -52 degrees    Diagnosis       Normal sinus rhythm  ST & T wave abnormality, consider inferolateral ischemia  When compared with ECG of 18-MAY-2021 18:33,  T wave inversion now evident in Inferior leads  T wave inversion now evident in Anterolateral leads     CBC WITH AUTOMATED DIFF    Collection Time: 07/05/21  9:34 PM   Result Value Ref Range    WBC 6.7 4.1 - 11.1 K/uL    RBC 5.65 4.10 - 5.70 M/uL    HGB 14.8 12.1 - 17.0 g/dL    HCT 45.6 36.6 - 50.3 %    MCV 80.7 80.0 - 99.0 FL    MCH 26.2 26.0 - 34.0 PG    MCHC 32.5 30.0 - 36.5 g/dL    RDW 15.0 (H) 11.5 - 14.5 %    PLATELET 144 357 - 954 K/uL    MPV 11.3 8.9 - 12.9 FL    NRBC 0.0 0  WBC    ABSOLUTE NRBC 0.00 0.00 - 0.01 K/uL    NEUTROPHILS 43 32 - 75 %    LYMPHOCYTES 43 12 - 49 %    MONOCYTES 11 5 - 13 %    EOSINOPHILS 2 0 - 7 %    BASOPHILS 1 0 - 1 %    IMMATURE GRANULOCYTES 0 0.0 - 0.5 %    ABS. NEUTROPHILS 2.9 1.8 - 8.0 K/UL    ABS. LYMPHOCYTES 2.9 0.8 - 3.5 K/UL    ABS.  MONOCYTES 0.8 0.0 - 1.0 K/UL    ABS. EOSINOPHILS 0.1 0.0 - 0.4 K/UL    ABS. BASOPHILS 0.1 0.0 - 0.1 K/UL    ABS. IMM. GRANS. 0.0 0.00 - 0.04 K/UL    DF AUTOMATED     METABOLIC PANEL, COMPREHENSIVE    Collection Time: 07/05/21  9:34 PM   Result Value Ref Range    Sodium 140 136 - 145 mmol/L    Potassium 4.0 3.5 - 5.1 mmol/L    Chloride 107 97 - 108 mmol/L    CO2 28 21 - 32 mmol/L    Anion gap 5 5 - 15 mmol/L    Glucose 116 (H) 65 - 100 mg/dL    BUN 15 6 - 20 MG/DL    Creatinine 1.31 (H) 0.70 - 1.30 MG/DL    BUN/Creatinine ratio 11 (L) 12 - 20      GFR est AA >60 >60 ml/min/1.73m2    GFR est non-AA 59 (L) >60 ml/min/1.73m2    Calcium 8.9 8.5 - 10.1 MG/DL    Bilirubin, total 0.4 0.2 - 1.0 MG/DL    ALT (SGPT) 47 12 - 78 U/L    AST (SGOT) 24 15 - 37 U/L    Alk. phosphatase 103 45 - 117 U/L    Protein, total 8.4 (H) 6.4 - 8.2 g/dL    Albumin 4.1 3.5 - 5.0 g/dL    Globulin 4.3 (H) 2.0 - 4.0 g/dL    A-G Ratio 1.0 (L) 1.1 - 2.2     TROPONIN I    Collection Time: 07/05/21  9:34 PM   Result Value Ref Range    Troponin-I, Qt. <0.05 <0.05 ng/mL   POC TROPONIN-I    Collection Time: 07/05/21 11:45 PM   Result Value Ref Range    Troponin-I (POC) <0.04 0.00 - 0.08 ng/mL       Radiologic Studies -   XR CHEST PORT   Final Result   No acute process. CT Results  (Last 48 hours)      None          CXR Results  (Last 48 hours)                 07/05/21 2212  XR CHEST PORT Final result    Impression:  No acute process. Narrative:  INDICATION: chest pain       EXAM:  AP CHEST RADIOGRAPH       COMPARISON: May 18, 2021       FINDINGS:       AP portable view of the chest demonstrates a normal cardiomediastinal   silhouette. There is no edema, effusion, consolidation, or pneumothorax. The   osseous structures are unremarkable. Medical Decision Making   I am the first provider for this patient. I reviewed the vital signs, available nursing notes, past medical history, past surgical history, family history and social history.     Records Reviewed:   Vital Signs-Reviewed the patient's vital signs. Patient Vitals for the past 12 hrs:   Temp Pulse Resp BP SpO2   07/05/21 2330 -- -- -- 132/86 97 %   07/05/21 2119 98.5 °F (36.9 °C) 92 18 (!) 132/90 98 %       Provider Notes (Medical Decision Making):     Differential Diagnosis: ACS, electrolyte abnormality, GERD, ulcer, pneumonia, pneumothorax    Initial Plan: Patient does have prior history of CAD, no ischemic changes on his initial EKG, will obtain laboratory studies, chest x-ray, patient is pain-free, will require repeat troponin and will reassess after this. ED Course:   Initial assessment performed. The patients presenting problems have been discussed, and they are in agreement with the care plan formulated and outlined with them. I have encouraged them to ask questions as they arise throughout their visit. ED Course as of Jul 06 0222   Mon Jul 05, 2021   2212 On my interpretation of the patient's EKG normal sinus rhythm at a rate of 96, QTc is 444, multiple areas of T wave inversion in lead II, III, aVF, and V4 through V6. New from previous EKG. [NW]   Tue Jul 06, 2021   0000 On my interpretation of the patient's laboratory studies troponin and follow-up troponin are negative, metabolic panel is unremarkable except for mildly elevated creatinine 1.31.  CBC unremarkable. [NW]   0001 On my interpretation of the patient's chest x-ray there is no evidence of acute abnormality. [NW]   0001 Patient presenting today with chest discomfort intermittently over the past couple of days. His troponin and follow-up troponin are negative. Patient is undergoing evaluation by cardiology and GI. He did have an MI and required stenting in April. No return of pain while here in the emergency department. Patient is ultimately discharged with return precautions.     [NW]      ED Course User Index  [NW] Mike Reeves MD       I, Brad Bustillo MD, am the attending of record for this patient encounter. Dispo: Discharged. The patient has been re-evaluated and is ready for discharge. Reviewed available results with patient. Counseled patient on diagnosis and care plan. Patient has expressed understanding, and all questions have been answered. Patient agrees with plan and agrees to follow up as recommended, or to return to the ED if their symptoms worsen. Discharge instructions have been provided and explained to the patient, along with reasons to return to the ED. PLAN:  Discharge Medication List as of 7/6/2021 12:02 AM        2. Follow-up Information       Follow up With Specialties Details Why Contact Info    Agustina Chew MD Internal Medicine  As needed [address] [place] [address]  [place] [address] [phone]            3. Return to ED if worse       Diagnosis     Clinical Impression:   1. Chest pain, unspecified type        Attestations:    Brad Bustillo MD    Please note that this dictation was completed with moziy, the computer voice recognition software. Quite often unanticipated grammatical, syntax, homophones, and other interpretive errors are inadvertently transcribed by the computer software. Please disregard these errors. Please excuse any errors that have escaped final proofreading. Thank you.

## 2021-07-07 ENCOUNTER — OFFICE VISIT (OUTPATIENT)
Dept: INTERNAL MEDICINE CLINIC | Age: 46
End: 2021-07-07
Payer: COMMERCIAL

## 2021-07-07 VITALS
DIASTOLIC BLOOD PRESSURE: 93 MMHG | HEIGHT: 64 IN | WEIGHT: 200.6 LBS | RESPIRATION RATE: 16 BRPM | OXYGEN SATURATION: 97 % | SYSTOLIC BLOOD PRESSURE: 130 MMHG | TEMPERATURE: 98.8 F | HEART RATE: 87 BPM | BODY MASS INDEX: 34.25 KG/M2

## 2021-07-07 DIAGNOSIS — E11.9 TYPE 2 DIABETES MELLITUS WITHOUT COMPLICATION, WITH LONG-TERM CURRENT USE OF INSULIN (HCC): ICD-10-CM

## 2021-07-07 DIAGNOSIS — I25.110 CORONARY ARTERY DISEASE INVOLVING NATIVE CORONARY ARTERY OF NATIVE HEART WITH UNSTABLE ANGINA PECTORIS (HCC): Primary | ICD-10-CM

## 2021-07-07 DIAGNOSIS — Z79.4 TYPE 2 DIABETES MELLITUS WITHOUT COMPLICATION, WITH LONG-TERM CURRENT USE OF INSULIN (HCC): ICD-10-CM

## 2021-07-07 DIAGNOSIS — E66.01 SEVERE OBESITY (HCC): ICD-10-CM

## 2021-07-07 DIAGNOSIS — G47.33 OBSTRUCTIVE SLEEP APNEA: ICD-10-CM

## 2021-07-07 DIAGNOSIS — E78.5 DYSLIPIDEMIA: ICD-10-CM

## 2021-07-07 DIAGNOSIS — I10 ESSENTIAL HYPERTENSION: ICD-10-CM

## 2021-07-07 DIAGNOSIS — R53.81 PHYSICAL DECONDITIONING: ICD-10-CM

## 2021-07-07 PROCEDURE — 99215 OFFICE O/P EST HI 40 MIN: CPT | Performed by: INTERNAL MEDICINE

## 2021-07-07 NOTE — PROGRESS NOTES
Chief Complaint   Patient presents with    Diabetes     Patient is here for a follow up.  Epigastric Pain     1. Have you been to the ER, urgent care clinic since your last visit? Hospitalized since your last visit? Yes Reason for visit: Rapid heart rate    2. Have you seen or consulted any other health care providers outside of the [address] [place] [address] since your last visit? Include any pap smears or colon screening.  No

## 2021-07-08 NOTE — PROGRESS NOTES
[address] [facility]  [place] [address]  [address] [facility] [address]  Phone:  [phone]  Fax: [phone]       Chief Complaint   Patient presents with    Diabetes     Patient is here for a follow up.  Epigastric Pain   . SUBJECTIVE:    Osman Blankenship is a 42 y.o. male comes in for a return visit, stating that he has done reasonably well. He saw a gastroenterologist because of dyspeptic symptoms. He had upper endoscopy. Nothing major was found. He is currently taking a PPI, specifically omeprazole. He was previously taking famotidine. As far as his diabetes is concerned, he states that his numbers are quite reasonable with average sugars generally less than 120. He has not had any interventional hypoglycemia. He is following with his cardiologist.  He has noted that his heart has been beating a bit faster than usual.  He remains on carvedilol 25 mg b.i.d. He has had no meaningful weight loss. He has a past history of primary hypertension and dyslipidemia. Current Outpatient Medications   Medication Sig Dispense Refill    rosuvastatin (CRESTOR) 40 mg tablet Take 1 Tablet by mouth daily. 30 Tablet 11    lidocaine (XYLOCAINE) 2 % solution Take 10 mL by mouth as needed for Pain. Indications: administration of local anesthetic drug 1 Bottle 0    aspirin delayed-release 81 mg tablet TAKE 1 TABLET BY MOUTH EVERY DAY 90 Tab 3    tadalafiL (Cialis) 20 mg tablet Take 1 Tab by mouth as needed for Erectile Dysfunction. 12 Tab 11    lisinopriL (PRINIVIL, ZESTRIL) 20 mg tablet Take 20 mg by mouth daily.  carvediloL (COREG) 25 mg tablet Take 1 Tab by mouth two (2) times daily (with meals). 180 Tab 1    dapagliflozin (Farxiga) 5 mg tab tablet Take 1 Tab by mouth daily. 90 Tab 1    spironolactone (ALDACTONE) 25 mg tablet Take 0.5 Tabs by mouth daily.  45 Tab 1    insulin glargine (LANTUS,BASAGLAR) 100 unit/mL (3 mL) inpn 30 units daily 3 Pen 11    amLODIPine (NORVASC) 5 mg tablet TAKE 1 TABLET BY MOUTH EVERY DAY 30 Tab 11    nitroglycerin (NITROSTAT) 0.4 mg SL tablet 1 Tab by SubLINGual route every five (5) minutes as needed for Chest Pain (for unstable angina, take up to 3 tabs q 5mins. If chest pain unresolved call 911.). Up to 3 doses. 1 Bottle 3    ticagrelor (BRILINTA) 90 mg tablet Take 90 mg by mouth two (2) times a day.        Past Medical History:   Diagnosis Date    CAD (coronary artery disease)     2 stents 2016     Headache     Hypercholesterolemia     Hypertension     Hypogonadism male     Obstructive sleep apnea      Past Surgical History:   Procedure Laterality Date    HX HEENT      status post pharyngioplasty     No Known Allergies      REVIEW OF SYSTEMS:  General: negative for - chills or fever  ENT: negative for - headaches, nasal congestion or tinnitus  Respiratory: negative for - cough, hemoptysis, shortness of breath or wheezing  Cardiovascular : negative for - chest pain, edema, palpitations or shortness of breath  Gastrointestinal: negative for - abdominal pain, blood in stools, heartburn or nausea/vomiting  Genito-Urinary: no dysuria, trouble voiding, or hematuria  Musculoskeletal: negative for - gait disturbance, joint pain, joint stiffness or joint swelling  Neurological: no TIA or stroke symptoms  Hematologic: no bruises, no bleeding, no swollen glands  Integument: no lumps, mole changes, nail changes or rash  Endocrine: no malaise/lethargy or unexpected weight changes      Social History     Socioeconomic History    Marital status:      Spouse name: Not on file    Number of children: 2    Years of education: 12    Highest education level: Not on file   Occupational History    Occupation:    Tobacco Use    Smoking status: Never Smoker    Smokeless tobacco: Never Used   Vaping Use    Vaping Use: Never used   Substance and Sexual Activity    Alcohol use: No    Drug use: No    Sexual activity: Yes     Partners: Female     Social Determinants of Health     Financial Resource Strain:     Difficulty of Paying Living Expenses:    Food Insecurity:     Worried About Running Out of Food in the Last Year:     [address] in the Last Year:    Transportation Needs:     Lack of Transportation (Medical):  Lack of Transportation (Non-Medical):    Physical Activity:     Days of Exercise per Week:     Minutes of Exercise per Session:    Stress:     Feeling of Stress :    Social Connections:     Frequency of Communication with Friends and Family:     Frequency of Social Gatherings with Friends and Family:     Attends Jain Services:     Active Member of Clubs or Organizations:     Attends Club or Organization Meetings:     Marital Status:      Family History   Problem Relation Age of Onset    Heart Disease Father        OBJECTIVE:    Visit Vitals  BP (!) 130/93   Pulse 87   Temp 98.8 °F (37.1 °C)   Resp 16   Ht 5' 4\" (1.626 m)   Wt 200 lb 9.6 oz (91 kg)   SpO2 97%   BMI 34.43 kg/m²     CONSTITUTIONAL: well , well nourished, appears age appropriate  EYES: perrla, eom intact  ENMT:moist mucous membranes, pharynx clear  NECK: supple. Thyroid normal  RESPIRATORY: Chest: clear to ascultation and percussion   CARDIOVASCULAR: Heart: regular rate and rhythm  GASTROINTESTINAL: Abdomen: soft, bowel sounds active  HEMATOLOGIC: no pathological lymph nodes palpated  MUSCULOSKELETAL: Extremities: no edema, pulse 1+   INTEGUMENT: No unusual rashes or suspicious skin lesions noted. Nails appear normal.  NEUROLOGIC: non-focal exam   MENTAL STATUS: alert and oriented, appropriate affect      ASSESSMENT:  1. Coronary artery disease involving native coronary artery of native heart with unstable angina pectoris ([address]    2. Essential hypertension    3. Type 2 diabetes mellitus without complication, with long-term current use of insulin ([address]    4. Obstructive sleep apnea    5. Severe obesity ([address]    6. Physical deconditioning    7. Dyslipidemia        PLAN:  1. His coronary artery disease appears to be stable. He follows with Cardiology. I suggest he follow up also regarding his episodic increased heart rate, which is an adverse cardiovascular phenomenon. Better protection can occur with improved heart rate control. 2. Blood pressure is reasonable. No adjustment is made today. 3. From a diabetic perspective, no adjustments are made. He is doing reasonably well all things considered. I reminded him the importance of eliminating his consumption of processed carbohydrates. 4. His obstructive sleep apnea is stable, and he is consistently using a CPAP machine. 5. He needs to lose weight as we have discussed repetitively. This can be accomplished by eating meals, eliminating snacks, and avoiding the consumption of processed carbohydrates. 6. He is physically deconditioning and needs to increase his physical activity on a more consistent basis. 7. He is in a secondary cardiovascular risk prevention mode and remains on a statin. He is having no adverse effects from this. Follow-up and Dispositions    · Return in about 2 months (around 9/7/2021).            Lizzie Briggs MD

## 2021-08-05 ENCOUNTER — TELEPHONE (OUTPATIENT)
Dept: CARDIOLOGY CLINIC | Age: 46
End: 2021-08-05

## 2021-08-05 RX ORDER — NITROGLYCERIN 0.4 MG/1
0.4 TABLET SUBLINGUAL
Qty: 1 BOTTLE | Refills: 3 | Status: SHIPPED | OUTPATIENT
Start: 2021-08-05 | End: 2022-03-18

## 2021-08-05 NOTE — TELEPHONE ENCOUNTER
Requested Prescriptions     Signed Prescriptions Disp Refills    nitroglycerin (NITROSTAT) 0.4 mg SL tablet 1 Bottle 3     Si Tablet by SubLINGual route every five (5) minutes as needed for Chest Pain (for unstable angina, take up to 3 tabs q 5mins. If chest pain unresolved call 911.). Up to 3 doses.      Authorizing Provider: Tootie Aponte     Ordering User: Aj Tillman       Last office visit 2021    Per Dr. Jeremy Lamb   Date Time Provider Richard Olivier   2021  3:30 PM Violeta Vivar MD [facility] MAIN BS AMB   2021  1:00 PM JONA MENDOZA BS AMB   2021  2:00 PM MD DARREL Lang BS AMB

## 2021-08-05 NOTE — TELEPHONE ENCOUNTER
Patient called because he has been out of his Nitrostat for over a week. Needs a refill please give a call back.       Phone [phone]

## 2021-08-09 ENCOUNTER — OFFICE VISIT (OUTPATIENT)
Dept: INTERNAL MEDICINE CLINIC | Age: 46
End: 2021-08-09
Payer: COMMERCIAL

## 2021-08-09 VITALS
WEIGHT: 202 LBS | OXYGEN SATURATION: 97 % | SYSTOLIC BLOOD PRESSURE: 123 MMHG | HEIGHT: 64 IN | TEMPERATURE: 98.6 F | DIASTOLIC BLOOD PRESSURE: 85 MMHG | RESPIRATION RATE: 16 BRPM | BODY MASS INDEX: 34.49 KG/M2 | HEART RATE: 95 BPM

## 2021-08-09 DIAGNOSIS — E78.5 DYSLIPIDEMIA: ICD-10-CM

## 2021-08-09 DIAGNOSIS — I25.110 CORONARY ARTERY DISEASE INVOLVING NATIVE CORONARY ARTERY OF NATIVE HEART WITH UNSTABLE ANGINA PECTORIS (HCC): ICD-10-CM

## 2021-08-09 DIAGNOSIS — E11.65 UNCONTROLLED TYPE 2 DIABETES MELLITUS WITH HYPERGLYCEMIA (HCC): ICD-10-CM

## 2021-08-09 DIAGNOSIS — G47.33 OBSTRUCTIVE SLEEP APNEA: ICD-10-CM

## 2021-08-09 DIAGNOSIS — E66.9 OBESITY (BMI 30.0-34.9): ICD-10-CM

## 2021-08-09 DIAGNOSIS — I10 ESSENTIAL HYPERTENSION: Primary | ICD-10-CM

## 2021-08-09 PROCEDURE — 99214 OFFICE O/P EST MOD 30 MIN: CPT | Performed by: INTERNAL MEDICINE

## 2021-08-09 RX ORDER — LISINOPRIL 40 MG/1
40 TABLET ORAL DAILY
Qty: 90 TABLET | Refills: 3 | Status: SHIPPED | OUTPATIENT
Start: 2021-08-09 | End: 2021-09-22 | Stop reason: ALTCHOICE

## 2021-08-09 NOTE — PROGRESS NOTES
[address] [facility]  [place] [address]  [address] [place] [address]  Phone:  [phone]  Fax: [phone]       Chief Complaint   Patient presents with    Coronary Artery Disease     1 month follow up    . SUBJECTIVE:    Carolin Shrestha is a 42 y.o. male comes in for return visit stating that he has done reasonably well. He continues to have vague tightness in his left and right chest wall. He actually last went to the emergency room in July. His next appointment with his cardiologist is in September. Previous records from the cardiologist revealed that he is reasonably stable. Diabetes hopefully has done well, but he frequently does not check his blood sugars. He has not had any interventional hypoglycemia. He remains on his basal insulin at 30 units daily. There has been no meaningful weight loss. He has a past history of primary hypertension as well as dyslipidemia and obstructive sleep apnea. Current Outpatient Medications   Medication Sig Dispense Refill    lisinopriL (PRINIVIL, ZESTRIL) 40 mg tablet Take 1 Tablet by mouth daily. 90 Tablet 3    nitroglycerin (NITROSTAT) 0.4 mg SL tablet 1 Tablet by SubLINGual route every five (5) minutes as needed for Chest Pain (for unstable angina, take up to 3 tabs q 5mins. If chest pain unresolved call 911.). Up to 3 doses. 1 Bottle 3    rosuvastatin (CRESTOR) 40 mg tablet Take 1 Tablet by mouth daily. 30 Tablet 11    lidocaine (XYLOCAINE) 2 % solution Take 10 mL by mouth as needed for Pain. Indications: administration of local anesthetic drug 1 Bottle 0    aspirin delayed-release 81 mg tablet TAKE 1 TABLET BY MOUTH EVERY DAY 90 Tab 3    tadalafiL (Cialis) 20 mg tablet Take 1 Tab by mouth as needed for Erectile Dysfunction. 12 Tab 11    carvediloL (COREG) 25 mg tablet Take 1 Tab by mouth two (2) times daily (with meals). 180 Tab 1    dapagliflozin (Farxiga) 5 mg tab tablet Take 1 Tab by mouth daily.  90 Tab 1  spironolactone (ALDACTONE) 25 mg tablet Take 0.5 Tabs by mouth daily. 45 Tab 1    insulin glargine (LANTUS,BASAGLAR) 100 unit/mL (3 mL) inpn 30 units daily 3 Pen 11    amLODIPine (NORVASC) 5 mg tablet TAKE 1 TABLET BY MOUTH EVERY DAY 30 Tab 11    ticagrelor (BRILINTA) 90 mg tablet Take 90 mg by mouth two (2) times a day.        Past Medical History:   Diagnosis Date    CAD (coronary artery disease)     2 stents 2016     Headache     Hypercholesterolemia     Hypertension     Hypogonadism male     Obstructive sleep apnea      Past Surgical History:   Procedure Laterality Date    HX HEENT      status post pharyngioplasty     No Known Allergies      REVIEW OF SYSTEMS:  General: negative for - chills or fever  ENT: negative for - headaches, nasal congestion or tinnitus  Respiratory: negative for - cough, hemoptysis, shortness of breath or wheezing  Cardiovascular : negative for - chest pain, edema, palpitations or shortness of breath  Gastrointestinal: negative for - abdominal pain, blood in stools, heartburn or nausea/vomiting  Genito-Urinary: no dysuria, trouble voiding, or hematuria  Musculoskeletal: negative for - gait disturbance, joint pain, joint stiffness or joint swelling  Neurological: no TIA or stroke symptoms  Hematologic: no bruises, no bleeding, no swollen glands  Integument: no lumps, mole changes, nail changes or rash  Endocrine: no malaise/lethargy or unexpected weight changes      Social History     Socioeconomic History    Marital status:      Spouse name: Not on file    Number of children: 2    Years of education: 12    Highest education level: Not on file   Occupational History    Occupation:    Tobacco Use    Smoking status: Never Smoker    Smokeless tobacco: Never Used   Vaping Use    Vaping Use: Never used   Substance and Sexual Activity    Alcohol use: No    Drug use: No    Sexual activity: Yes     Partners: Female     Social Determinants of Health Financial Resource Strain:     Difficulty of Paying Living Expenses:    Food Insecurity:     Worried About Running Out of Food in the Last Year:     [address] in the Last Year:    Transportation Needs:     Lack of Transportation (Medical):  Lack of Transportation (Non-Medical):    Physical Activity:     Days of Exercise per Week:     Minutes of Exercise per Session:    Stress:     Feeling of Stress :    Social Connections:     Frequency of Communication with Friends and Family:     Frequency of Social Gatherings with Friends and Family:     Attends Taoist Services:     Active Member of Clubs or Organizations:     Attends Club or Organization Meetings:     Marital Status:      Family History   Problem Relation Age of Onset    Heart Disease Father        OBJECTIVE:    Visit Vitals  /85   Pulse 95   Temp 98.6 °F (37 °C) (Oral)   Resp 16   Ht 5' 4\" (1.626 m)   Wt 202 lb (91.6 kg)   SpO2 97%   BMI 34.67 kg/m²     CONSTITUTIONAL: well , well nourished, appears age appropriate  EYES: perrla, eom intact  ENMT:moist mucous membranes, pharynx clear  NECK: supple. Thyroid normal  RESPIRATORY: Chest: clear to ascultation and percussion   CARDIOVASCULAR: Heart: regular rate and rhythm  GASTROINTESTINAL: Abdomen: soft, bowel sounds active  HEMATOLOGIC: no pathological lymph nodes palpated  MUSCULOSKELETAL: Extremities: no edema, pulse 1+   INTEGUMENT: No unusual rashes or suspicious skin lesions noted. Nails appear normal.  NEUROLOGIC: non-focal exam   MENTAL STATUS: alert and oriented, appropriate affect      ASSESSMENT:  1. Essential hypertension    2. Coronary artery disease involving native coronary artery of native heart with unstable angina pectoris ([facility] [address]    3. Uncontrolled type 2 diabetes mellitus with hyperglycemia ([facility] [address]    4. Obstructive sleep apnea    5. Obesity (BMI 30.0-34.9)    6. Dyslipidemia        PLAN:  1. The patient's blood pressure is excellent.   No adjustments are made today.  2. His coronary artery disease appears to be stable. He will follow up with Cardiology. Again, records were reviewed as it relates to this patient's contact with his cardiologist.  3. Hopefully, his diabetes is doing well, but I will await the results of his hemoglobin A1c.  4. He does have obstructive sleep apnea and appears to be compliant with his CPAP machine. 5. He needs to lose weight as we have discussed repetitively. He has been eating meals, eliminating snacks, and avoiding the consumption of processed carbohydrates. 6. He remains on his statin. His last particle count was excellent at 50. I will await the results from today to ensure that there is continued compliance present. 7. He needs to exercise on a more consistent basis. Follow-up and Dispositions    · Return in about 4 weeks (around 9/6/2021).            Nelson Pichardo MD

## 2021-08-09 NOTE — PROGRESS NOTES
Chief Complaint   Patient presents with    Coronary Artery Disease     1 month follow up          1. Have you been to the ER, urgent care clinic since your last visit? Hospitalized since your last visit? No    2. Have you seen or consulted any other health care providers outside of the [address] [place] [address] since your last visit? Include any pap smears or colon screening.  No

## 2021-08-10 ENCOUNTER — TELEPHONE (OUTPATIENT)
Dept: CARDIOLOGY CLINIC | Age: 46
End: 2021-08-10

## 2021-08-10 LAB
APO B SERPL-MCNC: 90 MG/DL
BUN SERPL-MCNC: 21 MG/DL (ref 6–24)
BUN/CREAT SERPL: 16 (ref 9–20)
CALCIUM SERPL-MCNC: 9.5 MG/DL (ref 8.7–10.2)
CHLORIDE SERPL-SCNC: 101 MMOL/L (ref 96–106)
CO2 SERPL-SCNC: 22 MMOL/L (ref 20–29)
CREAT SERPL-MCNC: 1.32 MG/DL (ref 0.76–1.27)
EST. AVERAGE GLUCOSE BLD GHB EST-MCNC: 240 MG/DL
GLUCOSE SERPL-MCNC: 377 MG/DL (ref 65–99)
HBA1C MFR BLD: 10 % (ref 4.8–5.6)
POTASSIUM SERPL-SCNC: 4.5 MMOL/L (ref 3.5–5.2)
SODIUM SERPL-SCNC: 139 MMOL/L (ref 134–144)

## 2021-08-10 NOTE — TELEPHONE ENCOUNTER
Patient states he has \"chest tightness when breathing, it comes and goes. \"  Patient was not experiencing symptom at time of call.        Phone: [phone]

## 2021-08-10 NOTE — TELEPHONE ENCOUNTER
Returned call to patient. Two patient indentifiers verified. Pt stated that he has been having chest pressure and fatigue x 1 week. Pt stated that he has gone to the emergency room for this and EKG is completed and they tell him he is fine. Asked patient if he has tried nitroglycerin when having the chest pressure but patient stated it has not been chest pain. Pt was informed if he has crushing chest pain or any other symptoms to go to the emergency room. Message will be sent to MD for any recommendations.

## 2021-08-12 NOTE — TELEPHONE ENCOUNTER
Returned call to patient. Two patient indentifiers verified. Pt was informed of the message. Pt verbalized understanding and denies any further questions at this time.      Future Appointments   Date Time Provider Richard Olivier   9/22/2021  1:00 PM Radha Hoover BS AMB   9/22/2021  2:00 PM Sukhjinder Villalba MD [facility] BS AMB   11/15/2021  4:30 PM Violeta Vivar MD [facility] MAIN BS AMB

## 2021-08-12 NOTE — TELEPHONE ENCOUNTER
MD Iwona Baker, RN  Caller: Unspecified (2 days ago,  3:57 PM)  NO intervention needed, likely non cardiac vs small vessel disease.  May take nitro if needed

## 2021-08-14 NOTE — PROGRESS NOTES
Tell patient increase his insulin by 14 units. Return to the office in the next month. Remind him to take a statin daily. Tell him his poor control is reflective of what he eats.

## 2021-08-17 ENCOUNTER — TELEPHONE (OUTPATIENT)
Dept: INTERNAL MEDICINE CLINIC | Age: 46
End: 2021-08-17

## 2021-08-17 NOTE — TELEPHONE ENCOUNTER
Patient notified by phone of elevated labs per Alfred Snellen. patient ask to increase his insulin by 14 units making his dose 44 units patient patient given appt for 1 month and we ask that he make sure to take his cholesterol medication daily.

## 2021-08-21 NOTE — ED PROVIDER NOTES
Patient: Gail Grigsby Date: 2021   : 1947 Attending: Julien Dukes MD   74 year old female      Chief Complaint: nausea, vomiting, diarrhea. weakness    Subjective / recent events:       Resting in bed  Still with loose stools   No fevers, VSS  On RA, no SOB  No cough or congestion   No abd pain or nausea  Cell counts stable  Tolerating po, decreased appetite  No other complaints          Problem List:   Patient Active Problem List   Diagnosis    Diverticulosis of colon (without mention of hemorrhage)    Prediabetes    Pure hyperglyceridemia    Essential hypertension, benign    Malignant neoplasm of nipple of left breast in female (CMS/HCC)    Bicuspid aortic valve    Cardiomyopathy EFF 53% ? 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    S/P ascending aortic replacement    S/P aortic valve repair    Breast screening    Bifascicular block    HLD (hyperlipidemia)    Anal cancer (CMS/HCC)       Allergies:   ALLERGIES:   Allergen Reactions    Seafood   (Food) Nausea & Vomiting     Allergic only to scallops...severe nausea and vomiting, tolerates CT scan dye and did not get premedication    Tape [Adhesive   (Environmental)] RASH     EKG patches       Medications/Infusions:    vancomycin (VANCOCIN) IVPB  1,250 mg Intravenous 2 times per day    silver sulfADIAZINE   Topical TID    gabapentin  100 mg Oral 2 times per day    metoPROLOL tartrate  12.5 mg Oral 2 times per day    Potassium Standard Replacement Protocol   Does not apply See Admin Instructions    Magnesium Standard Replacement Protocol   Does not apply See Admin Instructions    VANCOMYCIN - PHARMACIST MONITORED   Does not apply See Admin Instructions    cefepime (MAXIPIME) IVPB  2,000 mg Intravenous 3 times per day    metoCLOPramide  5 mg Oral TID AC    sodium chloride (PF)  2 mL Intracatheter 2 times per day         Review of Systems: All systems reviewed and negative except for those mentioned in the  EMERGENCY DEPARTMENT HISTORY AND PHYSICAL EXAM      Date: 2/23/2018  Patient Name: Rachel Vallecillo    History of Presenting Illness     Chief Complaint   Patient presents with    Arm Pain     left arm surgery due to a fracture. pt sent home with percocet and states \"this pain is ridiculous and its not touching it to the point where i am vomiting cause it hurts so bad. i just need something to get rid of the pain\"       History Provided By: Patient    HPI: Rachel Vallecillo, 38 y.o. male with PMHx significant for HTN, DM, liver disease, CAD, presents ambulatory to the ED with cc of worsening 10/10 left arm pain today. He is s/p Open reduction internal fixation, left ulnar shaft fracture yesterday by Dr. Cynthia Rivero. He reports being discharged with percocet for his pain. Today pt has tried the percocet without significant relief to his pain. He also reports some nausea and vomiting after his procedure likely due to the anesthesia. He does report a separate procedure done on him 8 days ago in which he was also under general anesthesia and was given percocet with relief and without any side effects at the time. Today pt's main complaint is for pain control. Pt specifically denies any fever, CP, SOB, cough. Social Hx: - Tobacco (-), - EtOH (-), - illicit drug use (-)    PCP: Sergio Recinos MD    There are no other complaints, changes, or physical findings at this time. Current Facility-Administered Medications   Medication Dose Route Frequency Provider Last Rate Last Dose    morphine injection 6 mg  6 mg IntraMUSCular NOW Gerber Mora MD   Stopped at 02/23/18 5263     Current Outpatient Prescriptions   Medication Sig Dispense Refill    oxyCODONE-acetaminophen (PERCOCET) 5-325 mg per tablet Take 1 Tab by mouth every four (4) hours as needed for Pain. Max Daily Amount: 6 Tabs. 30 Tab 0    HYDROcodone-acetaminophen (NORCO) 5-325 mg per tablet Take 1 Tab by mouth every four (4) hours as needed for Pain.  Max Daily Amount: 6 Tabs. 25 Tab 0    amoxicillin-clavulanate (AUGMENTIN) 875-125 mg per tablet Take 1 Tab by mouth every twelve (12) hours. Indications: DOG BITE WOUND 14 Tab 0    dulaglutide (TRULICITY) 1.5 ZI/0.2 mL sub-q pen 0.5 mL by SubCUTAneous route every seven (7) days. 4 Pen 11    Insulin Needles, Disposable, 31 gauge x 5/16\" ndle As directed 60 Package 11    insulin glargine (LANTUS,BASAGLAR) 100 unit/mL (3 mL) inpn 20 units daily 2 Pen 11    lisinopril (PRINIVIL, ZESTRIL) 40 mg tablet TAKE 1 TABLET BY MOUTH DAILY 30 Tab 11    metFORMIN ER (GLUCOPHAGE XR) 500 mg tablet Take 2 tablets with breakfast and dinner (Patient taking differently: 500 mg. Take 2 tablets with breakfast and dinner) 120 Tab 11    hydroCHLOROthiazide (MICROZIDE) 12.5 mg capsule Take 12.5 mg by mouth daily.  atorvastatin (LIPITOR) 20 mg tablet Take 1 Tab by mouth daily. 30 Tab 11    amLODIPine (NORVASC) 10 mg tablet Take 1 Tab by mouth daily. 30 Tab 11    aspirin delayed-release 81 mg tablet Take 1 Tab by mouth daily. 30 Tab 11    clopidogrel (PLAVIX) 75 mg tab Take 1 Tab by mouth daily. 30 Tab 11    spironolactone (ALDACTONE) 25 mg tablet Take 1 Tab by mouth daily. 30 Tab 11    metoprolol succinate (TOPROL-XL) 100 mg tablet Take 1 Tab by mouth daily. 30 Tab 11    omeprazole (PRILOSEC) 40 mg capsule Take 1 Cap by mouth daily.  30 Cap 11    VIAGRA 100 mg tablet          Past History     Past Medical History:  Past Medical History:   Diagnosis Date    CAD (coronary artery disease)     2 stents 2016     Diabetes ([facility] [address]     Headache     Hypercholesterolemia     Hypertension     Hypogonadism male     Liver disease     NAFLD    Obstructive sleep apnea        Past Surgical History:  Past Surgical History:   Procedure Laterality Date    HX HEENT      status post pharyngioplasty       Family History:  Family History   Problem Relation Age of Onset    Heart Disease Father        Social History:  Social History   Substance Use Topics    history of present illness                Vital Last Value 24 Hour Range   Temperature 98.8 °F (37.1 °C) (08/21/21 1234) Temp  Min: 98.3 °F (36.8 °C)  Max: 98.8 °F (37.1 °C)   Pulse 84 (08/21/21 1234) Pulse  Min: 82  Max: 95   Respiratory 18 (08/21/21 1234) Resp  Min: 18  Max: 18   Non-Invasive  Blood Pressure 122/66 (08/21/21 1234) BP  Min: 113/53  Max: 122/66   Pulse Oximetry 97 % (08/21/21 1234) SpO2  Min: 95 %  Max: 97 %     Vital Today Admitted   Weight 76 kg (08/21/21 0605) Weight: 76.7 kg (08/15/21 1944)   Height N/A Height: 5' 2\" (157.5 cm) (08/16/21 0031)   BMI N/A BMI (Calculated): 31.85 (08/16/21 0031)       Intake/Output:      Intake/Output Summary (Last 24 hours) at 8/21/2021 1634  Last data filed at 8/21/2021 1444  Gross per 24 hour   Intake 1985.42 ml   Output 1725 ml   Net 260.42 ml       Physical Exam:   Elderly aged female well built in no obvious distress  HEENT PERRL EOMI  Oral mucosa pale moist no thrush  Neck no JVD  Chest bilateral equal expansion  Lungs decreased breath sounds at bases  CVS S1-S2 regular  Abdomen soft nontender bowel sounds are present  Ext bilateral ankle edema right < lef, no calf tenderness  CNS alert with nonfocal      Laboratory Results:   Recent Labs   Lab 08/21/21  0710 08/20/21  1845 08/20/21  0858 08/20/21  0603 08/19/21  0641 08/18/21  0233 08/17/21  0440 08/15/21  2113 08/15/21  2103   WBC  --   --  1.3*  --  1.4* 1.4* 1.1*   < > 0.6*   HCT  --   --  29.0*  --  30.4* 30.3* 27.9*   < > 31.1*   HGB  --   --  10.0*  --  10.4* 10.3* 9.5*   < > 10.7*   PLT  --   --  132*  --  117* 99* 70*   < > 55*   SODIUM  --   --   --   --   --   --  138  --  139   POTASSIUM 3.6 3.7  --  3.3* 3.4 3.7 3.6  --  3.6   CHLORIDE  --   --   --   --   --   --  108*  --  107   CO2  --   --   --   --   --   --  25  --  26   CALCIUM  --   --   --   --   --   --  8.5  --  8.0*   GLUCOSE  --   --   --   --   --   --  94  --  123*   BUN  --   --   --   --   --   --  12  --  14   CREATININE  --    --  0.54  --   --  0.58 0.62  --  0.70   AST  --   --   --   --   --   --   --   --  12   GPT  --   --   --   --   --   --   --   --  10   ALKPT  --   --   --   --   --   --   --   --  52   BILIRUBIN  --   --   --   --   --   --   --   --  1.2*   ALBUMIN  --   --   --   --   --   --   --   --  2.7*   LIPA  --   --   --   --   --   --   --   --  <50*    < > = values in this interval not displayed.       Imaging:   XR Chest AP or PA 8/15/21:  IMPRESSION: No acute cardiopulmonary findings.    CT ABDOMEN PELVIS W CONTRAST 8/15/21:  IMPRESSION: 1. No acute inflammatory process in the abdomen or pelvis. 2. Uncomplicated diverticulosis. 3. Calcified gallstone. 4. Mild haziness along the adrenal glands is new from prior study. This could be related to adrenal stress. Correlate clinically.    Right lower extremity ultrasound 8/17/21:  Impression:   No evidence of acute deep vein thrombosis.        Assessment:    Neutropenic fever  Pancytopenia secondary to chemotherapy  bandemia   Colon cancer s/p chemo (7/5) and radiation therapy  Hypertension  Peripheral neuropathy  H/o breast cancer  Pseudo hypocalcemia   Hyperglycemia  Hyperbilirubinemia    Right lower extremity pain and edema, no dvt  Diarrhea, radiation related       Plan:    Empiric antibiotic per ID, monitor loose stools, follow cultures  Monitor cell counts   Monitor WBC and temp trend  Supportive cares  Prn antiemetics  PO diet as tolerated   Optimize nutrition  Wound care  Follow lab   PPI for GI, SCDs for DVT  Code status: FULL      Alyssia Randolph NP  8/21/2021    Attending Attestation :     Patient seen and examined  Reviewed and agree with above documentation with additions as below     Physical Exam     General appearance: NAD, resting comfortably   Lungs: normal efforts, symmetrical expansion, no wheezes, rhonchi,rales   Heart: RRR and S1, S2 normal, no murmur, gallop or rub   Abdomen: soft, NT/ND, normal BT,   Extremities: no edema/cyanosis, warm,  Smoking status: Never Smoker    Smokeless tobacco: Never Used    Alcohol use No       Allergies:  No Known Allergies      Review of Systems   Review of Systems   Constitutional: Negative for chills and fever. HENT: Negative. Negative for congestion, rhinorrhea, sneezing and sore throat. Eyes: Negative. Negative for redness and visual disturbance. Respiratory: Negative. Negative for cough, shortness of breath and wheezing. Cardiovascular: Negative. Negative for chest pain and leg swelling. Gastrointestinal: Positive for nausea and vomiting. Negative for abdominal pain and diarrhea. Genitourinary: Negative. Negative for difficulty urinating, discharge and frequency. Musculoskeletal: Positive for myalgias. Negative for arthralgias, back pain and neck stiffness. Skin: Negative. Negative for color change and rash. Neurological: Negative. Negative for dizziness, syncope, weakness, numbness and headaches. Hematological: Negative for adenopathy. Psychiatric/Behavioral: Negative. All other systems reviewed and are negative. Physical Exam   Physical Exam   Constitutional: He is oriented to person, place, and time. HENT:   Head: Atraumatic. Eyes: EOM are normal.   Cardiovascular: Normal rate, regular rhythm, normal heart sounds and intact distal pulses. Exam reveals no gallop and no friction rub. No murmur heard. Pulmonary/Chest: Effort normal and breath sounds normal. No respiratory distress. He has no wheezes. He has no rales. He exhibits no tenderness. Abdominal: Soft. Bowel sounds are normal. He exhibits no distension and no mass. There is no tenderness. There is no rebound and no guarding. Musculoskeletal: Normal range of motion. He exhibits no edema or tenderness. Left upper extremity in surgical sling. Good CR that is less than 3 seconds. Moving digits without difficulty. Neurological: He is alert and oriented to person, place, and time.    Psychiatric: He has   Skin: WDI, no rashes, lesions or wounds   Neurologic: AAOx3, Linda, no focal motor weakness,       Assessment/Plan     Monitor diarrhea  Abx  Follow ANC       Teo Alonso MD  8/21/2021     a normal mood and affect. Nursing note and vitals reviewed. Medical Decision Making   I am the first provider for this patient. I reviewed the vital signs, available nursing notes, past medical history, past surgical history, family history and social history. Vital Signs-Reviewed the patient's vital signs. Patient Vitals for the past 12 hrs:   Temp Pulse Resp BP SpO2   02/23/18 0032 99 °F (37.2 °C) (!) 109 17 (!) 143/96 100 %     Records Reviewed: Nursing Notes and Old Medical Records    Provider Notes (Medical Decision Making): Uncontrolled post operative pain s/p Open reduction internal fixation, left ulnar shaft fracture. ED Course:   Initial assessment performed. The patients presenting problems have been discussed, and they are in agreement with the care plan formulated and outlined with them. I have encouraged them to ask questions as they arise throughout their visit. Critical Care Time:   None. Disposition:  DISCHARGE NOTE  3:14 AM  The patient has been re-evaluated and is ready for discharge. Reviewed available results with patient. Counseled pt on diagnosis and care plan. Pt has expressed understanding, and all questions have been answered. Pt agrees with plan and agrees to F/U as recommended, or return to the ED if their sxs worsen. Discharge instructions have been provided and explained to the pt, along with reasons to return to the ED. PLAN:  1. Current Discharge Medication List        2. Follow-up Information     Follow up With Details Comments Contact Info    Tonia Lundy MD Call today As needed if pain not controlled [id number] [address]  5 [facility]  [phone]      [facility] [place] EM[facility] DEPT  If symptoms worsen [address] [place] [address]  [phone]        Return to ED if worse     Diagnosis     Clinical Impression:   1. Acute post-operative pain        Attestations:     This note is prepared by Randy Ness acting as Robert for MD Mary Kate Rosa MD : The scribe's documentation has been prepared under my direction and personally reviewed by me in its entirety. I confirm that the note above accurately reflects all work, treatment, procedures, and medical decision making performed by me.

## 2021-09-17 RX ORDER — SPIRONOLACTONE 25 MG/1
TABLET ORAL
Qty: 45 TABLET | Refills: 1 | Status: SHIPPED | OUTPATIENT
Start: 2021-09-17 | End: 2022-05-19

## 2021-09-22 ENCOUNTER — ANCILLARY PROCEDURE (OUTPATIENT)
Dept: CARDIOLOGY CLINIC | Age: 46
End: 2021-09-22
Payer: COMMERCIAL

## 2021-09-22 ENCOUNTER — OFFICE VISIT (OUTPATIENT)
Dept: CARDIOLOGY CLINIC | Age: 46
End: 2021-09-22

## 2021-09-22 VITALS
WEIGHT: 206 LBS | HEART RATE: 70 BPM | RESPIRATION RATE: 18 BRPM | HEIGHT: 64 IN | SYSTOLIC BLOOD PRESSURE: 180 MMHG | DIASTOLIC BLOOD PRESSURE: 100 MMHG | OXYGEN SATURATION: 97 % | BODY MASS INDEX: 35.17 KG/M2

## 2021-09-22 VITALS — HEIGHT: 64 IN | WEIGHT: 206 LBS | BODY MASS INDEX: 35.17 KG/M2

## 2021-09-22 DIAGNOSIS — E11.65 UNCONTROLLED TYPE 2 DIABETES MELLITUS WITH HYPERGLYCEMIA (HCC): ICD-10-CM

## 2021-09-22 DIAGNOSIS — I25.110 CORONARY ARTERY DISEASE INVOLVING NATIVE CORONARY ARTERY OF NATIVE HEART WITH UNSTABLE ANGINA PECTORIS (HCC): Primary | ICD-10-CM

## 2021-09-22 DIAGNOSIS — I73.9 PAD (PERIPHERAL ARTERY DISEASE) (HCC): ICD-10-CM

## 2021-09-22 DIAGNOSIS — I10 ESSENTIAL HYPERTENSION: ICD-10-CM

## 2021-09-22 DIAGNOSIS — I42.9 CARDIOMYOPATHY, UNSPECIFIED TYPE (HCC): ICD-10-CM

## 2021-09-22 LAB
ECHO LV EDV A2C: 110.78 ML
ECHO LV EDV A4C: 156.38 ML
ECHO LV EDV BP: 130.92 ML (ref 67–155)
ECHO LV EDV INDEX A4C: 79 ML/M2
ECHO LV EDV INDEX BP: 66.1 ML/M2
ECHO LV EDV NDEX A2C: 55.9 ML/M2
ECHO LV EJECTION FRACTION 3D: 27.4 PERCENT
ECHO LV EJECTION FRACTION A2C: 38 PERCENT
ECHO LV EJECTION FRACTION A4C: 40 PERCENT
ECHO LV EJECTION FRACTION BIPLANE: 36.6 PERCENT (ref 55–100)
ECHO LV ESV A2C: 68.29 ML
ECHO LV ESV A4C: 94.38 ML
ECHO LV ESV BP: 83.01 ML (ref 22–58)
ECHO LV ESV INDEX A2C: 34.5 ML/M2
ECHO LV ESV INDEX A4C: 47.7 ML/M2
ECHO LV ESV INDEX BP: 41.9 ML/M2
ECHO LV INTERNAL DIMENSION DIASTOLIC MMODE: 6.04 CM
ECHO LV INTERNAL DIMENSION DIASTOLIC: 5.13 CM (ref 4.2–5.9)
ECHO LV INTERNAL DIMENSION SYSTOLIC MMODE: 4.13 CM
ECHO LV INTERNAL DIMENSION SYSTOLIC: 4.18 CM
ECHO LV IVSD MMODE: 1.23 CM
ECHO LV IVSD: 1.31 CM (ref 0.6–1)
ECHO LV IVSS MMODE: 1.57 CM
ECHO LV MASS 2D: 271.1 G (ref 88–224)
ECHO LV MASS INDEX 2D: 136.9 G/M2 (ref 49–115)
ECHO LV POSTERIOR WALL DIASTOLIC MMODE: 1.19 CM
ECHO LV POSTERIOR WALL DIASTOLIC: 1.28 CM (ref 0.6–1)
ECHO LV POSTERIOR WALL SYSTOLIC MMODE: 1.77 CM

## 2021-09-22 PROCEDURE — 99214 OFFICE O/P EST MOD 30 MIN: CPT | Performed by: INTERNAL MEDICINE

## 2021-09-22 PROCEDURE — 93308 TTE F-UP OR LMTD: CPT | Performed by: INTERNAL MEDICINE

## 2021-09-22 RX ORDER — SACUBITRIL AND VALSARTAN 24; 26 MG/1; MG/1
1 TABLET, FILM COATED ORAL 2 TIMES DAILY
Qty: 30 TABLET | Refills: 1 | Status: SHIPPED | OUTPATIENT
Start: 2021-09-22 | End: 2021-10-25 | Stop reason: SDUPTHER

## 2021-09-22 RX ORDER — ICOSAPENT ETHYL 1000 MG/1
1 CAPSULE ORAL 2 TIMES DAILY WITH MEALS
Qty: 90 CAPSULE | Refills: 3 | Status: SHIPPED | OUTPATIENT
Start: 2021-09-22 | End: 2021-10-25 | Stop reason: SDUPTHER

## 2021-09-22 RX ORDER — AMLODIPINE BESYLATE 10 MG/1
10 TABLET ORAL DAILY
Qty: 90 TABLET | Refills: 5 | Status: SHIPPED | OUTPATIENT
Start: 2021-09-22 | End: 2022-10-21

## 2021-09-22 NOTE — PROGRESS NOTES
[facility] [place]: Tamy Hurtado  [phone]          Cardiology Consult/Progress Note      Requesting/referring provider: Dr. Linda Dos Santos,  Reason for Consult: Coronary disease    Assessment/Plan:  1. Coronary disease manifesting in the form of unstable angina  2. Cardiomyopathy with prior EF documented to be as low as 30%  3. Hypertension poor control  4. Hypercholesterolemia. Good LDL control  5. Metabolic syndrome elevated triglycerides  6. Diabetes mellitus with poor control    Mr. Migdalia Reed is seen for history of coronary disease and ischemic cardiomyopathy. Since his last PCI in April 2021, he has reported significant reduction in frequency of his chest discomfort which was nonetheless atypical.  His EF was previously severely reduced but now appears to be 40 to 45% today. Nonetheless he needs aggressive management of his diabetes hypertension and lipids as well as guideline directed medical therapy for heart failure with reduced action fraction. His blood pressure was elevated today but apparently he did not take his medication this morning. Nonetheless we will plan to increase his amlodipine to 10 mg daily, replace his lisinopril with Entresto. He will require a 48-hour hiatus. Carvedilol should be continued at maximum dose. He does not require significant doses of diuretics to manage systemic congestion. Ticagrelor based DAPT is being continued. Crestor has been maximized to 40 mg daily. Since his last known triglycerides were greater than 150(198 mg/dL) He would be a candidate for use of Vascepa. I will prescribe Vascepa and see if he is able to afford it. Assistance coupons will be provided. I have set him up for BMP and lipid profile to be checked in follow-up. Entresto dose can be further escalated. He will call us with his blood pressure readings in about 1 week after starting Entresto.   We will see him in follow-up in 6 months    []    High complexity decision making was performed  []    Patient is at high-risk of decompensation with multiple organ involvement      Investigations personally reviewed by me  ECG November 2020: Sinus rhythm, lateral precordial and lateral limb lead ST depressions nonspecific but could suggest ischemic heart disease  Echocardiogram November 2020 elevated LV systolic function of 35 to 40%. Mild regurgitation. Cardiac catheterization November 2020 patent stents in RCA and LAD. Diffuse small vessel disease involving diagonal branches. Right posterolateral branch is jailed chronically occluded. Mid circumflex/major obtuse marginal with 90 stenosis treated with drug-eluting stent. LDL November 2020: 2020. Triglycerides 190  Most recent HbA1c is less than 7. ECG performed 4/28/2021: Sinus rhythm, ST-T changes in the form of ST depression in inferolateral leads  Cardiac catheterization April 2021: Proximal RCA de zac stenosis treated with placement of another drug-eluting stent. 40 to 50% ISR in mid RCA stent. Patent stent in circumflex with 25% ISR. Apical and distal LAD stents are patent. Echocardiogram September 2021: Globally reduced EF. Overall EF about 40 to 45%. No regional wall motion abnormalities. HPI: Nasreen Jeffrey, a 42y.o. year-old who presents for evaluation of coronary artery disease. Mr. Jhony Avery has history of metabolic syndrome, poorly controlled diabetes and hypercholesterolemia, hypertension. He also history of coronary artery disease with history of remote coronary artery stenting. In November 2020, he was evaluated at [facility] with acute onset chest November. Elevated troponins and ECG changes. He underwent cardiac catheterization demonstrating mid circumflex/marginal treated with drug-eluting stent EF during this hospitalization was noted to be globally reduced 35 to 40%. Off-and-on he has had atypical chest discomfort requiring further ER visits.   Recently had a visit with progressive symptoms to [address] [place] [address] emergency room. His proximal RCA which has now new de zac lesion was stented. He also has diffuse distal right posterior AV groove branch which was disease and was treated with balloon angioplasty alone. Since his discharge he has generally done well. Occasionally he continues to have same left upper chest discomfort which she has had in the past which is likely noncardiac and is not related to activity or exertion. He  has a past medical history of CAD (coronary artery disease), Headache, Hypercholesterolemia, Hypertension, Hypogonadism male, and Obstructive sleep apnea. Review of system:Patient reports no dyspnea/PND/Orthpnea/CP. He reports no cough/fever/focal neurological deficits/abdominal pain. All other systems negative except as above. Family History   Problem Relation Age of Onset    Heart Disease Father       Social History     Socioeconomic History    Marital status:      Spouse name: Not on file    Number of children: 2    Years of education: 12    Highest education level: Not on file   Occupational History    Occupation:    Tobacco Use    Smoking status: Never Smoker    Smokeless tobacco: Never Used   Vaping Use    Vaping Use: Never used   Substance and Sexual Activity    Alcohol use: No    Drug use: No    Sexual activity: Yes     Partners: Female     Social Determinants of Health     Financial Resource Strain:     Difficulty of Paying Living Expenses:    Food Insecurity:     Worried About Running Out of Food in the Last Year:     Ran Out of Food in the Last Year:    Transportation Needs:     Lack of Transportation (Medical):      Lack of Transportation (Non-Medical):    Physical Activity:     Days of Exercise per Week:     Minutes of Exercise per Session:    Stress:     Feeling of Stress :    Social Connections:     Frequency of Communication with Friends and Family:     Frequency of Social Gatherings with Friends and Family:     Attends Denominational Services:     Active Member of Clubs or Organizations:     Attends Club or Organization Meetings:     Marital Status:       PE  Vitals:    09/22/21 1352   BP: (!) 180/100   Pulse: 70   Resp: 18   SpO2: 97%   Weight: 206 lb (93.4 kg)   Height: 5' 4\" (1.626 m)    Body mass index is 35.36 kg/m². General:    Alert, cooperative, no distress. Psychiatric:    Normal Mood and affect    Eye/ENT:      Pupils equal, No asymmetry, Conjunctival pink. Able to hear voice at normal amplitude   Lungs:      Visibly symmetric chest expansion, No palpable tenderness. Clear to auscultation bilaterally. Heart[de-identified]    Regular rate and rhythm, S1, S2 normal, no murmur, click, rub or gallop. No JVD, Normal palpable peripheral pulses. No cyanosis   Abdomen:     Soft, non-tender. Bowel sounds normal. No masses,  No      organomegaly. Extremities:   Extremities normal, atraumatic, no edema. Neurologic:   CN II-XII grossly intact.  No gross focal deficits           Recent Labs:  Lab Results   Component Value Date/Time    Cholesterol, total 125 11/28/2020 01:38 AM    HDL Cholesterol 52 11/28/2020 01:38 AM    LDL, calculated 33.4 11/28/2020 01:38 AM    Triglyceride 198 (H) 11/28/2020 01:38 AM    CHOL/HDL Ratio 2.4 11/28/2020 01:38 AM     Lab Results   Component Value Date/Time    Creatinine (POC) 1.0 12/13/2014 07:54 AM    Creatinine 1.32 (H) 08/09/2021 12:00 AM     Lab Results   Component Value Date/Time    BUN 21 08/09/2021 12:00 AM    BUN (POC) 6 (L) 12/13/2014 07:54 AM     Lab Results   Component Value Date/Time    Potassium 4.5 08/09/2021 12:00 AM     Lab Results   Component Value Date/Time    Hemoglobin A1c 10.0 (H) 08/09/2021 12:00 AM     Lab Results   Component Value Date/Time    Hemoglobin (POC) 14.6 12/13/2014 07:54 AM    HGB 14.8 07/05/2021 09:34 PM     Lab Results   Component Value Date/Time    PLATELET 332 57/91/3564 09:34 PM       Reviewed:  Past Medical History:   Diagnosis Date  CAD (coronary artery disease)     2 stents 2016     Headache     Hypercholesterolemia     Hypertension     Hypogonadism male     Obstructive sleep apnea      Social History     Tobacco Use   Smoking Status Never Smoker   Smokeless Tobacco Never Used     Social History     Substance and Sexual Activity   Alcohol Use No     No Known Allergies  Family History   Problem Relation Age of Onset    Heart Disease Father         Current Outpatient Medications   Medication Sig    spironolactone (ALDACTONE) 25 mg tablet TAKE 1/2 TABLET BY MOUTH EVERY DAY    amLODIPine (NORVASC) 5 mg tablet TAKE 1 TABLET BY MOUTH EVERY DAY    lisinopriL (PRINIVIL, ZESTRIL) 40 mg tablet Take 1 Tablet by mouth daily.  nitroglycerin (NITROSTAT) 0.4 mg SL tablet 1 Tablet by SubLINGual route every five (5) minutes as needed for Chest Pain (for unstable angina, take up to 3 tabs q 5mins. If chest pain unresolved call 911.). Up to 3 doses.  rosuvastatin (CRESTOR) 40 mg tablet Take 1 Tablet by mouth daily.  aspirin delayed-release 81 mg tablet TAKE 1 TABLET BY MOUTH EVERY DAY    tadalafiL (Cialis) 20 mg tablet Take 1 Tab by mouth as needed for Erectile Dysfunction.  carvediloL (COREG) 25 mg tablet Take 1 Tab by mouth two (2) times daily (with meals).  dapagliflozin (Farxiga) 5 mg tab tablet Take 1 Tab by mouth daily.  insulin glargine (LANTUS,BASAGLAR) 100 unit/mL (3 mL) inpn 30 units daily    ticagrelor (BRILINTA) 90 mg tablet Take 90 mg by mouth two (2) times a day.  lidocaine (XYLOCAINE) 2 % solution Take 10 mL by mouth as needed for Pain. Indications: administration of local anesthetic drug (Patient not taking: Reported on 9/22/2021)     No current facility-administered medications for this visit. Zhanna Pollock MD09/22/21   ATTENTION:   This medical record was transcribed using an electronic medical records/speech recognition system.   Although proofread, it may and can contain electronic, spelling and other errors. Corrections may be executed at a later time. Please feel free to contact us for any clarifications as needed.     [address] [facility] [place]  [address], [address]zman, [address] [place] [address] Avenue

## 2021-10-25 ENCOUNTER — TELEPHONE (OUTPATIENT)
Dept: CARDIOLOGY CLINIC | Age: 46
End: 2021-10-25

## 2021-10-25 RX ORDER — ICOSAPENT ETHYL 1000 MG/1
1 CAPSULE ORAL 2 TIMES DAILY WITH MEALS
Qty: 90 CAPSULE | Refills: 3 | Status: SHIPPED | OUTPATIENT
Start: 2021-10-25 | End: 2022-05-08 | Stop reason: SDUPTHER

## 2021-10-25 RX ORDER — SACUBITRIL AND VALSARTAN 24; 26 MG/1; MG/1
1 TABLET, FILM COATED ORAL 2 TIMES DAILY
Qty: 180 TABLET | Refills: 1 | Status: SHIPPED | OUTPATIENT
Start: 2021-10-25 | End: 2022-05-25 | Stop reason: SDUPTHER

## 2021-10-25 RX ORDER — SACUBITRIL AND VALSARTAN 24; 26 MG/1; MG/1
1 TABLET, FILM COATED ORAL 2 TIMES DAILY
Qty: 30 TABLET | Refills: 1 | OUTPATIENT
Start: 2021-10-25

## 2021-10-25 NOTE — TELEPHONE ENCOUNTER
Returned call to patient. Two patient indentifiers verified. Pt was informed that medications was denied but I am appealing it with his insurance company. Pt verbalized understanding and denies any further questions at this time.

## 2021-10-25 NOTE — TELEPHONE ENCOUNTER
Requesting refill, patient is out of these meds    [facility] pharmacy [phone]    Patient states icosapent ethyL (VASCEPA) 1 gram capsule must be pre-authorize (he has never had this prescription filled)            PHONE:[phone]

## 2021-10-25 NOTE — TELEPHONE ENCOUNTER
Requested Prescriptions     Signed Prescriptions Disp Refills    ticagrelor (BRILINTA) 90 mg tablet 180 Tablet 1     Sig: Take 1 Tablet by mouth two (2) times a day. Authorizing Provider: Betsy Herculesutant     Ordering User: Jacobymathew Braun icosapent ethyL (VASCEPA) 1 gram capsule 90 Capsule 3     Sig: Take 1 Capsule by mouth two (2) times daily (with meals). Authorizing Provider: Betsy Adjutant     Ordering User: Jacoby Braun sacubitriL-valsartan (Entresto) 24-26 mg tablet 180 Tablet 1     Sig: Take 1 Tablet by mouth two (2) times a day.      Authorizing Provider: Betsy Alvarenga     Ordering User: Tabitha Lesches       Last office visit 9/22/2021    Per Dr. Jorge Ludwig   Date Time Provider Richard Olivier   11/15/2021  4:30 PM Sherie Ellis MD [facility] MAIN BS AMB   3/22/2022  1:00 PM MD DARREL Morgan BS AMB

## 2021-10-28 NOTE — TELEPHONE ENCOUNTER
Patient returned call. Two patient indentifiers verified. Pt was informed that medication has been approved. Pt verbalized understanding and denies any further questions at this time.     Future Appointments   Date Time Provider Richard Olivier   11/15/2021  4:30 PM Greg Ewing MD [facility] MAIN BS AMB   3/22/2022  1:00 PM MD DARREL Salazar BS AMB

## 2021-11-05 ENCOUNTER — HOSPITAL ENCOUNTER (EMERGENCY)
Age: 46
Discharge: HOME | End: 2021-11-05
Attending: EMERGENCY MEDICINE
Payer: COMMERCIAL

## 2021-11-05 ENCOUNTER — APPOINTMENT (OUTPATIENT)
Dept: GENERAL RADIOLOGY | Age: 46
End: 2021-11-05
Attending: EMERGENCY MEDICINE
Payer: COMMERCIAL

## 2021-11-05 VITALS
WEIGHT: 202.38 LBS | BODY MASS INDEX: 35.86 KG/M2 | SYSTOLIC BLOOD PRESSURE: 123 MMHG | TEMPERATURE: 97.3 F | OXYGEN SATURATION: 98 % | RESPIRATION RATE: 18 BRPM | HEART RATE: 76 BPM | DIASTOLIC BLOOD PRESSURE: 89 MMHG | HEIGHT: 63 IN

## 2021-11-05 DIAGNOSIS — R07.9 CHEST PAIN, UNSPECIFIED TYPE: Primary | ICD-10-CM

## 2021-11-05 LAB
ALBUMIN SERPL-MCNC: 3.9 G/DL (ref 3.5–5)
ALBUMIN/GLOB SERPL: 0.8 {RATIO} (ref 1.1–2.2)
ALP SERPL-CCNC: 107 U/L (ref 45–117)
ALT SERPL-CCNC: 43 U/L (ref 12–78)
ANION GAP SERPL CALC-SCNC: 7 MMOL/L (ref 5–15)
AST SERPL-CCNC: 19 U/L (ref 15–37)
ATRIAL RATE: 77 BPM
BASOPHILS # BLD: 0 K/UL (ref 0–0.1)
BASOPHILS NFR BLD: 1 % (ref 0–1)
BILIRUB SERPL-MCNC: 0.4 MG/DL (ref 0.2–1)
BNP SERPL-MCNC: <5 PG/ML
BUN SERPL-MCNC: 23 MG/DL (ref 6–20)
BUN/CREAT SERPL: 16 (ref 12–20)
CALCIUM SERPL-MCNC: 9.8 MG/DL (ref 8.5–10.1)
CALCULATED P AXIS, ECG09: 56 DEGREES
CALCULATED R AXIS, ECG10: 27 DEGREES
CALCULATED T AXIS, ECG11: -17 DEGREES
CHLORIDE SERPL-SCNC: 106 MMOL/L (ref 97–108)
CO2 SERPL-SCNC: 25 MMOL/L (ref 21–32)
COMMENT, HOLDF: NORMAL
CREAT SERPL-MCNC: 1.43 MG/DL (ref 0.7–1.3)
DIAGNOSIS, 93000: NORMAL
DIFFERENTIAL METHOD BLD: ABNORMAL
EOSINOPHIL # BLD: 0.2 K/UL (ref 0–0.4)
EOSINOPHIL NFR BLD: 2 % (ref 0–7)
ERYTHROCYTE [DISTWIDTH] IN BLOOD BY AUTOMATED COUNT: 15.1 % (ref 11.5–14.5)
GLOBULIN SER CALC-MCNC: 4.6 G/DL (ref 2–4)
GLUCOSE SERPL-MCNC: 189 MG/DL (ref 65–100)
HCT VFR BLD AUTO: 45.3 % (ref 36.6–50.3)
HGB BLD-MCNC: 15.1 G/DL (ref 12.1–17)
IMM GRANULOCYTES # BLD AUTO: 0 K/UL (ref 0–0.04)
IMM GRANULOCYTES NFR BLD AUTO: 0 % (ref 0–0.5)
LYMPHOCYTES # BLD: 2.9 K/UL (ref 0.8–3.5)
LYMPHOCYTES NFR BLD: 43 % (ref 12–49)
MCH RBC QN AUTO: 26.2 PG (ref 26–34)
MCHC RBC AUTO-ENTMCNC: 33.3 G/DL (ref 30–36.5)
MCV RBC AUTO: 78.6 FL (ref 80–99)
MONOCYTES # BLD: 0.7 K/UL (ref 0–1)
MONOCYTES NFR BLD: 11 % (ref 5–13)
NEUTS SEG # BLD: 2.9 K/UL (ref 1.8–8)
NEUTS SEG NFR BLD: 43 % (ref 32–75)
NRBC # BLD: 0 K/UL (ref 0–0.01)
NRBC BLD-RTO: 0 PER 100 WBC
P-R INTERVAL, ECG05: 138 MS
PLATELET # BLD AUTO: 179 K/UL (ref 150–400)
PMV BLD AUTO: 10.9 FL (ref 8.9–12.9)
POTASSIUM SERPL-SCNC: 4.1 MMOL/L (ref 3.5–5.1)
PROT SERPL-MCNC: 8.5 G/DL (ref 6.4–8.2)
Q-T INTERVAL, ECG07: 352 MS
QRS DURATION, ECG06: 94 MS
QTC CALCULATION (BEZET), ECG08: 398 MS
RBC # BLD AUTO: 5.76 M/UL (ref 4.1–5.7)
SAMPLES BEING HELD,HOLD: NORMAL
SODIUM SERPL-SCNC: 138 MMOL/L (ref 136–145)
TROPONIN-HIGH SENSITIVITY: 5 NG/L (ref 0–76)
TROPONIN-HIGH SENSITIVITY: 6 NG/L (ref 0–76)
VENTRICULAR RATE, ECG03: 77 BPM
WBC # BLD AUTO: 6.7 K/UL (ref 4.1–11.1)

## 2021-11-05 PROCEDURE — 71046 X-RAY EXAM CHEST 2 VIEWS: CPT

## 2021-11-05 PROCEDURE — 83880 ASSAY OF NATRIURETIC PEPTIDE: CPT

## 2021-11-05 PROCEDURE — 84484 ASSAY OF TROPONIN QUANT: CPT

## 2021-11-05 PROCEDURE — 99285 EMERGENCY DEPT VISIT HI MDM: CPT

## 2021-11-05 PROCEDURE — 85025 COMPLETE CBC W/AUTO DIFF WBC: CPT

## 2021-11-05 PROCEDURE — 74011250637 HC RX REV CODE- 250/637: Performed by: EMERGENCY MEDICINE

## 2021-11-05 PROCEDURE — 80053 COMPREHEN METABOLIC PANEL: CPT

## 2021-11-05 PROCEDURE — 74011000250 HC RX REV CODE- 250: Performed by: EMERGENCY MEDICINE

## 2021-11-05 PROCEDURE — 36415 COLL VENOUS BLD VENIPUNCTURE: CPT

## 2021-11-05 PROCEDURE — 93005 ELECTROCARDIOGRAM TRACING: CPT

## 2021-11-05 RX ADMIN — LIDOCAINE HYDROCHLORIDE 40 ML: 20 SOLUTION OROPHARYNGEAL at 02:24

## 2021-11-05 NOTE — ED PROVIDER NOTES
EMERGENCY DEPARTMENT HISTORY AND PHYSICAL EXAM      Date: 11/5/2021  Patient Name: Star Faulkner    History of Presenting Illness     Chief Complaint   Patient presents with   [employer] Chest Pain     ED visit d/t chest pain & tightness (L) sided - onset of sxs, Tuesday - hx of MI with stent placed, April 2021 at [facility] - hx of DM T2 - self medicated with nitro x1 1930 11/04/2021 with no relief - Denies fevers / Jenna Devoid / N / V / D / vision change        History Provided By: Patient    HPI: Star Faulkner, 42 y.o. male with PMHx as noted below presents the emergency department chief complaint of chest pain. Patient reports onset of a left-sided chest tightness approximately 4 days ago. He reports that the pain has been constant but does seem to be worse after eating food. He describes it as a tightness that is mild to moderate in intensity and nonradiating. Patient states it does not feel similar to his prior MI. Pt denies any other alleviating or exacerbating factors. Additionally, pt specifically denies any recent fever, chills, headache, nausea, vomiting, abdominal pain, SOB, lightheadedness, dizziness, numbness, weakness, BLE swelling, heart palpitations, urinary sxs, diarrhea, constipation, melena, hematochezia, cough, or congestion. PCP: Deisi Haywood MD    Current Outpatient Medications   Medication Sig Dispense Refill    ticagrelor (BRILINTA) 90 mg tablet Take 1 Tablet by mouth two (2) times a day. 180 Tablet 1    icosapent ethyL (VASCEPA) 1 gram capsule Take 1 Capsule by mouth two (2) times daily (with meals). 90 Capsule 3    sacubitriL-valsartan (Entresto) 24-26 mg tablet Take 1 Tablet by mouth two (2) times a day. 180 Tablet 1    amLODIPine (NORVASC) 10 mg tablet Take 1 Tablet by mouth daily.  90 Tablet 5    spironolactone (ALDACTONE) 25 mg tablet TAKE 1/2 TABLET BY MOUTH EVERY DAY 45 Tablet 1    nitroglycerin (NITROSTAT) 0.4 mg SL tablet 1 Tablet by SubLINGual route every five (5) minutes as needed for Chest Pain (for unstable angina, take up to 3 tabs q 5mins. If chest pain unresolved call 911.). Up to 3 doses. 1 Bottle 3    rosuvastatin (CRESTOR) 40 mg tablet Take 1 Tablet by mouth daily. 30 Tablet 11    lidocaine (XYLOCAINE) 2 % solution Take 10 mL by mouth as needed for Pain. Indications: administration of local anesthetic drug (Patient not taking: Reported on 9/22/2021) 1 Bottle 0    aspirin delayed-release 81 mg tablet TAKE 1 TABLET BY MOUTH EVERY DAY 90 Tab 3    tadalafiL (Cialis) 20 mg tablet Take 1 Tab by mouth as needed for Erectile Dysfunction. 12 Tab 11    carvediloL (COREG) 25 mg tablet Take 1 Tab by mouth two (2) times daily (with meals). 180 Tab 1    dapagliflozin (Farxiga) 5 mg tab tablet Take 1 Tab by mouth daily. 90 Tab 1    insulin glargine (LANTUS,BASAGLAR) 100 unit/mL (3 mL) inpn 30 units daily 3 Pen 11       Past History     Past Medical History:  Past Medical History:   Diagnosis Date    CAD (coronary artery disease)     2 stents 2016     Headache     Hypercholesterolemia     Hypertension     Hypogonadism male     Obstructive sleep apnea        Past Surgical History:  Past Surgical History:   Procedure Laterality Date    HX HEENT      status post pharyngioplasty       Family History:  Family History   Problem Relation Age of Onset    Heart Disease Father        Social History:  Social History     Tobacco Use    Smoking status: Never Smoker    Smokeless tobacco: Never Used   Vaping Use    Vaping Use: Never used   Substance Use Topics    Alcohol use: No    Drug use: No       Allergies:  No Known Allergies      Review of Systems   Review of Systems  Constitutional: Negative for fever, chills, and fatigue. HENT: Negative for congestion, sore throat, rhinorrhea, sneezing and neck stiffness   Eyes: Negative for discharge and redness. Respiratory: Negative for  shortness of breath, wheezing   Cardiovascular: Positive for chest pain.   Negative palpitations   Gastrointestinal: Negative for nausea, vomiting, abdominal pain, constipation, diarrhea and blood in stool. Genitourinary: Negative for dysuria, hematuria, flank pain, decreased urine volume, discharge,   Musculoskeletal: Negative for myalgias or joint pain . Skin: Negative for rash or lesions . Neurological: Negative weakness, light-headedness, numbness and headaches. Physical Exam   Physical Exam    GENERAL: alert and oriented, no acute distress  EYES: PEERL, No injection, discharge or icterus. ENT: Mucous membranes pink and moist.  NECK: Supple  LUNGS: Airway patent. Non-labored respirations. Breath sounds clear with good air entry bilaterally. HEART: Regular rate and rhythm. No peripheral edema  ABDOMEN: Non-distended and non-tender, without guarding or rebound.   SKIN:  warm, dry  MSK/EXTREMITIES: Without swelling, tenderness or deformity, symmetric with normal ROM  NEUROLOGICAL: Alert, oriented      Diagnostic Study Results     Labs -     Recent Results (from the past 12 hour(s))   EKG, 12 LEAD, INITIAL    Collection Time: 11/05/21  1:05 AM   Result Value Ref Range    Ventricular Rate 77 BPM    Atrial Rate 77 BPM    P-R Interval 138 ms    QRS Duration 94 ms    Q-T Interval 352 ms    QTC Calculation (Bezet) 398 ms    Calculated P Axis 56 degrees    Calculated R Axis 27 degrees    Calculated T Axis -17 degrees    Diagnosis       Normal sinus rhythm  T wave abnormality, consider inferior ischemia  When compared with ECG of 05-JUL-2021 21:10,  No significant change was found     CBC WITH AUTOMATED DIFF    Collection Time: 11/05/21  1:15 AM   Result Value Ref Range    WBC 6.7 4.1 - 11.1 K/uL    RBC 5.76 (H) 4.10 - 5.70 M/uL    HGB 15.1 12.1 - 17.0 g/dL    HCT 45.3 36.6 - 50.3 %    MCV 78.6 (L) 80.0 - 99.0 FL    MCH 26.2 26.0 - 34.0 PG    MCHC 33.3 30.0 - 36.5 g/dL    RDW 15.1 (H) 11.5 - 14.5 %    PLATELET 687 635 - 313 K/uL    MPV 10.9 8.9 - 12.9 FL    NRBC 0.0 0  WBC    ABSOLUTE NRBC 0.00 0.00 - 0.01 K/uL NEUTROPHILS 43 32 - 75 %    LYMPHOCYTES 43 12 - 49 %    MONOCYTES 11 5 - 13 %    EOSINOPHILS 2 0 - 7 %    BASOPHILS 1 0 - 1 %    IMMATURE GRANULOCYTES 0 0.0 - 0.5 %    ABS. NEUTROPHILS 2.9 1.8 - 8.0 K/UL    ABS. LYMPHOCYTES 2.9 0.8 - 3.5 K/UL    ABS. MONOCYTES 0.7 0.0 - 1.0 K/UL    ABS. EOSINOPHILS 0.2 0.0 - 0.4 K/UL    ABS. BASOPHILS 0.0 0.0 - 0.1 K/UL    ABS. IMM. GRANS. 0.0 0.00 - 0.04 K/UL    DF AUTOMATED     METABOLIC PANEL, COMPREHENSIVE    Collection Time: 11/05/21  1:15 AM   Result Value Ref Range    Sodium 138 136 - 145 mmol/L    Potassium 4.1 3.5 - 5.1 mmol/L    Chloride 106 97 - 108 mmol/L    CO2 25 21 - 32 mmol/L    Anion gap 7 5 - 15 mmol/L    Glucose 189 (H) 65 - 100 mg/dL    BUN 23 (H) 6 - 20 MG/DL    Creatinine 1.43 (H) 0.70 - 1.30 MG/DL    BUN/Creatinine ratio 16 12 - 20      GFR est AA >60 >60 ml/min/1.73m2    GFR est non-AA 54 (L) >60 ml/min/1.73m2    Calcium 9.8 8.5 - 10.1 MG/DL    Bilirubin, total 0.4 0.2 - 1.0 MG/DL    ALT (SGPT) 43 12 - 78 U/L    AST (SGOT) 19 15 - 37 U/L    Alk. phosphatase 107 45 - 117 U/L    Protein, total 8.5 (H) 6.4 - 8.2 g/dL    Albumin 3.9 3.5 - 5.0 g/dL    Globulin 4.6 (H) 2.0 - 4.0 g/dL    A-G Ratio 0.8 (L) 1.1 - 2.2     NT-PRO BNP    Collection Time: 11/05/21  1:15 AM   Result Value Ref Range    NT pro-BNP <5 <125 PG/ML   TROPONIN-HIGH SENSITIVITY    Collection Time: 11/05/21  1:15 AM   Result Value Ref Range    Troponin-High Sensitivity 6 0 - 76 ng/L   SAMPLES BEING HELD    Collection Time: 11/05/21  1:15 AM   Result Value Ref Range    SAMPLES BEING HELD BLUE,RED     COMMENT        Add-on orders for these samples will be processed based on acceptable specimen integrity and analyte stability, which may vary by analyte. TROPONIN-HIGH SENSITIVITY    Collection Time: 11/05/21  3:08 AM   Result Value Ref Range    Troponin-High Sensitivity 5 0 - 76 ng/L       Radiologic Studies -   XR CHEST PA LAT   Final Result   No acute findings.         CT Results  (Last 48 hours) None        CXR Results  (Last 48 hours)               11/05/21 0158  XR CHEST PA LAT Final result    Impression:  No acute findings. Narrative:  EXAM: XR CHEST PA LAT       INDICATION: Chest Pain       COMPARISON: Chest x-ray 7/5/2021. FINDINGS: PA and lateral radiographs of the chest demonstrate clear lungs. The   cardiac and mediastinal contours and pulmonary vascularity are normal. The bones   and soft tissues are within normal limits. No acute findings in the visualized   upper abdomen with note of cholecystectomy clips. Medical Decision Making     IZeina MD am the first provider for this patient and am the attending of record for this patient encounter. I reviewed the vital signs, available nursing notes, past medical history, past surgical history, family history and social history. Vital Signs-Reviewed the patient's vital signs. Patient Vitals for the past 12 hrs:   Temp Pulse Resp BP SpO2   11/05/21 0118 -- -- -- -- 95 %   11/05/21 0102 98 °F (36.7 °C) 77 18 (!) 137/92 98 %       Pulse Oximetry Analysis - 98% on RA    Cardiac Monitor:   Rate: 77 bpm  Rhythm: Normal Sinus Rhythm    The cardiac monitor was ordered secondary to chest pain  and to monitor the patient for dysrhythmia        EKG interpretation: (Preliminary)  Rhythm: normal sinus rhythm; and regular . Rate (approx.) 77; Axis: normal; P wave: normal; QRS interval: normal ; ST/T wave: T inversions in the inferior leads was interpreted by Dawna Watson MD,ED Provider. Records Reviewed: Nursing Notes and Old Medical Records    Provider Notes (Medical Decision Making): On presentation the patient is well appearing, in no acute distress with reassurnig vital signs. Based on the history and exam the differential diagnosis for this patient includes  STEMI, NSTEMI, Angina, PE, Aortic Pathology, Chest Wall Pain, Pleurisy, Pneumonia, GERD/esophagitis, Anxiety.   No cough/fever or focal lung findings to suggest pneumonia. No tachycardia, hypoxia or pleuritic component to suggest PE, otherwise perc negative. Pulses symmetric and no extremely elevated BP/asymmetry or classic tearing sensation to suggest Aortic Dissection. Also, no neuro findings. No wretching/forceful vomiting to suggest esophageal disaster. Denies IV drug abuse, has native valves, no fevers/murmurs or skin lesions to suggest endocarditis. Will evaluate with EKG, labs, cardiac enzymes, chest x-ray. I have re-examined the patient and the patient denies any new or changing symptoms. Pain improved. The patient has had 2 negative high-sensitivity troponins at this time and an EKG without any signs of acute ischemia. Patient's labs otherwise were nondiagnostic and reassuring. Chest x-ray showed no acute findings my interpretation. The diagnosis, follow up, return instructions, test esults, x-rays and medications have been discussed and reviewed with the patient. The patient has been given the opportunity to ask questions. The patient expresses understanding of the diagnosis, follow-up and return instructions. The patient agrees to follow up with cardiology as directed and to return immediately if the chest pain worsens. The patient expresses understanding that although cardiac testing at this time is negative, a cardiac problem could still be present and that a follow-up appointment with a cardiologist for further evaluation and risk factor modification is necessary to complete the evaluation of this complaint. Sheila Crow MD.        ED Course:   Initial assessment performed. The patients presenting problems have been discussed, and they are in agreement with the care plan formulated and outlined with them. I have encouraged them to ask questions as they arise throughout their visit.         Medications   maalox/viscous lidocaine (COV GI COCKTAIL) (40 mL Oral Given 11/5/21 0224)         PROGRESS  Ami Cisneros's results have been reviewed with him. He has been counseled regarding his diagnosis. He verbally conveys understanding and agreement of the signs, symptoms, diagnosis, treatment and prognosis and additionally agrees to follow up as recommended with Dr. Amy Kirkpatrick MD in 24 - 48 hours. He also agrees with the care-plan and conveys that all of his questions have been answered. I have also put together some discharge instructions for him that include: 1) educational information regarding their diagnosis, 2) how to care for their diagnosis at home, as well a 3) list of reasons why they would want to return to the ED prior to their follow-up appointment, should their condition change. Disposition:  home    PLAN:  1. Current Discharge Medication List        2. Follow-up Information     Follow up With Specialties Details Why Contact Info    Amy Kirkpatrick MD Internal Medicine Schedule an appointment as soon as possible for a visit in 2 days  [id number] [address]  [facility] [phone]      [facility] EMERGENCY DEPT Emergency Medicine  If symptoms worsen [address] [place] [address]  [phone]    Xena Brown, [address] Vascular Surgery, Interventional Cardiology, Cardiology Schedule an appointment as soon as possible for a visit in 2 days  [address]   [facility]  [phone]          Return to ED if worse     Diagnosis     Clinical Impression:   1. Chest pain, unspecified type        Please note that this dictation was completed with Dragon, computer voice recognition software. Quite often unanticipated grammatical, syntax, homophones, and other interpretive errors are inadvertently transcribed by the computer software. Please disregard these errors. Additionally, please excuse any errors that have escaped final proofreading.

## 2021-11-05 NOTE — ED NOTES
Patient reports experiencing similar pains when blockages were present; pain currently 7/10 with pressure.

## 2021-11-08 ENCOUNTER — PATIENT OUTREACH (OUTPATIENT)
Dept: CASE MANAGEMENT | Age: 46
End: 2021-11-08

## 2021-11-08 NOTE — PROGRESS NOTES
Ambulatory Care Management Note    Date/Time:  11/8/2021 3:21 PM    This patient was received as a referral from Daily assignment. Ambulatory Care Manager outreached to patient today to offer care management services. Introduction to self and role of care manager provided. Patient accepted care management services at this time. Follow up call scheduled at this time. Patient has Ambulatory Care Manager's contact number for for any questions or concerns. Patient Seen in [facility] Ed on 11/5/21 for chest pain. hx of MI with stent placed, April 2021 at [facility] - hx of DM T2 - self medicated with nitro x1 1930 11/04/2021 with no relief.    Future Appointments    Encounter Information    Provider Richard Olivier   11/15/2021 4:30 PM Sheree Grant MD [address] [place] [address] and Primary Care BS YI   3/22/2022

## 2021-11-15 ENCOUNTER — OFFICE VISIT (OUTPATIENT)
Dept: INTERNAL MEDICINE CLINIC | Age: 46
End: 2021-11-15
Payer: COMMERCIAL

## 2021-11-15 VITALS
RESPIRATION RATE: 16 BRPM | BODY MASS INDEX: 36.09 KG/M2 | HEART RATE: 85 BPM | HEIGHT: 63 IN | DIASTOLIC BLOOD PRESSURE: 94 MMHG | TEMPERATURE: 98.5 F | OXYGEN SATURATION: 95 % | WEIGHT: 203.7 LBS | SYSTOLIC BLOOD PRESSURE: 145 MMHG

## 2021-11-15 DIAGNOSIS — Z79.4 TYPE 2 DIABETES MELLITUS WITHOUT COMPLICATION, WITH LONG-TERM CURRENT USE OF INSULIN (HCC): Primary | ICD-10-CM

## 2021-11-15 DIAGNOSIS — I42.9 CARDIOMYOPATHY, UNSPECIFIED TYPE (HCC): ICD-10-CM

## 2021-11-15 DIAGNOSIS — E66.01 SEVERE OBESITY (HCC): ICD-10-CM

## 2021-11-15 DIAGNOSIS — E11.9 TYPE 2 DIABETES MELLITUS WITHOUT COMPLICATION, WITH LONG-TERM CURRENT USE OF INSULIN (HCC): Primary | ICD-10-CM

## 2021-11-15 DIAGNOSIS — R07.9 CHEST PAIN, UNSPECIFIED TYPE: ICD-10-CM

## 2021-11-15 DIAGNOSIS — E78.5 DYSLIPIDEMIA: ICD-10-CM

## 2021-11-15 DIAGNOSIS — G47.33 OBSTRUCTIVE SLEEP APNEA: ICD-10-CM

## 2021-11-15 DIAGNOSIS — I10 PRIMARY HYPERTENSION: ICD-10-CM

## 2021-11-15 PROCEDURE — 99214 OFFICE O/P EST MOD 30 MIN: CPT | Performed by: INTERNAL MEDICINE

## 2021-11-15 NOTE — PROGRESS NOTES
Chief Complaint   Patient presents with   [facility] ED Follow-up     Patient seent at [facility] ED on 11/5/21 for chest pain. 1. Have you been to the ER, urgent care clinic since your last visit? Hospitalized since your last visit? Yes When: 11/5/21 Where: [facility] ED  Reason for visit: chest pain    2. Have you seen or consulted any other health care providers outside of the [address] [place] [address] since your last visit? Include any pap smears or colon screening.  No

## 2021-11-16 ENCOUNTER — PATIENT OUTREACH (OUTPATIENT)
Dept: CASE MANAGEMENT | Age: 46
End: 2021-11-16

## 2021-11-16 LAB
ALBUMIN/CREAT UR: 14 MG/G CREAT (ref 0–29)
APO B SERPL-MCNC: 101 MG/DL
CHOLEST SERPL-MCNC: 185 MG/DL (ref 100–199)
CREAT UR-MCNC: 272.9 MG/DL
EST. AVERAGE GLUCOSE BLD GHB EST-MCNC: 303 MG/DL
HBA1C MFR BLD: 12.2 % (ref 4.8–5.6)
HDLC SERPL-MCNC: 51 MG/DL
LDLC SERPL CALC-MCNC: 107 MG/DL (ref 0–99)
MICROALBUMIN UR-MCNC: 39.5 UG/ML
TRIGL SERPL-MCNC: 157 MG/DL (ref 0–149)
VLDLC SERPL CALC-MCNC: 27 MG/DL (ref 5–40)

## 2021-11-16 RX ORDER — SEMAGLUTIDE 1.34 MG/ML
1 INJECTION, SOLUTION SUBCUTANEOUS
Qty: 1 EACH | Refills: 11 | Status: SHIPPED | OUTPATIENT
Start: 2021-11-16

## 2021-11-16 NOTE — PROGRESS NOTES
[address] [facility]  [place] [address]  [address] [place] [address]  Phone:  [phone]  Fax: [phone]       Chief Complaint   Patient presents with   [facility] ED Follow-up     Patient seent at [facility] ED on 11/5/21 for chest pain. .      SUBJECTIVE:    Angel Lopez is a 42 y.o. male comes in for return visit complaining of pain in his left chest wall. This is a chronic complaint that he has had and has gone to the emergency room on several occasions for this. In every instance, he was told that this was noncardiac. He has been sensitized to the atypicality of his chest discomfort by his last cardiologist who actually cathed him when he came in for atypical chest pain. At the time, he put in an additional stent, but told him that what he did had nothing to do with the atypical chest discomfort, which is probably not cardiac, but unfortunately has set the tone. Current sensation is heaviness to the left side, often times lasting three to six hours continuously without any precipitating or exacerbating factors. He also has this vague sensation with some slight difficulty swallowing, although this is minimal.  He has had no regurgitation or vomiting. He remains noncompliant with his entire regimen, the classic example being his diabetic program for which he has not taken his Ozempic in the last several weeks. He was taking his insulin fairly consistently. There has been no meaningful weight loss. He has a past history of primary hypertension, dyslipidemia, and diabetes as I alluded to earlier. Current Outpatient Medications   Medication Sig Dispense Refill    semaglutide (Ozempic) 1 mg/dose (4 mg/3 mL) pnij 1 mg by SubCUTAneous route every seven (7) days. 1 Each 11    ticagrelor (BRILINTA) 90 mg tablet Take 1 Tablet by mouth two (2) times a day. 180 Tablet 1    icosapent ethyL (VASCEPA) 1 gram capsule Take 1 Capsule by mouth two (2) times daily (with meals).  80 Capsule 3    sacubitriL-valsartan (Entresto) 24-26 mg tablet Take 1 Tablet by mouth two (2) times a day. 180 Tablet 1    amLODIPine (NORVASC) 10 mg tablet Take 1 Tablet by mouth daily. 90 Tablet 5    spironolactone (ALDACTONE) 25 mg tablet TAKE 1/2 TABLET BY MOUTH EVERY DAY 45 Tablet 1    nitroglycerin (NITROSTAT) 0.4 mg SL tablet 1 Tablet by SubLINGual route every five (5) minutes as needed for Chest Pain (for unstable angina, take up to 3 tabs q 5mins. If chest pain unresolved call 911.). Up to 3 doses. 1 Bottle 3    rosuvastatin (CRESTOR) 40 mg tablet Take 1 Tablet by mouth daily. 30 Tablet 11    aspirin delayed-release 81 mg tablet TAKE 1 TABLET BY MOUTH EVERY DAY 90 Tab 3    tadalafiL (Cialis) 20 mg tablet Take 1 Tab by mouth as needed for Erectile Dysfunction. 12 Tab 11    carvediloL (COREG) 25 mg tablet Take 1 Tab by mouth two (2) times daily (with meals). 180 Tab 1    dapagliflozin (Farxiga) 5 mg tab tablet Take 1 Tab by mouth daily.  90 Tab 1    insulin glargine (LANTUS,BASAGLAR) 100 unit/mL (3 mL) inpn 30 units daily 3 Pen 11     Past Medical History:   Diagnosis Date    CAD (coronary artery disease)     2 stents 2016     Headache     Hypercholesterolemia     Hypertension     Hypogonadism male     Obstructive sleep apnea      Past Surgical History:   Procedure Laterality Date    HX HEENT      status post pharyngioplasty     No Known Allergies      REVIEW OF SYSTEMS:  General: negative for - chills or fever  ENT: negative for - headaches, nasal congestion or tinnitus  Respiratory: negative for - cough, hemoptysis, shortness of breath or wheezing  Cardiovascular : negative for - chest pain, edema, palpitations or shortness of breath  Gastrointestinal: negative for - abdominal pain, blood in stools, heartburn or nausea/vomiting  Genito-Urinary: no dysuria, trouble voiding, or hematuria  Musculoskeletal: negative for - gait disturbance, joint pain, joint stiffness or joint swelling  Neurological: no TIA or stroke symptoms  Hematologic: no bruises, no bleeding, no swollen glands  Integument: no lumps, mole changes, nail changes or rash  Endocrine: no malaise/lethargy or unexpected weight changes      Social History     Socioeconomic History    Marital status:     Number of children: 2    Years of education: 12   Occupational History    Occupation:    Tobacco Use    Smoking status: Never Smoker    Smokeless tobacco: Never Used   Vaping Use    Vaping Use: Never used   Substance and Sexual Activity    Alcohol use: No    Drug use: No    Sexual activity: Yes     Partners: Female     Family History   Problem Relation Age of Onset    Heart Disease Father        OBJECTIVE:    Visit Vitals  BP (!) 145/94   Pulse 85   Temp 98.5 °F (36.9 °C) (Oral)   Resp 16   Ht 5' 3\" (1.6 m)   Wt 203 lb 11.2 oz (92.4 kg)   SpO2 95%   BMI 36.08 kg/m²     CONSTITUTIONAL: well , well nourished, appears age appropriate  EYES: perrla, eom intact  ENMT:moist mucous membranes, pharynx clear  NECK: supple. Thyroid normal  RESPIRATORY: Chest: clear to ascultation and percussion   CARDIOVASCULAR: Heart: regular rate and rhythm  GASTROINTESTINAL: Abdomen: soft, bowel sounds active  HEMATOLOGIC: no pathological lymph nodes palpated  MUSCULOSKELETAL: Extremities: no edema, pulse 1+   INTEGUMENT: No unusual rashes or suspicious skin lesions noted. Nails appear normal.  NEUROLOGIC: non-focal exam   MENTAL STATUS: alert and oriented, appropriate affect      ASSESSMENT:  1. Type 2 diabetes mellitus without complication, with long-term current use of insulin ([address]    2. Primary hypertension    3. Dyslipidemia    4. Cardiomyopathy, unspecified type ([address]    5. Obstructive sleep apnea    6. Severe obesity (HCC)    7. Chest pain, unspecified type        PLAN:  1. From a diabetic perspective, I will send in [address] for him at 1 mg weekly. He will continue his current dose of insulin, which is 48 units.   I reminded him the importance of eliminating his consumption of processed carbohydrates. 2. His blood pressure is actually on the low side at 95/80. This is not surprising given all the cardioactive medications that he is taking. He needs to follow up with his cardiologist as relates to this. He is asymptomatic from this perspective, however. 3. He remains on a statin. Efficacy and compliance will be assessed. His cardiologist also placed him on Vascepa. 4. He has ischemic cardiomyopathy with the ejection fractions now between 40 and 45%. He was previously in the 35% range. His cardiologist decided to start him on Entresto. He is on no diuretic currently. 5. He has obstructive sleep apnea. He needs to use his CPAP machine on a regular basis, but there has been a recall on the device and he is in the process of waiting to get another one. Interestingly, even when he had it, he was inconsistent of its use. 6. He needs to lose weight. This can be accomplished by eating meals, eliminating snacks, and avoiding the consumption of processed carbohydrates. 7. He was told that the chest discomfort that he is currently expressing is noncardiac in origin. He needs to spend more time doing the things that he was supposed to do as far as medication compliance, lifestyle changes to promote weight reduction, and consistent exercising. .  Orders Placed This Encounter    MICROALBUMIN, UR, RAND    LIPID PANEL    HEMOGLOBIN A1C WITH EAG    APOLIPOPROTEIN B    semaglutide (Ozempic) 1 mg/dose (4 mg/3 mL) pnij         Follow-up and Dispositions    · Return in about 6 weeks (around 12/27/2021).            Maurisio Antunez MD

## 2021-11-17 NOTE — PROGRESS NOTES
Ambulatory Care Management Note    Date/Time:  11/16/2021 4:27 PM    This Ambulatory Care Manager (ACM) reviewed and updated the following screenings during this call; self management assessment    Patient's challenges to self management identified:   functional physical ability      Medication Management:  good understanding    Advance Care Planning:   Does patient have an Advance Directive: not on file  [employer], Referrals, and Durable Medical Equipment:       Health Maintenance Due   Topic Date Due    Hepatitis C Screening  Never done    COVID-19 Vaccine (1) Never done    Foot Exam Q1  05/12/2016    Eye Exam Retinal or Dilated  11/27/2019    Flu Vaccine (1) Never done     Health Maintenance reviewed - yes. Patient was asked to consider health care goals that they would like to focus on with this ACM. ACM will follow up with patient to discuss goals and establish care plan in the next 7-14 days.        PCP/Specialist follow up:   Future Appointments   Date Time Provider Richard Olivier   12/27/2021  4:15 PM Sherie Ellis MD [facility] TH[facility]DEUS BS AMB   3/22/2022  1:00 PM MD DARREL Morgan BS AMB

## 2021-11-23 ENCOUNTER — TELEPHONE (OUTPATIENT)
Dept: CARDIOLOGY CLINIC | Age: 46
End: 2021-11-23

## 2021-11-23 NOTE — TELEPHONE ENCOUNTER
TC to pt, ID verified. Advised Dr. Jackie Baires not in office today, but will be in tomorrow morning. Advised I would message both Dr. Jackie Baires and his nurse regarding the papers. No further questions.

## 2021-11-23 NOTE — TELEPHONE ENCOUNTER
Patient is calling because  was suppose to fill out insurance claim forms.  Patient said he left the forms either Wednesday or Thursday with the front office and he needed them back before the 11/26/21.    [phone]

## 2021-11-24 NOTE — TELEPHONE ENCOUNTER
Called patient. Two patient indentifiers verified. Pt was informed that paper work has been completed and he can  from the . Pt verbalized understanding and denies any further questions at this time.      Future Appointments   Date Time Provider Richard Charline   12/27/2021  4:15 PM Arcadio Gomez MD [facility] MAIN BS AMB   3/22/2022  1:00 PM MD DARREL Berry BS AMB

## 2021-11-26 ENCOUNTER — PATIENT OUTREACH (OUTPATIENT)
Dept: CASE MANAGEMENT | Age: 46
End: 2021-11-26

## 2021-11-29 NOTE — PROGRESS NOTES
11/29/21  ACM attempted to follow up with patient for CCM assessment. Attempts to reach patient were unsuccessful. On final call a VM was left for patient with the following information: AC contact information and reason for call. Will follow up again in 7 days. My Chart message sent.

## 2021-12-06 RX ORDER — DAPAGLIFLOZIN 5 MG/1
TABLET, FILM COATED ORAL
Qty: 90 TABLET | Refills: 1 | Status: SHIPPED | OUTPATIENT
Start: 2021-12-06 | End: 2022-10-25

## 2021-12-06 NOTE — TELEPHONE ENCOUNTER
Requested Prescriptions     Signed Prescriptions Disp Refills    Farxiga 5 mg tab tablet 90 Tablet 1     Sig: TAKE 1 TABLET BY MOUTH EVERY DAY     Authorizing Provider: Sadaf Diaz     Ordering User: Marie Leahy       Last office visit 9/22/21    Per Dr. Scooby Sánchez   Date Time Provider Richard Olivier   12/27/2021  4:15 PM Vivian Vo MD [facility] MAIN BS AMB   3/22/2022  1:00 PM MD DARREL Davenport BS AMB

## 2021-12-07 ENCOUNTER — PATIENT OUTREACH (OUTPATIENT)
Dept: CASE MANAGEMENT | Age: 46
End: 2021-12-07

## 2021-12-07 NOTE — PROGRESS NOTES
12/07/21   ACM attempted to follow up with patient for CCM assessment. Attempts to reach patient were unsuccessful. On final call a VM was left for patient with the following information: ACM contact information and reason for call. Will follow up again in 7 days.

## 2021-12-16 ENCOUNTER — PATIENT OUTREACH (OUTPATIENT)
Dept: CASE MANAGEMENT | Age: 46
End: 2021-12-16

## 2021-12-17 NOTE — PROGRESS NOTES
12/16/21  Ambulatory Care Management Note      Date/Time:  12/16/2021 4:54 PM       Challenges to be reviewed by the provider   Additional needs identified to be addressed with provider: yes  · ED 11/05/21 for chest pain, muscle aches, spoke to patient's spouse who says patient is not sleeping at night- still complains of chest pain  · HA1C on 11/15/21 was 12.2, down from 15.5 on 9/28/21,   · BS 180s-300s  · [address]  · No ACP          Ambulatory  contacted patient for discussion and case management of self care  Summary of patients top problems:   1. Diabetic-Non compliant with plan of care. Not checking blood sugars,  Has missed doses of his Ozempic, says he takes insulin. HA1C and BS remains elevated. 2. HTN-History of HTN, BP runs high according to spouse, says he takes his medication. Not sleeping at night, not using CPAP. 3. Chronic Pain-ED on 11/5/21 for chest pain, has history of cardiomyopathy with EF between 40 and 45%. Currently on Entresto. PCP/Specialist follow up:   Future Appointments   Date Time Provider Richard Olivier   12/27/2021  4:15 PM Keiko Mccallum MD [facility] MAIN BS AMB   3/22/2022  1:00 PM Hanh Washington MD [facility] BS AMB        Goals Addressed                 This Visit's Progress     Coordinate pain management plan for patient. 12/16/21   Outreach completed   Will Take all medications as ordered   Will utilize community resources for help in the home if needed:  a. Home Health  b. DME equipment  c. Medication Assistance Plan  d. Transportation  e. Referral to:pain management   Will follow up with all appointments as scheduled   Reviewed up coming appointments   Will attend all appointments as scheduled.  ACM will follow up again in 12-14 days. Contact given. pmk       To decrease or maintain patient's biometrics:  HgA1c ?7 mg/dL, /80 or less. LDL of less than 100 and/or less than 70 in a patient with CAD. 12/16/21   Outreach completed, patient's HA1C remains elevated, not checking BS as ordered, not sticking to diet or medication plan   According to spouse not sticking to diet or taking medications as ordered   Complete medication review,  (ie. action, side effects, missed dose, etc.) Review roles of different meds.  Educated on Importance of checking blood sugars, keeping a log, and understanding BS goals   Discussed the importance to prevent, recognize, and manage high and low blood sugars   Discussed meal planning and carb counting   Discussed importance of physical activity.  Will start a log of BS and BP for providers to evaluate   Will take all medications as ordered   Will stick to a low card, low sodium diet   Encouraged to call doctor or nurse if blood sugar is high and you don't know why.  ACM will follow up in 12/14 days. Contact information given. Pmk. Patient verbalized understanding of all information discussed. Patient has this Nurse Navigators contact information for any further questions, concerns, or needs.

## 2021-12-27 ENCOUNTER — OFFICE VISIT (OUTPATIENT)
Dept: INTERNAL MEDICINE CLINIC | Age: 46
End: 2021-12-27
Payer: COMMERCIAL

## 2021-12-27 VITALS
OXYGEN SATURATION: 96 % | TEMPERATURE: 98.6 F | RESPIRATION RATE: 16 BRPM | HEIGHT: 63 IN | BODY MASS INDEX: 35.72 KG/M2 | HEART RATE: 84 BPM | SYSTOLIC BLOOD PRESSURE: 112 MMHG | WEIGHT: 201.6 LBS | DIASTOLIC BLOOD PRESSURE: 80 MMHG

## 2021-12-27 DIAGNOSIS — E78.5 DYSLIPIDEMIA: ICD-10-CM

## 2021-12-27 DIAGNOSIS — Z91.199 HISTORY OF NONCOMPLIANCE WITH MEDICAL TREATMENT: ICD-10-CM

## 2021-12-27 DIAGNOSIS — E66.01 SEVERE OBESITY (HCC): ICD-10-CM

## 2021-12-27 DIAGNOSIS — E11.65 UNCONTROLLED TYPE 2 DIABETES MELLITUS WITH HYPERGLYCEMIA (HCC): ICD-10-CM

## 2021-12-27 DIAGNOSIS — I10 PRIMARY HYPERTENSION: ICD-10-CM

## 2021-12-27 DIAGNOSIS — I25.10 ASHD (ARTERIOSCLEROTIC HEART DISEASE): Primary | ICD-10-CM

## 2021-12-27 PROCEDURE — 99214 OFFICE O/P EST MOD 30 MIN: CPT | Performed by: INTERNAL MEDICINE

## 2021-12-27 NOTE — PROGRESS NOTES
Chief Complaint   Patient presents with    Diabetes     6 week follow up          1. Have you been to the ER, urgent care clinic since your last visit? Hospitalized since your last visit? No    2. Have you seen or consulted any other health care providers outside of the [address] [place] [address] since your last visit? Include any pap smears or colon screening.  No

## 2021-12-28 RX ORDER — EZETIMIBE 10 MG/1
10 TABLET ORAL DAILY
Qty: 90 TABLET | Refills: 3 | Status: SHIPPED | OUTPATIENT
Start: 2021-12-28 | End: 2022-03-12 | Stop reason: SDUPTHER

## 2021-12-28 NOTE — PROGRESS NOTES
[address] [place] [address] and Primary Care  [place] [address]  [address] [place] [address]  Phone:  [phone]  Fax: [phone]       Chief Complaint   Patient presents with    Diabetes     6 week follow up    . SUBJECTIVE:    Matt Martinez is a 42 y.o. male comes in for return visit not having gone to the emergency room since I last saw him with vague chest pain. He has not seen his cardiologist either. He has a forthcoming appointment. From a diabetic perspective, he is totally out of control. His last hemoglobin A1c was 12. This is exclusively related to noncompliance as far as his dietary recommendations are concerned. He literally has no control over what he eats. There has been no meaningful weight loss. He has a past history of primary hypertension, dyslipidemia and obesity. He is physically deconditioned. Interestingly, he talks about things that can be done that do not require active participation on his part with taking something like a pill. Current Outpatient Medications   Medication Sig Dispense Refill    ezetimibe (ZETIA) 10 mg tablet Take 1 Tablet by mouth daily. 90 Tablet 3    Farxiga 5 mg tab tablet TAKE 1 TABLET BY MOUTH EVERY DAY 90 Tablet 1    semaglutide (Ozempic) 1 mg/dose (4 mg/3 mL) pnij 1 mg by SubCUTAneous route every seven (7) days. 1 Each 11    ticagrelor (BRILINTA) 90 mg tablet Take 1 Tablet by mouth two (2) times a day. 180 Tablet 1    icosapent ethyL (VASCEPA) 1 gram capsule Take 1 Capsule by mouth two (2) times daily (with meals). 90 Capsule 3    sacubitriL-valsartan (Entresto) 24-26 mg tablet Take 1 Tablet by mouth two (2) times a day. 180 Tablet 1    amLODIPine (NORVASC) 10 mg tablet Take 1 Tablet by mouth daily.  90 Tablet 5    spironolactone (ALDACTONE) 25 mg tablet TAKE 1/2 TABLET BY MOUTH EVERY DAY 45 Tablet 1    nitroglycerin (NITROSTAT) 0.4 mg SL tablet 1 Tablet by SubLINGual route every five (5) minutes as needed for Chest Pain (for unstable angina, take up to 3 tabs q 5mins. If chest pain unresolved call 911.). Up to 3 doses. 1 Bottle 3    rosuvastatin (CRESTOR) 40 mg tablet Take 1 Tablet by mouth daily. 30 Tablet 11    aspirin delayed-release 81 mg tablet TAKE 1 TABLET BY MOUTH EVERY DAY 90 Tab 3    tadalafiL (Cialis) 20 mg tablet Take 1 Tab by mouth as needed for Erectile Dysfunction. 12 Tab 11    carvediloL (COREG) 25 mg tablet Take 1 Tab by mouth two (2) times daily (with meals).  180 Tab 1    insulin glargine (LANTUS,BASAGLAR) 100 unit/mL (3 mL) inpn 30 units daily 3 Pen 11     Past Medical History:   Diagnosis Date    CAD (coronary artery disease)     2 stents 2016     Headache     Hypercholesterolemia     Hypertension     Hypogonadism male     Obstructive sleep apnea      Past Surgical History:   Procedure Laterality Date    HX HEENT      status post pharyngioplasty     No Known Allergies      REVIEW OF SYSTEMS:  General: negative for - chills or fever  ENT: negative for - headaches, nasal congestion or tinnitus  Respiratory: negative for - cough, hemoptysis, shortness of breath or wheezing  Cardiovascular : negative for - chest pain, edema, palpitations or shortness of breath  Gastrointestinal: negative for - abdominal pain, blood in stools, heartburn or nausea/vomiting  Genito-Urinary: no dysuria, trouble voiding, or hematuria  Musculoskeletal: negative for - gait disturbance, joint pain, joint stiffness or joint swelling  Neurological: no TIA or stroke symptoms  Hematologic: no bruises, no bleeding, no swollen glands  Integument: no lumps, mole changes, nail changes or rash  Endocrine: no malaise/lethargy or unexpected weight changes      Social History     Socioeconomic History    Marital status:     Number of children: 2    Years of education: 12   Occupational History    Occupation:    Tobacco Use    Smoking status: Never Smoker    Smokeless tobacco: Never Used   Vaping Use    Vaping Use: Never used   Substance and Sexual Activity    Alcohol use: No    Drug use: No    Sexual activity: Yes     Partners: Female     Family History   Problem Relation Age of Onset    Heart Disease Father        OBJECTIVE:    Visit Vitals  /80   Pulse 84   Temp 98.6 °F (37 °C) (Oral)   Resp 16   Ht 5' 3\" (1.6 m)   Wt 201 lb 9.6 oz (91.4 kg)   SpO2 96%   BMI 35.71 kg/m²     CONSTITUTIONAL: well , well nourished, appears age appropriate  EYES: perrla, eom intact  ENMT:moist mucous membranes, pharynx clear  NECK: supple. Thyroid normal  RESPIRATORY: Chest: clear to ascultation and percussion   CARDIOVASCULAR: Heart: regular rate and rhythm  GASTROINTESTINAL: Abdomen: soft, bowel sounds active  HEMATOLOGIC: no pathological lymph nodes palpated  MUSCULOSKELETAL: Extremities: no edema, pulse 1+   INTEGUMENT: No unusual rashes or suspicious skin lesions noted. Nails appear normal.  NEUROLOGIC: non-focal exam   MENTAL STATUS: alert and oriented, appropriate affect      ASSESSMENT:  1. ASHD (arteriosclerotic heart disease)    2. Uncontrolled type 2 diabetes mellitus with hyperglycemia ([address]    3. Primary hypertension    4. Dyslipidemia    5. Severe obesity ([address]    6. History of noncompliance with medical treatment        PLAN:  1. His coronary artery disease appears to be stable. 2. His diabetes is totally out of control related to noncompliance from a dietary perspective. His insulin will be increased to 50 units and I will continue to titrate up. His requirements will be quite high because of his severe insulin resistance and dietary indiscretion with the consumption of processed carbohydrates. 3. Blood pressure is reasonable today. 4. He remains on his statin and I will add Zetia 10 mg q.d to his regimen. 5. He really needs to lose weight. Unfortunately he is just not motivated to do so.   This can be accomplished by eating meals, eliminating snacks, and avoiding the consumption of processed carbohydrates. Follow-up and Dispositions    · Return in about 4 weeks (around 1/24/2022).            Piper Dos Santos MD

## 2022-01-06 ENCOUNTER — PATIENT OUTREACH (OUTPATIENT)
Dept: CASE MANAGEMENT | Age: 47
End: 2022-01-06

## 2022-01-08 NOTE — PROGRESS NOTES
1/7/22  Goals Addressed                 This Visit's Progress     Coordinate pain management plan for patient. On track     12/16/21   Outreach completed   Will Take all medications as ordered   Will utilize community resources for help in the home if needed:  a. Home Health  b. DME equipment  c. Medication Assistance Plan  d. Transportation  e. Referral to:pain management   Will follow up with all appointments as scheduled   Reviewed up coming appointments   Will attend all appointments as scheduled.  ACM will follow up again in 12-14 days. Contact given. Pmk    1/7/22   Admits to taking pain medications   Will follow up with pain management as scheduled   Will follow up with PCP on 1/25/22 as scheduled   ACM will follow up again in 12-14 days. Contact given. Pmk         To decrease or maintain patient's biometrics:  HgA1c ?7 mg/dL, /80 or less. LDL of less than 100 and/or less than 70 in a patient with CAD. On track     12/16/21   Outreach completed, patient's HA1C remains elevated, not checking BS as ordered, not sticking to diet or medication plan   According to spouse not sticking to diet or taking medications as ordered   Complete medication review,  (ie. action, side effects, missed dose, etc.) Review roles of different meds.  Educated on Importance of checking blood sugars, keeping a log, and understanding BS goals   Discussed the importance to prevent, recognize, and manage high and low blood sugars   Discussed meal planning and carb counting   Discussed importance of physical activity.  Will start a log of BS and BP for providers to evaluate   Will take all medications as ordered   Will stick to a low card, low sodium diet   Encouraged to call doctor or nurse if blood sugar is high and you don't know why.  ACM will follow up in 12/14 days. Contact information given.  Pmk.    1/7/22   Outreach completed   Attended PCP follow up on 12/27/21 as was scheduled   Will continue to work on diet control-decreasing Carb intake by limiting carb intake each meal to around 65 grams  [employer] on importance of checking BSs and keeping log   Reviewed medication changes and understanding why   Will take all medications as ordered, including new order for insulin and Zetia   ACM will follow up in 12/14 days. Contact information given.  Pmk.

## 2022-01-24 ENCOUNTER — PATIENT OUTREACH (OUTPATIENT)
Dept: CASE MANAGEMENT | Age: 47
End: 2022-01-24

## 2022-01-25 NOTE — PROGRESS NOTES
Goals Addressed                 This Visit's Progress     Coordinate pain management plan for patient. On track     12/16/21   Outreach completed   Will Take all medications as ordered   Will utilize community resources for help in the home if needed:  a. Home Health  b. DME equipment  c. Medication Assistance Plan  d. Transportation  e. Referral to:pain management   Will follow up with all appointments as scheduled   Reviewed up coming appointments   Will attend all appointments as scheduled.  ACM will follow up again in 12-14 days. Contact given. Pmk    1/7/22   Admits to taking pain medications   Will follow up with pain management as scheduled   Will follow up with PCP on 1/25/22 as scheduled   ACM will follow up again in 12-14 days. Contact given. Pmk    1/25/22   Will continue all medications as ordered   After review of chart patient attended follow up appointments.  Patient has had no ED or hospital admissions 30 days post discharge.  Will attend appointment scheduled for today. pmk         To decrease or maintain patient's biometrics:  HgA1c ?7 mg/dL, /80 or less. LDL of less than 100 and/or less than 70 in a patient with CAD. 12/16/21   Outreach completed, patient's HA1C remains elevated, not checking BS as ordered, not sticking to diet or medication plan   According to spouse not sticking to diet or taking medications as ordered   Complete medication review,  (ie. action, side effects, missed dose, etc.) Review roles of different meds.  Educated on Importance of checking blood sugars, keeping a log, and understanding BS goals   Discussed the importance to prevent, recognize, and manage high and low blood sugars   Discussed meal planning and carb counting   Discussed importance of physical activity.    Will start a log of BS and BP for providers to evaluate   Will take all medications as ordered   Will stick to a low card, low sodium diet   Encouraged to call doctor or nurse if blood sugar is high and you don't know why.  ACM will follow up in 12/14 days. Contact information given. Pmk.    1/7/22   Outreach completed   Attended PCP follow up on 12/27/21 as was scheduled   Will continue to work on diet control-decreasing Carb intake by limiting carb intake each meal to around 65 grams   Educated on importance of checking BSs and keeping log   Reviewed medication changes and understanding why   Will take all medications as ordered, including new order for insulin and Zetia   ACM will follow up in 12/14 days. Contact information given. Pmk.      01/25/22    Outreach completed   Will continue to maintain a low carb diet   Will check blood sugars at least tid as ordered and keep log   Reviewed upcoming appointments   Will attend as scheduled. pmk   Will attend next appointment scheduled for 1/25/22   ACM will follow in 12-14 days.  pmk

## 2022-02-09 ENCOUNTER — PATIENT OUTREACH (OUTPATIENT)
Dept: CASE MANAGEMENT | Age: 47
End: 2022-02-09

## 2022-02-10 RX ORDER — EZETIMIBE 10 MG/1
10 TABLET ORAL
Status: CANCELLED | OUTPATIENT
Start: 2022-02-10

## 2022-02-10 NOTE — PROGRESS NOTES
Goals Addressed                 This Visit's Progress     Attend follow up appointments   On track     12/16/21  Darshan Werner Outreach completed   Will Take all medications as ordered   Will utilize community resources for help in the home if needed:  a. Home Health  b. DME equipment  c. Medication Assistance Plan  d. Transportation  e. Referral to:pain management   Will follow up with all appointments as scheduled   Reviewed up coming appointments   Will attend all appointments as scheduled.  ACM will follow up again in 12-14 days. Contact given. Pmk    02/09/22  1/7/22   Admits to taking pain medications   Will follow up with pain management as scheduled   Will follow up with PCP on 1/25/22 as scheduled   ACM will follow up again in 12-14 days. Contact given. Pmk    1/25/22   Will continue all medications as ordered   After review of chart patient attended follow up appointments.  Patient has had no ED or hospital admissions 30 days post discharge.  Will attend appointment scheduled for today. pmk    02/09/22     Canceled follow up visit scheduled for 1/25/22   Reminded of importance of keeping follow up appointments   Will call to reschedule follow up and labs   ACM will follow again in 12-14 days. pmk       To decrease or maintain patient's biometrics:  HgA1c ?7 mg/dL, /80 or less. LDL of less than 100 and/or less than 70 in a patient with CAD. Not on track     12/16/21  Darshan Werner Outreach completed, patient's HA1C remains elevated, not checking BS as ordered, not sticking to diet or medication plan   According to spouse not sticking to diet or taking medications as ordered   Complete medication review,  (ie. action, side effects, missed dose, etc.) Review roles of different meds.    Educated on Importance of checking blood sugars, keeping a log, and understanding BS goals   Discussed the importance to prevent, recognize, and manage high and low blood sugars   Discussed meal planning and carb counting   Discussed importance of physical activity.  Will start a log of BS and BP for providers to evaluate   Will take all medications as ordered   Will stick to a low card, low sodium diet   Encouraged to call doctor or nurse if blood sugar is high and you don't know why.  ACM will follow up in 12/14 days. Contact information given. Pmk.    1/7/22   Outreach completed   Attended PCP follow up on 12/27/21 as was scheduled   Will continue to work on diet control-decreasing Carb intake by limiting carb intake each meal to around 65 grams   Educated on importance of checking BSs and keeping log   Reviewed medication changes and understanding why   Will take all medications as ordered, including new order for insulin and Zetia   ACM will follow up in 12/14 days. Contact information given. Pmk.      01/25/22    Outreach completed   Will continue to maintain a low carb diet   Will check blood sugars at least tid as ordered and keep log   Reviewed upcoming appointments   Will attend as scheduled. pmk   Will attend next appointment scheduled for 1/25/22   ACM will follow in 12-14 days. Pmk      02/09/22   Outreach completed, patient has had 4 Ed visits in last 12 months   Patient says he is doing well, not checking blood sugars daily   Asked to check both BP and BS and keep logs   Educated on importance of eating foods low in carbs and concentrated sugars   Will limit sodium intake   Will take all medications as ordered   Discussed importance of including physical activity in daily routine   ACM will follow in 12-14 days.  Pmk

## 2022-02-17 ENCOUNTER — OFFICE VISIT (OUTPATIENT)
Dept: INTERNAL MEDICINE CLINIC | Age: 47
End: 2022-02-17
Payer: COMMERCIAL

## 2022-02-17 DIAGNOSIS — E66.01 SEVERE OBESITY (BMI 35.0-39.9) WITH COMORBIDITY (HCC): ICD-10-CM

## 2022-02-17 DIAGNOSIS — E11.65 UNCONTROLLED TYPE 2 DIABETES MELLITUS WITH HYPERGLYCEMIA (HCC): Primary | ICD-10-CM

## 2022-02-17 DIAGNOSIS — I25.10 ASHD (ARTERIOSCLEROTIC HEART DISEASE): ICD-10-CM

## 2022-02-17 DIAGNOSIS — E78.5 DYSLIPIDEMIA: ICD-10-CM

## 2022-02-17 DIAGNOSIS — G47.33 OBSTRUCTIVE SLEEP APNEA: ICD-10-CM

## 2022-02-17 DIAGNOSIS — I10 PRIMARY HYPERTENSION: ICD-10-CM

## 2022-02-17 PROBLEM — E11.22 TYPE 2 DIABETES MELLITUS WITH CHRONIC KIDNEY DISEASE (HCC): Status: ACTIVE | Noted: 2022-02-17

## 2022-02-17 PROCEDURE — 99214 OFFICE O/P EST MOD 30 MIN: CPT | Performed by: INTERNAL MEDICINE

## 2022-02-17 NOTE — PROGRESS NOTES
Chief Complaint   Patient presents with    Diabetes     follow up          1. Have you been to the ER, urgent care clinic since your last visit? Hospitalized since your last visit? No    2. Have you seen or consulted any other health care providers outside of the [address] [place] [address] since your last visit? Include any pap smears or colon screening.  No

## 2022-02-19 VITALS
WEIGHT: 206.3 LBS | HEIGHT: 63 IN | RESPIRATION RATE: 16 BRPM | OXYGEN SATURATION: 96 % | SYSTOLIC BLOOD PRESSURE: 140 MMHG | HEART RATE: 72 BPM | TEMPERATURE: 98.5 F | BODY MASS INDEX: 36.55 KG/M2 | DIASTOLIC BLOOD PRESSURE: 70 MMHG

## 2022-02-19 NOTE — PROGRESS NOTES
[facility]  [place] [address]  [address] [place] [address]  Phone:  [phone]  Fax: [phone]       Chief Complaint   Patient presents with    Diabetes     follow up    . SUBJECTIVE:    Geovany Alexandra is a 42 y.o. male comes in for return visit stating that he has done reasonably well. He has not been taking his insulin because he could not get it for technical reasons. It needed preauthorized and it was not done. It has been out for the last two weeks. This is not uncommon. He is taking his Ozempic on a regular basis. He is describing a vague sensation in the left side of his chest and occasionally right side. He attributes this to the medication that he is taking. He has seen a cardiologist for this repetitively. Previous workups have been negative, which did not, in any way, suggest ischemia. He has a past history of primary hypertension, dyslipidemia, obesity, and physical deconditioning. Current Outpatient Medications   Medication Sig Dispense Refill    insulin glargine (Lantus Solostar U-100 Insulin) 100 unit/mL (3 mL) inpn INJECT 50 UNITS UNDER THE SKIN EVERY DAY 5 Adjustable Dose Pre-filled Pen Syringe 11    ezetimibe (ZETIA) 10 mg tablet Take 1 Tablet by mouth daily. 90 Tablet 3    Farxiga 5 mg tab tablet TAKE 1 TABLET BY MOUTH EVERY DAY 90 Tablet 1    semaglutide (Ozempic) 1 mg/dose (4 mg/3 mL) pnij 1 mg by SubCUTAneous route every seven (7) days. 1 Each 11    ticagrelor (BRILINTA) 90 mg tablet Take 1 Tablet by mouth two (2) times a day. 180 Tablet 1    icosapent ethyL (VASCEPA) 1 gram capsule Take 1 Capsule by mouth two (2) times daily (with meals). 90 Capsule 3    sacubitriL-valsartan (Entresto) 24-26 mg tablet Take 1 Tablet by mouth two (2) times a day. 180 Tablet 1    amLODIPine (NORVASC) 10 mg tablet Take 1 Tablet by mouth daily.  90 Tablet 5    spironolactone (ALDACTONE) 25 mg tablet TAKE 1/2 TABLET BY MOUTH EVERY DAY 45 Tablet 1    nitroglycerin (NITROSTAT) 0.4 mg SL tablet 1 Tablet by SubLINGual route every five (5) minutes as needed for Chest Pain (for unstable angina, take up to 3 tabs q 5mins. If chest pain unresolved call 911.). Up to 3 doses. 1 Bottle 3    rosuvastatin (CRESTOR) 40 mg tablet Take 1 Tablet by mouth daily. 30 Tablet 11    aspirin delayed-release 81 mg tablet TAKE 1 TABLET BY MOUTH EVERY DAY 90 Tab 3    tadalafiL (Cialis) 20 mg tablet Take 1 Tab by mouth as needed for Erectile Dysfunction. 12 Tab 11    carvediloL (COREG) 25 mg tablet Take 1 Tab by mouth two (2) times daily (with meals).  180 Tab 1     Past Medical History:   Diagnosis Date    CAD (coronary artery disease)     2 stents 2016     Headache     Hypercholesterolemia     Hypertension     Hypogonadism male     Obstructive sleep apnea      Past Surgical History:   Procedure Laterality Date    HX HEENT      status post pharyngioplasty     No Known Allergies      REVIEW OF SYSTEMS:  General: negative for - chills or fever  ENT: negative for - headaches, nasal congestion or tinnitus  Respiratory: negative for - cough, hemoptysis, shortness of breath or wheezing  Cardiovascular : negative for - chest pain, edema, palpitations or shortness of breath  Gastrointestinal: negative for - abdominal pain, blood in stools, heartburn or nausea/vomiting  Genito-Urinary: no dysuria, trouble voiding, or hematuria  Musculoskeletal: negative for - gait disturbance, joint pain, joint stiffness or joint swelling  Neurological: no TIA or stroke symptoms  Hematologic: no bruises, no bleeding, no swollen glands  Integument: no lumps, mole changes, nail changes or rash  Endocrine: no malaise/lethargy or unexpected weight changes      Social History     Socioeconomic History    Marital status:     Number of children: 2    Years of education: 12   Occupational History    Occupation:    Tobacco Use    Smoking status: Never Smoker    Smokeless tobacco: Never Used   Vaping Use    Vaping Use: Never used   Substance and Sexual Activity    Alcohol use: No    Drug use: No    Sexual activity: Yes     Partners: Female     Family History   Problem Relation Age of Onset    Heart Disease Father        OBJECTIVE:    Visit Vitals  BP (!) 140/70   Pulse 72   Temp 98.5 °F (36.9 °C) (Oral)   Resp 16   Ht 5' 3\" (1.6 m)   Wt 206 lb 4.8 oz (93.6 kg)   SpO2 96%   BMI 36.54 kg/m²     CONSTITUTIONAL: well , well nourished, appears age appropriate  EYES: perrla, eom intact  ENMT:moist mucous membranes, pharynx clear  NECK: supple. Thyroid normal  RESPIRATORY: Chest: clear to ascultation and percussion   CARDIOVASCULAR: Heart: regular rate and rhythm  GASTROINTESTINAL: Abdomen: soft, bowel sounds active  HEMATOLOGIC: no pathological lymph nodes palpated  MUSCULOSKELETAL: Extremities: no edema, pulse 1+   INTEGUMENT: No unusual rashes or suspicious skin lesions noted. Nails appear normal.  NEUROLOGIC: non-focal exam   MENTAL STATUS: alert and oriented, appropriate affect      ASSESSMENT:  1. Uncontrolled type 2 diabetes mellitus with hyperglycemia ([address]    2. Primary hypertension    3. Dyslipidemia    4. ASHD (arteriosclerotic heart disease)    5. Severe obesity (BMI 35.0-39. 9) with comorbidity ([address]    6. Obstructive sleep apnea        PLAN:  1. The patient's diabetes is poorly controlled. I will still get a hemoglobin A1c. Insulin will be called in for him. He will continue the GLP-1 agonist, which is Ozempic. 2. Blood pressure is reasonable. No adjustment is made. 3. He will continue statin as prescribed along with his Zetia. 4. His coronary artery disease also appears to be stable in that he is not having any suggestive symptoms of ischemia. He will follow up with Cardiology. 5. He needs to lose weight. This can be accomplished by eating meals, eliminating snacking, and avoiding the consumption of processed carbohydrates.   6. He does have obstructive sleep apnea and needs to use his CPAP machine on a consistent basis. .  Orders Placed This Encounter    HEMOGLOBIN A1C WITH EAG         Follow-up and Dispositions    · Return in about 3 weeks (around 3/10/2022).            Fidelia Nice MD

## 2022-02-24 ENCOUNTER — PATIENT OUTREACH (OUTPATIENT)
Dept: CASE MANAGEMENT | Age: 47
End: 2022-02-24

## 2022-02-25 NOTE — PROGRESS NOTES
Goals Addressed                 This Visit's Progress     Attend follow up appointments   On track     12/16/21  Regina Torres Outreach completed, He is taking his Ozempic on a regular basis.  Will Take all medications as ordered   Will utilize community resources for help in the home if needed:  a. Home Health  b. DME equipment  c. Medication Assistance Plan  d. Transportation  e. Referral to:pain management   Will follow up with all appointments as scheduled   Reviewed up coming appointments   Will attend all appointments as scheduled.  ACM will follow up again in 12-14 days. Contact given. Pmk    1/7/22   Admits to taking pain medications   Will follow up with pain management as scheduled   Will follow up with PCP on 1/25/22 as scheduled   ACM will follow up again in 12-14 days. Contact given. Pmk    1/25/22   Will continue all medications as ordered   After review of chart patient attended follow up appointments.  Patient has had no ED or hospital admissions 30 days post discharge.  Will attend appointment scheduled for today. pmk    02/09/22     Canceled follow up visit scheduled for 1/25/22   Reminded of importance of keeping follow up appointments   Will call to reschedule follow up and labs   ACM will follow again in 12-14 days. Pmk    02/25/22    Has followed up with PCP and    Has followed up with cardiology   Will follow up with PCP again on 3/10/22        HA1   On track     12/16/21   Outreach completed, patient's HA1C remains elevated, not checking BS as ordered, not sticking to diet or medication plan   According to spouse not sticking to diet or taking medications as ordered   Complete medication review,  (ie. action, side effects, missed dose, etc.) Review roles of different meds.    Educated on Importance of checking blood sugars, keeping a log, and understanding BS goals   Discussed the importance to prevent, recognize, and manage high and low blood sugars   Discussed meal planning and carb counting   Discussed importance of physical activity.  Will start a log of BS and BP for providers to evaluate   Will take all medications as ordered   Will stick to a low card, low sodium diet   Encouraged to call doctor or nurse if blood sugar is high and you don't know why.  ACM will follow up in 12/14 days. Contact information given. Pmk.    1/7/22   Outreach completed   Attended PCP follow up on 12/27/21 as was scheduled   Will continue to work on diet control-decreasing Carb intake by limiting carb intake each meal to around 65 grams   Educated on importance of checking BSs and keeping log   Reviewed medication changes and understanding why   Will take all medications as ordered, including new order for insulin and Zetia   ACM will follow up in 12/14 days. Contact information given. Pmk.      01/25/22    Outreach completed   Will continue to maintain a low carb diet   Will check blood sugars at least tid as ordered and keep log   Reviewed upcoming appointments   Will attend as scheduled. pmk   Will attend next appointment scheduled for 1/25/22   ACM will follow in 12-14 days. Pmk      02/09/22   Outreach completed, patient has had 4 Ed visits in last 12 months   Patient says he is doing well, not checking blood sugars daily   Asked to check both BP and BS and keep logs   Educated on importance of eating foods low in carbs and concentrated sugars   Will limit sodium intake   Will take all medications as ordered   Discussed importance of including physical activity in daily routine   ACM will follow in 12-14 days. Pmk    2/24/22   Reports taking medications as ordered   HA1C currently 12.2 according to patient   Admits to not checking BS, says he is sticking to a low [employer]   Will continue to increase daily activity by walking daily   ACM will follow up again in 12-14 days.  pk

## 2022-03-10 ENCOUNTER — OFFICE VISIT (OUTPATIENT)
Dept: INTERNAL MEDICINE CLINIC | Age: 47
End: 2022-03-10
Payer: COMMERCIAL

## 2022-03-10 VITALS
TEMPERATURE: 98.4 F | RESPIRATION RATE: 18 BRPM | DIASTOLIC BLOOD PRESSURE: 88 MMHG | OXYGEN SATURATION: 97 % | WEIGHT: 204.6 LBS | HEART RATE: 88 BPM | BODY MASS INDEX: 36.25 KG/M2 | HEIGHT: 63 IN | SYSTOLIC BLOOD PRESSURE: 135 MMHG

## 2022-03-10 DIAGNOSIS — I25.10 ASHD (ARTERIOSCLEROTIC HEART DISEASE): ICD-10-CM

## 2022-03-10 DIAGNOSIS — I10 PRIMARY HYPERTENSION: ICD-10-CM

## 2022-03-10 DIAGNOSIS — E78.5 DYSLIPIDEMIA: ICD-10-CM

## 2022-03-10 DIAGNOSIS — E11.65 UNCONTROLLED TYPE 2 DIABETES MELLITUS WITH HYPERGLYCEMIA (HCC): Primary | ICD-10-CM

## 2022-03-10 DIAGNOSIS — E66.9 OBESITY (BMI 30.0-34.9): ICD-10-CM

## 2022-03-10 DIAGNOSIS — Z91.199 HISTORY OF NONCOMPLIANCE WITH MEDICAL TREATMENT: ICD-10-CM

## 2022-03-10 PROCEDURE — 99214 OFFICE O/P EST MOD 30 MIN: CPT | Performed by: INTERNAL MEDICINE

## 2022-03-10 NOTE — PROGRESS NOTES
Benita Allen is a 42 y.o. male    Chief Complaint   Patient presents with    Hypertension    Cholesterol Problem     1. Have you been to the ER, urgent care clinic since your last visit? Hospitalized since your last visit? No      2. Have you seen or consulted any other health care providers outside of the [address] [place] [address] since your last visit? Include any pap smears or colon screening.  No

## 2022-03-11 ENCOUNTER — PATIENT OUTREACH (OUTPATIENT)
Dept: CASE MANAGEMENT | Age: 47
End: 2022-03-11

## 2022-03-12 RX ORDER — EZETIMIBE 10 MG/1
10 TABLET ORAL DAILY
Qty: 90 TABLET | Refills: 3 | Status: SHIPPED | OUTPATIENT
Start: 2022-03-12 | End: 2022-10-28

## 2022-03-12 NOTE — PROGRESS NOTES
[address] [facility]  [place] [address]  [address] [place] [address]  Phone:  [phone]  Fax: [phone]       Chief Complaint   Patient presents with    Hypertension    Cholesterol Problem   . SUBJECTIVE:    Alex Rivero is a 42 y.o. male comes in for return visit remaining noncompliant. He is taking his insulin, as well as his GLP-1 agonist.  For unknown reasons he is not taking his statin. He denies any chest pain or shortness of breath. Current Outpatient Medications   Medication Sig Dispense Refill    ezetimibe (ZETIA) 10 mg tablet Take 1 Tablet by mouth daily. 90 Tablet 3    insulin glargine (Lantus Solostar U-100 Insulin) 100 unit/mL (3 mL) inpn INJECT 50 UNITS UNDER THE SKIN EVERY DAY 5 Adjustable Dose Pre-filled Pen Syringe 11    Farxiga 5 mg tab tablet TAKE 1 TABLET BY MOUTH EVERY DAY 90 Tablet 1    semaglutide (Ozempic) 1 mg/dose (4 mg/3 mL) pnij 1 mg by SubCUTAneous route every seven (7) days. 1 Each 11    ticagrelor (BRILINTA) 90 mg tablet Take 1 Tablet by mouth two (2) times a day. 180 Tablet 1    icosapent ethyL (VASCEPA) 1 gram capsule Take 1 Capsule by mouth two (2) times daily (with meals). 90 Capsule 3    sacubitriL-valsartan (Entresto) 24-26 mg tablet Take 1 Tablet by mouth two (2) times a day. 180 Tablet 1    amLODIPine (NORVASC) 10 mg tablet Take 1 Tablet by mouth daily. 90 Tablet 5    spironolactone (ALDACTONE) 25 mg tablet TAKE 1/2 TABLET BY MOUTH EVERY DAY 45 Tablet 1    nitroglycerin (NITROSTAT) 0.4 mg SL tablet 1 Tablet by SubLINGual route every five (5) minutes as needed for Chest Pain (for unstable angina, take up to 3 tabs q 5mins. If chest pain unresolved call 911.). Up to 3 doses. 1 Bottle 3    rosuvastatin (CRESTOR) 40 mg tablet Take 1 Tablet by mouth daily.  30 Tablet 11    aspirin delayed-release 81 mg tablet TAKE 1 TABLET BY MOUTH EVERY DAY 90 Tab 3    tadalafiL (Cialis) 20 mg tablet Take 1 Tab by mouth as needed for Erectile Dysfunction. 12 Tab 11    carvediloL (COREG) 25 mg tablet Take 1 Tab by mouth two (2) times daily (with meals).  180 Tab 1     Past Medical History:   Diagnosis Date    CAD (coronary artery disease)     2 stents 2016     Headache     Hypercholesterolemia     Hypertension     Hypogonadism male     Obstructive sleep apnea      Past Surgical History:   Procedure Laterality Date    HX HEENT      status post pharyngioplasty     No Known Allergies      REVIEW OF SYSTEMS:  General: negative for - chills or fever  ENT: negative for - headaches, nasal congestion or tinnitus  Respiratory: negative for - cough, hemoptysis, shortness of breath or wheezing  Cardiovascular : negative for - chest pain, edema, palpitations or shortness of breath  Gastrointestinal: negative for - abdominal pain, blood in stools, heartburn or nausea/vomiting  Genito-Urinary: no dysuria, trouble voiding, or hematuria  Musculoskeletal: negative for - gait disturbance, joint pain, joint stiffness or joint swelling  Neurological: no TIA or stroke symptoms  Hematologic: no bruises, no bleeding, no swollen glands  Integument: no lumps, mole changes, nail changes or rash  Endocrine: no malaise/lethargy or unexpected weight changes      Social History     Socioeconomic History    Marital status:     Number of children: 2    Years of education: 12   Occupational History    Occupation:    Tobacco Use    Smoking status: Never Smoker    Smokeless tobacco: Never Used   Vaping Use    Vaping Use: Never used   Substance and Sexual Activity    Alcohol use: No    Drug use: No    Sexual activity: Yes     Partners: Female     Family History   Problem Relation Age of Onset    Heart Disease Father        OBJECTIVE:    Visit Vitals  /88 (BP 1 Location: Right arm, BP Patient Position: Sitting)   Pulse 88   Temp 98.4 °F (36.9 °C) (Oral)   Resp 18   Ht 5' 3\" (1.6 m)   Wt 204 lb 9.6 oz (92.8 kg)   SpO2 97%   BMI 36.24 kg/m² CONSTITUTIONAL: well , well nourished, appears age appropriate  EYES: perrla, eom intact  ENMT:moist mucous membranes, pharynx clear  NECK: supple. Thyroid normal  RESPIRATORY: Chest: clear to ascultation and percussion   CARDIOVASCULAR: Heart: regular rate and rhythm  GASTROINTESTINAL: Abdomen: soft, bowel sounds active  HEMATOLOGIC: no pathological lymph nodes palpated  MUSCULOSKELETAL: Extremities: no edema, pulse 1+   INTEGUMENT: No unusual rashes or suspicious skin lesions noted. Nails appear normal.  NEUROLOGIC: non-focal exam   MENTAL STATUS: alert and oriented, appropriate affect      ASSESSMENT:  1. Uncontrolled type 2 diabetes mellitus with hyperglycemia ([address]    2. Primary hypertension    3. ASHD (arteriosclerotic heart disease)    4. Dyslipidemia    5. Obesity (BMI 30.0-34.9)    6. History of noncompliance with medical treatment        PLAN:  1. The patient's diabetes remains poorly controlled. He is taking two of his major agents without any adverse effects. I will check his hemoglobin A1c on his return visit. 2. Blood pressure is reasonable, no adjustments are made. 3. He is stable from a cardiac perspective denying any suggestive symptoms of cardiac ischemia or shortness of breath. 4. He is not taking his statin on a regular basis and I encouraged him to do so. In addition to that he needs to also remain on his Zetia. 5. Weight reduction would help. This can be accomplished by eating meals, eliminating snacks, and avoiding the consumption of processed carbohydrates. Addendum:    6. Medical noncompliance remains a large problem with this patient. Follow-up and Dispositions    · Return in about 4 weeks (around 4/7/2022).            Lakesha Cardoso MD

## 2022-03-14 NOTE — PROGRESS NOTES
3/11/22  Goals Addressed                 This Visit's Progress     Attend follow up appointments   On track     12/16/21  Carmita Outreach completed, He is taking his Ozempic on a regular basis.  Will Take all medications as ordered   Will utilize community resources for help in the home if needed:  a. Home Health  b. DME equipment  c. Medication Assistance Plan  d. Transportation  e. Referral to:pain management   Will follow up with all appointments as scheduled   Reviewed up coming appointments   Will attend all appointments as scheduled.  ACM will follow up again in 12-14 days. Contact given. Pmk    1/7/22   Admits to taking pain medications   Will follow up with pain management as scheduled   Will follow up with PCP on 1/25/22 as scheduled   ACM will follow up again in 12-14 days. Contact given. Pmk    1/25/22   Will continue all medications as ordered   After review of chart patient attended follow up appointments.  Patient has had no ED or hospital admissions 30 days post discharge.  Will attend appointment scheduled for today. pmk    02/09/22     Canceled follow up visit scheduled for 1/25/22   Reminded of importance of keeping follow up appointments   Will call to reschedule follow up and labs   ACM will follow again in 12-14 days. Pmk    02/25/22    Has followed up with PCP and    Has followed up with cardiology   Will follow up with PCP again on 3/10/22     3/11/22   Completed PCP follow up as was scheduled on 3/10/22   Admits to knowledge of needed and ordered blood work   Will follow up with cardiology on 3/22/22 as scheduled.  pmk       HA1   On track     12/16/21   Outreach completed, patient's HA1C remains elevated, not checking BS as ordered, not sticking to diet or medication plan   According to spouse not sticking to diet or taking medications as ordered   Complete medication review,  (ie. action, side effects, missed dose, etc.) Review roles of different meds.   Educated on Importance of checking blood sugars, keeping a log, and understanding BS goals   Discussed the importance to prevent, recognize, and manage high and low blood sugars   Discussed meal planning and carb counting   Discussed importance of physical activity.  Will start a log of BS and BP for providers to evaluate   Will take all medications as ordered   Will stick to a low card, low sodium diet   Encouraged to call doctor or nurse if blood sugar is high and you don't know why.  ACM will follow up in 12/14 days. Contact information given. Pmk.    1/7/22   Outreach completed   Attended PCP follow up on 12/27/21 as was scheduled   Will continue to work on diet control-decreasing Carb intake by limiting carb intake each meal to around 65 grams   Educated on importance of checking BSs and keeping log   Reviewed medication changes and understanding why   Will take all medications as ordered, including new order for insulin and Zetia   ACM will follow up in 12/14 days. Contact information given. Pmk.      01/25/22    Outreach completed   Will continue to maintain a low carb diet   Will check blood sugars at least tid as ordered and keep log   Reviewed upcoming appointments   Will attend as scheduled. pmk   Will attend next appointment scheduled for 1/25/22   ACM will follow in 12-14 days. Pmk      02/09/22   Outreach completed, patient has had 4 Ed visits in last 12 months   Patient says he is doing well, not checking blood sugars daily   Asked to check both BP and BS and keep logs   Educated on importance of eating foods low in carbs and concentrated sugars   Will limit sodium intake   Will take all medications as ordered   Discussed importance of including physical activity in daily routine   ACM will follow in 12-14 days.  Pmk    2/24/22   Reports taking medications as ordered   HA1C currently 12.2 according to patient   Admits to not checking BS, says he is sticking to a low Carb diet   Will continue to increase daily activity by walking daily   ACM will follow up again in 12-14 days. Pk    3/11/22   Patient reminded of orders for future lab work   Will continue to maintain a low carb, low fat diet   Will continue to take short walks and daily activity to burn calories   Report taking all medications as ordered   BS [id number]   ACM will follow again in 12-14 days.  pmk

## 2022-03-17 ENCOUNTER — PATIENT OUTREACH (OUTPATIENT)
Dept: CASE MANAGEMENT | Age: 47
End: 2022-03-17

## 2022-03-17 NOTE — PROGRESS NOTES
Patient has graduated from the Complex Case Management  program on 03/17/22. Patient/family has the ability to self-manage at this time. Care management goals have been completed. No further Ambulatory Care Manager follow up scheduled. Goals Addressed                 This Visit's Progress     COMPLETED: Attend follow up appointments   On track     12/16/21  Amber Mejias Outreach completed, He is taking his Ozempic on a regular basis.  Will Take all medications as ordered   Will utilize community resources for help in the home if needed:  a. Home Health  b. DME equipment  c. Medication Assistance Plan  d. Transportation  e. Referral to:pain management   Will follow up with all appointments as scheduled   Reviewed up coming appointments   Will attend all appointments as scheduled.  ACM will follow up again in 12-14 days. Contact given. Pmk    1/7/22   Admits to taking pain medications   Will follow up with pain management as scheduled   Will follow up with PCP on 1/25/22 as scheduled   ACM will follow up again in 12-14 days. Contact given. Pmk    1/25/22   Will continue all medications as ordered   After review of chart patient attended follow up appointments.  Patient has had no ED or hospital admissions 30 days post discharge.  Will attend appointment scheduled for today. pmk    02/09/22     Canceled follow up visit scheduled for 1/25/22   Reminded of importance of keeping follow up appointments   Will call to reschedule follow up and labs   ACM will follow again in 12-14 days. Pmk    02/25/22    Has followed up with PCP and    Has followed up with cardiology   Will follow up with PCP again on 3/10/22     3/11/22   Completed PCP follow up as was scheduled on 3/10/22   Admits to knowledge of needed and ordered blood work   Will follow up with cardiology on 3/22/22 as scheduled.  pmk       COMPLETED: HA1C At Goal        12/16/21   Outreach completed, patient's HA1C remains elevated, not checking BS as ordered, not sticking to diet or medication plan   According to spouse not sticking to diet or taking medications as ordered   Complete medication review,  (ie. action, side effects, missed dose, etc.) Review roles of different meds.  Educated on Importance of checking blood sugars, keeping a log, and understanding BS goals   Discussed the importance to prevent, recognize, and manage high and low blood sugars   Discussed meal planning and carb counting   Discussed importance of physical activity.  Will start a log of BS and BP for providers to evaluate   Will take all medications as ordered   Will stick to a low card, low sodium diet   Encouraged to call doctor or nurse if blood sugar is high and you don't know why.  ACM will follow up in 12/14 days. Contact information given. Pmk.    1/7/22   Outreach completed   Attended PCP follow up on 12/27/21 as was scheduled   Will continue to work on diet control-decreasing Carb intake by limiting carb intake each meal to around 65 grams   Educated on importance of checking BSs and keeping log   Reviewed medication changes and understanding why   Will take all medications as ordered, including new order for insulin and Zetia   ACM will follow up in 12/14 days. Contact information given. Pmk.      01/25/22    Outreach completed   Will continue to maintain a low carb diet   Will check blood sugars at least tid as ordered and keep log   Reviewed upcoming appointments   Will attend as scheduled. pmk   Will attend next appointment scheduled for 1/25/22   ACM will follow in 12-14 days.  Pmk      02/09/22   Outreach completed, patient has had 4 Ed visits in last 12 months   Patient says he is doing well, not checking blood sugars daily   Asked to check both BP and BS and keep logs   Educated on importance of eating foods low in carbs and concentrated sugars   Will limit sodium intake   Will take all medications as ordered   Discussed importance of including physical activity in daily routine   ACM will follow in 12-14 days. Pmk    2/24/22   Reports taking medications as ordered   HA1C currently 12.2 according to patient   Admits to not checking BS, says he is sticking to a low Carb diet   Will continue to increase daily activity by walking daily   ACM will follow up again in 12-14 days. Pk    3/11/22   Patient reminded of orders for future lab work   Will continue to maintain a low carb, low fat diet   Will continue to take short walks and daily activity to burn calories   Report taking all medications as ordered   BS neujp=[id number]   ACM will follow again in 12-14 days. pmk            Patient has Ambulatory Care Manager's contact information for any further questions, concerns, or needs.   Patients upcoming visits:    Future Appointments   Date Time Provider Richard Olivier   4/14/2022  4:30 PM Carol Rose MD [facility] MAIN BS AMB   5/25/2022  3:40 PM MD DARREL Slater BS AMB

## 2022-03-18 PROBLEM — E66.811 OBESITY (BMI 30.0-34.9): Status: ACTIVE | Noted: 2017-11-05

## 2022-03-18 PROBLEM — E11.65 UNCONTROLLED TYPE 2 DIABETES MELLITUS WITH HYPERGLYCEMIA (HCC): Status: ACTIVE | Noted: 2020-05-13

## 2022-03-18 PROBLEM — E11.22 TYPE 2 DIABETES MELLITUS WITH CHRONIC KIDNEY DISEASE (HCC): Status: ACTIVE | Noted: 2022-02-17

## 2022-03-18 PROBLEM — E66.9 OBESITY (BMI 30.0-34.9): Status: ACTIVE | Noted: 2017-11-05

## 2022-03-18 PROBLEM — S52.92XB OPEN LEFT FOREARM FRACTURE: Status: ACTIVE | Noted: 2018-02-15

## 2022-03-18 RX ORDER — NITROGLYCERIN 0.4 MG/1
TABLET SUBLINGUAL
Qty: 25 TABLET | Refills: 3 | Status: SHIPPED | OUTPATIENT
Start: 2022-03-18

## 2022-03-18 RX ORDER — CARVEDILOL 25 MG/1
TABLET ORAL
Qty: 180 TABLET | Refills: 1 | Status: SHIPPED | OUTPATIENT
Start: 2022-03-18 | End: 2022-05-25 | Stop reason: SDUPTHER

## 2022-03-19 PROBLEM — R07.9 CHEST PAIN: Status: ACTIVE | Noted: 2020-11-27

## 2022-03-19 PROBLEM — Z91.199 HISTORY OF NONCOMPLIANCE WITH MEDICAL TREATMENT: Status: ACTIVE | Noted: 2020-05-13

## 2022-03-19 PROBLEM — I42.9 CARDIOMYOPATHY (HCC): Status: ACTIVE | Noted: 2021-09-22

## 2022-03-19 PROBLEM — I21.4 NSTEMI (NON-ST ELEVATED MYOCARDIAL INFARCTION) (HCC): Status: ACTIVE | Noted: 2020-11-28

## 2022-03-19 PROBLEM — K76.0 STEATOSIS OF LIVER: Status: ACTIVE | Noted: 2021-06-07

## 2022-03-19 PROBLEM — E66.01 SEVERE OBESITY (HCC): Status: ACTIVE | Noted: 2020-11-29

## 2022-03-20 PROBLEM — N52.9 ERECTILE DYSFUNCTION: Status: ACTIVE | Noted: 2021-05-02

## 2022-03-20 PROBLEM — I20.0 UNSTABLE ANGINA PECTORIS (HCC): Status: ACTIVE | Noted: 2021-04-28

## 2022-05-09 RX ORDER — ICOSAPENT ETHYL 1000 MG/1
1 CAPSULE ORAL 2 TIMES DAILY WITH MEALS
Qty: 180 CAPSULE | Refills: 1 | Status: SHIPPED | OUTPATIENT
Start: 2022-05-09

## 2022-05-09 NOTE — TELEPHONE ENCOUNTER
Requested Prescriptions     Signed Prescriptions Disp Refills    icosapent ethyL (VASCEPA) 1 gram capsule 180 Capsule 1     Sig: Take 1 Capsule by mouth two (2) times daily (with meals). Authorizing Provider: Jeanine Neves     Ordering User: Anahy Smith     Verbal order per Dr. Sheree Bob.     Future Appointments   Date Time Provider Richard Olivier   5/11/2022  2:30 PM Kb Alfaro MD [facility] MAIN BS AMB   5/25/2022  3:40 PM MD DARREL Rosales BS AMB

## 2022-05-11 ENCOUNTER — OFFICE VISIT (OUTPATIENT)
Dept: INTERNAL MEDICINE CLINIC | Age: 47
End: 2022-05-11
Payer: COMMERCIAL

## 2022-05-11 VITALS
BODY MASS INDEX: 36.8 KG/M2 | WEIGHT: 207.7 LBS | HEIGHT: 63 IN | RESPIRATION RATE: 16 BRPM | SYSTOLIC BLOOD PRESSURE: 119 MMHG | TEMPERATURE: 99 F | HEART RATE: 81 BPM | DIASTOLIC BLOOD PRESSURE: 82 MMHG | OXYGEN SATURATION: 95 %

## 2022-05-11 DIAGNOSIS — E78.5 DYSLIPIDEMIA: ICD-10-CM

## 2022-05-11 DIAGNOSIS — I25.10 ASHD (ARTERIOSCLEROTIC HEART DISEASE): ICD-10-CM

## 2022-05-11 DIAGNOSIS — E11.65 UNCONTROLLED TYPE 2 DIABETES MELLITUS WITH HYPERGLYCEMIA (HCC): Primary | ICD-10-CM

## 2022-05-11 DIAGNOSIS — I10 PRIMARY HYPERTENSION: ICD-10-CM

## 2022-05-11 DIAGNOSIS — Z91.199 HISTORY OF NONCOMPLIANCE WITH MEDICAL TREATMENT: ICD-10-CM

## 2022-05-11 DIAGNOSIS — N52.9 ERECTILE DYSFUNCTION, UNSPECIFIED ERECTILE DYSFUNCTION TYPE: ICD-10-CM

## 2022-05-11 DIAGNOSIS — G47.33 OBSTRUCTIVE SLEEP APNEA: ICD-10-CM

## 2022-05-11 PROBLEM — N18.30 CHRONIC RENAL DISEASE, STAGE III (HCC): Status: ACTIVE | Noted: 2022-05-11

## 2022-05-11 PROCEDURE — 99214 OFFICE O/P EST MOD 30 MIN: CPT | Performed by: INTERNAL MEDICINE

## 2022-05-11 NOTE — PROGRESS NOTES
Chief Complaint   Patient presents with    Diabetes     routine follow up          1. Have you been to the ER, urgent care clinic since your last visit? Hospitalized since your last visit? No    2. Have you seen or consulted any other health care providers outside of the [address] [place] [address] since your last visit? Include any pap smears or colon screening.  No

## 2022-05-14 ENCOUNTER — APPOINTMENT (OUTPATIENT)
Dept: GENERAL RADIOLOGY | Age: 47
End: 2022-05-14
Attending: EMERGENCY MEDICINE
Payer: COMMERCIAL

## 2022-05-14 ENCOUNTER — HOSPITAL ENCOUNTER (EMERGENCY)
Age: 47
Discharge: HOME | End: 2022-05-14
Attending: EMERGENCY MEDICINE
Payer: COMMERCIAL

## 2022-05-14 VITALS
DIASTOLIC BLOOD PRESSURE: 95 MMHG | HEIGHT: 64 IN | SYSTOLIC BLOOD PRESSURE: 137 MMHG | TEMPERATURE: 97.9 F | OXYGEN SATURATION: 94 % | HEART RATE: 87 BPM | WEIGHT: 211.86 LBS | BODY MASS INDEX: 36.17 KG/M2 | RESPIRATION RATE: 19 BRPM

## 2022-05-14 DIAGNOSIS — R07.89 ATYPICAL CHEST PAIN: Primary | ICD-10-CM

## 2022-05-14 LAB
ALBUMIN SERPL-MCNC: 3.7 G/DL (ref 3.5–5)
ALBUMIN/GLOB SERPL: 1 {RATIO} (ref 1.1–2.2)
ALP SERPL-CCNC: 102 U/L (ref 45–117)
ALT SERPL-CCNC: 67 U/L (ref 12–78)
ANION GAP SERPL CALC-SCNC: 6 MMOL/L (ref 5–15)
AST SERPL-CCNC: 38 U/L (ref 15–37)
ATRIAL RATE: 87 BPM
BASOPHILS # BLD: 0 K/UL (ref 0–0.1)
BASOPHILS NFR BLD: 0 % (ref 0–1)
BILIRUB SERPL-MCNC: 0.3 MG/DL (ref 0.2–1)
BNP SERPL-MCNC: <5 PG/ML
BUN SERPL-MCNC: 14 MG/DL (ref 6–20)
BUN/CREAT SERPL: 11 (ref 12–20)
CALCIUM SERPL-MCNC: 8.9 MG/DL (ref 8.5–10.1)
CALCULATED P AXIS, ECG09: 27 DEGREES
CALCULATED R AXIS, ECG10: 9 DEGREES
CALCULATED T AXIS, ECG11: 17 DEGREES
CHLORIDE SERPL-SCNC: 107 MMOL/L (ref 97–108)
CO2 SERPL-SCNC: 27 MMOL/L (ref 21–32)
CREAT SERPL-MCNC: 1.25 MG/DL (ref 0.7–1.3)
DIAGNOSIS, 93000: NORMAL
DIFFERENTIAL METHOD BLD: ABNORMAL
EOSINOPHIL # BLD: 0.1 K/UL (ref 0–0.4)
EOSINOPHIL NFR BLD: 2 % (ref 0–7)
ERYTHROCYTE [DISTWIDTH] IN BLOOD BY AUTOMATED COUNT: 14.1 % (ref 11.5–14.5)
GLOBULIN SER CALC-MCNC: 3.8 G/DL (ref 2–4)
GLUCOSE SERPL-MCNC: 157 MG/DL (ref 65–100)
HCT VFR BLD AUTO: 40.2 % (ref 36.6–50.3)
HGB BLD-MCNC: 13.2 G/DL (ref 12.1–17)
IMM GRANULOCYTES # BLD AUTO: 0 K/UL (ref 0–0.04)
IMM GRANULOCYTES NFR BLD AUTO: 0 % (ref 0–0.5)
LIPASE SERPL-CCNC: 81 U/L (ref 73–393)
LYMPHOCYTES # BLD: 2.8 K/UL (ref 0.8–3.5)
LYMPHOCYTES NFR BLD: 41 % (ref 12–49)
MCH RBC QN AUTO: 26.1 PG (ref 26–34)
MCHC RBC AUTO-ENTMCNC: 32.8 G/DL (ref 30–36.5)
MCV RBC AUTO: 79.6 FL (ref 80–99)
MONOCYTES # BLD: 0.7 K/UL (ref 0–1)
MONOCYTES NFR BLD: 10 % (ref 5–13)
NEUTS SEG # BLD: 3.2 K/UL (ref 1.8–8)
NEUTS SEG NFR BLD: 47 % (ref 32–75)
NRBC # BLD: 0 K/UL (ref 0–0.01)
NRBC BLD-RTO: 0 PER 100 WBC
P-R INTERVAL, ECG05: 130 MS
PLATELET # BLD AUTO: 186 K/UL (ref 150–400)
PMV BLD AUTO: 11.1 FL (ref 8.9–12.9)
POTASSIUM SERPL-SCNC: 3.4 MMOL/L (ref 3.5–5.1)
PROT SERPL-MCNC: 7.5 G/DL (ref 6.4–8.2)
Q-T INTERVAL, ECG07: 376 MS
QRS DURATION, ECG06: 96 MS
QTC CALCULATION (BEZET), ECG08: 452 MS
RBC # BLD AUTO: 5.05 M/UL (ref 4.1–5.7)
SODIUM SERPL-SCNC: 140 MMOL/L (ref 136–145)
TROPONIN-HIGH SENSITIVITY: 8 NG/L (ref 0–76)
TROPONIN-HIGH SENSITIVITY: 9 NG/L (ref 0–76)
VENTRICULAR RATE, ECG03: 87 BPM
WBC # BLD AUTO: 6.9 K/UL (ref 4.1–11.1)

## 2022-05-14 PROCEDURE — 83880 ASSAY OF NATRIURETIC PEPTIDE: CPT

## 2022-05-14 PROCEDURE — 93005 ELECTROCARDIOGRAM TRACING: CPT

## 2022-05-14 PROCEDURE — 83690 ASSAY OF LIPASE: CPT

## 2022-05-14 PROCEDURE — 84484 ASSAY OF TROPONIN QUANT: CPT

## 2022-05-14 PROCEDURE — 74011250636 HC RX REV CODE- 250/636: Performed by: EMERGENCY MEDICINE

## 2022-05-14 PROCEDURE — 85025 COMPLETE CBC W/AUTO DIFF WBC: CPT

## 2022-05-14 PROCEDURE — 80053 COMPREHEN METABOLIC PANEL: CPT

## 2022-05-14 PROCEDURE — 74011250637 HC RX REV CODE- 250/637: Performed by: EMERGENCY MEDICINE

## 2022-05-14 PROCEDURE — 71046 X-RAY EXAM CHEST 2 VIEWS: CPT

## 2022-05-14 PROCEDURE — 96374 THER/PROPH/DIAG INJ IV PUSH: CPT

## 2022-05-14 PROCEDURE — 74011000250 HC RX REV CODE- 250: Performed by: EMERGENCY MEDICINE

## 2022-05-14 PROCEDURE — 36415 COLL VENOUS BLD VENIPUNCTURE: CPT

## 2022-05-14 PROCEDURE — 99285 EMERGENCY DEPT VISIT HI MDM: CPT

## 2022-05-14 PROCEDURE — C9113 INJ PANTOPRAZOLE SODIUM, VIA: HCPCS | Performed by: EMERGENCY MEDICINE

## 2022-05-14 RX ADMIN — SODIUM CHLORIDE 40 MG: 9 INJECTION, SOLUTION INTRAMUSCULAR; INTRAVENOUS; SUBCUTANEOUS at 05:47

## 2022-05-14 RX ADMIN — LIDOCAINE HYDROCHLORIDE 40 ML: 20 SOLUTION ORAL; TOPICAL at 05:47

## 2022-05-14 NOTE — ED NOTES
Shift change report given to Maureen HAAS (oncoming nurse) by Christina Myles (offgoing nurse). Report included the following information SBAR, Kardex, ED Summary, Intake/Output, MAR, Recent Results and Cardiac Rhythm Sinus.

## 2022-05-14 NOTE — DISCHARGE INSTRUCTIONS
It was a pleasure taking care of you in our Emergency Department today. We know that when you come to [facility] you are entrusting us with your health, comfort, and safety. Our physicians and nurses honor that trust, and truly appreciate the opportunity to care for you and your loved ones. We also value your feedback. If you receive a survey about your Emergency Department experience today, please fill it out. We care about our patients' feedback, and we listen to what you have to say. Thank you!       Dr. Dima Astorga MD.

## 2022-05-14 NOTE — ED PROVIDER NOTES
EMERGENCY DEPARTMENT HISTORY AND PHYSICAL EXAM     ----------------------------------------------------------------------------  Please note that this dictation was completed with [employer], the computer voice recognition software. Quite often unanticipated grammatical, syntax, homophones, and other interpretive errors are inadvertently transcribed by the computer software. Please disregard these errors. Please excuse any errors that have escaped final proofreading  ----------------------------------------------------------------------------      Date: 5/14/2022   Patient Name: Mariseal Gayle    History of Presenting Illness     Chief Complaint   Patient presents with    Chest Pain     Patient ambulatory to triage w c/o chest pressure onset 2-3 days ago. Denies any injury. History Provided By:  Patient    HPI: Marisela Gayle is a 42 y.o. male, with significant pmhx of hypertension, cholesterol, diabetes, CAD with 2 stents, followed by S, who presents via private vehicle to the ED with c/o midsternal nonradiating chest pressure is been ongoing for several days. Notes having worsening pain today and is not relieved by sublingual nitro prompting him to come to the emergency department for further evaluation. Notes that he has been compliant with his previously prescribed Brilinta. Denies any associated abdominal pain. Notes feeling intermittently short of breath as well as nausea but not at time of my evaluation. Patient also specifically denies any associated fevers, chills, abd pain, changes in BM, urinary sxs, or headache. Social Hx: denies tobacco  denies EtOH , denies recreational/Illicit Drugs    There are no other complaints, changes, or physical findings at this time. PCP: Martínez Jay MD    No Known Allergies    Current Outpatient Medications   Medication Sig Dispense Refill    icosapent ethyL (VASCEPA) 1 gram capsule Take 1 Capsule by mouth two (2) times daily (with meals).  180 Capsule 1  nitroglycerin (NITROSTAT) 0.4 mg SL tablet TAKE 1 TABLET UNDER THE TONGUE EVERY FIVE (5) MINUTES AS NEEDED FOR CHEST PAIN (FOR UNSTABLE ANGINA, TAKE UP TO 3 TABLETS EVERY 5 MINUTES. IF CHEST PAIN IS UNRESOLVED, CALL 911.). UP TO 3 DOSES. 25 Tablet 3    carvediloL (COREG) 25 mg tablet TAKE 1 TABLET BY MOUTH TWICE A DAY WITH MEALS 180 Tablet 1    ezetimibe (ZETIA) 10 mg tablet Take 1 Tablet by mouth daily. 90 Tablet 3    insulin glargine (Lantus Solostar U-100 Insulin) 100 unit/mL (3 mL) inpn INJECT 50 UNITS UNDER THE SKIN EVERY DAY 5 Adjustable Dose Pre-filled Pen Syringe 11    Farxiga 5 mg tab tablet TAKE 1 TABLET BY MOUTH EVERY DAY 90 Tablet 1    semaglutide (Ozempic) 1 mg/dose (4 mg/3 mL) pnij 1 mg by SubCUTAneous route every seven (7) days. 1 Each 11    ticagrelor (BRILINTA) 90 mg tablet Take 1 Tablet by mouth two (2) times a day. 180 Tablet 1    sacubitriL-valsartan (Entresto) 24-26 mg tablet Take 1 Tablet by mouth two (2) times a day. 180 Tablet 1    amLODIPine (NORVASC) 10 mg tablet Take 1 Tablet by mouth daily. 90 Tablet 5    spironolactone (ALDACTONE) 25 mg tablet TAKE 1/2 TABLET BY MOUTH EVERY DAY 45 Tablet 1    rosuvastatin (CRESTOR) 40 mg tablet Take 1 Tablet by mouth daily. 30 Tablet 11    aspirin delayed-release 81 mg tablet TAKE 1 TABLET BY MOUTH EVERY DAY 90 Tab 3    tadalafiL (Cialis) 20 mg tablet Take 1 Tab by mouth as needed for Erectile Dysfunction.  15 Tab 11       Past History     Past Medical History:  Past Medical History:   Diagnosis Date    CAD (coronary artery disease)     2 stents 2016     Headache     Hypercholesterolemia     Hypertension     Hypogonadism male     Obstructive sleep apnea        Past Surgical History:  Past Surgical History:   Procedure Laterality Date    HX HEENT      status post pharyngioplasty       Family History:  Family History   Problem Relation Age of Onset    Heart Disease Father        Social History:  Social History     Tobacco Use    Smoking status: Never Smoker    Smokeless tobacco: Never Used   Vaping Use    Vaping Use: Never used   Substance Use Topics    Alcohol use: No    Drug use: No       Allergies:  No Known Allergies      Review of Systems   Review of Systems   Constitutional: Negative for chills and fever. HENT: Negative. Eyes: Negative. Respiratory: Positive for chest tightness and shortness of breath. Negative for cough. Cardiovascular: Positive for chest pain. Negative for leg swelling. Gastrointestinal: Positive for nausea. Negative for abdominal pain, diarrhea and vomiting. Endocrine: Negative. Genitourinary: Negative for difficulty urinating and dysuria. Musculoskeletal: Negative for myalgias. Skin: Negative. Neurological: Negative. Psychiatric/Behavioral: Negative. All other systems reviewed and are negative. Physical Exam   Physical Exam  Vitals and nursing note reviewed. Constitutional:       General: He is not in acute distress. Appearance: He is well-developed. He is obese. He is not diaphoretic. HENT:      Head: Normocephalic and atraumatic. Nose: Nose normal.      Mouth/Throat:      Pharynx: No oropharyngeal exudate. Eyes:      Conjunctiva/sclera: Conjunctivae normal.      Pupils: Pupils are equal, round, and reactive to light. Neck:      Vascular: No JVD. Cardiovascular:      Rate and Rhythm: Normal rate and regular rhythm. Heart sounds: Normal heart sounds. No murmur heard. No friction rub. Pulmonary:      Effort: Pulmonary effort is normal. No respiratory distress. Breath sounds: Normal breath sounds. No stridor. No wheezing or rales. Chest:      Chest wall: Tenderness present. Abdominal:      General: Bowel sounds are normal. There is no distension. Palpations: Abdomen is soft. Tenderness: There is no abdominal tenderness. There is no rebound. Musculoskeletal:         General: No tenderness. Normal range of motion. Cervical back: Normal range of motion and neck supple. Skin:     General: Skin is warm and dry. Findings: No rash. Neurological:      Mental Status: He is alert and oriented to person, place, and time. Cranial Nerves: No cranial nerve deficit. Psychiatric:         Speech: Speech normal.         Behavior: Behavior normal.         Thought Content: Thought content normal.         Judgment: Judgment normal.           Diagnostic Study Results     Labs -     Recent Results (from the past 12 hour(s))   CBC WITH AUTOMATED DIFF    Collection Time: 05/14/22  4:24 AM   Result Value Ref Range    WBC 6.9 4.1 - 11.1 K/uL    RBC 5.05 4. 10 - 5.70 M/uL    HGB 13.2 12.1 - 17.0 g/dL    HCT 40.2 36.6 - 50.3 %    MCV 79.6 (L) 80.0 - 99.0 FL    MCH 26.1 26.0 - 34.0 PG    MCHC 32.8 30.0 - 36.5 g/dL    RDW 14.1 11.5 - 14.5 %    PLATELET 942 126 - 291 K/uL    MPV 11.1 8.9 - 12.9 FL    NRBC 0.0 0  WBC    ABSOLUTE NRBC 0.00 0.00 - 0.01 K/uL    NEUTROPHILS 47 32 - 75 %    LYMPHOCYTES 41 12 - 49 %    MONOCYTES 10 5 - 13 %    EOSINOPHILS 2 0 - 7 %    BASOPHILS 0 0 - 1 %    IMMATURE GRANULOCYTES 0 0.0 - 0.5 %    ABS. NEUTROPHILS 3.2 1.8 - 8.0 K/UL    ABS. LYMPHOCYTES 2.8 0.8 - 3.5 K/UL    ABS. MONOCYTES 0.7 0.0 - 1.0 K/UL    ABS. EOSINOPHILS 0.1 0.0 - 0.4 K/UL    ABS. BASOPHILS 0.0 0.0 - 0.1 K/UL    ABS. IMM.  GRANS. 0.0 0.00 - 0.04 K/UL    DF AUTOMATED     METABOLIC PANEL, COMPREHENSIVE    Collection Time: 05/14/22  4:24 AM   Result Value Ref Range    Sodium 140 136 - 145 mmol/L    Potassium 3.4 (L) 3.5 - 5.1 mmol/L    Chloride 107 97 - 108 mmol/L    CO2 27 21 - 32 mmol/L    Anion gap 6 5 - 15 mmol/L    Glucose 157 (H) 65 - 100 mg/dL    BUN 14 6 - 20 MG/DL    Creatinine 1.25 0.70 - 1.30 MG/DL    BUN/Creatinine ratio 11 (L) 12 - 20      GFR est AA >60 >60 ml/min/1.73m2    GFR est non-AA >60 >60 ml/min/1.73m2    Calcium 8.9 8.5 - 10.1 MG/DL    Bilirubin, total 0.3 0.2 - 1.0 MG/DL    ALT (SGPT) 67 12 - 78 U/L    AST (SGOT) 38 (H) 15 - 37 U/L    Alk. phosphatase 102 45 - 117 U/L    Protein, total 7.5 6.4 - 8.2 g/dL    Albumin 3.7 3.5 - 5.0 g/dL    Globulin 3.8 2.0 - 4.0 g/dL    A-G Ratio 1.0 (L) 1.1 - 2.2     TROPONIN-HIGH SENSITIVITY    Collection Time: 05/14/22  4:24 AM   Result Value Ref Range    Troponin-High Sensitivity 8 0 - 76 ng/L   LIPASE    Collection Time: 05/14/22  4:24 AM   Result Value Ref Range    Lipase 81 73 - 393 U/L   NT-PRO BNP    Collection Time: 05/14/22  4:24 AM   Result Value Ref Range    NT pro-BNP <5 <125 PG/ML   EKG, 12 LEAD, INITIAL    Collection Time: 05/14/22  4:25 AM   Result Value Ref Range    Ventricular Rate 87 BPM    Atrial Rate 87 BPM    P-R Interval 130 ms    QRS Duration 96 ms    Q-T Interval 376 ms    QTC Calculation (Bezet) 452 ms    Calculated P Axis 27 degrees    Calculated R Axis 9 degrees    Calculated T Axis 17 degrees    Diagnosis       Normal sinus rhythm  Normal ECG  When compared with ECG of 05-NOV-2021 01:05,  T wave inversion no longer evident in Inferior leads  QT has lengthened     TROPONIN-HIGH SENSITIVITY    Collection Time: 05/14/22  6:04 AM   Result Value Ref Range    Troponin-High Sensitivity 9 0 - 76 ng/L       Radiologic Studies -   XR CHEST PA LAT   Final Result   No acute process. CT Results  (Last 48 hours)    None        CXR Results  (Last 48 hours)               05/14/22 0504  XR CHEST PA LAT Final result    Impression:  No acute process. Narrative:  INDICATION: Chest pain       COMPARISON: 11/5/2021       FINDINGS: PA and lateral views of the chest demonstrate a stable   cardiomediastinal silhouette and clear lungs bilaterally. The visualized osseous   structures are unremarkable. Medical Decision Making   I am the first provider for this patient. I reviewed the vital signs, available nursing notes, past medical history, past surgical history, family history and social history. Vital Signs-Reviewed the patient's vital signs.   Patient Vitals for the past 12 hrs:   Temp Pulse Resp BP SpO2   05/14/22 0626 -- 87 19 (!) 137/95 94 %   05/14/22 0556 -- 84 19 127/71 95 %   05/14/22 0541 -- 85 19 -- 93 %   05/14/22 0526 -- 83 18 121/62 96 %   05/14/22 0511 -- 83 21 130/69 94 %   05/14/22 0432 -- 83 18 131/84 96 %   05/14/22 0417 97.9 °F (36.6 °C) 84 18 (!) 154/91 96 %       Pulse Oximetry Analysis - 96% on RA, normal  Rate: 84 bpm  Rhythm: Normal sinus      Provider Notes (Medical Decision Making):     DDX:  ACS, arrhythmia, reflux, pancreatitis    Plan:  EKG, labs, chest x-ray, analgesic, GI cocktail    Impression:  Atypical chest pain    ED Course:   Initial assessment performed. The patients presenting problems have been discussed, and they are in agreement with the care plan formulated and outlined with them. I have encouraged them to ask questions as they arise throughout their visit. I reviewed the nursing notes and and vital signs from today's visit, as well as the electronic medical record system for any past medical records that were available that may contribute to the patients current condition, including previous primary care and cardiology follow-ups    Nursing notes will be reviewed as they become available in realtime while the pt has been in the ED. Orville Lobato MD    EKG interpretation 0425: NSR, nl Axis, rate 87; , QRS 96, QTc 452; NO STEMI; interpreted by Orville Lobato MD    I personally reviewed/interpreted pt's imaging. Agree with official read by radiology as noted above. Orville Lobato MD      7:17 AM   Progress note:  Pt noted to be feeling better, ready for discharge. Discussed lab and imaging findings with pt, specifically noting no escalation of trop. Pt will follow up with cardiology as instructed. All questions have been answered, pt voiced understanding and agreement with plan.        Specific return precautions provided in addition to instructions for pt to return to the ED immediately should sx worsen at any time. Amelia Carr MD           Critical Care Time:     none      Diagnosis     Clinical Impression:   1. Atypical chest pain        PLAN:  1. Discharge Medication List as of 5/14/2022  8:05 AM        2. Follow-up Information     Follow up With Specialties Details Why Contact Info    Tej Chauhan MD Internal Medicine Physician Schedule an appointment as soon as possible for a visit in 2 days  [facility]  [address]  [address]      Chaparro Luis MD Cardiovascular Disease Physician, Interventional Cardiology Physician Schedule an appointment as soon as possible for a visit   [address] [facility]  [address]  [address]      [address] DEPT Emergency Medicine  As needed, If symptoms worsen [address]  [address]  [phone]        Return to ED if worse     Disposition:  The patient's results have been reviewed with family and/or caregiver. They verbally convey their understanding and agreement of the patient's signs, symptoms, diagnosis, treatment and prognosis and additionally agree to follow up as recommended in the discharge instructions or to return to the Emergency Room should the patient's condition change prior to their follow-up appointment. The family and/or caregiver verbally agrees with the care-plan and all of their questions have been answered. The discharge instructions have also been provided to the them with educational information regarding the patient's diagnosis as well a list of reasons why the patient would want to return to the ER prior to their follow-up appointment should their condition change.   Amelia Carr MD

## 2022-05-15 NOTE — PROGRESS NOTES
[address] [facility]  [place] [address]  [address] [place] [address]  Phone:  [phone]  Fax: [phone]       Chief Complaint   Patient presents with    Diabetes     routine follow up    . SUBJECTIVE:    Sirisha Butler is a 42 y.o. male comes in for return visit remaining totally noncompliant. He does not check his sugars at all or eat in an appropriate fashion. I have no idea how his blood sugars have indeed been doing. He has this vague chest pain, which he always has on the left side at the anterior axillary line, which is noncardiac. He follows up with Cardiology on a regular basis. He has a past history of primary hypertension, dyslipidemia, as well as obesity. His chief complaint is that of erectile dysfunction, which is chronic. Current Outpatient Medications   Medication Sig Dispense Refill    icosapent ethyL (VASCEPA) 1 gram capsule Take 1 Capsule by mouth two (2) times daily (with meals). 180 Capsule 1    nitroglycerin (NITROSTAT) 0.4 mg SL tablet TAKE 1 TABLET UNDER THE TONGUE EVERY FIVE (5) MINUTES AS NEEDED FOR CHEST PAIN (FOR UNSTABLE ANGINA, TAKE UP TO 3 TABLETS EVERY 5 MINUTES. IF CHEST PAIN IS UNRESOLVED, CALL 911.). UP TO 3 DOSES. 25 Tablet 3    carvediloL (COREG) 25 mg tablet TAKE 1 TABLET BY MOUTH TWICE A DAY WITH MEALS 180 Tablet 1    ezetimibe (ZETIA) 10 mg tablet Take 1 Tablet by mouth daily. 90 Tablet 3    insulin glargine (Lantus Solostar U-100 Insulin) 100 unit/mL (3 mL) inpn INJECT 50 UNITS UNDER THE SKIN EVERY DAY 5 Adjustable Dose Pre-filled Pen Syringe 11    Farxiga 5 mg tab tablet TAKE 1 TABLET BY MOUTH EVERY DAY 90 Tablet 1    semaglutide (Ozempic) 1 mg/dose (4 mg/3 mL) pnij 1 mg by SubCUTAneous route every seven (7) days. 1 Each 11    ticagrelor (BRILINTA) 90 mg tablet Take 1 Tablet by mouth two (2) times a day. 180 Tablet 1    sacubitriL-valsartan (Entresto) 24-26 mg tablet Take 1 Tablet by mouth two (2) times a day. 180 Tablet 1    amLODIPine (NORVASC) 10 mg tablet Take 1 Tablet by mouth daily. 90 Tablet 5    spironolactone (ALDACTONE) 25 mg tablet TAKE 1/2 TABLET BY MOUTH EVERY DAY 45 Tablet 1    rosuvastatin (CRESTOR) 40 mg tablet Take 1 Tablet by mouth daily. 30 Tablet 11    aspirin delayed-release 81 mg tablet TAKE 1 TABLET BY MOUTH EVERY DAY 90 Tab 3    tadalafiL (Cialis) 20 mg tablet Take 1 Tab by mouth as needed for Erectile Dysfunction.  15 Tab 11     Past Medical History:   Diagnosis Date    CAD (coronary artery disease)     2 stents 2016     Headache     Hypercholesterolemia     Hypertension     Hypogonadism male     Obstructive sleep apnea      Past Surgical History:   Procedure Laterality Date    HX HEENT      status post pharyngioplasty     No Known Allergies      REVIEW OF SYSTEMS:  General: negative for - chills or fever  ENT: negative for - headaches, nasal congestion or tinnitus  Respiratory: negative for - cough, hemoptysis, shortness of breath or wheezing  Cardiovascular : negative for - chest pain, edema, palpitations or shortness of breath  Gastrointestinal: negative for - abdominal pain, blood in stools, heartburn or nausea/vomiting  Genito-Urinary: no dysuria, trouble voiding, or hematuria  Musculoskeletal: negative for - gait disturbance, joint pain, joint stiffness or joint swelling  Neurological: no TIA or stroke symptoms  Hematologic: no bruises, no bleeding, no swollen glands  Integument: no lumps, mole changes, nail changes or rash  Endocrine: no malaise/lethargy or unexpected weight changes      Social History     Socioeconomic History    Marital status:     Number of children: 2    Years of education: 12   Occupational History    Occupation:    Tobacco Use    Smoking status: Never Smoker    Smokeless tobacco: Never Used   Vaping Use    Vaping Use: Never used   Substance and Sexual Activity    Alcohol use: No    Drug use: No    Sexual activity: Yes     Partners: Female     Family History   Problem Relation Age of Onset    Heart Disease Father        OBJECTIVE:    Visit Vitals  /82   Pulse 81   Temp 99 °F (37.2 °C) (Oral)   Resp 16   Ht 5' 3\" (1.6 m)   Wt 207 lb 11.2 oz (94.2 kg)   SpO2 95%   BMI 36.79 kg/m²     CONSTITUTIONAL: well , well nourished, appears age appropriate  EYES: perrla, eom intact  ENMT:moist mucous membranes, pharynx clear  NECK: supple. Thyroid normal  RESPIRATORY: Chest: clear to ascultation and percussion   CARDIOVASCULAR: Heart: regular rate and rhythm  GASTROINTESTINAL: Abdomen: soft, bowel sounds active  HEMATOLOGIC: no pathological lymph nodes palpated  MUSCULOSKELETAL: Extremities: no edema, pulse 1+   INTEGUMENT: No unusual rashes or suspicious skin lesions noted. Nails appear normal.  NEUROLOGIC: non-focal exam   MENTAL STATUS: alert and oriented, appropriate affect      ASSESSMENT:  1. Uncontrolled type 2 diabetes mellitus with hyperglycemia ([address]    2. Primary hypertension    3. Dyslipidemia    4. ASHD (arteriosclerotic heart disease)    5. Obstructive sleep apnea    6. Erectile dysfunction, unspecified erectile dysfunction type    7. History of noncompliance with medical treatment        PLAN:  1. His diabetes is totally out of control, primarily because of his noncompliance. He does not follow any directions. He does not check sugars. Often times, he runs out of his medication and does not say anything for several months about refills. This is contributing to his cardiovascular disease as well as significantly to his erectile dysfunction. 2. Blood pressure is reasonable today. 3. He has a high cardiovascular risk and is in a secondary risk prevention category. Efficacy and compliance of his lipid status will be assessed. 4. He has significant coronary artery disease and will follow up with his cardiologist, but he has had several stents.   5. He has obstructive sleep apnea for which he is probably symptomatic, but refuses to follow up with this. 6. He has significant erectile dysfunction. There is very little that can be done other than significant weight reduction and improving his diabetic compliance is controlled. 7. His whole history is one of medical noncompliance and we have discussed this on each of his visits. .  Orders Placed This Encounter    HEMOGLOBIN A1C WITH EAG    METABOLIC PANEL, BASIC    APOLIPOPROTEIN B         Follow-up and Dispositions    · Return in about 4 weeks (around 6/8/2022).            Simon Espinoza MD

## 2022-05-19 RX ORDER — SPIRONOLACTONE 25 MG/1
TABLET ORAL
Qty: 45 TABLET | Refills: 1 | Status: SHIPPED | OUTPATIENT
Start: 2022-05-19

## 2022-05-25 ENCOUNTER — OFFICE VISIT (OUTPATIENT)
Dept: CARDIOLOGY CLINIC | Age: 47
End: 2022-05-25
Payer: COMMERCIAL

## 2022-05-25 VITALS
HEIGHT: 64 IN | OXYGEN SATURATION: 98 % | RESPIRATION RATE: 16 BRPM | BODY MASS INDEX: 35.68 KG/M2 | SYSTOLIC BLOOD PRESSURE: 120 MMHG | HEART RATE: 81 BPM | DIASTOLIC BLOOD PRESSURE: 88 MMHG | WEIGHT: 209 LBS

## 2022-05-25 DIAGNOSIS — I42.9 CARDIOMYOPATHY, UNSPECIFIED TYPE (HCC): Primary | ICD-10-CM

## 2022-05-25 DIAGNOSIS — E78.5 DYSLIPIDEMIA: ICD-10-CM

## 2022-05-25 DIAGNOSIS — I25.110 CORONARY ARTERY DISEASE INVOLVING NATIVE CORONARY ARTERY OF NATIVE HEART WITH UNSTABLE ANGINA PECTORIS (HCC): ICD-10-CM

## 2022-05-25 DIAGNOSIS — I73.9 PAD (PERIPHERAL ARTERY DISEASE) (HCC): ICD-10-CM

## 2022-05-25 DIAGNOSIS — I10 ESSENTIAL HYPERTENSION: ICD-10-CM

## 2022-05-25 DIAGNOSIS — E66.01 SEVERE OBESITY (HCC): ICD-10-CM

## 2022-05-25 PROCEDURE — 99214 OFFICE O/P EST MOD 30 MIN: CPT | Performed by: INTERNAL MEDICINE

## 2022-05-25 RX ORDER — SACUBITRIL AND VALSARTAN 24; 26 MG/1; MG/1
1 TABLET, FILM COATED ORAL 2 TIMES DAILY
Qty: 180 TABLET | Refills: 1 | Status: SHIPPED | OUTPATIENT
Start: 2022-05-25

## 2022-05-25 RX ORDER — CARVEDILOL 25 MG/1
25 TABLET ORAL 2 TIMES DAILY WITH MEALS
Qty: 180 TABLET | Refills: 1 | Status: SHIPPED | OUTPATIENT
Start: 2022-05-25

## 2022-05-25 NOTE — PROGRESS NOTES
[facility] [place]: April Rendon  [phone]          Cardiology Consult/Progress Note      Requesting/referring provider: Dr. Iván Haque,  Reason for Consult: Coronary disease    Assessment/Plan:  1. Coronary disease manifesting in the form of unstable angina  2. Cardiomyopathy with prior EF documented to be as low as 30%  3. Hypertension poor control  4. Hypercholesterolemia. Good LDL control  5. Metabolic syndrome elevated triglycerides  6. Diabetes mellitus with poor control    Mr. Geovanna Kiser is seen for history of coronary disease and ischemic cardiomyopathy; 6 mo follow up. Since his last PCI in April 2021, he has reported significant reduction in frequency of his chest discomfort which was nonetheless atypical.  His EF was previously severely reduced but now appears to be 40 to 45%, 9/21. Nonetheless he needs aggressive management of his diabetes hypertension and lipids as well as guideline directed medical therapy for heart failure with reduced action fraction. His blood pressure was elevated today but apparently he did not take his medication this morning. He is on entresto and amlodipine. Carvedilol should be continued at maximum dose. He does not require significant doses of diuretics to manage systemic congestion. Ticagrelor based DAPT is being continued. Crestor has been maximized to 40 mg daily. Since his last known triglycerides were greater than 150(198 mg/dL) He was started on Vascepa. Due for lipids with PCP soon. Given hx and recent ED visit for chest pain, will repeat stress echo. If negative, follow up in 6 mo. Will continue with DAPT at this time.      []    High complexity decision making was performed  []    Patient is at high-risk of decompensation with multiple organ involvement      Investigations personally reviewed by me  ECG November 2020: Sinus rhythm, lateral precordial and lateral limb lead ST depressions nonspecific but could suggest ischemic heart disease  Echocardiogram November 2020 elevated LV systolic function of 35 to 40%. Mild regurgitation. Cardiac catheterization November 2020 patent stents in RCA and LAD. Diffuse small vessel disease involving diagonal branches. Right posterolateral branch is jailed chronically occluded. Mid circumflex/major obtuse marginal with 90 stenosis treated with drug-eluting stent. LDL November 2020: 2020. Triglycerides 190  Most recent HbA1c is less than 7. ECG performed 4/28/2021: Sinus rhythm, ST-T changes in the form of ST depression in inferolateral leads  Cardiac catheterization April 2021: Proximal RCA de zac stenosis treated with placement of another drug-eluting stent. 40 to 50% ISR in mid RCA stent. Patent stent in circumflex with 25% ISR. Apical and distal LAD stents are patent. Echocardiogram September 2021: Globally reduced EF. Overall EF about 40 to 45%. No regional wall motion abnormalities. HPI: Chidi Chávez, a 42y.o. year-old who presents for evaluation of coronary artery disease. Mr. Veto Yee has history of metabolic syndrome, poorly controlled diabetes and hypercholesterolemia, hypertension. He also history of coronary artery disease with history of remote coronary artery stenting. In November 2020, he was evaluated at [facility] with acute onset chest November. Elevated troponins and ECG changes. He underwent cardiac catheterization demonstrating mid circumflex/marginal treated with drug-eluting stent EF during this hospitalization was noted to be globally reduced 35 to 40%. Off-and-on he has had atypical chest discomfort requiring further ER visits. Recently had a visit with progressive symptoms to [facility] emergency room. His proximal RCA which has now new de zac lesion was stented. He also has diffuse distal right posterior AV groove branch which was disease and was treated with balloon angioplasty alone.   Since his discharge he has generally done well. Occasionally he continues to have same left upper chest discomfort which she has had in the past which is likely noncardiac and is not related to activity or exertion. Was recently seen in ED [facility] ED for chest pain and ruled out with normal EKG and negative HsTrop x 2. Noted that his heart was racing with chest pressure. Restarted Ozempic that day prior to sx. Took SL NTG without relief and went to ED. Was not as bad as previous chest pain. Has been feeling like \"I have a blockage\" more recently. With eating he feels \"tightness\"' in he left chest. This started before the ED visit - about 1 -2 mo. Is aware of it at other times but not nec with exertion. He  has a past medical history of CAD (coronary artery disease), Headache, Hypercholesterolemia, Hypertension, Hypogonadism male, and Obstructive sleep apnea. Review of system:Patient reports no dyspnea/PND/Orthpnea. He reports no cough/fever/focal neurological deficits/abdominal pain. All other systems negative except as above. Family History   Problem Relation Age of Onset    Heart Disease Father       Social History     Socioeconomic History    Marital status:     Number of children: 2    Years of education: 12   Occupational History    Occupation:    Tobacco Use    Smoking status: Never Smoker    Smokeless tobacco: Never Used   Vaping Use    Vaping Use: Never used   Substance and Sexual Activity    Alcohol use: No    Drug use: No    Sexual activity: Yes     Partners: Female      PE  Vitals:    05/25/22 1604   BP: 120/88   Pulse: 81   Resp: 16   SpO2: 98%   Weight: 209 lb (94.8 kg)   Height: 5' 4\" (1.626 m)    Body mass index is 35.87 kg/m². General:    Alert, cooperative, no distress. Psychiatric:    Normal Mood and affect    Eye/ENT:      Pupils equal, No asymmetry, Conjunctival pink. Able to hear voice at normal amplitude   Lungs:      Visibly symmetric chest expansion, No palpable tenderness.  Clear to auscultation bilaterally. Heart[de-identified]    Regular rate and rhythm, S1, S2 normal, no murmur, click, rub or gallop. No JVD, Normal palpable peripheral pulses. No cyanosis   Abdomen:     Soft, non-tender. Bowel sounds normal. No masses,  No      organomegaly. Extremities:   Extremities normal, atraumatic, no edema. Neurologic:   CN II-XII grossly intact.  No gross focal deficits           Recent Labs:  Lab Results   Component Value Date/Time    Cholesterol, total 185 11/15/2021 12:00 AM    HDL Cholesterol 51 11/15/2021 12:00 AM    LDL, calculated 107 (H) 11/15/2021 12:00 AM    LDL, calculated 33.4 11/28/2020 01:38 AM    Triglyceride 157 (H) 11/15/2021 12:00 AM    CHOL/HDL Ratio 2.4 11/28/2020 01:38 AM     Lab Results   Component Value Date/Time    Creatinine (POC) 1.0 12/13/2014 07:54 AM    Creatinine 1.25 05/14/2022 04:24 AM     Lab Results   Component Value Date/Time    BUN 14 05/14/2022 04:24 AM    BUN (POC) 6 (L) 12/13/2014 07:54 AM     Lab Results   Component Value Date/Time    Potassium 3.4 (L) 05/14/2022 04:24 AM     Lab Results   Component Value Date/Time    Hemoglobin A1c 12.2 (H) 11/15/2021 12:00 AM     Lab Results   Component Value Date/Time    Hemoglobin (POC) 14.6 12/13/2014 07:54 AM    HGB 13.2 05/14/2022 04:24 AM     Lab Results   Component Value Date/Time    PLATELET 151 30/80/9891 04:24 AM       Reviewed:  Past Medical History:   Diagnosis Date    CAD (coronary artery disease)     2 stents 2016     Headache     Hypercholesterolemia     Hypertension     Hypogonadism male     Obstructive sleep apnea      Social History     Tobacco Use   Smoking Status Never Smoker   Smokeless Tobacco Never Used     Social History     Substance and Sexual Activity   Alcohol Use No     No Known Allergies  Family History   Problem Relation Age of Onset    Heart Disease Father         Current Outpatient Medications   Medication Sig    spironolactone (ALDACTONE) 25 mg tablet TAKE 1/2 TABLET BY MOUTH EVERY DAY    icosapent ethyL (VASCEPA) 1 gram capsule Take 1 Capsule by mouth two (2) times daily (with meals).  carvediloL (COREG) 25 mg tablet TAKE 1 TABLET BY MOUTH TWICE A DAY WITH MEALS    ezetimibe (ZETIA) 10 mg tablet Take 1 Tablet by mouth daily.  insulin glargine (Lantus Solostar U-100 Insulin) 100 unit/mL (3 mL) inpn INJECT 50 UNITS UNDER THE SKIN EVERY DAY    Farxiga 5 mg tab tablet TAKE 1 TABLET BY MOUTH EVERY DAY    semaglutide (Ozempic) 1 mg/dose (4 mg/3 mL) pnij 1 mg by SubCUTAneous route every seven (7) days.  ticagrelor (BRILINTA) 90 mg tablet Take 1 Tablet by mouth two (2) times a day.  sacubitriL-valsartan (Entresto) 24-26 mg tablet Take 1 Tablet by mouth two (2) times a day.  amLODIPine (NORVASC) 10 mg tablet Take 1 Tablet by mouth daily.  rosuvastatin (CRESTOR) 40 mg tablet Take 1 Tablet by mouth daily.  aspirin delayed-release 81 mg tablet TAKE 1 TABLET BY MOUTH EVERY DAY    nitroglycerin (NITROSTAT) 0.4 mg SL tablet TAKE 1 TABLET UNDER THE TONGUE EVERY FIVE (5) MINUTES AS NEEDED FOR CHEST PAIN (FOR UNSTABLE ANGINA, TAKE UP TO 3 TABLETS EVERY 5 MINUTES. IF CHEST PAIN IS UNRESOLVED, CALL 911.). UP TO 3 DOSES. No current facility-administered medications for this visit. Alford Castleman, ANP05/25/22   ATTENTION:   This medical record was transcribed using an electronic medical records/speech recognition system. Although proofread, it may and can contain electronic, spelling and other errors. Corrections may be executed at a later time. Please feel free to contact us for any clarifications as needed.     [employer] [facility]  [address], [address], [address] [place] [address]

## 2022-05-25 NOTE — PATIENT INSTRUCTIONS
You will be scheduled for a stress echocardiogram after your appointment today. Please wear comfortable clothing (shorts or pants with a shirt or blouse) and walking/athletic shoes. Do not eat or drink anything, except water, for at least 2 hours prior to your test.  Do not  take your coreg the morning of your scheduled  test.    Our office will call you with results.

## 2022-05-25 NOTE — LETTER
5/29/2022    Patient: Ace Kang   YOB: 1975   Date of Visit: 5/25/2022     Mami Diaz, [address] [facility]  [address]  [address] [address]  [address] [facility] [address]    Dear Mami Diaz MD,      Thank you for referring Mr. Ace Kang to [facility] for evaluation. My notes for this consultation are attached. If you have questions, please do not hesitate to call me. I look forward to following your patient along with you.       Sincerely,    Tommy Crespo MD

## 2022-06-09 DIAGNOSIS — E11.9 TYPE 2 DIABETES MELLITUS WITHOUT COMPLICATION, WITH LONG-TERM CURRENT USE OF INSULIN (HCC): ICD-10-CM

## 2022-06-09 DIAGNOSIS — Z79.4 TYPE 2 DIABETES MELLITUS WITHOUT COMPLICATION, WITH LONG-TERM CURRENT USE OF INSULIN (HCC): ICD-10-CM

## 2022-06-09 RX ORDER — INSULIN GLARGINE 100 [IU]/ML
INJECTION, SOLUTION SUBCUTANEOUS
Qty: 5 ADJUSTABLE DOSE PRE-FILLED PEN SYRINGE | Refills: 11 | Status: SHIPPED | OUTPATIENT
Start: 2022-06-09 | End: 2022-07-22

## 2022-06-16 ENCOUNTER — TELEPHONE (OUTPATIENT)
Dept: CARDIOLOGY CLINIC | Age: 47
End: 2022-06-16

## 2022-06-16 DIAGNOSIS — I25.110 CORONARY ARTERY DISEASE INVOLVING NATIVE CORONARY ARTERY OF NATIVE HEART WITH UNSTABLE ANGINA PECTORIS (HCC): ICD-10-CM

## 2022-06-16 DIAGNOSIS — R07.9 CHEST PAIN, UNSPECIFIED TYPE: ICD-10-CM

## 2022-06-16 DIAGNOSIS — I10 ESSENTIAL HYPERTENSION: ICD-10-CM

## 2022-06-16 DIAGNOSIS — I42.9 CARDIOMYOPATHY, UNSPECIFIED TYPE (HCC): Primary | ICD-10-CM

## 2022-06-16 NOTE — TELEPHONE ENCOUNTER
Patient to be scheduled for colonoscopy on 7-13-22 with Dr. Britton Perez. Requesting to hold brillinta x 5 days.

## 2022-07-07 NOTE — TELEPHONE ENCOUNTER
Per Palmira Mcmahan, NP:  Will need to wait on results of stress echo prior to giving cardiac clearance and recommendations for holding brilinta. Identifiers x 2. Informed patient of the above. Faxed above information to [phone]. Confirmation received.

## 2022-07-08 DIAGNOSIS — I25.10 ASHD (ARTERIOSCLEROTIC HEART DISEASE): ICD-10-CM

## 2022-07-08 RX ORDER — ASPIRIN 81 MG/1
TABLET ORAL
Qty: 90 TABLET | Refills: 3 | Status: SHIPPED | OUTPATIENT
Start: 2022-07-08

## 2022-07-12 ENCOUNTER — TELEPHONE (OUTPATIENT)
Dept: CARDIOLOGY CLINIC | Age: 47
End: 2022-07-12

## 2022-07-12 NOTE — TELEPHONE ENCOUNTER
Patient in office today for stress echo. States he has not taken any of his medications since Saturday 7/9. Resting BP prior to starting test was 162/108, checked again later with no improvement. Per Dr. Angelica Givens cancel test due to elevated BP. Explained reasoning to patient and advised him to start his medications back up asap. Patient also stated he recently injured his leg and prefers not to walk the treadmill due to pain. He would like to know if he can do another test that does not involve the  treadmill? Informed him that I would leave a message with Dr. Kathryn Canales team and we would reach out with further instructions.

## 2022-07-14 NOTE — TELEPHONE ENCOUNTER
Identifiers x 2. Stress echo unable to be performed due to hypertension. Patient had not taking BP medications. States that he is to be scheduled for colonoscopy and had been instructed to not take medications. Was unsure of specific meds, so he did not take any. Discussed importance of controlling BP and blood glucose. Informed that the only medication that he would not take for colonoscopy is brilinta and that would need to be determined by our office after nuclear stress test.  Verbalized understanding. Verbally discussed test instructions. Written instructions sent [place].

## 2022-07-22 ENCOUNTER — TELEPHONE (OUTPATIENT)
Dept: CARDIOLOGY CLINIC | Age: 47
End: 2022-07-22

## 2022-07-22 RX ORDER — INSULIN DEGLUDEC INJECTION 200 U/ML
INJECTION, SOLUTION SUBCUTANEOUS
Qty: 3 PEN | Refills: 11 | Status: SHIPPED | OUTPATIENT
Start: 2022-07-22

## 2022-07-25 ENCOUNTER — ANCILLARY PROCEDURE (OUTPATIENT)
Dept: CARDIOLOGY CLINIC | Age: 47
End: 2022-07-25
Payer: COMMERCIAL

## 2022-07-25 VITALS
DIASTOLIC BLOOD PRESSURE: 90 MMHG | WEIGHT: 209 LBS | BODY MASS INDEX: 35.68 KG/M2 | HEIGHT: 64 IN | SYSTOLIC BLOOD PRESSURE: 124 MMHG

## 2022-07-25 DIAGNOSIS — I42.9 CARDIOMYOPATHY, UNSPECIFIED TYPE (HCC): ICD-10-CM

## 2022-07-25 DIAGNOSIS — I25.110 CORONARY ARTERY DISEASE INVOLVING NATIVE CORONARY ARTERY OF NATIVE HEART WITH UNSTABLE ANGINA PECTORIS (HCC): ICD-10-CM

## 2022-07-25 DIAGNOSIS — I10 ESSENTIAL HYPERTENSION: ICD-10-CM

## 2022-07-25 DIAGNOSIS — R07.9 CHEST PAIN, UNSPECIFIED TYPE: ICD-10-CM

## 2022-07-25 PROCEDURE — A9500 TC99M SESTAMIBI: HCPCS | Performed by: INTERNAL MEDICINE

## 2022-07-25 PROCEDURE — 78452 HT MUSCLE IMAGE SPECT MULT: CPT | Performed by: INTERNAL MEDICINE

## 2022-07-25 PROCEDURE — 93015 CV STRESS TEST SUPVJ I&R: CPT | Performed by: INTERNAL MEDICINE

## 2022-07-25 RX ORDER — TETRAKIS(2-METHOXYISOBUTYLISOCYANIDE)COPPER(I) TETRAFLUOROBORATE 1 MG/ML
30 INJECTION, POWDER, LYOPHILIZED, FOR SOLUTION INTRAVENOUS ONCE
Status: COMPLETED | OUTPATIENT
Start: 2022-07-25 | End: 2022-07-25

## 2022-07-25 RX ORDER — TETRAKIS(2-METHOXYISOBUTYLISOCYANIDE)COPPER(I) TETRAFLUOROBORATE 1 MG/ML
10 INJECTION, POWDER, LYOPHILIZED, FOR SOLUTION INTRAVENOUS ONCE
Status: COMPLETED | OUTPATIENT
Start: 2022-07-25 | End: 2022-07-25

## 2022-07-25 RX ADMIN — TETRAKIS(2-METHOXYISOBUTYLISOCYANIDE)COPPER(I) TETRAFLUOROBORATE 26.3 MILLICURIE: 1 INJECTION, POWDER, LYOPHILIZED, FOR SOLUTION INTRAVENOUS at 14:45

## 2022-07-25 RX ADMIN — TETRAKIS(2-METHOXYISOBUTYLISOCYANIDE)COPPER(I) TETRAFLUOROBORATE 8.7 MILLICURIE: 1 INJECTION, POWDER, LYOPHILIZED, FOR SOLUTION INTRAVENOUS at 13:20

## 2022-07-27 LAB
NUC STRESS EJECTION FRACTION: 43 %
STRESS BASELINE DIAS BP: 90 MMHG
STRESS BASELINE HR: 92 BPM
STRESS BASELINE SYS BP: 124 MMHG
STRESS O2 SAT PEAK: 98 %
STRESS O2 SAT REST: 98 %
STRESS PEAK DIAS BP: 80 MMHG
STRESS PEAK SYS BP: 120 MMHG
STRESS PERCENT HR ACHIEVED: 82 %
STRESS POST PEAK HR: 144 BPM
STRESS RATE PRESSURE PRODUCT: NORMAL BPM*MMHG
STRESS TARGET HR: 175 BPM
TID: 1.17

## 2022-07-28 NOTE — PROGRESS NOTES
Message sent via cc  Mr Sammy Carpio-   Your stress test is without blockage or defect. Heart function remains unchanged.

## 2022-07-29 ENCOUNTER — TELEPHONE (OUTPATIENT)
Dept: CARDIOLOGY CLINIC | Age: 47
End: 2022-07-29

## 2022-07-29 NOTE — TELEPHONE ENCOUNTER
Attempted several times to fax clearance to [phone]. Fax # repeatedly busy. Routed message to MD via [employer].

## 2022-07-29 NOTE — TELEPHONE ENCOUNTER
Per Yue Payne NP:  Yes he is CLEARED and my hold brilinta for 5 days. Identifiers x 2. Informed of NP recommendation. Verbalized understanding. Attempted to faxed clearance note to Dr. Otto Moran office @ [phone].   Continued busy signal.

## 2022-07-29 NOTE — TELEPHONE ENCOUNTER
----- Message from CLARENCE Horton sent at 7/28/2022  3:50 PM EDT -----  Message sent via cc  Mr Dany Greco-   Your stress test is without blockage or defect. Heart function remains unchanged.

## 2022-08-17 ENCOUNTER — APPOINTMENT (OUTPATIENT)
Dept: ULTRASOUND IMAGING | Age: 47
End: 2022-08-17
Attending: STUDENT IN AN ORGANIZED HEALTH CARE EDUCATION/TRAINING PROGRAM
Payer: COMMERCIAL

## 2022-08-17 ENCOUNTER — HOSPITAL ENCOUNTER (EMERGENCY)
Age: 47
Discharge: HOME | End: 2022-08-17
Attending: STUDENT IN AN ORGANIZED HEALTH CARE EDUCATION/TRAINING PROGRAM
Payer: COMMERCIAL

## 2022-08-17 VITALS
BODY MASS INDEX: 34.15 KG/M2 | HEART RATE: 90 BPM | TEMPERATURE: 98.3 F | OXYGEN SATURATION: 93 % | RESPIRATION RATE: 16 BRPM | SYSTOLIC BLOOD PRESSURE: 164 MMHG | WEIGHT: 200 LBS | HEIGHT: 64 IN | DIASTOLIC BLOOD PRESSURE: 105 MMHG

## 2022-08-17 DIAGNOSIS — K29.90 GASTRITIS AND DUODENITIS: Primary | ICD-10-CM

## 2022-08-17 LAB
ALBUMIN SERPL-MCNC: 3.5 G/DL (ref 3.5–5)
ALBUMIN SERPL-MCNC: 3.7 G/DL (ref 3.5–5)
ALBUMIN/GLOB SERPL: 0.9 {RATIO} (ref 1.1–2.2)
ALBUMIN/GLOB SERPL: 0.9 {RATIO} (ref 1.1–2.2)
ALP SERPL-CCNC: 112 U/L (ref 45–117)
ALP SERPL-CCNC: 118 U/L (ref 45–117)
ALT SERPL-CCNC: 71 U/L (ref 12–78)
ALT SERPL-CCNC: 77 U/L (ref 12–78)
ANION GAP SERPL CALC-SCNC: 7 MMOL/L (ref 5–15)
APPEARANCE UR: CLEAR
AST SERPL-CCNC: 39 U/L (ref 15–37)
AST SERPL-CCNC: 41 U/L (ref 15–37)
ATRIAL RATE: 88 BPM
BASOPHILS # BLD: 0 K/UL (ref 0–0.1)
BASOPHILS NFR BLD: 1 % (ref 0–1)
BILIRUB DIRECT SERPL-MCNC: 0.1 MG/DL (ref 0–0.2)
BILIRUB SERPL-MCNC: 0.3 MG/DL (ref 0.2–1)
BILIRUB SERPL-MCNC: 0.5 MG/DL (ref 0.2–1)
BILIRUB UR QL: NEGATIVE
BUN SERPL-MCNC: 17 MG/DL (ref 6–20)
BUN/CREAT SERPL: 14 (ref 12–20)
CALCIUM SERPL-MCNC: 8.9 MG/DL (ref 8.5–10.1)
CALCULATED P AXIS, ECG09: 32 DEGREES
CALCULATED R AXIS, ECG10: -9 DEGREES
CALCULATED T AXIS, ECG11: -5 DEGREES
CHLORIDE SERPL-SCNC: 105 MMOL/L (ref 97–108)
CO2 SERPL-SCNC: 26 MMOL/L (ref 21–32)
COLOR UR: ABNORMAL
CREAT SERPL-MCNC: 1.25 MG/DL (ref 0.7–1.3)
DIAGNOSIS, 93000: NORMAL
DIFFERENTIAL METHOD BLD: ABNORMAL
EOSINOPHIL # BLD: 0.1 K/UL (ref 0–0.4)
EOSINOPHIL NFR BLD: 2 % (ref 0–7)
ERYTHROCYTE [DISTWIDTH] IN BLOOD BY AUTOMATED COUNT: 14.3 % (ref 11.5–14.5)
GLOBULIN SER CALC-MCNC: 4 G/DL (ref 2–4)
GLOBULIN SER CALC-MCNC: 4.1 G/DL (ref 2–4)
GLUCOSE SERPL-MCNC: 282 MG/DL (ref 65–100)
GLUCOSE UR STRIP.AUTO-MCNC: >1000 MG/DL
HCT VFR BLD AUTO: 47.5 % (ref 36.6–50.3)
HGB BLD-MCNC: 15.4 G/DL (ref 12.1–17)
HGB UR QL STRIP: NEGATIVE
IMM GRANULOCYTES # BLD AUTO: 0 K/UL (ref 0–0.04)
IMM GRANULOCYTES NFR BLD AUTO: 0 % (ref 0–0.5)
KETONES UR QL STRIP.AUTO: NEGATIVE MG/DL
LACTATE SERPL-SCNC: 0.9 MMOL/L (ref 0.4–2)
LEUKOCYTE ESTERASE UR QL STRIP.AUTO: NEGATIVE
LIPASE SERPL-CCNC: 550 U/L (ref 73–393)
LYMPHOCYTES # BLD: 1.9 K/UL (ref 0.8–3.5)
LYMPHOCYTES NFR BLD: 44 % (ref 12–49)
MCH RBC QN AUTO: 26.5 PG (ref 26–34)
MCHC RBC AUTO-ENTMCNC: 32.4 G/DL (ref 30–36.5)
MCV RBC AUTO: 81.6 FL (ref 80–99)
MONOCYTES # BLD: 0.7 K/UL (ref 0–1)
MONOCYTES NFR BLD: 15 % (ref 5–13)
NEUTS SEG # BLD: 1.7 K/UL (ref 1.8–8)
NEUTS SEG NFR BLD: 38 % (ref 32–75)
NITRITE UR QL STRIP.AUTO: NEGATIVE
NRBC # BLD: 0 K/UL (ref 0–0.01)
NRBC BLD-RTO: 0 PER 100 WBC
P-R INTERVAL, ECG05: 138 MS
PH UR STRIP: 5 [PH] (ref 5–8)
PLATELET # BLD AUTO: 158 K/UL (ref 150–400)
PMV BLD AUTO: 11.8 FL (ref 8.9–12.9)
POTASSIUM SERPL-SCNC: 3.8 MMOL/L (ref 3.5–5.1)
PROT SERPL-MCNC: 7.6 G/DL (ref 6.4–8.2)
PROT SERPL-MCNC: 7.7 G/DL (ref 6.4–8.2)
PROT UR STRIP-MCNC: NEGATIVE MG/DL
Q-T INTERVAL, ECG07: 362 MS
QRS DURATION, ECG06: 102 MS
QTC CALCULATION (BEZET), ECG08: 438 MS
RBC # BLD AUTO: 5.82 M/UL (ref 4.1–5.7)
SODIUM SERPL-SCNC: 138 MMOL/L (ref 136–145)
SP GR UR REFRACTOMETRY: 1.02 (ref 1–1.03)
TROPONIN-HIGH SENSITIVITY: 7 NG/L (ref 0–76)
UROBILINOGEN UR QL STRIP.AUTO: 0.2 EU/DL (ref 0.2–1)
VENTRICULAR RATE, ECG03: 88 BPM
WBC # BLD AUTO: 4.5 K/UL (ref 4.1–11.1)

## 2022-08-17 PROCEDURE — 83690 ASSAY OF LIPASE: CPT

## 2022-08-17 PROCEDURE — 36415 COLL VENOUS BLD VENIPUNCTURE: CPT

## 2022-08-17 PROCEDURE — 96361 HYDRATE IV INFUSION ADD-ON: CPT

## 2022-08-17 PROCEDURE — 80076 HEPATIC FUNCTION PANEL: CPT

## 2022-08-17 PROCEDURE — 83605 ASSAY OF LACTIC ACID: CPT

## 2022-08-17 PROCEDURE — 74011250636 HC RX REV CODE- 250/636: Performed by: STUDENT IN AN ORGANIZED HEALTH CARE EDUCATION/TRAINING PROGRAM

## 2022-08-17 PROCEDURE — 74011000250 HC RX REV CODE- 250: Performed by: STUDENT IN AN ORGANIZED HEALTH CARE EDUCATION/TRAINING PROGRAM

## 2022-08-17 PROCEDURE — 76705 ECHO EXAM OF ABDOMEN: CPT

## 2022-08-17 PROCEDURE — 74011250637 HC RX REV CODE- 250/637: Performed by: STUDENT IN AN ORGANIZED HEALTH CARE EDUCATION/TRAINING PROGRAM

## 2022-08-17 PROCEDURE — 99284 EMERGENCY DEPT VISIT MOD MDM: CPT

## 2022-08-17 PROCEDURE — 93005 ELECTROCARDIOGRAM TRACING: CPT

## 2022-08-17 PROCEDURE — 81003 URINALYSIS AUTO W/O SCOPE: CPT

## 2022-08-17 PROCEDURE — 96360 HYDRATION IV INFUSION INIT: CPT

## 2022-08-17 PROCEDURE — 85025 COMPLETE CBC W/AUTO DIFF WBC: CPT

## 2022-08-17 PROCEDURE — 80053 COMPREHEN METABOLIC PANEL: CPT

## 2022-08-17 PROCEDURE — 84484 ASSAY OF TROPONIN QUANT: CPT

## 2022-08-17 RX ADMIN — ALUMINUM HYDROXIDE AND MAGNESIUM HYDROXIDE 40 ML: 200; 200 SUSPENSION ORAL at 09:13

## 2022-08-17 RX ADMIN — SODIUM CHLORIDE 1000 ML: 9 INJECTION, SOLUTION INTRAVENOUS at 09:56

## 2022-08-17 NOTE — ED PROVIDER NOTES
EMERGENCY DEPARTMENT HISTORY AND PHYSICAL EXAM      Date: 8/17/2022  Patient Name: Angel Lopez    History of Presenting Illness     Chief Complaint   Patient presents with    Abdominal Pain     Pt arrived to ED via triage. Pt states that he hs had abdominal pain since Monday. Thought it might be food poisoning but denies any N/V. Pt states that this morning the pain has radiated to his lower back. History Provided By: Patient    Angel Lopez, 41 y.o. male     Patient is a 53-year-old male presenting with concerns of abdominal pain which started 3 days ago, patient does have history of hypertension, diabetes, CAD, 2 stents in 2016 presenting with concerns of epigastric pain which began 3 days ago. Patient states that it started in his epigastric region, states it began after eating and he first felt nausea, episode of chills and then has had aching abdominal pain since. Patient states that he has had no more chills or fever since that time, denies any diarrhea, states that he has had normal bowel movements, patient states that it is mostly located in the epigastric region and also has mild pain in his lower back. Patient denies any changes in urination, dysuria or hematuria. Patient has history of gallbladder surgery, denies any drinking or tobacco use. Patient denies any other abdominal surgeries. He has had no chest pain or shortness of breath, states his last chest test was 2 weeks ago which was normal.  Patient has no additional complaints at this time. There are no other complaints, changes, or physical findings at this time. PCP: Estefani Gregorio MD    No current facility-administered medications on file prior to encounter.      Current Outpatient Medications on File Prior to Encounter   Medication Sig Dispense Refill    insulin degludec Penelope Dieter FlexTouch U-200) 200 unit/mL (3 mL) inpn pen Inject 50 units every morning 3 Pen 11    aspirin delayed-release 81 mg tablet TAKE 1 TABLET BY MOUTH EVERY DAY 90 Tablet 3    rosuvastatin (CRESTOR) 40 mg tablet TAKE 1 TABLET BY MOUTH EVERY DAY 90 Tablet 3    ticagrelor (BRILINTA) 90 mg tablet Take 1 Tablet by mouth two (2) times a day. 180 Tablet 1    sacubitriL-valsartan (Entresto) 24-26 mg tablet Take 1 Tablet by mouth two (2) times a day. 180 Tablet 1    carvediloL (COREG) 25 mg tablet Take 1 Tablet by mouth two (2) times daily (with meals). 180 Tablet 1    spironolactone (ALDACTONE) 25 mg tablet TAKE 1/2 TABLET BY MOUTH EVERY DAY 45 Tablet 1    icosapent ethyL (VASCEPA) 1 gram capsule Take 1 Capsule by mouth two (2) times daily (with meals). 180 Capsule 1    nitroglycerin (NITROSTAT) 0.4 mg SL tablet TAKE 1 TABLET UNDER THE TONGUE EVERY FIVE (5) MINUTES AS NEEDED FOR CHEST PAIN (FOR UNSTABLE ANGINA, TAKE UP TO 3 TABLETS EVERY 5 MINUTES. IF CHEST PAIN IS UNRESOLVED, CALL 911.). UP TO 3 DOSES. 25 Tablet 3    ezetimibe (ZETIA) 10 mg tablet Take 1 Tablet by mouth daily. 90 Tablet 3    Farxiga 5 mg tab tablet TAKE 1 TABLET BY MOUTH EVERY DAY 90 Tablet 1    semaglutide (Ozempic) 1 mg/dose (4 mg/3 mL) pnij 1 mg by SubCUTAneous route every seven (7) days. 1 Each 11    amLODIPine (NORVASC) 10 mg tablet Take 1 Tablet by mouth daily.  80 Tablet 5       Past History     Past Medical History:  Past Medical History:   Diagnosis Date    CAD (coronary artery disease)     2 stents 2016     Headache     Hypercholesterolemia     Hypertension     Hypogonadism male     Obstructive sleep apnea        Past Surgical History:  Past Surgical History:   Procedure Laterality Date    HX HEENT      status post pharyngioplasty       Family History:  Family History   Problem Relation Age of Onset    Heart Disease Father        Social History:  Social History     Tobacco Use    Smoking status: Never    Smokeless tobacco: Never   Vaping Use    Vaping Use: Never used   Substance Use Topics    Alcohol use: No    Drug use: No       Allergies:  Not on File      Review of Systems   Review of Systems   Constitutional:  Positive for chills. Negative for activity change, fatigue and fever. HENT:  Negative for congestion, sneezing and sore throat. Respiratory:  Negative for cough and shortness of breath. Cardiovascular:  Negative for chest pain and leg swelling. Gastrointestinal:  Positive for abdominal pain and nausea. Negative for constipation, diarrhea and vomiting. Genitourinary:  Negative for decreased urine volume, dysuria, frequency and hematuria. Musculoskeletal:  Positive for back pain. Negative for arthralgias and myalgias. Skin:  Negative for rash. Allergic/Immunologic: Negative for immunocompromised state. Neurological:  Negative for headaches. Hematological:  Does not bruise/bleed easily. Psychiatric/Behavioral:  Negative for confusion. Physical Exam   Physical Exam  Vitals reviewed. Constitutional:       General: He is not in acute distress. Appearance: He is not ill-appearing, toxic-appearing or diaphoretic. HENT:      Head: Normocephalic and atraumatic. Mouth/Throat:      Mouth: Mucous membranes are moist.   Cardiovascular:      Rate and Rhythm: Normal rate. Pulmonary:      Effort: Pulmonary effort is normal. No tachypnea, accessory muscle usage or respiratory distress. Abdominal:      Palpations: Abdomen is soft. Tenderness: There is abdominal tenderness in the epigastric area. There is no guarding or rebound. Comments: Mild is in the epigastric region, no CVA tenderness, no rebound or guarding   Musculoskeletal:      Cervical back: Neck supple. Right lower leg: No edema. Left lower leg: No edema. Skin:     General: Skin is warm and dry. Neurological:      General: No focal deficit present. Mental Status: He is alert.    Psychiatric:         Mood and Affect: Mood normal.       Diagnostic Study Results     Labs -     Recent Results (from the past 24 hour(s))   CBC WITH AUTOMATED DIFF    Collection Time: 08/17/22  7:53 AM   Result Value Ref Range    WBC 4.5 4.1 - 11.1 K/uL    RBC 5.82 (H) 4.10 - 5.70 M/uL    HGB 15.4 12.1 - 17.0 g/dL    HCT 47.5 36.6 - 50.3 %    MCV 81.6 80.0 - 99.0 FL    MCH 26.5 26.0 - 34.0 PG    MCHC 32.4 30.0 - 36.5 g/dL    RDW 14.3 11.5 - 14.5 %    PLATELET 844 085 - 015 K/uL    MPV 11.8 8.9 - 12.9 FL    NRBC 0.0 0  WBC    ABSOLUTE NRBC 0.00 0.00 - 0.01 K/uL    NEUTROPHILS 38 32 - 75 %    LYMPHOCYTES 44 12 - 49 %    MONOCYTES 15 (H) 5 - 13 %    EOSINOPHILS 2 0 - 7 %    BASOPHILS 1 0 - 1 %    IMMATURE GRANULOCYTES 0 0.0 - 0.5 %    ABS. NEUTROPHILS 1.7 (L) 1.8 - 8.0 K/UL    ABS. LYMPHOCYTES 1.9 0.8 - 3.5 K/UL    ABS. MONOCYTES 0.7 0.0 - 1.0 K/UL    ABS. EOSINOPHILS 0.1 0.0 - 0.4 K/UL    ABS. BASOPHILS 0.0 0.0 - 0.1 K/UL    ABS. IMM. GRANS. 0.0 0.00 - 0.04 K/UL    DF AUTOMATED     METABOLIC PANEL, COMPREHENSIVE    Collection Time: 08/17/22  7:53 AM   Result Value Ref Range    Sodium 138 136 - 145 mmol/L    Potassium 3.8 3.5 - 5.1 mmol/L    Chloride 105 97 - 108 mmol/L    CO2 26 21 - 32 mmol/L    Anion gap 7 5 - 15 mmol/L    Glucose 282 (H) 65 - 100 mg/dL    BUN 17 6 - 20 MG/DL    Creatinine 1.25 0.70 - 1.30 MG/DL    BUN/Creatinine ratio 14 12 - 20      GFR est AA >60 >60 ml/min/1.73m2    GFR est non-AA >60 >60 ml/min/1.73m2    Calcium 8.9 8.5 - 10.1 MG/DL    Bilirubin, total 0.5 0.2 - 1.0 MG/DL    ALT (SGPT) 77 12 - 78 U/L    AST (SGOT) 41 (H) 15 - 37 U/L    Alk.  phosphatase 118 (H) 45 - 117 U/L    Protein, total 7.7 6.4 - 8.2 g/dL    Albumin 3.7 3.5 - 5.0 g/dL    Globulin 4.0 2.0 - 4.0 g/dL    A-G Ratio 0.9 (L) 1.1 - 2.2     TROPONIN-HIGH SENSITIVITY    Collection Time: 08/17/22  8:49 AM   Result Value Ref Range    Troponin-High Sensitivity 7 0 - 76 ng/L   HEPATIC FUNCTION PANEL    Collection Time: 08/17/22  8:49 AM   Result Value Ref Range    Protein, total 7.6 6.4 - 8.2 g/dL    Albumin 3.5 3.5 - 5.0 g/dL    Globulin 4.1 (H) 2.0 - 4.0 g/dL    A-G Ratio 0.9 (L) 1.1 - 2.2      Bilirubin, total 0.3 0.2 - 1.0 MG/DL    Bilirubin, direct 0.1 0.0 - 0.2 MG/DL    Alk. phosphatase 112 45 - 117 U/L    AST (SGOT) 39 (H) 15 - 37 U/L    ALT (SGPT) 71 12 - 78 U/L   LIPASE    Collection Time: 08/17/22  8:49 AM   Result Value Ref Range    Lipase 550 (H) 73 - 393 U/L   LACTIC ACID    Collection Time: 08/17/22  8:49 AM   Result Value Ref Range    Lactic acid 0.9 0.4 - 2.0 MMOL/L   EKG, 12 LEAD, INITIAL    Collection Time: 08/17/22  8:49 AM   Result Value Ref Range    Ventricular Rate 88 BPM    Atrial Rate 88 BPM    P-R Interval 138 ms    QRS Duration 102 ms    Q-T Interval 362 ms    QTC Calculation (Bezet) 438 ms    Calculated P Axis 32 degrees    Calculated R Axis -9 degrees    Calculated T Axis -5 degrees    Diagnosis       Sinus rhythm with premature atrial complexes  Minimal voltage criteria for LVH, may be normal variant  When compared with ECG of 14-MAY-2022 04:25,  premature atrial complexes are now present     URINALYSIS W/ RFLX MICROSCOPIC    Collection Time: 08/17/22  9:08 AM   Result Value Ref Range    Color YELLOW/STRAW      Appearance CLEAR CLEAR      Specific gravity 1.025 1.003 - 1.030      pH (UA) 5.0 5.0 - 8.0      Protein Negative NEG mg/dL    Glucose >1,000 (A) NEG mg/dL    Ketone Negative NEG mg/dL    Bilirubin Negative NEG      Blood Negative NEG      Urobilinogen 0.2 0.2 - 1.0 EU/dL    Nitrites Negative NEG      Leukocyte Esterase Negative NEG         Radiologic Studies -   US ABD LTD   Final Result   No acute process. CT Results  (Last 48 hours)      None          CXR Results  (Last 48 hours)      None              Medical Decision Making   I am the first provider for this patient. I reviewed the vital signs, available nursing notes, past medical history, past surgical history, family history and social history. Vital Signs-Reviewed the patient's vital signs.   Patient Vitals for the past 12 hrs:   Temp Pulse Resp BP SpO2   08/17/22 0902 -- -- -- (!) 148/90 94 %   08/17/22 0842 -- -- -- -- 92 % 08/17/22 2583 -- -- -- -- 94 %   08/17/22 0742 98.3 °F (36.8 °C) 90 16 (!) 140/97 94 %       Records Reviewed: Nursing records and medical records reviewed    Ddx: Gastritis, peptic ulcer disease, acs    Initial assessment performed. The patients presenting problems have been discussed, and they are in agreement with the care plan formulated and outlined with them. I have encouraged them to ask questions as they arise throughout their visit. MDM  Number of Diagnoses or Management Options  Gastritis and duodenitis  Diagnosis management comments: Is a 82-year-old male with history of CAD, diabetes presenting with epigastric pain, states it has been ongoing x3 days, waxing and waning in character, associated with nausea, chills at beginning, mostly after food, patient presents stable, no apparent distress, does have only mild tenderness on exam, no rebound or guarding, afebrile, well-appearing. We will plan on basic lab work including LFTs, lipase, CBC and urine to evaluate further. We will plan on a cocktail for symptomatic treatment of possible gastritis and reevaluate. We will also obtain troponin and EKG given history of CAD although patient had negative stress testing last week for suspicion of this. ED Course as of 08/17/22 1149   Wed Aug 17, 2022   5380 With mild elevation in lipase, AST and alk phos, with IVF and obtain right upper quadrant ultrasound to evaluate for ductal dilation, [RN]   1148 Patient ultrasound without any significant findings, states that he has upcoming appointment with GI doctor, notified him to let them know of his symptoms for further work-up, and given strict return precautions if pain worsens or changes to return to ER, also states he will follow-up with primary care within the next week.  [RN]      ED Course User Index  [RN] Huang Phelan MD           Procedures        Disposition: Discharge Note:  11:49 AM  The patient has been re-evaluated and is ready for discharge. Reviewed available results with patient. Counseled patient on diagnosis and care plan. Patient has expressed understanding, and all questions have been answered. Patient agrees with plan and agrees to follow up as recommended, or to return to the ED if their symptoms worsen. Discharge instructions have been provided and explained to the patient, along with reasons to return to the ED. DISCHARGE PLAN:  1. Current Discharge Medication List        2. Follow-up Information       Follow up With Specialties Details Why Contact Info    Te Gale MD Internal Medicine Physician Schedule an appointment as soon as possible for a visit in 3 days  [address]  [facility] [phone]      [facility] EMERGENCY DEPT Emergency Medicine  As needed, If symptoms worsen [address]  [address]   [address] [address] [phone]          3. Return to ED if worse     Diagnosis     Clinical Impression:   1. Gastritis and duodenitis        Attestations:    Irvin Robbins MD    Please note that this dictation was completed with [employer] ([employer]), the computer voice recognition software. Quite often unanticipated grammatical, syntax, homophones, and other interpretive errors are inadvertently transcribed by the computer software. Please disregard these errors. Please excuse any errors that have escaped final proofreading. Thank you.

## 2022-08-20 ENCOUNTER — HOSPITAL ENCOUNTER (EMERGENCY)
Age: 47
Discharge: LEFT BEFORE DISCHARGE | End: 2022-08-20
Payer: COMMERCIAL

## 2022-08-20 LAB
GLUCOSE BLD STRIP.AUTO-MCNC: 398 MG/DL (ref 65–117)
SERVICE CMNT-IMP: ABNORMAL

## 2022-08-20 PROCEDURE — 75810000275 HC EMERGENCY DEPT VISIT NO LEVEL OF CARE

## 2022-08-20 PROCEDURE — 82962 GLUCOSE BLOOD TEST: CPT

## 2022-08-20 NOTE — ED NOTES
Pt FSBS checked in triage, pt decides that he does not want to be seen in triage, FSBS 398mg/dL> Pt in NAD. A&Ox4.  Pt advised to check FSBS at home and return if he experiences symptoms of hypoglycemia

## 2022-10-19 ENCOUNTER — TELEPHONE (OUTPATIENT)
Dept: INTERNAL MEDICINE CLINIC | Age: 47
End: 2022-10-19

## 2022-10-19 NOTE — TELEPHONE ENCOUNTER
Patient wife called stating that she and her  have sore throats and ear aches, per  PATIENT TREATED WITH AMOXICILLIN 250MG 1 TID X 10 DAYS

## 2022-10-20 DIAGNOSIS — I10 ESSENTIAL HYPERTENSION: ICD-10-CM

## 2022-10-21 RX ORDER — AMLODIPINE BESYLATE 10 MG/1
TABLET ORAL
Qty: 90 TABLET | Refills: 5 | Status: SHIPPED | OUTPATIENT
Start: 2022-10-21

## 2022-10-24 ENCOUNTER — TELEPHONE (OUTPATIENT)
Dept: CARDIOLOGY CLINIC | Age: 47
End: 2022-10-24

## 2022-10-25 ENCOUNTER — TELEPHONE (OUTPATIENT)
Dept: CARDIOLOGY CLINIC | Age: 47
End: 2022-10-25

## 2022-10-25 DIAGNOSIS — I25.110 CORONARY ARTERY DISEASE INVOLVING NATIVE CORONARY ARTERY OF NATIVE HEART WITH UNSTABLE ANGINA PECTORIS (HCC): ICD-10-CM

## 2022-10-25 DIAGNOSIS — I10 ESSENTIAL HYPERTENSION: ICD-10-CM

## 2022-10-25 DIAGNOSIS — I42.9 CARDIOMYOPATHY, UNSPECIFIED TYPE (HCC): Primary | ICD-10-CM

## 2022-10-25 NOTE — TELEPHONE ENCOUNTER
Brazil d/c'd due to cost ,Dr Arianna Melissa prescribed Jardiance 10 mg in it's place. RX sent to pt pharmacy per VO per md    No future appointments.

## 2022-10-27 DIAGNOSIS — E78.5 DYSLIPIDEMIA: ICD-10-CM

## 2022-10-28 RX ORDER — EZETIMIBE 10 MG/1
TABLET ORAL
Qty: 30 TABLET | Refills: 11 | Status: SHIPPED | OUTPATIENT
Start: 2022-10-28

## 2022-11-30 ENCOUNTER — TELEPHONE (OUTPATIENT)
Dept: CARDIOLOGY CLINIC | Age: 47
End: 2022-11-30

## 2022-11-30 NOTE — TELEPHONE ENCOUNTER
Received notification regarding insurance denial for jardiance 10 mg daily. Patient previously prescribed farixag 5 mg daily. Changed to jardiance due to patient inability to afford. Patient able to take either jardiance or Zetta Mikal for indication to reduce cardiovascular death and hospitalization for patients diagnosed with heart failure. Prior authorization completed via CoverMyMeds. Key [id number]. Insurance approved jardiance 10 mg daily. Mailed 14 voucher and co-pay card to patient's home address.

## 2022-12-01 NOTE — PATIENT INSTRUCTIONS
You will be scheduled for a Stress Echo after your appointment today. Please wear comfortable clothing (shorts or pants with a shirt or blouse) and walking/athletic shoes. Do not eat or drink anything, except water, for at least 2 hours prior to your test.    Do not take your scheduled medications prior to your test.    Hold Coreg the day of the test. You can take all your other medications. You will increase coreg to 12.5 twice a day after this test as well. Sotyktu Counseling:  I discussed the most common side effects of Sotyktu including: common cold, sore throat, sinus infections, cold sores, canker sores, folliculitis, and acne.  I also discussed more serious side effects of Sotyktu including but not limited to: serious allergic reactions; increased risk for infections such as TB; cancers such as lymphomas; rhabdomyolysis and elevated CPK; and elevated triglycerides and liver enzymes.

## 2022-12-08 NOTE — TELEPHONE ENCOUNTER
PT Evaluation     Today's date: 2022  Patient name: Hugh Augustin  : 2003  MRN: [id number]  Referring provider: Harry Pate PA-C  Dx:   Encounter Diagnosis     ICD-10-CM    1  Closed nondisplaced fracture of coracoid process of right shoulder with routine healing, subsequent encounter  S42 275D                      Assessment  Assessment details: Patient presents with signs and symptoms consistent with referring diagnosis  He presents with minor shoulder tightness  Strength not assessed today- will be assessed next week when he reaches 6 weeks post injury  Positive prognostic indicators include: Motivated, doing well  Negative prognostic indicators include: (-)  He presents with: pain, decreased: ROM, strength and  functional capacity  He requires skilled PT to address these deficits and restore maximal functional capacity  Thank you for this pleasant referral        Impairments: abnormal or restricted ROM, activity intolerance, impaired physical strength, lacks appropriate home exercise program and pain with function  Understanding of Dx/Px/POC: good   Prognosis: good    Goals  ST-6 weeks  1  Patient to be independent with HEP  2   Decrease pain at least 2 subjective levels  LT-12 weeks  1    Patient to voice comfort with self management of condition  2   75% or > decreased pain  3   75% or > decreased functional deficits  4   Normalize AROM of all deficit planes  5   Normalize strength        Plan  Patient would benefit from: skilled PT  Referral necessary: No  Planned modality interventions: cryotherapy  Planned therapy interventions: IADL retraining, joint mobilization, manual therapy, motor coordination training, neuromuscular re-education, patient education, postural training, self care, strengthening, stretching, therapeutic activities, therapeutic exercise, home exercise program, flexibility, ADL training, balance and body mechanics training  Frequency: 2x Patient called because he needs a new a prescription because the Vascepa is not covered. Patient said that the provider would have to call the insurance company.     Phone [phone] week  Duration in weeks: 6  Treatment plan discussed with: patient        Subjective Evaluation    History of Present Illness  Onset date: 22  Mechanism of injury: Chief Complaint:    History:  Patient fell onto R shoulder from 30 feet when he fell asleep in his tree stand  He suffered a R coracoid process fracture, fascial fractures, concussion and 2 brain bleeds  He was in a sling initially  He is a ania at [employer]  He may play tennis in the spring  He has gym class now and is partially participating  He is R handed        PMH: (-)     Aggravating factors: OH reaching, WB through arm    Relieving factors: rest    Functional Deficits:  R sidelying to sleep, gym class, lifiting,     Patient Goals: Get back to normal workout routine    Quality of life: good    Pain  At best pain ratin  At worst pain ratin  Location: R Shoulder and Coracioid process          Objective     Active Range of Motion   Left Shoulder   Flexion: 175 degrees   Abduction: [employer]  External rotation 45°: 80 degrees   Internal rotation BTB: WFL    Right Shoulder   Flexion: 170 degrees   Extension: [employer]  External rotation 45°: 70 degrees   Internal rotation BTB: WFL    General Comments:      Shoulder Comments   Dx, elbow, wrist/hand screen (-)             Precautions: No strengthening before 6 weeks      Manuals             PROM ER, Flexion             RS balanced position                                       Neuro Re-Ed                                                                                                        Ther Ex             HEP 10            Pulleys: F, A             Elbow Flex/Ext with #             UBE             SHld Ext/Rows [place]            SL ER #             SL Abd #             Standing Scaption #             Wt shifting                                       Ther Activity                                       Gait Training                                       Modalities

## 2023-01-26 ENCOUNTER — HOSPITAL ENCOUNTER (EMERGENCY)
Age: 48
Discharge: HOME | End: 2023-01-27
Attending: EMERGENCY MEDICINE
Payer: COMMERCIAL

## 2023-01-26 ENCOUNTER — APPOINTMENT (OUTPATIENT)
Dept: CT IMAGING | Age: 48
End: 2023-01-26
Attending: EMERGENCY MEDICINE
Payer: COMMERCIAL

## 2023-01-26 ENCOUNTER — APPOINTMENT (OUTPATIENT)
Dept: GENERAL RADIOLOGY | Age: 48
End: 2023-01-26
Attending: EMERGENCY MEDICINE
Payer: COMMERCIAL

## 2023-01-26 VITALS
TEMPERATURE: 98.1 F | BODY MASS INDEX: 35 KG/M2 | HEIGHT: 64 IN | HEART RATE: 107 BPM | OXYGEN SATURATION: 95 % | WEIGHT: 205.03 LBS | RESPIRATION RATE: 17 BRPM | SYSTOLIC BLOOD PRESSURE: 142 MMHG | DIASTOLIC BLOOD PRESSURE: 118 MMHG

## 2023-01-26 DIAGNOSIS — B34.9 VIRAL ILLNESS: Primary | ICD-10-CM

## 2023-01-26 DIAGNOSIS — R07.89 ATYPICAL CHEST PAIN: ICD-10-CM

## 2023-01-26 DIAGNOSIS — K52.9 GASTROENTERITIS: ICD-10-CM

## 2023-01-26 LAB
ALBUMIN SERPL-MCNC: 3.9 G/DL (ref 3.5–5)
ALBUMIN/GLOB SERPL: 1 (ref 1.1–2.2)
ALP SERPL-CCNC: 109 U/L (ref 45–117)
ALT SERPL-CCNC: 54 U/L (ref 12–78)
ANION GAP SERPL CALC-SCNC: 7 MMOL/L (ref 5–15)
APPEARANCE UR: CLEAR
AST SERPL-CCNC: 29 U/L (ref 15–37)
BACTERIA URNS QL MICRO: NEGATIVE /HPF
BASOPHILS # BLD: 0 K/UL (ref 0–0.1)
BASOPHILS NFR BLD: 1 % (ref 0–1)
BILIRUB SERPL-MCNC: 0.6 MG/DL (ref 0.2–1)
BILIRUB UR QL: NEGATIVE
BUN SERPL-MCNC: 23 MG/DL (ref 6–20)
BUN/CREAT SERPL: 18 (ref 12–20)
CALCIUM SERPL-MCNC: 9 MG/DL (ref 8.5–10.1)
CHLORIDE SERPL-SCNC: 105 MMOL/L (ref 97–108)
CO2 SERPL-SCNC: 26 MMOL/L (ref 21–32)
COLOR UR: ABNORMAL
COVID-19 RAPID TEST, COVR: NOT DETECTED
CREAT SERPL-MCNC: 1.3 MG/DL (ref 0.7–1.3)
DIFFERENTIAL METHOD BLD: ABNORMAL
EOSINOPHIL # BLD: 0.1 K/UL (ref 0–0.4)
EOSINOPHIL NFR BLD: 1 % (ref 0–7)
EPITH CASTS URNS QL MICRO: ABNORMAL /LPF
ERYTHROCYTE [DISTWIDTH] IN BLOOD BY AUTOMATED COUNT: 13.7 % (ref 11.5–14.5)
FLUAV AG NPH QL IA: NEGATIVE
FLUBV AG NOSE QL IA: NEGATIVE
GLOBULIN SER CALC-MCNC: 4.1 G/DL (ref 2–4)
GLUCOSE BLD-MCNC: 195 MG/DL (ref 65–100)
GLUCOSE SERPL-MCNC: 205 MG/DL (ref 65–100)
GLUCOSE UR STRIP.AUTO-MCNC: >1000 MG/DL
HCT VFR BLD AUTO: 48.4 % (ref 36.6–50.3)
HGB BLD-MCNC: 16.1 G/DL (ref 12.1–17)
HGB UR QL STRIP: NEGATIVE
HYALINE CASTS URNS QL MICRO: ABNORMAL /LPF (ref 0–2)
IMM GRANULOCYTES # BLD AUTO: 0 K/UL (ref 0–0.04)
IMM GRANULOCYTES NFR BLD AUTO: 0 % (ref 0–0.5)
KETONES UR QL STRIP.AUTO: ABNORMAL MG/DL
LEUKOCYTE ESTERASE UR QL STRIP.AUTO: NEGATIVE
LIPASE SERPL-CCNC: 109 U/L (ref 73–393)
LYMPHOCYTES # BLD: 1 K/UL (ref 0.8–3.5)
LYMPHOCYTES NFR BLD: 14 % (ref 12–49)
MCH RBC QN AUTO: 26.5 PG (ref 26–34)
MCHC RBC AUTO-ENTMCNC: 33.3 G/DL (ref 30–36.5)
MCV RBC AUTO: 79.7 FL (ref 80–99)
MONOCYTES # BLD: 0.5 K/UL (ref 0–1)
MONOCYTES NFR BLD: 8 % (ref 5–13)
NEUTS SEG # BLD: 5.3 K/UL (ref 1.8–8)
NEUTS SEG NFR BLD: 76 % (ref 32–75)
NITRITE UR QL STRIP.AUTO: NEGATIVE
NRBC # BLD: 0 K/UL (ref 0–0.01)
NRBC BLD-RTO: 0 PER 100 WBC
PH UR STRIP: 5 (ref 5–8)
PLATELET # BLD AUTO: 213 K/UL (ref 150–400)
PMV BLD AUTO: 10.6 FL (ref 8.9–12.9)
POTASSIUM SERPL-SCNC: 4 MMOL/L (ref 3.5–5.1)
PROCALCITONIN SERPL-MCNC: 0.22 NG/ML
PROT SERPL-MCNC: 8 G/DL (ref 6.4–8.2)
PROT UR STRIP-MCNC: NEGATIVE MG/DL
RBC # BLD AUTO: 6.07 M/UL (ref 4.1–5.7)
RBC #/AREA URNS HPF: ABNORMAL /HPF (ref 0–5)
SERVICE CMNT-IMP: ABNORMAL
SODIUM SERPL-SCNC: 138 MMOL/L (ref 136–145)
SOURCE, COVRS: NORMAL
SP GR UR REFRACTOMETRY: 1.01 (ref 1–1.03)
TROPONIN-HIGH SENSITIVITY: 7 NG/L (ref 0–76)
TROPONIN-HIGH SENSITIVITY: 9 NG/L (ref 0–76)
UA: UC IF INDICATED,UAUC: ABNORMAL
UROBILINOGEN UR QL STRIP.AUTO: 0.2 EU/DL (ref 0.2–1)
WBC # BLD AUTO: 6.9 K/UL (ref 4.1–11.1)
WBC URNS QL MICRO: ABNORMAL /HPF (ref 0–4)

## 2023-01-26 PROCEDURE — 81001 URINALYSIS AUTO W/SCOPE: CPT

## 2023-01-26 PROCEDURE — 74011250637 HC RX REV CODE- 250/637: Performed by: EMERGENCY MEDICINE

## 2023-01-26 PROCEDURE — 82962 GLUCOSE BLOOD TEST: CPT

## 2023-01-26 PROCEDURE — 84145 PROCALCITONIN (PCT): CPT

## 2023-01-26 PROCEDURE — 99285 EMERGENCY DEPT VISIT HI MDM: CPT

## 2023-01-26 PROCEDURE — 93005 ELECTROCARDIOGRAM TRACING: CPT

## 2023-01-26 PROCEDURE — 85025 COMPLETE CBC W/AUTO DIFF WBC: CPT

## 2023-01-26 PROCEDURE — 84484 ASSAY OF TROPONIN QUANT: CPT

## 2023-01-26 PROCEDURE — 36415 COLL VENOUS BLD VENIPUNCTURE: CPT

## 2023-01-26 PROCEDURE — 87804 INFLUENZA ASSAY W/OPTIC: CPT

## 2023-01-26 PROCEDURE — 87040 BLOOD CULTURE FOR BACTERIA: CPT

## 2023-01-26 PROCEDURE — 80053 COMPREHEN METABOLIC PANEL: CPT

## 2023-01-26 PROCEDURE — 83690 ASSAY OF LIPASE: CPT

## 2023-01-26 PROCEDURE — 96374 THER/PROPH/DIAG INJ IV PUSH: CPT

## 2023-01-26 PROCEDURE — 74011250636 HC RX REV CODE- 250/636: Performed by: EMERGENCY MEDICINE

## 2023-01-26 PROCEDURE — 74177 CT ABD & PELVIS W/CONTRAST: CPT

## 2023-01-26 PROCEDURE — 87635 SARS-COV-2 COVID-19 AMP PRB: CPT

## 2023-01-26 PROCEDURE — 71045 X-RAY EXAM CHEST 1 VIEW: CPT

## 2023-01-26 PROCEDURE — 96361 HYDRATE IV INFUSION ADD-ON: CPT

## 2023-01-26 PROCEDURE — 74011000636 HC RX REV CODE- 636: Performed by: EMERGENCY MEDICINE

## 2023-01-26 RX ORDER — SODIUM CHLORIDE 0.9 % (FLUSH) 0.9 %
5-10 SYRINGE (ML) INJECTION AS NEEDED
Status: DISCONTINUED | OUTPATIENT
Start: 2023-01-26 | End: 2023-01-27 | Stop reason: HOSPADM

## 2023-01-26 RX ORDER — ACETAMINOPHEN 500 MG
1000 TABLET ORAL
Status: COMPLETED | OUTPATIENT
Start: 2023-01-26 | End: 2023-01-26

## 2023-01-26 RX ORDER — ONDANSETRON 2 MG/ML
8 INJECTION INTRAMUSCULAR; INTRAVENOUS
Status: COMPLETED | OUTPATIENT
Start: 2023-01-26 | End: 2023-01-26

## 2023-01-26 RX ORDER — ONDANSETRON 4 MG/1
4 TABLET, FILM COATED ORAL
Qty: 20 TABLET | Refills: 0 | Status: SHIPPED | OUTPATIENT
Start: 2023-01-26

## 2023-01-26 RX ADMIN — ACETAMINOPHEN 1000 MG: 500 TABLET ORAL at 20:25

## 2023-01-26 RX ADMIN — ONDANSETRON 8 MG: 2 INJECTION INTRAMUSCULAR; INTRAVENOUS at 21:04

## 2023-01-26 RX ADMIN — SODIUM CHLORIDE 500 ML: 9 INJECTION, SOLUTION INTRAVENOUS at 20:24

## 2023-01-26 RX ADMIN — IOPAMIDOL 100 ML: 755 INJECTION, SOLUTION INTRAVENOUS at 21:34

## 2023-01-26 RX ADMIN — SODIUM CHLORIDE 1000 ML: 9 INJECTION, SOLUTION INTRAVENOUS at 21:04

## 2023-01-27 LAB
ATRIAL RATE: 117 BPM
CALCULATED P AXIS, ECG09: 50 DEGREES
CALCULATED R AXIS, ECG10: 66 DEGREES
CALCULATED T AXIS, ECG11: -18 DEGREES
DIAGNOSIS, 93000: NORMAL
P-R INTERVAL, ECG05: 134 MS
Q-T INTERVAL, ECG07: 308 MS
QRS DURATION, ECG06: 82 MS
QTC CALCULATION (BEZET), ECG08: 429 MS
VENTRICULAR RATE, ECG03: 117 BPM

## 2023-01-27 NOTE — ED PROVIDER NOTES
[facility] EMERGENCY DEPT  EMERGENCY DEPARTMENT ENCOUNTER       Pt Name: Nelli Reese  MRN: [id number]  [place] 1975  Date of evaluation: 1/26/2023  Provider: Katelynn Andrew MD   PCP: Luis Alberto King MD  Note Started: 8:13 AM 1/27/23     CHIEF COMPLAINT       Chief Complaint   Patient presents with    Chest Pain     Patient reports left side chest pain that began last night, pain was unrelieved by Nitro. Patient is under the care of [place]. Patient also reports nausea at this time. Patient febrile in triage. HISTORY OF PRESENT ILLNESS: 1 or more elements      History From: Patient  HPI Limitations : None     Nelli Reese is a 41 y.o. male who presents  to the ed with periumbilical cramping moderate non-radiating pain, nausea, few episodes of vomiting, chills and fever. Symptoms started yesterday afternoon. Yesterday evening he also had pressure-like mild to moderate chest discomfort that was not relieved by nitro. The chest discomfort has now resolved, however. No sob, cough, congestion. No sick contacts. No hematemesis. No blood in stool. Last bm was yesterday, normal.      Please see more comprehensive history below under MDM  Nursing Notes were all reviewed in real time as they are made available. Any disagreements addressed in the HPI/MDM. REVIEW OF SYSTEMS      Review of Systems   Constitutional:  Positive for chills, fatigue and fever. HENT:  Negative for congestion and sore throat. Eyes:  Negative for photophobia and visual disturbance. Respiratory:  Negative for cough and shortness of breath. Cardiovascular:  Positive for chest pain. Negative for palpitations and leg swelling. Gastrointestinal:  Positive for abdominal pain, nausea and vomiting. Negative for abdominal distention, blood in stool and diarrhea. Genitourinary:  Negative for difficulty urinating, dysuria, flank pain, frequency and testicular pain. Musculoskeletal:  Negative for back pain and joint swelling. Skin:  Negative for rash. Neurological:  Negative for light-headedness and headaches. Hematological:  Negative for adenopathy. Positives and Pertinent negatives as per HPI and MDM. PAST HISTORY     Past Medical History:  Past Medical History:   Diagnosis Date    CAD (coronary artery disease)     2 stents 2016     Headache     Hypercholesterolemia     Hypertension     Hypogonadism male     Obstructive sleep apnea        Past Surgical History:  Past Surgical History:   Procedure Laterality Date    HX HEENT      status post pharyngioplasty       Family History:  Family History   Problem Relation Age of Onset    Heart Disease Father        Social History:  Social History     Tobacco Use    Smoking status: Never    Smokeless tobacco: Never   Vaping Use    Vaping Use: Never used   Substance Use Topics    Alcohol use: No    Drug use: No       Allergies:  No Known Allergies    CURRENT MEDICATIONS      Discharge Medication List as of 1/26/2023 11:57 PM        CONTINUE these medications which have NOT CHANGED    Details   ezetimibe (ZETIA) 10 mg tablet TAKE 1 TABLET BY MOUTH EVERY DAY, Normal, Disp-30 Tablet, R-11DX Code Needed  . empagliflozin (JARDIANCE) 10 mg tablet Take 1 Tablet by mouth daily. , Normal, Disp-90 Tablet, R-3      amLODIPine (NORVASC) 10 mg tablet TAKE 1 TABLET BY MOUTH EVERY DAY, Normal, Disp-90 Tablet, R-5      insulin degludec Adaline [place] FlexTouch U-200) 200 unit/mL (3 mL) inpn pen Inject 50 units every morning, Normal, Disp-3 Pen, R-11      aspirin delayed-release 81 mg tablet TAKE 1 TABLET BY MOUTH EVERY DAY, Normal, Disp-90 Tablet, R-3      rosuvastatin (CRESTOR) 40 mg tablet TAKE 1 TABLET BY MOUTH EVERY DAY, Normal, Disp-90 Tablet, R-3PT REQUESTS REFILL FOR ROSUVASTATIN CALCIUM 40 MG TAB      ticagrelor (BRILINTA) 90 mg tablet Take 1 Tablet by mouth two (2) times a day., Normal, Disp-180 Tablet, R-1      sacubitriL-valsartan (Entresto) 24-26 mg tablet Take 1 Tablet by mouth two (2) times a day., Normal, Disp-180 Tablet, R-1      carvediloL (COREG) 25 mg tablet Take 1 Tablet by mouth two (2) times daily (with meals). , Normal, Disp-180 Tablet, R-1REFILLS NEEDED      spironolactone (ALDACTONE) 25 mg tablet TAKE 1/2 TABLET BY MOUTH EVERY DAY, Normal, Disp-45 Tablet, R-1NEW RX FOR SPIRONOLACTONE      icosapent ethyL (VASCEPA) 1 gram capsule Take 1 Capsule by mouth two (2) times daily (with meals). , Normal, Disp-180 Capsule, R-1      nitroglycerin (NITROSTAT) 0.4 mg SL tablet TAKE 1 TABLET UNDER THE TONGUE EVERY FIVE (5) MINUTES AS NEEDED FOR CHEST PAIN (FOR UNSTABLE ANGINA, TAKE UP TO 3 TABLETS EVERY 5 MINUTES. IF CHEST PAIN IS UNRESOLVED, CALL 911.). UP TO 3 DOSES., Normal, Disp-25 Tablet, R-3      semaglutide (Ozempic) 1 mg/dose (4 mg/3 mL) pnij 1 mg by SubCUTAneous route every seven (7) days. , Normal, Disp-1 Each, R-11               PHYSICAL EXAM      ED Triage Vitals [01/26/23 1927]   ED Encounter Vitals Group      /87      Pulse (Heart Rate) (!) 121      Resp Rate 18      Temp (!) 101.8 °F (38.8 °C)      Temp src       O2 Sat (%) 98 %      Weight 205 lb 0.4 oz      Height 5' 4\"        Physical Exam  Vitals and nursing note reviewed. Constitutional:       Appearance: He is not diaphoretic. Eyes:      Extraocular Movements: Extraocular movements intact. Neck:      Vascular: No JVD. Cardiovascular:      Rate and Rhythm: Regular rhythm. Tachycardia present. Heart sounds: Normal heart sounds. Pulmonary:      Effort: Pulmonary effort is normal.      Breath sounds: Normal breath sounds. Chest:      Chest wall: No tenderness. Abdominal:      General: Bowel sounds are normal.      Palpations: Abdomen is soft. Tenderness: There is abdominal tenderness. There is no guarding or rebound. Musculoskeletal:      Cervical back: Normal range of motion and neck supple. Right lower leg: No tenderness. No edema. Left lower leg: No tenderness. No edema.    Lymphadenopathy: Cervical: No cervical adenopathy. Skin:     General: Skin is warm and dry. Capillary Refill: Capillary refill takes less than 2 seconds. Coloration: Skin is not pale. Findings: No erythema or rash. Neurological:      General: No focal deficit present. Mental Status: He is alert and oriented to person, place, and time. DIAGNOSTIC RESULTS   LABS:     Recent Results (from the past 24 hour(s))   EKG, 12 LEAD, INITIAL    Collection Time: 01/26/23  7:25 PM   Result Value Ref Range    Ventricular Rate 117 BPM    Atrial Rate 117 BPM    P-R Interval 134 ms    QRS Duration 82 ms    Q-T Interval 308 ms    QTC Calculation (Bezet) 429 ms    Calculated P Axis 50 degrees    Calculated R Axis 66 degrees    Calculated T Axis -18 degrees    Diagnosis       Sinus tachycardia  T wave abnormality, consider inferior ischemia  When compared with ECG of 17-AUG-2022 08:49,  premature atrial complexes are no longer present  Questionable change in QRS axis     TROPONIN-HIGH SENSITIVITY    Collection Time: 01/26/23  7:58 PM   Result Value Ref Range    Troponin-High Sensitivity 9 0 - 76 ng/L   CBC WITH AUTOMATED DIFF    Collection Time: 01/26/23  7:58 PM   Result Value Ref Range    WBC 6.9 4.1 - 11.1 K/uL    RBC 6.07 (H) 4.10 - 5.70 M/uL    HGB 16.1 12.1 - 17.0 g/dL    HCT 48.4 36.6 - 50.3 %    MCV 79.7 (L) 80.0 - 99.0 FL    MCH 26.5 26.0 - 34.0 PG    MCHC 33.3 30.0 - 36.5 g/dL    RDW 13.7 11.5 - 14.5 %    PLATELET 669 953 - 181 K/uL    MPV 10.6 8.9 - 12.9 FL    NRBC 0.0 0  WBC    ABSOLUTE NRBC 0.00 0.00 - 0.01 K/uL    NEUTROPHILS 76 (H) 32 - 75 %    LYMPHOCYTES 14 12 - 49 %    MONOCYTES 8 5 - 13 %    EOSINOPHILS 1 0 - 7 %    BASOPHILS 1 0 - 1 %    IMMATURE GRANULOCYTES 0 0.0 - 0.5 %    ABS. NEUTROPHILS 5.3 1.8 - 8.0 K/UL    ABS. LYMPHOCYTES 1.0 0.8 - 3.5 K/UL    ABS. MONOCYTES 0.5 0.0 - 1.0 K/UL    ABS. EOSINOPHILS 0.1 0.0 - 0.4 K/UL    ABS. BASOPHILS 0.0 0.0 - 0.1 K/UL    ABS. IMM.  GRANS. 0.0 0.00 - 0.04 K/UL    DF AUTOMATED     METABOLIC PANEL, COMPREHENSIVE    Collection Time: 01/26/23  7:58 PM   Result Value Ref Range    Sodium 138 136 - 145 mmol/L    Potassium 4.0 3.5 - 5.1 mmol/L    Chloride 105 97 - 108 mmol/L    CO2 26 21 - 32 mmol/L    Anion gap 7 5 - 15 mmol/L    Glucose 205 (H) 65 - 100 mg/dL    BUN 23 (H) 6 - 20 MG/DL    Creatinine 1.30 0.70 - 1.30 MG/DL    BUN/Creatinine ratio 18 12 - 20      eGFR >60 >60 ml/min/1.73m2    Calcium 9.0 8.5 - 10.1 MG/DL    Bilirubin, total 0.6 0.2 - 1.0 MG/DL    ALT (SGPT) 54 12 - 78 U/L    AST (SGOT) 29 15 - 37 U/L    Alk.  phosphatase 109 45 - 117 U/L    Protein, total 8.0 6.4 - 8.2 g/dL    Albumin 3.9 3.5 - 5.0 g/dL    Globulin 4.1 (H) 2.0 - 4.0 g/dL    A-G Ratio 1.0 (L) 1.1 - 2.2     CULTURE, BLOOD, PAIRED    Collection Time: 01/26/23  8:21 PM    Specimen: Blood   Result Value Ref Range    Special Requests: NO SPECIAL REQUESTS      Culture result: NO GROWTH AFTER 9 HOURS     INFLUENZA A+B VIRAL AGS    Collection Time: 01/26/23  8:21 PM   Result Value Ref Range    Influenza A Antigen Negative NEG      Influenza B Antigen Negative NEG     PROCALCITONIN    Collection Time: 01/26/23  8:21 PM   Result Value Ref Range    Procalcitonin 0.22 ng/mL   COVID-19 RAPID TEST    Collection Time: 01/26/23  8:22 PM   Result Value Ref Range    Specimen source Nasopharyngeal      COVID-19 rapid test Not detected NOTD     GLUCOSE, POC    Collection Time: 01/26/23  8:56 PM   Result Value Ref Range    Glucose (POC) 195 (H) 65 - 100 mg/dL    Performed by Salvador CÁRDENAS    LIPASE    Collection Time: 01/26/23  9:46 PM   Result Value Ref Range    Lipase 109 73 - 393 U/L   TROPONIN-HIGH SENSITIVITY    Collection Time: 01/26/23  9:46 PM   Result Value Ref Range    Troponin-High Sensitivity 7 0 - 76 ng/L   URINALYSIS W/ REFLEX CULTURE    Collection Time: 01/26/23 10:05 PM    Specimen: Urine   Result Value Ref Range    Color YELLOW/STRAW      Appearance CLEAR CLEAR      Specific gravity 1.010 1.003 - 1.030      pH (UA) 5.0 5.0 - 8.0      Protein Negative NEG mg/dL    Glucose >1,000 (A) NEG mg/dL    Ketone TRACE (A) NEG mg/dL    Bilirubin Negative NEG      Blood Negative NEG      Urobilinogen 0.2 0.2 - 1.0 EU/dL    Nitrites Negative NEG      Leukocyte Esterase Negative NEG      UA:UC IF INDICATED CULTURE NOT INDICATED BY UA RESULT CNI      WBC 0-4 0 - 4 /hpf    RBC 0-5 0 - 5 /hpf    Epithelial cells FEW FEW /lpf    Bacteria Negative NEG /hpf    Hyaline cast 0-2 0 - 2 /lpf           RADIOLOGY:  Non-plain film images such as CT, Ultrasound and MRI are read by the radiologist. Plain radiographic images are visualized and preliminarily interpreted by the ED Provider with the below findings:     Cxr reviewed as soon as images were available. No acute process identified. Final radiology read to follow and will be copied below. Interpretation per the Radiologist below, if available at the time of this note:     CT ABD PELV W CONT   Final Result      1. Moderate bladder distention. 2. No other acute or significant abnormality identified. XR CHEST PORT   Final Result   No evidence of acute cardiopulmonary process. PROCEDURES        SCREENINGS        CRITICAL CARE TIME        EMERGENCY DEPARTMENT COURSE and DIFFERENTIAL DIAGNOSIS/MDM     Vitals:    Vitals:    01/26/23 2106 01/26/23 2107 01/26/23 2201 01/26/23 2230   BP:   (!) 140/119 (!) 142/118   Pulse: (!) 109  (!) 105 (!) 107   Resp: 21  19 17   Temp:  98.1 °F (36.7 °C)     SpO2: 96%  94% 95%   Weight:       Height:           Pertinent Chronic Medical Conditions or Prior Surgeries: cad    Social Determinants affecting Dx or Tx: None    Records Reviewed: Nursing Notes and Old Medical Records      EKG interpretation: Rhythm: sinus; and regular . Rate (approx.): 117; ST/T wave: no elevations or reciprocal depressions; Other intervals normal. No new changes concerning for acute ischemia.  This and prior available ECGs have been viewed and interpreted by me personally. Emilee Hdez MD, Msc    Cardiac monitor interpretation:  sinus rhythm, normal  Pulse ox interpretation: % on ra, normal    CC/HPI Summary, DDx, ED Course, and Reassessment:   Patient is overall well appearing and presents to the ed with substernal chest pain, onset yesterday but since resolved. Today he has had periumbilical abd pain, nausea, few episodes of vomiting. Found to be febrile in triage. No respiratory symptoms. No urinary symptoms. No diarrhea. In ed initially tachycardic but hr came down with fluids and temp control. Rest of vital signs are normal.    Ddx: gastroenteritis, pancreatitis, cholangitis, diverticulitis, viral illness. although chest pain episode has resolved, given history of cad will obtain trop x 2 and ecg to rule out cardiac strain or acs. Concern for acs is low given presentation, symptoms and exam.      Medical Decision Making  Amount and/or Complexity of Data Reviewed  Labs: ordered. Radiology: ordered. ECG/medicine tests: ordered. Risk  OTC drugs. Prescription drug management. Reassessment: no evidence of sepsis or shock. Low concern for bacteremia based on exam and hemodynamic status. Heme and metabolic panel normal. Ua normal. Ct abd pelv without acute abnormalities. Trop x 2 negative. Covid/flu neg  Likely viral illness/gastroenteritis. Supportive mgmt. Disposition Considerations (Tests not done, Shared Decision Making, Pt Expectation of Test or Tx.):    I have discussed with the patient and/or caregiver my initial clinical impression which is based on an evidence-based clinical evaluation of the patient and interpretation of available results. Involved patient and/or caregiver in management, treatment options and final disposition. Patient and/or caregiver verbalizes understanding and agreement.     ED Medications Administered  Medications   sodium chloride 0.9 % bolus infusion 500 mL (0 mL IntraVENous IV Completed 1/26/23 2300)   acetaminophen (TYLENOL) tablet 1,000 mg (1,000 mg Oral Given 1/26/23 2025)   ondansetron (ZOFRAN) injection 8 mg (8 mg IntraVENous Given 1/26/23 2104)   sodium chloride 0.9 % bolus infusion 1,000 mL (0 mL IntraVENous IV Completed 1/27/23 0021)   iopamidoL (ISOVUE-370) 370 mg iodine /mL (76 %) injection 100 mL (100 mL IntraVENous Given 1/26/23 2134)       ED Orders Placed:  Orders Placed This Encounter    CULTURE, BLOOD, PAIRED    COVID-19 RAPID TEST    CULTURE, BLOOD    CULTURE, BLOOD    XR CHEST PORT    CT ABD PELV W CONT    TROPONIN-HIGH SENSITIVITY    CBC WITH AUTOMATED DIFF    METABOLIC PANEL, COMPREHENSIVE    INFLUENZA A+B VIRAL AGS    URINALYSIS W/ REFLEX CULTURE    PROCALCITONIN    GLUCOSE, POC    LIPASE    TROPONIN-HIGH SENSITIVITY    EKG, 12 LEAD, INITIAL    EKG, 12 LEAD, INITIAL    sodium chloride 0.9 % bolus infusion 500 mL    DISCONTD: sodium chloride (NS) flush 5-10 mL    acetaminophen (TYLENOL) tablet 1,000 mg    ondansetron (ZOFRAN) injection 8 mg    sodium chloride 0.9 % bolus infusion 1,000 mL    iopamidoL (ISOVUE-370) 370 mg iodine /mL (76 %) injection 100 mL    ondansetron hcl (Zofran) 4 mg tablet         FINAL IMPRESSION     1. Viral illness    2. Gastroenteritis    3. Atypical chest pain          DISPOSITION/PLAN     Progress note:  Patient has been reassessed and reports feeling considerably better, has normal vital signs and feels comfortable going home. I think this is reasonable as no findings today suggest a life-threatening condition. DISPOSITION: DISCHARGE  The patient's results have been reviewed with patient and available family and/or caregiver. They verbally convey their understanding and agreement of the patient's signs, symptoms, diagnosis, treatment and prognosis and additionally agree to follow up as recommended in the discharge instructions or to return to the Emergency Department should the patient's condition change prior to their follow-up appointment.    The patient and available family and/or caregiver verbally agree with the care plan and all of their questions have been answered. The discharge instructions have also been provided to the them with educational information regarding the patient's diagnosis as well a list of reasons why the patient would want to return to the ER prior to their follow-up appointment should any concerns arise, the patient's condition change or symptoms worsen. Karthik Jose MD, Msc    PLAN:  Discharge Medication List as of 1/26/2023 11:57 PM        START taking these medications    Details   ondansetron hcl (Zofran) 4 mg tablet Take 1 Tablet by mouth every eight (8) hours as needed for Nausea or Vomiting., Normal, Disp-20 Tablet, R-0           CONTINUE these medications which have NOT CHANGED    Details   ezetimibe (ZETIA) 10 mg tablet TAKE 1 TABLET BY MOUTH EVERY DAY, Normal, Disp-30 Tablet, R-11DX Code Needed  . empagliflozin (JARDIANCE) 10 mg tablet Take 1 Tablet by mouth daily. , Normal, Disp-90 Tablet, R-3      amLODIPine (NORVASC) 10 mg tablet TAKE 1 TABLET BY MOUTH EVERY DAY, Normal, Disp-90 Tablet, R-5      insulin degludec [employer] FlexTouch U-200) 200 unit/mL (3 mL) inpn pen Inject 50 units every morning, Normal, Disp-3 Pen, R-11      aspirin delayed-release 81 mg tablet TAKE 1 TABLET BY MOUTH EVERY DAY, Normal, Disp-90 Tablet, R-3      rosuvastatin (CRESTOR) 40 mg tablet TAKE 1 TABLET BY MOUTH EVERY DAY, Normal, Disp-90 Tablet, R-3PT REQUESTS REFILL FOR ROSUVASTATIN CALCIUM 40 MG TAB      ticagrelor (BRILINTA) 90 mg tablet Take 1 Tablet by mouth two (2) times a day., Normal, Disp-180 Tablet, R-1      sacubitriL-valsartan (Entresto) 24-26 mg tablet Take 1 Tablet by mouth two (2) times a day., Normal, Disp-180 Tablet, R-1      carvediloL (COREG) 25 mg tablet Take 1 Tablet by mouth two (2) times daily (with meals). , Normal, Disp-180 Tablet, R-1REFILLS NEEDED      spironolactone (ALDACTONE) 25 mg tablet TAKE 1/2 TABLET BY MOUTH EVERY DAY, Normal, Disp-45 Tablet, R-1NEW RX FOR SPIRONOLACTONE      icosapent ethyL (VASCEPA) 1 gram capsule Take 1 Capsule by mouth two (2) times daily (with meals). , Normal, Disp-180 Capsule, R-1      nitroglycerin (NITROSTAT) 0.4 mg SL tablet TAKE 1 TABLET UNDER THE TONGUE EVERY FIVE (5) MINUTES AS NEEDED FOR CHEST PAIN (FOR UNSTABLE ANGINA, TAKE UP TO 3 TABLETS EVERY 5 MINUTES. IF CHEST PAIN IS UNRESOLVED, CALL 911.). UP TO 3 DOSES., Normal, Disp-25 Tablet, R-3      semaglutide (Ozempic) 1 mg/dose (4 mg/3 mL) pnij 1 mg by SubCUTAneous route every seven (7) days. , Normal, Disp-1 Each, R-11           2. Follow-up Information       Follow up With Specialties Details Why Contact Info    Keiko Mccallum MD Internal Medicine Physician Schedule an appointment as soon as possible for a visit in 2 days  [facility]  [address] [facility],[address] [place] [address] [phone]            3. Return to ED if worse       I am the Primary Clinician of Record. Lisa Burks MD (electronically signed)    (Please note that parts of this dictation were completed with voice recognition software. Quite often unanticipated grammatical, syntax, homophones, and other interpretive errors are inadvertently transcribed by the computer software. Please disregards these errors.  Please excuse any errors that have escaped final proofreading.)

## 2023-01-27 NOTE — DISCHARGE INSTRUCTIONS
It was a pleasure taking care of you in our Emergency Department today. We know that when you come to [facility], you are entrusting us with your health, comfort, and safety. Our physicians and nurses honor that trust, and truly appreciate the opportunity to care for you and your loved ones. We also value your feedback. If you receive a survey about your Emergency Department experience today, please fill it out. We care about our patients' feedback, and we listen to what you have to say.   Thank you!       --- Dr. Tano Rock MD

## 2023-01-28 RX ORDER — SPIRONOLACTONE 25 MG/1
TABLET ORAL
Qty: 45 TABLET | Refills: 1 | Status: SHIPPED | OUTPATIENT
Start: 2023-01-28

## 2023-01-29 LAB
BACTERIA SPEC CULT: NORMAL
SERVICE CMNT-IMP: NORMAL

## 2023-02-20 RX ORDER — ICOSAPENT ETHYL 1000 MG/1
1 CAPSULE ORAL 2 TIMES DAILY WITH MEALS
Qty: 180 CAPSULE | Refills: 1 | Status: SHIPPED | OUTPATIENT
Start: 2023-02-20

## 2023-04-04 ENCOUNTER — TELEPHONE (OUTPATIENT)
Dept: INTERNAL MEDICINE CLINIC | Age: 48
End: 2023-04-04

## 2023-04-04 NOTE — TELEPHONE ENCOUNTER
Patient notified and ask to increase his tresiba to 20 units qam and continute the   90 units at bedtime,we tell patient how he must eat on time

## 2023-04-04 NOTE — TELEPHONE ENCOUNTER
----- Message from Bakari Fritz MD sent at 4/1/2023  4:47 PM EDT -----  Asked patient how many units of insulin is he taking daily and what time does he take the insulin. His dose will be increased therefore he has to eat at the same time every day which includes breakfast lunch dinner and bedtime snack. These are preferably done at 7 to 8 AM 12 to 1 PM 6 PM along with a 9 PM snack.   Return to the office in 1

## 2023-04-24 NOTE — Clinical Note
Sheath #1: Closed using R-Band. Radial band pressure set at: 9. There are no Wet Read(s) to document.

## 2023-04-27 ENCOUNTER — OFFICE VISIT (OUTPATIENT)
Dept: INTERNAL MEDICINE CLINIC | Age: 48
End: 2023-04-27

## 2023-04-27 VITALS
WEIGHT: 213.4 LBS | BODY MASS INDEX: 36.43 KG/M2 | HEIGHT: 64 IN | SYSTOLIC BLOOD PRESSURE: 147 MMHG | OXYGEN SATURATION: 95 % | TEMPERATURE: 97.9 F | HEART RATE: 95 BPM | RESPIRATION RATE: 18 BRPM | DIASTOLIC BLOOD PRESSURE: 94 MMHG

## 2023-04-27 DIAGNOSIS — E11.65 UNCONTROLLED TYPE 2 DIABETES MELLITUS WITH HYPERGLYCEMIA (HCC): Primary | ICD-10-CM

## 2023-04-27 DIAGNOSIS — I10 PRIMARY HYPERTENSION: ICD-10-CM

## 2023-04-27 DIAGNOSIS — E66.9 OBESITY (BMI 30.0-34.9): ICD-10-CM

## 2023-04-27 DIAGNOSIS — I25.10 ASHD (ARTERIOSCLEROTIC HEART DISEASE): ICD-10-CM

## 2023-04-27 DIAGNOSIS — E78.5 DYSLIPIDEMIA: ICD-10-CM

## 2023-04-27 DIAGNOSIS — I42.9 CARDIOMYOPATHY, UNSPECIFIED TYPE (HCC): ICD-10-CM

## 2023-04-27 NOTE — PROGRESS NOTES
Francoise Johnson is a 41 y.o. male  Chief Complaint   Patient presents with    Follow-up    Diabetes    Hypertension     Patient here for follow up diabetes and high blood pressure. YES Answers must have Comments  1. \"Have you been to the ER, urgent care clinic since your last visit? Hospitalized since your last visit? \"    [] YES   [x] NO       2. Have you seen or consulted any other health care providers outside of [address] [place] [address] since your last visit?     [] YES   [x] NO       3. For patients aged 39-70: Have you had a colorectal cancer screening such as a colonoscopy/FIT/Cologuard? Nurse/CMA to request records if not in chart   [] YES   [x] NO   [] NA, based on age    If the patient is female:      4. For female patients aged 41-77: Mo Hadley you had a mammogram in the last two years?  Nurse/CMA to request records if not in chart   [] YES   [] NO   [x] NA, based on gender    5. For female patients aged 21-65: Mo Hadley you had a pap smear?   Nurse/CMA to request records if not in chart   [] YES   [] NO  [x] NA, based on gender

## 2023-04-29 RX ORDER — FLASH GLUCOSE SENSOR
KIT MISCELLANEOUS
Qty: 1 KIT | Refills: 11 | Status: SHIPPED | OUTPATIENT
Start: 2023-04-29

## 2023-04-29 RX ORDER — SEMAGLUTIDE 1.34 MG/ML
INJECTION, SOLUTION SUBCUTANEOUS
Qty: 1 BOX | Refills: 0 | Status: SHIPPED | OUTPATIENT
Start: 2023-04-29

## 2023-04-29 RX ORDER — FLASH GLUCOSE SCANNING READER
EACH MISCELLANEOUS
Qty: 1 EACH | Refills: 0 | Status: SHIPPED | OUTPATIENT
Start: 2023-04-29

## 2023-04-29 NOTE — PROGRESS NOTES
[facility]  [facility]  [address] [place] [address]  Phone:  [phone]  Fax: [phone]       Chief Complaint   Patient presents with    Follow-up    Diabetes    Hypertension     Patient here for follow up diabetes and high blood pressure. .      SUBJECTIVE:    Javier Reynoso is a 41 y.o. male  Dictation on: 04/29/2023  3:43 PM by: Azael Altman [[id number]]          Current Outpatient Medications   Medication Sig Dispense Refill    flash glucose sensor (FreeStyle Kaleb 2 Sensor) kit Blood sugar checks before meals and at bedtime and as needed 1 Kit 11    flash glucose scanning reader (FreeStyle Kaleb 2 [place]) misc Blood sugar checks before meals and at bedtime and as needed 1 Each 0    semaglutide (Ozempic) 0.25 mg or 0.5 mg/dose (2 mg/1.5 ml) subq pen 0.25 mg weekly x2 then 0.5 mg thereafter 1 Box 0    icosapent ethyL (VASCEPA) 1 gram capsule TAKE 1 CAPSULE BY MOUTH TWO (2) TIMES DAILY (WITH MEALS). 180 Capsule 1    Entresto 24-26 mg tablet TAKE 1 TABLET BY MOUTH TWO TIMES A DAY. 180 Tablet 1    spironolactone (ALDACTONE) 25 mg tablet TAKE 1/2 TABLET BY MOUTH EVERY DAY 45 Tablet 1    ezetimibe (ZETIA) 10 mg tablet TAKE 1 TABLET BY MOUTH EVERY DAY 30 Tablet 11    empagliflozin (JARDIANCE) 10 mg tablet Take 1 Tablet by mouth daily. 90 Tablet 3    amLODIPine (NORVASC) 10 mg tablet TAKE 1 TABLET BY MOUTH EVERY DAY 90 Tablet 5    insulin degludec [place] FlexTouch U-200) 200 unit/mL (3 mL) inpn pen Inject 50 units every morning 3 Pen 11    aspirin delayed-release 81 mg tablet TAKE 1 TABLET BY MOUTH EVERY DAY 90 Tablet 3    rosuvastatin (CRESTOR) 40 mg tablet TAKE 1 TABLET BY MOUTH EVERY DAY 90 Tablet 3    ticagrelor (BRILINTA) 90 mg tablet Take 1 Tablet by mouth two (2) times a day. 180 Tablet 1    carvediloL (COREG) 25 mg tablet Take 1 Tablet by mouth two (2) times daily (with meals).  180 Tablet 1    nitroglycerin (NITROSTAT) 0.4 mg SL tablet TAKE 1 TABLET UNDER THE TONGUE EVERY FIVE (5) MINUTES AS NEEDED FOR CHEST PAIN (FOR UNSTABLE ANGINA, TAKE UP TO 3 TABLETS EVERY 5 MINUTES. IF CHEST PAIN IS UNRESOLVED, CALL 911.). UP TO 3 DOSES. 25 Tablet 3    ondansetron hcl (Zofran) 4 mg tablet Take 1 Tablet by mouth every eight (8) hours as needed for Nausea or Vomiting.  (Patient not taking: Reported on 3/29/2023) 20 Tablet 0     Past Medical History:   Diagnosis Date    CAD (coronary artery disease)     2 stents 2016     Headache     Hypercholesterolemia     Hypertension     Hypogonadism male     Obstructive sleep apnea      Past Surgical History:   Procedure Laterality Date    HX HEENT      status post pharyngioplasty     No Known Allergies      REVIEW OF SYSTEMS:  General: negative for - chills or fever  ENT: negative for - headaches, nasal congestion or tinnitus  Respiratory: negative for - cough, hemoptysis, shortness of breath or wheezing  Cardiovascular : negative for - chest pain, edema, palpitations or shortness of breath  Gastrointestinal: negative for - abdominal pain, blood in stools, heartburn or nausea/vomiting  Genito-Urinary: no dysuria, trouble voiding, or hematuria  Musculoskeletal: negative for - gait disturbance, joint pain, joint stiffness or joint swelling  Neurological: no TIA or stroke symptoms  Hematologic: no bruises, no bleeding, no swollen glands  Integument: no lumps, mole changes, nail changes or rash  Endocrine: no malaise/lethargy or unexpected weight changes      Social History     Socioeconomic History    Marital status:     Number of children: 2    Years of education: 12   Occupational History    Occupation:    Tobacco Use    Smoking status: Never    Smokeless tobacco: Never   Vaping Use    Vaping Use: Never used   Substance and Sexual Activity    Alcohol use: No    Drug use: No    Sexual activity: Yes     Partners: Female     Social Determinants of Health     Financial Resource Strain: Low Risk     Difficulty of Paying Living Expenses: Not hard at all   Food Insecurity: No Food Insecurity    Worried About [address] in the Last Year: Never true    [address] [facility] [address] in the Last Year: Never true   Transportation Needs: No Transportation Needs    Lack of Transportation (Medical): No    Lack of Transportation (Non-Medical): No   Housing Stability: Low Risk     Unable to Pay for Housing in the Last Year: No    Number of [place] in the Last Year: 1    Unstable Housing in the Last Year: No     Family History   Problem Relation Age of Onset    Heart Disease Father        OBJECTIVE:    Visit Vitals  BP (!) 147/94   Pulse 95   Temp 97.9 °F (36.6 °C) (Oral)   Resp 18   Ht 5' 4\" (1.626 m)   Wt 213 lb 6.4 oz (96.8 kg)   SpO2 95%   BMI 36.63 kg/m²     CONSTITUTIONAL: well , well nourished, appears age appropriate  EYES: perrla, eom intact  ENMT:moist mucous membranes, pharynx clear  NECK: supple. Thyroid normal  RESPIRATORY: Chest: clear to ascultation and percussion   CARDIOVASCULAR: Heart: regular rate and rhythm  GASTROINTESTINAL: Abdomen: soft, bowel sounds active  HEMATOLOGIC: no pathological lymph nodes palpated  MUSCULOSKELETAL: Extremities: no edema, pulse 1+   INTEGUMENT: No unusual rashes or suspicious skin lesions noted. Nails appear normal.  NEUROLOGIC: non-focal exam   MENTAL STATUS: alert and oriented, appropriate affect      ASSESSMENT:  1. Uncontrolled type 2 diabetes mellitus with hyperglycemia ([address]    2. Primary hypertension    3. ASHD (arteriosclerotic heart disease)    4. Cardiomyopathy, unspecified type ([address]    5. Dyslipidemia    6. Obesity (BMI 30.0-34. 9)        PLAN:   Dictation on: 04/29/2023  3:46 PM by: Evelyn Del Valle [[id number]]    Dictation on: 04/29/2023  3:50 PM by: Evelyn Del Valle [[id number]]         Follow-up and Dispositions    Return in about 3 weeks (around 5/18/2023).            Sunday Deluna MD

## 2023-05-12 ENCOUNTER — HOSPITAL ENCOUNTER (INPATIENT)
Facility: HOSPITAL | Age: 48
LOS: 4 days | Discharge: HOME | DRG: 283 | End: 2023-05-16
Attending: STUDENT IN AN ORGANIZED HEALTH CARE EDUCATION/TRAINING PROGRAM | Admitting: STUDENT IN AN ORGANIZED HEALTH CARE EDUCATION/TRAINING PROGRAM
Payer: COMMERCIAL

## 2023-05-12 ENCOUNTER — APPOINTMENT (OUTPATIENT)
Facility: HOSPITAL | Age: 48
DRG: 283 | End: 2023-05-12
Payer: COMMERCIAL

## 2023-05-12 DIAGNOSIS — K83.8 INTRAHEPATIC BILE DUCT DILATION: ICD-10-CM

## 2023-05-12 DIAGNOSIS — R74.01 TRANSAMINITIS: Primary | ICD-10-CM

## 2023-05-12 PROBLEM — K83.1 BILIARY OBSTRUCTION: Status: ACTIVE | Noted: 2023-05-12

## 2023-05-12 LAB
ALBUMIN SERPL-MCNC: 4.3 G/DL (ref 3.5–5)
ALBUMIN/GLOB SERPL: 1 (ref 1.1–2.2)
ALP SERPL-CCNC: 217 U/L (ref 45–117)
ALT SERPL-CCNC: 775 U/L (ref 12–78)
ANION GAP SERPL CALC-SCNC: 7 MMOL/L (ref 5–15)
APPEARANCE UR: CLEAR
AST SERPL-CCNC: 1562 U/L (ref 15–37)
BACTERIA URNS QL MICRO: NEGATIVE /HPF
BASOPHILS # BLD: 0 K/UL (ref 0–0.1)
BASOPHILS NFR BLD: 0 % (ref 0–1)
BILIRUB SERPL-MCNC: 1.9 MG/DL (ref 0.2–1)
BILIRUB UR QL: NEGATIVE
BUN SERPL-MCNC: 19 MG/DL (ref 6–20)
BUN/CREAT SERPL: 13 (ref 12–20)
CALCIUM SERPL-MCNC: 9.8 MG/DL (ref 8.5–10.1)
CHLORIDE SERPL-SCNC: 106 MMOL/L (ref 97–108)
CO2 SERPL-SCNC: 25 MMOL/L (ref 21–32)
COLOR UR: YELLOW
CREAT SERPL-MCNC: 1.45 MG/DL (ref 0.7–1.3)
DIFFERENTIAL METHOD BLD: ABNORMAL
EOSINOPHIL # BLD: 0 K/UL (ref 0–0.4)
EOSINOPHIL NFR BLD: 0 % (ref 0–7)
EPITH CASTS URNS QL MICRO: ABNORMAL /LPF
ERYTHROCYTE [DISTWIDTH] IN BLOOD BY AUTOMATED COUNT: 14.3 % (ref 11.5–14.5)
GLOBULIN SER CALC-MCNC: 4.2 G/DL (ref 2–4)
GLUCOSE SERPL-MCNC: 296 MG/DL (ref 65–100)
GLUCOSE UR STRIP.AUTO-MCNC: 500 MG/DL
HCT VFR BLD AUTO: 47.8 % (ref 36.6–50.3)
HGB BLD-MCNC: 15.8 G/DL (ref 12.1–17)
HGB UR QL STRIP: ABNORMAL
IMM GRANULOCYTES # BLD AUTO: 0 K/UL (ref 0–0.04)
IMM GRANULOCYTES NFR BLD AUTO: 0 % (ref 0–0.5)
KETONES UR QL STRIP.AUTO: ABNORMAL MG/DL
LEUKOCYTE ESTERASE UR QL STRIP.AUTO: NEGATIVE
LIPASE SERPL-CCNC: 184 U/L (ref 73–393)
LYMPHOCYTES # BLD: 1.5 K/UL (ref 0.8–3.5)
LYMPHOCYTES NFR BLD: 21 % (ref 12–49)
MCH RBC QN AUTO: 26.1 PG (ref 26–34)
MCHC RBC AUTO-ENTMCNC: 33.1 G/DL (ref 30–36.5)
MCV RBC AUTO: 79 FL (ref 80–99)
MONOCYTES # BLD: 0.7 K/UL (ref 0–1)
MONOCYTES NFR BLD: 10 % (ref 5–13)
NEUTS SEG # BLD: 4.8 K/UL (ref 1.8–8)
NEUTS SEG NFR BLD: 69 % (ref 32–75)
NITRITE UR QL STRIP.AUTO: NEGATIVE
NRBC # BLD: 0 K/UL (ref 0–0.01)
NRBC BLD-RTO: 0 PER 100 WBC
NT PRO BNP: <5 PG/ML
PH UR STRIP: 5 (ref 5–8)
PLATELET # BLD AUTO: 198 K/UL (ref 150–400)
PMV BLD AUTO: 11.3 FL (ref 8.9–12.9)
POTASSIUM SERPL-SCNC: 4.1 MMOL/L (ref 3.5–5.1)
PROT SERPL-MCNC: 8.5 G/DL (ref 6.4–8.2)
PROT UR STRIP-MCNC: 100 MG/DL
RBC # BLD AUTO: 6.05 M/UL (ref 4.1–5.7)
RBC #/AREA URNS HPF: ABNORMAL /HPF (ref 0–5)
SODIUM SERPL-SCNC: 138 MMOL/L (ref 136–145)
SP GR UR REFRACTOMETRY: <1.005 (ref 1–1.03)
TROPONIN I SERPL HS-MCNC: 6 NG/L (ref 0–76)
URINE CULTURE IF INDICATED: ABNORMAL
UROBILINOGEN UR QL STRIP.AUTO: 0.2 EU/DL (ref 0.2–1)
WBC # BLD AUTO: 7.1 K/UL (ref 4.1–11.1)
WBC URNS QL MICRO: ABNORMAL /HPF (ref 0–4)

## 2023-05-12 PROCEDURE — 6360000002 HC RX W HCPCS: Performed by: STUDENT IN AN ORGANIZED HEALTH CARE EDUCATION/TRAINING PROGRAM

## 2023-05-12 PROCEDURE — 85025 COMPLETE CBC W/AUTO DIFF WBC: CPT

## 2023-05-12 PROCEDURE — 96375 TX/PRO/DX INJ NEW DRUG ADDON: CPT

## 2023-05-12 PROCEDURE — 83880 ASSAY OF NATRIURETIC PEPTIDE: CPT

## 2023-05-12 PROCEDURE — 81001 URINALYSIS AUTO W/SCOPE: CPT

## 2023-05-12 PROCEDURE — 6360000004 HC RX CONTRAST MEDICATION: Performed by: RADIOLOGY

## 2023-05-12 PROCEDURE — 2580000003 HC RX 258: Performed by: STUDENT IN AN ORGANIZED HEALTH CARE EDUCATION/TRAINING PROGRAM

## 2023-05-12 PROCEDURE — 1100000000 HC RM PRIVATE

## 2023-05-12 PROCEDURE — 96374 THER/PROPH/DIAG INJ IV PUSH: CPT

## 2023-05-12 PROCEDURE — 83690 ASSAY OF LIPASE: CPT

## 2023-05-12 PROCEDURE — 80053 COMPREHEN METABOLIC PANEL: CPT

## 2023-05-12 PROCEDURE — 84484 ASSAY OF TROPONIN QUANT: CPT

## 2023-05-12 PROCEDURE — 99285 EMERGENCY DEPT VISIT HI MDM: CPT

## 2023-05-12 PROCEDURE — 2500000003 HC RX 250 WO HCPCS: Performed by: STUDENT IN AN ORGANIZED HEALTH CARE EDUCATION/TRAINING PROGRAM

## 2023-05-12 PROCEDURE — 36415 COLL VENOUS BLD VENIPUNCTURE: CPT

## 2023-05-12 PROCEDURE — 74177 CT ABD & PELVIS W/CONTRAST: CPT

## 2023-05-12 PROCEDURE — 6360000002 HC RX W HCPCS: Performed by: EMERGENCY MEDICINE

## 2023-05-12 PROCEDURE — 96376 TX/PRO/DX INJ SAME DRUG ADON: CPT

## 2023-05-12 RX ORDER — SODIUM CHLORIDE 9 MG/ML
INJECTION, SOLUTION INTRAVENOUS CONTINUOUS
Status: DISCONTINUED | OUTPATIENT
Start: 2023-05-12 | End: 2023-05-12

## 2023-05-12 RX ORDER — AMLODIPINE BESYLATE 5 MG/1
10 TABLET ORAL DAILY
Status: DISCONTINUED | OUTPATIENT
Start: 2023-05-13 | End: 2023-05-16 | Stop reason: HOSPADM

## 2023-05-12 RX ORDER — SODIUM CHLORIDE 0.9 % (FLUSH) 0.9 %
5-40 SYRINGE (ML) INJECTION PRN
Status: DISCONTINUED | OUTPATIENT
Start: 2023-05-12 | End: 2023-05-16 | Stop reason: HOSPADM

## 2023-05-12 RX ORDER — ONDANSETRON 2 MG/ML
4 INJECTION INTRAMUSCULAR; INTRAVENOUS EVERY 4 HOURS PRN
Status: DISCONTINUED | OUTPATIENT
Start: 2023-05-12 | End: 2023-05-12

## 2023-05-12 RX ORDER — INSULIN GLARGINE 100 [IU]/ML
30 INJECTION, SOLUTION SUBCUTANEOUS 2 TIMES DAILY
Status: DISCONTINUED | OUTPATIENT
Start: 2023-05-12 | End: 2023-05-16 | Stop reason: HOSPADM

## 2023-05-12 RX ORDER — ONDANSETRON 2 MG/ML
4 INJECTION INTRAMUSCULAR; INTRAVENOUS ONCE
Status: COMPLETED | OUTPATIENT
Start: 2023-05-12 | End: 2023-05-12

## 2023-05-12 RX ORDER — SPIRONOLACTONE 25 MG/1
12.5 TABLET ORAL DAILY
Status: DISCONTINUED | OUTPATIENT
Start: 2023-05-13 | End: 2023-05-16 | Stop reason: HOSPADM

## 2023-05-12 RX ORDER — ONDANSETRON 2 MG/ML
4 INJECTION INTRAMUSCULAR; INTRAVENOUS EVERY 6 HOURS PRN
Status: DISCONTINUED | OUTPATIENT
Start: 2023-05-12 | End: 2023-05-12

## 2023-05-12 RX ORDER — HYDROMORPHONE HYDROCHLORIDE 1 MG/ML
0.5 INJECTION, SOLUTION INTRAMUSCULAR; INTRAVENOUS; SUBCUTANEOUS EVERY 4 HOURS PRN
Status: DISCONTINUED | OUTPATIENT
Start: 2023-05-12 | End: 2023-05-16 | Stop reason: HOSPADM

## 2023-05-12 RX ORDER — ROSUVASTATIN CALCIUM 20 MG/1
40 TABLET, COATED ORAL DAILY
Status: DISCONTINUED | OUTPATIENT
Start: 2023-05-13 | End: 2023-05-16 | Stop reason: HOSPADM

## 2023-05-12 RX ORDER — INSULIN LISPRO 100 [IU]/ML
0-16 INJECTION, SOLUTION INTRAVENOUS; SUBCUTANEOUS
Status: DISCONTINUED | OUTPATIENT
Start: 2023-05-13 | End: 2023-05-16 | Stop reason: HOSPADM

## 2023-05-12 RX ORDER — ONDANSETRON 4 MG/1
4 TABLET, ORALLY DISINTEGRATING ORAL EVERY 8 HOURS PRN
Status: DISCONTINUED | OUTPATIENT
Start: 2023-05-12 | End: 2023-05-16 | Stop reason: HOSPADM

## 2023-05-12 RX ORDER — SODIUM CHLORIDE 9 MG/ML
INJECTION, SOLUTION INTRAVENOUS PRN
Status: DISCONTINUED | OUTPATIENT
Start: 2023-05-12 | End: 2023-05-14

## 2023-05-12 RX ORDER — ENOXAPARIN SODIUM 100 MG/ML
40 INJECTION SUBCUTANEOUS DAILY
Status: DISCONTINUED | OUTPATIENT
Start: 2023-05-13 | End: 2023-05-16 | Stop reason: HOSPADM

## 2023-05-12 RX ORDER — EZETIMIBE 10 MG/1
10 TABLET ORAL DAILY
Status: DISCONTINUED | OUTPATIENT
Start: 2023-05-13 | End: 2023-05-16 | Stop reason: HOSPADM

## 2023-05-12 RX ORDER — ACETAMINOPHEN 325 MG/1
650 TABLET ORAL EVERY 6 HOURS PRN
Status: DISCONTINUED | OUTPATIENT
Start: 2023-05-12 | End: 2023-05-16 | Stop reason: HOSPADM

## 2023-05-12 RX ORDER — FENTANYL CITRATE 50 UG/ML
25 INJECTION, SOLUTION INTRAMUSCULAR; INTRAVENOUS
Status: DISCONTINUED | OUTPATIENT
Start: 2023-05-12 | End: 2023-05-16 | Stop reason: HOSPADM

## 2023-05-12 RX ORDER — SODIUM CHLORIDE 9 MG/ML
INJECTION, SOLUTION INTRAVENOUS CONTINUOUS
Status: ACTIVE | OUTPATIENT
Start: 2023-05-12 | End: 2023-05-13

## 2023-05-12 RX ORDER — POLYETHYLENE GLYCOL 3350 17 G/17G
17 POWDER, FOR SOLUTION ORAL DAILY PRN
Status: DISCONTINUED | OUTPATIENT
Start: 2023-05-12 | End: 2023-05-16 | Stop reason: HOSPADM

## 2023-05-12 RX ORDER — ASPIRIN 81 MG/1
81 TABLET ORAL DAILY
Status: DISCONTINUED | OUTPATIENT
Start: 2023-05-13 | End: 2023-05-16 | Stop reason: HOSPADM

## 2023-05-12 RX ORDER — SODIUM CHLORIDE 0.9 % (FLUSH) 0.9 %
5-40 SYRINGE (ML) INJECTION EVERY 12 HOURS SCHEDULED
Status: DISCONTINUED | OUTPATIENT
Start: 2023-05-12 | End: 2023-05-14

## 2023-05-12 RX ORDER — INSULIN LISPRO 100 [IU]/ML
5 INJECTION, SOLUTION INTRAVENOUS; SUBCUTANEOUS
Status: DISCONTINUED | OUTPATIENT
Start: 2023-05-13 | End: 2023-05-16 | Stop reason: HOSPADM

## 2023-05-12 RX ORDER — DEXTROSE MONOHYDRATE 100 MG/ML
INJECTION, SOLUTION INTRAVENOUS CONTINUOUS PRN
Status: DISCONTINUED | OUTPATIENT
Start: 2023-05-12 | End: 2023-05-16 | Stop reason: HOSPADM

## 2023-05-12 RX ORDER — CARVEDILOL 12.5 MG/1
25 TABLET ORAL 2 TIMES DAILY WITH MEALS
Status: DISCONTINUED | OUTPATIENT
Start: 2023-05-13 | End: 2023-05-16 | Stop reason: HOSPADM

## 2023-05-12 RX ORDER — INSULIN LISPRO 100 [IU]/ML
0-4 INJECTION, SOLUTION INTRAVENOUS; SUBCUTANEOUS NIGHTLY
Status: DISCONTINUED | OUTPATIENT
Start: 2023-05-12 | End: 2023-05-13

## 2023-05-12 RX ORDER — MORPHINE SULFATE 2 MG/ML
4 INJECTION, SOLUTION INTRAMUSCULAR; INTRAVENOUS
Status: COMPLETED | OUTPATIENT
Start: 2023-05-12 | End: 2023-05-12

## 2023-05-12 RX ORDER — ONDANSETRON 2 MG/ML
4 INJECTION INTRAMUSCULAR; INTRAVENOUS EVERY 6 HOURS PRN
Status: DISCONTINUED | OUTPATIENT
Start: 2023-05-12 | End: 2023-05-16 | Stop reason: HOSPADM

## 2023-05-12 RX ADMIN — ONDANSETRON 4 MG: 2 INJECTION INTRAMUSCULAR; INTRAVENOUS at 19:21

## 2023-05-12 RX ADMIN — IOPAMIDOL 100 ML: 755 INJECTION, SOLUTION INTRAVENOUS at 19:53

## 2023-05-12 RX ADMIN — SODIUM CHLORIDE: 9 INJECTION, SOLUTION INTRAVENOUS at 22:41

## 2023-05-12 RX ADMIN — ONDANSETRON 4 MG: 2 INJECTION INTRAMUSCULAR; INTRAVENOUS at 22:41

## 2023-05-12 RX ADMIN — HYDROMORPHONE HYDROCHLORIDE 0.5 MG: 1 INJECTION, SOLUTION INTRAMUSCULAR; INTRAVENOUS; SUBCUTANEOUS at 21:32

## 2023-05-12 RX ADMIN — MORPHINE SULFATE 4 MG: 2 INJECTION, SOLUTION INTRAMUSCULAR; INTRAVENOUS at 19:21

## 2023-05-12 ASSESSMENT — LIFESTYLE VARIABLES
HOW MANY STANDARD DRINKS CONTAINING ALCOHOL DO YOU HAVE ON A TYPICAL DAY: PATIENT DOES NOT DRINK
HOW OFTEN DO YOU HAVE A DRINK CONTAINING ALCOHOL: NEVER

## 2023-05-12 ASSESSMENT — PAIN SCALES - GENERAL
PAINLEVEL_OUTOF10: 9
PAINLEVEL_OUTOF10: 8
PAINLEVEL_OUTOF10: 10
PAINLEVEL_OUTOF10: 10
PAINLEVEL_OUTOF10: 9
PAINLEVEL_OUTOF10: 8

## 2023-05-12 ASSESSMENT — PAIN DESCRIPTION - LOCATION
LOCATION: ABDOMEN

## 2023-05-12 ASSESSMENT — PAIN DESCRIPTION - DESCRIPTORS: DESCRIPTORS: CRAMPING

## 2023-05-12 ASSESSMENT — PAIN DESCRIPTION - ORIENTATION: ORIENTATION: RIGHT

## 2023-05-13 ENCOUNTER — APPOINTMENT (OUTPATIENT)
Facility: HOSPITAL | Age: 48
DRG: 283 | End: 2023-05-13
Payer: COMMERCIAL

## 2023-05-13 PROBLEM — E11.65 UNCONTROLLED TYPE 2 DIABETES MELLITUS WITH HYPERGLYCEMIA (HCC): Status: ACTIVE | Noted: 2020-05-13

## 2023-05-13 PROBLEM — E66.01 SEVERE OBESITY (HCC): Status: ACTIVE | Noted: 2020-11-29

## 2023-05-13 LAB
-: NORMAL
ALBUMIN SERPL-MCNC: 4 G/DL (ref 3.5–5)
ALBUMIN/GLOB SERPL: 1 (ref 1.1–2.2)
ALP SERPL-CCNC: 245 U/L (ref 45–117)
ALT SERPL-CCNC: 1321 U/L (ref 12–78)
ANION GAP SERPL CALC-SCNC: 8 MMOL/L (ref 5–15)
AST SERPL-CCNC: >2000 U/L (ref 15–37)
BASOPHILS # BLD: 0 K/UL (ref 0–0.1)
BASOPHILS NFR BLD: 0 % (ref 0–1)
BILIRUB DIRECT SERPL-MCNC: 1.3 MG/DL (ref 0–0.2)
BILIRUB SERPL-MCNC: 2.9 MG/DL (ref 0.2–1)
BUN SERPL-MCNC: 22 MG/DL (ref 6–20)
BUN/CREAT SERPL: 16 (ref 12–20)
CALCIUM SERPL-MCNC: 9.4 MG/DL (ref 8.5–10.1)
CHLORIDE SERPL-SCNC: 104 MMOL/L (ref 97–108)
CK SERPL-CCNC: 253 U/L (ref 39–308)
CO2 SERPL-SCNC: 25 MMOL/L (ref 21–32)
CREAT SERPL-MCNC: 1.37 MG/DL (ref 0.7–1.3)
DIFFERENTIAL METHOD BLD: ABNORMAL
EOSINOPHIL # BLD: 0 K/UL (ref 0–0.4)
EOSINOPHIL NFR BLD: 0 % (ref 0–7)
ERYTHROCYTE [DISTWIDTH] IN BLOOD BY AUTOMATED COUNT: 14.1 % (ref 11.5–14.5)
GLOBULIN SER CALC-MCNC: 4 G/DL (ref 2–4)
GLUCOSE BLD STRIP.AUTO-MCNC: 123 MG/DL (ref 65–117)
GLUCOSE BLD STRIP.AUTO-MCNC: 282 MG/DL (ref 65–117)
GLUCOSE BLD STRIP.AUTO-MCNC: 295 MG/DL (ref 65–117)
GLUCOSE SERPL-MCNC: 224 MG/DL (ref 65–100)
HAV IGM SER QL: NONREACTIVE
HAV IGM SER QL: NONREACTIVE
HBV CORE IGM SER QL: NONREACTIVE
HBV CORE IGM SER QL: NONREACTIVE
HBV SURFACE AG SER QL: <0.1 INDEX
HBV SURFACE AG SER QL: <0.1 INDEX
HBV SURFACE AG SER QL: NEGATIVE
HBV SURFACE AG SER QL: NEGATIVE
HCT VFR BLD AUTO: 45.7 % (ref 36.6–50.3)
HCV AB SERPL QL IA: NONREACTIVE
HETEROPH AB BLD QL IA: NEGATIVE
HGB BLD-MCNC: 15.2 G/DL (ref 12.1–17)
IGG SERPL-MCNC: 1190 MG/DL (ref 700–1600)
IMM GRANULOCYTES # BLD AUTO: 0 K/UL (ref 0–0.04)
IMM GRANULOCYTES NFR BLD AUTO: 0 % (ref 0–0.5)
IRON SATN MFR SERPL: 16 % (ref 20–50)
IRON SERPL-MCNC: 59 UG/DL (ref 35–150)
LYMPHOCYTES # BLD: 1.2 K/UL (ref 0.8–3.5)
LYMPHOCYTES NFR BLD: 16 % (ref 12–49)
MCH RBC QN AUTO: 26.5 PG (ref 26–34)
MCHC RBC AUTO-ENTMCNC: 33.3 G/DL (ref 30–36.5)
MCV RBC AUTO: 79.8 FL (ref 80–99)
MONOCYTES # BLD: 0.8 K/UL (ref 0–1)
MONOCYTES NFR BLD: 10 % (ref 5–13)
NEUTS SEG # BLD: 5.2 K/UL (ref 1.8–8)
NEUTS SEG NFR BLD: 74 % (ref 32–75)
NRBC # BLD: 0 K/UL (ref 0–0.01)
NRBC BLD-RTO: 0 PER 100 WBC
PLATELET # BLD AUTO: 184 K/UL (ref 150–400)
PMV BLD AUTO: 11.5 FL (ref 8.9–12.9)
POTASSIUM SERPL-SCNC: 4.2 MMOL/L (ref 3.5–5.1)
PROT SERPL-MCNC: 8 G/DL (ref 6.4–8.2)
RBC # BLD AUTO: 5.73 M/UL (ref 4.1–5.7)
SERVICE CMNT-IMP: ABNORMAL
SODIUM SERPL-SCNC: 137 MMOL/L (ref 136–145)
TIBC SERPL-MCNC: 370 UG/DL (ref 250–450)
WBC # BLD AUTO: 7.2 K/UL (ref 4.1–11.1)

## 2023-05-13 PROCEDURE — 86705 HEP B CORE ANTIBODY IGM: CPT

## 2023-05-13 PROCEDURE — 80074 ACUTE HEPATITIS PANEL: CPT

## 2023-05-13 PROCEDURE — 6370000000 HC RX 637 (ALT 250 FOR IP): Performed by: STUDENT IN AN ORGANIZED HEALTH CARE EDUCATION/TRAINING PROGRAM

## 2023-05-13 PROCEDURE — 83540 ASSAY OF IRON: CPT

## 2023-05-13 PROCEDURE — 86308 HETEROPHILE ANTIBODY SCREEN: CPT

## 2023-05-13 PROCEDURE — 80076 HEPATIC FUNCTION PANEL: CPT

## 2023-05-13 PROCEDURE — 86645 CMV ANTIBODY IGM: CPT

## 2023-05-13 PROCEDURE — 82550 ASSAY OF CK (CPK): CPT

## 2023-05-13 PROCEDURE — 82962 GLUCOSE BLOOD TEST: CPT

## 2023-05-13 PROCEDURE — 74183 MRI ABD W/O CNTR FLWD CNTR: CPT

## 2023-05-13 PROCEDURE — 86381 MITOCHONDRIAL ANTIBODY EACH: CPT

## 2023-05-13 PROCEDURE — 87529 HSV DNA AMP PROBE: CPT

## 2023-05-13 PROCEDURE — 86704 HEP B CORE ANTIBODY TOTAL: CPT

## 2023-05-13 PROCEDURE — 80048 BASIC METABOLIC PNL TOTAL CA: CPT

## 2023-05-13 PROCEDURE — 6360000002 HC RX W HCPCS: Performed by: STUDENT IN AN ORGANIZED HEALTH CARE EDUCATION/TRAINING PROGRAM

## 2023-05-13 PROCEDURE — 86803 HEPATITIS C AB TEST: CPT

## 2023-05-13 PROCEDURE — 82784 ASSAY IGA/IGD/IGG/IGM EACH: CPT

## 2023-05-13 PROCEDURE — 86708 HEPATITIS A ANTIBODY: CPT

## 2023-05-13 PROCEDURE — 1100000000 HC RM PRIVATE

## 2023-05-13 PROCEDURE — 86015 ACTIN ANTIBODY EACH: CPT

## 2023-05-13 PROCEDURE — 82103 ALPHA-1-ANTITRYPSIN TOTAL: CPT

## 2023-05-13 PROCEDURE — 86644 CMV ANTIBODY: CPT

## 2023-05-13 PROCEDURE — 36415 COLL VENOUS BLD VENIPUNCTURE: CPT

## 2023-05-13 PROCEDURE — 86665 EPSTEIN-BARR CAPSID VCA: CPT

## 2023-05-13 PROCEDURE — 2500000003 HC RX 250 WO HCPCS: Performed by: STUDENT IN AN ORGANIZED HEALTH CARE EDUCATION/TRAINING PROGRAM

## 2023-05-13 PROCEDURE — 86709 HEPATITIS A IGM ANTIBODY: CPT

## 2023-05-13 PROCEDURE — A9579 GAD-BASE MR CONTRAST NOS,1ML: HCPCS | Performed by: INTERNAL MEDICINE

## 2023-05-13 PROCEDURE — 2580000003 HC RX 258: Performed by: STUDENT IN AN ORGANIZED HEALTH CARE EDUCATION/TRAINING PROGRAM

## 2023-05-13 PROCEDURE — 85025 COMPLETE CBC W/AUTO DIFF WBC: CPT

## 2023-05-13 PROCEDURE — 82390 ASSAY OF CERULOPLASMIN: CPT

## 2023-05-13 PROCEDURE — 6360000004 HC RX CONTRAST MEDICATION: Performed by: INTERNAL MEDICINE

## 2023-05-13 PROCEDURE — 83550 IRON BINDING TEST: CPT

## 2023-05-13 PROCEDURE — 87340 HEPATITIS B SURFACE AG IA: CPT

## 2023-05-13 RX ADMIN — CARVEDILOL 25 MG: 12.5 TABLET, FILM COATED ORAL at 10:01

## 2023-05-13 RX ADMIN — TICAGRELOR 90 MG: 90 TABLET ORAL at 10:01

## 2023-05-13 RX ADMIN — ASPIRIN 81 MG: 81 TABLET, COATED ORAL at 10:02

## 2023-05-13 RX ADMIN — ENOXAPARIN SODIUM 40 MG: 40 INJECTION SUBCUTANEOUS at 10:06

## 2023-05-13 RX ADMIN — CARVEDILOL 25 MG: 12.5 TABLET, FILM COATED ORAL at 20:19

## 2023-05-13 RX ADMIN — INSULIN GLARGINE 30 UNITS: 100 INJECTION, SOLUTION SUBCUTANEOUS at 20:56

## 2023-05-13 RX ADMIN — SACUBITRIL AND VALSARTAN 1 TABLET: 24; 26 TABLET, FILM COATED ORAL at 00:17

## 2023-05-13 RX ADMIN — SACUBITRIL AND VALSARTAN 1 TABLET: 24; 26 TABLET, FILM COATED ORAL at 20:56

## 2023-05-13 RX ADMIN — SODIUM CHLORIDE, PRESERVATIVE FREE 10 ML: 5 INJECTION INTRAVENOUS at 01:27

## 2023-05-13 RX ADMIN — ROSUVASTATIN 40 MG: 20 TABLET, FILM COATED ORAL at 10:00

## 2023-05-13 RX ADMIN — Medication 5 UNITS: at 20:17

## 2023-05-13 RX ADMIN — TICAGRELOR 90 MG: 90 TABLET ORAL at 00:17

## 2023-05-13 RX ADMIN — AMLODIPINE BESYLATE 10 MG: 5 TABLET ORAL at 10:02

## 2023-05-13 RX ADMIN — ONDANSETRON 4 MG: 2 INJECTION INTRAMUSCULAR; INTRAVENOUS at 20:58

## 2023-05-13 RX ADMIN — SODIUM CHLORIDE, PRESERVATIVE FREE 10 ML: 5 INJECTION INTRAVENOUS at 20:58

## 2023-05-13 RX ADMIN — Medication 8 UNITS: at 20:18

## 2023-05-13 RX ADMIN — HYDROMORPHONE HYDROCHLORIDE 0.5 MG: 1 INJECTION, SOLUTION INTRAMUSCULAR; INTRAVENOUS; SUBCUTANEOUS at 06:32

## 2023-05-13 RX ADMIN — SODIUM CHLORIDE, PRESERVATIVE FREE 10 ML: 5 INJECTION INTRAVENOUS at 10:05

## 2023-05-13 RX ADMIN — HYDROMORPHONE HYDROCHLORIDE 0.5 MG: 1 INJECTION, SOLUTION INTRAMUSCULAR; INTRAVENOUS; SUBCUTANEOUS at 01:24

## 2023-05-13 RX ADMIN — TICAGRELOR 90 MG: 90 TABLET ORAL at 20:56

## 2023-05-13 RX ADMIN — SODIUM CHLORIDE: 9 INJECTION, SOLUTION INTRAVENOUS at 01:25

## 2023-05-13 RX ADMIN — SACUBITRIL AND VALSARTAN 1 TABLET: 24; 26 TABLET, FILM COATED ORAL at 10:01

## 2023-05-13 RX ADMIN — INSULIN GLARGINE 30 UNITS: 100 INJECTION, SOLUTION SUBCUTANEOUS at 00:17

## 2023-05-13 RX ADMIN — EZETIMIBE 10 MG: 10 TABLET ORAL at 10:01

## 2023-05-13 RX ADMIN — GADOTERIDOL 20 ML: 279.3 INJECTION, SOLUTION INTRAVENOUS at 12:48

## 2023-05-13 RX ADMIN — SPIRONOLACTONE 12.5 MG: 25 TABLET ORAL at 10:02

## 2023-05-13 ASSESSMENT — PAIN DESCRIPTION - LOCATION: LOCATION: ABDOMEN

## 2023-05-13 ASSESSMENT — PAIN SCALES - GENERAL
PAINLEVEL_OUTOF10: 0
PAINLEVEL_OUTOF10: 8
PAINLEVEL_OUTOF10: 0

## 2023-05-13 ASSESSMENT — PAIN DESCRIPTION - ORIENTATION: ORIENTATION: RIGHT

## 2023-05-13 ASSESSMENT — PAIN DESCRIPTION - DESCRIPTORS: DESCRIPTORS: ACHING

## 2023-05-13 ASSESSMENT — PAIN - FUNCTIONAL ASSESSMENT: PAIN_FUNCTIONAL_ASSESSMENT: ACTIVITIES ARE NOT PREVENTED

## 2023-05-14 LAB
ALBUMIN SERPL-MCNC: 3.6 G/DL (ref 3.5–5)
ALBUMIN/GLOB SERPL: 0.9 (ref 1.1–2.2)
ALP SERPL-CCNC: 239 U/L (ref 45–117)
ALT SERPL-CCNC: 836 U/L (ref 12–78)
ANION GAP SERPL CALC-SCNC: 7 MMOL/L (ref 5–15)
AST SERPL-CCNC: 497 U/L (ref 15–37)
BILIRUB SERPL-MCNC: 0.9 MG/DL (ref 0.2–1)
BUN SERPL-MCNC: 20 MG/DL (ref 6–20)
BUN/CREAT SERPL: 19 (ref 12–20)
CALCIUM SERPL-MCNC: 8.4 MG/DL (ref 8.5–10.1)
CHLORIDE SERPL-SCNC: 106 MMOL/L (ref 97–108)
CO2 SERPL-SCNC: 25 MMOL/L (ref 21–32)
CREAT SERPL-MCNC: 1.06 MG/DL (ref 0.7–1.3)
GLOBULIN SER CALC-MCNC: 3.9 G/DL (ref 2–4)
GLUCOSE BLD STRIP.AUTO-MCNC: 119 MG/DL (ref 65–117)
GLUCOSE BLD STRIP.AUTO-MCNC: 142 MG/DL (ref 65–117)
GLUCOSE BLD STRIP.AUTO-MCNC: 152 MG/DL (ref 65–117)
GLUCOSE BLD STRIP.AUTO-MCNC: 198 MG/DL (ref 65–117)
GLUCOSE SERPL-MCNC: 146 MG/DL (ref 65–100)
HBV SURFACE AB SER QL: NONREACTIVE
HBV SURFACE AB SER-ACNC: <3.1 MIU/ML
POTASSIUM SERPL-SCNC: 3.6 MMOL/L (ref 3.5–5.1)
PROT SERPL-MCNC: 7.5 G/DL (ref 6.4–8.2)
SERVICE CMNT-IMP: ABNORMAL
SODIUM SERPL-SCNC: 138 MMOL/L (ref 136–145)

## 2023-05-14 PROCEDURE — 2580000003 HC RX 258: Performed by: STUDENT IN AN ORGANIZED HEALTH CARE EDUCATION/TRAINING PROGRAM

## 2023-05-14 PROCEDURE — 36415 COLL VENOUS BLD VENIPUNCTURE: CPT

## 2023-05-14 PROCEDURE — 80053 COMPREHEN METABOLIC PANEL: CPT

## 2023-05-14 PROCEDURE — 86706 HEP B SURFACE ANTIBODY: CPT

## 2023-05-14 PROCEDURE — 6360000002 HC RX W HCPCS: Performed by: STUDENT IN AN ORGANIZED HEALTH CARE EDUCATION/TRAINING PROGRAM

## 2023-05-14 PROCEDURE — 1100000000 HC RM PRIVATE

## 2023-05-14 PROCEDURE — 87529 HSV DNA AMP PROBE: CPT

## 2023-05-14 PROCEDURE — 82962 GLUCOSE BLOOD TEST: CPT

## 2023-05-14 PROCEDURE — 6370000000 HC RX 637 (ALT 250 FOR IP): Performed by: STUDENT IN AN ORGANIZED HEALTH CARE EDUCATION/TRAINING PROGRAM

## 2023-05-14 RX ADMIN — CARVEDILOL 25 MG: 12.5 TABLET, FILM COATED ORAL at 17:06

## 2023-05-14 RX ADMIN — ASPIRIN 81 MG: 81 TABLET, COATED ORAL at 10:36

## 2023-05-14 RX ADMIN — TICAGRELOR 90 MG: 90 TABLET ORAL at 22:23

## 2023-05-14 RX ADMIN — TICAGRELOR 90 MG: 90 TABLET ORAL at 10:35

## 2023-05-14 RX ADMIN — EZETIMIBE 10 MG: 10 TABLET ORAL at 09:00

## 2023-05-14 RX ADMIN — SODIUM CHLORIDE, PRESERVATIVE FREE 10 ML: 5 INJECTION INTRAVENOUS at 10:35

## 2023-05-14 RX ADMIN — Medication 5 UNITS: at 14:23

## 2023-05-14 RX ADMIN — ENOXAPARIN SODIUM 40 MG: 40 INJECTION SUBCUTANEOUS at 10:00

## 2023-05-14 RX ADMIN — AMLODIPINE BESYLATE 10 MG: 5 TABLET ORAL at 10:35

## 2023-05-14 RX ADMIN — SACUBITRIL AND VALSARTAN 1 TABLET: 24; 26 TABLET, FILM COATED ORAL at 10:33

## 2023-05-14 RX ADMIN — INSULIN GLARGINE 30 UNITS: 100 INJECTION, SOLUTION SUBCUTANEOUS at 21:09

## 2023-05-14 RX ADMIN — EMPAGLIFLOZIN 10 MG: 10 TABLET, FILM COATED ORAL at 10:32

## 2023-05-14 RX ADMIN — CARVEDILOL 25 MG: 12.5 TABLET, FILM COATED ORAL at 08:28

## 2023-05-14 RX ADMIN — Medication 5 UNITS: at 17:09

## 2023-05-14 RX ADMIN — SPIRONOLACTONE 12.5 MG: 25 TABLET ORAL at 10:33

## 2023-05-14 RX ADMIN — SACUBITRIL AND VALSARTAN 1 TABLET: 24; 26 TABLET, FILM COATED ORAL at 22:24

## 2023-05-14 RX ADMIN — ROSUVASTATIN 40 MG: 20 TABLET, FILM COATED ORAL at 10:33

## 2023-05-14 RX ADMIN — Medication 5 UNITS: at 08:27

## 2023-05-14 RX ADMIN — INSULIN GLARGINE 30 UNITS: 100 INJECTION, SOLUTION SUBCUTANEOUS at 10:32

## 2023-05-14 ASSESSMENT — PAIN DESCRIPTION - ORIENTATION: ORIENTATION: MID

## 2023-05-14 ASSESSMENT — PAIN DESCRIPTION - LOCATION: LOCATION: ABDOMEN

## 2023-05-14 ASSESSMENT — PAIN DESCRIPTION - DESCRIPTORS: DESCRIPTORS: ACHING

## 2023-05-14 ASSESSMENT — PAIN DESCRIPTION - PAIN TYPE: TYPE: CHRONIC PAIN

## 2023-05-14 ASSESSMENT — PAIN DESCRIPTION - FREQUENCY: FREQUENCY: CONTINUOUS

## 2023-05-14 ASSESSMENT — PAIN - FUNCTIONAL ASSESSMENT: PAIN_FUNCTIONAL_ASSESSMENT: ACTIVITIES ARE NOT PREVENTED

## 2023-05-14 ASSESSMENT — PAIN SCALES - GENERAL: PAINLEVEL_OUTOF10: 3

## 2023-05-14 ASSESSMENT — PAIN DESCRIPTION - ONSET: ONSET: ON-GOING

## 2023-05-15 LAB
A1AT SERPL-MCNC: 135 MG/DL (ref 101–187)
ALBUMIN SERPL-MCNC: 3.5 G/DL (ref 3.5–5)
ALBUMIN SERPL-MCNC: 3.5 G/DL (ref 3.5–5)
ALBUMIN/GLOB SERPL: 0.8 (ref 1.1–2.2)
ALBUMIN/GLOB SERPL: 0.9 (ref 1.1–2.2)
ALP SERPL-CCNC: 210 U/L (ref 45–117)
ALP SERPL-CCNC: 212 U/L (ref 45–117)
ALT SERPL-CCNC: 487 U/L (ref 12–78)
ALT SERPL-CCNC: 487 U/L (ref 12–78)
ANION GAP SERPL CALC-SCNC: 7 MMOL/L (ref 5–15)
AST SERPL-CCNC: 121 U/L (ref 15–37)
AST SERPL-CCNC: 123 U/L (ref 15–37)
BILIRUB DIRECT SERPL-MCNC: 0.2 MG/DL (ref 0–0.2)
BILIRUB SERPL-MCNC: 0.5 MG/DL (ref 0.2–1)
BILIRUB SERPL-MCNC: 0.6 MG/DL (ref 0.2–1)
BUN SERPL-MCNC: 20 MG/DL (ref 6–20)
BUN/CREAT SERPL: 19 (ref 12–20)
CALCIUM SERPL-MCNC: 9.3 MG/DL (ref 8.5–10.1)
CERULOPLASMIN SERPL-MCNC: 29.8 MG/DL (ref 16–31)
CHLORIDE SERPL-SCNC: 105 MMOL/L (ref 97–108)
CMV IGG SERPL IA-ACNC: <0.6 U/ML (ref 0–0.59)
CMV IGM SERPL IA-ACNC: <30 AU/ML (ref 0–29.9)
CO2 SERPL-SCNC: 26 MMOL/L (ref 21–32)
CREAT SERPL-MCNC: 1.03 MG/DL (ref 0.7–1.3)
EBV VCA IGG SER-ACNC: >600 U/ML (ref 0–17.9)
EBV VCA IGM SER-ACNC: <36 U/ML (ref 0–35.9)
FERRITIN SERPL-MCNC: 512 NG/ML (ref 26–388)
GLOBULIN SER CALC-MCNC: 4.1 G/DL (ref 2–4)
GLOBULIN SER CALC-MCNC: 4.2 G/DL (ref 2–4)
GLUCOSE BLD STRIP.AUTO-MCNC: 118 MG/DL (ref 65–117)
GLUCOSE BLD STRIP.AUTO-MCNC: 84 MG/DL (ref 65–117)
GLUCOSE BLD STRIP.AUTO-MCNC: 86 MG/DL (ref 65–117)
GLUCOSE SERPL-MCNC: 92 MG/DL (ref 65–100)
HAV AB SER QL IA: NEGATIVE
HBV CORE AB SERPL QL IA: NEGATIVE
HBV CORE AB SERPL QL IA: NEGATIVE
HBV CORE IGM SERPL QL IA: NEGATIVE
HCV AB SERPL QL IA: NORMAL
HCV IGG SERPL QL IA: NON REACTIVE S/CO RATIO
INR PPP: 1 (ref 0.9–1.1)
MITOCHONDRIA M2 IGG SER-ACNC: <20 UNITS (ref 0–20)
POTASSIUM SERPL-SCNC: 3.6 MMOL/L (ref 3.5–5.1)
PROT SERPL-MCNC: 7.6 G/DL (ref 6.4–8.2)
PROT SERPL-MCNC: 7.7 G/DL (ref 6.4–8.2)
PROTHROMBIN TIME: 10.9 SEC (ref 9–11.1)
SERVICE CMNT-IMP: ABNORMAL
SERVICE CMNT-IMP: NORMAL
SERVICE CMNT-IMP: NORMAL
SMA IGG SER-ACNC: 11 UNITS (ref 0–19)
SODIUM SERPL-SCNC: 138 MMOL/L (ref 136–145)

## 2023-05-15 PROCEDURE — 82962 GLUCOSE BLOOD TEST: CPT

## 2023-05-15 PROCEDURE — 99221 1ST HOSP IP/OBS SF/LOW 40: CPT | Performed by: INTERNAL MEDICINE

## 2023-05-15 PROCEDURE — 36415 COLL VENOUS BLD VENIPUNCTURE: CPT

## 2023-05-15 PROCEDURE — 86038 ANTINUCLEAR ANTIBODIES: CPT

## 2023-05-15 PROCEDURE — 85610 PROTHROMBIN TIME: CPT

## 2023-05-15 PROCEDURE — 82728 ASSAY OF FERRITIN: CPT

## 2023-05-15 PROCEDURE — 6360000002 HC RX W HCPCS: Performed by: STUDENT IN AN ORGANIZED HEALTH CARE EDUCATION/TRAINING PROGRAM

## 2023-05-15 PROCEDURE — 6370000000 HC RX 637 (ALT 250 FOR IP): Performed by: STUDENT IN AN ORGANIZED HEALTH CARE EDUCATION/TRAINING PROGRAM

## 2023-05-15 PROCEDURE — 80053 COMPREHEN METABOLIC PANEL: CPT

## 2023-05-15 PROCEDURE — 1100000000 HC RM PRIVATE

## 2023-05-15 PROCEDURE — 80076 HEPATIC FUNCTION PANEL: CPT

## 2023-05-15 RX ADMIN — SACUBITRIL AND VALSARTAN 1 TABLET: 24; 26 TABLET, FILM COATED ORAL at 08:35

## 2023-05-15 RX ADMIN — Medication 5 UNITS: at 08:34

## 2023-05-15 RX ADMIN — Medication 5 UNITS: at 17:56

## 2023-05-15 RX ADMIN — ASPIRIN 81 MG: 81 TABLET, COATED ORAL at 08:36

## 2023-05-15 RX ADMIN — INSULIN GLARGINE 30 UNITS: 100 INJECTION, SOLUTION SUBCUTANEOUS at 21:39

## 2023-05-15 RX ADMIN — SACUBITRIL AND VALSARTAN 1 TABLET: 24; 26 TABLET, FILM COATED ORAL at 21:38

## 2023-05-15 RX ADMIN — INSULIN GLARGINE 30 UNITS: 100 INJECTION, SOLUTION SUBCUTANEOUS at 08:34

## 2023-05-15 RX ADMIN — TICAGRELOR 90 MG: 90 TABLET ORAL at 21:38

## 2023-05-15 RX ADMIN — ROSUVASTATIN 40 MG: 20 TABLET, FILM COATED ORAL at 08:35

## 2023-05-15 RX ADMIN — EMPAGLIFLOZIN 10 MG: 10 TABLET, FILM COATED ORAL at 08:39

## 2023-05-15 RX ADMIN — SPIRONOLACTONE 12.5 MG: 25 TABLET ORAL at 08:36

## 2023-05-15 RX ADMIN — Medication 5 UNITS: at 12:37

## 2023-05-15 RX ADMIN — TICAGRELOR 90 MG: 90 TABLET ORAL at 08:35

## 2023-05-15 RX ADMIN — AMLODIPINE BESYLATE 10 MG: 5 TABLET ORAL at 08:36

## 2023-05-15 RX ADMIN — CARVEDILOL 25 MG: 12.5 TABLET, FILM COATED ORAL at 08:36

## 2023-05-15 RX ADMIN — CARVEDILOL 25 MG: 12.5 TABLET, FILM COATED ORAL at 17:56

## 2023-05-15 RX ADMIN — ENOXAPARIN SODIUM 40 MG: 40 INJECTION SUBCUTANEOUS at 08:35

## 2023-05-15 RX ADMIN — EZETIMIBE 10 MG: 10 TABLET ORAL at 08:36

## 2023-05-15 NOTE — PLAN OF CARE
Problem: Discharge Planning  Goal: Discharge to home or other facility with appropriate resources  Outcome: Progressing  Flowsheets (Taken 5/14/2023 1931)  Discharge to home or other facility with appropriate resources: Identify barriers to discharge with patient and caregiver     Problem: Pain  Goal: Verbalizes/displays adequate comfort level or baseline comfort level  Outcome: Progressing

## 2023-05-16 VITALS
HEIGHT: 64 IN | RESPIRATION RATE: 16 BRPM | SYSTOLIC BLOOD PRESSURE: 108 MMHG | OXYGEN SATURATION: 92 % | HEART RATE: 71 BPM | TEMPERATURE: 97.3 F | DIASTOLIC BLOOD PRESSURE: 82 MMHG | BODY MASS INDEX: 34.93 KG/M2 | WEIGHT: 204.59 LBS

## 2023-05-16 PROBLEM — K75.9 HEPATITIS: Status: ACTIVE | Noted: 2023-05-16

## 2023-05-16 LAB
ALBUMIN SERPL-MCNC: 3.3 G/DL (ref 3.5–5)
ALBUMIN/GLOB SERPL: 0.8 (ref 1.1–2.2)
ALP SERPL-CCNC: 193 U/L (ref 45–117)
ALT SERPL-CCNC: 354 U/L (ref 12–78)
ANA SER QL: NEGATIVE
ANION GAP SERPL CALC-SCNC: 8 MMOL/L (ref 5–15)
AST SERPL-CCNC: 74 U/L (ref 15–37)
BILIRUB SERPL-MCNC: 0.7 MG/DL (ref 0.2–1)
BUN SERPL-MCNC: 19 MG/DL (ref 6–20)
BUN/CREAT SERPL: 18 (ref 12–20)
CALCIUM SERPL-MCNC: 8.8 MG/DL (ref 8.5–10.1)
CHLORIDE SERPL-SCNC: 105 MMOL/L (ref 97–108)
CO2 SERPL-SCNC: 24 MMOL/L (ref 21–32)
CREAT SERPL-MCNC: 1.06 MG/DL (ref 0.7–1.3)
GLOBULIN SER CALC-MCNC: 4.1 G/DL (ref 2–4)
GLUCOSE BLD STRIP.AUTO-MCNC: 97 MG/DL (ref 65–117)
GLUCOSE SERPL-MCNC: 124 MG/DL (ref 65–100)
POTASSIUM SERPL-SCNC: 3.8 MMOL/L (ref 3.5–5.1)
PROT SERPL-MCNC: 7.4 G/DL (ref 6.4–8.2)
SERVICE CMNT-IMP: NORMAL
SODIUM SERPL-SCNC: 137 MMOL/L (ref 136–145)

## 2023-05-16 PROCEDURE — 82962 GLUCOSE BLOOD TEST: CPT

## 2023-05-16 PROCEDURE — 6370000000 HC RX 637 (ALT 250 FOR IP): Performed by: STUDENT IN AN ORGANIZED HEALTH CARE EDUCATION/TRAINING PROGRAM

## 2023-05-16 PROCEDURE — 36415 COLL VENOUS BLD VENIPUNCTURE: CPT

## 2023-05-16 PROCEDURE — 6360000002 HC RX W HCPCS: Performed by: STUDENT IN AN ORGANIZED HEALTH CARE EDUCATION/TRAINING PROGRAM

## 2023-05-16 PROCEDURE — 80053 COMPREHEN METABOLIC PANEL: CPT

## 2023-05-16 PROCEDURE — 99234 HOSP IP/OBS SM DT SF/LOW 45: CPT | Performed by: INTERNAL MEDICINE

## 2023-05-16 RX ORDER — INSULIN DEGLUDEC 200 U/ML
INJECTION, SOLUTION SUBCUTANEOUS
Qty: 6 ADJUSTABLE DOSE PRE-FILLED PEN SYRINGE | Refills: 11 | Status: SHIPPED
Start: 2023-05-16

## 2023-05-16 RX ORDER — ONDANSETRON 4 MG/1
4 TABLET, FILM COATED ORAL EVERY 8 HOURS PRN
Qty: 30 TABLET | Refills: 0 | Status: SHIPPED | OUTPATIENT
Start: 2023-05-16

## 2023-05-16 RX ADMIN — INSULIN GLARGINE 30 UNITS: 100 INJECTION, SOLUTION SUBCUTANEOUS at 08:51

## 2023-05-16 RX ADMIN — ROSUVASTATIN 40 MG: 20 TABLET, FILM COATED ORAL at 08:53

## 2023-05-16 RX ADMIN — AMLODIPINE BESYLATE 10 MG: 5 TABLET ORAL at 08:53

## 2023-05-16 RX ADMIN — ONDANSETRON 4 MG: 2 INJECTION INTRAMUSCULAR; INTRAVENOUS at 09:28

## 2023-05-16 RX ADMIN — ASPIRIN 81 MG: 81 TABLET, COATED ORAL at 08:53

## 2023-05-16 RX ADMIN — EMPAGLIFLOZIN 10 MG: 10 TABLET, FILM COATED ORAL at 08:57

## 2023-05-16 RX ADMIN — EZETIMIBE 10 MG: 10 TABLET ORAL at 08:52

## 2023-05-16 RX ADMIN — TICAGRELOR 90 MG: 90 TABLET ORAL at 08:53

## 2023-05-16 RX ADMIN — Medication 5 UNITS: at 12:30

## 2023-05-16 RX ADMIN — CARVEDILOL 25 MG: 12.5 TABLET, FILM COATED ORAL at 08:53

## 2023-05-16 RX ADMIN — SACUBITRIL AND VALSARTAN 1 TABLET: 24; 26 TABLET, FILM COATED ORAL at 08:52

## 2023-05-16 RX ADMIN — SPIRONOLACTONE 12.5 MG: 25 TABLET ORAL at 08:53

## 2023-05-16 RX ADMIN — Medication 5 UNITS: at 08:51

## 2023-05-16 RX ADMIN — ENOXAPARIN SODIUM 40 MG: 40 INJECTION SUBCUTANEOUS at 08:52

## 2023-05-16 ASSESSMENT — PAIN SCALES - GENERAL
PAINLEVEL_OUTOF10: 0
PAINLEVEL_OUTOF10: 0

## 2023-05-16 NOTE — DISCHARGE INSTRUCTIONS
General Discharge Instructions    Patient ID:  Tam De Leon  [id number]  41 y.o.  1975    Patient Instructions          The following personal items were collected during your admission and were returned to you. Take Home Medications     {Medication reconciliation information is now added to the patient's AVS automatically when it is printed. There is no need to use this SmartLink in discharge instructions. Highlight this text and delete it to clear this message}      What to do at Home    Recommended diet: diabetic diet    Recommended activity: activity as tolerated    Follow-up with Carlyle Syed MD  in 3 days. Information obtained by :  I understand that if any problems occur once I am at home I am to contact my physician. I understand and acknowledge receipt of the instructions indicated above.                                                                                                                                            Physician's or R.N.'s Signature                                                                  Date/Time                                                                                                                                              Patient or Representative Signature                                                          Date/Time

## 2023-05-16 NOTE — DISCHARGE SUMMARY
Discharge Summary    Name: Clair Wolff  [id number]  YOB: 1975 (Age: 41 y.o.)   Date of Admission: 5/12/2023  Date of Discharge: 5/16/2023  Attending Physician: Camron Carreon MD    Discharge Diagnosis:   Principal Problem:    Hepatitis  Active Problems:    ASHD (arteriosclerotic heart disease)    Uncontrolled type 2 diabetes mellitus with hyperglycemia ([facility]    Severe obesity ([facility]    Biliary obstruction  Resolved Problems:    * No resolved hospital problems. *      Consultations:  IP CONSULT TO GI      Brief Admission History/Reason for Admission Per Zhao Irene, DO:     41 y.o.  male with PMHx significant for CAD status post GENO, HFrEF (EF 40-45%), hyperlipidemia, essential hypertension who presents to emergency department with complaints of 3 days of intermittent right upper quadrant pain (\"I thought it was gas\") without temporal association with meals that acutely worsened today and failed to remit over time prompting presentation emergency department for evaluation. Patient reports pain is associated with nausea and vomiting and was actively retching during exam in the room. He denies sick contacts, recent travel, IVDU, or other hepatitis risk factors. ROS otherwise negative. He denies tobacco, alcohol, or illicit drug use. In the ED, patient afebrile and hemodynamically stable (hypertensive 130s/100s), saturating upper 90s on room air. CT abdomen pelvis demonstrates mild intra and extrahepatic biliary ductal dilatation worsened from prior imaging in setting of status postcholecystectomy. Labs demonstrate: Total bilirubin 1.9, , AST 1562, alk phos 217, albumin 4.3, sodium 138, potassium 4.1, glucose 296, BUN 19, creatinine 1.45 (baseline), UA not reflexed to culture. Lipase 184, WBC 7.1, hemoglobin 15.8, platelets 211, high sensitive troponin 6, proBNP undetectable. Patient given morphine and Zofran by ED provider.      We were asked to admit for work up and

## 2023-05-16 NOTE — CARE COORDINATION
05/15/23 2057   Service Assessment   Patient Orientation Alert and Oriented;Person;Place;Situation;Self   Cognition Alert   History Provided By Patient   Primary Caregiver Self   Support Systems Spouse/Significant Other   PCP Verified by CM Yes   Last Visit to PCP Within last 3 months   Prior Functional Level Independent in ADLs/IADLs   Current Functional Level Independent in ADLs/IADLs   Can patient return to prior living arrangement Yes   Ability to make needs known: Good   Family able to assist with home care needs: Yes   Would you like for me to discuss the discharge plan with any other family members/significant others, and if so, who?  No   Financial Resources Medicaid   Social/Functional History   Lives With Spouse   Type of [address] One level   Home Access Stairs to enter with rails   Entrance Stairs - Number of Steps 1   ADL Assistance Independent   Homemaking Assistance Independent   Active  Yes   Mode of Transportation Car   Discharge Planning   Living Arrangements Spouse/Significant Other   Patient expects to be discharged to: House   One/Two Story Residence One story   Services At/After Discharge   Mode of Transport at Discharge John 29 MSW   5730

## 2023-05-16 NOTE — PROGRESS NOTES
.End of Shift Note    Bedside shift change report given to Barb Tidwell RN (oncoming nurse) by Ana Lilia Pate RN (offgoing nurse). Report included the following information SBAR, Kardex, ED Summary, Procedure Summary, Intake/Output, MAR, and Recent Results    Shift worked:  0403-5551     Shift summary and any significant changes:     Unable to draw AM labs, 4 attempts made by 2 Rns. Left voicemail for PICC team, supplies with labels left in the room. HSV lab completed and sent to lab. Vital signs stable, no significant events. Concerns for physician to address:  Plan of care, Discharge planning     Zone phone for oncoming shift:   [phone]       Activity:     Number times ambulated in hallways past shift: 0  Number of times OOB to chair past shift: 2    Cardiac:   Cardiac Monitoring: No           Access:  Current line(s): PIV     Genitourinary:   Urinary status: voiding    Respiratory:      Chronic home O2 use?: NO  Incentive spirometer at bedside: YES       GI:     Current diet:  ADULT DIET;  Regular; 4 carb choices (60 gm/meal)  Passing flatus: YES  Tolerating current diet: YES       Pain Management:   Patient states pain is manageable on current regimen: YES    Skin:     Interventions: increase time out of bed and nutritional support    Patient Safety:  Fall Score:    Interventions: gripper socks, pt to call before getting OOB, and stay with me (per policy)       Length of Stay:  Expected LOS: 2  Actual LOS: 3      Ana Lilia Pate RN
DISCHARGE NOTE FROM [facility]    Patient determined to be stable for discharge by attending provider. I have reviewed the discharge instructions and follow-up appointments with the Patient and Spouse. They verbalized understanding and all questions were answered to their satisfaction. No complaints or further questions were expressed. Medications sent to pharmacy Appropriate educational materials and medication side effect teaching were provided. PIV were removed prior to discharge. Patient did not discharge with any line, mix, or drain. All personal items collected during admission were returned to the patient prior to discharge.     Post-op patient: Catrachita Hatch RN
End of Shift Note    Bedside shift change report given to Pattie Hensley RN  (oncoming nurse) by Annamarie Toney RN (offgoing nurse). Report included the following information SBAR, Kardex, Intake/Output, and MAR    Shift worked:  7am-7pm     Shift summary and any significant changes:     Pt's blood was drawn by PICC Team in the morning successfully. Pt was able to ambulate around the room. Vital signs were stable, no complaints or any concerns noted. Pt did not complain of pain during the shift. Pt complained of slight nausea during the shift, but denied wanting medication. Pt is tolerating the current diet. Concerns for physician to address:       Zone phone for oncoming shift:   [phone]       Activity:     Number times ambulated in hallways past shift: 0  Number of times OOB to chair past shift: 2    Cardiac:   Cardiac Monitoring: Yes           Access:  Current line(s): PIV     Genitourinary:   Urinary status: voiding    Respiratory:      Chronic home O2 use?: N/A  Incentive spirometer at bedside: NO       GI:     Current diet:  ADULT DIET;  Regular; 4 carb choices (60 gm/meal)  Passing flatus: YES  Tolerating current diet: YES       Pain Management:   Patient states pain is manageable on current regimen: YES    Skin:     Interventions: increase time out of bed    Patient Safety:  Fall Score:    Interventions: gripper socks and pt to call before getting OOB       Length of Stay:  Expected LOS: 2  Actual LOS: 3      Annamarie Toney RN
GI Progress Note  Mary Page NP  NAME:Yohance Jennifer Rolling PMX:0/4/4263 MTU:[id number]   ATTG/PCP: Latosha Rankin MD  Date/Time:  5/15/2023 1:16 PM   Primary GI: Dr. Hodan Norris  Reason for following: elevated LFTs    Assessment:     Elevated LFTs - primarily acute hepatitis picture rapidly improving, which can be consistent with ischemia/hypoxia, though patent vasculature on CT/MRI and inter/extrahepatic lorenza dil noted but no underlying lesion or concern on imaging, suggested to be physiologic post cholecystectomy ('13 for dyskinesia)  Abd pain, nausea, much improved; suspect related to LFTs, though we'll see how symptoms progress     Plan:     Await pending hepatitis and liver disease serologies, few that are back are negative so far  Diet okay  Manage clinically  Trend LFTs  Avoid hepatotoxic medications  Will arrange for outpatient follow-up with Dr. Hodan Norris to review liver serologies  Nothing further to add from a GI standpoint. GI signing-off. Please call with any questions. Thank you for this consult. *Plan of care discussed with Dr. Amira Ramos      Subjective:   Discussed with RN events overnight. Wife at bedside. Patient in bathroom cleaning up. LFT's continue to downtrend. T. Bili is WNL. Review of Systems:  Symptom Y/N Comments  Symptom Y/N Comments   Fever/Chills n   Chest Pain n    Cough n   Headaches n    Sputum n   Joint Pain n    SOB/ALATORRE n   Pruritis/Rash n    Tolerating Diet y   Other       Could NOT obtain due to:      Objective:   VITALS:   Last 24hrs VS reviewed since prior progress note. Most recent are:  Vitals:    05/15/23 1116   BP: (!) 107/90   Pulse: 73   Resp: 17   Temp: 97.9 °F (36.6 °C)   SpO2: 94%     No intake or output data in the 24 hours ending 05/15/23 1316  PHYSICAL EXAM:  General: Pleasant AA male. NAD   HEENT: NC, Atraumatic. Anicteric sclerae. Lungs:  CTA Bilaterally. No Wheezing/Rhonchi/Rales.   Heart:  Regular  rhythm,  No murmur (), No Rubs, No Gallops  Abdomen: Soft,
Hospital follow-up PCP transitional care appointment has been scheduled with Dr. Otoniel Hernandez on 6/7/23 1600. Patient made this appt themself per chart record. This is a previously scheduled appt. Pending patient discharge.   Darlena Skiff, Care Management Assistant
Spiritual Care Assessment/Progress Note  [address]    Name: Eladio Barnett MRN: [id number]    Age: 41 y.o. Sex: male   Language: English     Date: 5/16/2023            Total Time Calculated: 22 min              Spiritual Assessment begun in [facility] 3 SURG TELE  Service Provided For[de-identified] Patient and family together  Referral/Consult From[de-identified] Rounding  Encounter Overview/Reason : Initial Encounter    Spiritual beliefs:      [x] Involved in a miranda tradition/spiritual practice:      [x] Supported by a miranda community:      [] Claims no spiritual orientation:      [] Seeking spiritual identity:           [] Adheres to an individual form of spirituality:      [] Not able to assess:                Identified resources for coping and support system:   Support System: Spouse, Children, Buddhist/miranda community       [x] Prayer                  [] Devotional reading               [] Music                  [] Guided Imagery     [] Pet visits                                        [] Other: (COMMENT)     Specific area/focus of visit   Encounter: Type: Initial Screen/Assessment  Crisis:    Spiritual/Emotional needs: Type: Spiritual Support  Ritual, Rites and Sacraments:    Grief, Loss, and Adjustments:    Ethics/Mediation:    Behavioral Health:    Palliative Care: Advance Care Planning:      Plan/Referrals: Other (Comment) (patient being discharged.)    Narrative:   Reviewed chart prior to visit for spiritual assessment. This  ran into patient's wife in the elevator. Chelo Louis is a  of a Taoism and guadarrama over other pastors. His wife ministers as well. He shared he is feeling much better than when he arrived. He and his wife have identifies some stressors in life that they plan to work together to address. They have young adult children. No spiritual concerns were expressed;  offered listening presence and assurance of prayer.   Chelo Louis and his wife expressed
review of systems was negative. Objective:     Patient Vitals for the past 24 hrs:   BP Temp Temp src Pulse Resp SpO2   05/15/23 1116 (!) 107/90 97.9 °F (36.6 °C) Oral 73 17 94 %   05/15/23 0816 (!) 162/96 98.1 °F (36.7 °C) Oral 84 18 99 %   05/15/23 0347 125/87 97.8 °F (36.6 °C) Oral 76 18 99 %   05/15/23 0015 105/65 98.9 °F (37.2 °C) Oral 85 18 100 %   05/14/23 2034 112/85 98.4 °F (36.9 °C) Oral 80 16 96 %       No intake/output data recorded. No intake/output data recorded. Physical Exam: BP (!) 107/90   Pulse 73   Temp 97.9 °F (36.6 °C) (Oral)   Resp 17   Ht 5' 4\" (1.626 m)   Wt 204 lb 9.4 oz (92.8 kg)   SpO2 94%   BMI 35.12 kg/m²   General appearance: alert, appears stated age, and cooperative  Neck: no adenopathy, no carotid bruit, no JVD, supple, symmetrical, trachea midline, and thyroid not enlarged, symmetric, no tenderness/mass/nodules  Lungs: clear to auscultation bilaterally  Heart: regular rate and rhythm, S1, S2 normal, no murmur, click, rub or gallop  Abdomen: soft, non-tender; bowel sounds normal; no masses,  no organomegaly  Extremities: extremities normal, atraumatic, no cyanosis or edema    Assessment:     Principal Problem:    Biliary obstruction     Per GI. Picture looks more like a hepatic inflammatory process. Significant reduction in LFTs today. If he feels significantly better as anticipated will be the case then discharge in a.m. Active Problems:    ASHD (arteriosclerotic heart disease)  No symptoms of ischemia as well as no gross evidence of cardiac decompensation. Uncontrolled type 2 diabetes mellitus with hyperglycemia ([address]     Poorly controlled diabetes which should improve during the hospital stay because of a major restriction in processed carbohydrates    Severe obesity ([address]  Resolved Problems:    * No resolved hospital problems.  *      Plan:

## 2023-05-16 NOTE — CARE COORDINATION
05/16/23 [facility] Discharge   Transition of Care Consult (CM Consult) Discharge [address] Discharge None    Resource Information Provided? No   Mode of Transport at Discharge Other (see comment)  (Family to transport home.)   Confirm Follow Up Transport Family     Pt to discharge home with family. CM visited pt at bedside. No CM needs noted. Pt cleared by CM as long as labs are completed.

## 2023-06-06 ENCOUNTER — HOSPITAL ENCOUNTER (EMERGENCY)
Facility: HOSPITAL | Age: 48
Discharge: HOME | End: 2023-06-06
Attending: STUDENT IN AN ORGANIZED HEALTH CARE EDUCATION/TRAINING PROGRAM
Payer: COMMERCIAL

## 2023-06-06 VITALS
WEIGHT: 206.13 LBS | TEMPERATURE: 97.7 F | OXYGEN SATURATION: 96 % | SYSTOLIC BLOOD PRESSURE: 153 MMHG | HEART RATE: 83 BPM | RESPIRATION RATE: 16 BRPM | BODY MASS INDEX: 35.19 KG/M2 | DIASTOLIC BLOOD PRESSURE: 98 MMHG | HEIGHT: 64 IN

## 2023-06-06 DIAGNOSIS — R30.0 DYSURIA: Primary | ICD-10-CM

## 2023-06-06 LAB
APPEARANCE UR: CLEAR
BACTERIA URNS QL MICRO: NEGATIVE /HPF
BILIRUB UR QL: NEGATIVE
C TRACH DNA SPEC QL NAA+PROBE: NEGATIVE
COLOR UR: ABNORMAL
EPITH CASTS URNS QL MICRO: ABNORMAL /LPF
GLUCOSE BLD STRIP.AUTO-MCNC: 193 MG/DL (ref 65–117)
GLUCOSE UR STRIP.AUTO-MCNC: >1000 MG/DL
HGB UR QL STRIP: NEGATIVE
HYALINE CASTS URNS QL MICRO: ABNORMAL /LPF (ref 0–2)
KETONES UR QL STRIP.AUTO: ABNORMAL MG/DL
LEUKOCYTE ESTERASE UR QL STRIP.AUTO: NEGATIVE
N GONORRHOEA DNA SPEC QL NAA+PROBE: NEGATIVE
NITRITE UR QL STRIP.AUTO: NEGATIVE
PH UR STRIP: 5 (ref 5–8)
PROT UR STRIP-MCNC: NEGATIVE MG/DL
RBC #/AREA URNS HPF: ABNORMAL /HPF (ref 0–5)
SAMPLE TYPE: NORMAL
SERVICE CMNT-IMP: ABNORMAL
SERVICE CMNT-IMP: NORMAL
SP GR UR REFRACTOMETRY: 1.02 (ref 1–1.03)
SPECIMEN SOURCE: NORMAL
URINE CULTURE IF INDICATED: ABNORMAL
UROBILINOGEN UR QL STRIP.AUTO: 0.2 EU/DL (ref 0.2–1)
WBC URNS QL MICRO: ABNORMAL /HPF (ref 0–4)

## 2023-06-06 PROCEDURE — 81001 URINALYSIS AUTO W/SCOPE: CPT

## 2023-06-06 PROCEDURE — 87591 N.GONORRHOEAE DNA AMP PROB: CPT

## 2023-06-06 PROCEDURE — 87491 CHLMYD TRACH DNA AMP PROBE: CPT

## 2023-06-06 PROCEDURE — 82962 GLUCOSE BLOOD TEST: CPT

## 2023-06-06 PROCEDURE — 2500000003 HC RX 250 WO HCPCS: Performed by: STUDENT IN AN ORGANIZED HEALTH CARE EDUCATION/TRAINING PROGRAM

## 2023-06-06 PROCEDURE — 6360000002 HC RX W HCPCS: Performed by: STUDENT IN AN ORGANIZED HEALTH CARE EDUCATION/TRAINING PROGRAM

## 2023-06-06 PROCEDURE — 6370000000 HC RX 637 (ALT 250 FOR IP): Performed by: STUDENT IN AN ORGANIZED HEALTH CARE EDUCATION/TRAINING PROGRAM

## 2023-06-06 PROCEDURE — 96372 THER/PROPH/DIAG INJ SC/IM: CPT

## 2023-06-06 PROCEDURE — 99284 EMERGENCY DEPT VISIT MOD MDM: CPT

## 2023-06-06 RX ORDER — DOXYCYCLINE HYCLATE 100 MG
100 TABLET ORAL 2 TIMES DAILY
Qty: 14 TABLET | Refills: 0 | Status: SHIPPED | OUTPATIENT
Start: 2023-06-06 | End: 2023-06-13

## 2023-06-06 RX ORDER — DOXYCYCLINE HYCLATE 100 MG
100 TABLET ORAL ONCE
Status: COMPLETED | OUTPATIENT
Start: 2023-06-06 | End: 2023-06-06

## 2023-06-06 RX ADMIN — DOXYCYCLINE HYCLATE 100 MG: 100 TABLET, COATED ORAL at 04:07

## 2023-06-06 RX ADMIN — LIDOCAINE HYDROCHLORIDE 500 MG: 10 INJECTION, SOLUTION EPIDURAL; INFILTRATION; INTRACAUDAL; PERINEURAL at 04:08

## 2023-06-06 ASSESSMENT — PAIN - FUNCTIONAL ASSESSMENT: PAIN_FUNCTIONAL_ASSESSMENT: 0-10

## 2023-06-06 ASSESSMENT — PAIN DESCRIPTION - LOCATION: LOCATION: PENIS

## 2023-06-06 NOTE — DISCHARGE INSTRUCTIONS
You were seen and evaluated in the ER today for discomfort with urination, you were given 1 dose of an antibiotic to cover for possible STD, the other will need to be taken twice daily for 1 week. Please follow-up the results on MyChart or you will get a call if positive.   If positive please notify your partners, refrain from intercourse until your results return

## 2023-06-06 NOTE — ED PROVIDER NOTES
[facility] EMERGENCY DEPT  EMERGENCY DEPARTMENT [address]    MRN: [id number]  [place] 1975  Date of evaluation: 6/6/2023  Provider: Bethany Churchill MD   PCP: Abraham Camacho MD  Note Started: 3:42 AM 6/6/23     CHIEF COMPLAINT       Chief Complaint   Patient presents with    Other     Patient states he thinks he has a UTI. He has been feeling a tingly/burning sensation on and inside the penis. He has never experienced this before. He has urinary frequency but also has DM2. Denies any color or odor change. Denies any penile discharge. Denies any new sexual partners. In a monogamous relationship and doesn't use protection. He did find out that she recently had an affair and would like to be tested for STD's. HISTORY OF PRESENT ILLNESS: 1 or more elements   History From: Patient, History limited by: none     Frandy Ashley is a 57 y.o. male with Pmhx listed below     Patient is a 73-year-old male with history of diabetes, hypertension, CAD presenting with concern of dysuria. Patient states that he has had some discomfort with urination, denies any lesions or discharge, odor, patient states that he has had no urgency or frequency, states that he has never had this happen in the past, patient denies abdominal pain nausea vomiting, does state that he has multiple partners and sometimes does not use protection, concern of possible STD although no history of this in the past and no known exposure. No other complaints this time. Nursing Notes were all reviewed and agreed with or any disagreements were addressed in the HPI. REVIEW OF SYSTEMS      Positives and Pertinent negatives as per HPI.     PAST HISTORY     Past Medical History:  Past Medical History:   Diagnosis Date    CAD (coronary artery disease)     2 stents 2016     Headache     Hypercholesterolemia     Hypertension     Hypogonadism male     Obstructive sleep apnea      Previous Medications    AMLODIPINE (NORVASC) 10 MG TABLET    TAKE 1 TABLET BY MOUTH

## 2023-06-06 NOTE — ED NOTES
Discharge instructions given to patient by Rufina Lozano Rn.  Verbalized understanding of instructions. Patient discharged without difficulty. Patient discharged in stable condition via ambulation accompanied by self.        Juanita Lujan RN  06/06/23 3006

## 2023-06-07 ENCOUNTER — OFFICE VISIT (OUTPATIENT)
Facility: CLINIC | Age: 48
End: 2023-06-07

## 2023-06-07 VITALS
SYSTOLIC BLOOD PRESSURE: 125 MMHG | RESPIRATION RATE: 16 BRPM | WEIGHT: 205.7 LBS | HEIGHT: 62 IN | DIASTOLIC BLOOD PRESSURE: 85 MMHG | OXYGEN SATURATION: 95 % | TEMPERATURE: 98 F | HEART RATE: 90 BPM | BODY MASS INDEX: 37.85 KG/M2

## 2023-06-07 DIAGNOSIS — Z09 HOSPITAL DISCHARGE FOLLOW-UP: ICD-10-CM

## 2023-06-07 DIAGNOSIS — E78.5 DYSLIPIDEMIA: ICD-10-CM

## 2023-06-07 DIAGNOSIS — I25.10 ASHD (ARTERIOSCLEROTIC HEART DISEASE): ICD-10-CM

## 2023-06-07 DIAGNOSIS — E66.01 SEVERE OBESITY (HCC): ICD-10-CM

## 2023-06-07 DIAGNOSIS — E11.65 UNCONTROLLED TYPE 2 DIABETES MELLITUS WITH HYPERGLYCEMIA (HCC): ICD-10-CM

## 2023-06-07 DIAGNOSIS — K75.9 HEPATITIS: Primary | ICD-10-CM

## 2023-06-07 DIAGNOSIS — I10 PRIMARY HYPERTENSION: ICD-10-CM

## 2023-06-07 SDOH — HEALTH STABILITY: PHYSICAL HEALTH: ON AVERAGE, HOW MANY MINUTES DO YOU ENGAGE IN EXERCISE AT THIS LEVEL?: 0 MIN

## 2023-06-07 SDOH — ECONOMIC STABILITY: FOOD INSECURITY: WITHIN THE PAST 12 MONTHS, THE FOOD YOU BOUGHT JUST DIDN'T LAST AND YOU DIDN'T HAVE MONEY TO GET MORE.: NEVER TRUE

## 2023-06-07 SDOH — ECONOMIC STABILITY: TRANSPORTATION INSECURITY
IN THE PAST 12 MONTHS, HAS THE LACK OF TRANSPORTATION KEPT YOU FROM MEDICAL APPOINTMENTS OR FROM GETTING MEDICATIONS?: NO

## 2023-06-07 SDOH — ECONOMIC STABILITY: FOOD INSECURITY: WITHIN THE PAST 12 MONTHS, YOU WORRIED THAT YOUR FOOD WOULD RUN OUT BEFORE YOU GOT MONEY TO BUY MORE.: NEVER TRUE

## 2023-06-07 SDOH — ECONOMIC STABILITY: HOUSING INSECURITY: IN THE LAST 12 MONTHS, HOW MANY PLACES HAVE YOU LIVED?: 1

## 2023-06-07 SDOH — HEALTH STABILITY: PHYSICAL HEALTH: ON AVERAGE, HOW MANY DAYS PER WEEK DO YOU ENGAGE IN MODERATE TO STRENUOUS EXERCISE (LIKE A BRISK WALK)?: 0 DAYS

## 2023-06-07 SDOH — ECONOMIC STABILITY: INCOME INSECURITY: IN THE LAST 12 MONTHS, WAS THERE A TIME WHEN YOU WERE NOT ABLE TO PAY THE MORTGAGE OR RENT ON TIME?: NO

## 2023-06-07 SDOH — ECONOMIC STABILITY: TRANSPORTATION INSECURITY
IN THE PAST 12 MONTHS, HAS LACK OF TRANSPORTATION KEPT YOU FROM MEETINGS, WORK, OR FROM GETTING THINGS NEEDED FOR DAILY LIVING?: NO

## 2023-06-07 SDOH — ECONOMIC STABILITY: HOUSING INSECURITY
IN THE LAST 12 MONTHS, WAS THERE A TIME WHEN YOU DID NOT HAVE A STEADY PLACE TO SLEEP OR SLEPT IN A SHELTER (INCLUDING NOW)?: NO

## 2023-06-07 ASSESSMENT — PATIENT HEALTH QUESTIONNAIRE - PHQ9
SUM OF ALL RESPONSES TO PHQ QUESTIONS 1-9: 0
2. FEELING DOWN, DEPRESSED OR HOPELESS: 0
SUM OF ALL RESPONSES TO PHQ QUESTIONS 1-9: 0
1. LITTLE INTEREST OR PLEASURE IN DOING THINGS: 0
SUM OF ALL RESPONSES TO PHQ9 QUESTIONS 1 & 2: 0

## 2023-06-07 ASSESSMENT — ANXIETY QUESTIONNAIRES
1. FEELING NERVOUS, ANXIOUS, OR ON EDGE: 0
3. WORRYING TOO MUCH ABOUT DIFFERENT THINGS: 0
GAD7 TOTAL SCORE: 0
7. FEELING AFRAID AS IF SOMETHING AWFUL MIGHT HAPPEN: 0
6. BECOMING EASILY ANNOYED OR IRRITABLE: 0
4. TROUBLE RELAXING: 0
2. NOT BEING ABLE TO STOP OR CONTROL WORRYING: 0
IF YOU CHECKED OFF ANY PROBLEMS ON THIS QUESTIONNAIRE, HOW DIFFICULT HAVE THESE PROBLEMS MADE IT FOR YOU TO DO YOUR WORK, TAKE CARE OF THINGS AT HOME, OR GET ALONG WITH OTHER PEOPLE: NOT DIFFICULT AT ALL
5. BEING SO RESTLESS THAT IT IS HARD TO SIT STILL: 0

## 2023-06-07 ASSESSMENT — SOCIAL DETERMINANTS OF HEALTH (SDOH)
WITHIN THE LAST YEAR, HAVE TO BEEN RAPED OR FORCED TO HAVE ANY KIND OF SEXUAL ACTIVITY BY YOUR PARTNER OR EX-PARTNER?: NO
WITHIN THE LAST YEAR, HAVE YOU BEEN HUMILIATED OR EMOTIONALLY ABUSED IN OTHER WAYS BY YOUR PARTNER OR EX-PARTNER?: NO
WITHIN THE LAST YEAR, HAVE YOU BEEN KICKED, HIT, SLAPPED, OR OTHERWISE PHYSICALLY HURT BY YOUR PARTNER OR EX-PARTNER?: NO
IN A TYPICAL WEEK, HOW MANY TIMES DO YOU TALK ON THE PHONE WITH FAMILY, FRIENDS, OR NEIGHBORS?: MORE THAN THREE TIMES A WEEK
DO YOU BELONG TO ANY CLUBS OR ORGANIZATIONS SUCH AS CHURCH GROUPS UNIONS, FRATERNAL OR ATHLETIC GROUPS, OR SCHOOL GROUPS?: YES
HOW OFTEN DO YOU ATTENT MEETINGS OF THE CLUB OR ORGANIZATION YOU BELONG TO?: MORE THAN 4 TIMES PER YEAR
HOW OFTEN DO YOU GET TOGETHER WITH FRIENDS OR RELATIVES?: MORE THAN THREE TIMES A WEEK
WITHIN THE LAST YEAR, HAVE YOU BEEN AFRAID OF YOUR PARTNER OR EX-PARTNER?: NO
HOW HARD IS IT FOR YOU TO PAY FOR THE VERY BASICS LIKE FOOD, HOUSING, MEDICAL CARE, AND HEATING?: NOT HARD AT ALL
HOW OFTEN DO YOU ATTEND CHURCH OR RELIGIOUS SERVICES?: MORE THAN 4 TIMES PER YEAR

## 2023-06-07 NOTE — PROGRESS NOTES
Brijesh Cid is a 57 y.o. male  Chief Complaint   Patient presents with    Follow-Up from Hospital     Patient here for hospital follow up due to liver disease. YES Answers must have Comments  1. \"Have you been to the ER, urgent care clinic since your last visit? Hospitalized since your last visit? \"    [x] YES When: 06/06/2023 Where: [facility] Reason for visit: Liver Disease  [] NO       2. Have you seen or consulted any other health care providers outside of [address] [place] [address] since your last visit?     [] YES   [x] NO       3. For patients aged 39-70: Have you had a colorectal cancer screening such as a colonoscopy/FIT/Cologuard? Nurse/CMA to request records if not in chart   [] YES   [x] NO   [] NA, based on age    If the patient is female:      4. For female patients aged 41-77: Samantha Weller you had a mammogram in the last two years?  Nurse/CMA to request records if not in chart   [] YES   [] NO   [x] NA, based on gender    5. For female patients aged 21-65: Ayshalynaima Melgozas you had a pap smear?   Nurse/CMA to request records if not in chart   [] YES   [] NO  [x] NA, based on gender

## 2023-06-08 NOTE — PROGRESS NOTES
[address] [place] [address] and Primary Care  [place] [address]  [address] [place] [address]  Phone:  [phone]  Fax: [phone]       Chief Complaint   Patient presents with    Follow-Up from Hospital     Patient here for hospital follow up due to liver disease. .      SUBJECTIVE:    Yvan Romero is a 57 y.o. male comes in for return visit. He missed his a return appointment for follow-up after his hospitalization. He was hospitalized for a nonspecific hepatitis. He will be seeing a gastroenterology in the very near future. Is unclear as to the etiology. Fortunately his course was uneventful and he was symptomatic primarily manifest by nausea. His diabetes is totally out of control but he states that he is attempting to eat better and blood sugars have been fairly consistently in the 130 range. He tells me this is usually not the case. He denies any chest pain or shortness of breath. He sees cardiology at least every 4 to 6 months. There is been no meaningful weight loss either.        Current Outpatient Medications   Medication Sig Dispense Refill    doxycycline hyclate (VIBRA-TABS) 100 MG tablet Take 1 tablet by mouth 2 times daily for 7 days 14 tablet 0    Insulin Degludec (TRESIBA FLEXTOUCH) 200 UNIT/ML SOPN 80 units daily 6 Adjustable Dose Pre-filled Pen Syringe 11    ondansetron (ZOFRAN) 4 MG tablet Take 1 tablet by mouth every 8 hours as needed for Nausea or Vomiting 30 tablet 0    amLODIPine (NORVASC) 10 MG tablet TAKE 1 TABLET BY MOUTH EVERY DAY      aspirin 81 MG EC tablet TAKE 1 TABLET BY MOUTH EVERY DAY      carvedilol (COREG) 25 MG tablet Take 1 tablet by mouth 2 times daily (with meals)      empagliflozin (JARDIANCE) 10 MG tablet Take 1 tablet by mouth daily      ezetimibe (ZETIA) 10 MG tablet TAKE 1 TABLET BY MOUTH EVERY DAY      Icosapent Ethyl (VASCEPA) 1 g CAPS capsule Take 1 capsule by mouth 2 times daily (with meals)      nitroGLYCERIN (NITROSTAT) 0.4 MG SL tablet TAKE 1

## 2023-06-08 NOTE — PROGRESS NOTES
1.  The patient will follow up with GI regarding his hepatitis which has not been well characterized. 2. His diabetes hopefully is doing well. I emphasized the importance of eating at the same time every day, as well minimizing his consumption of processed carbohydrates. He additionally has to check blood sugars consistently. 3. He has a history of coronary artery disease rather extensive. He does follow up with Cardiology. He has not had any suggestive symptoms of angina or an anginal equivalent. 4. He needs to lose weight which has been a major problem for him. Unfortunately he is not making any meaningful effort to do so. This can be accomplished by eating meals, eliminating snacks, and avoiding the consumption of processed carbohydrates. 5. Blood pressure is excellent, no adjustments are made. 6. He remains on his statin as prescribed and previous values of his ApoB levels have been acceptable.

## 2023-06-10 LAB
ALBUMIN SERPL-MCNC: 4.7 G/DL (ref 4–5)
ALP SERPL-CCNC: 111 IU/L (ref 44–121)
ALT SERPL-CCNC: 33 IU/L (ref 0–44)
AST SERPL-CCNC: 22 IU/L (ref 0–40)
BILIRUB DIRECT SERPL-MCNC: 0.11 MG/DL (ref 0–0.4)
BILIRUB SERPL-MCNC: 0.4 MG/DL (ref 0–1.2)
FRUCTOSAMINE SERPL-SCNC: 447 UMOL/L (ref 0–285)
PROT SERPL-MCNC: 7.3 G/DL (ref 6–8.5)

## 2023-06-30 DIAGNOSIS — E78.5 HYPERLIPIDEMIA, UNSPECIFIED: ICD-10-CM

## 2023-07-03 DIAGNOSIS — E11.65 UNCONTROLLED TYPE 2 DIABETES MELLITUS WITH HYPERGLYCEMIA (HCC): Primary | ICD-10-CM

## 2023-07-03 RX ORDER — ROSUVASTATIN CALCIUM 40 MG/1
TABLET, COATED ORAL
Qty: 90 TABLET | Refills: 3 | Status: SHIPPED | OUTPATIENT
Start: 2023-07-03

## 2023-07-03 RX ORDER — SEMAGLUTIDE 0.68 MG/ML
INJECTION, SOLUTION SUBCUTANEOUS
Qty: 3 ML | Refills: 0 | OUTPATIENT
Start: 2023-07-03

## 2023-07-03 RX ORDER — SEMAGLUTIDE 2.68 MG/ML
INJECTION, SOLUTION SUBCUTANEOUS
Qty: 3 ML | Refills: 11 | Status: SHIPPED | OUTPATIENT
Start: 2023-07-03

## 2023-07-05 RX ORDER — CARVEDILOL 25 MG/1
TABLET ORAL
Qty: 180 TABLET | Refills: 1 | OUTPATIENT
Start: 2023-07-05

## 2023-07-05 RX ORDER — TICAGRELOR 90 MG/1
TABLET ORAL
Qty: 180 TABLET | Refills: 1 | OUTPATIENT
Start: 2023-07-05

## 2023-07-19 ENCOUNTER — OFFICE VISIT (OUTPATIENT)
Age: 48
End: 2023-07-19

## 2023-07-19 VITALS
WEIGHT: 203.8 LBS | BODY MASS INDEX: 37.5 KG/M2 | HEIGHT: 62 IN | HEART RATE: 89 BPM | SYSTOLIC BLOOD PRESSURE: 100 MMHG | DIASTOLIC BLOOD PRESSURE: 80 MMHG | OXYGEN SATURATION: 97 %

## 2023-07-19 DIAGNOSIS — I25.5 ISCHEMIC CARDIOMYOPATHY: ICD-10-CM

## 2023-07-19 DIAGNOSIS — I73.9 PAD (PERIPHERAL ARTERY DISEASE) (HCC): ICD-10-CM

## 2023-07-19 DIAGNOSIS — N18.32 TYPE 2 DIABETES MELLITUS WITH STAGE 3B CHRONIC KIDNEY DISEASE, WITH LONG-TERM CURRENT USE OF INSULIN (HCC): ICD-10-CM

## 2023-07-19 DIAGNOSIS — I25.10 ASHD (ARTERIOSCLEROTIC HEART DISEASE): Primary | ICD-10-CM

## 2023-07-19 DIAGNOSIS — E11.22 TYPE 2 DIABETES MELLITUS WITH STAGE 3B CHRONIC KIDNEY DISEASE, WITH LONG-TERM CURRENT USE OF INSULIN (HCC): ICD-10-CM

## 2023-07-19 DIAGNOSIS — E66.01 SEVERE OBESITY (HCC): ICD-10-CM

## 2023-07-19 DIAGNOSIS — E78.5 DYSLIPIDEMIA: ICD-10-CM

## 2023-07-19 DIAGNOSIS — E66.9 OBESITY (BMI 30.0-34.9): ICD-10-CM

## 2023-07-19 DIAGNOSIS — I10 PRIMARY HYPERTENSION: ICD-10-CM

## 2023-07-19 DIAGNOSIS — Z79.4 TYPE 2 DIABETES MELLITUS WITH STAGE 3B CHRONIC KIDNEY DISEASE, WITH LONG-TERM CURRENT USE OF INSULIN (HCC): ICD-10-CM

## 2023-07-19 RX ORDER — CLOPIDOGREL BISULFATE 75 MG/1
75 TABLET ORAL DAILY
Qty: 90 TABLET | Refills: 3 | Status: SHIPPED | OUTPATIENT
Start: 2023-07-19

## 2023-07-19 ASSESSMENT — PATIENT HEALTH QUESTIONNAIRE - PHQ9
SUM OF ALL RESPONSES TO PHQ QUESTIONS 1-9: 0
SUM OF ALL RESPONSES TO PHQ QUESTIONS 1-9: 0
2. FEELING DOWN, DEPRESSED OR HOPELESS: 0
SUM OF ALL RESPONSES TO PHQ QUESTIONS 1-9: 0
SUM OF ALL RESPONSES TO PHQ QUESTIONS 1-9: 0
1. LITTLE INTEREST OR PLEASURE IN DOING THINGS: 0
SUM OF ALL RESPONSES TO PHQ9 QUESTIONS 1 & 2: 0

## 2023-07-19 NOTE — PROGRESS NOTES
Diffuse small vessel disease involving diagonal branches. Right posterolateral branch is jailed chronically occluded. Mid circumflex/major obtuse marginal with 90 stenosis treated  with drug-eluting stent. ECG performed 4/28/2021: Sinus rhythm, ST-T changes in the form of ST depression in inferolateral leads     Cardiac catheterization April 2021: Proximal RCA de bela stenosis treated with placement of another drug-eluting stent. 40 to 50% ISR in mid RCA stent. Patent stent in circumflex with 25% ISR. Apical and distal LAD stents are patent. Echocardiogram September 2021: Globally reduced EF. Overall EF about 40 to 45%. No regional wall motion abnormalities. Nuclear stress 7/2022:    Nuclear Findings: LVEF measures 43%. There is a moderate severity left ventricular stress perfusion defect that is small to medium in size present in the apex segment(s). This defect is consistent with artifact rather than true ischemia    Nuclear Findings: The study is negative for myocardial ischemia. ECG: Stress ECG had minor ST changes of no clinical significance. Stress Test: A pharmacological stress test was performed using lexiscan. Blood pressure demonstrated a normal response and heart rate demonstrated a normal response to stress. The patient's heart rate recovery was normal.            HPI: Apple Sanchez, a  42y.o. year-old who presents for evaluation of coronary artery disease. Mr. Leslie Beckett has history of metabolic syndrome, poorly controlled diabetes and hypercholesterolemia, hypertension. He also history  of coronary artery disease with history of remote coronary artery stenting. Vague discomfort on left side of chest.  Not able to specify if with rest or exertion. Mild SOB when over exerting self, but rests and resolves. Acknowledged he is out of shape. Working on weight loss, had lost but regained. Denies dizziness, LE edema.     Admits he isn't always compliant with meds due to

## 2023-08-18 RX ORDER — INSULIN DEGLUDEC 200 U/ML
INJECTION, SOLUTION SUBCUTANEOUS
Qty: 5 ADJUSTABLE DOSE PRE-FILLED PEN SYRINGE | Refills: 11 | Status: SHIPPED | OUTPATIENT
Start: 2023-08-18

## 2023-08-21 RX ORDER — NITROGLYCERIN 0.4 MG/1
0.4 TABLET SUBLINGUAL EVERY 5 MIN PRN
Qty: 25 TABLET | Refills: 1 | Status: SHIPPED | OUTPATIENT
Start: 2023-08-21

## 2023-08-21 NOTE — TELEPHONE ENCOUNTER
Requested Prescriptions     Signed Prescriptions Disp Refills    nitroGLYCERIN (NITROSTAT) 0.4 MG SL tablet 25 tablet 1     Sig: Place 1 tablet under the tongue every 5 minutes as needed for Chest pain up to max of 3 total doses. If no relief after 2 doses, call 911.      Authorizing Provider: Marlee Watts     Ordering User: Ivy Rojas     Per VO per MD     Future Appointments   Date Time Provider [address]   8/29/2023  4:15 PM Pelon Polanco MD [facility] MA[facility] BS AMB   7/24/2024  9:00 AM MD ELOINA Weaver BS AMB

## 2023-09-02 ENCOUNTER — HOSPITAL ENCOUNTER (EMERGENCY)
Facility: HOSPITAL | Age: 48
Discharge: HOME | End: 2023-09-02
Attending: EMERGENCY MEDICINE
Payer: COMMERCIAL

## 2023-09-02 ENCOUNTER — APPOINTMENT (OUTPATIENT)
Facility: HOSPITAL | Age: 48
End: 2023-09-02
Payer: COMMERCIAL

## 2023-09-02 VITALS
OXYGEN SATURATION: 99 % | SYSTOLIC BLOOD PRESSURE: 126 MMHG | HEART RATE: 88 BPM | TEMPERATURE: 98.2 F | BODY MASS INDEX: 34.15 KG/M2 | RESPIRATION RATE: 16 BRPM | WEIGHT: 200 LBS | HEIGHT: 64 IN | DIASTOLIC BLOOD PRESSURE: 91 MMHG

## 2023-09-02 DIAGNOSIS — K52.9 GASTROENTERITIS: ICD-10-CM

## 2023-09-02 DIAGNOSIS — R10.10 UPPER ABDOMINAL PAIN: Primary | ICD-10-CM

## 2023-09-02 LAB
ALBUMIN SERPL-MCNC: 3.6 G/DL (ref 3.5–5)
ALBUMIN/GLOB SERPL: 0.8 (ref 1.1–2.2)
ALP SERPL-CCNC: 100 U/L (ref 45–117)
ALT SERPL-CCNC: 37 U/L (ref 12–78)
ANION GAP SERPL CALC-SCNC: 5 MMOL/L (ref 5–15)
APPEARANCE UR: CLEAR
AST SERPL-CCNC: 35 U/L (ref 15–37)
BACTERIA URNS QL MICRO: NEGATIVE /HPF
BASOPHILS # BLD: 0 K/UL (ref 0–0.1)
BASOPHILS NFR BLD: 1 % (ref 0–1)
BILIRUB SERPL-MCNC: 0.5 MG/DL (ref 0.2–1)
BILIRUB UR QL: NEGATIVE
BUN SERPL-MCNC: 20 MG/DL (ref 6–20)
BUN/CREAT SERPL: 15 (ref 12–20)
CALCIUM SERPL-MCNC: 9 MG/DL (ref 8.5–10.1)
CHLORIDE SERPL-SCNC: 110 MMOL/L (ref 97–108)
CO2 SERPL-SCNC: 23 MMOL/L (ref 21–32)
COLOR UR: ABNORMAL
CREAT SERPL-MCNC: 1.36 MG/DL (ref 0.7–1.3)
DIFFERENTIAL METHOD BLD: ABNORMAL
EOSINOPHIL # BLD: 0.1 K/UL (ref 0–0.4)
EOSINOPHIL NFR BLD: 2 % (ref 0–7)
EPITH CASTS URNS QL MICRO: ABNORMAL /LPF
ERYTHROCYTE [DISTWIDTH] IN BLOOD BY AUTOMATED COUNT: 14.1 % (ref 11.5–14.5)
GLOBULIN SER CALC-MCNC: 4.7 G/DL (ref 2–4)
GLUCOSE SERPL-MCNC: 197 MG/DL (ref 65–100)
GLUCOSE UR STRIP.AUTO-MCNC: >1000 MG/DL
HCT VFR BLD AUTO: 47.9 % (ref 36.6–50.3)
HGB BLD-MCNC: 15.9 G/DL (ref 12.1–17)
HGB UR QL STRIP: NEGATIVE
HYALINE CASTS URNS QL MICRO: ABNORMAL /LPF (ref 0–2)
IMM GRANULOCYTES # BLD AUTO: 0 K/UL (ref 0–0.04)
IMM GRANULOCYTES NFR BLD AUTO: 0 % (ref 0–0.5)
KETONES UR QL STRIP.AUTO: NEGATIVE MG/DL
LEUKOCYTE ESTERASE UR QL STRIP.AUTO: NEGATIVE
LIPASE SERPL-CCNC: 197 U/L (ref 73–393)
LYMPHOCYTES # BLD: 1.8 K/UL (ref 0.8–3.5)
LYMPHOCYTES NFR BLD: 29 % (ref 12–49)
MCH RBC QN AUTO: 26.4 PG (ref 26–34)
MCHC RBC AUTO-ENTMCNC: 33.2 G/DL (ref 30–36.5)
MCV RBC AUTO: 79.4 FL (ref 80–99)
MONOCYTES # BLD: 1 K/UL (ref 0–1)
MONOCYTES NFR BLD: 16 % (ref 5–13)
NEUTS SEG # BLD: 3.3 K/UL (ref 1.8–8)
NEUTS SEG NFR BLD: 52 % (ref 32–75)
NITRITE UR QL STRIP.AUTO: NEGATIVE
NRBC # BLD: 0 K/UL (ref 0–0.01)
NRBC BLD-RTO: 0 PER 100 WBC
PH UR STRIP: 5 (ref 5–8)
PLATELET # BLD AUTO: 194 K/UL (ref 150–400)
PMV BLD AUTO: 11.4 FL (ref 8.9–12.9)
POTASSIUM SERPL-SCNC: 4.9 MMOL/L (ref 3.5–5.1)
PROT SERPL-MCNC: 8.3 G/DL (ref 6.4–8.2)
PROT UR STRIP-MCNC: NEGATIVE MG/DL
RBC # BLD AUTO: 6.03 M/UL (ref 4.1–5.7)
RBC #/AREA URNS HPF: ABNORMAL /HPF (ref 0–5)
SODIUM SERPL-SCNC: 138 MMOL/L (ref 136–145)
SP GR UR REFRACTOMETRY: 1.02 (ref 1–1.03)
TROPONIN I SERPL HS-MCNC: 7 NG/L (ref 0–76)
URINE CULTURE IF INDICATED: ABNORMAL
UROBILINOGEN UR QL STRIP.AUTO: 0.2 EU/DL (ref 0.2–1)
WBC # BLD AUTO: 6.1 K/UL (ref 4.1–11.1)
WBC URNS QL MICRO: ABNORMAL /HPF (ref 0–4)

## 2023-09-02 PROCEDURE — 6360000002 HC RX W HCPCS: Performed by: EMERGENCY MEDICINE

## 2023-09-02 PROCEDURE — 85025 COMPLETE CBC W/AUTO DIFF WBC: CPT

## 2023-09-02 PROCEDURE — 81001 URINALYSIS AUTO W/SCOPE: CPT

## 2023-09-02 PROCEDURE — 84484 ASSAY OF TROPONIN QUANT: CPT

## 2023-09-02 PROCEDURE — 99285 EMERGENCY DEPT VISIT HI MDM: CPT

## 2023-09-02 PROCEDURE — 83690 ASSAY OF LIPASE: CPT

## 2023-09-02 PROCEDURE — 96374 THER/PROPH/DIAG INJ IV PUSH: CPT

## 2023-09-02 PROCEDURE — 96375 TX/PRO/DX INJ NEW DRUG ADDON: CPT

## 2023-09-02 PROCEDURE — 80053 COMPREHEN METABOLIC PANEL: CPT

## 2023-09-02 PROCEDURE — 6360000004 HC RX CONTRAST MEDICATION: Performed by: EMERGENCY MEDICINE

## 2023-09-02 PROCEDURE — 74177 CT ABD & PELVIS W/CONTRAST: CPT

## 2023-09-02 PROCEDURE — 36415 COLL VENOUS BLD VENIPUNCTURE: CPT

## 2023-09-02 RX ORDER — FENTANYL CITRATE 50 UG/ML
50 INJECTION, SOLUTION INTRAMUSCULAR; INTRAVENOUS
Status: COMPLETED | OUTPATIENT
Start: 2023-09-02 | End: 2023-09-02

## 2023-09-02 RX ORDER — ONDANSETRON 2 MG/ML
4 INJECTION INTRAMUSCULAR; INTRAVENOUS ONCE
Status: COMPLETED | OUTPATIENT
Start: 2023-09-02 | End: 2023-09-02

## 2023-09-02 RX ORDER — FAMOTIDINE 20 MG/1
20 TABLET, FILM COATED ORAL 2 TIMES DAILY
Qty: 30 TABLET | Refills: 0 | Status: SHIPPED | OUTPATIENT
Start: 2023-09-02

## 2023-09-02 RX ORDER — HYDROCODONE BITARTRATE AND ACETAMINOPHEN 5; 325 MG/1; MG/1
1 TABLET ORAL EVERY 6 HOURS PRN
Qty: 10 TABLET | Refills: 0 | Status: SHIPPED | OUTPATIENT
Start: 2023-09-02 | End: 2023-09-05

## 2023-09-02 RX ADMIN — ONDANSETRON 4 MG: 2 INJECTION INTRAMUSCULAR; INTRAVENOUS at 12:13

## 2023-09-02 RX ADMIN — IOPAMIDOL 100 ML: 755 INJECTION, SOLUTION INTRAVENOUS at 11:50

## 2023-09-02 RX ADMIN — FENTANYL CITRATE 50 MCG: 50 INJECTION, SOLUTION INTRAMUSCULAR; INTRAVENOUS at 12:13

## 2023-09-02 ASSESSMENT — PAIN - FUNCTIONAL ASSESSMENT
PAIN_FUNCTIONAL_ASSESSMENT: ACTIVITIES ARE NOT PREVENTED
PAIN_FUNCTIONAL_ASSESSMENT: 0-10

## 2023-09-02 ASSESSMENT — PAIN SCALES - GENERAL
PAINLEVEL_OUTOF10: 9
PAINLEVEL_OUTOF10: 9

## 2023-09-02 ASSESSMENT — PAIN DESCRIPTION - DESCRIPTORS: DESCRIPTORS: DISCOMFORT

## 2023-09-02 ASSESSMENT — PAIN DESCRIPTION - LOCATION
LOCATION: ABDOMEN
LOCATION: ABDOMEN

## 2023-09-02 NOTE — ED PROVIDER NOTES
[facility] EMERGENCY DEPT  EMERGENCY DEPARTMENT ENCOUNTER       Pt Name: Olvin Recinos  MRN: [id number]  [address] [facility] 1975  Date of evaluation: 9/2/2023  Provider: Sophie Chavez MD   PCP: Danica Simms MD  Note Started: 11:26 AM EDT 9/2/23     CHIEF COMPLAINT       Chief Complaint   Patient presents with    Abdominal Pain     Upper abdominal pain since Thursday exacerbated after eating. Denies N/V/D, urinary sx, fever, chills        HISTORY OF PRESENT ILLNESS: 1 or more elements      History From: Patient, History limited by: none     Olvin Recinos is a 57 y.o. male patient with a history of coronary artery disease, hypertension and cholecystectomy, here for upper abdominal pain. His symptoms started 2 days ago. He has constant pain which is a 9 out of 10 in severity. He denies back pain or current chest pain. Did have chest pain 2 days ago, which was located in several points on his chest, but was not severe, with no associated symptoms. He has had similar abdominal pain in the past, when his liver function tests were elevated. He did have an MRI in May, which showed mild intrahepatic and extrahepatic dilatation likely physiologic post cholecystectomy. Denies dysuria, change in bowel habits, fever, lightheadedness or dizziness. He has had nausea but no vomiting. He took ibuprofen for pain 2 days ago, which did not help his symptoms. Please See MDM for Additional Details of the HPI/PMH  Nursing Notes were all reviewed and agreed with or any disagreements were addressed in the HPI. REVIEW OF SYSTEMS        Positives and Pertinent negatives as per HPI.     PAST HISTORY     Past Medical History:  Past Medical History:   Diagnosis Date    CAD (coronary artery disease)     2 stents 2016     Headache     Hypercholesterolemia     Hypertension     Hypogonadism male     Obstructive sleep apnea        Past Surgical History:  Past Surgical History:   Procedure Laterality Date    HEENT      status post pharyngioplasty

## 2023-09-02 NOTE — ED NOTES
DC info reviewed with patient, all questions answered. Patient well-appearing at time of discharge and vital signs stable. Ambulatory out of ED at this time.        Amy Melgar RN  09/02/23 1300

## 2023-10-11 DIAGNOSIS — I25.10 ATHEROSCLEROTIC HEART DISEASE OF NATIVE CORONARY ARTERY WITHOUT ANGINA PECTORIS: ICD-10-CM

## 2023-10-11 DIAGNOSIS — E78.5 HYPERLIPIDEMIA, UNSPECIFIED: ICD-10-CM

## 2023-10-12 DIAGNOSIS — E11.65 UNCONTROLLED TYPE 2 DIABETES MELLITUS WITH HYPERGLYCEMIA (HCC): ICD-10-CM

## 2023-10-12 RX ORDER — SEMAGLUTIDE 2.68 MG/ML
INJECTION, SOLUTION SUBCUTANEOUS
Qty: 3 ML | Refills: 11 | Status: SHIPPED | OUTPATIENT
Start: 2023-10-12

## 2023-10-12 RX ORDER — EZETIMIBE 10 MG/1
TABLET ORAL
Qty: 90 TABLET | Refills: 3 | Status: SHIPPED | OUTPATIENT
Start: 2023-10-12

## 2023-10-12 RX ORDER — SACUBITRIL AND VALSARTAN 24; 26 MG/1; MG/1
1 TABLET, FILM COATED ORAL 2 TIMES DAILY
Qty: 180 TABLET | Refills: 2 | Status: SHIPPED | OUTPATIENT
Start: 2023-10-12

## 2023-10-12 RX ORDER — SALICYLIC ACID 40 %
81 ADHESIVE PATCH, MEDICATED TOPICAL DAILY
Qty: 90 TABLET | Refills: 3 | Status: SHIPPED | OUTPATIENT
Start: 2023-10-12

## 2023-10-12 RX ORDER — ICOSAPENT ETHYL 1000 MG/1
1 CAPSULE ORAL 2 TIMES DAILY WITH MEALS
Qty: 180 CAPSULE | Refills: 2 | Status: SHIPPED | OUTPATIENT
Start: 2023-10-12

## 2023-10-12 RX ORDER — SEMAGLUTIDE 0.68 MG/ML
INJECTION, SOLUTION SUBCUTANEOUS
Qty: 3 ML | Refills: 0 | OUTPATIENT
Start: 2023-10-12

## 2023-10-12 NOTE — TELEPHONE ENCOUNTER
Requested Prescriptions     Signed Prescriptions Disp Refills    sacubitril-valsartan (ENTRESTO) 24-26 MG per tablet 180 tablet 2     Sig: Take 1 tablet by mouth 2 times daily     Authorizing Provider: Carla Sidhu     Ordering User: Juan Luna Ethyl (VASCEPA) 1 g CAPS capsule 180 capsule 2     Sig: Take 1 capsule by mouth 2 times daily (with meals)     Authorizing Provider: Carla Sidhu     Ordering User: Mai Flores     Per VO per MD     Future Appointments   Date Time Provider [address]   7/24/2024  9:00 AM MD ELOINA Wolff AMB

## 2023-10-13 NOTE — TELEPHONE ENCOUNTER
JAMES Condon CNP  to Me        10/13/23  9:48 AM  I'm ok with labs. Thx   October 12, 2023  Me  to MD Jasiel Noland APRN - CNP          10/12/23  4:31 PM  Any concerns with recent labs?

## 2023-11-06 DIAGNOSIS — I10 ESSENTIAL (PRIMARY) HYPERTENSION: ICD-10-CM

## 2023-11-06 DIAGNOSIS — I42.9 CARDIOMYOPATHY, UNSPECIFIED (HCC): ICD-10-CM

## 2023-11-06 RX ORDER — NITROGLYCERIN 0.4 MG/1
0.4 TABLET SUBLINGUAL EVERY 5 MIN PRN
Qty: 25 TABLET | Refills: 1 | Status: SHIPPED | OUTPATIENT
Start: 2023-11-06

## 2023-11-06 RX ORDER — EMPAGLIFLOZIN 10 MG/1
10 TABLET, FILM COATED ORAL DAILY
Qty: 90 TABLET | Refills: 3 | Status: SHIPPED | OUTPATIENT
Start: 2023-11-06

## 2023-11-06 NOTE — TELEPHONE ENCOUNTER
Requested Prescriptions     Signed Prescriptions Disp Refills    JARDIANCE 10 MG tablet 90 tablet 3     Sig: TAKE 1 TABLET BY MOUTH EVERY DAY     Authorizing Provider: Donavan Browning     Ordering User: Kei Baires     Verbal order per Dr. Gilberto Samaniego.       Future Appointments   Date Time Provider [address]   7/24/2024  9:00 AM MD ELOINA Montalvo AMB

## 2023-11-06 NOTE — TELEPHONE ENCOUNTER
Requested Prescriptions     Signed Prescriptions Disp Refills    nitroGLYCERIN (NITROSTAT) 0.4 MG SL tablet 25 tablet 1     Sig: PLACE 1 TABLET UNDER THE TONGUE EVERY 5 MINUTES AS NEEDED FOR CHEST PAIN UP TO MAX OF 3 TOTAL DOSES. IF NO RELIEF AFTER 2 DOSES, CALL 911. Authorizing Provider: Jesusita Esquivel     Ordering User: Roland Elise     Verbal order per Dr. Sammi Guerra.     Future Appointments   Date Time Provider [address]   7/24/2024  9:00 AM MD ELOINA Henao AMB

## 2023-11-07 ENCOUNTER — HOSPITAL ENCOUNTER (OUTPATIENT)
Facility: HOSPITAL | Age: 48
Setting detail: OUTPATIENT SURGERY
Discharge: HOME | End: 2023-11-07
Attending: INTERNAL MEDICINE | Admitting: INTERNAL MEDICINE
Payer: COMMERCIAL

## 2023-11-07 ENCOUNTER — ANESTHESIA EVENT (OUTPATIENT)
Facility: HOSPITAL | Age: 48
End: 2023-11-07
Payer: COMMERCIAL

## 2023-11-07 ENCOUNTER — ANESTHESIA (OUTPATIENT)
Facility: HOSPITAL | Age: 48
End: 2023-11-07
Payer: COMMERCIAL

## 2023-11-07 VITALS
BODY MASS INDEX: 34.15 KG/M2 | WEIGHT: 200 LBS | TEMPERATURE: 97.2 F | RESPIRATION RATE: 23 BRPM | HEART RATE: 95 BPM | OXYGEN SATURATION: 96 % | HEIGHT: 64 IN | DIASTOLIC BLOOD PRESSURE: 91 MMHG | SYSTOLIC BLOOD PRESSURE: 148 MMHG

## 2023-11-07 LAB
GLUCOSE BLD STRIP.AUTO-MCNC: 219 MG/DL (ref 65–117)
SERVICE CMNT-IMP: ABNORMAL

## 2023-11-07 PROCEDURE — 3600007502: Performed by: INTERNAL MEDICINE

## 2023-11-07 PROCEDURE — 2709999900 HC NON-CHARGEABLE SUPPLY: Performed by: INTERNAL MEDICINE

## 2023-11-07 PROCEDURE — 3600007512: Performed by: INTERNAL MEDICINE

## 2023-11-07 PROCEDURE — 7100000010 HC PHASE II RECOVERY - FIRST 15 MIN: Performed by: INTERNAL MEDICINE

## 2023-11-07 PROCEDURE — 2500000003 HC RX 250 WO HCPCS: Performed by: REGISTERED NURSE

## 2023-11-07 PROCEDURE — 6360000002 HC RX W HCPCS: Performed by: REGISTERED NURSE

## 2023-11-07 PROCEDURE — 3700000001 HC ADD 15 MINUTES (ANESTHESIA): Performed by: INTERNAL MEDICINE

## 2023-11-07 PROCEDURE — 2580000003 HC RX 258: Performed by: INTERNAL MEDICINE

## 2023-11-07 PROCEDURE — 82962 GLUCOSE BLOOD TEST: CPT

## 2023-11-07 PROCEDURE — 3700000000 HC ANESTHESIA ATTENDED CARE: Performed by: INTERNAL MEDICINE

## 2023-11-07 RX ORDER — SODIUM CHLORIDE 0.9 % (FLUSH) 0.9 %
5-40 SYRINGE (ML) INJECTION PRN
Status: DISCONTINUED | OUTPATIENT
Start: 2023-11-07 | End: 2023-11-07 | Stop reason: HOSPADM

## 2023-11-07 RX ORDER — SODIUM CHLORIDE 0.9 % (FLUSH) 0.9 %
5-40 SYRINGE (ML) INJECTION EVERY 12 HOURS SCHEDULED
Status: DISCONTINUED | OUTPATIENT
Start: 2023-11-07 | End: 2023-11-07 | Stop reason: HOSPADM

## 2023-11-07 RX ORDER — SODIUM CHLORIDE 9 MG/ML
25 INJECTION, SOLUTION INTRAVENOUS PRN
Status: DISCONTINUED | OUTPATIENT
Start: 2023-11-07 | End: 2023-11-07 | Stop reason: HOSPADM

## 2023-11-07 RX ADMIN — PROPOFOL 70 MG: 10 INJECTION, EMULSION INTRAVENOUS at 10:29

## 2023-11-07 RX ADMIN — SODIUM CHLORIDE 25 ML: 9 INJECTION, SOLUTION INTRAVENOUS at 10:13

## 2023-11-07 RX ADMIN — PROPOFOL 170 MG: 10 INJECTION, EMULSION INTRAVENOUS at 10:41

## 2023-11-07 RX ADMIN — LIDOCAINE HYDROCHLORIDE 50 MG: 20 INJECTION, SOLUTION INFILTRATION; PERINEURAL at 10:29

## 2023-11-07 ASSESSMENT — PAIN - FUNCTIONAL ASSESSMENT: PAIN_FUNCTIONAL_ASSESSMENT: 0-10

## 2023-11-07 NOTE — DISCHARGE INSTRUCTIONS
Adrienne Gann  [id number]  1975    COLON DISCHARGE INSTRUCTIONS  Discomfort:  Redness at IV site- apply warm compress to area; if redness or soreness persist- contact your physician  There may be a slight amount of blood passed from the rectum  Gaseous discomfort- walking, belching will help relieve any discomfort  You may not operate a vehicle for 12 hours  You may not engage in an occupation involving machinery or appliances for rest of today  You may not drink alcoholic beverages for at least 12 hours  Avoid making any critical decisions for at least 24 hour  DIET:   High fiber diet. - however -  remember your colon is empty and a heavy meal will produce gas. Avoid these foods:  vegetables, fried / greasy foods, carbonated drinks for today  MEDICATION:  [unfilled]     ACTIVITY:  You may not resume your normal daily activities until tomorrow AM; it is recommended that you spend the remainder of the day resting -  avoid any strenuous activity. CALL M.D.   ANY SIGN OF:   Increasing pain, nausea, vomiting  Abdominal distension (swelling)  New increased bleeding (oral or rectal)  Fever (chills)  Pain in chest area  Bloody discharge from nose or mouth  Shortness of breath    IMPRESSION:  -Normal colon mucosa without masses, polyps, or inflammation    Follow-up Instructions:   Call Dr. Selvin Lopez if questions arise regarding your procedure  Telephone # [phone]  Repeat colonoscopy in 10 years    Alina Abrams MD    Patient Education on Sedation / Analgesia Administered for Procedure      For 24 hours after general anesthesia or intravenous analgesia / sedation:  Have someone responsible help you with your care  Limit your activities  Do not drive and operate hazardous machinery  Do not make important personal, legal or business decisions  Do not drink alcoholic beverages  If you have not urinated within 8 hours after discharge, please contact your physician  Resume your medications unless otherwise instructed    For

## 2023-11-07 NOTE — ANESTHESIA POSTPROCEDURE EVALUATION
Department of Anesthesiology  Postprocedure Note    Patient: Dixon Morgan  MRN: [id number]  YOB: 1975  Date of evaluation: 11/7/2023      Procedure Summary     Date: 11/07/23 Room / Location: [facility] ENDO 01 / [facility] ENDOSCOPY    Anesthesia Start: 1025 Anesthesia Stop: 9118    Procedure: COLORECTAL CANCER SCREENING, NOT HIGH RISK (Lower GI Region) Diagnosis:       Encounter for long-term (current) use of insulin ([address])      Long term (current) use of anticoagulants      Abdominal pain, right upper quadrant      Special screening for malignant neoplasms, colon      (Encounter for long-term (current) use of insulin ([address]) [Z79.4])      (Long term (current) use of anticoagulants [Z79.01])      (Abdominal pain, right upper quadrant [R10.11])      (Special screening for malignant neoplasms, colon [Z12.11])    Surgeons: Jose Roberto Moctezuma MD Responsible Provider: Pedro Jay MD    Anesthesia Type: MAC ASA Status: 3          Anesthesia Type: MAC    Lyndon Phase I: Lyndon Score: 10    Lyndon Phase II:        Anesthesia Post Evaluation    Patient location during evaluation: PACU  Patient participation: complete - patient participated  Level of consciousness: sleepy but conscious and responsive to verbal stimuli  Airway patency: patent  Nausea & Vomiting: no vomiting and no nausea  Complications: no  Cardiovascular status: blood pressure returned to baseline and hemodynamically stable  Respiratory status: acceptable  Hydration status: stable

## 2023-11-07 NOTE — PROGRESS NOTES
ARRIVAL INFORMATION:  Verified patient name and date of birth, scheduled procedure, and informed consent. Belongings with patient include:  Clothing, cell phone    GI FOCUSED ASSESSMENT:  Neuro: Awake, alert, oriented x4  Respiratory: even and unlabored   GI: soft and non-distended  EKG Rhythm: normal sinus rhythm    Education:Reviewed general discharge instructions and  information.

## 2023-11-07 NOTE — PROGRESS NOTES
Endoscopy Case End Note:    Procedure scope was pre-cleaned, per protocol, at bedside by Yaritza Bass. Report received from anesthesia. See anesthesia flowsheet for intra-procedure vital signs and events. Belongings returned to patient.

## 2023-11-07 NOTE — ANESTHESIA PRE PROCEDURE
Department of Anesthesiology  Preprocedure Note       Name:  Lauren Wilde   Age:  57 y.o.  :  1975                                          MRN:  [id number]         Date:  2023      Surgeon: Samantha Diana):  Irlanda Rodriguez MD    Procedure: Procedure(s):  COLONOSCOPY WITH BIOPSY/POLYP    Medications prior to admission:   Prior to Admission medications    Medication Sig Start Date End Date Taking? Authorizing Provider   nitroGLYCERIN (NITROSTAT) 0.4 MG SL tablet PLACE 1 TABLET UNDER THE TONGUE EVERY 5 MINUTES AS NEEDED FOR CHEST PAIN UP TO MAX OF 3 TOTAL DOSES.  IF NO RELIEF AFTER 2 DOSES, CALL 911. 23   Jessica Storm MD   JARDIANCE 10 MG tablet TAKE 1 TABLET BY MOUTH EVERY DAY 23   Jessica Storm MD   ezetimibe (ZETIA) 10 MG tablet TAKE 1 TABLET BY MOUTH EVERY DAY 10/12/23   Selvin Krueger MD   [employer] ASPIRIN LOW DOSE 81 MG EC tablet TAKE 1 TABLET BY MOUTH EVERY DAY 10/12/23   Selvin Krueger MD   Semaglutide, 2 MG/DOSE, (OZEMPIC, 2 MG/DOSE,) 8 MG/3ML SOPN Blood sugar checks before meals and at bedtime and as needed  Patient not taking: Reported on 2023 10/12/23   Selvin Krueger MD   sacubitril-valsartan (ENTRESTO) 24-26 MG per tablet Take 1 tablet by mouth 2 times daily 10/12/23   Jessica Storm MD   Icosapent Ethyl (VASCEPA) 1 g CAPS capsule Take 1 capsule by mouth 2 times daily (with meals) 10/12/23   Jessica Storm MD   famotidine (PEPCID) 20 MG tablet Take 1 tablet by mouth 2 times daily  Patient not taking: Reported on 2023   Nettie Paredes MD   Insulin Degludec (TRESIBA FLEXTOUCH) 200 UNIT/ML SOPN INJECT 90 UNITS EVERY MORNING 23   Selvin Krueger MD   clopidogrel (PLAVIX) 75 MG tablet Take 1 tablet by mouth daily 23   JAMES Garcia - CNP   rosuvastatin (CRESTOR) 40 MG tablet TAKE 1 TABLET BY MOUTH EVERY DAY 7/3/23   Selvin Krueger MD   ondansetron (ZOFRAN) 4 MG tablet Take 1 tablet by mouth every 8 hours as needed for Nausea or Vomiting 23

## 2023-11-07 NOTE — OP NOTE
NAME:  Nancy Briceño   :   1975   MRN:   [id number]     Date/Time:  2023 10:49 AM    Colonoscopy Operative Report    Procedure Type:   Colonoscopy --screening     Indications:     Screening colonoscopy  Pre-operative Diagnosis: see indication above  Post-operative Diagnosis:  See findings below  :  Janet Avila MD  Referring Provider: Sol Brown MD    Exam:  Airway: clear, no airway problems anticipated  Heart: RRR, without gallops or rubs  Lungs: clear bilaterally without wheezes, crackles, or rhonchi  Abdomen: soft, nontender, nondistended, bowel sounds present  Mental Status: awake, alert and oriented to person, place and time    Sedation:  MAC anesthesia Propofol 240mg IV  Procedure Details:  After informed consent was obtained with all risks and benefits of procedure explained and preoperative exam completed, the patient was taken to the endoscopy suite and placed in the left lateral decubitus position. Upon sequential sedation as per above, a digital rectal exam was performed demonstrating no hemorrhoids. The Olympus videocolonoscope  was inserted in the rectum and carefully advanced to the cecum, which was identified by the ileocecal valve and appendiceal orifice. The quality of preparation was adequate. The colonoscope was slowly withdrawn with careful evaluation between folds. Retroflexion in the rectum was completed demonstrating no hemorrhoids. Findings:     -Normal colon mucosa without masses, polyps, or inflammation    Specimen Removed:  None  Complications: None. EBL:  None. Impression:    -Normal colon mucosa without masses, polyps, or inflammation    Recommendations: --For colon cancer screening in this average-risk patient, colonoscopy may be repeated in 10 years. High fiber diet. Resume normal medication(s). Discharge Disposition:  Home in the company of a  when able to ambulate.     Janet Avila MD

## 2023-11-08 RX ORDER — SPIRONOLACTONE 25 MG/1
TABLET ORAL
Qty: 45 TABLET | Refills: 3 | Status: SHIPPED | OUTPATIENT
Start: 2023-11-08

## 2023-11-13 RX ORDER — CARVEDILOL 25 MG/1
25 TABLET ORAL 2 TIMES DAILY WITH MEALS
Qty: 180 TABLET | Refills: 1 | Status: SHIPPED | OUTPATIENT
Start: 2023-11-13

## 2023-11-13 NOTE — TELEPHONE ENCOUNTER
Requested Prescriptions     Signed Prescriptions Disp Refills    carvedilol (COREG) 25 MG tablet 180 tablet 1     Sig: Take 1 tablet by mouth 2 times daily (with meals)     Authorizing Provider: Parris Talamantes     Ordering User: Jenise Gama per MD     Future Appointments   Date Time Provider [address]  [facility]   7/24/2024  9:00 AM MD ELOINA Hooper AMB

## 2023-11-27 ENCOUNTER — CLINICAL DOCUMENTATION (OUTPATIENT)
Age: 48
End: 2023-11-27

## 2023-11-27 NOTE — PROGRESS NOTES
Received request for PA. Response received: Information regarding your request  Drug is covered by current benefit plan.  No further PA activity needed

## 2023-11-29 RX ORDER — FAMOTIDINE 20 MG/1
20 TABLET, FILM COATED ORAL 2 TIMES DAILY
Qty: 60 TABLET | Refills: 11 | Status: SHIPPED | OUTPATIENT
Start: 2023-11-29

## 2023-12-27 RX ORDER — NITROGLYCERIN 0.4 MG/1
0.4 TABLET SUBLINGUAL EVERY 5 MIN PRN
Qty: 25 TABLET | Refills: 2 | Status: SHIPPED | OUTPATIENT
Start: 2023-12-27

## 2023-12-27 NOTE — TELEPHONE ENCOUNTER
Requested Prescriptions     Signed Prescriptions Disp Refills    nitroGLYCERIN (NITROSTAT) 0.4 MG SL tablet 25 tablet 2     Sig: Place 1 tablet under the tongue every 5 minutes as needed for Chest pain up to max of 3 total doses. If no relief after 2 doses, call 911. Authorizing Provider: Carla Sidhu     Ordering User: Ayush Roldan     Verbal order per Dr. Francy Keller.     Future Appointments   Date Time Provider [address]  [address]   7/24/2024  9:00 AM MD ELOINA Wolff AMB

## 2024-01-03 RX ORDER — NITROGLYCERIN 0.4 MG/1
0.4 TABLET SUBLINGUAL EVERY 5 MIN PRN
Qty: 25 TABLET | Refills: 2 | OUTPATIENT
Start: 2024-01-03

## 2024-01-03 NOTE — ED PROVIDER NOTES
Subjective   Patient ID: Beatriz Lopez is a 34 y.o. female who presents for Post-op (Pt is s/p laparoscopic cholecystectomy on 12/19/23.  Pt states that her belly button is sore when her pants rub on it.  States she has had bleeding off and on and discharge that was yellow-orange.  ).    HPI  Recovering well. Some diarrhea with certain foods    Review of Systems   Constitutional: Negative.    HENT: Negative.     Eyes: Negative.    Respiratory: Negative.     Cardiovascular: Negative.    Gastrointestinal: Negative.    Endocrine: Negative.    Genitourinary: Negative.    Musculoskeletal: Negative.    Skin: Negative.    Neurological: Negative.    Hematological: Negative.    Psychiatric/Behavioral: Negative.         Objective   Physical Exam  Constitutional:       Appearance: Normal appearance.   HENT:      Head: Normocephalic and atraumatic.   Eyes:      Extraocular Movements: Extraocular movements intact.      Pupils: Pupils are equal, round, and reactive to light.   Cardiovascular:      Rate and Rhythm: Normal rate and regular rhythm.      Pulses: Normal pulses.   Pulmonary:      Effort: Pulmonary effort is normal.      Breath sounds: Normal breath sounds.   Abdominal:      General: Abdomen is flat.      Palpations: Abdomen is soft.      Comments: Well healing incisions x 4   Musculoskeletal:         General: Normal range of motion.      Cervical back: Normal range of motion.   Skin:     General: Skin is warm and dry.   Neurological:      General: No focal deficit present.      Mental Status: She is alert and oriented to person, place, and time.   Psychiatric:         Mood and Affect: Mood normal.         Behavior: Behavior normal.         Assessment/Plan   Diagnoses and all orders for this visit:  Postoperative examination  Chronic cholecystitis without calculus    Recovering well. Working on figuring out which foods work best. May return to working, no lifting greater than 15 pounds for a total of 4 weeks from  This is a 43-year-old male with a history of MI in October or November of last year that [employer]. He has a stent in place. Is currently followed by Dr. Vaishnavi Ware at [address] [facility]. He states he has been doing pretty well and had his last Covid shot on Friday. On Sunday this past, he started with intermittent left anterior chest pain slightly to the left. It is intermittent and only last about 15 or 20 minutes at the most.  He does not have any associated nausea vomiting or sweating. The symptoms are not brought on by any particular activity. He does have some shortness of breath intermittently since Sunday. He became concerned because his symptoms were continuing and although they did not feel typical for his cardiac type pain end of last year, he was concerned with his history and was told by cardiology office to come to the hospital for evaluation. At the present time he has no chest pain or shortness of breath. He denies any fever or chills and has had no cough or congestion. There is no leg pain. Patient denies any other acute symptomatology.            Past Medical History:   Diagnosis Date    CAD (coronary artery disease)     2 stents 2016     Headache     Hypercholesterolemia     Hypertension     Hypogonadism male     Liver disease     NAFLD    Obstructive sleep apnea        Past Surgical History:   Procedure Laterality Date    HX HEENT      status post pharyngioplasty         Family History:   Problem Relation Age of Onset    Heart Disease Father        Social History     Socioeconomic History    Marital status:      Spouse name: Not on file    Number of children: 2    Years of education: 12    Highest education level: Not on file   Occupational History    Occupation:    Social Needs    Financial resource strain: Not on file    Food insecurity     Worry: Not on file     Inability: Not on file   [employer] needs     Medical: Not on file     Non-medical: Not on file   Tobacco Use    Smoking status: Never Smoker    Smokeless tobacco: Never Used   Substance and Sexual Activity    Alcohol use: No    Drug use: No    Sexual activity: Yes     Partners: Female   Lifestyle    Physical activity     Days per week: Not on file     Minutes per session: Not on file    Stress: Not on file   Relationships    Social connections     Talks on phone: Not on file     Gets together: Not on file     Attends Yazidism service: Not on file     Active member of club or organization: Not on file     Attends meetings of clubs or organizations: Not on file     Relationship status: Not on file    Intimate partner violence     Fear of current or ex partner: Not on file     Emotionally abused: Not on file     Physically abused: Not on file     Forced sexual activity: Not on file   Other Topics Concern    Not on file   Social History Narrative    Not on file         ALLERGIES: Patient has no known allergies. Review of Systems   Constitutional: Negative for activity change, appetite change and fatigue. HENT: Negative for ear pain, facial swelling, sore throat and trouble swallowing. Eyes: Negative for pain, discharge and visual disturbance. Respiratory: Positive for shortness of breath. Negative for chest tightness and wheezing. Cardiovascular: Positive for chest pain. Negative for palpitations. Gastrointestinal: Negative for abdominal pain, blood in stool, nausea and vomiting. Genitourinary: Negative for difficulty urinating, flank pain and hematuria. Musculoskeletal: Negative for arthralgias, joint swelling, myalgias and neck pain. Skin: Negative for color change and rash. Neurological: Negative for dizziness, weakness, numbness and headaches. Hematological: Negative for adenopathy. Does not bruise/bleed easily. Psychiatric/Behavioral: Negative for behavioral problems, confusion and sleep disturbance. All other systems reviewed and are negative.       Vitals: surgery     Pathology reviewed, no stones, cancer, or polyps identified on final pathology    Laurence Man MD 01/03/24 9:52 AM    04/28/21 1022   BP: 117/81   Pulse: 90   Resp: 16   Temp: 98.1 °F (36.7 °C)   SpO2: 98%            Physical Exam  Vitals signs and nursing note reviewed. Constitutional:       General: He is not in acute distress. Appearance: He is well-developed. HENT:      Head: Normocephalic and atraumatic. Nose: Nose normal.   Eyes:      General: No scleral icterus. Conjunctiva/sclera: Conjunctivae normal.      Pupils: Pupils are equal, round, and reactive to light. Neck:      Musculoskeletal: Normal range of motion and neck supple. Thyroid: No thyromegaly. Vascular: No JVD. Trachea: No tracheal deviation. Comments: No carotid bruits noted. Cardiovascular:      Rate and Rhythm: Normal rate and regular rhythm. Heart sounds: Normal heart sounds. No murmur. No friction rub. No gallop. Pulmonary:      Effort: Pulmonary effort is normal. No respiratory distress. Breath sounds: Normal breath sounds. No wheezing or rales. Chest:      Chest wall: No tenderness. Abdominal:      General: Bowel sounds are normal. There is no distension. Palpations: Abdomen is soft. There is no mass. Tenderness: There is no abdominal tenderness. There is no guarding or rebound. Musculoskeletal: Normal range of motion. General: No tenderness. Lymphadenopathy:      Cervical: No cervical adenopathy. Skin:     General: Skin is warm and dry. Findings: No erythema or rash. Neurological:      Mental Status: He is alert and oriented to person, place, and time. Cranial Nerves: No cranial nerve deficit. Coordination: Coordination normal.      Deep Tendon Reflexes: Reflexes are normal and symmetric. Psychiatric:         Behavior: Behavior normal.         Thought Content:  Thought content normal.         Judgment: Judgment normal.          MDM  Number of Diagnoses or Management Options     Amount and/or Complexity of Data Reviewed  Clinical lab tests: ordered and reviewed  Tests in the radiology section of CPT®: ordered and reviewed  Decide to obtain previous medical records or to obtain history from someone other than the patient: yes  Review and summarize past medical records: yes  Discuss the patient with other providers: yes  Independent visualization of images, tracings, or specimens: yes    Risk of Complications, Morbidity, and/or Mortality  Presenting problems: high  Diagnostic procedures: high  Management options: high    Patient Progress  Patient progress: stable         Procedures    ED MD EKG interpretation: There is a sinus rhythm at 83 beats a minute. Axis is normal with inverted T waves in 3 and aVF. He also has some by phasing of his T waves in V4 5 and 6. These changes are new when compared to the previous EKG that we had from January of this year. Erik Jimenez MD    A chest x-ray is ordered, labs have been ordered, and a consult with Dr. Jenn Doty has been placed. The office knows the patient was coming. Given these intermittent symptoms over the last 3 to 4 days and his past history of MI and stent, believe the patient should be seen by cardiology. 12:51 PM  Still awaiting evaluation by cardiology. Patient has been seen by the PA. Labs appear unremarkable. Patient is stable. 1:47 PM still no disposition from cardiology on this patient. I have perfect serve the nurse practitioner with cardiology for definitive plan. 2:34 PM a perfect serve message to the nurse practitioner is not been answered yet. Another consult is placed with the cardiologist.    The patient will be admitted for further evaluation and treatment by cardiology. Able in the ED.

## 2024-01-09 ENCOUNTER — APPOINTMENT (OUTPATIENT)
Facility: HOSPITAL | Age: 49
End: 2024-01-09
Payer: COMMERCIAL

## 2024-01-09 ENCOUNTER — HOSPITAL ENCOUNTER (EMERGENCY)
Facility: HOSPITAL | Age: 49
Discharge: HOME | End: 2024-01-09
Attending: EMERGENCY MEDICINE
Payer: COMMERCIAL

## 2024-01-09 VITALS
OXYGEN SATURATION: 96 % | BODY MASS INDEX: 34.15 KG/M2 | HEART RATE: 108 BPM | TEMPERATURE: 98.4 F | DIASTOLIC BLOOD PRESSURE: 122 MMHG | WEIGHT: 200 LBS | RESPIRATION RATE: 18 BRPM | SYSTOLIC BLOOD PRESSURE: 179 MMHG | HEIGHT: 64 IN

## 2024-01-09 DIAGNOSIS — J11.1 INFLUENZA WITH RESPIRATORY MANIFESTATION OTHER THAN PNEUMONIA: Primary | ICD-10-CM

## 2024-01-09 DIAGNOSIS — J06.9 ACUTE UPPER RESPIRATORY INFECTION: ICD-10-CM

## 2024-01-09 DIAGNOSIS — J01.10 ACUTE NON-RECURRENT FRONTAL SINUSITIS: ICD-10-CM

## 2024-01-09 LAB
ALBUMIN SERPL-MCNC: 3.6 G/DL (ref 3.5–5)
ALBUMIN/GLOB SERPL: 0.9 (ref 1.1–2.2)
ALP SERPL-CCNC: 115 U/L (ref 45–117)
ALT SERPL-CCNC: 34 U/L (ref 12–78)
ANION GAP SERPL CALC-SCNC: 5 MMOL/L (ref 5–15)
AST SERPL-CCNC: 26 U/L (ref 15–37)
BASOPHILS # BLD: 0 K/UL (ref 0–0.1)
BASOPHILS NFR BLD: 1 % (ref 0–1)
BILIRUB SERPL-MCNC: 0.5 MG/DL (ref 0.2–1)
BUN SERPL-MCNC: 13 MG/DL (ref 6–20)
BUN/CREAT SERPL: 11 (ref 12–20)
CALCIUM SERPL-MCNC: 9.1 MG/DL (ref 8.5–10.1)
CHLORIDE SERPL-SCNC: 102 MMOL/L (ref 97–108)
CO2 SERPL-SCNC: 29 MMOL/L (ref 21–32)
CREAT SERPL-MCNC: 1.21 MG/DL (ref 0.7–1.3)
DIFFERENTIAL METHOD BLD: ABNORMAL
EOSINOPHIL # BLD: 0 K/UL (ref 0–0.4)
EOSINOPHIL NFR BLD: 0 % (ref 0–7)
ERYTHROCYTE [DISTWIDTH] IN BLOOD BY AUTOMATED COUNT: 14.4 % (ref 11.5–14.5)
FLUAV AG NPH QL IA: POSITIVE
FLUBV AG NOSE QL IA: NEGATIVE
GLOBULIN SER CALC-MCNC: 3.9 G/DL (ref 2–4)
GLUCOSE SERPL-MCNC: 332 MG/DL (ref 65–100)
HCT VFR BLD AUTO: 45.6 % (ref 36.6–50.3)
HGB BLD-MCNC: 14.8 G/DL (ref 12.1–17)
IMM GRANULOCYTES # BLD AUTO: 0 K/UL (ref 0–0.04)
IMM GRANULOCYTES NFR BLD AUTO: 0 % (ref 0–0.5)
LYMPHOCYTES # BLD: 1.1 K/UL (ref 0.8–3.5)
LYMPHOCYTES NFR BLD: 18 % (ref 12–49)
MCH RBC QN AUTO: 25.8 PG (ref 26–34)
MCHC RBC AUTO-ENTMCNC: 32.5 G/DL (ref 30–36.5)
MCV RBC AUTO: 79.6 FL (ref 80–99)
MONOCYTES # BLD: 0.9 K/UL (ref 0–1)
MONOCYTES NFR BLD: 15 % (ref 5–13)
NEUTS SEG # BLD: 4 K/UL (ref 1.8–8)
NEUTS SEG NFR BLD: 66 % (ref 32–75)
NRBC # BLD: 0 K/UL (ref 0–0.01)
NRBC BLD-RTO: 0 PER 100 WBC
PLATELET # BLD AUTO: 162 K/UL (ref 150–400)
PMV BLD AUTO: 12 FL (ref 8.9–12.9)
POTASSIUM SERPL-SCNC: 3.9 MMOL/L (ref 3.5–5.1)
PROT SERPL-MCNC: 7.5 G/DL (ref 6.4–8.2)
RBC # BLD AUTO: 5.73 M/UL (ref 4.1–5.7)
SARS-COV-2 RDRP RESP QL NAA+PROBE: NOT DETECTED
SODIUM SERPL-SCNC: 136 MMOL/L (ref 136–145)
SOURCE: NORMAL
WBC # BLD AUTO: 6 K/UL (ref 4.1–11.1)

## 2024-01-09 PROCEDURE — 36415 COLL VENOUS BLD VENIPUNCTURE: CPT

## 2024-01-09 PROCEDURE — 87804 INFLUENZA ASSAY W/OPTIC: CPT

## 2024-01-09 PROCEDURE — 80053 COMPREHEN METABOLIC PANEL: CPT

## 2024-01-09 PROCEDURE — 99284 EMERGENCY DEPT VISIT MOD MDM: CPT

## 2024-01-09 PROCEDURE — 6360000002 HC RX W HCPCS: Performed by: EMERGENCY MEDICINE

## 2024-01-09 PROCEDURE — 70450 CT HEAD/BRAIN W/O DYE: CPT

## 2024-01-09 PROCEDURE — 96361 HYDRATE IV INFUSION ADD-ON: CPT

## 2024-01-09 PROCEDURE — 96374 THER/PROPH/DIAG INJ IV PUSH: CPT

## 2024-01-09 PROCEDURE — 85025 COMPLETE CBC W/AUTO DIFF WBC: CPT

## 2024-01-09 PROCEDURE — 2580000003 HC RX 258: Performed by: EMERGENCY MEDICINE

## 2024-01-09 PROCEDURE — 87635 SARS-COV-2 COVID-19 AMP PRB: CPT

## 2024-01-09 PROCEDURE — 6370000000 HC RX 637 (ALT 250 FOR IP): Performed by: EMERGENCY MEDICINE

## 2024-01-09 RX ORDER — ACETAMINOPHEN 500 MG
1000 TABLET ORAL
Status: COMPLETED | OUTPATIENT
Start: 2024-01-09 | End: 2024-01-09

## 2024-01-09 RX ORDER — OSELTAMIVIR PHOSPHATE 75 MG/1
75 CAPSULE ORAL 2 TIMES DAILY
Qty: 10 CAPSULE | Refills: 0 | Status: SHIPPED | OUTPATIENT
Start: 2024-01-09 | End: 2024-01-14

## 2024-01-09 RX ORDER — FLUTICASONE PROPIONATE 50 MCG
2 SPRAY, SUSPENSION (ML) NASAL 2 TIMES DAILY
Qty: 1 EACH | Refills: 2 | Status: SHIPPED | OUTPATIENT
Start: 2024-01-09 | End: 2024-01-14

## 2024-01-09 RX ORDER — ACETAMINOPHEN 500 MG
500 TABLET ORAL 4 TIMES DAILY PRN
Qty: 20 TABLET | Refills: 0 | Status: SHIPPED | OUTPATIENT
Start: 2024-01-09 | End: 2024-01-14

## 2024-01-09 RX ORDER — LABETALOL HYDROCHLORIDE 5 MG/ML
10 INJECTION, SOLUTION INTRAVENOUS
Status: COMPLETED | OUTPATIENT
Start: 2024-01-09 | End: 2024-01-09

## 2024-01-09 RX ORDER — 0.9 % SODIUM CHLORIDE 0.9 %
500 INTRAVENOUS SOLUTION INTRAVENOUS ONCE
Status: COMPLETED | OUTPATIENT
Start: 2024-01-09 | End: 2024-01-09

## 2024-01-09 RX ADMIN — LABETALOL HYDROCHLORIDE 10 MG: 5 INJECTION INTRAVENOUS at 10:57

## 2024-01-09 RX ADMIN — ACETAMINOPHEN 1000 MG: 500 TABLET ORAL at 10:56

## 2024-01-09 RX ADMIN — SODIUM CHLORIDE 500 ML: 9 INJECTION, SOLUTION INTRAVENOUS at 10:58

## 2024-01-09 ASSESSMENT — PAIN - FUNCTIONAL ASSESSMENT: PAIN_FUNCTIONAL_ASSESSMENT: 0-10

## 2024-01-09 ASSESSMENT — PAIN SCALES - GENERAL: PAINLEVEL_OUTOF10: 9

## 2024-01-09 NOTE — ED PROVIDER NOTES
Sopn sc injection  Generic drug: semaglutide (2 MG/DOSE)  Blood sugar checks before meals and at bedtime and as needed     rosuvastatin 40 MG tablet  Commonly known as: CRESTOR  TAKE 1 TABLET BY MOUTH EVERY DAY     spironolactone 25 MG tablet  Commonly known as: ALDACTONE  TAKE 1/2 TABLET BY MOUTH EVERY DAY     Tresiba FlexTouch 200 UNIT/ML Sopn  Generic drug: Insulin Degludec  INJECT 90 UNITS EVERY MORNING           * This list has 2 medication(s) that are the same as other medications prescribed for you. Read the directions carefully, and ask your doctor or other care provider to review them with you.                   Where to Get Your Medications        These medications were sent to [facility]/pharmacy #[id number] - [place], [place] - [address] - P [phone] - F [phone] [address] [facility] [address]      Hours: 24-hours Phone: [phone]   acetaminophen 500 MG tablet  fluticasone 50 MCG/ACT nasal spray  oseltamivir 75 MG capsule           DISCONTINUED MEDICATIONS:  Current Discharge Medication List          I am the Primary Clinician of Record.   Vito Kramer MD (electronically signed)      (Please note that parts of this dictation were completed with voice recognition software. Quite often unanticipated grammatical, syntax, homophones, and other interpretive errors are inadvertently transcribed by the computer software. Please disregards these errors. Please excuse any errors that have escaped final proofreading.)

## 2024-01-09 NOTE — ED NOTES
I have reviewed the provider's instructions with the patient, answering all questions to his satisfaction. Pt ambulatory out of ED.

## 2024-01-09 NOTE — DISCHARGE INSTRUCTIONS
It was a pleasure taking care of you at [facility] Emergency Department today.  We know that when you come to [facility], you are entrusting us with your health, comfort, and safety.  Our physicians and nurses honor that trust, and we truly appreciate the opportunity to care for you and your loved ones.      We also value your feedback.  If you receive a survey about your Emergency Department experience today, please fill it out.  We care about our patients' feedback, and we listen to what you have to say.  Thank you!

## 2024-01-25 ENCOUNTER — APPOINTMENT (OUTPATIENT)
Facility: HOSPITAL | Age: 49
End: 2024-01-25
Payer: COMMERCIAL

## 2024-01-25 ENCOUNTER — HOSPITAL ENCOUNTER (EMERGENCY)
Facility: HOSPITAL | Age: 49
Discharge: HOME | End: 2024-01-25
Attending: EMERGENCY MEDICINE
Payer: COMMERCIAL

## 2024-01-25 VITALS
SYSTOLIC BLOOD PRESSURE: 147 MMHG | BODY MASS INDEX: 34.15 KG/M2 | HEART RATE: 97 BPM | DIASTOLIC BLOOD PRESSURE: 102 MMHG | RESPIRATION RATE: 18 BRPM | WEIGHT: 200 LBS | HEIGHT: 64 IN | OXYGEN SATURATION: 95 % | TEMPERATURE: 98.4 F

## 2024-01-25 DIAGNOSIS — R07.89 ATYPICAL CHEST PAIN: Primary | ICD-10-CM

## 2024-01-25 DIAGNOSIS — R91.1 PULMONARY NODULE: ICD-10-CM

## 2024-01-25 DIAGNOSIS — R00.2 PALPITATIONS: ICD-10-CM

## 2024-01-25 LAB
ALBUMIN SERPL-MCNC: 3.4 G/DL (ref 3.5–5)
ALBUMIN/GLOB SERPL: 0.9 (ref 1.1–2.2)
ALP SERPL-CCNC: 120 U/L (ref 45–117)
ALT SERPL-CCNC: ABNORMAL U/L (ref 12–78)
ANION GAP SERPL CALC-SCNC: 5 MMOL/L (ref 5–15)
AST SERPL-CCNC: 23 U/L (ref 15–37)
BASOPHILS # BLD: 0.1 K/UL (ref 0–0.1)
BASOPHILS NFR BLD: 1 % (ref 0–1)
BILIRUB SERPL-MCNC: 0.5 MG/DL (ref 0.2–1)
BUN SERPL-MCNC: 15 MG/DL (ref 6–20)
BUN/CREAT SERPL: 10 (ref 12–20)
CALCIUM SERPL-MCNC: 8.4 MG/DL (ref 8.5–10.1)
CHLORIDE SERPL-SCNC: 102 MMOL/L (ref 97–108)
CO2 SERPL-SCNC: 28 MMOL/L (ref 21–32)
CREAT SERPL-MCNC: 1.54 MG/DL (ref 0.7–1.3)
DIFFERENTIAL METHOD BLD: NORMAL
EKG ATRIAL RATE: 116 BPM
EKG DIAGNOSIS: NORMAL
EKG P AXIS: 44 DEGREES
EKG P-R INTERVAL: 138 MS
EKG Q-T INTERVAL: 356 MS
EKG QRS DURATION: 94 MS
EKG QTC CALCULATION (BAZETT): 494 MS
EKG R AXIS: 1 DEGREES
EKG T AXIS: -1 DEGREES
EKG VENTRICULAR RATE: 116 BPM
EOSINOPHIL # BLD: 0.1 K/UL (ref 0–0.4)
EOSINOPHIL NFR BLD: 1 % (ref 0–7)
ERYTHROCYTE [DISTWIDTH] IN BLOOD BY AUTOMATED COUNT: 14.2 % (ref 11.5–14.5)
GLOBULIN SER CALC-MCNC: 4 G/DL (ref 2–4)
GLUCOSE SERPL-MCNC: 375 MG/DL (ref 65–100)
HCT VFR BLD AUTO: 41.5 % (ref 36.6–50.3)
HGB BLD-MCNC: 13.8 G/DL (ref 12.1–17)
IMM GRANULOCYTES # BLD AUTO: 0 K/UL (ref 0–0.04)
IMM GRANULOCYTES NFR BLD AUTO: 0 % (ref 0–0.5)
LYMPHOCYTES # BLD: 2.6 K/UL (ref 0.8–3.5)
LYMPHOCYTES NFR BLD: 27 % (ref 12–49)
MCH RBC QN AUTO: 27 PG (ref 26–34)
MCHC RBC AUTO-ENTMCNC: 33.3 G/DL (ref 30–36.5)
MCV RBC AUTO: 81.1 FL (ref 80–99)
MONOCYTES # BLD: 0.8 K/UL (ref 0–1)
MONOCYTES NFR BLD: 8 % (ref 5–13)
NEUTS SEG # BLD: 6 K/UL (ref 1.8–8)
NEUTS SEG NFR BLD: 63 % (ref 32–75)
NRBC # BLD: 0 K/UL (ref 0–0.01)
NRBC BLD-RTO: 0 PER 100 WBC
PLATELET # BLD AUTO: 226 K/UL (ref 150–400)
PMV BLD AUTO: 11.6 FL (ref 8.9–12.9)
POTASSIUM SERPL-SCNC: 3.7 MMOL/L (ref 3.5–5.1)
PROT SERPL-MCNC: 7.4 G/DL (ref 6.4–8.2)
RBC # BLD AUTO: 5.12 M/UL (ref 4.1–5.7)
SODIUM SERPL-SCNC: 135 MMOL/L (ref 136–145)
TROPONIN I SERPL HS-MCNC: 23 NG/L (ref 0–76)
TROPONIN I SERPL HS-MCNC: 26 NG/L (ref 0–76)
WBC # BLD AUTO: 9.5 K/UL (ref 4.1–11.1)

## 2024-01-25 PROCEDURE — 96374 THER/PROPH/DIAG INJ IV PUSH: CPT

## 2024-01-25 PROCEDURE — 71275 CT ANGIOGRAPHY CHEST: CPT

## 2024-01-25 PROCEDURE — 84484 ASSAY OF TROPONIN QUANT: CPT

## 2024-01-25 PROCEDURE — 85025 COMPLETE CBC W/AUTO DIFF WBC: CPT

## 2024-01-25 PROCEDURE — 6360000002 HC RX W HCPCS: Performed by: EMERGENCY MEDICINE

## 2024-01-25 PROCEDURE — 93005 ELECTROCARDIOGRAM TRACING: CPT | Performed by: EMERGENCY MEDICINE

## 2024-01-25 PROCEDURE — 71046 X-RAY EXAM CHEST 2 VIEWS: CPT

## 2024-01-25 PROCEDURE — 36415 COLL VENOUS BLD VENIPUNCTURE: CPT

## 2024-01-25 PROCEDURE — 99285 EMERGENCY DEPT VISIT HI MDM: CPT

## 2024-01-25 PROCEDURE — 80053 COMPREHEN METABOLIC PANEL: CPT

## 2024-01-25 PROCEDURE — 6360000004 HC RX CONTRAST MEDICATION: Performed by: RADIOLOGY

## 2024-01-25 RX ORDER — KETOROLAC TROMETHAMINE 30 MG/ML
15 INJECTION, SOLUTION INTRAMUSCULAR; INTRAVENOUS
Status: COMPLETED | OUTPATIENT
Start: 2024-01-25 | End: 2024-01-25

## 2024-01-25 RX ADMIN — IOPAMIDOL 80 ML: 755 INJECTION, SOLUTION INTRAVENOUS at 03:25

## 2024-01-25 RX ADMIN — KETOROLAC TROMETHAMINE 15 MG: 30 INJECTION, SOLUTION INTRAMUSCULAR; INTRAVENOUS at 02:01

## 2024-01-25 ASSESSMENT — ENCOUNTER SYMPTOMS
SHORTNESS OF BREATH: 1
DIARRHEA: 0
NAUSEA: 0
VOMITING: 0
ABDOMINAL PAIN: 0

## 2024-01-25 ASSESSMENT — PAIN DESCRIPTION - LOCATION: LOCATION: CHEST

## 2024-01-25 ASSESSMENT — PAIN SCALES - GENERAL: PAINLEVEL_OUTOF10: 8

## 2024-01-25 ASSESSMENT — HEART SCORE: ECG: 0

## 2024-01-25 NOTE — ED PROVIDER NOTES
tablet under the tongue every 5 minutes as needed for Chest pain up to max of 3 total doses. If no relief after 2 doses, call 911.     * ondansetron 4 MG tablet  Commonly known as: ZOFRAN     * ondansetron 4 MG tablet  Commonly known as: ZOFRAN  Take 1 tablet by mouth every 8 hours as needed for Nausea or Vomiting     Ozempic (2 MG/DOSE) 8 MG/3ML Sopn sc injection  Generic drug: semaglutide (2 MG/DOSE)  Blood sugar checks before meals and at bedtime and as needed     rosuvastatin 40 MG tablet  Commonly known as: CRESTOR  TAKE 1 TABLET BY MOUTH EVERY DAY     spironolactone 25 MG tablet  Commonly known as: ALDACTONE  TAKE 1/2 TABLET BY MOUTH EVERY DAY     Tresiba FlexTouch 200 UNIT/ML Sopn  Generic drug: Insulin Degludec  INJECT 90 UNITS EVERY MORNING           * This list has 2 medication(s) that are the same as other medications prescribed for you. Read the directions carefully, and ask your doctor or other care provider to review them with you.                    DISCONTINUED MEDICATIONS:  Discharge Medication List as of 1/25/2024  4:28 AM          I am the Primary Clinician of Record.   Adriana Wren MD (electronically signed)    (Please note that parts of this dictation were completed with voice recognition software. Quite often unanticipated grammatical, syntax, homophones, and other interpretive errors are inadvertently transcribed by the computer software. Please disregards these errors. Please excuse any errors that have escaped final proofreading.)           Adriana Wren MD  01/25/24 0454

## 2024-01-25 NOTE — DISCHARGE INSTRUCTIONS
It was a pleasure taking care of you in our Emergency Department today.  We know that when you come to [facility], you are entrusting us with your health, comfort, and safety.  Our physicians and nurses honor that trust, and truly appreciate the opportunity to care for you and your loved ones.      We also value your feedback.  If you receive a survey about your Emergency Department experience today, please fill it out.  We care about our patients' feedback, and we listen to what you have to say.  Thank you!      Dr. Adriana Wren MD.

## 2024-01-25 NOTE — ED NOTES
Holger ROBLES reviewed discharge instructions with the patient. The patient verbalized understanding. All questions and concerns were addressed. The patient is discharged ambulatory with instructions and prescriptions in hand.  Pt is alert and oriented x 4. Respirations are clear and unlabored.

## 2024-02-14 ENCOUNTER — TELEPHONE (OUTPATIENT)
Age: 49
End: 2024-02-14

## 2024-02-14 NOTE — TELEPHONE ENCOUNTER
Prior auth unable to be completed via covermymeds; pharmacy called and explained they need a new pt insurance card and nothing more to be done on provider end

## 2024-03-09 ENCOUNTER — TELEPHONE (OUTPATIENT)
Facility: CLINIC | Age: 49
End: 2024-03-09

## 2024-03-09 NOTE — TELEPHONE ENCOUNTER
Called patient to verify insurance information due to not coming back eligible in cover my meds. Patient needs a call back did not have insurance card present

## 2024-03-16 ENCOUNTER — TELEPHONE (OUTPATIENT)
Facility: CLINIC | Age: 49
End: 2024-03-16

## 2024-04-03 ENCOUNTER — APPOINTMENT (OUTPATIENT)
Facility: HOSPITAL | Age: 49
End: 2024-04-03
Payer: COMMERCIAL

## 2024-04-03 ENCOUNTER — HOSPITAL ENCOUNTER (EMERGENCY)
Facility: HOSPITAL | Age: 49
Discharge: HOME | End: 2024-04-03
Attending: EMERGENCY MEDICINE
Payer: COMMERCIAL

## 2024-04-03 VITALS
BODY MASS INDEX: 35.3 KG/M2 | DIASTOLIC BLOOD PRESSURE: 107 MMHG | OXYGEN SATURATION: 93 % | RESPIRATION RATE: 16 BRPM | HEIGHT: 64 IN | SYSTOLIC BLOOD PRESSURE: 151 MMHG | HEART RATE: 92 BPM | TEMPERATURE: 99.1 F | WEIGHT: 206.79 LBS

## 2024-04-03 DIAGNOSIS — J20.9 ACUTE BRONCHITIS, UNSPECIFIED ORGANISM: Primary | ICD-10-CM

## 2024-04-03 LAB
FLUAV AG NPH QL IA: NEGATIVE
FLUBV AG NOSE QL IA: NEGATIVE
SARS-COV-2 RDRP RESP QL NAA+PROBE: NOT DETECTED
SOURCE: NORMAL

## 2024-04-03 PROCEDURE — 87804 INFLUENZA ASSAY W/OPTIC: CPT

## 2024-04-03 PROCEDURE — 99283 EMERGENCY DEPT VISIT LOW MDM: CPT

## 2024-04-03 PROCEDURE — 6370000000 HC RX 637 (ALT 250 FOR IP): Performed by: EMERGENCY MEDICINE

## 2024-04-03 PROCEDURE — 71045 X-RAY EXAM CHEST 1 VIEW: CPT

## 2024-04-03 PROCEDURE — 87635 SARS-COV-2 COVID-19 AMP PRB: CPT

## 2024-04-03 PROCEDURE — 99284 EMERGENCY DEPT VISIT MOD MDM: CPT

## 2024-04-03 RX ORDER — GUAIFENESIN 600 MG/1
600 TABLET, EXTENDED RELEASE ORAL
Status: COMPLETED | OUTPATIENT
Start: 2024-04-03 | End: 2024-04-03

## 2024-04-03 RX ORDER — GUAIFENESIN 600 MG/1
1200 TABLET, EXTENDED RELEASE ORAL 2 TIMES DAILY
Qty: 40 TABLET | Refills: 0 | Status: SHIPPED | OUTPATIENT
Start: 2024-04-03 | End: 2024-04-13

## 2024-04-03 RX ADMIN — GUAIFENESIN 600 MG: 600 TABLET, EXTENDED RELEASE ORAL at 04:43

## 2024-04-03 ASSESSMENT — PAIN - FUNCTIONAL ASSESSMENT: PAIN_FUNCTIONAL_ASSESSMENT: NONE - DENIES PAIN

## 2024-04-03 NOTE — ED NOTES
Patient discharged from the ED by Natalia ROBLES. Diagnosis, medications, precautions and follow-ups were reviewed with the patient/family. Questions were asked and answered prior to departure. Patient departed the ED via ambulatory and was accompanied by self.

## 2024-04-03 NOTE — DISCHARGE INSTRUCTIONS
It was a pleasure taking care of you in our Emergency Department today.  We know that when you come to [facility], you are entrusting us with your health, comfort, and safety.  Our physicians and nurses honor that trust, and truly appreciate the opportunity to care for you and your loved ones.    We also value your feedback.  If you receive a survey about your Emergency Department experience today, please fill it out.  We care about our patients' feedback, and we listen to what you have to say.  Thank you!       --- Dr. Radha Fisher MD

## 2024-04-03 NOTE — ED NOTES
Pt ringing call bell stating he is ready to go. Pt's status in epic is up for discharge. Discharge paperwork printed for patient. Patient told that he is welcome to wait for the provider for test results however pt states he has Mychart and already saw that the results were negative and just wants to go home. Discharge paperwork given to patient and patient left.

## 2024-04-03 NOTE — ED TRIAGE NOTES
Patient ambulatory to triage from waiting room with complaint of a cough. Patient reports cough began 2 days ago and feels as if there is irritating phlegm in his throat. Patient states he cannot cough anything up. Patient reports he is concerned about the cough because he is a heart patient.

## 2024-04-15 ASSESSMENT — ENCOUNTER SYMPTOMS
SORE THROAT: 0
COLOR CHANGE: 0
SHORTNESS OF BREATH: 0
SINUS PRESSURE: 0
NAUSEA: 0
COUGH: 1
VOMITING: 0
ABDOMINAL PAIN: 0
WHEEZING: 0

## 2024-04-15 NOTE — ED PROVIDER NOTES
600 MG extended release tablet  Commonly known as: MUCINEX  Take 2 tablets by mouth 2 times daily for 10 days  Ask about: Should I take this medication?     Icosapent Ethyl 1 g Caps capsule  Commonly known as: VASCEPA  Take 1 capsule by mouth 2 times daily (with meals)     Jardiance 10 MG tablet  Generic drug: empagliflozin  TAKE 1 TABLET BY MOUTH EVERY DAY     nitroGLYCERIN 0.4 MG SL tablet  Commonly known as: NITROSTAT  Place 1 tablet under the tongue every 5 minutes as needed for Chest pain up to max of 3 total doses. If no relief after 2 doses, call 911.     * ondansetron 4 MG tablet  Commonly known as: ZOFRAN     * ondansetron 4 MG tablet  Commonly known as: ZOFRAN  Take 1 tablet by mouth every 8 hours as needed for Nausea or Vomiting     Ozempic (2 MG/DOSE) 8 MG/3ML Sopn sc injection  Generic drug: semaglutide (2 MG/DOSE)  Blood sugar checks before meals and at bedtime and as needed     rosuvastatin 40 MG tablet  Commonly known as: CRESTOR  TAKE 1 TABLET BY MOUTH EVERY DAY     spironolactone 25 MG tablet  Commonly known as: ALDACTONE  TAKE 1/2 TABLET BY MOUTH EVERY DAY     Tresiba FlexTouch 200 UNIT/ML Sopn  Generic drug: Insulin Degludec  INJECT 90 UNITS EVERY MORNING           * This list has 2 medication(s) that are the same as other medications prescribed for you. Read the directions carefully, and ask your doctor or other care provider to review them with you.                   Where to Get Your Medications        These medications were sent to [facility]/pharmacy #[id number] - [place], [place] - [address] [facility] [facility] - P [phone] - F [phone]  [address] [place] [id number]      Hours: 24-hours Phone: [phone]   guaiFENesin 600 MG extended release tablet       2.   Adria Lee MD  [address] [address] [phone]    Schedule an appointment as soon as possible for a visit in 3 days      [facility] EMERGENCY DEPT  [address] [facility]

## 2024-05-28 ENCOUNTER — TRANSCRIBE ORDERS (OUTPATIENT)
Facility: HOSPITAL | Age: 49
End: 2024-05-28

## 2024-05-28 DIAGNOSIS — S90.851S FOREIGN BODY IN RIGHT FOOT, SEQUELA: Primary | ICD-10-CM

## 2024-05-28 DIAGNOSIS — L02.611 FOOT ABSCESS, RIGHT: ICD-10-CM

## 2024-05-28 DIAGNOSIS — E13.69 OTHER SPECIFIED DIABETES MELLITUS WITH OTHER SPECIFIED COMPLICATION, UNSPECIFIED WHETHER LONG TERM INSULIN USE (HCC): ICD-10-CM

## 2024-05-28 DIAGNOSIS — M79.671 FOOT PAIN, RIGHT: ICD-10-CM

## 2024-05-29 ENCOUNTER — HOSPITAL ENCOUNTER (OUTPATIENT)
Facility: HOSPITAL | Age: 49
Discharge: HOME | End: 2024-06-01
Attending: PODIATRIST
Payer: COMMERCIAL

## 2024-05-29 DIAGNOSIS — L02.611 FOOT ABSCESS, RIGHT: ICD-10-CM

## 2024-05-29 DIAGNOSIS — M79.671 FOOT PAIN, RIGHT: ICD-10-CM

## 2024-05-29 DIAGNOSIS — S90.851S FOREIGN BODY IN RIGHT FOOT, SEQUELA: ICD-10-CM

## 2024-05-29 DIAGNOSIS — E13.69 OTHER SPECIFIED DIABETES MELLITUS WITH OTHER SPECIFIED COMPLICATION, UNSPECIFIED WHETHER LONG TERM INSULIN USE (HCC): ICD-10-CM

## 2024-05-29 PROCEDURE — 73720 MRI LWR EXTREMITY W/O&W/DYE: CPT

## 2024-05-29 PROCEDURE — 6360000004 HC RX CONTRAST MEDICATION: Performed by: PODIATRIST

## 2024-05-29 PROCEDURE — A9579 GAD-BASE MR CONTRAST NOS,1ML: HCPCS | Performed by: PODIATRIST

## 2024-05-29 RX ADMIN — GADOTERIDOL 20 ML: 279.3 INJECTION, SOLUTION INTRAVENOUS at 14:07

## 2024-05-31 ENCOUNTER — HOSPITAL ENCOUNTER (INPATIENT)
Facility: HOSPITAL | Age: 49
LOS: 6 days | Discharge: HOME | End: 2024-06-07
Attending: EMERGENCY MEDICINE | Admitting: INTERNAL MEDICINE
Payer: COMMERCIAL

## 2024-05-31 DIAGNOSIS — L08.9 DIABETIC INFECTION OF RIGHT FOOT (HCC): ICD-10-CM

## 2024-05-31 DIAGNOSIS — E11.65 TYPE 2 DIABETES MELLITUS WITH HYPERGLYCEMIA (HCC): ICD-10-CM

## 2024-05-31 DIAGNOSIS — E11.628 DIABETIC INFECTION OF RIGHT FOOT (HCC): ICD-10-CM

## 2024-05-31 DIAGNOSIS — L03.119 CELLULITIS AND ABSCESS OF FOOT: Primary | ICD-10-CM

## 2024-05-31 DIAGNOSIS — I42.9 CARDIOMYOPATHY, UNSPECIFIED TYPE (HCC): ICD-10-CM

## 2024-05-31 DIAGNOSIS — L02.619 CELLULITIS AND ABSCESS OF FOOT: Primary | ICD-10-CM

## 2024-05-31 LAB
ALBUMIN SERPL-MCNC: 3.4 G/DL (ref 3.5–5)
ALBUMIN/GLOB SERPL: 0.7 (ref 1.1–2.2)
ALP SERPL-CCNC: 223 U/L (ref 45–117)
ALT SERPL-CCNC: 158 U/L (ref 12–78)
ANION GAP SERPL CALC-SCNC: 3 MMOL/L (ref 5–15)
AST SERPL-CCNC: 62 U/L (ref 15–37)
BASOPHILS # BLD: 0 K/UL (ref 0–0.1)
BASOPHILS NFR BLD: 0 % (ref 0–1)
BILIRUB SERPL-MCNC: 0.5 MG/DL (ref 0.2–1)
BUN SERPL-MCNC: 14 MG/DL (ref 6–20)
BUN/CREAT SERPL: 13 (ref 12–20)
CALCIUM SERPL-MCNC: 9.8 MG/DL (ref 8.5–10.1)
CHLORIDE SERPL-SCNC: 101 MMOL/L (ref 97–108)
CO2 SERPL-SCNC: 28 MMOL/L (ref 21–32)
COMMENT:: NORMAL
CREAT SERPL-MCNC: 1.08 MG/DL (ref 0.7–1.3)
DIFFERENTIAL METHOD BLD: ABNORMAL
EOSINOPHIL # BLD: 0.1 K/UL (ref 0–0.4)
EOSINOPHIL NFR BLD: 1 % (ref 0–7)
ERYTHROCYTE [DISTWIDTH] IN BLOOD BY AUTOMATED COUNT: 14.3 % (ref 11.5–14.5)
GLOBULIN SER CALC-MCNC: 5.1 G/DL (ref 2–4)
GLUCOSE SERPL-MCNC: 180 MG/DL (ref 65–100)
HCT VFR BLD AUTO: 45.4 % (ref 36.6–50.3)
HGB BLD-MCNC: 15.3 G/DL (ref 12.1–17)
IMM GRANULOCYTES # BLD AUTO: 0 K/UL (ref 0–0.04)
IMM GRANULOCYTES NFR BLD AUTO: 0 % (ref 0–0.5)
LACTATE SERPL-SCNC: 1.2 MMOL/L (ref 0.4–2)
LYMPHOCYTES # BLD: 2.4 K/UL (ref 0.8–3.5)
LYMPHOCYTES NFR BLD: 24 % (ref 12–49)
MCH RBC QN AUTO: 26.1 PG (ref 26–34)
MCHC RBC AUTO-ENTMCNC: 33.7 G/DL (ref 30–36.5)
MCV RBC AUTO: 77.5 FL (ref 80–99)
MONOCYTES # BLD: 1.1 K/UL (ref 0–1)
MONOCYTES NFR BLD: 11 % (ref 5–13)
NEUTS SEG # BLD: 6.4 K/UL (ref 1.8–8)
NEUTS SEG NFR BLD: 64 % (ref 32–75)
NRBC # BLD: 0 K/UL (ref 0–0.01)
NRBC BLD-RTO: 0 PER 100 WBC
PLATELET # BLD AUTO: 232 K/UL (ref 150–400)
PMV BLD AUTO: 11.8 FL (ref 8.9–12.9)
POTASSIUM SERPL-SCNC: 3.8 MMOL/L (ref 3.5–5.1)
PROT SERPL-MCNC: 8.5 G/DL (ref 6.4–8.2)
RBC # BLD AUTO: 5.86 M/UL (ref 4.1–5.7)
SODIUM SERPL-SCNC: 132 MMOL/L (ref 136–145)
SPECIMEN HOLD: NORMAL
WBC # BLD AUTO: 10 K/UL (ref 4.1–11.1)

## 2024-05-31 PROCEDURE — 96374 THER/PROPH/DIAG INJ IV PUSH: CPT

## 2024-05-31 PROCEDURE — 96375 TX/PRO/DX INJ NEW DRUG ADDON: CPT

## 2024-05-31 PROCEDURE — 36415 COLL VENOUS BLD VENIPUNCTURE: CPT

## 2024-05-31 PROCEDURE — 99285 EMERGENCY DEPT VISIT HI MDM: CPT

## 2024-05-31 PROCEDURE — 80053 COMPREHEN METABOLIC PANEL: CPT

## 2024-05-31 PROCEDURE — 87040 BLOOD CULTURE FOR BACTERIA: CPT

## 2024-05-31 PROCEDURE — 85025 COMPLETE CBC W/AUTO DIFF WBC: CPT

## 2024-05-31 PROCEDURE — 83605 ASSAY OF LACTIC ACID: CPT

## 2024-05-31 ASSESSMENT — PAIN DESCRIPTION - ORIENTATION: ORIENTATION: RIGHT

## 2024-05-31 ASSESSMENT — PAIN DESCRIPTION - LOCATION: LOCATION: FOOT

## 2024-05-31 ASSESSMENT — PAIN - FUNCTIONAL ASSESSMENT: PAIN_FUNCTIONAL_ASSESSMENT: 0-10

## 2024-05-31 ASSESSMENT — PAIN SCALES - GENERAL: PAINLEVEL_OUTOF10: 10

## 2024-06-01 ENCOUNTER — APPOINTMENT (OUTPATIENT)
Facility: HOSPITAL | Age: 49
End: 2024-06-01
Attending: INTERNAL MEDICINE
Payer: COMMERCIAL

## 2024-06-01 ENCOUNTER — APPOINTMENT (OUTPATIENT)
Facility: HOSPITAL | Age: 49
End: 2024-06-01
Payer: COMMERCIAL

## 2024-06-01 PROBLEM — L08.9 DIABETIC INFECTION OF RIGHT FOOT (HCC): Status: ACTIVE | Noted: 2024-06-01

## 2024-06-01 PROBLEM — E11.628 DIABETIC INFECTION OF RIGHT FOOT (HCC): Status: ACTIVE | Noted: 2024-06-01

## 2024-06-01 PROBLEM — R74.8 ELEVATED LIVER ENZYMES: Status: ACTIVE | Noted: 2024-06-01

## 2024-06-01 LAB
ALBUMIN SERPL-MCNC: 3.2 G/DL (ref 3.5–5)
ALBUMIN/GLOB SERPL: 0.7 (ref 1.1–2.2)
ALP SERPL-CCNC: 340 U/L (ref 45–117)
ALT SERPL-CCNC: 188 U/L (ref 12–78)
AMPHET UR QL SCN: NEGATIVE
ANION GAP SERPL CALC-SCNC: 5 MMOL/L (ref 5–15)
APPEARANCE UR: CLEAR
AST SERPL-CCNC: 163 U/L (ref 15–37)
BACTERIA URNS QL MICRO: NEGATIVE /HPF
BARBITURATES UR QL SCN: NEGATIVE
BASOPHILS # BLD: 0 K/UL (ref 0–0.1)
BASOPHILS NFR BLD: 0 % (ref 0–1)
BENZODIAZ UR QL: NEGATIVE
BILIRUB SERPL-MCNC: 0.7 MG/DL (ref 0.2–1)
BILIRUB UR QL: NEGATIVE
BUN SERPL-MCNC: 12 MG/DL (ref 6–20)
BUN/CREAT SERPL: 11 (ref 12–20)
CALCIUM SERPL-MCNC: 9 MG/DL (ref 8.5–10.1)
CANNABINOIDS UR QL SCN: NEGATIVE
CHLORIDE SERPL-SCNC: 103 MMOL/L (ref 97–108)
CO2 SERPL-SCNC: 26 MMOL/L (ref 21–32)
COCAINE UR QL SCN: NEGATIVE
COLOR UR: ABNORMAL
CREAT SERPL-MCNC: 1.1 MG/DL (ref 0.7–1.3)
CRP SERPL-MCNC: 14.5 MG/DL (ref 0–0.3)
DIFFERENTIAL METHOD BLD: ABNORMAL
EKG ATRIAL RATE: 108 BPM
EKG DIAGNOSIS: NORMAL
EKG P AXIS: 51 DEGREES
EKG P-R INTERVAL: 124 MS
EKG Q-T INTERVAL: 342 MS
EKG QRS DURATION: 98 MS
EKG QTC CALCULATION (BAZETT): 458 MS
EKG R AXIS: -9 DEGREES
EKG T AXIS: -8 DEGREES
EKG VENTRICULAR RATE: 108 BPM
EOSINOPHIL # BLD: 0.1 K/UL (ref 0–0.4)
EOSINOPHIL NFR BLD: 1 % (ref 0–7)
EPITH CASTS URNS QL MICRO: ABNORMAL /LPF
ERYTHROCYTE [DISTWIDTH] IN BLOOD BY AUTOMATED COUNT: 14.1 % (ref 11.5–14.5)
EST. AVERAGE GLUCOSE BLD GHB EST-MCNC: 312 MG/DL
GLOBULIN SER CALC-MCNC: 4.4 G/DL (ref 2–4)
GLUCOSE BLD STRIP.AUTO-MCNC: 181 MG/DL (ref 65–117)
GLUCOSE BLD STRIP.AUTO-MCNC: 192 MG/DL (ref 65–117)
GLUCOSE BLD STRIP.AUTO-MCNC: 216 MG/DL (ref 65–117)
GLUCOSE BLD STRIP.AUTO-MCNC: 244 MG/DL (ref 65–117)
GLUCOSE SERPL-MCNC: 223 MG/DL (ref 65–100)
GLUCOSE UR STRIP.AUTO-MCNC: 100 MG/DL
HBA1C MFR BLD: 12.5 % (ref 4–5.6)
HCT VFR BLD AUTO: 44.2 % (ref 36.6–50.3)
HGB BLD-MCNC: 14.3 G/DL (ref 12.1–17)
HGB UR QL STRIP: NEGATIVE
HYALINE CASTS URNS QL MICRO: ABNORMAL /LPF (ref 0–5)
IMM GRANULOCYTES # BLD AUTO: 0 K/UL (ref 0–0.04)
IMM GRANULOCYTES NFR BLD AUTO: 0 % (ref 0–0.5)
KETONES UR QL STRIP.AUTO: ABNORMAL MG/DL
LEUKOCYTE ESTERASE UR QL STRIP.AUTO: NEGATIVE
LIPASE SERPL-CCNC: 22 U/L (ref 13–75)
LYMPHOCYTES # BLD: 2.7 K/UL (ref 0.8–3.5)
LYMPHOCYTES NFR BLD: 25 % (ref 12–49)
Lab: ABNORMAL
MAGNESIUM SERPL-MCNC: 1.9 MG/DL (ref 1.6–2.4)
MCH RBC QN AUTO: 25.8 PG (ref 26–34)
MCHC RBC AUTO-ENTMCNC: 32.4 G/DL (ref 30–36.5)
MCV RBC AUTO: 79.6 FL (ref 80–99)
METHADONE UR QL: NEGATIVE
MONOCYTES # BLD: 1.2 K/UL (ref 0–1)
MONOCYTES NFR BLD: 11 % (ref 5–13)
NEUTS SEG # BLD: 6.7 K/UL (ref 1.8–8)
NEUTS SEG NFR BLD: 63 % (ref 32–75)
NITRITE UR QL STRIP.AUTO: NEGATIVE
NRBC # BLD: 0 K/UL (ref 0–0.01)
NRBC BLD-RTO: 0 PER 100 WBC
OPIATES UR QL: POSITIVE
PCP UR QL: NEGATIVE
PH UR STRIP: 5 (ref 5–8)
PHOSPHATE SERPL-MCNC: 3.8 MG/DL (ref 2.6–4.7)
PLATELET # BLD AUTO: 244 K/UL (ref 150–400)
PMV BLD AUTO: 12.2 FL (ref 8.9–12.9)
POTASSIUM SERPL-SCNC: 4.4 MMOL/L (ref 3.5–5.1)
PROT SERPL-MCNC: 7.6 G/DL (ref 6.4–8.2)
PROT UR STRIP-MCNC: 30 MG/DL
RBC # BLD AUTO: 5.55 M/UL (ref 4.1–5.7)
RBC #/AREA URNS HPF: ABNORMAL /HPF (ref 0–5)
SERVICE CMNT-IMP: ABNORMAL
SODIUM SERPL-SCNC: 134 MMOL/L (ref 136–145)
SP GR UR REFRACTOMETRY: 1.02 (ref 1–1.03)
TSH SERPL DL<=0.05 MIU/L-ACNC: 2.02 UIU/ML (ref 0.36–3.74)
URINE CULTURE IF INDICATED: ABNORMAL
UROBILINOGEN UR QL STRIP.AUTO: 0.2 EU/DL (ref 0.2–1)
WBC # BLD AUTO: 10.6 K/UL (ref 4.1–11.1)
WBC URNS QL MICRO: ABNORMAL /HPF (ref 0–4)

## 2024-06-01 PROCEDURE — 6370000000 HC RX 637 (ALT 250 FOR IP): Performed by: INTERNAL MEDICINE

## 2024-06-01 PROCEDURE — 2580000003 HC RX 258: Performed by: EMERGENCY MEDICINE

## 2024-06-01 PROCEDURE — 6360000002 HC RX W HCPCS: Performed by: INTERNAL MEDICINE

## 2024-06-01 PROCEDURE — 76700 US EXAM ABDOM COMPLETE: CPT

## 2024-06-01 PROCEDURE — 6360000002 HC RX W HCPCS: Performed by: EMERGENCY MEDICINE

## 2024-06-01 PROCEDURE — 81001 URINALYSIS AUTO W/SCOPE: CPT

## 2024-06-01 PROCEDURE — 93971 EXTREMITY STUDY: CPT

## 2024-06-01 PROCEDURE — 71045 X-RAY EXAM CHEST 1 VIEW: CPT

## 2024-06-01 PROCEDURE — 86140 C-REACTIVE PROTEIN: CPT

## 2024-06-01 PROCEDURE — 1100000000 HC RM PRIVATE

## 2024-06-01 PROCEDURE — 80307 DRUG TEST PRSMV CHEM ANLYZR: CPT

## 2024-06-01 PROCEDURE — 83735 ASSAY OF MAGNESIUM: CPT

## 2024-06-01 PROCEDURE — 36415 COLL VENOUS BLD VENIPUNCTURE: CPT

## 2024-06-01 PROCEDURE — 80053 COMPREHEN METABOLIC PANEL: CPT

## 2024-06-01 PROCEDURE — 84443 ASSAY THYROID STIM HORMONE: CPT

## 2024-06-01 PROCEDURE — 82962 GLUCOSE BLOOD TEST: CPT

## 2024-06-01 PROCEDURE — 2580000003 HC RX 258: Performed by: INTERNAL MEDICINE

## 2024-06-01 PROCEDURE — 85025 COMPLETE CBC W/AUTO DIFF WBC: CPT

## 2024-06-01 PROCEDURE — 84100 ASSAY OF PHOSPHORUS: CPT

## 2024-06-01 PROCEDURE — 83036 HEMOGLOBIN GLYCOSYLATED A1C: CPT

## 2024-06-01 PROCEDURE — 83690 ASSAY OF LIPASE: CPT

## 2024-06-01 PROCEDURE — 93010 ELECTROCARDIOGRAM REPORT: CPT | Performed by: SPECIALIST

## 2024-06-01 PROCEDURE — 99232 SBSQ HOSP IP/OBS MODERATE 35: CPT | Performed by: INTERNAL MEDICINE

## 2024-06-01 PROCEDURE — 93005 ELECTROCARDIOGRAM TRACING: CPT | Performed by: INTERNAL MEDICINE

## 2024-06-01 RX ORDER — ENOXAPARIN SODIUM 100 MG/ML
40 INJECTION SUBCUTANEOUS DAILY
Status: DISCONTINUED | OUTPATIENT
Start: 2024-06-01 | End: 2024-06-03

## 2024-06-01 RX ORDER — ACETAMINOPHEN 325 MG/1
650 TABLET ORAL EVERY 6 HOURS PRN
Status: DISCONTINUED | OUTPATIENT
Start: 2024-06-01 | End: 2024-06-07 | Stop reason: HOSPADM

## 2024-06-01 RX ORDER — SODIUM CHLORIDE, SODIUM LACTATE, POTASSIUM CHLORIDE, CALCIUM CHLORIDE 600; 310; 30; 20 MG/100ML; MG/100ML; MG/100ML; MG/100ML
INJECTION, SOLUTION INTRAVENOUS CONTINUOUS
Status: DISCONTINUED | OUTPATIENT
Start: 2024-06-01 | End: 2024-06-01

## 2024-06-01 RX ORDER — SODIUM CHLORIDE 9 MG/ML
INJECTION, SOLUTION INTRAVENOUS PRN
Status: DISCONTINUED | OUTPATIENT
Start: 2024-06-01 | End: 2024-06-07 | Stop reason: HOSPADM

## 2024-06-01 RX ORDER — DEXTROSE MONOHYDRATE 100 MG/ML
INJECTION, SOLUTION INTRAVENOUS CONTINUOUS PRN
Status: DISCONTINUED | OUTPATIENT
Start: 2024-06-01 | End: 2024-06-07 | Stop reason: HOSPADM

## 2024-06-01 RX ORDER — POTASSIUM CHLORIDE 750 MG/1
40 TABLET, FILM COATED, EXTENDED RELEASE ORAL PRN
Status: DISCONTINUED | OUTPATIENT
Start: 2024-06-01 | End: 2024-06-07 | Stop reason: HOSPADM

## 2024-06-01 RX ORDER — INSULIN LISPRO 100 [IU]/ML
0-4 INJECTION, SOLUTION INTRAVENOUS; SUBCUTANEOUS NIGHTLY
Status: DISCONTINUED | OUTPATIENT
Start: 2024-06-01 | End: 2024-06-07 | Stop reason: HOSPADM

## 2024-06-01 RX ORDER — SODIUM CHLORIDE 0.9 % (FLUSH) 0.9 %
5-40 SYRINGE (ML) INJECTION PRN
Status: DISCONTINUED | OUTPATIENT
Start: 2024-06-01 | End: 2024-06-07 | Stop reason: HOSPADM

## 2024-06-01 RX ORDER — POLYETHYLENE GLYCOL 3350 17 G/17G
17 POWDER, FOR SOLUTION ORAL DAILY PRN
Status: DISCONTINUED | OUTPATIENT
Start: 2024-06-01 | End: 2024-06-04

## 2024-06-01 RX ORDER — SODIUM CHLORIDE 9 MG/ML
INJECTION, SOLUTION INTRAVENOUS CONTINUOUS
Status: DISCONTINUED | OUTPATIENT
Start: 2024-06-01 | End: 2024-06-07 | Stop reason: HOSPADM

## 2024-06-01 RX ORDER — CARVEDILOL 12.5 MG/1
25 TABLET ORAL 2 TIMES DAILY WITH MEALS
Status: DISCONTINUED | OUTPATIENT
Start: 2024-06-01 | End: 2024-06-07 | Stop reason: HOSPADM

## 2024-06-01 RX ORDER — INSULIN LISPRO 100 [IU]/ML
0-8 INJECTION, SOLUTION INTRAVENOUS; SUBCUTANEOUS
Status: DISCONTINUED | OUTPATIENT
Start: 2024-06-01 | End: 2024-06-07 | Stop reason: HOSPADM

## 2024-06-01 RX ORDER — EZETIMIBE 10 MG/1
10 TABLET ORAL DAILY
Status: DISCONTINUED | OUTPATIENT
Start: 2024-06-01 | End: 2024-06-07 | Stop reason: HOSPADM

## 2024-06-01 RX ORDER — AMLODIPINE BESYLATE 5 MG/1
10 TABLET ORAL DAILY
Status: DISCONTINUED | OUTPATIENT
Start: 2024-06-01 | End: 2024-06-01

## 2024-06-01 RX ORDER — ONDANSETRON 2 MG/ML
4 INJECTION INTRAMUSCULAR; INTRAVENOUS EVERY 6 HOURS PRN
Status: DISCONTINUED | OUTPATIENT
Start: 2024-06-01 | End: 2024-06-07 | Stop reason: HOSPADM

## 2024-06-01 RX ORDER — OXYCODONE HYDROCHLORIDE 5 MG/1
5 TABLET ORAL EVERY 4 HOURS PRN
Status: DISCONTINUED | OUTPATIENT
Start: 2024-06-01 | End: 2024-06-07 | Stop reason: HOSPADM

## 2024-06-01 RX ORDER — CLOPIDOGREL BISULFATE 75 MG/1
75 TABLET ORAL DAILY
Status: DISCONTINUED | OUTPATIENT
Start: 2024-06-01 | End: 2024-06-03

## 2024-06-01 RX ORDER — MORPHINE SULFATE 4 MG/ML
4 INJECTION, SOLUTION INTRAMUSCULAR; INTRAVENOUS
Status: COMPLETED | OUTPATIENT
Start: 2024-06-01 | End: 2024-06-01

## 2024-06-01 RX ORDER — ONDANSETRON 4 MG/1
4 TABLET, ORALLY DISINTEGRATING ORAL EVERY 8 HOURS PRN
Status: DISCONTINUED | OUTPATIENT
Start: 2024-06-01 | End: 2024-06-07 | Stop reason: HOSPADM

## 2024-06-01 RX ORDER — NITROGLYCERIN 0.4 MG/1
0.4 TABLET SUBLINGUAL EVERY 5 MIN PRN
Status: DISCONTINUED | OUTPATIENT
Start: 2024-06-01 | End: 2024-06-07 | Stop reason: HOSPADM

## 2024-06-01 RX ORDER — ROSUVASTATIN CALCIUM 10 MG/1
40 TABLET, COATED ORAL DAILY
Status: DISCONTINUED | OUTPATIENT
Start: 2024-06-01 | End: 2024-06-07 | Stop reason: HOSPADM

## 2024-06-01 RX ORDER — POTASSIUM CHLORIDE 7.45 MG/ML
10 INJECTION INTRAVENOUS PRN
Status: DISCONTINUED | OUTPATIENT
Start: 2024-06-01 | End: 2024-06-07 | Stop reason: HOSPADM

## 2024-06-01 RX ORDER — 0.9 % SODIUM CHLORIDE 0.9 %
1000 INTRAVENOUS SOLUTION INTRAVENOUS ONCE
Status: COMPLETED | OUTPATIENT
Start: 2024-06-01 | End: 2024-06-01

## 2024-06-01 RX ORDER — MORPHINE SULFATE 2 MG/ML
2 INJECTION, SOLUTION INTRAMUSCULAR; INTRAVENOUS EVERY 4 HOURS PRN
Status: DISCONTINUED | OUTPATIENT
Start: 2024-06-01 | End: 2024-06-04

## 2024-06-01 RX ORDER — FAMOTIDINE 20 MG/1
20 TABLET, FILM COATED ORAL 2 TIMES DAILY
Status: DISCONTINUED | OUTPATIENT
Start: 2024-06-01 | End: 2024-06-07 | Stop reason: HOSPADM

## 2024-06-01 RX ORDER — ASPIRIN 81 MG/1
81 TABLET ORAL DAILY
Status: DISCONTINUED | OUTPATIENT
Start: 2024-06-01 | End: 2024-06-07 | Stop reason: HOSPADM

## 2024-06-01 RX ORDER — SPIRONOLACTONE 25 MG/1
25 TABLET ORAL DAILY
Status: DISCONTINUED | OUTPATIENT
Start: 2024-06-01 | End: 2024-06-07 | Stop reason: HOSPADM

## 2024-06-01 RX ORDER — FLUTICASONE PROPIONATE 50 MCG
2 SPRAY, SUSPENSION (ML) NASAL 2 TIMES DAILY
Status: DISCONTINUED | OUTPATIENT
Start: 2024-06-01 | End: 2024-06-07 | Stop reason: HOSPADM

## 2024-06-01 RX ORDER — SODIUM CHLORIDE 0.9 % (FLUSH) 0.9 %
5-40 SYRINGE (ML) INJECTION EVERY 12 HOURS SCHEDULED
Status: DISCONTINUED | OUTPATIENT
Start: 2024-06-01 | End: 2024-06-07 | Stop reason: HOSPADM

## 2024-06-01 RX ORDER — MAGNESIUM SULFATE IN WATER 40 MG/ML
2000 INJECTION, SOLUTION INTRAVENOUS PRN
Status: DISCONTINUED | OUTPATIENT
Start: 2024-06-01 | End: 2024-06-07 | Stop reason: HOSPADM

## 2024-06-01 RX ORDER — INSULIN GLARGINE 100 [IU]/ML
16 INJECTION, SOLUTION SUBCUTANEOUS NIGHTLY
Status: DISCONTINUED | OUTPATIENT
Start: 2024-06-01 | End: 2024-06-03

## 2024-06-01 RX ORDER — ACETAMINOPHEN 650 MG/1
650 SUPPOSITORY RECTAL EVERY 6 HOURS PRN
Status: DISCONTINUED | OUTPATIENT
Start: 2024-06-01 | End: 2024-06-07 | Stop reason: HOSPADM

## 2024-06-01 RX ADMIN — SACUBITRIL AND VALSARTAN 1 TABLET: 24; 26 TABLET, FILM COATED ORAL at 10:39

## 2024-06-01 RX ADMIN — OXYCODONE HYDROCHLORIDE 5 MG: 5 TABLET ORAL at 10:40

## 2024-06-01 RX ADMIN — SPIRONOLACTONE 25 MG: 25 TABLET ORAL at 10:41

## 2024-06-01 RX ADMIN — INSULIN GLARGINE 16 UNITS: 100 INJECTION, SOLUTION SUBCUTANEOUS at 20:48

## 2024-06-01 RX ADMIN — SODIUM CHLORIDE 1000 ML: 9 INJECTION, SOLUTION INTRAVENOUS at 01:00

## 2024-06-01 RX ADMIN — ROSUVASTATIN CALCIUM 40 MG: 10 TABLET, FILM COATED ORAL at 10:41

## 2024-06-01 RX ADMIN — EMPAGLIFLOZIN 10 MG: 10 TABLET, FILM COATED ORAL at 10:40

## 2024-06-01 RX ADMIN — MORPHINE SULFATE 2 MG: 2 INJECTION, SOLUTION INTRAMUSCULAR; INTRAVENOUS at 12:07

## 2024-06-01 RX ADMIN — PIPERACILLIN AND TAZOBACTAM 4500 MG: 4; .5 INJECTION, POWDER, LYOPHILIZED, FOR SOLUTION INTRAVENOUS at 06:36

## 2024-06-01 RX ADMIN — OXYCODONE HYDROCHLORIDE 5 MG: 5 TABLET ORAL at 19:23

## 2024-06-01 RX ADMIN — CARVEDILOL 25 MG: 12.5 TABLET, FILM COATED ORAL at 17:08

## 2024-06-01 RX ADMIN — SODIUM CHLORIDE: 9 INJECTION, SOLUTION INTRAVENOUS at 10:38

## 2024-06-01 RX ADMIN — MORPHINE SULFATE 2 MG: 2 INJECTION, SOLUTION INTRAMUSCULAR; INTRAVENOUS at 16:56

## 2024-06-01 RX ADMIN — ENOXAPARIN SODIUM 40 MG: 100 INJECTION SUBCUTANEOUS at 10:39

## 2024-06-01 RX ADMIN — SODIUM CHLORIDE, POTASSIUM CHLORIDE, SODIUM LACTATE AND CALCIUM CHLORIDE: 600; 310; 30; 20 INJECTION, SOLUTION INTRAVENOUS at 06:08

## 2024-06-01 RX ADMIN — ONDANSETRON 4 MG: 2 INJECTION INTRAMUSCULAR; INTRAVENOUS at 21:06

## 2024-06-01 RX ADMIN — MORPHINE SULFATE 2 MG: 2 INJECTION, SOLUTION INTRAMUSCULAR; INTRAVENOUS at 04:16

## 2024-06-01 RX ADMIN — VANCOMYCIN HYDROCHLORIDE 1000 MG: 1 INJECTION, POWDER, LYOPHILIZED, FOR SOLUTION INTRAVENOUS at 19:05

## 2024-06-01 RX ADMIN — PIPERACILLIN AND TAZOBACTAM 3375 MG: 3; .375 INJECTION, POWDER, LYOPHILIZED, FOR SOLUTION INTRAVENOUS at 12:10

## 2024-06-01 RX ADMIN — MORPHINE SULFATE 2 MG: 2 INJECTION, SOLUTION INTRAMUSCULAR; INTRAVENOUS at 21:04

## 2024-06-01 RX ADMIN — OXYCODONE HYDROCHLORIDE 5 MG: 5 TABLET ORAL at 23:40

## 2024-06-01 RX ADMIN — ACETAMINOPHEN 650 MG: 325 TABLET ORAL at 05:48

## 2024-06-01 RX ADMIN — SODIUM CHLORIDE 3000 MG: 900 INJECTION INTRAVENOUS at 01:07

## 2024-06-01 RX ADMIN — INSULIN LISPRO 2 UNITS: 100 INJECTION, SOLUTION INTRAVENOUS; SUBCUTANEOUS at 17:08

## 2024-06-01 RX ADMIN — SACUBITRIL AND VALSARTAN 1 TABLET: 24; 26 TABLET, FILM COATED ORAL at 20:48

## 2024-06-01 RX ADMIN — ONDANSETRON 4 MG: 2 INJECTION INTRAMUSCULAR; INTRAVENOUS at 10:48

## 2024-06-01 RX ADMIN — CARVEDILOL 25 MG: 12.5 TABLET, FILM COATED ORAL at 10:44

## 2024-06-01 RX ADMIN — FAMOTIDINE 20 MG: 20 TABLET ORAL at 20:48

## 2024-06-01 RX ADMIN — OXYCODONE HYDROCHLORIDE 5 MG: 5 TABLET ORAL at 15:23

## 2024-06-01 RX ADMIN — MORPHINE SULFATE 4 MG: 4 INJECTION, SOLUTION INTRAMUSCULAR; INTRAVENOUS at 00:58

## 2024-06-01 RX ADMIN — ASPIRIN 81 MG: 81 TABLET, COATED ORAL at 10:41

## 2024-06-01 RX ADMIN — EZETIMIBE 10 MG: 10 TABLET ORAL at 10:41

## 2024-06-01 RX ADMIN — VANCOMYCIN HYDROCHLORIDE 2250 MG: 10 INJECTION, POWDER, LYOPHILIZED, FOR SOLUTION INTRAVENOUS at 07:23

## 2024-06-01 RX ADMIN — PIPERACILLIN AND TAZOBACTAM 3375 MG: 3; .375 INJECTION, POWDER, LYOPHILIZED, FOR SOLUTION INTRAVENOUS at 21:11

## 2024-06-01 RX ADMIN — OXYCODONE HYDROCHLORIDE 5 MG: 5 TABLET ORAL at 05:48

## 2024-06-01 RX ADMIN — ACETAMINOPHEN 650 MG: 325 TABLET ORAL at 20:54

## 2024-06-01 ASSESSMENT — PAIN DESCRIPTION - ONSET: ONSET: ON-GOING

## 2024-06-01 ASSESSMENT — PAIN DESCRIPTION - LOCATION
LOCATION: FOOT

## 2024-06-01 ASSESSMENT — PAIN SCALES - GENERAL
PAINLEVEL_OUTOF10: 8
PAINLEVEL_OUTOF10: 9
PAINLEVEL_OUTOF10: 8
PAINLEVEL_OUTOF10: 9
PAINLEVEL_OUTOF10: 6
PAINLEVEL_OUTOF10: 8
PAINLEVEL_OUTOF10: 7
PAINLEVEL_OUTOF10: 8
PAINLEVEL_OUTOF10: 10
PAINLEVEL_OUTOF10: 9
PAINLEVEL_OUTOF10: 9

## 2024-06-01 ASSESSMENT — PAIN DESCRIPTION - PAIN TYPE: TYPE: ACUTE PAIN

## 2024-06-01 ASSESSMENT — PAIN DESCRIPTION - ORIENTATION
ORIENTATION: RIGHT

## 2024-06-01 ASSESSMENT — PAIN DESCRIPTION - DESCRIPTORS
DESCRIPTORS: ACHING
DESCRIPTORS: ACHING;BURNING

## 2024-06-01 ASSESSMENT — PAIN DESCRIPTION - FREQUENCY: FREQUENCY: CONTINUOUS

## 2024-06-01 NOTE — PROGRESS NOTES
Hospital Progress Note  Khoa Cooper MD   Answering service: [phone] OR    [employer]      NAME:  Timur Puente   :   1975   MRN:  [id number]     Date/Time:  2024 7:57 AM    Plan:     Continue vanco/zosyn pending cultures  DPM consult  DM ed  Risk of Deterioration: Low  []           Moderate  [x]           High  []                 Assessment:     Principal Problem:    Diabetic infection of right foot (HCC)     MRI -24       Diffuse soft tissue edema and swelling, correlate for cellulitis. Indeterminate  amorphous nonenhancing focus in the lateral dorsal forefoot soft tissues, could  reflect an area of devascularized tissue, fat necrosis, or be related to  reported retained foreign body. No evidence of acute osteomyelitis. Radiographic  correlation may be of benefit.    Active Problems:    Obesity (BMI 30.0-34.9)     Encourage lifestyle modification and diet      Hypertension     Titrate home meds      Diabetes mellitus (HCC)     Basal bollus     Diabetic ed      Cardiomyopathy (HCC)     EF 40-45%     Resume GDMT     Cardiac cath 21       Findings:         Severe 1 vessel disease         Patent stents in apical LAD as well as mid Lcx         50% ISR in mid RCA stent with de bela lesion in proximal RCA about 75%         Normal LVEDP         PCI of proximal RCA with 3.5 by 12 mm Xience GENO post dilated with 4.5 mm NC at 20 ELVIN            Obstructive sleep apnea     Intolerant of CPAP      Dyslipidemia     Continue zetia and crestor      PAD (peripheral artery disease) ([facility])     Doppler        Elevated liver enzymes     Us   Resolved Problems:    * No resolved hospital problems. *          Admitting notes:49 y.o. male with past medical history significant for CAD s/p stent placement, type 2 diabetes, hypertension, JACKY intolerant of CPAP presented at the emergency room with right foot pain and swelling.  Patient symptoms started few days ago.  Patient denies injury to the right foot.   0 ml   Output --   Net 0 ml        PHYSICAL EXAM:  General:    Alert, cooperative, no distress, appears stated age.     Head:   Normocephalic, without obvious abnormality, atraumatic.  Eyes:   Conjunctivae/corneas clear.  PERRLA  Nose:  Nares normal. No drainage or sinus tenderness.  Throat:    Lips, mucosa, and tongue normal.  No Thrush  Neck:  Supple, symmetrical,  no adenopathy, thyroid: non tender    no carotid bruit and no JVD.  Back:    Symmetric,  No CVA tenderness.  Lungs:   Clear to auscultation bilaterally.  No Wheezing or Rhonchi. No rales.  Chest wall:  No tenderness or deformity. No Accessory muscle use.  Heart:   Regular rate and rhythm,  no murmur, rub or gallop.  Abdomen:   Soft, non-tender. Not distended.  Bowel sounds normal. No masses  Extremities: R foot erythema and some drainage from wound  Skin:     Texture, turgor normal. No rashes or lesions.  Not Jaundiced  Lymph nodes: Cervical, supraclavicular normal.  Psych:  Good insight.  Not depressed.  Not anxious or agitated.  Neurologic: Normal strength, Alert and oriented X 3.       Lab Data Reviewed:    Recent Labs     05/31/24 2223 06/01/24  0555   WBC 10.0 10.6   HGB 15.3 14.3   HCT 45.4 44.2    244     Recent Labs     05/31/24  2223   *   K 3.8      CO2 28   BUN 14   *     Lab Results   Component Value Date/Time    GLUCPOC 195 01/26/2023 08:56 PM     No results for input(s): \"PH\", \"PCO2\", \"PO2\", \"HCO3\", \"FIO2\" in the last 72 hours.  No results for input(s): \"INR\" in the last 72 hours.  ___________________________________________________  ___________________________________________________    Attending Physician: Khoa Coopre MD

## 2024-06-01 NOTE — H&P
no thyromegaly.  Chest: Clear breath sounds, no wheezing no crackles.  Heart: Normal S1 and S2, regular, no clinically appreciable murmur.  Abdomen: Soft nontender normal bowel sounds.  CNS: Alert oriented x3, no gross or focal neurological deficit.  Extremities: No edema, pulses 2+ bilaterally.  Musculoskeletal system: No joint deformity and swelling.  Skin: Swelling, tenderness and redness of right foot with blister formation  Psychiatry: Normal mood and affect.  Lymphatic system: No cervical lymphadenopathy.    Data Review:   I have independently reviewed and interpreted patient's lab and all other diagnostic data    Abnormal Labs Reviewed   COMPREHENSIVE METABOLIC PANEL - Abnormal; Notable for the following components:       Result Value    Sodium 132 (*)     Anion Gap 3 (*)     Glucose 180 (*)      (*)     AST 62 (*)     Alk Phosphatase 223 (*)     Total Protein 8.5 (*)     Albumin 3.4 (*)     Globulin 5.1 (*)     Albumin/Globulin Ratio 0.7 (*)     All other components within normal limits   CBC WITH AUTO DIFFERENTIAL - Abnormal; Notable for the following components:    RBC 5.86 (*)     MCV 77.5 (*)     Monocytes Absolute 1.1 (*)     All other components within normal limits       [unfilled]    IMAGING:   No orders to display        ECG/ECHO:  [unfilled]       Notes reviewed from all clinical/nonclinical/nursing services involved in patient's clinical care. Care coordination discussions were held with appropriate clinical/nonclinical/ nursing providers based on care coordination needs.     Assessment:   Given the patient's current clinical presentation, there is a high level of concern for decompensation if discharged from the emergency department. Complex decision making was performed, which includes reviewing the patient's available past medical records, laboratory results, and imaging studies.    Active Problems:    * No active hospital problems. *  Resolved Problems:    * No resolved  hospital problems. *      A/P   1.  Right diabetic foot infection: Will admit the patient for further evaluation and treatment.  Will start the patient on vancomycin and Zosyn.  MRSA screen if negative can DC vancomycin.  Wound care consult will be requested for local wound care.  Pain control and await definitive treatment from the podiatry service already consulted by ED physician.  Will check C-reactive protein level.  Will check ultrasound of the right lower extremity to evaluate the patient for DVT.    2.  Obesity: Patient presented with BMI of 34.33.  Patient will benefit from lifestyle modification including diet and exercise to promote weight loss.    3.  Type 2 diabetes: Will place the patient on sliding scale with insulin coverage.  Will check A1c level.    4.  Hyponatremia: Most likely due to volume depletion.  Fluid therapy and repeat sodium level.    5.  Abnormal LFT: Will check lipase level.  Will check abdominal ultrasound to evaluate the patient for gallbladder disease.    6.  JACKY: Patient is intolerant of CPAP.  Will continue supportive therapy.    7.  Essential hypertension: Will resume preadmission medications and monitor blood pressure closely.    8.  Cardiomyopathy: Patient is stable with no shortness of breath or chest pain.  Will hold Entresto and Aldactone.          DIET: No diet orders on file   ISOLATION PRECAUTIONS: No active isolations  CODE STATUS: Prior   Central Line:   None  DVT PROPHYLAXIS: Lovenox  FUNCTIONAL STATUS PRIOR TO HOSPITALIZATION: Fully active and ambulatory; able to carry on all self-care without restriction.  Ambulatory status/function: By self   EARLY MOBILITY ASSESSMENT: Recommend routine ambulation while hospitalized with the assistance of nursing staff  ANTICIPATED DISCHARGE: Greater than 48 hours.  ANTICIPATED DISPOSITION: Home  EMERGENCY CONTACT/SURROGATE DECISION MAKER: Sara Puente, spouse    CRITICAL CARE WAS PERFORMED FOR THIS ENCOUNTER: NO.      Signed By:

## 2024-06-01 NOTE — ED NOTES
ED TO INPATIENT SBAR HANDOFF    Patient Name: Timur Puente   :  1975  49 y.o.   MRN:  [id number]  ED Room #:  ER20/20  Family/Caregiver Present no       Situation  Code Status: Prior     Allergies: Patient has no known allergies.  Weight: Patient Vitals for the past 96 hrs (Last 3 readings):   Weight   24 2200 90.7 kg (200 lb)     Arrived from: home  Chief Complaint:   Chief Complaint   Patient presents with    Foot Pain     Patient arrives to ED via wheelchair for R foot pain. Patient had MRI last Tuesday of R foot, per his podiatrist they recommended admission and abscess draining     Hospital Problem/Diagnosis:  Principal Problem:    Diabetic infection of right foot (HCC)  Resolved Problems:    * No resolved hospital problems. *    Imaging:   No orders to display     Abnormal labs:   Abnormal Labs Reviewed   COMPREHENSIVE METABOLIC PANEL - Abnormal; Notable for the following components:       Result Value    Sodium 132 (*)     Anion Gap 3 (*)     Glucose 180 (*)      (*)     AST 62 (*)     Alk Phosphatase 223 (*)     Total Protein 8.5 (*)     Albumin 3.4 (*)     Globulin 5.1 (*)     Albumin/Globulin Ratio 0.7 (*)     All other components within normal limits   CBC WITH AUTO DIFFERENTIAL - Abnormal; Notable for the following components:    RBC 5.86 (*)     MCV 77.5 (*)     Monocytes Absolute 1.1 (*)     All other components within normal limits     Abnormal Assessment Findings: Tachycardic, abscess to right foot     SAFETY    Mobility: no current mobility problem   ED Fall Risk: Presents to emergency department  because of falls (Syncope, seizure, or loss of consciousness): No, Age > 70: No, Altered Mental Status, Intoxication with alcohol or substance confusion (Disorientation, impaired judgment, poor safety awaremess, or inability to follow instructions): No, Impaired Mobility: Ambulates or transfers with assistive devices or assistance; Unable to ambulate or transer.: No, Nursing Judgement: No    Predictive Model Details          4 (Low)  Factor Value    Calculated 6/1/2024 03:33 12% O2 Delivery Method ROOM AIR    [facility] EARLY DETECTION OF SEPSIS VERSION 2 Model 11% Pulse 113     6% Absolute Monocytes 1.1 K/UL     5% Temperature 99.1 °F (37.3 °C)     5% Albumin 3.4 g/dL     -4% Total Active Inpatient Meds 0     -4% Duration of Encounter .2 days     4% Pain Scale 10     -3% Has Imaging Procedure 0     3% Respirations 20      Recent Labs     05/31/24  2223   WBC 10.0     Blood Cultures Drawn: Yes  2200 pm  Repeat LA: Time Due   Antibiotic Given: Yes  Fluid Resuscitation: Total needed , Status    VS x 2 post-fluid resuscitation:     Recommendation    Pending orders   Consults ordered: IP CONSULT TO PODIATRY    Consulted provider:      Plan for next 24 hours:   Additional Comments:      If any further questions, please call Sending RN at [phone]  Electronically signed by: Electronically signed by Adilia Meredith RN on 6/1/2024 at 3:36 AM

## 2024-06-01 NOTE — PLAN OF CARE
Problem: Discharge Planning  Goal: Discharge to home or other facility with appropriate resources  6/1/2024 1745 by Britt Leavitt RN  Outcome: Progressing  Flowsheets (Taken 6/1/2024 0846)  Discharge to home or other facility with appropriate resources: Identify barriers to discharge with patient and caregiver  6/1/2024 0531 by Margarita Almonte RN  Outcome: Progressing  Flowsheets (Taken 6/1/2024 0513)  Discharge to home or other facility with appropriate resources: Identify barriers to discharge with patient and caregiver  6/1/2024 0501 by Margarita Almonte RN  Outcome: Progressing     Problem: Pain  Goal: Verbalizes/displays adequate comfort level or baseline comfort level  6/1/2024 1745 by Britt Leavitt RN  Outcome: Progressing  6/1/2024 0531 by Margarita Almonte RN  Outcome: Progressing  6/1/2024 0501 by Margarita Almonte RN  Outcome: Progressing     Problem: Skin/Tissue Integrity  Goal: Absence of new skin breakdown  Description: 1.  Monitor for areas of redness and/or skin breakdown  2.  Assess vascular access sites hourly  3.  Every 4-6 hours minimum:  Change oxygen saturation probe site  4.  Every 4-6 hours:  If on nasal continuous positive airway pressure, respiratory therapy assess nares and determine need for appliance change or resting period.  6/1/2024 0531 by Margarita Almonte RN  Outcome: Progressing  6/1/2024 0501 by Margarita Almonte RN  Outcome: Progressing

## 2024-06-01 NOTE — CONSULTS
Podiatry Consult    Subjective:         Date of Consultation: June 1, 2024     Patient is a 49 y.o.  male who is being seen for a right diabetic foot infection.  Pt was admitted to [facility] for the same reason. He typically sees my partner, Dr. Figueroa.  Pt was sent to the hospital by Dr. Figueroa because his recent MRI showed findings consistent with an abscess in the right foot.  Pt is currently laying in bed in no apparent distress.  Denies any n/v/f/ns/c.  States he has pain in the right foot.     Patient Active Problem List    Diagnosis Date Noted    Diabetic infection of right foot (HCC) 06/01/2024    Elevated liver enzymes 06/01/2024    Hepatitis 05/16/2023    Biliary obstruction 05/12/2023    PAD (peripheral artery disease) (HCC) 05/25/2022    Chronic renal disease, stage III (HCC) 05/11/2022    Type 2 diabetes mellitus with chronic kidney disease (HCC) 02/17/2022    Cardiomyopathy (HCC) 09/22/2021    Steatosis of liver 06/07/2021    Erectile dysfunction 05/02/2021    Unstable angina pectoris (HCC) 04/28/2021    Severe obesity (HCC) 11/29/2020    NSTEMI (non-ST elevated myocardial infarction) ([facility]) 11/28/2020    Chest pain 11/27/2020    Uncontrolled type 2 diabetes mellitus with hyperglycemia ([facility]) 05/13/2020    History of noncompliance with medical treatment 05/13/2020    Open left forearm fracture 02/15/2018    Obesity (BMI 30.0-34.9) 11/05/2017    ASHD (arteriosclerotic heart disease) 06/07/2016    Physical deconditioning 06/07/2016    Dyspepsia 06/07/2016    S/P pharyngoplasty 01/06/2015    Hypertension 09/30/2014    Diabetes mellitus (HCC) 09/30/2014    Hypogonadism male 09/30/2014    Obstructive sleep apnea 09/30/2014    Dyslipidemia 09/30/2014     Past Medical History:   Diagnosis Date    CAD (coronary artery disease)     2 stents 2016     DM2 (diabetes mellitus, type 2) ([facility])     Headache     Hypercholesterolemia     Hypertension     Hypogonadism male     Obstructive sleep apnea     JACKY  acetaminophen (TYLENOL) suppository 650 mg  650 mg Rectal Q6H PRN Ralf Kapoor MD        oxyCODONE (ROXICODONE) immediate release tablet 5 mg  5 mg Oral Q4H PRN Ralf Kapoor MD   5 mg at 06/01/24 1040    insulin lispro (HUMALOG,ADMELOG) injection vial 0-8 Units  0-8 Units SubCUTAneous TID  Ralf Kaporo MD        insulin lispro (HUMALOG,ADMELOG) injection vial 0-4 Units  0-4 Units SubCUTAneous Nightly Ralf Kapoor MD        carvedilol (COREG) tablet 25 mg  25 mg Oral BID  Ralf Kapoor MD   25 mg at 06/01/24 1044    [Held by provider] clopidogrel (PLAVIX) tablet 75 mg  75 mg Oral Daily Ralf Kapoor MD        aspirin EC tablet 81 mg  81 mg Oral Daily Ralf Kapoor MD   81 mg at 06/01/24 1041    ezetimibe (ZETIA) tablet 10 mg  10 mg Oral Daily Ralf Kapoor MD   10 mg at 06/01/24 1041    famotidine (PEPCID) tablet 20 mg  20 mg Oral BID Ralf Kapoor MD        fluticasone (FLONASE) 50 MCG/ACT nasal spray 2 spray  2 spray Nasal BID Ralf Kapoor MD        empagliflozin (JARDIANCE) tablet 10 mg  10 mg Oral Daily Ralf Kapoor MD   10 mg at 06/01/24 1040    nitroGLYCERIN (NITROSTAT) SL tablet 0.4 mg  0.4 mg SubLINGual Q5 Min PRN Ralf Kapoor MD        rosuvastatin (CRESTOR) tablet 40 mg  40 mg Oral Daily Ralf Kapoor MD   40 mg at 06/01/24 1041    glucose chewable tablet 16 g  4 tablet Oral PRN Ralf Kapoor MD        dextrose bolus 10% 125 mL  125 mL IntraVENous PRN Ralf Kapoor MD        Or    dextrose bolus 10% 250 mL  250 mL IntraVENous PRN Ralf Kapoor MD        glucagon injection 1 mg  1 mg SubCUTAneous PRN Ralf Kapoor MD        dextrose 10 % infusion   IntraVENous Continuous PRN Ralf Kapoor MD        piperacillin-tazobactam (ZOSYN) 3,375 mg in sodium chloride 0.9 % 50 mL IVPB (mini-bag)  3,375 mg IntraVENous Q8H Ralf Kapoor MD        vancomycin (VANCOCIN) 1,000 mg in sodium chloride 0.9 % 250 mL IVPB ([id number])  1,000 mg

## 2024-06-01 NOTE — PLAN OF CARE
Problem: Discharge Planning  Goal: Discharge to home or other facility with appropriate resources  6/1/2024 0531 by Margarita Almonte RN  Outcome: Progressing  Flowsheets (Taken 6/1/2024 0513)  Discharge to home or other facility with appropriate resources: Identify barriers to discharge with patient and caregiver  6/1/2024 0501 by Margarita Almonte RN  Outcome: Progressing     Problem: Pain  Goal: Verbalizes/displays adequate comfort level or baseline comfort level  6/1/2024 0531 by Margarita Almonte RN  Outcome: Progressing  6/1/2024 0501 by Margarita Almonte RN  Outcome: Progressing     Problem: Skin/Tissue Integrity  Goal: Absence of new skin breakdown  Description: 1.  Monitor for areas of redness and/or skin breakdown  2.  Assess vascular access sites hourly  3.  Every 4-6 hours minimum:  Change oxygen saturation probe site  4.  Every 4-6 hours:  If on nasal continuous positive airway pressure, respiratory therapy assess nares and determine need for appliance change or resting period.  6/1/2024 0531 by Margarita Almonte RN  Outcome: Progressing  6/1/2024 0501 by Margarita Almonte RN  Outcome: Progressing

## 2024-06-01 NOTE — ED PROVIDER NOTES
[facility] 5S1 ORTHO JOINT  EMERGENCY DEPARTMENT ENCOUNTER      Pt Name: Timur Puente  MRN: [id number]  Birthdate 1975  Date of evaluation: 5/31/2024  Provider: Vamsi Carranza MD    CHIEF COMPLAINT       Chief Complaint   Patient presents with    Foot Pain     Patient arrives to ED via wheelchair for R foot pain. Patient had MRI last Tuesday of R foot, per his podiatrist they recommended admission and abscess draining         HISTORY OF PRESENT ILLNESS   (Location/Symptom, Timing/Onset, Context/Setting, Quality, Duration, Modifying Factors, Severity)  Note limiting factors.   49-year-old male with PMHx of CAD, DM 2, HTN presents to the emergency department at the referral of his podiatrist complaining of right foot pain, redness, and swelling for the last few days.  Patient also reports a new blister that is formed at the dorsal right forefoot today.  He notes that he underwent MRI and after seeing the results of this, his podiatrist, Dr. Shultz, referred him to the emergency department for admission to the hospital.  He has no additional complaints at this time.    The history is provided by the patient.         Review of External Medical Records:     Nursing Notes were reviewed.    REVIEW OF SYSTEMS    (2-9 systems for level 4, 10 or more for level 5)     Review of Systems   Constitutional: Negative.    HENT: Negative.     Eyes: Negative.    Respiratory: Negative.     Cardiovascular: Negative.    Gastrointestinal: Negative.    Genitourinary: Negative.    Musculoskeletal:  Positive for arthralgias.   Skin:  Positive for rash.   Neurological: Negative.    Psychiatric/Behavioral: Negative.         Except as noted above the remainder of the review of systems was reviewed and negative.       PAST MEDICAL HISTORY     Past Medical History:   Diagnosis Date    CAD (coronary artery disease)     2 stents 2016     DM2 (diabetes mellitus, type 2) ([facility])     Headache     Hypercholesterolemia     Hypertension     Hypogonadism  REFERRED TO:  No follow-up provider specified.    DISCHARGE MEDICATIONS:  Current Discharge Medication List            (Please note that portions of this note were completed with a voice recognition program.  Efforts were made to edit the dictations but occasionally words are mis-transcribed.)    Vamsi Carranza MD (electronically signed)  Emergency Attending Physician / Physician Assistant / Nurse Practitioner            Vamsi Carranza MD  06/01/24 4827

## 2024-06-02 ENCOUNTER — APPOINTMENT (OUTPATIENT)
Facility: HOSPITAL | Age: 49
End: 2024-06-02
Attending: INTERNAL MEDICINE
Payer: COMMERCIAL

## 2024-06-02 LAB
ANION GAP SERPL CALC-SCNC: 5 MMOL/L (ref 5–15)
BASOPHILS # BLD: 0 K/UL (ref 0–0.1)
BASOPHILS NFR BLD: 0 % (ref 0–1)
BUN SERPL-MCNC: 16 MG/DL (ref 6–20)
BUN/CREAT SERPL: 11 (ref 12–20)
CALCIUM SERPL-MCNC: 8.7 MG/DL (ref 8.5–10.1)
CHLORIDE SERPL-SCNC: 106 MMOL/L (ref 97–108)
CO2 SERPL-SCNC: 28 MMOL/L (ref 21–32)
CREAT SERPL-MCNC: 1.42 MG/DL (ref 0.7–1.3)
DIFFERENTIAL METHOD BLD: ABNORMAL
ECHO BSA: 2.02 M2
EOSINOPHIL # BLD: 0.2 K/UL (ref 0–0.4)
EOSINOPHIL NFR BLD: 2 % (ref 0–7)
ERYTHROCYTE [DISTWIDTH] IN BLOOD BY AUTOMATED COUNT: 14.4 % (ref 11.5–14.5)
GLUCOSE BLD STRIP.AUTO-MCNC: 127 MG/DL (ref 65–117)
GLUCOSE BLD STRIP.AUTO-MCNC: 153 MG/DL (ref 65–117)
GLUCOSE BLD STRIP.AUTO-MCNC: 212 MG/DL (ref 65–117)
GLUCOSE BLD STRIP.AUTO-MCNC: 322 MG/DL (ref 65–117)
GLUCOSE SERPL-MCNC: 167 MG/DL (ref 65–100)
HCT VFR BLD AUTO: 39.6 % (ref 36.6–50.3)
HGB BLD-MCNC: 13.1 G/DL (ref 12.1–17)
IMM GRANULOCYTES # BLD AUTO: 0 K/UL (ref 0–0.04)
IMM GRANULOCYTES NFR BLD AUTO: 0 % (ref 0–0.5)
LYMPHOCYTES # BLD: 1.2 K/UL (ref 0.8–3.5)
LYMPHOCYTES NFR BLD: 17 % (ref 12–49)
MCH RBC QN AUTO: 26.1 PG (ref 26–34)
MCHC RBC AUTO-ENTMCNC: 33.1 G/DL (ref 30–36.5)
MCV RBC AUTO: 78.9 FL (ref 80–99)
MONOCYTES # BLD: 0.9 K/UL (ref 0–1)
MONOCYTES NFR BLD: 12 % (ref 5–13)
NEUTS SEG # BLD: 4.9 K/UL (ref 1.8–8)
NEUTS SEG NFR BLD: 69 % (ref 32–75)
NRBC # BLD: 0 K/UL (ref 0–0.01)
NRBC BLD-RTO: 0 PER 100 WBC
NT PRO BNP: 104 PG/ML
PLATELET # BLD AUTO: 207 K/UL (ref 150–400)
PMV BLD AUTO: 11.5 FL (ref 8.9–12.9)
POTASSIUM SERPL-SCNC: 4 MMOL/L (ref 3.5–5.1)
RBC # BLD AUTO: 5.02 M/UL (ref 4.1–5.7)
SERVICE CMNT-IMP: ABNORMAL
SODIUM SERPL-SCNC: 139 MMOL/L (ref 136–145)
WBC # BLD AUTO: 7.2 K/UL (ref 4.1–11.1)

## 2024-06-02 PROCEDURE — 6360000002 HC RX W HCPCS: Performed by: INTERNAL MEDICINE

## 2024-06-02 PROCEDURE — 36415 COLL VENOUS BLD VENIPUNCTURE: CPT

## 2024-06-02 PROCEDURE — 6370000000 HC RX 637 (ALT 250 FOR IP): Performed by: INTERNAL MEDICINE

## 2024-06-02 PROCEDURE — 93306 TTE W/DOPPLER COMPLETE: CPT

## 2024-06-02 PROCEDURE — 2580000003 HC RX 258: Performed by: INTERNAL MEDICINE

## 2024-06-02 PROCEDURE — 85025 COMPLETE CBC W/AUTO DIFF WBC: CPT

## 2024-06-02 PROCEDURE — 82962 GLUCOSE BLOOD TEST: CPT

## 2024-06-02 PROCEDURE — 83880 ASSAY OF NATRIURETIC PEPTIDE: CPT

## 2024-06-02 PROCEDURE — 80048 BASIC METABOLIC PNL TOTAL CA: CPT

## 2024-06-02 PROCEDURE — 1100000000 HC RM PRIVATE

## 2024-06-02 PROCEDURE — 99222 1ST HOSP IP/OBS MODERATE 55: CPT | Performed by: INTERNAL MEDICINE

## 2024-06-02 PROCEDURE — 99232 SBSQ HOSP IP/OBS MODERATE 35: CPT | Performed by: INTERNAL MEDICINE

## 2024-06-02 RX ADMIN — SPIRONOLACTONE 25 MG: 25 TABLET ORAL at 09:15

## 2024-06-02 RX ADMIN — OXYCODONE HYDROCHLORIDE 5 MG: 5 TABLET ORAL at 14:13

## 2024-06-02 RX ADMIN — EZETIMIBE 10 MG: 10 TABLET ORAL at 09:15

## 2024-06-02 RX ADMIN — CARVEDILOL 25 MG: 12.5 TABLET, FILM COATED ORAL at 17:22

## 2024-06-02 RX ADMIN — OXYCODONE HYDROCHLORIDE 5 MG: 5 TABLET ORAL at 09:15

## 2024-06-02 RX ADMIN — OXYCODONE HYDROCHLORIDE 5 MG: 5 TABLET ORAL at 18:39

## 2024-06-02 RX ADMIN — OXYCODONE HYDROCHLORIDE 5 MG: 5 TABLET ORAL at 22:43

## 2024-06-02 RX ADMIN — PIPERACILLIN AND TAZOBACTAM 3375 MG: 3; .375 INJECTION, POWDER, LYOPHILIZED, FOR SOLUTION INTRAVENOUS at 04:34

## 2024-06-02 RX ADMIN — PIPERACILLIN AND TAZOBACTAM 3375 MG: 3; .375 INJECTION, POWDER, LYOPHILIZED, FOR SOLUTION INTRAVENOUS at 19:39

## 2024-06-02 RX ADMIN — OXYCODONE HYDROCHLORIDE 5 MG: 5 TABLET ORAL at 04:13

## 2024-06-02 RX ADMIN — MORPHINE SULFATE 2 MG: 2 INJECTION, SOLUTION INTRAMUSCULAR; INTRAVENOUS at 11:12

## 2024-06-02 RX ADMIN — INSULIN GLARGINE 16 UNITS: 100 INJECTION, SOLUTION SUBCUTANEOUS at 21:13

## 2024-06-02 RX ADMIN — MORPHINE SULFATE 2 MG: 2 INJECTION, SOLUTION INTRAMUSCULAR; INTRAVENOUS at 16:15

## 2024-06-02 RX ADMIN — VANCOMYCIN HYDROCHLORIDE 1000 MG: 1 INJECTION, POWDER, LYOPHILIZED, FOR SOLUTION INTRAVENOUS at 07:14

## 2024-06-02 RX ADMIN — MORPHINE SULFATE 2 MG: 2 INJECTION, SOLUTION INTRAMUSCULAR; INTRAVENOUS at 02:06

## 2024-06-02 RX ADMIN — SODIUM CHLORIDE: 9 INJECTION, SOLUTION INTRAVENOUS at 22:45

## 2024-06-02 RX ADMIN — SACUBITRIL AND VALSARTAN 1 TABLET: 24; 26 TABLET, FILM COATED ORAL at 21:14

## 2024-06-02 RX ADMIN — FAMOTIDINE 20 MG: 20 TABLET ORAL at 21:14

## 2024-06-02 RX ADMIN — ONDANSETRON 4 MG: 2 INJECTION INTRAMUSCULAR; INTRAVENOUS at 07:00

## 2024-06-02 RX ADMIN — SODIUM CHLORIDE: 9 INJECTION, SOLUTION INTRAVENOUS at 07:08

## 2024-06-02 RX ADMIN — MORPHINE SULFATE 2 MG: 2 INJECTION, SOLUTION INTRAMUSCULAR; INTRAVENOUS at 21:13

## 2024-06-02 RX ADMIN — PIPERACILLIN AND TAZOBACTAM 3375 MG: 3; .375 INJECTION, POWDER, LYOPHILIZED, FOR SOLUTION INTRAVENOUS at 12:29

## 2024-06-02 RX ADMIN — SACUBITRIL AND VALSARTAN 1 TABLET: 24; 26 TABLET, FILM COATED ORAL at 09:15

## 2024-06-02 RX ADMIN — MORPHINE SULFATE 2 MG: 2 INJECTION, SOLUTION INTRAMUSCULAR; INTRAVENOUS at 06:56

## 2024-06-02 RX ADMIN — INSULIN LISPRO 2 UNITS: 100 INJECTION, SOLUTION INTRAVENOUS; SUBCUTANEOUS at 12:24

## 2024-06-02 RX ADMIN — INSULIN LISPRO 6 UNITS: 100 INJECTION, SOLUTION INTRAVENOUS; SUBCUTANEOUS at 17:22

## 2024-06-02 RX ADMIN — ASPIRIN 81 MG: 81 TABLET, COATED ORAL at 09:15

## 2024-06-02 RX ADMIN — ONDANSETRON 4 MG: 2 INJECTION INTRAMUSCULAR; INTRAVENOUS at 21:21

## 2024-06-02 RX ADMIN — EMPAGLIFLOZIN 10 MG: 10 TABLET, FILM COATED ORAL at 09:14

## 2024-06-02 RX ADMIN — ROSUVASTATIN CALCIUM 40 MG: 10 TABLET, FILM COATED ORAL at 09:14

## 2024-06-02 RX ADMIN — CARVEDILOL 25 MG: 12.5 TABLET, FILM COATED ORAL at 09:15

## 2024-06-02 ASSESSMENT — PAIN DESCRIPTION - LOCATION
LOCATION: FOOT
LOCATION: CHEST
LOCATION: FOOT

## 2024-06-02 ASSESSMENT — PAIN SCALES - GENERAL
PAINLEVEL_OUTOF10: 8
PAINLEVEL_OUTOF10: 9
PAINLEVEL_OUTOF10: 7
PAINLEVEL_OUTOF10: 9

## 2024-06-02 ASSESSMENT — PAIN DESCRIPTION - ORIENTATION
ORIENTATION: RIGHT

## 2024-06-02 ASSESSMENT — PAIN DESCRIPTION - DESCRIPTORS
DESCRIPTORS: ACHING

## 2024-06-02 NOTE — PROGRESS NOTES
Day #2 of Vancomycin - Dosing Update  Indication:  Right diabetic foot infection with right 4th interspace abscess  - Plan to go to OR on Monday for I&D  Current regimen:  1000 mg IV Q 12 hr  Abx regimen:  Zosyn + Vancomycin  ID Following ?: NO  Concomitant nephrotoxic drugs (requires more frequent monitoring): None  Frequency of BMP?: daily through     Recent Labs     24  2223 24  0555 24  0448   WBC 10.0 10.6 7.2   CREATININE 1.08 1.10 1.42*   BUN 14 12 16     Est CrCl: ~60-70 ml/min; UO: -- ml/kg/hr  Temp (24hrs), Av °F (37.8 °C), Min:100 °F (37.8 °C), Max:100 °F (37.8 °C)    Cultures:    Blood: NGTD    MRSA Swab ordered (if applicable)? N/A    Goal target range AUC/EZIO 400-600    Recent level history:  Date/Time Dose & Interval Measured Level (mcg/mL) Associated AUC/EZIO Dose Adjustment                                                Plan: Given the bump in the patient's Scr, the dosing model is now predicting a supratherapeutic AUC/EZIO with the current regimen.  Will preemptively adjust the dose to 1000 mg IV Q 18 hr and will plan on checking a random level with am labs tomorrow.  Pharmacy will continue to monitor patient daily and will make dosage adjustments based upon changing renal function.

## 2024-06-02 NOTE — CONSULTS
[facility]      Ayad Gonzalez MD, FACP, FACG, FAASLD      CASSIDY Person, [facility]   Saida Gonzalez, [facility]   Ruthy Farrukh, [facility]  Nazario Schmidt, [facility]   Frances Loyd, [facility]   Angela Yanon, [facility]   [address], [address]   [place]  [address] [phone]   FAX: [phone]  [facility]   [address], [address]   [place]  [address] [phone]   FAX: [phone]       HEPATOLOGY CONSULT NOTE  I was asked to see this patient in consultation for evaluation and management of elevated liver enzymes.  I have reviewed the Emergency room note, Hospital admission note,Notes by all other physicians who have seen the patient during this hospitalization to date.  I have reviewed the problem list, all past medical history and the reason for this hospitalization.  I have reviewed the allergies and the medications the patient was taking at home prior to this hospitalization.    HISTORY:  The patient is a 49 y.o. male with T2DM and CAD.  He has has elevated liver enzymes since at least 11/2021 and was told he had a fatty liver.     Serologic testing for causes of chronic liver disease was negative for HCV, HBV, OPHELIA,  AMA    An MRI/MRCP in 5/2023 demonstrated a normal appearing liver with a few scattered cysts.  No bile duct strictures, dilation.  No liver masses.    He was hospitalized for treatment of an abscess in his foot.    In the ED Laboratory studies were significant for:    AST 68, , , TBILI 0.5 mg, ALB 3.4 gm,     Imaging of the liver with Ultrasound demonstrated a normal appearing liver.    Hepatology Plan:  Will obtain serology for various causes of chronic liver disease  I will see him in my office for Fibroscan in about 1  Sclera anicteric.   ENT:  No oral lesions.  Thyroid normal.  Nodes:  No adenopathy.   Skin:  No spider angiomata.  Respiratory:  Lungs clear to auscultation.   Cardiovascular:  Regular heart rate.  Abdomen:  Soft non-tender, No obvious ascites.  Extremities:  No lower extremity edema.  Abscess on dorsum of foot  Neurologic:  Alert and oriented.  Cranial nerves grossly intact.  No asterixis.    LABORATORY:   Latest Reference Range & Units 05/31/24 22:23 06/01/24 05:55 06/02/24 04:48   Sodium 136 - 145 mmol/L 132 (L) 134 (L) 139   Potassium 3.5 - 5.1 mmol/L 3.8 4.4 4.0   Chloride 97 - 108 mmol/L 101 103 106   CARBON DIOXIDE 21 - 32 mmol/L 28 26 28   BUN,BUNPL 6 - 20 MG/DL 14 12 16   Creatinine 0.70 - 1.30 MG/DL 1.08 1.10 1.42 (H)   Albumin 3.5 - 5.0 g/dL 3.4 (L) 3.2 (L)    Alkaline Phosphatase 45 - 117 U/L 223 (H) 340 (H)    ALT 12 - 78 U/L 158 (H) 188 (H)    AST 15 - 37 U/L 62 (H) 163 (H)    Total Bilirubin 0.2 - 1.0 MG/DL 0.5 0.7    WBC 4.1 - 11.1 K/uL 10.0 10.6 7.2   Hemoglobin Quant 12.1 - 17.0 g/dL 15.3 14.3 13.1   Platelet Count 150 - 400 K/uL 232 244 207   (L): Data is abnormally low  (H): Data is abnormally high      Ayad Gonzalez MD  [facility] [address], [address]  [place]  [address] [phone]  [facility]

## 2024-06-02 NOTE — PROGRESS NOTES
Progress Note    Patient: Timur Puente MRN: [id number]  SSN: [id number]    YOB: 1975  Age: 49 y.o.  Sex: male      Admit Date: 2024    Subjective:     Patient seen & examined at bedside. Denies any n/v/f/ns/c.      Objective:     Visit Vitals  /85   Pulse 100   Temp 100 °F (37.8 °C) (Oral)   Resp 18   Ht 1.626 m (5' 4\")   Wt 90.7 kg (200 lb)   SpO2 92%   BMI 34.33 kg/m²      Right Foot Exam: +POP along dorsal aspect of 4th interspace. +localized edema, erythema, and hyperpigmentation consistent with an underlying collection/abscess.  DP/PT palpable.      Labs/Radiology Review: images and reports reviewed    Assessment:     1.) Right Foot Abscess  2.) Cellulitis Right Foot  3.) Right Foot Pain    Plan/Recommendations/Medical Decision Makin.) Pt to OR tomorrow for I&D of right foot by Dr. Figueroa.  Will pre-op today for OR tomorrow.   2.) Continue antibxs

## 2024-06-02 NOTE — PLAN OF CARE
Problem: Discharge Planning  Goal: Discharge to home or other facility with appropriate resources  6/2/2024 0729 by Rebecca Ward LPN  Outcome: Progressing  6/1/2024 1745 by Britt Leavitt, RN  Outcome: Progressing  Flowsheets (Taken 6/1/2024 0846)  Discharge to home or other facility with appropriate resources: Identify barriers to discharge with patient and caregiver     Problem: Pain  Goal: Verbalizes/displays adequate comfort level or baseline comfort level  6/2/2024 0729 by Rebecca Ward LPN  Outcome: Progressing  6/1/2024 1745 by Britt Leavitt, RN  Outcome: Progressing     Problem: Skin/Tissue Integrity  Goal: Absence of new skin breakdown  Description: 1.  Monitor for areas of redness and/or skin breakdown  2.  Assess vascular access sites hourly  3.  Every 4-6 hours minimum:  Change oxygen saturation probe site  4.  Every 4-6 hours:  If on nasal continuous positive airway pressure, respiratory therapy assess nares and determine need for appliance change or resting period.  Outcome: Progressing

## 2024-06-02 NOTE — PROGRESS NOTES
Hospital Progress Note  Khoa Cooper MD   Answering service: [phone] OR    [employer]      NAME:  Timur Puente   :   1975   MRN:  [id number]     Date/Time:  2024 7:21 AM    Plan:     Continue vanco/zosyn pending cultures  Debridement planned   DM ed  Card consult  Risk of Deterioration: Low  []           Moderate  [x]           High  []                 Assessment:     Principal Problem:    Diabetic infection of right foot (HCC)     MRI -24       Diffuse soft tissue edema and swelling, correlate for cellulitis. Indeterminate  amorphous nonenhancing focus in the lateral dorsal forefoot soft tissues, could  reflect an area of devascularized tissue, fat necrosis, or be related to  reported retained foreign body. No evidence of acute osteomyelitis. Radiographic  correlation may be of benefit.    Active Problems:    Obesity (BMI 30.0-34.9)     Encourage lifestyle modification and diet      Hypertension     Titrate home meds      Diabetes mellitus (HCC)     Basal bollus     Diabetic ed      Cardiomyopathy (HCC)     EF 40-45%     Resume GDMT     Cardiac cath 21       Findings:         Severe 1 vessel disease         Patent stents in apical LAD as well as mid Lcx         50% ISR in mid RCA stent with de bela lesion in proximal RCA about 75%         Normal LVEDP         PCI of proximal RCA with 3.5 by 12 mm Xience GENO post dilated with 4.5 mm NC at 20 ELVIN            Obstructive sleep apnea     Intolerant of CPAP      Dyslipidemia     Continue zetia and crestor      PAD (peripheral artery disease) (HCC)     Doppler        Elevated liver enzymes     Hep opinion    Resolved Problems:    * No resolved hospital problems. *          Admitting notes:49 y.o. male with past medical history significant for CAD s/p stent placement, type 2 diabetes, hypertension, JACKY intolerant of CPAP presented at the emergency room with right foot pain and swelling.  Patient symptoms started few days ago.  Patient

## 2024-06-02 NOTE — PLAN OF CARE
Problem: Discharge Planning  Goal: Discharge to home or other facility with appropriate resources  6/2/2024 1316 by Britt Leavitt RN  Outcome: Progressing  Flowsheets (Taken 6/2/2024 1030)  Discharge to home or other facility with appropriate resources: Identify barriers to discharge with patient and caregiver  6/2/2024 0729 by Rebecca Ward LPN  Outcome: Progressing     Problem: Pain  Goal: Verbalizes/displays adequate comfort level or baseline comfort level  6/2/2024 1316 by Britt Leavitt, RN  Outcome: Progressing  Flowsheets (Taken 6/2/2024 1030)  Verbalizes/displays adequate comfort level or baseline comfort level: Encourage patient to monitor pain and request assistance  6/2/2024 0729 by Rebecca Ward LPN  Outcome: Progressing     Problem: Skin/Tissue Integrity  Goal: Absence of new skin breakdown  Description: 1.  Monitor for areas of redness and/or skin breakdown  2.  Assess vascular access sites hourly  3.  Every 4-6 hours minimum:  Change oxygen saturation probe site  4.  Every 4-6 hours:  If on nasal continuous positive airway pressure, respiratory therapy assess nares and determine need for appliance change or resting period.  6/2/2024 0729 by Rebecca Ward LPN  Outcome: Progressing

## 2024-06-03 ENCOUNTER — ANESTHESIA (OUTPATIENT)
Facility: HOSPITAL | Age: 49
End: 2024-06-03
Payer: COMMERCIAL

## 2024-06-03 ENCOUNTER — ANESTHESIA EVENT (OUTPATIENT)
Facility: HOSPITAL | Age: 49
End: 2024-06-03
Payer: COMMERCIAL

## 2024-06-03 PROBLEM — L02.619 CELLULITIS AND ABSCESS OF FOOT: Status: ACTIVE | Noted: 2024-06-03

## 2024-06-03 PROBLEM — L03.119 CELLULITIS AND ABSCESS OF FOOT: Status: ACTIVE | Noted: 2024-06-03

## 2024-06-03 LAB
ANION GAP SERPL CALC-SCNC: 8 MMOL/L (ref 5–15)
BUN SERPL-MCNC: 14 MG/DL (ref 6–20)
BUN/CREAT SERPL: 11 (ref 12–20)
CALCIUM SERPL-MCNC: 9.1 MG/DL (ref 8.5–10.1)
CHLORIDE SERPL-SCNC: 108 MMOL/L (ref 97–108)
CHOLEST SERPL-MCNC: 168 MG/DL
CO2 SERPL-SCNC: 20 MMOL/L (ref 21–32)
CREAT SERPL-MCNC: 1.33 MG/DL (ref 0.7–1.3)
ECHO AO ROOT DIAM: 3.2 CM
ECHO AO ROOT INDEX: 1.63 CM/M2
ECHO AV AREA PEAK VELOCITY: 3.6 CM2
ECHO AV AREA/BSA PEAK VELOCITY: 1.8 CM2/M2
ECHO AV PEAK GRADIENT: 12 MMHG
ECHO AV PEAK VELOCITY: 1.7 M/S
ECHO AV VELOCITY RATIO: 0.76
ECHO BSA: 2.02 M2
ECHO EST RA PRESSURE: 3 MMHG
ECHO LA DIAMETER INDEX: 2.04 CM/M2
ECHO LA DIAMETER: 4 CM
ECHO LA TO AORTIC ROOT RATIO: 1.25
ECHO LA VOL A-L A2C: 53 ML (ref 18–58)
ECHO LA VOL A-L A4C: 32 ML (ref 18–58)
ECHO LA VOL MOD A2C: 51 ML (ref 18–58)
ECHO LA VOL MOD A4C: 29 ML (ref 18–58)
ECHO LA VOLUME AREA LENGTH: 42 ML
ECHO LA VOLUME INDEX A-L A2C: 27 ML/M2 (ref 16–34)
ECHO LA VOLUME INDEX A-L A4C: 16 ML/M2 (ref 16–34)
ECHO LA VOLUME INDEX AREA LENGTH: 21 ML/M2 (ref 16–34)
ECHO LA VOLUME INDEX MOD A2C: 26 ML/M2 (ref 16–34)
ECHO LA VOLUME INDEX MOD A4C: 15 ML/M2 (ref 16–34)
ECHO LV E' LATERAL VELOCITY: 8 CM/S
ECHO LV E' SEPTAL VELOCITY: 6 CM/S
ECHO LV FRACTIONAL SHORTENING: 14 % (ref 28–44)
ECHO LV GLOBAL LONGITUDINAL STRAIN (GLS): -6.3 %
ECHO LV GLOBAL LONGITUDINAL STRAIN (GLS): -6.7 %
ECHO LV GLOBAL LONGITUDINAL STRAIN (GLS): -7.3 %
ECHO LV GLOBAL LONGITUDINAL STRAIN (GLS): -8.9 %
ECHO LV INTERNAL DIMENSION DIASTOLE INDEX: 2.24 CM/M2
ECHO LV INTERNAL DIMENSION DIASTOLIC: 4.4 CM (ref 4.2–5.9)
ECHO LV INTERNAL DIMENSION SYSTOLIC INDEX: 1.94 CM/M2
ECHO LV INTERNAL DIMENSION SYSTOLIC: 3.8 CM
ECHO LV IVSD: 1.4 CM (ref 0.6–1)
ECHO LV MASS 2D: 191.3 G (ref 88–224)
ECHO LV MASS INDEX 2D: 97.6 G/M2 (ref 49–115)
ECHO LV POSTERIOR WALL DIASTOLIC: 1 CM (ref 0.6–1)
ECHO LV RELATIVE WALL THICKNESS RATIO: 0.45
ECHO LVOT AREA: 4.9 CM2
ECHO LVOT DIAM: 2.5 CM
ECHO LVOT PEAK GRADIENT: 6 MMHG
ECHO LVOT PEAK VELOCITY: 1.3 M/S
ECHO MV A VELOCITY: 1.06 M/S
ECHO MV E VELOCITY: 1.05 M/S
ECHO MV E/A RATIO: 0.99
ECHO MV E/E' LATERAL: 13.13
ECHO MV E/E' RATIO (AVERAGED): 15.31
ECHO MV E/E' SEPTAL: 17.5
ECHO MV MAX VELOCITY: 1.1 M/S
ECHO MV MEAN GRADIENT: 3 MMHG
ECHO MV MEAN VELOCITY: 0.8 M/S
ECHO MV PEAK GRADIENT: 5 MMHG
ECHO MV REGURGITANT PEAK GRADIENT: 81 MMHG
ECHO MV REGURGITANT PEAK VELOCITY: 4.5 M/S
ECHO MV VTI: 28.4 CM
ECHO PV MAX VELOCITY: 1.4 M/S
ECHO PV PEAK GRADIENT: 7 MMHG
ECHO RIGHT VENTRICULAR SYSTOLIC PRESSURE (RVSP): 19 MMHG
ECHO RV FREE WALL PEAK S': 16 CM/S
ECHO RV TAPSE: 1.6 CM (ref 1.7–?)
ECHO TV REGURGITANT MAX VELOCITY: 2.03 M/S
ECHO TV REGURGITANT PEAK GRADIENT: 16 MMHG
FERRITIN SERPL-MCNC: 521 NG/ML (ref 26–388)
GLUCOSE BLD STRIP.AUTO-MCNC: 101 MG/DL (ref 65–117)
GLUCOSE BLD STRIP.AUTO-MCNC: 103 MG/DL (ref 65–117)
GLUCOSE BLD STRIP.AUTO-MCNC: 177 MG/DL (ref 65–117)
GLUCOSE BLD STRIP.AUTO-MCNC: 198 MG/DL (ref 65–117)
GLUCOSE BLD STRIP.AUTO-MCNC: 261 MG/DL (ref 65–117)
GLUCOSE BLD STRIP.AUTO-MCNC: 303 MG/DL (ref 65–117)
GLUCOSE SERPL-MCNC: 130 MG/DL (ref 65–100)
HDLC SERPL-MCNC: 53 MG/DL
HDLC SERPL: 3.2 (ref 0–5)
IRON SATN MFR SERPL: 13 % (ref 20–50)
IRON SERPL-MCNC: 33 UG/DL (ref 35–150)
LDLC SERPL CALC-MCNC: 84.2 MG/DL (ref 0–100)
POTASSIUM SERPL-SCNC: 4.1 MMOL/L (ref 3.5–5.1)
SERVICE CMNT-IMP: ABNORMAL
SERVICE CMNT-IMP: NORMAL
SERVICE CMNT-IMP: NORMAL
SODIUM SERPL-SCNC: 136 MMOL/L (ref 136–145)
TIBC SERPL-MCNC: 245 UG/DL (ref 250–450)
TRIGL SERPL-MCNC: 154 MG/DL
VANCOMYCIN SERPL-MCNC: 17.8 UG/ML
VLDLC SERPL CALC-MCNC: 30.8 MG/DL

## 2024-06-03 PROCEDURE — 87205 SMEAR GRAM STAIN: CPT

## 2024-06-03 PROCEDURE — 83540 ASSAY OF IRON: CPT

## 2024-06-03 PROCEDURE — 2580000003 HC RX 258: Performed by: STUDENT IN AN ORGANIZED HEALTH CARE EDUCATION/TRAINING PROGRAM

## 2024-06-03 PROCEDURE — 87070 CULTURE OTHR SPECIMN AEROBIC: CPT

## 2024-06-03 PROCEDURE — 2580000003 HC RX 258: Performed by: INTERNAL MEDICINE

## 2024-06-03 PROCEDURE — 2580000003 HC RX 258: Performed by: PODIATRIST

## 2024-06-03 PROCEDURE — 82728 ASSAY OF FERRITIN: CPT

## 2024-06-03 PROCEDURE — 6370000000 HC RX 637 (ALT 250 FOR IP): Performed by: PODIATRIST

## 2024-06-03 PROCEDURE — 80202 ASSAY OF VANCOMYCIN: CPT

## 2024-06-03 PROCEDURE — 2500000003 HC RX 250 WO HCPCS: Performed by: PODIATRIST

## 2024-06-03 PROCEDURE — 6360000002 HC RX W HCPCS: Performed by: NURSE ANESTHETIST, CERTIFIED REGISTERED

## 2024-06-03 PROCEDURE — 2709999900 HC NON-CHARGEABLE SUPPLY: Performed by: PODIATRIST

## 2024-06-03 PROCEDURE — 6360000002 HC RX W HCPCS: Performed by: INTERNAL MEDICINE

## 2024-06-03 PROCEDURE — 86037 ANCA TITER EACH ANTIBODY: CPT

## 2024-06-03 PROCEDURE — 6360000002 HC RX W HCPCS: Performed by: PODIATRIST

## 2024-06-03 PROCEDURE — 3600000012 HC SURGERY LEVEL 2 ADDTL 15MIN: Performed by: PODIATRIST

## 2024-06-03 PROCEDURE — 93356 MYOCRD STRAIN IMG SPCKL TRCK: CPT | Performed by: INTERNAL MEDICINE

## 2024-06-03 PROCEDURE — 87075 CULTR BACTERIA EXCEPT BLOOD: CPT

## 2024-06-03 PROCEDURE — 99232 SBSQ HOSP IP/OBS MODERATE 35: CPT | Performed by: INTERNAL MEDICINE

## 2024-06-03 PROCEDURE — 2500000003 HC RX 250 WO HCPCS: Performed by: NURSE ANESTHETIST, CERTIFIED REGISTERED

## 2024-06-03 PROCEDURE — 83550 IRON BINDING TEST: CPT

## 2024-06-03 PROCEDURE — 87147 CULTURE TYPE IMMUNOLOGIC: CPT

## 2024-06-03 PROCEDURE — 80048 BASIC METABOLIC PNL TOTAL CA: CPT

## 2024-06-03 PROCEDURE — 2580000003 HC RX 258: Performed by: NURSE ANESTHETIST, CERTIFIED REGISTERED

## 2024-06-03 PROCEDURE — 3600000002 HC SURGERY LEVEL 2 BASE: Performed by: PODIATRIST

## 2024-06-03 PROCEDURE — 93306 TTE W/DOPPLER COMPLETE: CPT | Performed by: INTERNAL MEDICINE

## 2024-06-03 PROCEDURE — 82103 ALPHA-1-ANTITRYPSIN TOTAL: CPT

## 2024-06-03 PROCEDURE — 6370000000 HC RX 637 (ALT 250 FOR IP): Performed by: CLINICAL NURSE SPECIALIST

## 2024-06-03 PROCEDURE — 0J9Q0ZZ DRAINAGE OF RIGHT FOOT SUBCUTANEOUS TISSUE AND FASCIA, OPEN APPROACH: ICD-10-PCS | Performed by: PODIATRIST

## 2024-06-03 PROCEDURE — 1100000000 HC RM PRIVATE

## 2024-06-03 PROCEDURE — 80061 LIPID PANEL: CPT

## 2024-06-03 PROCEDURE — 87186 SC STD MICRODIL/AGAR DIL: CPT

## 2024-06-03 PROCEDURE — 99221 1ST HOSP IP/OBS SF/LOW 40: CPT | Performed by: CLINICAL NURSE SPECIALIST

## 2024-06-03 PROCEDURE — 3700000001 HC ADD 15 MINUTES (ANESTHESIA): Performed by: PODIATRIST

## 2024-06-03 PROCEDURE — 87077 CULTURE AEROBIC IDENTIFY: CPT

## 2024-06-03 PROCEDURE — 82962 GLUCOSE BLOOD TEST: CPT

## 2024-06-03 PROCEDURE — 6370000000 HC RX 637 (ALT 250 FOR IP): Performed by: STUDENT IN AN ORGANIZED HEALTH CARE EDUCATION/TRAINING PROGRAM

## 2024-06-03 PROCEDURE — 36415 COLL VENOUS BLD VENIPUNCTURE: CPT

## 2024-06-03 PROCEDURE — 99223 1ST HOSP IP/OBS HIGH 75: CPT | Performed by: INTERNAL MEDICINE

## 2024-06-03 PROCEDURE — 7100000001 HC PACU RECOVERY - ADDTL 15 MIN: Performed by: PODIATRIST

## 2024-06-03 PROCEDURE — 86015 ACTIN ANTIBODY EACH: CPT

## 2024-06-03 PROCEDURE — 82390 ASSAY OF CERULOPLASMIN: CPT

## 2024-06-03 PROCEDURE — 7100000000 HC PACU RECOVERY - FIRST 15 MIN: Performed by: PODIATRIST

## 2024-06-03 PROCEDURE — 3700000000 HC ANESTHESIA ATTENDED CARE: Performed by: PODIATRIST

## 2024-06-03 RX ORDER — SODIUM CHLORIDE 9 MG/ML
INJECTION, SOLUTION INTRAVENOUS PRN
Status: DISCONTINUED | OUTPATIENT
Start: 2024-06-03 | End: 2024-06-07 | Stop reason: HOSPADM

## 2024-06-03 RX ORDER — ONDANSETRON 2 MG/ML
INJECTION INTRAMUSCULAR; INTRAVENOUS PRN
Status: DISCONTINUED | OUTPATIENT
Start: 2024-06-03 | End: 2024-06-03 | Stop reason: SDUPTHER

## 2024-06-03 RX ORDER — FENTANYL CITRATE 50 UG/ML
INJECTION, SOLUTION INTRAMUSCULAR; INTRAVENOUS PRN
Status: DISCONTINUED | OUTPATIENT
Start: 2024-06-03 | End: 2024-06-03 | Stop reason: SDUPTHER

## 2024-06-03 RX ORDER — SODIUM CHLORIDE 0.9 % (FLUSH) 0.9 %
5-40 SYRINGE (ML) INJECTION PRN
Status: DISCONTINUED | OUTPATIENT
Start: 2024-06-03 | End: 2024-06-07 | Stop reason: HOSPADM

## 2024-06-03 RX ORDER — PROCHLORPERAZINE EDISYLATE 5 MG/ML
5 INJECTION INTRAMUSCULAR; INTRAVENOUS
Status: ACTIVE | OUTPATIENT
Start: 2024-06-03 | End: 2024-06-04

## 2024-06-03 RX ORDER — HYDROMORPHONE HYDROCHLORIDE 1 MG/ML
0.5 INJECTION, SOLUTION INTRAMUSCULAR; INTRAVENOUS; SUBCUTANEOUS EVERY 5 MIN PRN
Status: DISCONTINUED | OUTPATIENT
Start: 2024-06-03 | End: 2024-06-04

## 2024-06-03 RX ORDER — ENOXAPARIN SODIUM 100 MG/ML
40 INJECTION SUBCUTANEOUS DAILY
Status: DISCONTINUED | OUTPATIENT
Start: 2024-06-03 | End: 2024-06-07 | Stop reason: HOSPADM

## 2024-06-03 RX ORDER — SODIUM CHLORIDE 0.9 % (FLUSH) 0.9 %
5-40 SYRINGE (ML) INJECTION EVERY 12 HOURS SCHEDULED
Status: DISCONTINUED | OUTPATIENT
Start: 2024-06-03 | End: 2024-06-07 | Stop reason: HOSPADM

## 2024-06-03 RX ORDER — HYDRALAZINE HYDROCHLORIDE 20 MG/ML
10 INJECTION INTRAMUSCULAR; INTRAVENOUS
Status: DISCONTINUED | OUTPATIENT
Start: 2024-06-03 | End: 2024-06-07 | Stop reason: HOSPADM

## 2024-06-03 RX ORDER — LIDOCAINE HYDROCHLORIDE 10 MG/ML
1 INJECTION, SOLUTION EPIDURAL; INFILTRATION; INTRACAUDAL; PERINEURAL
Status: ACTIVE | OUTPATIENT
Start: 2024-06-03 | End: 2024-06-04

## 2024-06-03 RX ORDER — PROPOFOL 10 MG/ML
INJECTION, EMULSION INTRAVENOUS PRN
Status: DISCONTINUED | OUTPATIENT
Start: 2024-06-03 | End: 2024-06-03 | Stop reason: SDUPTHER

## 2024-06-03 RX ORDER — ONDANSETRON 2 MG/ML
4 INJECTION INTRAMUSCULAR; INTRAVENOUS
Status: DISCONTINUED | OUTPATIENT
Start: 2024-06-03 | End: 2024-06-03

## 2024-06-03 RX ORDER — ACETAMINOPHEN 500 MG
1000 TABLET ORAL ONCE
Status: COMPLETED | OUTPATIENT
Start: 2024-06-03 | End: 2024-06-03

## 2024-06-03 RX ORDER — NALOXONE HYDROCHLORIDE 0.4 MG/ML
INJECTION, SOLUTION INTRAMUSCULAR; INTRAVENOUS; SUBCUTANEOUS PRN
Status: DISCONTINUED | OUTPATIENT
Start: 2024-06-03 | End: 2024-06-07 | Stop reason: HOSPADM

## 2024-06-03 RX ORDER — MIDAZOLAM HYDROCHLORIDE 2 MG/2ML
2 INJECTION, SOLUTION INTRAMUSCULAR; INTRAVENOUS
Status: ACTIVE | OUTPATIENT
Start: 2024-06-03 | End: 2024-06-04

## 2024-06-03 RX ORDER — CLOPIDOGREL BISULFATE 75 MG/1
75 TABLET ORAL DAILY
Status: DISCONTINUED | OUTPATIENT
Start: 2024-06-03 | End: 2024-06-07 | Stop reason: HOSPADM

## 2024-06-03 RX ORDER — FENTANYL CITRATE 50 UG/ML
25 INJECTION, SOLUTION INTRAMUSCULAR; INTRAVENOUS EVERY 5 MIN PRN
Status: DISCONTINUED | OUTPATIENT
Start: 2024-06-03 | End: 2024-06-04 | Stop reason: HOSPADM

## 2024-06-03 RX ORDER — OXYCODONE HYDROCHLORIDE 5 MG/1
5 TABLET ORAL
Status: ACTIVE | OUTPATIENT
Start: 2024-06-03 | End: 2024-06-04

## 2024-06-03 RX ORDER — INSULIN GLARGINE 100 [IU]/ML
22 INJECTION, SOLUTION SUBCUTANEOUS NIGHTLY
Status: DISCONTINUED | OUTPATIENT
Start: 2024-06-03 | End: 2024-06-07 | Stop reason: HOSPADM

## 2024-06-03 RX ORDER — FENTANYL CITRATE 50 UG/ML
100 INJECTION, SOLUTION INTRAMUSCULAR; INTRAVENOUS
Status: ACTIVE | OUTPATIENT
Start: 2024-06-03 | End: 2024-06-04

## 2024-06-03 RX ORDER — MIDAZOLAM HYDROCHLORIDE 1 MG/ML
INJECTION INTRAMUSCULAR; INTRAVENOUS PRN
Status: DISCONTINUED | OUTPATIENT
Start: 2024-06-03 | End: 2024-06-03 | Stop reason: SDUPTHER

## 2024-06-03 RX ORDER — PHENYLEPHRINE HYDROCHLORIDE 10 MG/ML
INJECTION INTRAVENOUS PRN
Status: DISCONTINUED | OUTPATIENT
Start: 2024-06-03 | End: 2024-06-03 | Stop reason: SDUPTHER

## 2024-06-03 RX ORDER — LIDOCAINE HYDROCHLORIDE 20 MG/ML
INJECTION, SOLUTION EPIDURAL; INFILTRATION; INTRACAUDAL; PERINEURAL PRN
Status: DISCONTINUED | OUTPATIENT
Start: 2024-06-03 | End: 2024-06-03 | Stop reason: SDUPTHER

## 2024-06-03 RX ORDER — SODIUM CHLORIDE, SODIUM LACTATE, POTASSIUM CHLORIDE, CALCIUM CHLORIDE 600; 310; 30; 20 MG/100ML; MG/100ML; MG/100ML; MG/100ML
INJECTION, SOLUTION INTRAVENOUS CONTINUOUS
Status: DISCONTINUED | OUTPATIENT
Start: 2024-06-03 | End: 2024-06-07 | Stop reason: HOSPADM

## 2024-06-03 RX ADMIN — OXYCODONE HYDROCHLORIDE 5 MG: 5 TABLET ORAL at 21:47

## 2024-06-03 RX ADMIN — EMPAGLIFLOZIN 10 MG: 10 TABLET, FILM COATED ORAL at 10:02

## 2024-06-03 RX ADMIN — MIDAZOLAM 2 MG: 1 INJECTION INTRAMUSCULAR; INTRAVENOUS at 07:36

## 2024-06-03 RX ADMIN — PIPERACILLIN AND TAZOBACTAM 3375 MG: 3; .375 INJECTION, POWDER, LYOPHILIZED, FOR SOLUTION INTRAVENOUS at 11:12

## 2024-06-03 RX ADMIN — EZETIMIBE 10 MG: 10 TABLET ORAL at 10:03

## 2024-06-03 RX ADMIN — ONDANSETRON 4 MG: 2 INJECTION INTRAMUSCULAR; INTRAVENOUS at 16:01

## 2024-06-03 RX ADMIN — SACUBITRIL AND VALSARTAN 1 TABLET: 24; 26 TABLET, FILM COATED ORAL at 10:03

## 2024-06-03 RX ADMIN — SODIUM CHLORIDE, POTASSIUM CHLORIDE, SODIUM LACTATE AND CALCIUM CHLORIDE: 600; 310; 30; 20 INJECTION, SOLUTION INTRAVENOUS at 10:01

## 2024-06-03 RX ADMIN — PHENYLEPHRINE HYDROCHLORIDE 120 MCG: 10 INJECTION INTRAVENOUS at 07:50

## 2024-06-03 RX ADMIN — ONDANSETRON 4 MG: 2 INJECTION INTRAMUSCULAR; INTRAVENOUS at 05:27

## 2024-06-03 RX ADMIN — FAMOTIDINE 20 MG: 20 TABLET ORAL at 10:02

## 2024-06-03 RX ADMIN — MORPHINE SULFATE 2 MG: 2 INJECTION, SOLUTION INTRAMUSCULAR; INTRAVENOUS at 16:01

## 2024-06-03 RX ADMIN — MORPHINE SULFATE 2 MG: 2 INJECTION, SOLUTION INTRAMUSCULAR; INTRAVENOUS at 05:27

## 2024-06-03 RX ADMIN — LIDOCAINE HYDROCHLORIDE 60 MG: 20 INJECTION, SOLUTION EPIDURAL; INFILTRATION; INTRACAUDAL; PERINEURAL at 07:44

## 2024-06-03 RX ADMIN — PIPERACILLIN AND TAZOBACTAM 3375 MG: 3; .375 INJECTION, POWDER, LYOPHILIZED, FOR SOLUTION INTRAVENOUS at 04:01

## 2024-06-03 RX ADMIN — PIPERACILLIN AND TAZOBACTAM 3375 MG: 3; .375 INJECTION, POWDER, LYOPHILIZED, FOR SOLUTION INTRAVENOUS at 20:48

## 2024-06-03 RX ADMIN — ROSUVASTATIN CALCIUM 40 MG: 10 TABLET, FILM COATED ORAL at 10:02

## 2024-06-03 RX ADMIN — OXYCODONE HYDROCHLORIDE 5 MG: 5 TABLET ORAL at 12:59

## 2024-06-03 RX ADMIN — PROPOFOL 200 MG: 10 INJECTION, EMULSION INTRAVENOUS at 07:44

## 2024-06-03 RX ADMIN — VANCOMYCIN HYDROCHLORIDE 1000 MG: 1 INJECTION, POWDER, LYOPHILIZED, FOR SOLUTION INTRAVENOUS at 00:54

## 2024-06-03 RX ADMIN — FENTANYL CITRATE 25 MCG: 50 INJECTION, SOLUTION INTRAMUSCULAR; INTRAVENOUS at 07:55

## 2024-06-03 RX ADMIN — ONDANSETRON 4 MG: 2 INJECTION INTRAMUSCULAR; INTRAVENOUS at 08:01

## 2024-06-03 RX ADMIN — PHENYLEPHRINE HYDROCHLORIDE 80 MCG: 10 INJECTION INTRAVENOUS at 07:47

## 2024-06-03 RX ADMIN — SPIRONOLACTONE 25 MG: 25 TABLET ORAL at 10:03

## 2024-06-03 RX ADMIN — CARVEDILOL 25 MG: 12.5 TABLET, FILM COATED ORAL at 10:03

## 2024-06-03 RX ADMIN — ONDANSETRON 4 MG: 2 INJECTION INTRAMUSCULAR; INTRAVENOUS at 10:10

## 2024-06-03 RX ADMIN — FENTANYL CITRATE 25 MCG: 50 INJECTION, SOLUTION INTRAMUSCULAR; INTRAVENOUS at 08:00

## 2024-06-03 RX ADMIN — INSULIN GLARGINE 22 UNITS: 100 INJECTION, SOLUTION SUBCUTANEOUS at 20:43

## 2024-06-03 RX ADMIN — ENOXAPARIN SODIUM 40 MG: 100 INJECTION SUBCUTANEOUS at 11:10

## 2024-06-03 RX ADMIN — PHENYLEPHRINE HYDROCHLORIDE 80 MCG: 10 INJECTION INTRAVENOUS at 07:44

## 2024-06-03 RX ADMIN — MORPHINE SULFATE 2 MG: 2 INJECTION, SOLUTION INTRAMUSCULAR; INTRAVENOUS at 20:42

## 2024-06-03 RX ADMIN — CARVEDILOL 25 MG: 12.5 TABLET, FILM COATED ORAL at 16:02

## 2024-06-03 RX ADMIN — ACETAMINOPHEN 1000 MG: 500 TABLET ORAL at 10:02

## 2024-06-03 RX ADMIN — MORPHINE SULFATE 2 MG: 2 INJECTION, SOLUTION INTRAMUSCULAR; INTRAVENOUS at 01:25

## 2024-06-03 RX ADMIN — MORPHINE SULFATE 2 MG: 2 INJECTION, SOLUTION INTRAMUSCULAR; INTRAVENOUS at 10:10

## 2024-06-03 RX ADMIN — PHENYLEPHRINE HYDROCHLORIDE 40 MCG/MIN: 10 INJECTION INTRAVENOUS at 07:54

## 2024-06-03 RX ADMIN — PHENYLEPHRINE HYDROCHLORIDE 120 MCG: 10 INJECTION INTRAVENOUS at 07:53

## 2024-06-03 RX ADMIN — OXYCODONE HYDROCHLORIDE 5 MG: 5 TABLET ORAL at 16:56

## 2024-06-03 RX ADMIN — VANCOMYCIN HYDROCHLORIDE 1000 MG: 1 INJECTION, POWDER, LYOPHILIZED, FOR SOLUTION INTRAVENOUS at 12:03

## 2024-06-03 RX ADMIN — CLOPIDOGREL BISULFATE 75 MG: 75 TABLET ORAL at 11:10

## 2024-06-03 RX ADMIN — SACUBITRIL AND VALSARTAN 1 TABLET: 24; 26 TABLET, FILM COATED ORAL at 20:43

## 2024-06-03 RX ADMIN — ASPIRIN 81 MG: 81 TABLET, COATED ORAL at 10:03

## 2024-06-03 RX ADMIN — FAMOTIDINE 20 MG: 20 TABLET ORAL at 20:43

## 2024-06-03 ASSESSMENT — PAIN DESCRIPTION - ORIENTATION
ORIENTATION: RIGHT

## 2024-06-03 ASSESSMENT — PAIN DESCRIPTION - LOCATION
LOCATION: LEG
LOCATION: LEG
LOCATION: FOOT
LOCATION: LEG
LOCATION: FOOT
LOCATION: LEG
LOCATION: FOOT
LOCATION: LEG
LOCATION: FOOT
LOCATION: FOOT
LOCATION: LEG
LOCATION: LEG
LOCATION: FOOT
LOCATION: LEG
LOCATION: FOOT
LOCATION: FOOT

## 2024-06-03 ASSESSMENT — PAIN DESCRIPTION - DESCRIPTORS
DESCRIPTORS: ACHING
DESCRIPTORS: BURNING;THROBBING
DESCRIPTORS: ACHING
DESCRIPTORS: THROBBING
DESCRIPTORS: ACHING

## 2024-06-03 ASSESSMENT — PAIN SCALES - GENERAL
PAINLEVEL_OUTOF10: 9
PAINLEVEL_OUTOF10: 6
PAINLEVEL_OUTOF10: 5
PAINLEVEL_OUTOF10: 7
PAINLEVEL_OUTOF10: 9
PAINLEVEL_OUTOF10: 8
PAINLEVEL_OUTOF10: 9
PAINLEVEL_OUTOF10: 6
PAINLEVEL_OUTOF10: 7
PAINLEVEL_OUTOF10: 8
PAINLEVEL_OUTOF10: 7
PAINLEVEL_OUTOF10: 0
PAINLEVEL_OUTOF10: 8
PAINLEVEL_OUTOF10: 10
PAINLEVEL_OUTOF10: 4

## 2024-06-03 ASSESSMENT — PAIN - FUNCTIONAL ASSESSMENT
PAIN_FUNCTIONAL_ASSESSMENT: 0-10
PAIN_FUNCTIONAL_ASSESSMENT: ACTIVITIES ARE NOT PREVENTED

## 2024-06-03 NOTE — PROGRESS NOTES
Hospital Progress Note  Khoa Cooper MD   Answering service: [phone] OR    [employer]      NAME:  Timur Puente   :   1975   MRN:  [id number]     Date/Time:  6/3/2024 6:11 PM    Plan:     Continue vanco/zosyn pending cultures  Debridement completed  DM ed  Card consult reviewed  Risk of Deterioration: Low  []           Moderate  [x]           High  []                 Assessment:     Principal Problem:    Diabetic infection of right foot (HCC)     MRI -24       Diffuse soft tissue edema and swelling, correlate for cellulitis. Indeterminate  amorphous nonenhancing focus in the lateral dorsal forefoot soft tissues, could  reflect an area of devascularized tissue, fat necrosis, or be related to  reported retained foreign body. No evidence of acute osteomyelitis. Radiographic  correlation may be of benefit.  6/3/24 RIGHT FOOT INCISION AND DRAINAGE     Active Problems:    Obesity (BMI 30.0-34.9)     Encourage lifestyle modification and diet      Hypertension     Titrate home meds      Diabetes mellitus (HCC)     Basal bollus     Diabetic ed      Cardiomyopathy (HCC)     EF 40-45%     Resume GDMT     Cardiac cath 21       Findings:         Severe 1 vessel disease         Patent stents in apical LAD as well as mid Lcx         50% ISR in mid RCA stent with de bela lesion in proximal RCA about 75%         Normal LVEDP         PCI of proximal RCA with 3.5 by 12 mm Xience GENO post dilated with 4.5 mm NC at 20 ELVIN            Obstructive sleep apnea     Intolerant of CPAP      Dyslipidemia     Continue zetia and crestor      PAD (peripheral artery disease) (HCC)     Doppler        Elevated liver enzymes     Hep opinion    Resolved Problems:    * No resolved hospital problems. *          Admitting notes:49 y.o. male with past medical history significant for CAD s/p stent placement, type 2 diabetes, hypertension, JACKY intolerant of CPAP presented at the emergency room with right foot pain and swelling.   2 spray  2 spray Nasal BID    empagliflozin (JARDIANCE) tablet 10 mg  10 mg Oral Daily    nitroGLYCERIN (NITROSTAT) SL tablet 0.4 mg  0.4 mg SubLINGual Q5 Min PRN    rosuvastatin (CRESTOR) tablet 40 mg  40 mg Oral Daily    glucose chewable tablet 16 g  4 tablet Oral PRN    dextrose bolus 10% 125 mL  125 mL IntraVENous PRN    Or    dextrose bolus 10% 250 mL  250 mL IntraVENous PRN    glucagon injection 1 mg  1 mg SubCUTAneous PRN    dextrose 10 % infusion   IntraVENous Continuous PRN    piperacillin-tazobactam (ZOSYN) 3,375 mg in sodium chloride 0.9 % 50 mL IVPB (mini-bag)  3,375 mg IntraVENous Q8H    Vancomycin pharmacy to dose  1 each Other RX Placeholder    sacubitril-valsartan (ENTRESTO) 24-26 MG per tablet 1 tablet  1 tablet Oral BID    spironolactone (ALDACTONE) tablet 25 mg  25 mg Oral Daily    0.9 % sodium chloride infusion   IntraVENous Continuous        Objective:   Vitals:  /78   Pulse 88   Temp 98.3 °F (36.8 °C) (Oral)   Resp 17   Ht 1.626 m (5' 4.02\")   Wt 90.7 kg (199 lb 15.3 oz)   SpO2 98%   BMI 34.31 kg/m²   Temp (24hrs), Av.4 °F (36.9 °C), Min:97.3 °F (36.3 °C), Max:99.3 °F (37.4 °C)           Last 24hr Input/Output:    Intake/Output Summary (Last 24 hours) at 6/3/2024 1811  Last data filed at 6/3/2024 0945  Gross per 24 hour   Intake 500 ml   Output 2300 ml   Net -1800 ml          PHYSICAL EXAM:  General:    Alert, cooperative, no distress, appears stated age.     Head:   Normocephalic, without obvious abnormality, atraumatic.  Eyes:   Conjunctivae/corneas clear.  PERRLA  Nose:  Nares normal. No drainage or sinus tenderness.  Throat:    Lips, mucosa, and tongue normal.  No Thrush  Neck:  Supple, symmetrical,  no adenopathy, thyroid: non tender    no carotid bruit and no JVD.  Back:    Symmetric,  No CVA tenderness.  Lungs:   Clear to auscultation bilaterally.  No Wheezing or Rhonchi. No rales.  Chest wall:  No tenderness or deformity. No Accessory muscle use.  Heart:   Regular rate

## 2024-06-03 NOTE — PLAN OF CARE
Problem: Discharge Planning  Goal: Discharge to home or other facility with appropriate resources  6/3/2024 1019 by Abby Walls RN  Outcome: Progressing  6/3/2024 0522 by Yasmeen Hauser RN  Outcome: Progressing     Problem: Pain  Goal: Verbalizes/displays adequate comfort level or baseline comfort level  6/3/2024 1019 by Abby Walls RN  Outcome: Progressing  6/3/2024 0522 by Yasmeen Hauser RN  Outcome: Progressing     Problem: Skin/Tissue Integrity  Goal: Absence of new skin breakdown  Description: 1.  Monitor for areas of redness and/or skin breakdown  2.  Assess vascular access sites hourly  3.  Every 4-6 hours minimum:  Change oxygen saturation probe site  4.  Every 4-6 hours:  If on nasal continuous positive airway pressure, respiratory therapy assess nares and determine need for appliance change or resting period.  6/3/2024 1019 by Abby Walls RN  Outcome: Progressing  6/3/2024 0522 by Yasmeen Hauser RN  Outcome: Progressing     Problem: Chronic Conditions and Co-morbidities  Goal: Patient's chronic conditions and co-morbidity symptoms are monitored and maintained or improved  6/3/2024 1019 by Abby Walls RN  Outcome: Progressing  6/3/2024 0522 by Yasmeen Hauser RN  Outcome: Progressing     Problem: Safety - Adult  Goal: Free from fall injury  6/3/2024 1019 by Abby Walls RN  Outcome: Progressing  6/3/2024 0522 by Yasmeen Hauser RN  Outcome: Progressing

## 2024-06-03 NOTE — BRIEF OP NOTE
Brief Postoperative Note      Patient: Timur Puente  YOB: 1975  MRN: [id number]    Date of Procedure: 6/3/2024    Pre-Op Diagnosis Codes:     * Abscess of right foot [L02.611]    Post-Op Diagnosis: Same       Procedure(s):  RIGHT FOOT  INCISION AND DRAINAGE REQUEST 7:30  OR AFTER 4PM    Surgeon(s):  Fallon Figueroa DPM    Assistant:  * No surgical staff found *    Anesthesia: General    Estimated Blood Loss (mL): Minimal    Complications: None    Specimens:   ID Type Source Tests Collected by Time Destination   A : right foot abscess Tissue Tissue CULTURE, ANAEROBIC, CULTURE, TISSUE Fallon Figueroa DPM 6/3/2024 0809        Implants:  * No implants in log *      Drains: * No LDAs found *    Findings:  Infection Present At Time Of Surgery (PATOS) (choose all levels that have infection present):  - Superficial Infection (skin/subcutaneous) present as evidenced by abscess, pus, and purulent fluid  Other Findings: Abscess limited to subcutaneous tissues of dorsum of right foot and 4th intermetatarsal space. No involvement of muscle, tendon, or bone.    Electronically signed by Fallon Figueroa DPM on 6/3/2024 at 8:10 AM

## 2024-06-03 NOTE — DIABETES MGMT
[facility]  DIABETES MANAGEMENT CONSULT    Consulted by Provider for advanced nursing evaluation and care for inpatient blood glucose management.    Setting Blood glucose targets   Non-critical care 100 - 180 mg/dl     Evaluation and Action Plan   This 49 year old AA male was admitted 5/31/24 with right foot wound and underwent I&D today 6/3/24. Patient has Type 2 diabetes on insulin therapy. Current basal insulin dose in dosed at 0.2 units/kg/D (much lower than home dose) and FBGs have been in the 100-120s (with correction). BGs are coming into target range. Patient is still groggy from surgery so will need to come back to chat with him. Likely, he is not giving himself the home basal insulin dose and his PCP not aware. May well be managed with a low-dose basal, the GLP-1 and Jardiance IF HE WILL APPLY SOME SELF-MANAGEMENT STRATEGIES. Will refer to outpatient diabetes education.    Management Rationale Action Plan   Medication   Basal needs Based on  [x]        Blood glucose pattern  [x]        Weight & BMI  []        Kidney dysfunction  []        Home diabetes regimen     Increase Lantus insulin 22 units daily   Nutritional needs Based on  []        Blood glucose pattern  []        CHO load  []        Enteral feeding CHO load  []        TPN/PPN dextrose load      Corrective insulin Based on sensitivity  []        Low   [x]        Medium  []        High      Additional orders   Jardiance per PCP     Diabetes Discharge Plan   Medication  TBD     Referral  [x]        Outpatient diabetes education   Additional orders            Initial Presentation   Timur Puente is a 49 y.o. male admitted 5/31/24 from ER after experiencing right foot pain, redness and swelling. Patient also reports a new blister that is formed at the dorsal right forefoot today. Under the care of podiatrist.   Low grade fever. . Hypertensive. 02 sats 95%  ER exam:  Musculoskeletal:         General: Swelling and

## 2024-06-03 NOTE — ANESTHESIA PRE PROCEDURE
ROM: full  Mouth opening: > = 3 FB   Dental: normal exam         Pulmonary:normal exam    (+)     sleep apnea:                                  Cardiovascular:  Exercise tolerance: good (>4 METS)  (+) hypertension:, past MI:, CAD: non-obstructive, CABG/stent:, CHF: systolic, hyperlipidemia      ECG reviewed      Echocardiogram reviewed  Stress test reviewed             ROS comment: Hx NSTEMI  Coronary stent x 4  EF 40%     Neuro/Psych:   (+) headaches:            GI/Hepatic/Renal:   (+) hepatitis:, liver disease:, renal disease: CRI         ROS comment: Steatosis of liver  .   Endo/Other:    (+) DiabetesType II DM, poorly controlled.                 Abdominal:             Vascular:   + PVD, aortic or cerebral.       Other Findings:             Anesthesia Plan      general     ASA 3       Induction: intravenous.    MIPS: Prophylactic antiemetics administered.  Anesthetic plan and risks discussed with patient.      Plan discussed with CRNA.                    Mayda Hendrickson DO   6/3/2024

## 2024-06-03 NOTE — OP NOTE
[address]  [address]                            OPERATIVE REPORT      PATIENT NAME: NIDIA FABIAN                  : 1975  MED REC NO: [id number]                       ROOM: 572  ACCOUNT NO: [id number]                       ADMIT DATE: 2024  PROVIDER: Fallon Figueroa DPM    DATE OF SERVICE:  2024    PREOPERATIVE DIAGNOSES:  Right foot abscess.    POSTOPERATIVE DIAGNOSES:  Right foot abscess.    PROCEDURES PERFORMED:  Incision and drainage of right foot abscess.    SURGEON:  Fallon Figueroa DPM    ASSISTANT:  None.    ANESTHESIA:  LMA with 10 mL of 1:1 mix 1% lidocaine plain and 0.5% bupivacaine plain.    ESTIMATED BLOOD LOSS:  Minimal.    SPECIMENS REMOVED:  Pathology:  Aerobic and anaerobic culture taken of right foot abscess.    INTRAOPERATIVE FINDINGS:  Abscess of dorsal foot and 4th intermetatarsal space limited to subcutaneous tissues     COMPLICATIONS:  None.    IMPLANTS:  Materials Used:  Quarter-inch iodoform packing and 3-0 nylon.    INDICATIONS:  Abscess present clinically and extent noted on MRI    DESCRIPTION OF PROCEDURE:  Under mild sedation, the patient was brought into the operating room on his hospital bed and remained on his hospital bed for the entirety of the procedure.  After the patient underwent IV sedation and LMA placement, the right foot was then scrubbed, prepped, and draped in the usual aseptic manner.  A full and complete time-out was performed.    Attention was then directed to the dorsal lateral foot over the right 4th intermetatarsal space where a longitudinal incision was made over the area of the abscess.  The abscess was drained of approximately 20 mL of purulent fluid and any necrotic subcutaneous tissue was resected out from the dorsum of the foot.  There was some communication to the 4th intermetatarsal space.  However, there was no significant involvement of tendon or muscle.  After the area  was sharply debrided down to healthy red bleeding tissue, the area was thoroughly irrigated.  Previous to the irrigation of the wound, a deep wound abscess culture was obtained.  After the area was irrigated and appropriate hemostasis had been obtained, the dorsal incision was closed partially with 3-0 nylon with horizontal mattress sutures.  The rest of the wound was then packed with quarter-inch iodoform packing and a dressing of 4x4s, ABD, Kerlix, and Ace wrap was applied to the foot.  The patient was then awoken from procedure and transferred to the postanesthesia care unit.    HEMOSTASIS:  No pneumatic tourniquet was used for this procedure.    INJECTABLES GIVEN:  None.        VAMSI TAPIA/TINA  D:  06/03/2024 08:59:12  T:  06/03/2024 13:39:35  JOB #:  [id number]

## 2024-06-03 NOTE — PERIOP NOTE
TRANSFER - OUT REPORT:    Verbal report given to AGGIE Valdez(name) on Timur Puente  being transferred to [facility](unit) for routine post-op       Report consisted of patient’s Situation, Background, Assessment and   Recommendations(SBAR).     Time Pre op antibiotic given:scheduled  Anesthesia Stop time: 8:24    Information from the following report(s) SBAR, Kardex, OR Summary, Procedure Summary, Intake/Output, MAR, Recent Results, Med Rec Status, and Cardiac Rhythm SR  was reviewed with the receiving nurse.    Opportunity for questions and clarification was provided.     Is the patient on 02? No       L/Min        Other     Is the patient on a monitor? No    Is the nurse transporting with the patient? No    Surgical Waiting Area notified of patient's transfer from PACU? Yes

## 2024-06-03 NOTE — ANESTHESIA POSTPROCEDURE EVALUATION
Department of Anesthesiology  Postprocedure Note    Patient: Timur Puente  MRN: [id number]  YOB: 1975  Date of evaluation: 6/3/2024    Procedure Summary       Date: 06/03/24 Room / Location: [facility] MAIN OR [address] [place] [address] MAIN OR    Anesthesia Start: 0736 Anesthesia Stop: 0822    Procedure: RIGHT FOOT  INCISION AND DRAINAGE REQUEST 7:30  OR AFTER 4PM (Right: Foot) Diagnosis:       Abscess of right foot      (Abscess of right foot [L02.611])    Providers: Fallon Figueroa DPM Responsible Provider: Mayda Hendrickson DO    Anesthesia Type: General ASA Status: 3            Anesthesia Type: General    Lyndon Phase I: Lyndon Score: 8    Lyndon Phase II:      Anesthesia Post Evaluation    Patient location during evaluation: PACU  Patient participation: complete - patient participated  Level of consciousness: sleepy but conscious  Pain score: 0  Airway patency: patent  Nausea & Vomiting: no nausea and no vomiting  Cardiovascular status: hemodynamically stable  Respiratory status: acceptable  Hydration status: euvolemic  Pain management: adequate    No notable events documented.

## 2024-06-03 NOTE — CONSULTS
[facility] CARDIOLOGY  Cardiology Care Note                  [x]Initial visit     []Established visit     Patient Name: Timur Puente - :1975 - MRN:[id number]  Primary Cardiologist: Rebekah Gordon MD  Consulting Cardiologist: Rebekah Gordon MD         Assessment/Plan/Discussion: Cardiology Attending:     ASSESSMENT  1.  Coronary disease manifesting in the form of unstable angina   2.  Cardiomyopathy with prior EF documented to be as low as 30%   3.  Hypertension poor control   4.  Hypercholesterolemia.  Good LDL control   5.  Metabolic syndrome elevated triglycerides   6.  Diabetes mellitus with poor control   7.  Right foot infection    Mr. Puente is evaluated for coronary artery disease.  He has history of cardiomyopathy as well which has been deemed secondary to coronary artery disease.  He is now admitted for predominantly right foot infection.  He does not report any recent symptoms of angina/significant shortness of breath although has been significantly noncompliant with his medications lately.  His echocardiogram which was performed today demonstrates worsening ejection fraction from 40 to 45% previously down to about 25-30%.  I doubt that he has ongoing angina but if there is no alternative explanation of drop in EF, may require cardiac catheterization electively as an outpatient.  For now I recommend that he restarts taking his home medications regularly which he has been noncompliant within the past few months.  In summary reinitiate home medications and recommend maintenance of compliance.  Will have an outpatient visit to see him back in about a month and then repeat echocardiogram in 3 to 4 months from now.     Cardiac catheterization 2021: Proximal RCA de bela stenosis treated with placement of another drug-eluting stent.  40 to 50% ISR in mid RCA stent.  Patent stent in circumflex with 25% ISR.   Apical and distal  acid (EFFER-K) effervescent tablet 40 mEq, 40 mEq, Oral, PRN **OR** potassium chloride 10 mEq/100 mL IVPB (Peripheral Line), 10 mEq, IntraVENous, PRN, Fallon Figueroa M, DPM    magnesium sulfate 2000 mg in 50 mL IVPB premix, 2,000 mg, IntraVENous, PRN, Henry, Fallon M, DPM    ondansetron (ZOFRAN-ODT) disintegrating tablet 4 mg, 4 mg, Oral, Q8H PRN **OR** ondansetron (ZOFRAN) injection 4 mg, 4 mg, IntraVENous, Q6H PRN, Henry, Fallon M, DPM, 4 mg at 06/03/24 1010    polyethylene glycol (GLYCOLAX) packet 17 g, 17 g, Oral, Daily PRN, Henry Fallon M, DPM    acetaminophen (TYLENOL) tablet 650 mg, 650 mg, Oral, Q6H PRN, 650 mg at 06/01/24 2054 **OR** acetaminophen (TYLENOL) suppository 650 mg, 650 mg, Rectal, Q6H PRN, Henry, Fallon M, DPM    oxyCODONE (ROXICODONE) immediate release tablet 5 mg, 5 mg, Oral, Q4H PRN, Henry, Fallon M, DPM, 5 mg at 06/03/24 1259    insulin lispro (HUMALOG,ADMELOG) injection vial 0-8 Units, 0-8 Units, SubCUTAneous, TID WC, Henry, Fallon M, DPM, 6 Units at 06/02/24 1722    insulin lispro (HUMALOG,ADMELOG) injection vial 0-4 Units, 0-4 Units, SubCUTAneous, Nightly, Henry Fallon M, DPM    carvedilol (COREG) tablet 25 mg, 25 mg, Oral, BID WC, Henry, Fallon M, DPM, 25 mg at 06/03/24 1003    aspirin EC tablet 81 mg, 81 mg, Oral, Daily, Henry Fallon M, DPM, 81 mg at 06/03/24 1003    ezetimibe (ZETIA) tablet 10 mg, 10 mg, Oral, Daily, Henry, Fallon M, DPM, 10 mg at 06/03/24 1003    famotidine (PEPCID) tablet 20 mg, 20 mg, Oral, BID, Fallon Figueroa, DPM, 20 mg at 06/03/24 1002    fluticasone (FLONASE) 50 MCG/ACT nasal spray 2 spray, 2 spray, Nasal, BID, Fallon Figueroa, DPM    empagliflozin (JARDIANCE) tablet 10 mg, 10 mg, Oral, Daily, Fallon Figueroa DPM, 10 mg at 06/03/24 1002    nitroGLYCERIN (NITROSTAT) SL tablet 0.4 mg, 0.4 mg, SubLINGual, Q5 Min PRN, Fallon Figueroa, DPM    rosuvastatin (CRESTOR) tablet 40 mg, 40 mg, Oral, Daily, Fallon Figueroa DPM, 40 mg at 06/03/24 1002    glucose chewable tablet 16 g, 4 tablet, Oral,

## 2024-06-03 NOTE — PROGRESS NOTES
Pharmacist Note - Vancomycin Dosing  Therapy day 3  Indication: Right diabetic foot infection with right 4th interspace abscess  Current regimen: 1000 mg q18 hours    Recent Labs     05/31/24  2223 06/01/24  0555 06/02/24  0448 06/03/24  0359   WBC 10.0 10.6 7.2  --    CREATININE 1.08 1.10 1.42* 1.33*   BUN 14 12 16 14       A random vancomycin level of 17.8 mcg/mL was obtained and from this level, the patient's AUC24 is calculated to be 344 with the current regimen.     Goal target range AUC/EZIO 400-600      Plan: Change to 1000 mg IV every 12 hours for an expected AUC of 494 (93%) . Pharmacy will continue to monitor this patient daily for changes in clinical status and renal function.    *Random vancomycin levels are used to calculate AUC/EZIO, this level should not be interpreted as a trough. Vancomycin has been dosed using Bayesian kinetics software to target an AUC24:EZIO of 400-600, which provides adequate exposure for as assumed infection due to MRSA with an EZIO of 1 or less while reducing the risk of nephrotoxicity as seen with traditional trough based dosing goals.

## 2024-06-03 NOTE — FLOWSHEET NOTE
06/03/24 0817   Incision 06/03/24 Foot Right;Anterior   Date First Assessed/Time First Assessed: 06/03/24 0810   Present on Original Admission: No  Location: Foot  Incision Location Orientation: Right;Anterior   Dressing Status Clean;Dry;Intact   Incision Cleansed Cleansed with saline   Dressing/Treatment Packing/iodaform packing;Gauze dressing/dressing sponge;Roll gauze;Ace wrap   Closure Sutures

## 2024-06-03 NOTE — WOUND CARE
Received new consult for right foot wound  Noted patient had I&D this morning with Dr. Figueroa  Confirmed with Dr. Figueroa there is no need for wound care to follow at this time    Will sign off. Please re-consult if needed    Anne Sturgeon MSN, [facility] Inpatient Wound Care  Office [phone]   Available through [employer]

## 2024-06-03 NOTE — PERIOP NOTE
TRANSFER - IN REPORT:    Verbal report received from Jorge MARCIAL on Timur Puente  being received from [facility] for ordered procedure      Report consisted of patient's Situation, Background, Assessment and   Recommendations(SBAR).     Information from the following report(s) Nurse Handoff Report was reviewed with the receiving nurse.    Opportunity for questions and clarification was provided.      Assessment completed upon patient's arrival to unit and care assumed.

## 2024-06-04 LAB
A1AT SERPL-MCNC: 204 MG/DL (ref 101–187)
ANION GAP SERPL CALC-SCNC: 6 MMOL/L (ref 5–15)
BUN SERPL-MCNC: 15 MG/DL (ref 6–20)
BUN/CREAT SERPL: 11 (ref 12–20)
C-ANCA TITR SER IF: NORMAL TITER
CALCIUM SERPL-MCNC: 9 MG/DL (ref 8.5–10.1)
CERULOPLASMIN SERPL-MCNC: 48.5 MG/DL (ref 16–31)
CHLORIDE SERPL-SCNC: 102 MMOL/L (ref 97–108)
CO2 SERPL-SCNC: 28 MMOL/L (ref 21–32)
CREAT SERPL-MCNC: 1.32 MG/DL (ref 0.7–1.3)
GLUCOSE BLD STRIP.AUTO-MCNC: 131 MG/DL (ref 65–117)
GLUCOSE BLD STRIP.AUTO-MCNC: 134 MG/DL (ref 65–117)
GLUCOSE BLD STRIP.AUTO-MCNC: 168 MG/DL (ref 65–117)
GLUCOSE BLD STRIP.AUTO-MCNC: 221 MG/DL (ref 65–117)
GLUCOSE SERPL-MCNC: 147 MG/DL (ref 65–100)
P-ANCA ATYPICAL TITR SER IF: NORMAL TITER
P-ANCA TITR SER IF: NORMAL TITER
POTASSIUM SERPL-SCNC: 3.6 MMOL/L (ref 3.5–5.1)
SERVICE CMNT-IMP: ABNORMAL
SMA IGG SER-ACNC: 5 UNITS (ref 0–19)
SODIUM SERPL-SCNC: 136 MMOL/L (ref 136–145)

## 2024-06-04 PROCEDURE — 6370000000 HC RX 637 (ALT 250 FOR IP): Performed by: PODIATRIST

## 2024-06-04 PROCEDURE — 6370000000 HC RX 637 (ALT 250 FOR IP): Performed by: CLINICAL NURSE SPECIALIST

## 2024-06-04 PROCEDURE — 99232 SBSQ HOSP IP/OBS MODERATE 35: CPT | Performed by: INTERNAL MEDICINE

## 2024-06-04 PROCEDURE — 6360000002 HC RX W HCPCS: Performed by: PODIATRIST

## 2024-06-04 PROCEDURE — 6360000002 HC RX W HCPCS: Performed by: INTERNAL MEDICINE

## 2024-06-04 PROCEDURE — 2580000003 HC RX 258: Performed by: PODIATRIST

## 2024-06-04 PROCEDURE — 2580000003 HC RX 258: Performed by: INTERNAL MEDICINE

## 2024-06-04 PROCEDURE — 6370000000 HC RX 637 (ALT 250 FOR IP): Performed by: INTERNAL MEDICINE

## 2024-06-04 PROCEDURE — 82962 GLUCOSE BLOOD TEST: CPT

## 2024-06-04 PROCEDURE — 1100000000 HC RM PRIVATE

## 2024-06-04 PROCEDURE — 80048 BASIC METABOLIC PNL TOTAL CA: CPT

## 2024-06-04 PROCEDURE — 36415 COLL VENOUS BLD VENIPUNCTURE: CPT

## 2024-06-04 PROCEDURE — 99232 SBSQ HOSP IP/OBS MODERATE 35: CPT | Performed by: CLINICAL NURSE SPECIALIST

## 2024-06-04 RX ORDER — ENEMA 19; 7 G/133ML; G/133ML
1 ENEMA RECTAL
Status: DISPENSED | OUTPATIENT
Start: 2024-06-04 | End: 2024-06-05

## 2024-06-04 RX ORDER — BISACODYL 5 MG/1
10 TABLET, DELAYED RELEASE ORAL DAILY PRN
Status: DISCONTINUED | OUTPATIENT
Start: 2024-06-04 | End: 2024-06-07 | Stop reason: HOSPADM

## 2024-06-04 RX ORDER — POLYETHYLENE GLYCOL 3350 17 G/17G
17 POWDER, FOR SOLUTION ORAL 2 TIMES DAILY
Status: DISCONTINUED | OUTPATIENT
Start: 2024-06-04 | End: 2024-06-07 | Stop reason: HOSPADM

## 2024-06-04 RX ADMIN — POLYETHYLENE GLYCOL 3350 17 G: 17 POWDER, FOR SOLUTION ORAL at 08:29

## 2024-06-04 RX ADMIN — PIPERACILLIN AND TAZOBACTAM 3375 MG: 3; .375 INJECTION, POWDER, LYOPHILIZED, FOR SOLUTION INTRAVENOUS at 12:03

## 2024-06-04 RX ADMIN — OXYCODONE HYDROCHLORIDE 5 MG: 5 TABLET ORAL at 03:09

## 2024-06-04 RX ADMIN — ASPIRIN 81 MG: 81 TABLET, COATED ORAL at 08:27

## 2024-06-04 RX ADMIN — MORPHINE SULFATE 2 MG: 2 INJECTION, SOLUTION INTRAMUSCULAR; INTRAVENOUS at 00:38

## 2024-06-04 RX ADMIN — EMPAGLIFLOZIN 10 MG: 10 TABLET, FILM COATED ORAL at 08:26

## 2024-06-04 RX ADMIN — OXYCODONE HYDROCHLORIDE 5 MG: 5 TABLET ORAL at 12:21

## 2024-06-04 RX ADMIN — PIPERACILLIN AND TAZOBACTAM 3375 MG: 3; .375 INJECTION, POWDER, LYOPHILIZED, FOR SOLUTION INTRAVENOUS at 03:51

## 2024-06-04 RX ADMIN — SACUBITRIL AND VALSARTAN 1 TABLET: 24; 26 TABLET, FILM COATED ORAL at 20:43

## 2024-06-04 RX ADMIN — FAMOTIDINE 20 MG: 20 TABLET ORAL at 20:42

## 2024-06-04 RX ADMIN — OXYCODONE HYDROCHLORIDE 5 MG: 5 TABLET ORAL at 16:08

## 2024-06-04 RX ADMIN — INSULIN GLARGINE 22 UNITS: 100 INJECTION, SOLUTION SUBCUTANEOUS at 20:45

## 2024-06-04 RX ADMIN — CLOPIDOGREL BISULFATE 75 MG: 75 TABLET ORAL at 08:27

## 2024-06-04 RX ADMIN — CARVEDILOL 25 MG: 12.5 TABLET, FILM COATED ORAL at 16:08

## 2024-06-04 RX ADMIN — OXYCODONE HYDROCHLORIDE 5 MG: 5 TABLET ORAL at 08:32

## 2024-06-04 RX ADMIN — EZETIMIBE 10 MG: 10 TABLET ORAL at 08:26

## 2024-06-04 RX ADMIN — ROSUVASTATIN CALCIUM 40 MG: 10 TABLET, FILM COATED ORAL at 08:26

## 2024-06-04 RX ADMIN — ENOXAPARIN SODIUM 40 MG: 100 INJECTION SUBCUTANEOUS at 08:26

## 2024-06-04 RX ADMIN — SODIUM CHLORIDE 25 ML: 9 INJECTION, SOLUTION INTRAVENOUS at 12:02

## 2024-06-04 RX ADMIN — CARVEDILOL 25 MG: 12.5 TABLET, FILM COATED ORAL at 08:26

## 2024-06-04 RX ADMIN — SPIRONOLACTONE 25 MG: 25 TABLET ORAL at 08:26

## 2024-06-04 RX ADMIN — VANCOMYCIN HYDROCHLORIDE 1000 MG: 1 INJECTION, POWDER, LYOPHILIZED, FOR SOLUTION INTRAVENOUS at 00:34

## 2024-06-04 RX ADMIN — PIPERACILLIN AND TAZOBACTAM 3375 MG: 3; .375 INJECTION, POWDER, LYOPHILIZED, FOR SOLUTION INTRAVENOUS at 20:49

## 2024-06-04 RX ADMIN — SODIUM CHLORIDE, PRESERVATIVE FREE 10 ML: 5 INJECTION INTRAVENOUS at 20:43

## 2024-06-04 RX ADMIN — FAMOTIDINE 20 MG: 20 TABLET ORAL at 08:28

## 2024-06-04 RX ADMIN — OXYCODONE HYDROCHLORIDE 5 MG: 5 TABLET ORAL at 20:39

## 2024-06-04 RX ADMIN — SACUBITRIL AND VALSARTAN 1 TABLET: 24; 26 TABLET, FILM COATED ORAL at 08:27

## 2024-06-04 ASSESSMENT — PAIN DESCRIPTION - LOCATION
LOCATION: FOOT

## 2024-06-04 ASSESSMENT — PAIN DESCRIPTION - ORIENTATION
ORIENTATION: RIGHT

## 2024-06-04 ASSESSMENT — PAIN DESCRIPTION - DESCRIPTORS
DESCRIPTORS: ACHING

## 2024-06-04 ASSESSMENT — PAIN SCALES - GENERAL
PAINLEVEL_OUTOF10: 7
PAINLEVEL_OUTOF10: 6
PAINLEVEL_OUTOF10: 7
PAINLEVEL_OUTOF10: 8
PAINLEVEL_OUTOF10: 6
PAINLEVEL_OUTOF10: 7
PAINLEVEL_OUTOF10: 6
PAINLEVEL_OUTOF10: 8
PAINLEVEL_OUTOF10: 8
PAINLEVEL_OUTOF10: 7
PAINLEVEL_OUTOF10: 7
PAINLEVEL_OUTOF10: 6

## 2024-06-04 ASSESSMENT — PAIN - FUNCTIONAL ASSESSMENT
PAIN_FUNCTIONAL_ASSESSMENT: ACTIVITIES ARE NOT PREVENTED

## 2024-06-04 NOTE — PROGRESS NOTES
Progress Note    Patient: Timur Puente MRN: [id number]  SSN: [id number]    YOB: 1975  Age: 49 y.o.  Sex: male      Admit Date: 5/31/2024  1 Day Post-Op         Subjective:     Patient seen resting quiet and comfortably and no apparent distress.  Relates that he has had some pain in the right foot overnight.    Objective:     Visit Vitals  BP (!) 149/90   Pulse 98   Temp 98.8 °F (37.1 °C) (Oral)   Resp 18   Ht 1.626 m (5' 4.02\")   Wt 90.7 kg (199 lb 15.3 oz)   SpO2 93%   BMI 34.31 kg/m²        Physical Exam:  Right foot: Mild strikethrough serosanguineous drainage to inner dressings. Erythema and edema significantly improved. Wound bed is red and granular with no signs of necrosis. No purulence or malodor to wound bed    Labs/Radiology Review: images and reports reviewed    Assessment:   Right foot abscess  Diabetes mellitus      Plan/Recommendations/Medical Decision Making:   -Dressing changed at bedside. Appearance of foot is dramatically improved.  -Continue Vanc/Zosyn. Abscess culture pending--currently growing GPC in pairs  -WBAT to right foot in surgical shoe  -Will continue to follow.

## 2024-06-04 NOTE — CARE COORDINATION
GILDA:     CM met with Pt at bedside and confirmed face sheet. Pt lives with wife in one story home. Family will transport. He owns no DME and has no history of HH or SNF; has been to outpatient therapy before at [facility]. Pt reports that he is working on ACP documents but they aren't yet complete. CM consulted with attending about need for PT and OT eval and treat.     Care Management Initial Assessment       RUR:11%  Readmission? No  1st IM letter given? N/a     06/04/24 1526   Service Assessment   Patient Orientation Alert and Oriented   Cognition Alert   History Provided By Patient   Primary Caregiver Self   Accompanied By/Relationship godsister   Support Systems Spouse/Significant Other;Children;Family Members;Friends/Neighbors   Patient's Healthcare Decision Maker is: Legal Next of Kin   PCP Verified by CM Yes   Last Visit to PCP Within last 3 months   Prior Functional Level Independent in ADLs/IADLs   Current Functional Level Assistance with the following:;Mobility   Can patient return to prior living arrangement Yes   Ability to make needs known: Good   Family able to assist with home care needs: Yes   Social/Functional History   Type of Home House   Home Layout One level   Home Equipment None   Active  Yes   Discharge Planning   Patient expects to be discharged to: House   Condition of Participation: Discharge Planning   The Plan for Transition of Care is related to the following treatment goals: tbd; no prior HH, has been to [facility] outpatient therapy in the past   The Patient and/or Patient Representative was provided with a Choice of Provider? Patient   The Patient and/Or Patient Representative agree with the Discharge Plan? Yes   Freedom of Choice list was provided with basic dialogue that supports the patient's individualized plan of care/goals, treatment preferences, and shares the quality data associated with the providers?  Yes

## 2024-06-04 NOTE — DIABETES MGMT
[facility]  PROGRAM FOR DIABETES HEALTH  DIABETES MANAGEMENT CONSULT    Consulted by Provider for advanced nursing evaluation and care for inpatient blood glucose management.    Setting Blood glucose targets   Non-critical care 100 - 180 mg/dl     Evaluation and Action Plan   This 49 year old AA male was admitted 5/31/24 with right foot wound and underwent I&D 6/3/24. Patient has Type 2 diabetes on insulin therapy, along with Jardiance and a GLP-1. Current basal insulin dose is dosed at 0.25 units/kg/D (much lower than home dose) and  Bgs have come into target range along with controlled carb at meals. Could be managed with a low-dose basal insulin, the GLP-1 and Jardiance at discharge.    Discussed carbohydrate control as major strategy to achieving BG targets, along with the current dose of long-acting insulin & other meds. Discussed usual timing of self-management strategies. He was unaware that he could take his long-acting insulin in the evenings when he is home so I encouraged him to do so. He tends to have a busy work week with loads of out of town work so encouraged him to take his GLP-1 on the weekend when he is home. Spent time discussing carbohydrates and use of the [employer] jose to understand how much carb he is consuming; directed him to eat no more than 60 gram of carb per meal. Patient believes he can implement these strategies.    Management Rationale Action Plan   Medication   Basal needs Based on  [x]        Blood glucose pattern  [x]        Weight & BMI  []        Kidney dysfunction  []        Home diabetes regimen     Continue Lantus insulin 22 units daily   Nutritional needs Based on  []        Blood glucose pattern  []        CHO load  []        Enteral feeding CHO load  []        TPN/PPN dextrose load      Corrective insulin Based on sensitivity  []        Low   [x]        Medium  []        High      Additional orders   Jardiance per PCP     Diabetes Discharge Plan   Medication  Lantus

## 2024-06-04 NOTE — PROGRESS NOTES
rate and rhythm,  no murmur, rub or gallop.  Abdomen:   Soft, non-tender. Not distended.  Bowel sounds normal. No masses  Extremities: R foot erythema and some drainage from wound  Skin:     Texture, turgor normal. No rashes or lesions.  Not Jaundiced  Lymph nodes: Cervical, supraclavicular normal.  Psych:  Good insight.  Not depressed.  Not anxious or agitated.  Neurologic: Normal strength, Alert and oriented X 3.       Lab Data Reviewed:    Recent Labs     06/02/24 0448   WBC 7.2   HGB 13.1   HCT 39.6          Recent Labs     06/02/24  0448 06/03/24  0359 06/04/24  0520    136 136   K 4.0 4.1 3.6    108 102   CO2 28 20* 28   BUN 16 14 15       Lab Results   Component Value Date/Time    GLUCPOC 195 01/26/2023 08:56 PM     No results for input(s): \"PH\", \"PCO2\", \"PO2\", \"HCO3\", \"FIO2\" in the last 72 hours.  No results for input(s): \"INR\" in the last 72 hours.  ___________________________________________________  ___________________________________________________    Attending Physician: Khoa Cooper MD

## 2024-06-05 LAB
ALBUMIN SERPL-MCNC: 2.8 G/DL (ref 3.5–5)
ALBUMIN/GLOB SERPL: 0.5 (ref 1.1–2.2)
ALP SERPL-CCNC: 187 U/L (ref 45–117)
ALT SERPL-CCNC: 72 U/L (ref 12–78)
AST SERPL-CCNC: 38 U/L (ref 15–37)
BACTERIA SPEC CULT: NORMAL
BILIRUB DIRECT SERPL-MCNC: <0.1 MG/DL (ref 0–0.2)
BILIRUB SERPL-MCNC: 0.3 MG/DL (ref 0.2–1)
GLOBULIN SER CALC-MCNC: 5.3 G/DL (ref 2–4)
GLUCOSE BLD STRIP.AUTO-MCNC: 152 MG/DL (ref 65–117)
GLUCOSE BLD STRIP.AUTO-MCNC: 164 MG/DL (ref 65–117)
GLUCOSE BLD STRIP.AUTO-MCNC: 181 MG/DL (ref 65–117)
GLUCOSE BLD STRIP.AUTO-MCNC: 90 MG/DL (ref 65–117)
PROT SERPL-MCNC: 8.1 G/DL (ref 6.4–8.2)
SERVICE CMNT-IMP: ABNORMAL
SERVICE CMNT-IMP: NORMAL

## 2024-06-05 PROCEDURE — 2580000003 HC RX 258: Performed by: PODIATRIST

## 2024-06-05 PROCEDURE — 82962 GLUCOSE BLOOD TEST: CPT

## 2024-06-05 PROCEDURE — 6360000002 HC RX W HCPCS: Performed by: INTERNAL MEDICINE

## 2024-06-05 PROCEDURE — 36415 COLL VENOUS BLD VENIPUNCTURE: CPT

## 2024-06-05 PROCEDURE — 6370000000 HC RX 637 (ALT 250 FOR IP): Performed by: PODIATRIST

## 2024-06-05 PROCEDURE — 97161 PT EVAL LOW COMPLEX 20 MIN: CPT

## 2024-06-05 PROCEDURE — 80076 HEPATIC FUNCTION PANEL: CPT

## 2024-06-05 PROCEDURE — 6370000000 HC RX 637 (ALT 250 FOR IP): Performed by: CLINICAL NURSE SPECIALIST

## 2024-06-05 PROCEDURE — 6360000002 HC RX W HCPCS: Performed by: PODIATRIST

## 2024-06-05 PROCEDURE — 2580000003 HC RX 258: Performed by: INTERNAL MEDICINE

## 2024-06-05 PROCEDURE — 97165 OT EVAL LOW COMPLEX 30 MIN: CPT

## 2024-06-05 PROCEDURE — 97535 SELF CARE MNGMENT TRAINING: CPT

## 2024-06-05 PROCEDURE — 99231 SBSQ HOSP IP/OBS SF/LOW 25: CPT | Performed by: CLINICAL NURSE SPECIALIST

## 2024-06-05 PROCEDURE — 1100000000 HC RM PRIVATE

## 2024-06-05 PROCEDURE — 97116 GAIT TRAINING THERAPY: CPT

## 2024-06-05 PROCEDURE — 99232 SBSQ HOSP IP/OBS MODERATE 35: CPT | Performed by: INTERNAL MEDICINE

## 2024-06-05 RX ADMIN — PIPERACILLIN AND TAZOBACTAM 3375 MG: 3; .375 INJECTION, POWDER, LYOPHILIZED, FOR SOLUTION INTRAVENOUS at 15:13

## 2024-06-05 RX ADMIN — PIPERACILLIN AND TAZOBACTAM 3375 MG: 3; .375 INJECTION, POWDER, LYOPHILIZED, FOR SOLUTION INTRAVENOUS at 05:38

## 2024-06-05 RX ADMIN — OXYCODONE HYDROCHLORIDE 5 MG: 5 TABLET ORAL at 22:06

## 2024-06-05 RX ADMIN — EZETIMIBE 10 MG: 10 TABLET ORAL at 09:27

## 2024-06-05 RX ADMIN — OXYCODONE HYDROCHLORIDE 5 MG: 5 TABLET ORAL at 00:36

## 2024-06-05 RX ADMIN — EMPAGLIFLOZIN 10 MG: 10 TABLET, FILM COATED ORAL at 09:28

## 2024-06-05 RX ADMIN — CARVEDILOL 25 MG: 12.5 TABLET, FILM COATED ORAL at 09:28

## 2024-06-05 RX ADMIN — ASPIRIN 81 MG: 81 TABLET, COATED ORAL at 09:29

## 2024-06-05 RX ADMIN — FLUTICASONE PROPIONATE 2 SPRAY: 50 SPRAY, METERED NASAL at 09:39

## 2024-06-05 RX ADMIN — CARVEDILOL 25 MG: 12.5 TABLET, FILM COATED ORAL at 18:04

## 2024-06-05 RX ADMIN — SACUBITRIL AND VALSARTAN 1 TABLET: 24; 26 TABLET, FILM COATED ORAL at 09:29

## 2024-06-05 RX ADMIN — PIPERACILLIN AND TAZOBACTAM 3375 MG: 3; .375 INJECTION, POWDER, LYOPHILIZED, FOR SOLUTION INTRAVENOUS at 21:56

## 2024-06-05 RX ADMIN — OXYCODONE HYDROCHLORIDE 5 MG: 5 TABLET ORAL at 18:05

## 2024-06-05 RX ADMIN — FAMOTIDINE 20 MG: 20 TABLET ORAL at 21:55

## 2024-06-05 RX ADMIN — OXYCODONE HYDROCHLORIDE 5 MG: 5 TABLET ORAL at 09:36

## 2024-06-05 RX ADMIN — ENOXAPARIN SODIUM 40 MG: 100 INJECTION SUBCUTANEOUS at 09:25

## 2024-06-05 RX ADMIN — ROSUVASTATIN CALCIUM 40 MG: 10 TABLET, FILM COATED ORAL at 09:26

## 2024-06-05 RX ADMIN — SPIRONOLACTONE 25 MG: 25 TABLET ORAL at 09:28

## 2024-06-05 RX ADMIN — OXYCODONE HYDROCHLORIDE 5 MG: 5 TABLET ORAL at 05:37

## 2024-06-05 RX ADMIN — FAMOTIDINE 20 MG: 20 TABLET ORAL at 09:28

## 2024-06-05 RX ADMIN — OXYCODONE HYDROCHLORIDE 5 MG: 5 TABLET ORAL at 13:58

## 2024-06-05 RX ADMIN — CLOPIDOGREL BISULFATE 75 MG: 75 TABLET ORAL at 09:28

## 2024-06-05 RX ADMIN — SODIUM CHLORIDE, PRESERVATIVE FREE 10 ML: 5 INJECTION INTRAVENOUS at 21:56

## 2024-06-05 RX ADMIN — INSULIN GLARGINE 22 UNITS: 100 INJECTION, SOLUTION SUBCUTANEOUS at 22:05

## 2024-06-05 RX ADMIN — SODIUM CHLORIDE 25 ML: 9 INJECTION, SOLUTION INTRAVENOUS at 15:09

## 2024-06-05 RX ADMIN — SACUBITRIL AND VALSARTAN 1 TABLET: 24; 26 TABLET, FILM COATED ORAL at 21:55

## 2024-06-05 ASSESSMENT — PAIN SCALES - GENERAL
PAINLEVEL_OUTOF10: 7
PAINLEVEL_OUTOF10: 5
PAINLEVEL_OUTOF10: 7
PAINLEVEL_OUTOF10: 7
PAINLEVEL_OUTOF10: 6
PAINLEVEL_OUTOF10: 6

## 2024-06-05 ASSESSMENT — PAIN DESCRIPTION - LOCATION
LOCATION: FOOT

## 2024-06-05 ASSESSMENT — PAIN DESCRIPTION - DESCRIPTORS
DESCRIPTORS: ACHING

## 2024-06-05 ASSESSMENT — PAIN DESCRIPTION - ORIENTATION
ORIENTATION: RIGHT

## 2024-06-05 NOTE — PROGRESS NOTES
Occupational Therapy    Orders received, chart reviewed and patient evaluated by occupational therapy. Pending progression with skilled acute occupational therapy, recommend:  No skilled occupational therapy    Recommend with nursing patient to complete as able in order to maintain strength, endurance and independence: OOB to chair 3x/day, ADLs with supervision/setup and mobilizing to the bathroom for RW toileting with Stand-by assist. Thank you for your assistance.     Full evaluation to follow.     Thank you,  Matthew Kerley, OT

## 2024-06-05 NOTE — PROGRESS NOTES
(ZOSYN) 3,375 mg in sodium chloride 0.9 % 50 mL IVPB (mini-bag)  3,375 mg IntraVENous Q8H    sacubitril-valsartan (ENTRESTO) 24-26 MG per tablet 1 tablet  1 tablet Oral BID    spironolactone (ALDACTONE) tablet 25 mg  25 mg Oral Daily    0.9 % sodium chloride infusion   IntraVENous Continuous        Objective:   Vitals:  /85   Pulse 88   Temp 99.1 °F (37.3 °C) (Oral)   Resp 17   Ht 1.626 m (5' 4.02\")   Wt 90.7 kg (199 lb 15.3 oz)   SpO2 93%   BMI 34.31 kg/m²   Temp (24hrs), Av.2 °F (36.8 °C), Min:97.3 °F (36.3 °C), Max:99.1 °F (37.3 °C)           Last 24hr Input/Output:    Intake/Output Summary (Last 24 hours) at 2024 0737  Last data filed at 2024 0042  Gross per 24 hour   Intake 610 ml   Output 600 ml   Net 10 ml          PHYSICAL EXAM:  General:    Alert, cooperative, no distress, appears stated age.     Head:   Normocephalic, without obvious abnormality, atraumatic.  Eyes:   Conjunctivae/corneas clear.  PERRLA  Nose:  Nares normal. No drainage or sinus tenderness.  Throat:    Lips, mucosa, and tongue normal.  No Thrush  Neck:  Supple, symmetrical,  no adenopathy, thyroid: non tender    no carotid bruit and no JVD.  Back:    Symmetric,  No CVA tenderness.  Lungs:   Clear to auscultation bilaterally.  No Wheezing or Rhonchi. No rales.  Chest wall:  No tenderness or deformity. No Accessory muscle use.  Heart:   Regular rate and rhythm,  no murmur, rub or gallop.  Abdomen:   Soft, non-tender. Not distended.  Bowel sounds normal. No masses  Extremities: R foot erythema and some drainage from wound  Skin:     Texture, turgor normal. No rashes or lesions.  Not Jaundiced  Lymph nodes: Cervical, supraclavicular normal.  Psych:  Good insight.  Not depressed.  Not anxious or agitated.  Neurologic: Normal strength, Alert and oriented X 3.       Lab Data Reviewed:    No results for input(s): \"WBC\", \"HGB\", \"HCT\", \"PLT\" in the last 72 hours.    Recent Labs     24  0359 24  0520 24  0041     136  --    K 4.1 3.6  --     102  --    CO2 20* 28  --    BUN 14 15  --    ALT  --   --  72       Lab Results   Component Value Date/Time    GLUCPOC 195 01/26/2023 08:56 PM     No results for input(s): \"PH\", \"PCO2\", \"PO2\", \"HCO3\", \"FIO2\" in the last 72 hours.  No results for input(s): \"INR\" in the last 72 hours.  ___________________________________________________  ___________________________________________________    Attending Physician: Khoa Cooper MD

## 2024-06-05 NOTE — CARE COORDINATION
GILDA:     CM met with Pt at bedside to confirm that he and his family will do own wound care. Pt confirms that they are comfortable with doing so and that no HH is need for PT nor wound care. Cultures still pending. CM will continue to follow.     Pt lives with wife in one story home. Family will transport. He owns no DME and has no history of HH or SNF; has been to outpatient therapy before at [facility]. Pt reports that he is working on ACP documents but they aren't yet complete. CM consulted with attending about need for PT and OT eval and treat.     Transition of Care Plan:    RUR: 13%  Prior Level of Functioning: home indep  Disposition: home  If SNF or IPR: Date FOC offered: n/a  Date FOC received:   Accepting facility:   Date authorization started with reference number:   Date authorization received and expires:   Follow up appointments:   DME needed: none  Transportation at discharge: family  IM/IMM Medicare/ letter given: n/a  Is patient a  and connected with [facility]?    If yes, was  transfer form completed and [facility] notified?   Caregiver Contact: [phone]  Sara Puente wife  Discharge Caregiver contacted prior to discharge?   Care Conference needed?   Barriers to discharge:  cultures pending

## 2024-06-05 NOTE — PLAN OF CARE
Problem: Physical Therapy - Adult  Goal: By Discharge: Performs mobility at highest level of function for planned discharge setting.  See evaluation for individualized goals.  Description: FUNCTIONAL STATUS PRIOR TO ADMISSION: Patient was independent and active without use of DME.    HOME SUPPORT PRIOR TO ADMISSION: The patient lived with spouse but did not require assistance.    Physical Therapy Goals  Initiated 6/5/2024  1.  Patient will move from supine to sit and sit to supine in bed with independence within 7 day(s).    2.  Patient will perform sit to stand with modified independence within 7 day(s).  3.  Patient will transfer from bed to chair and chair to bed with modified independence using the least restrictive device within 7 day(s).  4.  Patient will ambulate with modified independence for 150 feet with the least restrictive device within 7 day(s).   5.  Patient will ascend/descend 4 stairs with 1 handrail(s) with modified independence within 7 day(s).   Outcome: Progressing   PHYSICAL THERAPY EVALUATION    Patient: Timur Puente (49 y.o. male)  Date: 6/5/2024  Primary Diagnosis: Cellulitis and abscess of foot [L03.119, L02.619]  Diabetic infection of right foot (HCC) [E11.628, L08.9]  Procedure(s) (LRB):  RIGHT FOOT  INCISION AND DRAINAGE REQUEST 7:30  OR AFTER 4PM (Right) 2 Days Post-Op   Precautions: Restrictions/Precautions: Weight Bearing  Required Braces or Orthoses?: Yes (surgical shoe)   Right Lower Extremity Weight Bearing: Weight Bearing As Tolerated (with surgical shoe, no standing or walking > 15 min)                  ASSESSMENT :   DEFICITS/IMPAIRMENTS:   The patient is limited by decreased functional mobility, high-level IADLs, strength, high-level decreased balance, activity tolerance, increased pain levels S/P admission with diabetic foot infection and RIGHT FOOT  INCISION AND DRAINAGE POD 2.  Pt educated on WBAT with surgical shoe and importance of avoiding standing or walking > 15 min.  AM-PAC \"6-Clicks\" Basic Mobility and Daily Activity Scores With Discharge Destination. Phys Ther. 2021 4;101(4):petd365. doi: 10.1093/ptj/rtvy772. PMID: 91589914.  3. Tomás BLOCK, Ping SAPP, Dina S, Peri K, Janina LAWRENCE. Activity Measure for Post-Acute Care \"6-Clicks\" Basic Mobility Scores Predict Discharge Destination After Acute Care Hospitalization in Select Patient Groups: A Retrospective, Observational Study. Arch Rehabil Res Clin Transl. 2022 16;4(3):929749. doi: 10.1016/j.arrct..939905. PMID: 12221518; PMCID: [id number].  4. Eduardo ROSS, Dianna S, Ag W, Padmini P. AM-PAC Short Forms Manual 4.0. Revised 2020.                                                                                                                                                                                                                              Pain Ratin/10, R foot   Pain Intervention(s):   rest and elevation    Activity Tolerance:   Fair     After treatment:   Patient left in no apparent distress in bed and Call bell within reach    COMMUNICATION/EDUCATION:   The patient's plan of care was discussed with: registered nurse    Patient Education  Education Given To: Patient  Education Provided: Plan of Care;Transfer Training;Equipment  Education Method: Verbal;Demonstration  Barriers to Learning: None  Education Outcome: Verbalized understanding    Thank you for this referral.  RALPH SHUKLA, PT  Minutes: 23      Physical Therapy Evaluation Charge Determination   History Examination Presentation Decision-Making   MEDIUM  Complexity : 1-2 comorbidities / personal factors will impact the outcome/ POC  LOW Complexity : 1-2 Standardized tests and measures addressing body structure, function, activity limitation and / or participation in recreation  LOW Complexity : Stable, uncomplicated  AM-PAC  LOW    Based on the above components, the patient evaluation is determined to be of the following complexity level: Low

## 2024-06-05 NOTE — DIABETES MGMT
insulin the body can not make to provide basic needs [x] Degludec (Tresiba)  [] Detemir (Levemir)  [] Glargine (Basaglar)   [] Glargine (Lantus)    [] Glargine (Toujeo)    [] Glargine-yfgn (Semglee)  [] Relion Novolin NPH 90 units D     Diabetes self-management practices: Deferred for now  Eating pattern  [] Eats a carbohydrate-controlled meal plan  [] Buys own groceries  [] Prepares own meals  [] Consumes CHO-rich beverages    [] Consumes CHO-rich snacks   [] Dentition status   Physical activity pattern  [] Employs a physical activity program to control BG  [] Lives in walking neighborhood  [] Has gym membership/home equipment  [] Participates in Aerobic exercise   [] Participates in Weight training   [] Participates in Flexibility routine    Monitoring pattern  [] Tests Glucoses sufficiently to inform self-management adjustments  [] Via fingerstick   [] Via Continuous Glucose Monitor (CGM)   Taking medications pattern  [] Takes medications consistently   [] Affordable  Reducing risks  [] Has up-to-date vaccinations  [] Influenza:      [] Pneumonia:     [] Hepatitis:      Overall evaluation:    [x] Not achieving A1c target with drug therapy & self-care practices    Subjective   ”I'm going to do what I need to do.”     Objective   Physical exam  General Obese male who is in no acute distress. Cooperative  Neuro  Alert, oriented  Vital Signs   Vitals:    06/05/24 0928   BP: (!) 154/106   Pulse: 87   Resp:    Temp:    SpO2:      Extremities No foot wounds    Diabetic foot exam:    Left Foot     Visual Exam: Cool & dry. Thick toe nails    Pulse DP: +1   Right Foot   Covered with Ace bandage   Thick toe nails     Laboratory  No results for input(s): \"WBC\", \"HGB\", \"HCT\", \"MCV\", \"PLT\" in the last 72 hours.    Recent Labs     06/03/24  0359 06/04/24  0520    136   K 4.1 3.6    102   CO2 20* 28   BUN 14 15   CREATININE 1.33* 1.32*       Recent Labs     06/03/24  0359 06/04/24  0520   LABGLOM 66 67       Lab  medications, and examined the patient at the bedside.  Total minutes: 25    JAMES Suero - CNS  Clinical Nurse Specialist - Diabetes & endocrine disorders  Program for Diabetes Health  Access via E[employer] Education

## 2024-06-05 NOTE — PROGRESS NOTES
Progress Note    Patient: Timur Puente MRN: [id number]  SSN: [id number]    YOB: 1975  Age: 49 y.o.  Sex: male      Admit Date: 5/31/2024  2 Days Post-Op     POD #2 Incision and drainage of right foot    Subjective:     Patient seen resting quiet and comfortably and no apparent distress.  Wife and mother-in-law are present at bedside. Relates that his pain the the foot is improved compared to yesterday. No issues overnight.     Objective:     Visit Vitals  /85   Pulse 88   Temp 99.1 °F (37.3 °C) (Oral)   Resp 17   Ht 1.626 m (5' 4.02\")   Wt 90.7 kg (199 lb 15.3 oz)   SpO2 93%   BMI 34.31 kg/m²        Physical Exam:  Right foot: Minimalstrikethrough serosanguineous drainage to inner dressings. Erythema and edema are resolved. Wound bed is red and granular with no signs of necrosis. No purulence or malodor to wound bed    Labs/Radiology Review: images and reports reviewed    Assessment:   Right foot abscess  Diabetes mellitus      Plan/Recommendations/Medical Decision Making:   -Dressing changed at bedside. Appearance of foot is dramatically improved. Reviewed how to pack and dress with wound with patient's wife and mother-in-law.  -Continue Zosyn. Preliminary surgical culture currently growing beta-hemolytic group B strep.  -WBAT to right foot in surgical shoe. (Patient to be dispensed surgical shoe, currently using wife's previous surgical shoe.)  -Will continue to follow while in house. Patient is stable for discharge from my standpoint once abscess cultures are finalized.    Discharge Instructions:  -Pack wound daily with either 1/4\" packing or [employer] Ag rope and cover with 4x4, roll gauze, and Ace bandage.  -WBAT in surgical shoe. Do not walk or stand for more than 15 minutes at a time.  -Follow-up with me in office one week after discharge. Call [phone] to make appointment.

## 2024-06-05 NOTE — PROGRESS NOTES
[facility] LIVER [facility]      Ayad Gonzalez MD, FACP, FACG, FAASLD      CASSIDY Person, [facility]   Saida Gonzalez, [facility]   Ruthy Marquesosta, [facility]  Nazario Schmidt, [facility]   Frances Loyd, [facility]   Angela Lemus, [facility]      Liver [facility]   [address], [address]   [place]  [address] [phone]   FAX: [phone]  [facility]   [address], [address]   [place]  [address] [phone]   FAX: [phone]       HEPATOLOGY PROGRESS NOTE  24-hr events:   LFTs downtrending, serologic w/u unremarkable, Mr. Puente feeling well.      ASSESSMENT AND PLAN:  50 yo w/MASLD admitted for diabetic foot wound, found to have elevated LFTs.  His acute elevation is resolved however he has underlying MASLD.  We will sign off, recommend outpatient follow-up for fibrosis staging with fibroscan, normal spleen and robust plt count reassuring.   Discussed with him better glycemic control, no alcohol, weight loss goal of 10% and increased physical activity.      PHYSICAL EXAMINATION:  VS: BP (!) 154/106   Pulse 87   Temp 98.8 °F (37.1 °C) (Oral)   Resp 16   Ht 1.626 m (5' 4.02\")   Wt 90.7 kg (199 lb 15.3 oz)   SpO2 97%   BMI 34.31 kg/m²     General:  No acute distress.   Eyes:  Sclera anicteric.   ENT:  No oral lesions.  Thyroid normal.  Nodes:  No adenopathy.   Skin:  No spider angiomata.  Respiratory:  Lungs clear to auscultation.   Cardiovascular:  Regular heart rate.  Abdomen:  Soft non-tender, No obvious ascites.  Extremities:  No lower extremity edema.  Abscess on dorsum of foot  Neurologic:  Alert and oriented.  Cranial nerves grossly intact.  No asterixis.    LABORATORY:   Latest Reference Range & Units 05/31/24 22:23 06/01/24 05:55 06/05/24 00:41   Albumin 3.5 - 5.0 g/dL 3.4 (L) 3.2 (L) 2.8 (L)    Globulin 2.0 - 4.0 g/dL 5.1 (H) 4.4 (H) 5.3 (H)   Albumin/Globulin Ratio 1.1 - 2.2   0.7 (L) 0.7 (L) 0.5 (L)   Alkaline Phosphatase 45 - 117 U/L 223 (H) 340 (H) 187 (H)   ALT 12 - 78 U/L 158 (H) 188 (H) 72   AST 15 - 37 U/L 62 (H) 163 (H) 38 (H)   Total Bilirubin 0.2 - 1.0 MG/DL 0.5 0.7 0.3   (L): Data is abnormally low  (H): Data is abnormally high    Elyssa Landers M.D.  Transplant Hepatology & Gastroenterology

## 2024-06-05 NOTE — PROGRESS NOTES
OCCUPATIONAL THERAPY EVALUATION/DISCHARGE  Patient: Timur Puente (49 y.o. male)  Date: 6/5/2024  Primary Diagnosis: Cellulitis and abscess of foot [L03.119, L02.619]  Diabetic infection of right foot (HCC) [E11.628, L08.9]  Procedure(s) (LRB):  RIGHT FOOT  INCISION AND DRAINAGE REQUEST 7:30  OR AFTER 4PM (Right) 2 Days Post-Op     Precautions: RLE WBAT    ASSESSMENT :  Based on the objective data below, the patient presents with decreased higher level mobility/balance and activity tolerance, as well as, 6/10 c/o R foot pain; however, he is demonstrating a high level of safe functional independence, completing RW levels with Supervision, aside from Min A for RLE post-op shoe donning.  Pt reports good PRN assist from family within home, as well as, tub-shower combo.  Pt educated on modified dressing techniques, as well as, tub-transfer bench and verbalizes good understanding.  Given above mentioned, no further acute OT needs identified, with OT answering pt's questions/concerns and pt thanking therapist for his efforts.    Functional Outcome Measure:  The patient scored 70/100 on the Barthel Index outcome measure.      Further skilled acute occupational therapy is not indicated at this time.     PLAN :  Recommend with staff: OOB meals, Active ADL engagement    Recommendation for discharge: (in order for the patient to meet his/her long term goals): No skilled occupational therapy    Other factors to consider for discharge: no additional factors    IF patient discharges home will need the following DME: transfer bench and rolling walker     SUBJECTIVE:   Patient stated, “Thanks for your help.”    OBJECTIVE DATA SUMMARY:     Past Medical History:   Diagnosis Date    CAD (coronary artery disease)     2 stents 2016     DM2 (diabetes mellitus, type 2) (HCC)     Headache     Hypercholesterolemia     Hypertension     Hypogonadism male     Obstructive sleep apnea     JACKY (obstructive sleep apnea)      Past Surgical History:  rehabilitation; comparison of the responsiveness of the Barthel Index and Functional [place] Measure. Journal of Neurology, Neurosurgery, and Psychiatry, 66(4), 986-316.  -MEY Tineo.A, LANE Sue, & ROSE MARY Jacob (2004) Assessment of post-stroke quality of life in cost-effectiveness studies: The usefulness of the Barthel Index and the EuroQoL-5D. Quality of Life Research, 13, 427-43                                                                                                                                                                                                                                 Pain Ratin/10   Pain Intervention(s):   nursing notified and addressing, rest, and repositioning    Activity Tolerance:   Good, Fair , and requires rest breaks    After treatment:   Patient left in no apparent distress sitting up in chair, Call bell within reach, and Bed/ chair alarm activated    COMMUNICATION/EDUCATION:   The patient's plan of care was discussed with: physical therapist, registered nurse, and     Patient Education  Education Given To: Patient  Education Provided: Role of Therapy;Plan of Care;Precautions;ADL Adaptive Strategies;Transfer Training;Energy Conservation  Education Method: Demonstration;Verbal  Barriers to Learning: None  Education Outcome: Verbalized understanding;Demonstrated understanding    Thank you for this referral.  Matthew Kerley, OT  Minutes: 38    Occupational Therapy Evaluation Charge Determination   History Examination Decision-Making   LOW Complexity : Brief history review  LOW Complexity: 1-3 Performance deficits relating to physical, cognitive, or psychosocial skills that result in activity limitations and/or participation restrictions LOW Complexity: No comorbidities that affect functional and  no verbal  or physical assist needed to complete eval tasks   Based on the above components, the patient evaluation is determined to be

## 2024-06-06 LAB
ALBUMIN SERPL-MCNC: 2.8 G/DL (ref 3.5–5)
ALBUMIN/GLOB SERPL: 0.5 (ref 1.1–2.2)
ALP SERPL-CCNC: 162 U/L (ref 45–117)
ALT SERPL-CCNC: 58 U/L (ref 12–78)
ANION GAP SERPL CALC-SCNC: 5 MMOL/L (ref 5–15)
AST SERPL-CCNC: 30 U/L (ref 15–37)
BACTERIA SPEC CULT: ABNORMAL
BACTERIA SPEC CULT: ABNORMAL
BASOPHILS # BLD: 0.1 K/UL (ref 0–0.1)
BASOPHILS NFR BLD: 1 % (ref 0–1)
BILIRUB DIRECT SERPL-MCNC: <0.1 MG/DL (ref 0–0.2)
BILIRUB SERPL-MCNC: 0.4 MG/DL (ref 0.2–1)
BUN SERPL-MCNC: 20 MG/DL (ref 6–20)
BUN/CREAT SERPL: 16 (ref 12–20)
CALCIUM SERPL-MCNC: 9.1 MG/DL (ref 8.5–10.1)
CHLORIDE SERPL-SCNC: 105 MMOL/L (ref 97–108)
CO2 SERPL-SCNC: 26 MMOL/L (ref 21–32)
CREAT SERPL-MCNC: 1.26 MG/DL (ref 0.7–1.3)
DIFFERENTIAL METHOD BLD: ABNORMAL
EOSINOPHIL # BLD: 0.3 K/UL (ref 0–0.4)
EOSINOPHIL NFR BLD: 6 % (ref 0–7)
ERYTHROCYTE [DISTWIDTH] IN BLOOD BY AUTOMATED COUNT: 13.6 % (ref 11.5–14.5)
GLOBULIN SER CALC-MCNC: 5.1 G/DL (ref 2–4)
GLUCOSE BLD STRIP.AUTO-MCNC: 103 MG/DL (ref 65–117)
GLUCOSE BLD STRIP.AUTO-MCNC: 114 MG/DL (ref 65–117)
GLUCOSE BLD STRIP.AUTO-MCNC: 129 MG/DL (ref 65–117)
GLUCOSE BLD STRIP.AUTO-MCNC: 95 MG/DL (ref 65–117)
GLUCOSE SERPL-MCNC: 127 MG/DL (ref 65–100)
GRAM STN SPEC: ABNORMAL
GRAM STN SPEC: ABNORMAL
HCT VFR BLD AUTO: 42.5 % (ref 36.6–50.3)
HGB BLD-MCNC: 14.4 G/DL (ref 12.1–17)
IMM GRANULOCYTES # BLD AUTO: 0 K/UL
IMM GRANULOCYTES NFR BLD AUTO: 0 %
LYMPHOCYTES # BLD: 2.1 K/UL (ref 0.8–3.5)
LYMPHOCYTES NFR BLD: 38 % (ref 12–49)
MCH RBC QN AUTO: 26.1 PG (ref 26–34)
MCHC RBC AUTO-ENTMCNC: 33.9 G/DL (ref 30–36.5)
MCV RBC AUTO: 77.1 FL (ref 80–99)
MONOCYTES # BLD: 0.9 K/UL (ref 0–1)
MONOCYTES NFR BLD: 16 % (ref 5–13)
NEUTS SEG # BLD: 2.2 K/UL (ref 1.8–8)
NEUTS SEG NFR BLD: 39 % (ref 32–75)
NRBC # BLD: 0 K/UL (ref 0–0.01)
NRBC BLD-RTO: 0 PER 100 WBC
PLATELET # BLD AUTO: 259 K/UL (ref 150–400)
PMV BLD AUTO: 10.4 FL (ref 8.9–12.9)
POTASSIUM SERPL-SCNC: 3.8 MMOL/L (ref 3.5–5.1)
PROT SERPL-MCNC: 7.9 G/DL (ref 6.4–8.2)
RBC # BLD AUTO: 5.51 M/UL (ref 4.1–5.7)
RBC MORPH BLD: ABNORMAL
SERVICE CMNT-IMP: ABNORMAL
SERVICE CMNT-IMP: ABNORMAL
SERVICE CMNT-IMP: NORMAL
SODIUM SERPL-SCNC: 136 MMOL/L (ref 136–145)
WBC # BLD AUTO: 5.6 K/UL (ref 4.1–11.1)
WBC MORPH BLD: ABNORMAL

## 2024-06-06 PROCEDURE — 6370000000 HC RX 637 (ALT 250 FOR IP): Performed by: CLINICAL NURSE SPECIALIST

## 2024-06-06 PROCEDURE — 2580000003 HC RX 258: Performed by: STUDENT IN AN ORGANIZED HEALTH CARE EDUCATION/TRAINING PROGRAM

## 2024-06-06 PROCEDURE — 85025 COMPLETE CBC W/AUTO DIFF WBC: CPT

## 2024-06-06 PROCEDURE — 6360000002 HC RX W HCPCS: Performed by: PODIATRIST

## 2024-06-06 PROCEDURE — 2580000003 HC RX 258: Performed by: INTERNAL MEDICINE

## 2024-06-06 PROCEDURE — 2580000003 HC RX 258: Performed by: PODIATRIST

## 2024-06-06 PROCEDURE — 82962 GLUCOSE BLOOD TEST: CPT

## 2024-06-06 PROCEDURE — 80076 HEPATIC FUNCTION PANEL: CPT

## 2024-06-06 PROCEDURE — 6370000000 HC RX 637 (ALT 250 FOR IP): Performed by: PODIATRIST

## 2024-06-06 PROCEDURE — 1100000000 HC RM PRIVATE

## 2024-06-06 PROCEDURE — 36415 COLL VENOUS BLD VENIPUNCTURE: CPT

## 2024-06-06 PROCEDURE — 80048 BASIC METABOLIC PNL TOTAL CA: CPT

## 2024-06-06 PROCEDURE — 6360000002 HC RX W HCPCS: Performed by: INTERNAL MEDICINE

## 2024-06-06 PROCEDURE — 99232 SBSQ HOSP IP/OBS MODERATE 35: CPT | Performed by: INTERNAL MEDICINE

## 2024-06-06 RX ORDER — INSULIN DEGLUDEC 200 U/ML
22 INJECTION, SOLUTION SUBCUTANEOUS
Qty: 5 ADJUSTABLE DOSE PRE-FILLED PEN SYRINGE | Refills: 11 | Status: SHIPPED | OUTPATIENT
Start: 2024-06-06 | End: 2024-06-06

## 2024-06-06 RX ORDER — INSULIN DEGLUDEC 200 U/ML
22 INJECTION, SOLUTION SUBCUTANEOUS
Qty: 5 ADJUSTABLE DOSE PRE-FILLED PEN SYRINGE | Refills: 11 | Status: SHIPPED | OUTPATIENT
Start: 2024-06-06

## 2024-06-06 RX ORDER — OXYCODONE HYDROCHLORIDE 5 MG/1
5 TABLET ORAL EVERY 4 HOURS PRN
Qty: 18 TABLET | Refills: 0 | Status: SHIPPED | OUTPATIENT
Start: 2024-06-06 | End: 2024-06-09

## 2024-06-06 RX ORDER — AMOXICILLIN 500 MG/1
500 CAPSULE ORAL 3 TIMES DAILY
Qty: 21 CAPSULE | Refills: 0 | Status: SHIPPED | OUTPATIENT
Start: 2024-06-06 | End: 2024-06-13

## 2024-06-06 RX ORDER — POLYETHYLENE GLYCOL 3350 17 G/17G
17 POWDER, FOR SOLUTION ORAL 2 TIMES DAILY
Qty: 527 G | Refills: 1 | Status: SHIPPED | OUTPATIENT
Start: 2024-06-06 | End: 2024-07-06

## 2024-06-06 RX ADMIN — OXYCODONE HYDROCHLORIDE 5 MG: 5 TABLET ORAL at 22:33

## 2024-06-06 RX ADMIN — SODIUM CHLORIDE, POTASSIUM CHLORIDE, SODIUM LACTATE AND CALCIUM CHLORIDE: 600; 310; 30; 20 INJECTION, SOLUTION INTRAVENOUS at 14:25

## 2024-06-06 RX ADMIN — CARVEDILOL 25 MG: 12.5 TABLET, FILM COATED ORAL at 17:23

## 2024-06-06 RX ADMIN — CARVEDILOL 25 MG: 12.5 TABLET, FILM COATED ORAL at 09:28

## 2024-06-06 RX ADMIN — SACUBITRIL AND VALSARTAN 1 TABLET: 24; 26 TABLET, FILM COATED ORAL at 09:27

## 2024-06-06 RX ADMIN — OXYCODONE HYDROCHLORIDE 5 MG: 5 TABLET ORAL at 18:36

## 2024-06-06 RX ADMIN — CLOPIDOGREL BISULFATE 75 MG: 75 TABLET ORAL at 09:27

## 2024-06-06 RX ADMIN — ROSUVASTATIN CALCIUM 40 MG: 10 TABLET, FILM COATED ORAL at 09:26

## 2024-06-06 RX ADMIN — SODIUM CHLORIDE, PRESERVATIVE FREE 10 ML: 5 INJECTION INTRAVENOUS at 09:29

## 2024-06-06 RX ADMIN — PIPERACILLIN AND TAZOBACTAM 3375 MG: 3; .375 INJECTION, POWDER, LYOPHILIZED, FOR SOLUTION INTRAVENOUS at 14:30

## 2024-06-06 RX ADMIN — OXYCODONE HYDROCHLORIDE 5 MG: 5 TABLET ORAL at 06:46

## 2024-06-06 RX ADMIN — FAMOTIDINE 20 MG: 20 TABLET ORAL at 21:36

## 2024-06-06 RX ADMIN — SACUBITRIL AND VALSARTAN 1 TABLET: 24; 26 TABLET, FILM COATED ORAL at 21:36

## 2024-06-06 RX ADMIN — SODIUM CHLORIDE 25 ML: 9 INJECTION, SOLUTION INTRAVENOUS at 14:27

## 2024-06-06 RX ADMIN — FAMOTIDINE 20 MG: 20 TABLET ORAL at 09:28

## 2024-06-06 RX ADMIN — OXYCODONE HYDROCHLORIDE 5 MG: 5 TABLET ORAL at 02:22

## 2024-06-06 RX ADMIN — INSULIN GLARGINE 22 UNITS: 100 INJECTION, SOLUTION SUBCUTANEOUS at 21:49

## 2024-06-06 RX ADMIN — PIPERACILLIN AND TAZOBACTAM 3375 MG: 3; .375 INJECTION, POWDER, LYOPHILIZED, FOR SOLUTION INTRAVENOUS at 06:50

## 2024-06-06 RX ADMIN — SODIUM CHLORIDE, PRESERVATIVE FREE 10 ML: 5 INJECTION INTRAVENOUS at 21:48

## 2024-06-06 RX ADMIN — OXYCODONE HYDROCHLORIDE 5 MG: 5 TABLET ORAL at 12:15

## 2024-06-06 RX ADMIN — SPIRONOLACTONE 25 MG: 25 TABLET ORAL at 09:30

## 2024-06-06 RX ADMIN — ASPIRIN 81 MG: 81 TABLET, COATED ORAL at 09:26

## 2024-06-06 RX ADMIN — PIPERACILLIN AND TAZOBACTAM 3375 MG: 3; .375 INJECTION, POWDER, LYOPHILIZED, FOR SOLUTION INTRAVENOUS at 21:44

## 2024-06-06 RX ADMIN — EMPAGLIFLOZIN 10 MG: 10 TABLET, FILM COATED ORAL at 09:27

## 2024-06-06 RX ADMIN — EZETIMIBE 10 MG: 10 TABLET ORAL at 09:27

## 2024-06-06 RX ADMIN — ENOXAPARIN SODIUM 40 MG: 100 INJECTION SUBCUTANEOUS at 09:25

## 2024-06-06 ASSESSMENT — PAIN DESCRIPTION - LOCATION
LOCATION: FOOT

## 2024-06-06 ASSESSMENT — PAIN SCALES - GENERAL
PAINLEVEL_OUTOF10: 5
PAINLEVEL_OUTOF10: 6
PAINLEVEL_OUTOF10: 6
PAINLEVEL_OUTOF10: 5
PAINLEVEL_OUTOF10: 0
PAINLEVEL_OUTOF10: 6

## 2024-06-06 ASSESSMENT — PAIN DESCRIPTION - ORIENTATION
ORIENTATION: RIGHT
ORIENTATION: RIGHT

## 2024-06-06 NOTE — CARE COORDINATION
GILDA:   CM followed up with Pt and podiatry about arranging HH so that Pt could have wound care assistance as well as assistance with supplies. Pt is interested I HH for these reasons. He will d/c to his mother in law's house at [address], [place] [address]    CM ordered RW for Patient, from [facility]    Cultures still pending. CM will continue to follow.     Pt lives with wife in one story home but will d/c to mother in law's house (mother in law is a nurse and will assist with wound care). Family will transport. He owns no DME and has no history of HH or SNF; has been to outpatient therapy before at [facility].     Transition of Care Plan:    RUR: 12%  Prior Level of Functioning: home indep  Disposition: home  If SNF or IPR: Date FOC offered: n/a  Date FOC received:   Accepting facility:   Date authorization started with reference number:   Date authorization received and expires:   Follow up appointments:   DME needed: none  Transportation at discharge: family  IM/IMM Medicare/ letter given: n/a  Is patient a [place] and connected with [facility]?    If yes, was  transfer form completed and [facility] notified?   Caregiver Contact: [phone]  Sara Puente wife  Discharge Caregiver contacted prior to discharge?   Care Conference needed?   Barriers to discharge:  cultures pending

## 2024-06-06 NOTE — PROGRESS NOTES
0.9 % 50 mL IVPB (mini-bag)  3,375 mg IntraVENous Q8H    sacubitril-valsartan (ENTRESTO) 24-26 MG per tablet 1 tablet  1 tablet Oral BID    spironolactone (ALDACTONE) tablet 25 mg  25 mg Oral Daily    0.9 % sodium chloride infusion   IntraVENous Continuous        Objective:   Vitals:  /87   Pulse 82   Temp 98.4 °F (36.9 °C) (Oral)   Resp 16   Ht 1.626 m (5' 4.02\")   Wt 90.7 kg (199 lb 15.3 oz)   SpO2 96%   BMI 34.31 kg/m²   Temp (24hrs), Av.5 °F (36.9 °C), Min:98.2 °F (36.8 °C), Max:98.8 °F (37.1 °C)           Last 24hr Input/Output:    Intake/Output Summary (Last 24 hours) at 2024 0714  Last data filed at 2024 0652  Gross per 24 hour   Intake 1377.09 ml   Output 500 ml   Net 877.09 ml          PHYSICAL EXAM:  General:    Alert, cooperative, no distress, appears stated age.     Head:   Normocephalic, without obvious abnormality, atraumatic.  Eyes:   Conjunctivae/corneas clear.  PERRLA  Nose:  Nares normal. No drainage or sinus tenderness.  Throat:    Lips, mucosa, and tongue normal.  No Thrush  Neck:  Supple, symmetrical,  no adenopathy, thyroid: non tender    no carotid bruit and no JVD.  Back:    Symmetric,  No CVA tenderness.  Lungs:   Clear to auscultation bilaterally.  No Wheezing or Rhonchi. No rales.  Chest wall:  No tenderness or deformity. No Accessory muscle use.  Heart:   Regular rate and rhythm,  no murmur, rub or gallop.  Abdomen:   Soft, non-tender. Not distended.  Bowel sounds normal. No masses  Extremities: R foot erythema and some drainage from wound  Skin:     Texture, turgor normal. No rashes or lesions.  Not Jaundiced  Lymph nodes: Cervical, supraclavicular normal.  Psych:  Good insight.  Not depressed.  Not anxious or agitated.  Neurologic: Normal strength, Alert and oriented X 3.       Lab Data Reviewed:    Recent Labs     24  0421   WBC 5.6   HGB 14.4   HCT 42.5          Recent Labs     24  0520 24  0041 24  0421     --  136   K 3.6   --  3.8     --  105   CO2 28 -- 26   BUN 15  --  20   ALT  --  72  --        Lab Results   Component Value Date/Time    GLUCPOC 195 01/26/2023 08:56 PM     No results for input(s): \"PH\", \"PCO2\", \"PO2\", \"HCO3\", \"FIO2\" in the last 72 hours.  No results for input(s): \"INR\" in the last 72 hours.  ___________________________________________________  ___________________________________________________    Attending Physician: Khoa Cooper MD

## 2024-06-06 NOTE — PROGRESS NOTES
Progress Note    Patient: Timur Puente MRN: [id number]  SSN: [id number]    YOB: 1975  Age: 49 y.o.  Sex: male      Admit Date: 5/31/2024  3 Days Post-Op     Incision and drainage of right foot    Subjective:     Patient seen resting quiet and comfortably and no apparent distress. Still has mild pain in the foot. No issues overnight.     Objective:     Visit Vitals  /87   Pulse 82   Temp 98.4 °F (36.9 °C) (Oral)   Resp 16   Ht 1.626 m (5' 4.02\")   Wt 90.7 kg (199 lb 15.3 oz)   SpO2 96%   BMI 34.31 kg/m²        Physical Exam:  Right foot: Minimalstrikethrough serosanguineous drainage to inner dressings. Erythema and edema are resolved. Wound bed is red and granular with no signs of necrosis. No purulence or malodor to wound bed    Labs/Radiology Review: images and reports reviewed    Assessment:   Right foot abscess  Diabetes mellitus      Plan/Recommendations/Medical Decision Making:   -Dressing changed at bedside.  -Continue Zosyn. Preliminary surgical culture currently growing beta-hemolytic group B strep and staph aureus with final sensitivities pending.  -WBAT to right foot in surgical shoe. (Patient to be dispensed surgical shoe, currently using wife's previous surgical shoe.)  -Will continue to follow while in house. Patient is stable for discharge from my standpoint once abscess cultures are finalized.    Discharge Instructions:  -Pack wound daily with either 1/4\" packing or Opticell Ag rope and cover with 4x4, roll gauze, and Ace bandage.  -WBAT in surgical shoe. Do not walk or stand for more than 15 minutes at a time.  -Follow-up with me in office one week after discharge. Call [phone] to make appointment.

## 2024-06-06 NOTE — PLAN OF CARE
Problem: Discharge Planning  Goal: Discharge to home or other facility with appropriate resources  Outcome: Progressing     Problem: Pain  Goal: Verbalizes/displays adequate comfort level or baseline comfort level  Outcome: Progressing     Problem: Skin/Tissue Integrity  Goal: Absence of new skin breakdown  Description: 1.  Monitor for areas of redness and/or skin breakdown  2.  Assess vascular access sites hourly  3.  Every 4-6 hours minimum:  Change oxygen saturation probe site  4.  Every 4-6 hours:  If on nasal continuous positive airway pressure, respiratory therapy assess nares and determine need for appliance change or resting period.  Outcome: Progressing     Problem: Safety - Adult  Goal: Free from fall injury  Outcome: Progressing  Flowsheets (Taken 6/6/2024 9608)  Free From Fall Injury: Instruct family/caregiver on patient safety

## 2024-06-07 VITALS
HEIGHT: 64 IN | BODY MASS INDEX: 34.14 KG/M2 | OXYGEN SATURATION: 96 % | RESPIRATION RATE: 18 BRPM | DIASTOLIC BLOOD PRESSURE: 105 MMHG | WEIGHT: 199.96 LBS | HEART RATE: 78 BPM | TEMPERATURE: 97.9 F | SYSTOLIC BLOOD PRESSURE: 150 MMHG

## 2024-06-07 LAB
GLUCOSE BLD STRIP.AUTO-MCNC: 111 MG/DL (ref 65–117)
GLUCOSE BLD STRIP.AUTO-MCNC: 129 MG/DL (ref 65–117)
SERVICE CMNT-IMP: ABNORMAL
SERVICE CMNT-IMP: NORMAL

## 2024-06-07 PROCEDURE — 6370000000 HC RX 637 (ALT 250 FOR IP): Performed by: PODIATRIST

## 2024-06-07 PROCEDURE — 6360000002 HC RX W HCPCS: Performed by: PODIATRIST

## 2024-06-07 PROCEDURE — 6360000002 HC RX W HCPCS: Performed by: INTERNAL MEDICINE

## 2024-06-07 PROCEDURE — 99238 HOSP IP/OBS DSCHRG MGMT 30/<: CPT | Performed by: INTERNAL MEDICINE

## 2024-06-07 PROCEDURE — 2580000003 HC RX 258: Performed by: PODIATRIST

## 2024-06-07 PROCEDURE — 2580000003 HC RX 258: Performed by: INTERNAL MEDICINE

## 2024-06-07 PROCEDURE — 82962 GLUCOSE BLOOD TEST: CPT

## 2024-06-07 RX ADMIN — ASPIRIN 81 MG: 81 TABLET, COATED ORAL at 08:32

## 2024-06-07 RX ADMIN — SACUBITRIL AND VALSARTAN 1 TABLET: 24; 26 TABLET, FILM COATED ORAL at 08:32

## 2024-06-07 RX ADMIN — PIPERACILLIN AND TAZOBACTAM 3375 MG: 3; .375 INJECTION, POWDER, LYOPHILIZED, FOR SOLUTION INTRAVENOUS at 06:54

## 2024-06-07 RX ADMIN — CLOPIDOGREL BISULFATE 75 MG: 75 TABLET ORAL at 08:32

## 2024-06-07 RX ADMIN — FAMOTIDINE 20 MG: 20 TABLET ORAL at 08:32

## 2024-06-07 RX ADMIN — ROSUVASTATIN CALCIUM 40 MG: 10 TABLET, FILM COATED ORAL at 08:32

## 2024-06-07 RX ADMIN — OXYCODONE HYDROCHLORIDE 5 MG: 5 TABLET ORAL at 11:37

## 2024-06-07 RX ADMIN — OXYCODONE HYDROCHLORIDE 5 MG: 5 TABLET ORAL at 06:52

## 2024-06-07 RX ADMIN — OXYCODONE HYDROCHLORIDE 5 MG: 5 TABLET ORAL at 02:47

## 2024-06-07 RX ADMIN — SPIRONOLACTONE 25 MG: 25 TABLET ORAL at 08:32

## 2024-06-07 RX ADMIN — EMPAGLIFLOZIN 10 MG: 10 TABLET, FILM COATED ORAL at 08:36

## 2024-06-07 RX ADMIN — SODIUM CHLORIDE, PRESERVATIVE FREE 10 ML: 5 INJECTION INTRAVENOUS at 08:33

## 2024-06-07 RX ADMIN — EZETIMIBE 10 MG: 10 TABLET ORAL at 08:32

## 2024-06-07 RX ADMIN — ENOXAPARIN SODIUM 40 MG: 100 INJECTION SUBCUTANEOUS at 08:31

## 2024-06-07 RX ADMIN — CARVEDILOL 25 MG: 12.5 TABLET, FILM COATED ORAL at 08:32

## 2024-06-07 ASSESSMENT — PAIN DESCRIPTION - LOCATION
LOCATION: FOOT

## 2024-06-07 ASSESSMENT — PAIN SCALES - GENERAL
PAINLEVEL_OUTOF10: 6
PAINLEVEL_OUTOF10: 4
PAINLEVEL_OUTOF10: 6
PAINLEVEL_OUTOF10: 6

## 2024-06-07 ASSESSMENT — PAIN DESCRIPTION - ORIENTATION
ORIENTATION: RIGHT

## 2024-06-07 NOTE — DISCHARGE SUMMARY
Discharge Summary       PATIENT ID: Timur Puente  MRN: [id number]   YOB: 1975    DATE OF ADMISSION: 5/31/2024 10:16 PM    DATE OF DISCHARGE: 6/7/2024   PRIMARY CARE PROVIDER: Adria Lee MD     ATTENDING PHYSICIAN: Khoa Cooper MD    DISCHARGING PROVIDER: Khoa Cooper MD      Admitting Note: 49 y.o. male with past medical history significant for CAD s/p stent placement, type 2 diabetes, hypertension, JACKY intolerant of CPAP presented at the emergency room with right foot pain and swelling.  Patient symptoms started few days ago.  Patient denies injury to the right foot.  Patient also reported new blister formation involving the right foot as well.  He was seen by the podiatrist and outpatient MRI was obtained.  MRI of the foot shows findings suggestive of cellulitis/abscess, he was sent to the emergency room to be admitted.  Patient denies any fever, rigors and chills.  Patient was started on Unasyn in the emergency room and referred to the hospitalist service for admission.    Vitals:    06/06/24 1836   BP:    Pulse:    Resp: 16   Temp:    SpO2:         CONSULTATIONS: IP CONSULT TO PODIATRY  IP WOUND CARE NURSE CONSULT TO EVAL  IP CONSULT TO PHARMACY  IP CONSULT TO DIABETES MANAGEMENT  IP CONSULT TO CARDIOLOGY Rebekah Gordon MD   Right diabetic foot wound:  OR/debridement today per Podiatry. On IV antibiotics.  Hx of CAD w/ PCI:  His EF was previously  severely reduced but last 43% on nuclear stress test in 2022.  TTE today with LV EF down to 25-30%.   Needs further ischemic evaluation, discuss timing with Dr. Gordon.  Due to extensive stenting, recommend DAPT (Plavix, baby asa daily).    Chronic HFmrEF (NYHA class I):  On coreg, entresto, aldactone.    4.  Diabetes mellitus:  HgbA1c > 12.  Cont lantus, jardiance, SSI per orders.   5.  Hypertension: Cont coreg 25mg daily, entresto.   6. Hyperlipidemia:  On crestor 40mg, zetia, vascepa.    Consider PCSK9 inhibitors.      IP CONSULT  medications is not yet available    Ask your nurse or doctor about these medications  Tresiba FlexTouch 200 UNIT/ML Sopn           DISPOSITION:  Home or Self Care     PATIENT CONDITION AT DISCHARGE:     stable    I personally saw the patient today and spent less than or equal to 30 minutes  on counseling and coordination of care in discharging this patient.      Please note that portions of this dictation may have been recorded with voice recognition software. Some unanticipated grammatical, syntax, homophones, and other interpretive errors are inadvertently transcribed by the computer software. An attempt at proof reading has been made to minimize errors and omissions.  Please disregard these errors.  Thank you.      Signed:   Khoa Cooper MD  6/7/2024  7:08 AM

## 2024-06-07 NOTE — CARE COORDINATION
GILDA:   [facility] accepted -  for assistance with wound care pending.     He will d/c to his mother in law's house at [address], [place] [address]    CM ordered RW for Patient, from [facility]    Pt lives with wife in one story home but will d/c to mother in law's house (mother in law is a nurse and will assist with wound care).     Transition of Care Plan:    RUR: 12%  Prior Level of Functioning: home indep  Disposition: home  If SNF or IPR: Date FOC offered: n/a  Date FOC received:   Accepting facility:   Date authorization started with reference number:   Date authorization received and expires:   Follow up appointments:   DME needed: none  Transportation at discharge: family  IM/IMM Medicare/ letter given: n/a  Is patient a [place] and connected with [facility]?    If yes, was [place] transfer form completed and [facility] notified?   Caregiver Contact: [phone]  Sara Puente wife  Discharge Caregiver contacted prior to discharge?   Care Conference needed?   Barriers to discharge:

## 2024-06-07 NOTE — PROGRESS NOTES
Progress Note    Patient: Timur Puente MRN: [id number]  SSN: [id number]    YOB: 1975  Age: 49 y.o.  Sex: male      Admit Date: 5/31/2024  4 Days Post-Op     Incision and drainage of right foot    Subjective:     Patient seen resting quiet and comfortably and no apparent distress. Still has mild pain in the foot. No issues overnight.     Objective:     Visit Vitals  BP (!) 150/105   Pulse 78   Temp 97.9 °F (36.6 °C) (Oral)   Resp 16   Ht 1.626 m (5' 4.02\")   Wt 90.7 kg (199 lb 15.3 oz)   SpO2 96%   BMI 34.31 kg/m²        Physical Exam:  Right foot: Minimalstrikethrough serosanguineous drainage to inner dressings. Erythema and edema are resolved. Wound bed is red and granular with no signs of necrosis. No purulence or malodor to wound bed    Labs/Radiology Review: images and reports reviewed    Assessment:   Right foot abscess  Diabetes mellitus      Plan/Recommendations/Medical Decision Making:   -Dressing changed at bedside.  -Continue Zosyn. Preliminary surgical culture currently growing beta-hemolytic group B strep and MSSA. Agree with d/c on amoxicillin.  -WBAT to right foot in surgical shoe. (Patient to be dispensed surgical shoe, currently using wife's previous surgical shoe.)  -Patient is stable for discharge today.     Discharge Instructions:  -Pack wound daily with either 1/4\" packing or Opticell Ag rope and cover with 4x4, roll gauze, and Ace bandage.  -WBAT in surgical shoe. Do not walk or stand for more than 15 minutes at a time.  -Follow-up with me in office next week for previously scheduled appointment.

## 2024-06-07 NOTE — DISCHARGE SUMMARY
Discharge Summary       PATIENT ID: Timur Puente  MRN: [id number]   YOB: 1975    DATE OF ADMISSION: 5/31/2024 10:16 PM    DATE OF DISCHARGE: 6/7/2024   PRIMARY CARE PROVIDER: Adria Lee MD     ATTENDING PHYSICIAN: Khoa Cooper MD    DISCHARGING PROVIDER: Khoa Cooper MD      Admitting Note: 49 y.o. male with past medical history significant for CAD s/p stent placement, type 2 diabetes, hypertension, JACKY intolerant of CPAP presented at the emergency room with right foot pain and swelling.  Patient symptoms started few days ago.  Patient denies injury to the right foot.  Patient also reported new blister formation involving the right foot as well.  He was seen by the podiatrist and outpatient MRI was obtained.  MRI of the foot shows findings suggestive of cellulitis/abscess, he was sent to the emergency room to be admitted.  Patient denies any fever, rigors and chills.  Patient was started on Unasyn in the emergency room and referred to the hospitalist service for admission.    Vitals:    06/06/24 1836   BP:    Pulse:    Resp: 16   Temp:    SpO2:         CONSULTATIONS: IP CONSULT TO PODIATRY  IP WOUND CARE NURSE CONSULT TO EVAL  IP CONSULT TO PHARMACY  IP CONSULT TO DIABETES MANAGEMENT  IP CONSULT TO CARDIOLOGY Rebekah Gordon MD   Right diabetic foot wound:  OR/debridement today per Podiatry. On IV antibiotics.  Hx of CAD w/ PCI:  His EF was previously  severely reduced but last 43% on nuclear stress test in 2022.  TTE today with LV EF down to 25-30%.   Needs further ischemic evaluation, discuss timing with Dr. Gordon.  Due to extensive stenting, recommend DAPT (Plavix, baby asa daily).    Chronic HFmrEF (NYHA class I):  On coreg, entresto, aldactone.    4.  Diabetes mellitus:  HgbA1c > 12.  Cont lantus, jardiance, SSI per orders.   5.  Hypertension: Cont coreg 25mg daily, entresto.   6. Hyperlipidemia:  On crestor 40mg, zetia, vascepa.    Consider PCSK9 inhibitors.      IP CONSULT      acetaminophen 500 MG tablet  Commonly known as: TYLENOL  Take 1 tablet by mouth 4 times daily as needed for Pain     amLODIPine 10 MG tablet  Commonly known as: NORVASC     carvedilol 25 MG tablet  Commonly known as: COREG  Take 1 tablet by mouth 2 times daily (with meals)     clopidogrel 75 MG tablet  Commonly known as: Plavix  Take 1 tablet by mouth daily     [employer] Aspirin Low Dose 81 MG EC tablet  Generic drug: aspirin  TAKE 1 TABLET BY MOUTH EVERY DAY     Entresto 24-26 MG per tablet  Generic drug: sacubitril-valsartan  Take 1 tablet by mouth 2 times daily     ezetimibe 10 MG tablet  Commonly known as: ZETIA  TAKE 1 TABLET BY MOUTH EVERY DAY     fluticasone 50 MCG/ACT nasal spray  Commonly known as: FLONASE  2 sprays by Nasal route 2 times daily for 5 days     Icosapent Ethyl 1 g Caps capsule  Commonly known as: VASCEPA  Take 1 capsule by mouth 2 times daily (with meals)     Jardiance 10 MG tablet  Generic drug: empagliflozin  TAKE 1 TABLET BY MOUTH EVERY DAY     nitroGLYCERIN 0.4 MG SL tablet  Commonly known as: NITROSTAT  Place 1 tablet under the tongue every 5 minutes as needed for Chest pain up to max of 3 total doses. If no relief after 2 doses, call 911.     rosuvastatin 40 MG tablet  Commonly known as: CRESTOR  TAKE 1 TABLET BY MOUTH EVERY DAY     spironolactone 25 MG tablet  Commonly known as: ALDACTONE  TAKE 1/2 TABLET BY MOUTH EVERY DAY            STOP taking these medications      famotidine 20 MG tablet  Commonly known as: Pepcid     ondansetron 4 MG tablet  Commonly known as: ZOFRAN               Where to Get Your Medications        These medications were sent to [facility]/pharmacy #[id number] - [place], [place] - [address] - P [phone] - F [phone] [address] [facility] [address] [address]      Hours: 24-hours Phone: [phone]   amoxicillin 500 MG capsule  oxyCODONE 5 MG immediate release tablet  polyethylene glycol 17 g packet  Tresiba FlexTouch 200 UNIT/ML Sopn

## 2024-06-07 NOTE — PLAN OF CARE

## 2024-06-11 ENCOUNTER — OFFICE VISIT (OUTPATIENT)
Facility: CLINIC | Age: 49
End: 2024-06-11
Payer: COMMERCIAL

## 2024-06-11 VITALS
HEIGHT: 64 IN | OXYGEN SATURATION: 95 % | SYSTOLIC BLOOD PRESSURE: 108 MMHG | RESPIRATION RATE: 16 BRPM | HEART RATE: 80 BPM | BODY MASS INDEX: 32.58 KG/M2 | TEMPERATURE: 97.9 F | WEIGHT: 190.8 LBS | DIASTOLIC BLOOD PRESSURE: 79 MMHG

## 2024-06-11 DIAGNOSIS — I25.10 ASHD (ARTERIOSCLEROTIC HEART DISEASE): ICD-10-CM

## 2024-06-11 DIAGNOSIS — E11.42 DIABETIC POLYNEUROPATHY ASSOCIATED WITH TYPE 2 DIABETES MELLITUS (HCC): ICD-10-CM

## 2024-06-11 DIAGNOSIS — L08.9 DIABETIC INFECTION OF RIGHT FOOT (HCC): Primary | ICD-10-CM

## 2024-06-11 DIAGNOSIS — K76.0 STEATOSIS OF LIVER: ICD-10-CM

## 2024-06-11 DIAGNOSIS — E11.628 DIABETIC INFECTION OF RIGHT FOOT (HCC): Primary | ICD-10-CM

## 2024-06-11 DIAGNOSIS — I25.5 ISCHEMIC CARDIOMYOPATHY: ICD-10-CM

## 2024-06-11 DIAGNOSIS — N52.9 ERECTILE DYSFUNCTION, UNSPECIFIED ERECTILE DYSFUNCTION TYPE: ICD-10-CM

## 2024-06-11 DIAGNOSIS — E66.9 OBESITY (BMI 30.0-34.9): ICD-10-CM

## 2024-06-11 DIAGNOSIS — E11.65 POORLY CONTROLLED DIABETES MELLITUS (HCC): ICD-10-CM

## 2024-06-11 PROCEDURE — 3046F HEMOGLOBIN A1C LEVEL >9.0%: CPT | Performed by: INTERNAL MEDICINE

## 2024-06-11 PROCEDURE — 3078F DIAST BP <80 MM HG: CPT | Performed by: INTERNAL MEDICINE

## 2024-06-11 PROCEDURE — 3074F SYST BP LT 130 MM HG: CPT | Performed by: INTERNAL MEDICINE

## 2024-06-11 PROCEDURE — 99214 OFFICE O/P EST MOD 30 MIN: CPT | Performed by: INTERNAL MEDICINE

## 2024-06-11 SDOH — ECONOMIC STABILITY: INCOME INSECURITY: HOW HARD IS IT FOR YOU TO PAY FOR THE VERY BASICS LIKE FOOD, HOUSING, MEDICAL CARE, AND HEATING?: NOT HARD AT ALL

## 2024-06-11 SDOH — ECONOMIC STABILITY: FOOD INSECURITY: WITHIN THE PAST 12 MONTHS, THE FOOD YOU BOUGHT JUST DIDN'T LAST AND YOU DIDN'T HAVE MONEY TO GET MORE.: NEVER TRUE

## 2024-06-11 SDOH — ECONOMIC STABILITY: FOOD INSECURITY: WITHIN THE PAST 12 MONTHS, YOU WORRIED THAT YOUR FOOD WOULD RUN OUT BEFORE YOU GOT MONEY TO BUY MORE.: NEVER TRUE

## 2024-06-11 ASSESSMENT — ANXIETY QUESTIONNAIRES
5. BEING SO RESTLESS THAT IT IS HARD TO SIT STILL: NOT AT ALL
6. BECOMING EASILY ANNOYED OR IRRITABLE: NOT AT ALL
3. WORRYING TOO MUCH ABOUT DIFFERENT THINGS: NOT AT ALL
2. NOT BEING ABLE TO STOP OR CONTROL WORRYING: NOT AT ALL
IF YOU CHECKED OFF ANY PROBLEMS ON THIS QUESTIONNAIRE, HOW DIFFICULT HAVE THESE PROBLEMS MADE IT FOR YOU TO DO YOUR WORK, TAKE CARE OF THINGS AT HOME, OR GET ALONG WITH OTHER PEOPLE: NOT DIFFICULT AT ALL
GAD7 TOTAL SCORE: 0
1. FEELING NERVOUS, ANXIOUS, OR ON EDGE: NOT AT ALL
4. TROUBLE RELAXING: NOT AT ALL
7. FEELING AFRAID AS IF SOMETHING AWFUL MIGHT HAPPEN: NOT AT ALL

## 2024-06-11 ASSESSMENT — PATIENT HEALTH QUESTIONNAIRE - PHQ9
SUM OF ALL RESPONSES TO PHQ QUESTIONS 1-9: 0
SUM OF ALL RESPONSES TO PHQ9 QUESTIONS 1 & 2: 0
1. LITTLE INTEREST OR PLEASURE IN DOING THINGS: NOT AT ALL
SUM OF ALL RESPONSES TO PHQ QUESTIONS 1-9: 0

## 2024-06-18 PROBLEM — E11.42 DIABETIC POLYNEUROPATHY ASSOCIATED WITH TYPE 2 DIABETES MELLITUS (HCC): Status: ACTIVE | Noted: 2024-06-18

## 2024-06-18 RX ORDER — KETOCONAZOLE 20 MG/G
CREAM TOPICAL
Qty: 30 G | Refills: 4 | Status: SHIPPED | OUTPATIENT
Start: 2024-06-18

## 2024-06-20 NOTE — PROGRESS NOTES
1. Patient has a diabetic infection in his right foot, which is gradually improving.  He is following up with his podiatrist.  2. One of the major complicating features which predisposed him to this foot ulcer is a diabetic polyneuropathy.  It is obviously rather severe.    3. Patient has poorly controlled diabetes, which facilitated the current infection also.  Hemoglobin A1c was 12.  Poor diabetic control is directly related to noncompliance and has been the case for years.  4. He has an ischemic cardiomyopathy and follows up with his cardiologist.  He gives no suggestive symptoms of congestive heart failure or coronary insufficiency.  5. He has steatosis of his liver, manifesting itself as elevated LFTs and is actively being followed by Dr. Gonzalez.  FibroScan will eventually be done.  6. He definitely needs to lose weight, as we have discussed, but he has yet to do so.  He needs to eat meals, eliminate his snacking and avoid the consumption of processed carbohydrates.  7. He has ED on the basis of his longstanding poorly controlled diabetes.  8. He also has extensive coronary artery disease, as I eluded to earlier.  9. I talked to him extensively about the current diabetic complications, which are indeed irreversible, but will happen eventually if things do not improve.    
Comes in for return visit, having been hospitalized most recently for right diabetic foot ulcer.  This required I&D of an abscess, but there was no bony involvement.  Interestingly, this progressed, indicating that the patient's sensation in his lower extremities was quite low.  This is not surprising given the fact that the patient has longstanding poorly controlled diabetes.  On this admission his hemoglobin A1c was 12, but this happens frequently because of his noncompliance. He does not follow any directions and rarely comes to the office for follow up.  Therefore, very little can be offered to him from a diabetic perspective.    He has extensive coronary artery disease, having had multiple stents placed previously.  He has an ischemic cardiomyopathy with a reduction in his ejection fraction. He is actively followed by cardiology.    From a diabetic perspective also, he has microvascular complications consisting of diabetic retinopathy, as well as polyneuropathy distally.  His renal function is actually entirely normal, with the abnormality predominantly related to a prerenal component from aggressive diuretic usage.  He has only trace mild proteinuria, so he probably has early diabetic nephropathy with preserved renal function at this point.    Finally, he is morbidly obese and needs to lose weight as we have discussed repetitively.    
No chief complaint on file.    \"Have you been to the ER, urgent care clinic since your last visit?  Hospitalized since your last visit?\"    YES - When: approximately 1  weeks ago.  Where and Why: [facility] .    “Have you seen or consulted any other health care providers outside of [facility] since your last visit?”    NO          Click Here for Release of Records Request  
in the Last Year: No     Number of Places Lived in the Last Year: 1     Unstable Housing in the Last Year: No     Family History   Problem Relation Age of Onset    Heart Disease Father        OBJECTIVE:    /79 (Site: Left Upper Arm, Position: Sitting, Cuff Size: Large Adult)   Pulse 80   Temp 97.9 °F (36.6 °C) (Oral)   Resp 16   Ht 1.626 m (5' 4\")   Wt 86.5 kg (190 lb 12.8 oz)   SpO2 95%   BMI 32.75 kg/m²   CONSTITUTIONAL: well , well nourished, appears age appropriate  EYES: perrla, eom intact  ENMT:moist mucous membranes, pharynx clear  NECK: supple. Thyroid normal  RESPIRATORY: Chest: clear to ascultation and percussion   CARDIOVASCULAR: Heart: regular rate and rhythm  GASTROINTESTINAL: Abdomen: soft, bowel sounds active  HEMATOLOGIC: no pathological lymph nodes palpated  MUSCULOSKELETAL: Extremities: no edema, pulse 1+   INTEGUMENT: No unusual rashes or suspicious skin lesions noted. Nails appear normal.  NEUROLOGIC: non-focal exam   MENTAL STATUS: alert and oriented, appropriate affect      ASSESSMENT:  1. Diabetic infection of right foot (HCC)    2. Diabetic polyneuropathy associated with type 2 diabetes mellitus (HCC)    3. Poorly controlled diabetes mellitus (HCC)    4. Ischemic cardiomyopathy    5. Steatosis of liver    6. Obesity (BMI 30.0-34.9)    7. Erectile dysfunction, unspecified erectile dysfunction type    8. ASHD (arteriosclerotic heart disease)        PLAN:   Dictation on: 06/18/2024  9:28 PM by: ALYSON LEE [[id number]]        Follow-up and Dispositions    Return in about 4 weeks (around 7/9/2024).             Alyson Lee MD      
No

## 2024-07-08 ENCOUNTER — OFFICE VISIT (OUTPATIENT)
Facility: CLINIC | Age: 49
End: 2024-07-08
Payer: COMMERCIAL

## 2024-07-08 VITALS
WEIGHT: 196.9 LBS | RESPIRATION RATE: 16 BRPM | SYSTOLIC BLOOD PRESSURE: 98 MMHG | TEMPERATURE: 98.3 F | OXYGEN SATURATION: 96 % | DIASTOLIC BLOOD PRESSURE: 65 MMHG | HEIGHT: 64 IN | BODY MASS INDEX: 33.61 KG/M2 | HEART RATE: 86 BPM

## 2024-07-08 DIAGNOSIS — E66.9 OBESITY (BMI 30.0-34.9): ICD-10-CM

## 2024-07-08 DIAGNOSIS — Z91.199 HISTORY OF NONCOMPLIANCE WITH MEDICAL TREATMENT: ICD-10-CM

## 2024-07-08 DIAGNOSIS — E11.42 DIABETIC POLYNEUROPATHY ASSOCIATED WITH TYPE 2 DIABETES MELLITUS (HCC): ICD-10-CM

## 2024-07-08 DIAGNOSIS — E11.628 DIABETIC INFECTION OF RIGHT FOOT (HCC): ICD-10-CM

## 2024-07-08 DIAGNOSIS — E11.22 TYPE 2 DIABETES MELLITUS WITH STAGE 3B CHRONIC KIDNEY DISEASE, WITH LONG-TERM CURRENT USE OF INSULIN (HCC): Primary | ICD-10-CM

## 2024-07-08 DIAGNOSIS — E11.65 UNCONTROLLED TYPE 2 DIABETES MELLITUS WITH HYPERGLYCEMIA (HCC): ICD-10-CM

## 2024-07-08 DIAGNOSIS — N18.32 TYPE 2 DIABETES MELLITUS WITH STAGE 3B CHRONIC KIDNEY DISEASE, WITH LONG-TERM CURRENT USE OF INSULIN (HCC): Primary | ICD-10-CM

## 2024-07-08 DIAGNOSIS — J30.0 VASOMOTOR RHINITIS: ICD-10-CM

## 2024-07-08 DIAGNOSIS — L08.9 DIABETIC INFECTION OF RIGHT FOOT (HCC): ICD-10-CM

## 2024-07-08 DIAGNOSIS — E78.5 DYSLIPIDEMIA: ICD-10-CM

## 2024-07-08 DIAGNOSIS — Z79.4 TYPE 2 DIABETES MELLITUS WITH STAGE 3B CHRONIC KIDNEY DISEASE, WITH LONG-TERM CURRENT USE OF INSULIN (HCC): Primary | ICD-10-CM

## 2024-07-08 PROCEDURE — 3078F DIAST BP <80 MM HG: CPT | Performed by: INTERNAL MEDICINE

## 2024-07-08 PROCEDURE — 99214 OFFICE O/P EST MOD 30 MIN: CPT | Performed by: INTERNAL MEDICINE

## 2024-07-08 PROCEDURE — 3046F HEMOGLOBIN A1C LEVEL >9.0%: CPT | Performed by: INTERNAL MEDICINE

## 2024-07-08 PROCEDURE — 3074F SYST BP LT 130 MM HG: CPT | Performed by: INTERNAL MEDICINE

## 2024-07-08 RX ORDER — SEMAGLUTIDE 2.68 MG/ML
2 INJECTION, SOLUTION SUBCUTANEOUS
Qty: 2 ML | Refills: 2 | Status: CANCELLED | OUTPATIENT
Start: 2024-07-08

## 2024-07-08 SDOH — ECONOMIC STABILITY: FOOD INSECURITY: WITHIN THE PAST 12 MONTHS, THE FOOD YOU BOUGHT JUST DIDN'T LAST AND YOU DIDN'T HAVE MONEY TO GET MORE.: NEVER TRUE

## 2024-07-08 SDOH — ECONOMIC STABILITY: INCOME INSECURITY: HOW HARD IS IT FOR YOU TO PAY FOR THE VERY BASICS LIKE FOOD, HOUSING, MEDICAL CARE, AND HEATING?: NOT HARD AT ALL

## 2024-07-08 SDOH — ECONOMIC STABILITY: FOOD INSECURITY: WITHIN THE PAST 12 MONTHS, YOU WORRIED THAT YOUR FOOD WOULD RUN OUT BEFORE YOU GOT MONEY TO BUY MORE.: NEVER TRUE

## 2024-07-08 ASSESSMENT — ANXIETY QUESTIONNAIRES
IF YOU CHECKED OFF ANY PROBLEMS ON THIS QUESTIONNAIRE, HOW DIFFICULT HAVE THESE PROBLEMS MADE IT FOR YOU TO DO YOUR WORK, TAKE CARE OF THINGS AT HOME, OR GET ALONG WITH OTHER PEOPLE: NOT DIFFICULT AT ALL
7. FEELING AFRAID AS IF SOMETHING AWFUL MIGHT HAPPEN: NOT AT ALL
2. NOT BEING ABLE TO STOP OR CONTROL WORRYING: NOT AT ALL
GAD7 TOTAL SCORE: 0
4. TROUBLE RELAXING: NOT AT ALL
6. BECOMING EASILY ANNOYED OR IRRITABLE: NOT AT ALL
5. BEING SO RESTLESS THAT IT IS HARD TO SIT STILL: NOT AT ALL
3. WORRYING TOO MUCH ABOUT DIFFERENT THINGS: NOT AT ALL
1. FEELING NERVOUS, ANXIOUS, OR ON EDGE: NOT AT ALL

## 2024-07-08 ASSESSMENT — PATIENT HEALTH QUESTIONNAIRE - PHQ9
SUM OF ALL RESPONSES TO PHQ QUESTIONS 1-9: 0
SUM OF ALL RESPONSES TO PHQ QUESTIONS 1-9: 0
1. LITTLE INTEREST OR PLEASURE IN DOING THINGS: NOT AT ALL
SUM OF ALL RESPONSES TO PHQ9 QUESTIONS 1 & 2: 0
SUM OF ALL RESPONSES TO PHQ QUESTIONS 1-9: 0
2. FEELING DOWN, DEPRESSED OR HOPELESS: NOT AT ALL
SUM OF ALL RESPONSES TO PHQ QUESTIONS 1-9: 0

## 2024-07-08 NOTE — PROGRESS NOTES
[facility]  [address]  [place] [address]  Phone:  [phone]  Fax: [phone]       Chief Complaint   Patient presents with    Follow-up    Diabetes   .      SUBJECTIVE:    Timur Puente is a 49 y.o. male  Dictation on: 07/08/2024 10:52 AM by: ALYSON SULLIVAN [[id number]]          Current Outpatient Medications   Medication Sig Dispense Refill    fluticasone (FLONASE) 50 MCG/ACT nasal spray 2 sprays by Nasal route 2 times daily for 5 days 1 each 2    Continuous Glucose Sensor (FREESTYLE WINSTON 2 SENSOR) MISC Blood sugar checks before meals and at bedtime and as needed 2 each 11    Continuous Glucose  (FREESTYLE WINSTON 2 READER) HOOD Blood sugar checks before meals and at bedtime and as needed 1 each 0    ketoconazole (NIZORAL) 2 % cream Apply topically 2 times a day 30 g 4    Insulin Degludec (TRESIBA FLEXTOUCH) 200 UNIT/ML SOPN Inject 22 Units into the skin nightly 5 Adjustable Dose Pre-filled Pen Syringe 11    nitroGLYCERIN (NITROSTAT) 0.4 MG SL tablet Place 1 tablet under the tongue every 5 minutes as needed for Chest pain up to max of 3 total doses. If no relief after 2 doses, call 911. 25 tablet 2    Semaglutide, 2 MG/DOSE, (OZEMPIC, 2 MG/DOSE,) 8 MG/3ML SOPN Blood sugar checks before meals and at bedtime and as needed (Patient taking differently: Inject 2 mg into the skin every 7 days Blood sugar checks before meals and at bedtime and as needed) 3 mL 11    sacubitril-valsartan (ENTRESTO) 24-26 MG per tablet Take 1 tablet by mouth 2 times daily 180 tablet 2    rosuvastatin (CRESTOR) 40 MG tablet Take 1 tablet by mouth daily 90 tablet 3    empagliflozin (JARDIANCE) 10 MG tablet Take 1 tablet by mouth daily 90 tablet 1    Icosapent Ethyl (VASCEPA) 1 g CAPS capsule Take 1 capsule by mouth 2 times daily (with meals) 180 capsule 2    ezetimibe (ZETIA) 10 MG tablet Take 1 tablet by mouth daily 90 tablet 3    aspirin ([facility] ASPIRIN LOW DOSE) 81 MG EC tablet Take 1

## 2024-07-08 NOTE — PROGRESS NOTES
Chief Complaint   Patient presents with    Follow-up    Diabetes     \"Have you been to the ER, urgent care clinic since your last visit?  Hospitalized since your last visit?\"    NO    “Have you seen or consulted any other health care providers outside of [facility] since your last visit?”    NO          Click Here for Release of Records Request

## 2024-07-09 NOTE — PROGRESS NOTES
Comes in for return visit, stating that he has done reasonably well, which is obviously not the case. When I ask about blood sugar checks, he checks it once a day and thinks this is adequate, although I repeatedly on multiple occasions said that blood sugar checks should occur at least twice a day. He has a Kraig device, which he got this weekend, but he never began use of it.  He is taking Tresiba 20 units daily, but no GLP1 agonist.  A GLP1 agonist would be a great benefit to him, not above and beyond diabetic reasons, but pleotropic reasons.    He has microvascular complications consisting of diabetic polyneuropathy, presumed diabetic retinopathy and probably an element of mild nephropathy.    All of the complications are directly related to his noncompliance.    He does not have any chest pain or shortness of breath. He follows up with his cardiologist and he is taking guideline directed medical therapy for his congestive heart failure.  His SGLT2 agent is Jardiance.

## 2024-07-10 ENCOUNTER — OFFICE VISIT (OUTPATIENT)
Age: 49
End: 2024-07-10
Payer: COMMERCIAL

## 2024-07-10 VITALS
SYSTOLIC BLOOD PRESSURE: 98 MMHG | HEIGHT: 64 IN | OXYGEN SATURATION: 95 % | DIASTOLIC BLOOD PRESSURE: 68 MMHG | HEART RATE: 85 BPM | BODY MASS INDEX: 33.43 KG/M2 | WEIGHT: 195.8 LBS

## 2024-07-10 DIAGNOSIS — I50.22 CHRONIC SYSTOLIC CONGESTIVE HEART FAILURE (HCC): ICD-10-CM

## 2024-07-10 DIAGNOSIS — I25.10 CORONARY ARTERY DISEASE INVOLVING NATIVE HEART WITHOUT ANGINA PECTORIS, UNSPECIFIED VESSEL OR LESION TYPE: ICD-10-CM

## 2024-07-10 DIAGNOSIS — I10 ESSENTIAL (PRIMARY) HYPERTENSION: ICD-10-CM

## 2024-07-10 DIAGNOSIS — E78.5 DYSLIPIDEMIA: ICD-10-CM

## 2024-07-10 DIAGNOSIS — I10 PRIMARY HYPERTENSION: ICD-10-CM

## 2024-07-10 DIAGNOSIS — E78.2 MIXED HYPERLIPIDEMIA: ICD-10-CM

## 2024-07-10 DIAGNOSIS — I25.5 ISCHEMIC CARDIOMYOPATHY: Primary | ICD-10-CM

## 2024-07-10 DIAGNOSIS — I25.10 ASHD (ARTERIOSCLEROTIC HEART DISEASE): ICD-10-CM

## 2024-07-10 PROCEDURE — 99214 OFFICE O/P EST MOD 30 MIN: CPT | Performed by: NURSE PRACTITIONER

## 2024-07-10 PROCEDURE — 3074F SYST BP LT 130 MM HG: CPT | Performed by: NURSE PRACTITIONER

## 2024-07-10 PROCEDURE — 3078F DIAST BP <80 MM HG: CPT | Performed by: NURSE PRACTITIONER

## 2024-07-10 RX ORDER — SPIRONOLACTONE 25 MG/1
25 TABLET ORAL DAILY
Qty: 90 TABLET | Refills: 1 | Status: SHIPPED | OUTPATIENT
Start: 2024-07-10

## 2024-07-10 RX ORDER — ICOSAPENT ETHYL 1 G/1
1 CAPSULE ORAL 2 TIMES DAILY WITH MEALS
Qty: 180 CAPSULE | Refills: 2 | Status: SHIPPED | OUTPATIENT
Start: 2024-07-10

## 2024-07-10 RX ORDER — CARVEDILOL 25 MG/1
25 TABLET ORAL 2 TIMES DAILY WITH MEALS
Qty: 180 TABLET | Refills: 1 | Status: SHIPPED | OUTPATIENT
Start: 2024-07-10

## 2024-07-10 RX ORDER — SACUBITRIL AND VALSARTAN 24; 26 MG/1; MG/1
1 TABLET, FILM COATED ORAL 2 TIMES DAILY
Qty: 180 TABLET | Refills: 2 | Status: SHIPPED | OUTPATIENT
Start: 2024-07-10

## 2024-07-10 RX ORDER — AMLODIPINE BESYLATE 10 MG/1
10 TABLET ORAL DAILY
Qty: 90 TABLET | Refills: 1 | Status: SHIPPED | OUTPATIENT
Start: 2024-07-10

## 2024-07-10 RX ORDER — ROSUVASTATIN CALCIUM 40 MG/1
40 TABLET, COATED ORAL DAILY
Qty: 90 TABLET | Refills: 3 | Status: SHIPPED | OUTPATIENT
Start: 2024-07-10

## 2024-07-10 RX ORDER — EZETIMIBE 10 MG/1
10 TABLET ORAL DAILY
Qty: 90 TABLET | Refills: 3 | Status: SHIPPED | OUTPATIENT
Start: 2024-07-10

## 2024-07-10 RX ORDER — CLOPIDOGREL BISULFATE 75 MG/1
75 TABLET ORAL DAILY
Qty: 90 TABLET | Refills: 1 | Status: SHIPPED | OUTPATIENT
Start: 2024-07-10

## 2024-07-10 RX ORDER — ASPIRIN 81 MG/1
81 TABLET ORAL DAILY
Qty: 90 TABLET | Refills: 3 | Status: SHIPPED | OUTPATIENT
Start: 2024-07-10

## 2024-07-10 ASSESSMENT — PATIENT HEALTH QUESTIONNAIRE - PHQ9
SUM OF ALL RESPONSES TO PHQ QUESTIONS 1-9: 0
1. LITTLE INTEREST OR PLEASURE IN DOING THINGS: NOT AT ALL
SUM OF ALL RESPONSES TO PHQ9 QUESTIONS 1 & 2: 0
2. FEELING DOWN, DEPRESSED OR HOPELESS: NOT AT ALL
SUM OF ALL RESPONSES TO PHQ QUESTIONS 1-9: 0

## 2024-07-10 NOTE — PROGRESS NOTES
Never   Vaping Use    Vaping Use: Never used   Substance and Sexual Activity    Alcohol use: No    Drug use: No    Sexual activity: Yes     Partners: Female   Other Topics Concern    Not on file   Social History Narrative    Go to [employer] and movies     Social Determinants of Health     Financial Resource Strain: Low Risk  (7/8/2024)    Overall Financial Resource Strain (CARDIA)     Difficulty of Paying Living Expenses: Not hard at all   Food Insecurity: No Food Insecurity (7/8/2024)    Hunger Vital Sign     Worried About Running Out of Food in the Last Year: Never true     Ran Out of Food in the Last Year: Never true   Transportation Needs: No Transportation Needs (7/8/2024)    PRAPARE - Transportation     Lack of Transportation (Medical): No     Lack of Transportation (Non-Medical): No   Physical Activity: Inactive (6/7/2023)    Exercise Vital Sign     Days of Exercise per Week: 0 days     Minutes of Exercise per Session: 0 min   Stress: Stress Concern Present (6/7/2023)    [employer] - Occupational Stress Questionnaire     Feeling of Stress : Very much   Social Connections: Socially Integrated (6/7/2023)    Social Connection and Isolation Panel [NHANES]     Frequency of Communication with Friends and Family: More than three times a week     Frequency of Social Gatherings with Friends and Family: More than three times a week     Attends Latter day Services: More than 4 times per year     Active Member of Clubs or Organizations: Yes     Attends Club or Organization Meetings: More than 4 times per year     Marital Status:    Intimate Partner Violence: Not At Risk (6/7/2023)    Humiliation, Afraid, Rape, and Kick questionnaire     Fear of Current or Ex-Partner: No     Emotionally Abused: No     Physically Abused: No     Sexually Abused: No   Housing Stability: Low Risk  (7/8/2024)    Housing Stability Vital Sign     Unable to Pay for Housing in the Last Year: No     Number of Places

## 2024-07-15 NOTE — LETTER
9/22/2021    Patient: Al Servin   YOB: 1975   Date of Visit: 9/22/2021     Nelson Pichardo, [address] [facility]  [address] [address]  [place] [place] [address] [address]  [place]    Dear Nelson Pichardo MD,      Thank you for referring Mr. Al Servin to [facility] for evaluation. My notes for this consultation are attached. If you have questions, please do not hesitate to call me. I look forward to following your patient along with you.       Sincerely,    Justo Valderrama MD Include Location In Plan?: No Detail Level: Zone Detail Level: Detailed

## 2024-07-16 RX ORDER — FLUTICASONE PROPIONATE 50 MCG
2 SPRAY, SUSPENSION (ML) NASAL 2 TIMES DAILY
Qty: 1 EACH | Refills: 2 | Status: SHIPPED | OUTPATIENT
Start: 2024-07-16 | End: 2024-07-21

## 2024-07-17 NOTE — PROGRESS NOTES
Addendum:  The patient is being switched to Mounjaro because the Ozempic basically did not address his needs.  He had no meaningful weight loss and his diabetes is poorly controlled, although this is predominantly related to his noncompliance.

## 2024-07-18 NOTE — PROGRESS NOTES
1. The patient's diabetes is poorly controlled, primarily because of his noncompliance.  He is using his continuous glucose monitor.  I attempt to explain some concepts to him to allow him to use the data to assist with better blood pressure control.  I again emphasized the importance of eating at the same time every day and minimizing his consumption of processed carbohydrates.  2. He has diabetic polyneuropathy, which has been one of the contributing factors to the ulcer on his right foot.    To assist with his diabetes I will add a GLP1, which should also help with a cardiovascular and renal perspective.  3. He has diabetic retinopathy, as well as diabetic nephropathy.  He has all the microvascular complications of his poor diabetic control since its onset.  4. I encouraged him to lose weight and this he has not done.  This can be accomplished by eating meals, eliminating snacks and avoiding the consumption of processed carbohydrates.  5. He remains on a statin in view of his increased cardiovascular risk.  He is in a secondary risk prevention mode in view of his history of coronary artery disease.  6. He has a history of medical noncompliance, which will eventually lead to some very adverse problems.

## 2024-07-23 LAB — DIABETIC RETINOPATHY: NEGATIVE

## 2024-07-31 ENCOUNTER — TELEPHONE (OUTPATIENT)
Age: 49
End: 2024-07-31

## 2024-07-31 NOTE — TELEPHONE ENCOUNTER
Patient requested appointment be rescheduled due to a flight change explained to patient he would be put on a wait list and we will contact him regarding a appointment due to [facility] not having availability anytime soon

## 2024-09-10 ENCOUNTER — ANCILLARY PROCEDURE (OUTPATIENT)
Age: 49
End: 2024-09-10
Payer: COMMERCIAL

## 2024-09-10 ENCOUNTER — TELEPHONE (OUTPATIENT)
Age: 49
End: 2024-09-10

## 2024-09-10 VITALS
HEIGHT: 64 IN | SYSTOLIC BLOOD PRESSURE: 120 MMHG | WEIGHT: 207 LBS | BODY MASS INDEX: 35.34 KG/M2 | DIASTOLIC BLOOD PRESSURE: 86 MMHG

## 2024-09-10 DIAGNOSIS — I50.20 HFREF (HEART FAILURE WITH REDUCED EJECTION FRACTION) (HCC): ICD-10-CM

## 2024-09-10 LAB
ECHO BSA: 2.06 M2
ECHO LV EDV A2C: 138 ML
ECHO LV EDV A4C: 160 ML
ECHO LV EDV BP: 149 ML (ref 67–155)
ECHO LV EDV INDEX A4C: 81 ML/M2
ECHO LV EDV INDEX BP: 75 ML/M2
ECHO LV EDV NDEX A2C: 70 ML/M2
ECHO LV EJECTION FRACTION A2C: 27 %
ECHO LV EJECTION FRACTION A2C: 41 %
ECHO LV EJECTION FRACTION A4C: 39 %
ECHO LV EJECTION FRACTION A4C: 41 %
ECHO LV EJECTION FRACTION BIPLANE: 33 % (ref 55–100)
ECHO LV ESV A2C: 101 ML
ECHO LV ESV A4C: 97 ML
ECHO LV ESV BP: 100 ML (ref 22–58)
ECHO LV ESV INDEX A2C: 51 ML/M2
ECHO LV ESV INDEX A4C: 49 ML/M2
ECHO LV ESV INDEX BP: 51 ML/M2
ECHO LV FRACTIONAL SHORTENING: 24 % (ref 28–44)
ECHO LV GLOBAL LONGITUDINAL STRAIN (GLS): -11.8 %
ECHO LV INTERNAL DIMENSION DIASTOLE INDEX: 2.78 CM/M2
ECHO LV INTERNAL DIMENSION DIASTOLIC MMODE: 5.6 CM (ref 4.2–5.9)
ECHO LV INTERNAL DIMENSION DIASTOLIC: 5.5 CM (ref 4.2–5.9)
ECHO LV INTERNAL DIMENSION SYSTOLIC INDEX: 2.12 CM/M2
ECHO LV INTERNAL DIMENSION SYSTOLIC MMODE: 4.1 CM
ECHO LV INTERNAL DIMENSION SYSTOLIC: 4.2 CM
ECHO LV IVSD MMODE: 1.3 CM (ref 0.6–1)
ECHO LV IVSD: 1.2 CM (ref 0.6–1)
ECHO LV IVSS MMODE: 1.7 CM
ECHO LV MASS 2D: 320.9 G (ref 88–224)
ECHO LV MASS INDEX 2D: 162.1 G/M2 (ref 49–115)
ECHO LV POSTERIOR WALL DIASTOLIC MMODE: 1.3 CM (ref 0.6–1)
ECHO LV POSTERIOR WALL DIASTOLIC: 1.5 CM (ref 0.6–1)
ECHO LV POSTERIOR WALL SYSTOLIC MMODE: 1.9 CM
ECHO LV RELATIVE WALL THICKNESS RATIO: 0.55

## 2024-09-10 PROCEDURE — 93308 TTE F-UP OR LMTD: CPT | Performed by: INTERNAL MEDICINE

## 2024-09-10 PROCEDURE — 93356 MYOCRD STRAIN IMG SPCKL TRCK: CPT | Performed by: INTERNAL MEDICINE

## 2024-09-26 ENCOUNTER — OFFICE VISIT (OUTPATIENT)
Age: 49
End: 2024-09-26
Payer: COMMERCIAL

## 2024-09-26 VITALS
OXYGEN SATURATION: 97 % | HEIGHT: 64 IN | HEART RATE: 79 BPM | DIASTOLIC BLOOD PRESSURE: 88 MMHG | SYSTOLIC BLOOD PRESSURE: 149 MMHG | WEIGHT: 206 LBS | TEMPERATURE: 98.2 F | BODY MASS INDEX: 35.17 KG/M2

## 2024-09-26 DIAGNOSIS — R74.8 ELEVATED LIVER ENZYMES: Primary | ICD-10-CM

## 2024-09-26 PROCEDURE — 3077F SYST BP >= 140 MM HG: CPT | Performed by: INTERNAL MEDICINE

## 2024-09-26 PROCEDURE — 3079F DIAST BP 80-89 MM HG: CPT | Performed by: INTERNAL MEDICINE

## 2024-09-26 PROCEDURE — 99214 OFFICE O/P EST MOD 30 MIN: CPT | Performed by: INTERNAL MEDICINE

## 2024-09-26 PROCEDURE — 91200 LIVER ELASTOGRAPHY: CPT | Performed by: INTERNAL MEDICINE

## 2024-09-26 RX ORDER — GABAPENTIN 100 MG/1
CAPSULE ORAL
COMMUNITY
Start: 2024-07-24

## 2024-09-26 ASSESSMENT — ANXIETY QUESTIONNAIRES
IF YOU CHECKED OFF ANY PROBLEMS ON THIS QUESTIONNAIRE, HOW DIFFICULT HAVE THESE PROBLEMS MADE IT FOR YOU TO DO YOUR WORK, TAKE CARE OF THINGS AT HOME, OR GET ALONG WITH OTHER PEOPLE: NOT DIFFICULT AT ALL
5. BEING SO RESTLESS THAT IT IS HARD TO SIT STILL: NOT AT ALL
7. FEELING AFRAID AS IF SOMETHING AWFUL MIGHT HAPPEN: NOT AT ALL
3. WORRYING TOO MUCH ABOUT DIFFERENT THINGS: NOT AT ALL
GAD7 TOTAL SCORE: 0
4. TROUBLE RELAXING: NOT AT ALL
2. NOT BEING ABLE TO STOP OR CONTROL WORRYING: NOT AT ALL
1. FEELING NERVOUS, ANXIOUS, OR ON EDGE: NOT AT ALL
6. BECOMING EASILY ANNOYED OR IRRITABLE: NOT AT ALL

## 2024-09-26 ASSESSMENT — PATIENT HEALTH QUESTIONNAIRE - PHQ9
DEPRESSION UNABLE TO ASSESS: FUNCTIONAL CAPACITY MOTIVATION LIMITS ACCURACY
SUM OF ALL RESPONSES TO PHQ9 QUESTIONS 1 & 2: 0
1. LITTLE INTEREST OR PLEASURE IN DOING THINGS: NOT AT ALL
SUM OF ALL RESPONSES TO PHQ QUESTIONS 1-9: 0
SUM OF ALL RESPONSES TO PHQ QUESTIONS 1-9: 0
2. FEELING DOWN, DEPRESSED OR HOPELESS: NOT AT ALL
SUM OF ALL RESPONSES TO PHQ QUESTIONS 1-9: 0
SUM OF ALL RESPONSES TO PHQ QUESTIONS 1-9: 0

## 2024-09-26 NOTE — PROGRESS NOTES
Chief Complaint   Patient presents with    Follow-up     N/A     Vitals:    09/26/24 0901   BP: (!) 149/88   Site: Left Upper Arm   Position: Sitting   Pulse: 79   Temp: 98.2 °F (36.8 °C)   TempSrc: Temporal   SpO2: 97%   Weight: 93.4 kg (206 lb)   Height: 1.626 m (5' 4\")     .  \"Have you been to the ER, urgent care clinic since your last visit?  Hospitalized since your last visit?\"    NO    “Have you seen or consulted any other health care providers outside of [facility] since your last visit?”    NO          Click Here for Release of Records Request

## 2024-10-18 PROBLEM — R07.9 CHEST PAIN: Status: RESOLVED | Noted: 2020-11-27 | Resolved: 2024-10-18

## 2024-10-18 PROBLEM — R74.8 ELEVATED LIVER ENZYMES: Status: RESOLVED | Noted: 2024-06-01 | Resolved: 2024-10-18

## 2024-10-18 PROBLEM — L02.619 CELLULITIS AND ABSCESS OF FOOT: Status: RESOLVED | Noted: 2024-06-03 | Resolved: 2024-10-18

## 2024-10-18 PROBLEM — I20.0 UNSTABLE ANGINA PECTORIS (HCC): Status: RESOLVED | Noted: 2021-04-28 | Resolved: 2024-10-18

## 2024-10-18 PROBLEM — K83.1 BILIARY OBSTRUCTION: Status: RESOLVED | Noted: 2023-05-12 | Resolved: 2024-10-18

## 2024-10-18 PROBLEM — S52.92XB OPEN LEFT FOREARM FRACTURE: Status: RESOLVED | Noted: 2018-02-15 | Resolved: 2024-10-18

## 2024-10-18 PROBLEM — E11.65 POORLY CONTROLLED DIABETES MELLITUS (HCC): Status: RESOLVED | Noted: 2020-05-13 | Resolved: 2024-10-18

## 2024-10-18 PROBLEM — K76.0 STEATOSIS OF LIVER: Status: RESOLVED | Noted: 2021-06-07 | Resolved: 2024-10-18

## 2024-10-18 PROBLEM — K75.9 HEPATITIS: Status: RESOLVED | Noted: 2023-05-16 | Resolved: 2024-10-18

## 2024-10-18 PROBLEM — L03.119 CELLULITIS AND ABSCESS OF FOOT: Status: RESOLVED | Noted: 2024-06-03 | Resolved: 2024-10-18

## 2024-10-18 PROBLEM — E11.22 TYPE 2 DIABETES MELLITUS WITH CHRONIC KIDNEY DISEASE (HCC): Status: RESOLVED | Noted: 2022-02-17 | Resolved: 2024-10-18

## 2024-12-04 ENCOUNTER — APPOINTMENT (OUTPATIENT)
Facility: HOSPITAL | Age: 49
End: 2024-12-04
Payer: COMMERCIAL

## 2024-12-04 ENCOUNTER — HOSPITAL ENCOUNTER (OUTPATIENT)
Facility: HOSPITAL | Age: 49
Setting detail: OBSERVATION
Discharge: HOME | End: 2024-12-06
Attending: INTERNAL MEDICINE | Admitting: INTERNAL MEDICINE
Payer: COMMERCIAL

## 2024-12-04 DIAGNOSIS — K52.9 ENTERITIS: Primary | ICD-10-CM

## 2024-12-04 DIAGNOSIS — N17.9 AKI (ACUTE KIDNEY INJURY) (HCC): ICD-10-CM

## 2024-12-04 DIAGNOSIS — E86.0 DEHYDRATION: ICD-10-CM

## 2024-12-04 LAB
ALBUMIN SERPL-MCNC: 4.5 G/DL (ref 3.5–5)
ALBUMIN/GLOB SERPL: 0.9 (ref 1.1–2.2)
ALP SERPL-CCNC: 113 U/L (ref 45–117)
ALT SERPL-CCNC: 41 U/L (ref 12–78)
ANION GAP SERPL CALC-SCNC: 5 MMOL/L (ref 2–12)
ANION GAP SERPL CALC-SCNC: 6 MMOL/L (ref 2–12)
APPEARANCE UR: ABNORMAL
AST SERPL-CCNC: 32 U/L (ref 15–37)
BACTERIA URNS QL MICRO: NEGATIVE /HPF
BASOPHILS # BLD: 0 K/UL (ref 0–0.1)
BASOPHILS NFR BLD: 0 % (ref 0–1)
BILIRUB SERPL-MCNC: 0.7 MG/DL (ref 0.2–1)
BILIRUB UR QL CFM: NEGATIVE
BUN SERPL-MCNC: 30 MG/DL (ref 6–20)
BUN SERPL-MCNC: 32 MG/DL (ref 6–20)
BUN/CREAT SERPL: 10 (ref 12–20)
BUN/CREAT SERPL: 16 (ref 12–20)
CALCIUM SERPL-MCNC: 7.8 MG/DL (ref 8.5–10.1)
CALCIUM SERPL-MCNC: 9.6 MG/DL (ref 8.5–10.1)
CAOX CRY URNS QL MICRO: ABNORMAL
CHLORIDE SERPL-SCNC: 103 MMOL/L (ref 97–108)
CHLORIDE SERPL-SCNC: 114 MMOL/L (ref 97–108)
CO2 SERPL-SCNC: 18 MMOL/L (ref 21–32)
CO2 SERPL-SCNC: 23 MMOL/L (ref 21–32)
COLOR UR: ABNORMAL
COMMENT:: NORMAL
CREAT SERPL-MCNC: 1.93 MG/DL (ref 0.7–1.3)
CREAT SERPL-MCNC: 3.08 MG/DL (ref 0.7–1.3)
DIFFERENTIAL METHOD BLD: ABNORMAL
EOSINOPHIL # BLD: 0.2 K/UL (ref 0–0.4)
EOSINOPHIL NFR BLD: 3 % (ref 0–7)
EPITH CASTS URNS QL MICRO: ABNORMAL /LPF
ERYTHROCYTE [DISTWIDTH] IN BLOOD BY AUTOMATED COUNT: 15.5 % (ref 11.5–14.5)
EST. AVERAGE GLUCOSE BLD GHB EST-MCNC: 272 MG/DL
GLOBULIN SER CALC-MCNC: 5.1 G/DL (ref 2–4)
GLUCOSE BLD STRIP.AUTO-MCNC: 143 MG/DL (ref 65–117)
GLUCOSE BLD STRIP.AUTO-MCNC: 162 MG/DL (ref 65–117)
GLUCOSE BLD STRIP.AUTO-MCNC: 237 MG/DL (ref 65–117)
GLUCOSE SERPL-MCNC: 179 MG/DL (ref 65–100)
GLUCOSE SERPL-MCNC: 338 MG/DL (ref 65–100)
GLUCOSE UR STRIP.AUTO-MCNC: 500 MG/DL
GRAN CASTS URNS QL MICRO: ABNORMAL /LPF
HBA1C MFR BLD: 11.1 % (ref 4–5.6)
HCT VFR BLD AUTO: 53 % (ref 36.6–50.3)
HGB BLD-MCNC: 17.3 G/DL (ref 12.1–17)
HGB UR QL STRIP: ABNORMAL
HYALINE CASTS URNS QL MICRO: >20 /LPF (ref 0–5)
IMM GRANULOCYTES # BLD AUTO: 0 K/UL (ref 0–0.04)
IMM GRANULOCYTES NFR BLD AUTO: 1 % (ref 0–0.5)
KETONES UR QL STRIP.AUTO: ABNORMAL MG/DL
LACTATE BLD-SCNC: 1.46 MMOL/L (ref 0.4–2)
LEUKOCYTE ESTERASE UR QL STRIP.AUTO: NEGATIVE
LIPASE SERPL-CCNC: 39 U/L (ref 13–75)
LYMPHOCYTES # BLD: 1.3 K/UL (ref 0.8–3.5)
LYMPHOCYTES NFR BLD: 20 % (ref 12–49)
MCH RBC QN AUTO: 26.6 PG (ref 26–34)
MCHC RBC AUTO-ENTMCNC: 32.6 G/DL (ref 30–36.5)
MCV RBC AUTO: 81.4 FL (ref 80–99)
MONOCYTES # BLD: 0.6 K/UL (ref 0–1)
MONOCYTES NFR BLD: 10 % (ref 5–13)
NEUTS SEG # BLD: 4.4 K/UL (ref 1.8–8)
NEUTS SEG NFR BLD: 66 % (ref 32–75)
NITRITE UR QL STRIP.AUTO: NEGATIVE
NRBC # BLD: 0 K/UL (ref 0–0.01)
NRBC BLD-RTO: 0 PER 100 WBC
PH UR STRIP: 5 (ref 5–8)
PLATELET # BLD AUTO: 218 K/UL (ref 150–400)
PMV BLD AUTO: 12 FL (ref 8.9–12.9)
POTASSIUM SERPL-SCNC: 4.4 MMOL/L (ref 3.5–5.1)
POTASSIUM SERPL-SCNC: ABNORMAL MMOL/L (ref 3.5–5.1)
PROT SERPL-MCNC: 9.6 G/DL (ref 6.4–8.2)
PROT UR STRIP-MCNC: 100 MG/DL
RBC # BLD AUTO: 6.51 M/UL (ref 4.1–5.7)
RBC #/AREA URNS HPF: ABNORMAL /HPF (ref 0–5)
RV AG STL QL IA: NEGATIVE
SERVICE CMNT-IMP: ABNORMAL
SODIUM SERPL-SCNC: 132 MMOL/L (ref 136–145)
SODIUM SERPL-SCNC: 137 MMOL/L (ref 136–145)
SP GR UR REFRACTOMETRY: 1.03
SPECIMEN HOLD: NORMAL
URINE CULTURE IF INDICATED: ABNORMAL
UROBILINOGEN UR QL STRIP.AUTO: 1 EU/DL (ref 0.2–1)
WBC # BLD AUTO: 6.6 K/UL (ref 4.1–11.1)
WBC URNS QL MICRO: ABNORMAL /HPF (ref 0–4)

## 2024-12-04 PROCEDURE — 96361 HYDRATE IV INFUSION ADD-ON: CPT

## 2024-12-04 PROCEDURE — G0378 HOSPITAL OBSERVATION PER HR: HCPCS

## 2024-12-04 PROCEDURE — 96375 TX/PRO/DX INJ NEW DRUG ADDON: CPT

## 2024-12-04 PROCEDURE — 96376 TX/PRO/DX INJ SAME DRUG ADON: CPT

## 2024-12-04 PROCEDURE — 80053 COMPREHEN METABOLIC PANEL: CPT

## 2024-12-04 PROCEDURE — 87798 DETECT AGENT NOS DNA AMP: CPT

## 2024-12-04 PROCEDURE — 85025 COMPLETE CBC W/AUTO DIFF WBC: CPT

## 2024-12-04 PROCEDURE — 6360000002 HC RX W HCPCS: Performed by: INTERNAL MEDICINE

## 2024-12-04 PROCEDURE — 87324 CLOSTRIDIUM AG IA: CPT

## 2024-12-04 PROCEDURE — 6370000000 HC RX 637 (ALT 250 FOR IP): Performed by: INTERNAL MEDICINE

## 2024-12-04 PROCEDURE — 83036 HEMOGLOBIN GLYCOSYLATED A1C: CPT

## 2024-12-04 PROCEDURE — 87449 NOS EACH ORGANISM AG IA: CPT

## 2024-12-04 PROCEDURE — 81001 URINALYSIS AUTO W/SCOPE: CPT

## 2024-12-04 PROCEDURE — 2580000003 HC RX 258: Performed by: INTERNAL MEDICINE

## 2024-12-04 PROCEDURE — 96374 THER/PROPH/DIAG INJ IV PUSH: CPT

## 2024-12-04 PROCEDURE — 99285 EMERGENCY DEPT VISIT HI MDM: CPT

## 2024-12-04 PROCEDURE — 6360000002 HC RX W HCPCS: Performed by: PHYSICIAN ASSISTANT

## 2024-12-04 PROCEDURE — 74176 CT ABD & PELVIS W/O CONTRAST: CPT

## 2024-12-04 PROCEDURE — 96365 THER/PROPH/DIAG IV INF INIT: CPT

## 2024-12-04 PROCEDURE — 96372 THER/PROPH/DIAG INJ SC/IM: CPT

## 2024-12-04 PROCEDURE — 83605 ASSAY OF LACTIC ACID: CPT

## 2024-12-04 PROCEDURE — 89055 LEUKOCYTE ASSESSMENT FECAL: CPT

## 2024-12-04 PROCEDURE — 96366 THER/PROPH/DIAG IV INF ADDON: CPT

## 2024-12-04 PROCEDURE — 83690 ASSAY OF LIPASE: CPT

## 2024-12-04 PROCEDURE — 36415 COLL VENOUS BLD VENIPUNCTURE: CPT

## 2024-12-04 PROCEDURE — 2580000003 HC RX 258: Performed by: PHYSICIAN ASSISTANT

## 2024-12-04 PROCEDURE — 82962 GLUCOSE BLOOD TEST: CPT

## 2024-12-04 PROCEDURE — 87425 ROTAVIRUS AG IA: CPT

## 2024-12-04 RX ORDER — ACETAMINOPHEN 500 MG
500 TABLET ORAL 4 TIMES DAILY PRN
Status: DISCONTINUED | OUTPATIENT
Start: 2024-12-04 | End: 2024-12-05 | Stop reason: SDUPTHER

## 2024-12-04 RX ORDER — CLOPIDOGREL BISULFATE 75 MG/1
75 TABLET ORAL DAILY
Status: DISCONTINUED | OUTPATIENT
Start: 2024-12-04 | End: 2024-12-06 | Stop reason: HOSPADM

## 2024-12-04 RX ORDER — INSULIN LISPRO 100 [IU]/ML
0-8 INJECTION, SOLUTION INTRAVENOUS; SUBCUTANEOUS
Status: DISCONTINUED | OUTPATIENT
Start: 2024-12-04 | End: 2024-12-06 | Stop reason: HOSPADM

## 2024-12-04 RX ORDER — OMEGA-3-ACID ETHYL ESTERS 1 G/1
1 CAPSULE, LIQUID FILLED ORAL DAILY
Status: DISCONTINUED | OUTPATIENT
Start: 2024-12-04 | End: 2024-12-06 | Stop reason: HOSPADM

## 2024-12-04 RX ORDER — ONDANSETRON 2 MG/ML
4 INJECTION INTRAMUSCULAR; INTRAVENOUS EVERY 6 HOURS PRN
Status: DISCONTINUED | OUTPATIENT
Start: 2024-12-04 | End: 2024-12-06 | Stop reason: HOSPADM

## 2024-12-04 RX ORDER — MORPHINE SULFATE 4 MG/ML
4 INJECTION, SOLUTION INTRAMUSCULAR; INTRAVENOUS
Status: COMPLETED | OUTPATIENT
Start: 2024-12-04 | End: 2024-12-04

## 2024-12-04 RX ORDER — 0.9 % SODIUM CHLORIDE 0.9 %
500 INTRAVENOUS SOLUTION INTRAVENOUS ONCE
Status: COMPLETED | OUTPATIENT
Start: 2024-12-04 | End: 2024-12-04

## 2024-12-04 RX ORDER — SODIUM CHLORIDE 450 MG/100ML
INJECTION, SOLUTION INTRAVENOUS CONTINUOUS
Status: DISCONTINUED | OUTPATIENT
Start: 2024-12-04 | End: 2024-12-06

## 2024-12-04 RX ORDER — POTASSIUM CHLORIDE 7.45 MG/ML
10 INJECTION INTRAVENOUS PRN
Status: DISCONTINUED | OUTPATIENT
Start: 2024-12-04 | End: 2024-12-06 | Stop reason: HOSPADM

## 2024-12-04 RX ORDER — POTASSIUM CHLORIDE 1500 MG/1
40 TABLET, EXTENDED RELEASE ORAL PRN
Status: DISCONTINUED | OUTPATIENT
Start: 2024-12-04 | End: 2024-12-06 | Stop reason: HOSPADM

## 2024-12-04 RX ORDER — FLUTICASONE PROPIONATE 50 MCG
2 SPRAY, SUSPENSION (ML) NASAL 2 TIMES DAILY
Status: DISCONTINUED | OUTPATIENT
Start: 2024-12-04 | End: 2024-12-06 | Stop reason: HOSPADM

## 2024-12-04 RX ORDER — INSULIN GLARGINE 100 [IU]/ML
17 INJECTION, SOLUTION SUBCUTANEOUS NIGHTLY
Status: DISCONTINUED | OUTPATIENT
Start: 2024-12-04 | End: 2024-12-06

## 2024-12-04 RX ORDER — FENTANYL CITRATE 50 UG/ML
50 INJECTION, SOLUTION INTRAMUSCULAR; INTRAVENOUS
Status: DISCONTINUED | OUTPATIENT
Start: 2024-12-04 | End: 2024-12-06 | Stop reason: HOSPADM

## 2024-12-04 RX ORDER — MAGNESIUM SULFATE IN WATER 40 MG/ML
2000 INJECTION, SOLUTION INTRAVENOUS PRN
Status: DISCONTINUED | OUTPATIENT
Start: 2024-12-04 | End: 2024-12-06 | Stop reason: HOSPADM

## 2024-12-04 RX ORDER — ACETAMINOPHEN 325 MG/1
650 TABLET ORAL EVERY 6 HOURS PRN
Status: DISCONTINUED | OUTPATIENT
Start: 2024-12-04 | End: 2024-12-06 | Stop reason: HOSPADM

## 2024-12-04 RX ORDER — ENOXAPARIN SODIUM 100 MG/ML
40 INJECTION SUBCUTANEOUS DAILY
Status: DISCONTINUED | OUTPATIENT
Start: 2024-12-04 | End: 2024-12-06 | Stop reason: HOSPADM

## 2024-12-04 RX ORDER — AMLODIPINE BESYLATE 5 MG/1
10 TABLET ORAL DAILY
Status: DISCONTINUED | OUTPATIENT
Start: 2024-12-04 | End: 2024-12-06 | Stop reason: HOSPADM

## 2024-12-04 RX ORDER — OXYCODONE AND ACETAMINOPHEN 5; 325 MG/1; MG/1
1 TABLET ORAL EVERY 4 HOURS PRN
Status: DISCONTINUED | OUTPATIENT
Start: 2024-12-04 | End: 2024-12-06

## 2024-12-04 RX ORDER — NITROGLYCERIN 0.4 MG/1
0.4 TABLET SUBLINGUAL EVERY 5 MIN PRN
Status: DISCONTINUED | OUTPATIENT
Start: 2024-12-04 | End: 2024-12-06 | Stop reason: HOSPADM

## 2024-12-04 RX ORDER — SODIUM CHLORIDE 9 MG/ML
INJECTION, SOLUTION INTRAVENOUS PRN
Status: DISCONTINUED | OUTPATIENT
Start: 2024-12-04 | End: 2024-12-06 | Stop reason: HOSPADM

## 2024-12-04 RX ORDER — ONDANSETRON 4 MG/1
4 TABLET, ORALLY DISINTEGRATING ORAL EVERY 8 HOURS PRN
Status: DISCONTINUED | OUTPATIENT
Start: 2024-12-04 | End: 2024-12-06 | Stop reason: HOSPADM

## 2024-12-04 RX ORDER — GABAPENTIN 100 MG/1
100 CAPSULE ORAL 2 TIMES DAILY
Status: DISCONTINUED | OUTPATIENT
Start: 2024-12-04 | End: 2024-12-06 | Stop reason: HOSPADM

## 2024-12-04 RX ORDER — ONDANSETRON 2 MG/ML
4 INJECTION INTRAMUSCULAR; INTRAVENOUS
Status: COMPLETED | OUTPATIENT
Start: 2024-12-04 | End: 2024-12-04

## 2024-12-04 RX ORDER — 0.9 % SODIUM CHLORIDE 0.9 %
1000 INTRAVENOUS SOLUTION INTRAVENOUS ONCE
Status: COMPLETED | OUTPATIENT
Start: 2024-12-04 | End: 2024-12-04

## 2024-12-04 RX ORDER — ACETAMINOPHEN 650 MG/1
650 SUPPOSITORY RECTAL EVERY 6 HOURS PRN
Status: DISCONTINUED | OUTPATIENT
Start: 2024-12-04 | End: 2024-12-06 | Stop reason: HOSPADM

## 2024-12-04 RX ORDER — ROSUVASTATIN CALCIUM 20 MG/1
40 TABLET, COATED ORAL NIGHTLY
Status: DISCONTINUED | OUTPATIENT
Start: 2024-12-04 | End: 2024-12-06 | Stop reason: HOSPADM

## 2024-12-04 RX ORDER — SODIUM CHLORIDE 0.9 % (FLUSH) 0.9 %
5-40 SYRINGE (ML) INJECTION EVERY 12 HOURS SCHEDULED
Status: DISCONTINUED | OUTPATIENT
Start: 2024-12-04 | End: 2024-12-06 | Stop reason: HOSPADM

## 2024-12-04 RX ORDER — EZETIMIBE 10 MG/1
10 TABLET ORAL DAILY
Status: DISCONTINUED | OUTPATIENT
Start: 2024-12-04 | End: 2024-12-06 | Stop reason: HOSPADM

## 2024-12-04 RX ORDER — ONDANSETRON 2 MG/ML
4 INJECTION INTRAMUSCULAR; INTRAVENOUS EVERY 6 HOURS PRN
Status: COMPLETED | OUTPATIENT
Start: 2024-12-04 | End: 2024-12-04

## 2024-12-04 RX ORDER — FENTANYL CITRATE 50 UG/ML
50 INJECTION, SOLUTION INTRAMUSCULAR; INTRAVENOUS
Status: COMPLETED | OUTPATIENT
Start: 2024-12-04 | End: 2024-12-04

## 2024-12-04 RX ORDER — DEXTROSE MONOHYDRATE 100 MG/ML
INJECTION, SOLUTION INTRAVENOUS CONTINUOUS PRN
Status: DISCONTINUED | OUTPATIENT
Start: 2024-12-04 | End: 2024-12-06 | Stop reason: HOSPADM

## 2024-12-04 RX ORDER — ASPIRIN 81 MG/1
81 TABLET ORAL DAILY
Status: DISCONTINUED | OUTPATIENT
Start: 2024-12-04 | End: 2024-12-06 | Stop reason: HOSPADM

## 2024-12-04 RX ORDER — METRONIDAZOLE 500 MG/100ML
500 INJECTION, SOLUTION INTRAVENOUS EVERY 8 HOURS
Status: DISCONTINUED | OUTPATIENT
Start: 2024-12-04 | End: 2024-12-06

## 2024-12-04 RX ORDER — HEPARIN SODIUM 5000 [USP'U]/ML
5000 INJECTION, SOLUTION INTRAVENOUS; SUBCUTANEOUS EVERY 8 HOURS SCHEDULED
Status: DISCONTINUED | OUTPATIENT
Start: 2024-12-04 | End: 2024-12-04

## 2024-12-04 RX ORDER — GLUCAGON 1 MG/ML
1 KIT INJECTION PRN
Status: DISCONTINUED | OUTPATIENT
Start: 2024-12-04 | End: 2024-12-06 | Stop reason: HOSPADM

## 2024-12-04 RX ORDER — POLYETHYLENE GLYCOL 3350 17 G/17G
17 POWDER, FOR SOLUTION ORAL DAILY PRN
Status: DISCONTINUED | OUTPATIENT
Start: 2024-12-04 | End: 2024-12-06 | Stop reason: HOSPADM

## 2024-12-04 RX ORDER — CARVEDILOL 12.5 MG/1
25 TABLET ORAL 2 TIMES DAILY WITH MEALS
Status: DISCONTINUED | OUTPATIENT
Start: 2024-12-04 | End: 2024-12-06 | Stop reason: HOSPADM

## 2024-12-04 RX ADMIN — SACUBITRIL AND VALSARTAN 1 TABLET: 24; 26 TABLET, FILM COATED ORAL at 21:13

## 2024-12-04 RX ADMIN — ASPIRIN 81 MG: 81 TABLET, COATED ORAL at 18:31

## 2024-12-04 RX ADMIN — ONDANSETRON 4 MG: 2 INJECTION INTRAMUSCULAR; INTRAVENOUS at 21:13

## 2024-12-04 RX ADMIN — SODIUM CHLORIDE 500 ML: 9 INJECTION, SOLUTION INTRAVENOUS at 10:47

## 2024-12-04 RX ADMIN — EZETIMIBE 10 MG: 10 TABLET ORAL at 18:02

## 2024-12-04 RX ADMIN — GABAPENTIN 100 MG: 100 CAPSULE ORAL at 21:14

## 2024-12-04 RX ADMIN — ENOXAPARIN SODIUM 40 MG: 100 INJECTION SUBCUTANEOUS at 18:03

## 2024-12-04 RX ADMIN — SODIUM CHLORIDE 500 ML: 9 INJECTION, SOLUTION INTRAVENOUS at 09:08

## 2024-12-04 RX ADMIN — METRONIDAZOLE 500 MG: 500 INJECTION, SOLUTION INTRAVENOUS at 18:30

## 2024-12-04 RX ADMIN — ONDANSETRON 4 MG: 2 INJECTION INTRAMUSCULAR; INTRAVENOUS at 09:08

## 2024-12-04 RX ADMIN — INSULIN GLARGINE 17 UNITS: 100 INJECTION, SOLUTION SUBCUTANEOUS at 21:15

## 2024-12-04 RX ADMIN — FENTANYL CITRATE 50 MCG: 50 INJECTION INTRAMUSCULAR; INTRAVENOUS at 12:48

## 2024-12-04 RX ADMIN — ROSUVASTATIN CALCIUM 40 MG: 20 TABLET, FILM COATED ORAL at 21:13

## 2024-12-04 RX ADMIN — FENTANYL CITRATE 50 MCG: 50 INJECTION INTRAMUSCULAR; INTRAVENOUS at 15:34

## 2024-12-04 RX ADMIN — MORPHINE SULFATE 4 MG: 4 INJECTION, SOLUTION INTRAMUSCULAR; INTRAVENOUS at 09:08

## 2024-12-04 RX ADMIN — CARVEDILOL 25 MG: 12.5 TABLET, FILM COATED ORAL at 18:10

## 2024-12-04 RX ADMIN — SODIUM CHLORIDE, PRESERVATIVE FREE 10 ML: 5 INJECTION INTRAVENOUS at 21:14

## 2024-12-04 RX ADMIN — AMLODIPINE BESYLATE 10 MG: 5 TABLET ORAL at 18:11

## 2024-12-04 RX ADMIN — ONDANSETRON 4 MG: 2 INJECTION INTRAMUSCULAR; INTRAVENOUS at 15:48

## 2024-12-04 RX ADMIN — SODIUM CHLORIDE 1000 ML: 9 INJECTION, SOLUTION INTRAVENOUS at 15:45

## 2024-12-04 RX ADMIN — OXYCODONE HYDROCHLORIDE AND ACETAMINOPHEN 1 TABLET: 5; 325 TABLET ORAL at 21:13

## 2024-12-04 RX ADMIN — WATER 1000 MG: 1 INJECTION INTRAMUSCULAR; INTRAVENOUS; SUBCUTANEOUS at 18:06

## 2024-12-04 RX ADMIN — SODIUM CHLORIDE: 4.5 INJECTION, SOLUTION INTRAVENOUS at 18:29

## 2024-12-04 ASSESSMENT — PAIN DESCRIPTION - LOCATION
LOCATION: ABDOMEN

## 2024-12-04 ASSESSMENT — PAIN SCALES - GENERAL
PAINLEVEL_OUTOF10: 9
PAINLEVEL_OUTOF10: 5
PAINLEVEL_OUTOF10: 6
PAINLEVEL_OUTOF10: 7
PAINLEVEL_OUTOF10: 8
PAINLEVEL_OUTOF10: 3
PAINLEVEL_OUTOF10: 5

## 2024-12-04 ASSESSMENT — PAIN DESCRIPTION - DESCRIPTORS
DESCRIPTORS: ACHING
DESCRIPTORS: DISCOMFORT
DESCRIPTORS: ACHING;SHARP
DESCRIPTORS: ACHING

## 2024-12-04 ASSESSMENT — PAIN DESCRIPTION - PAIN TYPE
TYPE: ACUTE PAIN
TYPE: ACUTE PAIN

## 2024-12-04 ASSESSMENT — PAIN DESCRIPTION - ORIENTATION
ORIENTATION: RIGHT;LOWER
ORIENTATION: ANTERIOR
ORIENTATION: RIGHT;UPPER
ORIENTATION: ANTERIOR;LOWER
ORIENTATION: RIGHT

## 2024-12-04 ASSESSMENT — PAIN - FUNCTIONAL ASSESSMENT: PAIN_FUNCTIONAL_ASSESSMENT: 0-10

## 2024-12-04 NOTE — H&P
Hospitalist Admission Note      Demographics    Pt Name  Timur Puente   Date of Birth 1975   Medical Record Number  [id number]      Age  49 y.o.   PCP Adria Lee MD   Admit date:  12/4/2024    Room Number  CD37/37  @ [facility]   Date of Service  12/4/24     Admission Diagnosis:  ASHLEY (acute kidney injury) ([facility])      Certification: We are admitting Timur Puente 49 y.o. male with a principle diagnosis of ASHLEY (acute kidney injury) (HCC)  This patient also suffers from other comorbidities listed below. I have a high level of concern for progression of GI and renal issues  leading to life threatening complications      Assessment and plan:   Acute gastroenteritis -leading to   Moderate to severe dehydration, possibly contributing to   ASHLEY on CKD stage II  In light of complex hx of   CAD  HTN  Chronic severe systolic congestive heart failure   Admit to remote telemetry   [facility] x48hrs   Nephrology consultation - pt reports no previous nephrologist evaluation   Empiric IV Abx Rocephin+ Flagyl   Daily BMP   Stool for C Diff (less likely), Stool WBC, Norvirus, Rotavirus ag tests   Enteric isolation     T2DM -unknown status of control   Diabetic diet   SSI   Continue Degludec long acting insulin   Continue Jardiance - though primary use is for his HF     Severe chronic systolic CHF pt [facility]   CAD   S/p PCI on chronic plavix   Hyperlipidemia -Pt follows   Continue Entresto   Continue Coreg  Continue Rosuvastatin + Repatha+ Zetia+ Vasceo(switched to Lovaza)   Continue Empagloiflozin   Continue ASA + Plavix as was PTA     JACKY - pt is unable to tolerated CPAP     Body mass index is 33.45 kg/m². -Class I Obesity     Present on Admission:   ASHLEY (acute kidney injury) ([facility])         CODE STATUS   Full    Functional Status  Pt is  and lives with his wife and children    Surrogate decision maker:  Pt's wife      Prophylaxis   Hep SQ

## 2024-12-04 NOTE — ED NOTES
TRANSFER - OUT REPORT:    Verbal report given to Gayatri on Timur Puente  being transferred to [facility] for routine progression of patient care       Report consisted of patient's Situation, Background, Assessment and   Recommendations(SBAR).     Information from the following report(s) Nurse Handoff Report, ED Encounter Summary, ED SBAR, Neuro Assessment, and Event Log was reviewed with the receiving nurse.    [place] Fall Assessment:    Presents to emergency department  because of falls (Syncope, seizure, or loss of consciousness): No  Age > 70: No  Altered Mental Status, Intoxication with alcohol or substance confusion (Disorientation, impaired judgment, poor safety awaremess, or inability to follow instructions): No  Impaired Mobility: Ambulates or transfers with assistive devices or assistance; Unable to ambulate or transer.: No  Nursing Judgement: No          Lines:   Peripheral IV 12/04/24 Left Antecubital (Active)   Site Assessment Clean, dry & intact 12/04/24 0907   Line Status Blood return noted 12/04/24 0907   Phlebitis Assessment No symptoms 12/04/24 0907   Infiltration Assessment 0 12/04/24 0907        Opportunity for questions and clarification was provided.      Patient transported with:  Monitor

## 2024-12-04 NOTE — ED NOTES
This RN went into pt's room to revitalize and check in after calling report upstairs to floor. Pt's LAC IV infiltrated with NS. Patient noted minimal pain, and he did not notice IV infiltration. RAC 20g placed.

## 2024-12-04 NOTE — ED NOTES
Sent A1c, and repeated BMP to lab. Additionally sent fecal samples to lab as patient was transported upstairs.

## 2024-12-04 NOTE — ED PROVIDER NOTES
\[facility]\"" EMERGENCY DEPT  EMERGENCY DEPARTMENT ENCOUNTER       Pt Name: Timur Puente  MRN: [id number]  Birthdate 1975  Date of evaluation: 12/4/2024  Provider: TERRY Ward   PCP: Adria Lee MD  Note Started: 9:01 AM EST 12/4/24     CHIEF COMPLAINT       Chief Complaint   Patient presents with    Abdominal Pain     Patient reports vomiting and diarrhea x2 days.  Patient reports decreased appetite.  Patient states he has pain on the R side. Denies fever.      HISTORY OF PRESENT ILLNESS: 1 or more elements      History From: Patient  HPI Limitations: None     Timur Puente is a 49 y.o. male with a history of diabetes, hypertension, coronary artery disease, peripheral artery disease, chronic renal disease, high cholesterol presents by POV with complaints of right upper quadrant abdominal pain for several days. He also notes nausea, vomiting, diarrhea and decreased appetite. Denies fever and urinary complaints. Denies known sick contacts or eating any potentially rancid foods. No treatment PTA. Prior abdominal surgeries include cholecystectomy.     Nursing Notes were all reviewed and agreed with or any disagreements were addressed in the HPI.     REVIEW OF SYSTEMS      Review of Systems     Positives and Pertinent negatives as per HPI.    PAST HISTORY     Past Medical History:  Past Medical History:   Diagnosis Date    CAD (coronary artery disease)     2 stents 2016     DM2 (diabetes mellitus, type 2) (HCC)     Headache     Hypercholesterolemia     Hypertension     Hypogonadism male     Obstructive sleep apnea     JACKY (obstructive sleep apnea)        Past Surgical History:  Past Surgical History:   Procedure Laterality Date    COLONOSCOPY N/A 11/7/2023    COLORECTAL CANCER SCREENING, NOT HIGH RISK performed by Nishant Castro MD at \[facility]\"" ENDOSCOPY    FOOT DEBRIDEMENT Right 6/3/2024    RIGHT FOOT  INCISION AND DRAINAGE REQUEST 7:30  OR AFTER 4PM performed by Fallon Figueroa DPM at [facility] MAIN OR    HEENT  2013    tonsils

## 2024-12-05 ENCOUNTER — APPOINTMENT (OUTPATIENT)
Facility: HOSPITAL | Age: 49
End: 2024-12-05
Payer: COMMERCIAL

## 2024-12-05 PROBLEM — K52.9 ENTERITIS: Status: ACTIVE | Noted: 2024-12-05

## 2024-12-05 LAB
ALBUMIN SERPL-MCNC: 3.3 G/DL (ref 3.5–5)
ANION GAP SERPL CALC-SCNC: 4 MMOL/L (ref 2–12)
ANION GAP SERPL CALC-SCNC: 5 MMOL/L (ref 2–12)
BASOPHILS # BLD: 0 K/UL (ref 0–0.1)
BASOPHILS NFR BLD: 0 % (ref 0–1)
BUN SERPL-MCNC: 27 MG/DL (ref 6–20)
BUN SERPL-MCNC: 29 MG/DL (ref 6–20)
BUN/CREAT SERPL: 18 (ref 12–20)
BUN/CREAT SERPL: 19 (ref 12–20)
C DIFF GDH STL QL: NEGATIVE
C DIFF TOX A+B STL QL IA: NEGATIVE
C DIFF TOXIN INTERPRETATION: NORMAL
CALCIUM SERPL-MCNC: 8 MG/DL (ref 8.5–10.1)
CALCIUM SERPL-MCNC: 8.1 MG/DL (ref 8.5–10.1)
CHLORIDE SERPL-SCNC: 109 MMOL/L (ref 97–108)
CHLORIDE SERPL-SCNC: 110 MMOL/L (ref 97–108)
CO2 SERPL-SCNC: 23 MMOL/L (ref 21–32)
CO2 SERPL-SCNC: 25 MMOL/L (ref 21–32)
COMMENT:: NORMAL
CREAT SERPL-MCNC: 1.49 MG/DL (ref 0.7–1.3)
CREAT SERPL-MCNC: 1.51 MG/DL (ref 0.7–1.3)
CREAT UR-MCNC: 191 MG/DL
DIFFERENTIAL METHOD BLD: NORMAL
EOSINOPHIL # BLD: 0 K/UL (ref 0–0.4)
EOSINOPHIL NFR BLD: 1 % (ref 0–7)
ERYTHROCYTE [DISTWIDTH] IN BLOOD BY AUTOMATED COUNT: 14.5 % (ref 11.5–14.5)
GLUCOSE BLD STRIP.AUTO-MCNC: 139 MG/DL (ref 65–117)
GLUCOSE BLD STRIP.AUTO-MCNC: 146 MG/DL (ref 65–117)
GLUCOSE BLD STRIP.AUTO-MCNC: 149 MG/DL (ref 65–117)
GLUCOSE BLD STRIP.AUTO-MCNC: 157 MG/DL (ref 65–117)
GLUCOSE SERPL-MCNC: 162 MG/DL (ref 65–100)
GLUCOSE SERPL-MCNC: 183 MG/DL (ref 65–100)
HCT VFR BLD AUTO: 45.7 % (ref 36.6–50.3)
HGB BLD-MCNC: 14.8 G/DL (ref 12.1–17)
IMM GRANULOCYTES # BLD AUTO: 0 K/UL (ref 0–0.04)
IMM GRANULOCYTES NFR BLD AUTO: 0 % (ref 0–0.5)
LYMPHOCYTES # BLD: 2.1 K/UL (ref 0.8–3.5)
LYMPHOCYTES NFR BLD: 42 % (ref 12–49)
MCH RBC QN AUTO: 26.5 PG (ref 26–34)
MCHC RBC AUTO-ENTMCNC: 32.4 G/DL (ref 30–36.5)
MCV RBC AUTO: 81.8 FL (ref 80–99)
MONOCYTES # BLD: 0.6 K/UL (ref 0–1)
MONOCYTES NFR BLD: 12 % (ref 5–13)
NEUTS SEG # BLD: 2.2 K/UL (ref 1.8–8)
NEUTS SEG NFR BLD: 45 % (ref 32–75)
NRBC # BLD: 0 K/UL (ref 0–0.01)
NRBC BLD-RTO: 0 PER 100 WBC
PHOSPHATE SERPL-MCNC: 3.3 MG/DL (ref 2.6–4.7)
PLATELET # BLD AUTO: 182 K/UL (ref 150–400)
PMV BLD AUTO: 11.8 FL (ref 8.9–12.9)
POTASSIUM SERPL-SCNC: 3.6 MMOL/L (ref 3.5–5.1)
POTASSIUM SERPL-SCNC: 3.7 MMOL/L (ref 3.5–5.1)
PROT UR-MCNC: 37 MG/DL (ref 0–11.9)
PROT/CREAT UR-RTO: 0.2
RBC # BLD AUTO: 5.59 M/UL (ref 4.1–5.7)
SERVICE CMNT-IMP: ABNORMAL
SODIUM SERPL-SCNC: 138 MMOL/L (ref 136–145)
SODIUM SERPL-SCNC: 138 MMOL/L (ref 136–145)
SPECIMEN HOLD: NORMAL
WBC # BLD AUTO: 4.9 K/UL (ref 4.1–11.1)
WBC #/AREA STL HPF: NORMAL /HPF (ref 0–4)

## 2024-12-05 PROCEDURE — 2580000003 HC RX 258: Performed by: INTERNAL MEDICINE

## 2024-12-05 PROCEDURE — 85025 COMPLETE CBC W/AUTO DIFF WBC: CPT

## 2024-12-05 PROCEDURE — 96372 THER/PROPH/DIAG INJ SC/IM: CPT

## 2024-12-05 PROCEDURE — 6370000000 HC RX 637 (ALT 250 FOR IP): Performed by: INTERNAL MEDICINE

## 2024-12-05 PROCEDURE — 80048 BASIC METABOLIC PNL TOTAL CA: CPT

## 2024-12-05 PROCEDURE — 82962 GLUCOSE BLOOD TEST: CPT

## 2024-12-05 PROCEDURE — G0378 HOSPITAL OBSERVATION PER HR: HCPCS

## 2024-12-05 PROCEDURE — 84156 ASSAY OF PROTEIN URINE: CPT

## 2024-12-05 PROCEDURE — 36415 COLL VENOUS BLD VENIPUNCTURE: CPT

## 2024-12-05 PROCEDURE — 6360000002 HC RX W HCPCS: Performed by: INTERNAL MEDICINE

## 2024-12-05 PROCEDURE — 96366 THER/PROPH/DIAG IV INF ADDON: CPT

## 2024-12-05 PROCEDURE — 82570 ASSAY OF URINE CREATININE: CPT

## 2024-12-05 PROCEDURE — 96376 TX/PRO/DX INJ SAME DRUG ADON: CPT

## 2024-12-05 PROCEDURE — 76770 US EXAM ABDO BACK WALL COMP: CPT

## 2024-12-05 PROCEDURE — 80069 RENAL FUNCTION PANEL: CPT

## 2024-12-05 PROCEDURE — 99221 1ST HOSP IP/OBS SF/LOW 40: CPT | Performed by: INTERNAL MEDICINE

## 2024-12-05 RX ORDER — PANTOPRAZOLE SODIUM 40 MG/1
40 TABLET, DELAYED RELEASE ORAL
Status: DISCONTINUED | OUTPATIENT
Start: 2024-12-05 | End: 2024-12-06 | Stop reason: HOSPADM

## 2024-12-05 RX ADMIN — CARVEDILOL 25 MG: 12.5 TABLET, FILM COATED ORAL at 17:04

## 2024-12-05 RX ADMIN — METRONIDAZOLE 500 MG: 500 INJECTION, SOLUTION INTRAVENOUS at 17:16

## 2024-12-05 RX ADMIN — ASPIRIN 81 MG: 81 TABLET, COATED ORAL at 08:48

## 2024-12-05 RX ADMIN — ONDANSETRON 4 MG: 2 INJECTION INTRAMUSCULAR; INTRAVENOUS at 11:21

## 2024-12-05 RX ADMIN — CARVEDILOL 25 MG: 12.5 TABLET, FILM COATED ORAL at 08:48

## 2024-12-05 RX ADMIN — EZETIMIBE 10 MG: 10 TABLET ORAL at 08:48

## 2024-12-05 RX ADMIN — SODIUM CHLORIDE: 4.5 INJECTION, SOLUTION INTRAVENOUS at 22:45

## 2024-12-05 RX ADMIN — METRONIDAZOLE 500 MG: 500 INJECTION, SOLUTION INTRAVENOUS at 01:03

## 2024-12-05 RX ADMIN — INSULIN GLARGINE 17 UNITS: 100 INJECTION, SOLUTION SUBCUTANEOUS at 22:01

## 2024-12-05 RX ADMIN — OXYCODONE HYDROCHLORIDE AND ACETAMINOPHEN 1 TABLET: 5; 325 TABLET ORAL at 12:57

## 2024-12-05 RX ADMIN — SODIUM CHLORIDE, PRESERVATIVE FREE 10 ML: 5 INJECTION INTRAVENOUS at 08:57

## 2024-12-05 RX ADMIN — SODIUM CHLORIDE, PRESERVATIVE FREE 10 ML: 5 INJECTION INTRAVENOUS at 22:02

## 2024-12-05 RX ADMIN — SACUBITRIL AND VALSARTAN 1 TABLET: 24; 26 TABLET, FILM COATED ORAL at 22:01

## 2024-12-05 RX ADMIN — AMLODIPINE BESYLATE 10 MG: 5 TABLET ORAL at 08:48

## 2024-12-05 RX ADMIN — ROSUVASTATIN CALCIUM 40 MG: 20 TABLET, FILM COATED ORAL at 22:01

## 2024-12-05 RX ADMIN — SACUBITRIL AND VALSARTAN 1 TABLET: 24; 26 TABLET, FILM COATED ORAL at 08:52

## 2024-12-05 RX ADMIN — CLOPIDOGREL BISULFATE 75 MG: 75 TABLET ORAL at 08:48

## 2024-12-05 RX ADMIN — EMPAGLIFLOZIN 10 MG: 10 TABLET, FILM COATED ORAL at 08:48

## 2024-12-05 RX ADMIN — PANTOPRAZOLE SODIUM 40 MG: 40 TABLET, DELAYED RELEASE ORAL at 22:01

## 2024-12-05 RX ADMIN — SODIUM CHLORIDE: 4.5 INJECTION, SOLUTION INTRAVENOUS at 06:50

## 2024-12-05 RX ADMIN — ENOXAPARIN SODIUM 40 MG: 100 INJECTION SUBCUTANEOUS at 08:48

## 2024-12-05 RX ADMIN — OMEGA-3-ACID ETHYL ESTERS CAPSULES 1 G: 1 CAPSULE, LIQUID FILLED ORAL at 08:52

## 2024-12-05 RX ADMIN — METRONIDAZOLE 500 MG: 500 INJECTION, SOLUTION INTRAVENOUS at 08:57

## 2024-12-05 RX ADMIN — ONDANSETRON 4 MG: 2 INJECTION INTRAMUSCULAR; INTRAVENOUS at 22:02

## 2024-12-05 RX ADMIN — GABAPENTIN 100 MG: 100 CAPSULE ORAL at 08:48

## 2024-12-05 RX ADMIN — GABAPENTIN 100 MG: 100 CAPSULE ORAL at 22:01

## 2024-12-05 RX ADMIN — WATER 1000 MG: 1 INJECTION INTRAMUSCULAR; INTRAVENOUS; SUBCUTANEOUS at 17:09

## 2024-12-05 ASSESSMENT — PAIN SCALES - GENERAL
PAINLEVEL_OUTOF10: 0
PAINLEVEL_OUTOF10: 5

## 2024-12-05 ASSESSMENT — PAIN DESCRIPTION - ORIENTATION: ORIENTATION: MID

## 2024-12-05 ASSESSMENT — PAIN DESCRIPTION - LOCATION: LOCATION: OTHER (COMMENT)

## 2024-12-05 ASSESSMENT — PAIN DESCRIPTION - DESCRIPTORS: DESCRIPTORS: DISCOMFORT

## 2024-12-05 NOTE — CARE COORDINATION
Care Management Initial Assessment       RUR: n/a - observation status  Readmission? No  1st IM letter given? No  1st  letter given: No    Met with pt at bedside, confirmed demographics on face sheet. Pt reported household includes himself, wife Sara, and two \"twenty-something\" sons in college. No current DME use, although pt has RW at home from a previous foot surgery, also used home health wound care nurse after the foot surgery but does not recall agency. PCP Dr Lee last seen in June; preferred pharmacy [facility] [address]; sees cardiologist regularly. Sara or one of pt's children anticipated to transport home, although pt has transportation benefits through his Medicaid if necessary. No CM needs identified at this time; CM will continue to follow and remain available in case of changes or additions to discharge plan.     12/05/24 3013   Service Assessment   Patient Orientation Alert and Oriented   Cognition Alert   History Provided By Patient   Primary Caregiver Self   Support Systems Spouse/Significant Other;Children   Patient's Healthcare Decision Maker is: Legal Next of Kin   PCP Verified by CM Yes  (Dr Lee)   Last Visit to PCP Within last 6 months   Prior Functional Level Independent in ADLs/IADLs   Current Functional Level Independent in ADLs/IADLs   Can patient return to prior living arrangement Yes   Ability to make needs known: Good   Family able to assist with home care needs: Other (comment)  (no assistance anticipated)   Would you like for me to discuss the discharge plan with any other family members/significant others, and if so, who? Yes  (wife Sara Puente)   Financial Resources Medicaid  ([employer] Medicaid)   Social/Functional History   Lives With Spouse;Son  (wife and two college-aged sons)   Home Equipment Walker - Rolling   Receives Help From Other (comment)  (no assistance needed)   Prior Level of Assist for ADLs Independent   Prior Level of Assist for Homemaking Independent

## 2024-12-05 NOTE — PLAN OF CARE
Problem: Chronic Conditions and Co-morbidities  Goal: Patient's chronic conditions and co-morbidity symptoms are monitored and maintained or improved  Recent Flowsheet Documentation  Taken 12/5/2024 0715 by Maria Esther Franklin, RN  Care Plan - Patient's Chronic Conditions and Co-Morbidity Symptoms are Monitored and Maintained or Improved: Monitor and assess patient's chronic conditions and comorbid symptoms for stability, deterioration, or improvement  12/4/2024 2315 by Brenda Ty, RN  Outcome: Progressing     Problem: Pain  Goal: Verbalizes/displays adequate comfort level or baseline comfort level  12/5/2024 1306 by Maria Esther Franklin, RN  Outcome: Progressing  12/4/2024 2315 by Brenda Ty, RN  Outcome: Progressing

## 2024-12-05 NOTE — CONSULTS
Nephrology Consult Note     [facility]                Phone - [phone]   Patient: Timur Puente   YOB: 1975    Date- 12/5/2024  MRN: [id number]             CONSULTING PHYSICIAN: Dr. Lee  REASON FOR CONSULTATION: ASHLEY stage III  ADMIT DATE:12/4/2024 PATIENT PCP:Adria Lee MD     IMPRESSION & PLAN:   ASHLEY stage III(suspect secondary to volume depletion)  CKD stage II(baseline creatinine 1.2)  Acute gastroenteritis  Hyponatremia(secondary to volume depletion)  History of systolic heart failure EF 30 to 35%  Volume depletion  CAD  Hypertension    PLAN-  -continue gentle hydration  -Creatinine continues to improve  -Should be able to stop IV fluids by tomorrow  -Order renal ultrasound, UA, urine sodium  -Continue Entresto  -Continue to hold spironolactone for now  -Thank you for the consult       Principal Problem:    ASHLEY (acute kidney injury) (HCC)  Resolved Problems:    * No resolved hospital problems. *      [] High complexity decision making was performed  [] Patient is at high-risk of decompensation with multiple organ involvement    Subjective:   HPI: Timur Puente is a 49 y.o. male who has past medical history significant for type 2 diabetes, hypertension, systolic heart failure, CKD stage II with a baseline creatinine 1.2 currently not established with any nephrologist who came to the ED with complaints of multiple episodes of nausea vomiting and diarrhea 2 to 3 days prior to his admission.  Initial workup in the ED included a CT scan of abdomen and pelvis that showed enteritis.  His initial creatinine on admission was 3.0, he has been admitted and placed on IV antibiotics.  Renal consult is requested for evaluation of ASHLEY on CKD  Chief Complaint   Patient presents with    Abdominal Pain     Patient reports vomiting and diarrhea x2 days.  Patient reports decreased appetite.  Patient states he has pain on the R side. Denies fever.     .        Review of  lymphadenopathy or aortic aneurysm.  APPENDIX: Unremarkable.  BLADDER/REPRODUCTIVE ORGANS: No acute process.  BONES:  No acute fracture or dislocation.  ADDITIONAL COMMENTS: N/A  Impression: Imaging findings consistent with a mild diffuse infectious/inflammatory  enteritis.     Nonobstructive punctate left renal calculi.    Incidental and/or nonemergent findings are as described in detail above.    Electronically signed by CHANNING MONAHAN     Prior to Admission Medications   Prescriptions Last Dose Informant Patient Reported? Taking?   Continuous Glucose  (FREESTYLE WINSTON 2 READER) HOOD   No No   Sig: Blood sugar checks before meals and at bedtime and as needed   Continuous Glucose Sensor (FREESTYLE WINSTON 2 SENSOR) MISC   No No   Sig: Blood sugar checks before meals and at bedtime and as needed   Evolocumab (REPATHA) SOSY syringe Not Taking  No No   Sig: Inject 1 mL into the skin every 14 days   Patient not taking: Reported on 12/5/2024   Icosapent Ethyl (VASCEPA) 1 g CAPS capsule   No No   Sig: Take 1 capsule by mouth 2 times daily (with meals)   Insulin Degludec (TRESIBA FLEXTOUCH) 200 UNIT/ML SOPN   No No   Sig: Inject 22 Units into the skin nightly   Semaglutide, 2 MG/DOSE, (OZEMPIC, 2 MG/DOSE,) 8 MG/3ML SOPN   No No   Sig: Blood sugar checks before meals and at bedtime and as needed   Patient taking differently: Inject 2 mg into the skin every 7 days Blood sugar checks before meals and at bedtime and as needed   acetaminophen (TYLENOL) 500 MG tablet   No No   Sig: Take 1 tablet by mouth 4 times daily as needed for Pain   amLODIPine (NORVASC) 10 MG tablet   No No   Sig: Take 1 tablet by mouth daily   aspirin (CVS ASPIRIN LOW DOSE) 81 MG EC tablet   No No   Sig: Take 1 tablet by mouth daily   carvedilol (COREG) 25 MG tablet   No No   Sig: Take 1 tablet by mouth 2 times daily (with meals)   clopidogrel (PLAVIX) 75 MG tablet   No No   Sig: Take 1 tablet by mouth daily   empagliflozin (JARDIANCE) 10 MG tablet

## 2024-12-06 VITALS
HEART RATE: 70 BPM | RESPIRATION RATE: 16 BRPM | SYSTOLIC BLOOD PRESSURE: 117 MMHG | HEIGHT: 64 IN | BODY MASS INDEX: 33.27 KG/M2 | DIASTOLIC BLOOD PRESSURE: 78 MMHG | OXYGEN SATURATION: 95 % | WEIGHT: 194.89 LBS | TEMPERATURE: 97.7 F

## 2024-12-06 LAB
ANION GAP SERPL CALC-SCNC: 6 MMOL/L (ref 2–12)
BASOPHILS # BLD: 0 K/UL (ref 0–0.1)
BASOPHILS NFR BLD: 0 % (ref 0–1)
BUN SERPL-MCNC: 21 MG/DL (ref 6–20)
BUN/CREAT SERPL: 16 (ref 12–20)
CALCIUM SERPL-MCNC: 7.9 MG/DL (ref 8.5–10.1)
CHLORIDE SERPL-SCNC: 110 MMOL/L (ref 97–108)
CO2 SERPL-SCNC: 24 MMOL/L (ref 21–32)
CREAT SERPL-MCNC: 1.3 MG/DL (ref 0.7–1.3)
CREAT UR-MCNC: 67.8 MG/DL
DIFFERENTIAL METHOD BLD: ABNORMAL
EOSINOPHIL # BLD: 0.1 K/UL (ref 0–0.4)
EOSINOPHIL NFR BLD: 2 % (ref 0–7)
ERYTHROCYTE [DISTWIDTH] IN BLOOD BY AUTOMATED COUNT: 13.7 % (ref 11.5–14.5)
GLUCOSE BLD STRIP.AUTO-MCNC: 143 MG/DL (ref 65–117)
GLUCOSE BLD STRIP.AUTO-MCNC: 151 MG/DL (ref 65–117)
GLUCOSE SERPL-MCNC: 174 MG/DL (ref 65–100)
HCT VFR BLD AUTO: 40.6 % (ref 36.6–50.3)
HGB BLD-MCNC: 13.5 G/DL (ref 12.1–17)
IMM GRANULOCYTES # BLD AUTO: 0 K/UL (ref 0–0.04)
IMM GRANULOCYTES NFR BLD AUTO: 0 % (ref 0–0.5)
LYMPHOCYTES # BLD: 1.9 K/UL (ref 0.8–3.5)
LYMPHOCYTES NFR BLD: 47 % (ref 12–49)
MAGNESIUM SERPL-MCNC: 2.1 MG/DL (ref 1.6–2.4)
MCH RBC QN AUTO: 26.6 PG (ref 26–34)
MCHC RBC AUTO-ENTMCNC: 33.3 G/DL (ref 30–36.5)
MCV RBC AUTO: 79.9 FL (ref 80–99)
MONOCYTES # BLD: 0.4 K/UL (ref 0–1)
MONOCYTES NFR BLD: 10 % (ref 5–13)
NEUTS SEG # BLD: 1.7 K/UL (ref 1.8–8)
NEUTS SEG NFR BLD: 41 % (ref 32–75)
NRBC # BLD: 0 K/UL (ref 0–0.01)
NRBC BLD-RTO: 0 PER 100 WBC
NT PRO BNP: 10 PG/ML
PLATELET # BLD AUTO: 161 K/UL (ref 150–400)
PMV BLD AUTO: 12 FL (ref 8.9–12.9)
POTASSIUM SERPL-SCNC: 3.2 MMOL/L (ref 3.5–5.1)
PROT UR-MCNC: 10 MG/DL (ref 0–11.9)
PROT/CREAT UR-RTO: 0.1
RBC # BLD AUTO: 5.08 M/UL (ref 4.1–5.7)
SERVICE CMNT-IMP: ABNORMAL
SERVICE CMNT-IMP: ABNORMAL
SODIUM SERPL-SCNC: 140 MMOL/L (ref 136–145)
WBC # BLD AUTO: 4.1 K/UL (ref 4.1–11.1)

## 2024-12-06 PROCEDURE — 6370000000 HC RX 637 (ALT 250 FOR IP): Performed by: INTERNAL MEDICINE

## 2024-12-06 PROCEDURE — 96366 THER/PROPH/DIAG IV INF ADDON: CPT

## 2024-12-06 PROCEDURE — 83880 ASSAY OF NATRIURETIC PEPTIDE: CPT

## 2024-12-06 PROCEDURE — 6360000002 HC RX W HCPCS: Performed by: INTERNAL MEDICINE

## 2024-12-06 PROCEDURE — 82570 ASSAY OF URINE CREATININE: CPT

## 2024-12-06 PROCEDURE — 85025 COMPLETE CBC W/AUTO DIFF WBC: CPT

## 2024-12-06 PROCEDURE — 84156 ASSAY OF PROTEIN URINE: CPT

## 2024-12-06 PROCEDURE — 36415 COLL VENOUS BLD VENIPUNCTURE: CPT

## 2024-12-06 PROCEDURE — G0378 HOSPITAL OBSERVATION PER HR: HCPCS

## 2024-12-06 PROCEDURE — 83735 ASSAY OF MAGNESIUM: CPT

## 2024-12-06 PROCEDURE — 96372 THER/PROPH/DIAG INJ SC/IM: CPT

## 2024-12-06 PROCEDURE — 82962 GLUCOSE BLOOD TEST: CPT

## 2024-12-06 PROCEDURE — 2580000003 HC RX 258: Performed by: INTERNAL MEDICINE

## 2024-12-06 PROCEDURE — 80048 BASIC METABOLIC PNL TOTAL CA: CPT

## 2024-12-06 RX ORDER — POTASSIUM CHLORIDE 750 MG/1
40 TABLET, EXTENDED RELEASE ORAL ONCE
Status: DISCONTINUED | OUTPATIENT
Start: 2024-12-06 | End: 2024-12-06 | Stop reason: HOSPADM

## 2024-12-06 RX ORDER — POTASSIUM CHLORIDE 750 MG/1
40 TABLET, EXTENDED RELEASE ORAL ONCE
Status: COMPLETED | OUTPATIENT
Start: 2024-12-06 | End: 2024-12-06

## 2024-12-06 RX ORDER — METRONIDAZOLE 500 MG/1
500 TABLET ORAL 3 TIMES DAILY
Qty: 21 TABLET | Refills: 0 | Status: SHIPPED | OUTPATIENT
Start: 2024-12-06 | End: 2024-12-16

## 2024-12-06 RX ORDER — PANTOPRAZOLE SODIUM 40 MG/1
40 TABLET, DELAYED RELEASE ORAL
Qty: 30 TABLET | Refills: 3 | Status: SHIPPED | OUTPATIENT
Start: 2024-12-06

## 2024-12-06 RX ADMIN — OMEGA-3-ACID ETHYL ESTERS CAPSULES 1 G: 1 CAPSULE, LIQUID FILLED ORAL at 10:16

## 2024-12-06 RX ADMIN — CLOPIDOGREL BISULFATE 75 MG: 75 TABLET ORAL at 10:17

## 2024-12-06 RX ADMIN — METRONIDAZOLE 500 MG: 500 INJECTION, SOLUTION INTRAVENOUS at 00:45

## 2024-12-06 RX ADMIN — CARVEDILOL 25 MG: 12.5 TABLET, FILM COATED ORAL at 10:16

## 2024-12-06 RX ADMIN — SODIUM CHLORIDE: 4.5 INJECTION, SOLUTION INTRAVENOUS at 07:09

## 2024-12-06 RX ADMIN — EMPAGLIFLOZIN 10 MG: 10 TABLET, FILM COATED ORAL at 10:17

## 2024-12-06 RX ADMIN — ASPIRIN 81 MG: 81 TABLET, COATED ORAL at 10:17

## 2024-12-06 RX ADMIN — ENOXAPARIN SODIUM 40 MG: 100 INJECTION SUBCUTANEOUS at 08:52

## 2024-12-06 RX ADMIN — SACUBITRIL AND VALSARTAN 1 TABLET: 24; 26 TABLET, FILM COATED ORAL at 10:16

## 2024-12-06 RX ADMIN — EZETIMIBE 10 MG: 10 TABLET ORAL at 10:16

## 2024-12-06 RX ADMIN — POTASSIUM CHLORIDE 40 MEQ: 750 TABLET, EXTENDED RELEASE ORAL at 11:42

## 2024-12-06 RX ADMIN — SODIUM CHLORIDE, PRESERVATIVE FREE 10 ML: 5 INJECTION INTRAVENOUS at 08:54

## 2024-12-06 RX ADMIN — METRONIDAZOLE 500 MG: 500 INJECTION, SOLUTION INTRAVENOUS at 08:53

## 2024-12-06 RX ADMIN — PANTOPRAZOLE SODIUM 40 MG: 40 TABLET, DELAYED RELEASE ORAL at 07:06

## 2024-12-06 RX ADMIN — AMLODIPINE BESYLATE 10 MG: 5 TABLET ORAL at 10:17

## 2024-12-06 ASSESSMENT — PAIN SCALES - GENERAL
PAINLEVEL_OUTOF10: 0
PAINLEVEL_OUTOF10: 0

## 2024-12-06 NOTE — PROGRESS NOTES
DISCHARGE NOTE FROM ORTHO NURSE    Patient determined to be stable for discharge by attending provider. I have reviewed the discharge instructions with the patient. They verbalized understanding and all questions were answered to their satisfaction. No complaints or further questions were expressed.      Medications sent to pharmacy. Appropriate educational materials and medication side effect teaching were provided.      PIV were removed prior to discharge.     Patient did not discharge with any line, mix, or drain.    Personal items and valuables accounted for at discharge by patient and/or family: Yes    Post-op patient: No    Jarad Caceres RN

## 2024-12-06 NOTE — PLAN OF CARE
Problem: Chronic Conditions and Co-morbidities  Goal: Patient's chronic conditions and co-morbidity symptoms are monitored and maintained or improved  12/6/2024 1204 by Jarad Caceres RN  Outcome: Adequate for Discharge  12/6/2024 0337 by Angela Purcell RN  Outcome: Progressing  Flowsheets (Taken 12/5/2024 1915)  Care Plan - Patient's Chronic Conditions and Co-Morbidity Symptoms are Monitored and Maintained or Improved:   Monitor and assess patient's chronic conditions and comorbid symptoms for stability, deterioration, or improvement   Collaborate with multidisciplinary team to address chronic and comorbid conditions and prevent exacerbation or deterioration   Update acute care plan with appropriate goals if chronic or comorbid symptoms are exacerbated and prevent overall improvement and discharge     Problem: Pain  Goal: Verbalizes/displays adequate comfort level or baseline comfort level  12/6/2024 1204 by Jarad Caceres RN  Outcome: Adequate for Discharge  12/6/2024 0337 by Angela Purcell RN  Outcome: Progressing  Flowsheets (Taken 12/5/2024 2133)  Verbalizes/displays adequate comfort level or baseline comfort level:   Encourage patient to monitor pain and request assistance   Assess pain using appropriate pain scale   Administer analgesics based on type and severity of pain and evaluate response   Implement non-pharmacological measures as appropriate and evaluate response     Problem: ABCDS Injury Assessment  Goal: Absence of physical injury  12/6/2024 1204 by Jarad Caceres RN  Outcome: Adequate for Discharge  12/6/2024 0337 by Angela Purcell RN  Outcome: Progressing  Flowsheets (Taken 12/5/2024 1915)  Absence of Physical Injury: Implement safety measures based on patient assessment

## 2024-12-06 NOTE — PROGRESS NOTES
General Daily Progress Note    Admit Date: 12/4/2024    Subjective:     Patient has no complaint     Current Facility-Administered Medications   Medication Dose Route Frequency    pantoprazole (PROTONIX) tablet 40 mg  40 mg Oral BID AC    fentaNYL (SUBLIMAZE) injection 50 mcg  50 mcg IntraVENous Q2H PRN    amLODIPine (NORVASC) tablet 10 mg  10 mg Oral Daily    aspirin EC tablet 81 mg  81 mg Oral Daily    carvedilol (COREG) tablet 25 mg  25 mg Oral BID WC    clopidogrel (PLAVIX) tablet 75 mg  75 mg Oral Daily    empagliflozin (JARDIANCE) tablet 10 mg  10 mg Oral Daily    ezetimibe (ZETIA) tablet 10 mg  10 mg Oral Daily    fluticasone (FLONASE) 50 MCG/ACT nasal spray 2 spray  2 spray Nasal BID    gabapentin (NEURONTIN) capsule 100 mg  100 mg Oral BID    omega-3 acid ethyl esters (LOVAZA) capsule 1 g  1 g Oral Daily    nitroGLYCERIN (NITROSTAT) SL tablet 0.4 mg  0.4 mg SubLINGual Q5 Min PRN    rosuvastatin (CRESTOR) tablet 40 mg  40 mg Oral Nightly    sacubitril-valsartan (ENTRESTO) 24-26 MG per tablet 1 tablet  1 tablet Oral BID    sodium chloride flush 0.9 % injection 5-40 mL  5-40 mL IntraVENous 2 times per day    0.9 % sodium chloride infusion   IntraVENous PRN    potassium chloride (KLOR-CON M) extended release tablet 40 mEq  40 mEq Oral PRN    Or    potassium bicarb-citric acid (EFFER-K) effervescent tablet 40 mEq  40 mEq Oral PRN    Or    potassium chloride 10 mEq/100 mL IVPB (Peripheral Line)  10 mEq IntraVENous PRN    magnesium sulfate 2000 mg in 50 mL IVPB premix  2,000 mg IntraVENous PRN    ondansetron (ZOFRAN-ODT) disintegrating tablet 4 mg  4 mg Oral Q8H PRN    Or    ondansetron (ZOFRAN) injection 4 mg  4 mg IntraVENous Q6H PRN    melatonin tablet 1.5 mg  1.5 mg Oral Nightly PRN    polyethylene glycol (GLYCOLAX) packet 17 g  17 g Oral Daily PRN    acetaminophen (TYLENOL) tablet 650 mg  650 mg Oral Q6H PRN    Or    acetaminophen (TYLENOL) suppository 650 mg  650 mg Rectal Q6H PRN    insulin lispro

## 2024-12-06 NOTE — PROGRESS NOTES
Nephrology Progress Note  [facility]  [address], [address]  [place] [address]  Phone - [phone]  Fax - [phone]                 Patient: Timur Puente                     YOB: 1975        Date- 12/6/2024                                     Admit Date: 12/4/2024   CC: Follow up for ASHLEY          IMPRESSION & PLAN:   Acute kidney injury (suspect secondary to volume depletion)  Hypokalemia  CKD stage II(baseline creatinine 1.2)  Acute gastroenteritis  Hyponatremia(secondary to volume depletion)  History of systolic heart failure EF 30 to 35%  Volume depletion  CAD  Hypertension      PLAN-  Stop IV fluids  KCl 40 mEq p.o.  Check BMP in the morning  Continue amlodipine  Okay to restart Aldactone     Subjective:  Interval History:   -Creatinine 1.3, potassium 3.2 blood pressure stable no complaint of shortness of breath    Objective:   Vitals:    12/06/24 0000 12/06/24 0733 12/06/24 0734 12/06/24 1016   BP: (!) 125/98 (!) 139/102 (!) 146/95 (!) 149/95   Pulse: 75 71 70 70   Resp: 18 16     Temp: 98.4 °F (36.9 °C) 97.9 °F (36.6 °C)     TempSrc: Oral      SpO2: 97% 96% 95%    Weight:       Height:          I/O last 3 completed shifts:  In: 3121.4 [I.V.:2622.3; IV Piggyback:499.1]  Out: -   No intake/output data recorded.      Physical exam:    GEN: NAD  NECK- no mass  RESP: No wheezing, decreased BS b/l  CVS: S1,S2  RRR  NEURO: Normal speech, Non focal  EXT: No Edema   PSYCH: Normal Mood    Chart reviewed.         Pertinent Notes reviewed.     Data Review :  Lab Results   Component Value Date/Time     12/06/2024 05:05 AM    K 3.2 12/06/2024 05:05 AM     12/06/2024 05:05 AM    CO2 24 12/06/2024 05:05 AM    BUN 21 12/06/2024 05:05 AM    CREATININE 1.30 12/06/2024 05:05 AM    GLUCOSE 174 12/06/2024 05:05 AM    CALCIUM 7.9 12/06/2024 05:05 AM       Lab Results   Component Value Date    WBC 4.1 12/06/2024    HGB  13.5 12/06/2024    HCT 40.6 12/06/2024    MCV 79.9 (L) 12/06/2024     12/06/2024      Recent Labs     12/05/24  0553 12/05/24  0910 12/06/24  0505    138 140   K 3.7 3.6 3.2*   * 109* 110*   CO2 23 25 24   GLUCOSE 162* 183* 174*   BUN 29* 27* 21*   CREATININE 1.49* 1.51* 1.30   CALCIUM 8.0* 8.1* 7.9*       Recent Labs     12/04/24  0910 12/05/24  0553 12/06/24  0505   WBC 6.6 4.9 4.1   RBC 6.51* 5.59 5.08   HGB 17.3* 14.8 13.5   HCT 53.0* 45.7 40.6   MCV 81.4 81.8 79.9*   MCH 26.6 26.5 26.6   MCHC 32.6 32.4 33.3   RDW 15.5* 14.5 13.7    182 161   MPV 12.0 11.8 12.0     Lab Results   Component Value Date/Time    IRON 33 (L) 06/03/2024 03:59 AM    TIBC 245 (L) 06/03/2024 03:59 AM     No results found for: \"PTH\"  Lab Results   Component Value Date/Time    LABA1C 11.1 (H) 12/04/2024 05:39 PM     Lab Results   Component Value Date/Time    COLORU DARK YELLOW 12/04/2024 09:10 AM    CLARITYU CLEAR 01/26/2023 10:05 PM    GLUCOSEU 500 (A) 12/04/2024 09:10 AM    GLUCOSEU >1000 (A) 09/02/2023 11:45 AM    BILIRUBINUR Negative 06/01/2024 05:55 AM    KETUA TRACE (A) 12/04/2024 09:10 AM    BLOODU MODERATE (A) 12/04/2024 09:10 AM    PHUR 5.0 12/04/2024 09:10 AM    PHUR 5.0 01/26/2023 10:05 PM    PROTEINU 100 (A) 12/04/2024 09:10 AM    NITRU Negative 12/04/2024 09:10 AM    LEUKOCYTESUR Negative 12/04/2024 09:10 AM     US Results (most recent):  Medication list  reviewed  Current Facility-Administered Medications   Medication Dose Route Frequency    potassium chloride (KLOR-CON) extended release tablet 40 mEq  40 mEq Oral Once    potassium chloride (KLOR-CON) extended release tablet 40 mEq  40 mEq Oral Once    pantoprazole (PROTONIX) tablet 40 mg  40 mg Oral BID AC    fentaNYL (SUBLIMAZE) injection 50 mcg  50 mcg IntraVENous Q2H PRN    amLODIPine (NORVASC) tablet 10 mg  10 mg Oral Daily    aspirin EC tablet 81 mg  81 mg Oral Daily    carvedilol (COREG) tablet 25 mg  25 mg Oral BID WC    clopidogrel (PLAVIX)

## 2024-12-06 NOTE — PROGRESS NOTES
End of Shift Note    Bedside shift change report given to AGGIE Abraham (oncoming nurse) by Angela Purcell RN (offgoing nurse).  Report included the following information SBAR, Kardex, ED Summary, Procedure Summary, Intake/Output, MAR, Recent Results, Med Rec Status, and Cardiac Rhythm NSR    Shift worked:  [id number]     Shift summary and any significant changes:     Pt GERD resolved w/ 40 mg protonix. No other c/o pain over shift. AM labs and urine lab completed. IVF running over shift. VSS, no significant events.     Concerns for physician to address:  Home Gabapentin dose is 200 mg at night, not 100 mg     Zone phone for oncoming shift:   [phone]       Activity:  Level of Assistance: Independent  Number times ambulated in hallways past shift: 0  Number of times OOB to chair past shift: 0    Cardiac:   Cardiac Monitoring: Yes      Cardiac Rhythm: Sinus rhythm    Access:  Current line(s): PIV     Genitourinary:   Urinary Status: Voiding    Respiratory:   O2 Device: None (Room air)  Chronic home O2 use?: NO  Incentive spirometer at bedside: NO    GI:  Last BM (including prior to admit): 12/04/24  Current diet:  ADULT DIET; Regular; 4 carb choices (60 gm/meal)  Passing flatus: YES    Pain Management:   Patient states pain is manageable on current regimen: YES    Skin:  Keith Scale Score: 21  Interventions: Wound Offloading (Prevention Methods): Bed, pressure reduction mattress    Patient Safety:  Fall Risk: Nursing Judgement-Fall Risk High(Add Comments): No  Fall Risk Interventions  Nursing Judgement-Fall Risk High(Add Comments): No  Toilet Every 2 Hours-In Advance of Need: No (Comment)  Hourly Visual Checks: Awake, In bed  Fall Visual Posted: Socks  Room Door Open: Deferred to promote rest  Alarm On: Other (Comment) (None)  Patient Moved Closer to Nursing Station: No    Active Consults:   IP CONSULT TO HOSPITALIST  IP CONSULT TO SOCIAL WORK  IP CONSULT TO NEPHROLOGY  IP CONSULT TO PRIMARY CARE PROVIDER    Length of

## 2024-12-07 LAB
NOROVIRUS GI RNA STL QL NAA+PROBE: NEGATIVE
NOROVIRUS GII RNA STL QL NAA+PROBE: POSITIVE

## 2024-12-24 RX ORDER — PANTOPRAZOLE SODIUM 40 MG/1
40 TABLET, DELAYED RELEASE ORAL
Qty: 180 TABLET | Refills: 3 | Status: SHIPPED | OUTPATIENT
Start: 2024-12-24

## 2025-01-13 RX ORDER — NITROGLYCERIN 0.4 MG/1
TABLET SUBLINGUAL
Qty: 25 TABLET | Refills: 0 | Status: SHIPPED | OUTPATIENT
Start: 2025-01-13

## 2025-01-13 RX ORDER — SPIRONOLACTONE 25 MG/1
TABLET ORAL
Qty: 30 TABLET | Refills: 0 | Status: SHIPPED | OUTPATIENT
Start: 2025-01-13

## 2025-01-13 NOTE — PROGRESS NOTES
Requested Prescriptions     Signed Prescriptions Disp Refills    spironolactone (ALDACTONE) 25 MG tablet 30 tablet 0     Sig: NEED FOLLOW-UP FOR REFILLS; Take 1 tablet by mouth daily     VO per MD    Future Appointments   Date Time Provider Department Center   9/29/2025  9:30 AM Kenya De Paz, APRN - NP [facility]

## 2025-01-13 NOTE — TELEPHONE ENCOUNTER
Requested Prescriptions     Signed Prescriptions Disp Refills    nitroGLYCERIN (NITROSTAT) 0.4 MG SL tablet 25 tablet 0     Sig: Follow up for refills! PLACE 1 TABLET UNDER THE TONGUE EVERY 5 MINUTES AS NEEDED FOR CHEST PAIN UP TO MAX OF 3 TOTAL DOSES. IF NO RELIEF AFTER 2 DOSES, CALL 911.     Authorizing Provider: NEEL STILES     Ordering User: JUANA LOUIS per MD    Future Appointments   Date Time Provider Department Center   9/29/2025  9:30 AM Kenya De Paz APRN - [facility]

## 2025-02-05 RX ORDER — SPIRONOLACTONE 25 MG/1
TABLET ORAL
Qty: 90 TABLET | Refills: 0 | Status: SHIPPED | OUTPATIENT
Start: 2025-02-05

## 2025-02-05 NOTE — TELEPHONE ENCOUNTER
Requested Prescriptions     Signed Prescriptions Disp Refills    spironolactone (ALDACTONE) 25 MG tablet 90 tablet 0     Sig: TAKE 1 TABLET BY MOUTH ONCE DAILY. PLEASE SCHEDULE APPT FOR FURTHER REFILLS.     Authorizing Provider: NEEL STILES     Ordering User: JUANA LOUIS per MD    Future Appointments   Date Time Provider Department Center   2/20/2025  2:45 PM Adria Lee MD [facility]   9/29/2025  9:30 AM Kenya De Paz, APRN - NP [facility]

## 2025-02-17 ENCOUNTER — HOSPITAL ENCOUNTER (EMERGENCY)
Facility: HOSPITAL | Age: 50
Discharge: HOME | End: 2025-02-18
Attending: EMERGENCY MEDICINE
Payer: COMMERCIAL

## 2025-02-17 VITALS
HEIGHT: 64 IN | OXYGEN SATURATION: 95 % | WEIGHT: 200 LBS | RESPIRATION RATE: 20 BRPM | HEART RATE: 94 BPM | DIASTOLIC BLOOD PRESSURE: 99 MMHG | TEMPERATURE: 99.1 F | BODY MASS INDEX: 34.15 KG/M2 | SYSTOLIC BLOOD PRESSURE: 151 MMHG

## 2025-02-17 DIAGNOSIS — I16.0 HYPERTENSIVE URGENCY: ICD-10-CM

## 2025-02-17 DIAGNOSIS — S09.90XA CLOSED HEAD INJURY, INITIAL ENCOUNTER: Primary | ICD-10-CM

## 2025-02-17 DIAGNOSIS — S01.111A EYEBROW LACERATION, RIGHT, INITIAL ENCOUNTER: ICD-10-CM
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
## 2025-02-17 PROCEDURE — 99284 EMERGENCY DEPT VISIT MOD MDM: CPT

## 2025-02-17 PROCEDURE — 13152 CMPLX RPR E/N/E/L 2.6-7.5 CM: CPT

## 2025-02-17 PROCEDURE — 12052 INTMD RPR FACE/MM 2.6-5.0 CM: CPT

## 2025-02-17 ASSESSMENT — PAIN SCALES - GENERAL: PAINLEVEL_OUTOF10: 3

## 2025-02-17 ASSESSMENT — PAIN - FUNCTIONAL ASSESSMENT: PAIN_FUNCTIONAL_ASSESSMENT: 0-10

## 2025-02-17 ASSESSMENT — PAIN DESCRIPTION - ORIENTATION: ORIENTATION: RIGHT

## 2025-02-17 ASSESSMENT — PAIN DESCRIPTION - LOCATION: LOCATION: EYE

## 2025-02-17 ASSESSMENT — PAIN DESCRIPTION - DESCRIPTORS: DESCRIPTORS: THROBBING;ACHING

## 2025-02-18 ENCOUNTER — APPOINTMENT (OUTPATIENT)
Facility: HOSPITAL | Age: 50
End: 2025-02-18
Payer: COMMERCIAL

## 2025-02-18 PROCEDURE — 13152 CMPLX RPR E/N/E/L 2.6-7.5 CM: CPT

## 2025-02-18 PROCEDURE — 12052 INTMD RPR FACE/MM 2.6-5.0 CM: CPT

## 2025-02-18 PROCEDURE — 6360000002 HC RX W HCPCS: Performed by: EMERGENCY MEDICINE

## 2025-02-18 PROCEDURE — 6370000000 HC RX 637 (ALT 250 FOR IP): Performed by: EMERGENCY MEDICINE

## 2025-02-18 PROCEDURE — 70450 CT HEAD/BRAIN W/O DYE: CPT

## 2025-02-18 PROCEDURE — 70486 CT MAXILLOFACIAL W/O DYE: CPT

## 2025-02-18 RX ORDER — GINSENG 100 MG
CAPSULE ORAL
Status: COMPLETED | OUTPATIENT
Start: 2025-02-18 | End: 2025-02-18

## 2025-02-18 RX ORDER — CEPHALEXIN 500 MG/1
500 CAPSULE ORAL 3 TIMES DAILY
Qty: 21 CAPSULE | Refills: 0 | Status: SHIPPED | OUTPATIENT
Start: 2025-02-18 | End: 2025-02-25

## 2025-02-18 RX ORDER — LIDOCAINE HYDROCHLORIDE AND EPINEPHRINE 10; 10 MG/ML; UG/ML
20 INJECTION, SOLUTION INFILTRATION; PERINEURAL ONCE
Status: COMPLETED | OUTPATIENT
Start: 2025-02-18 | End: 2025-02-18

## 2025-02-18 RX ORDER — GINSENG 100 MG
CAPSULE ORAL
Qty: 28 G | Refills: 0 | Status: SHIPPED | OUTPATIENT
Start: 2025-02-18 | End: 2025-02-28

## 2025-02-18 RX ORDER — ACETAMINOPHEN 500 MG
1000 TABLET ORAL
Status: COMPLETED | OUTPATIENT
Start: 2025-02-18 | End: 2025-02-18

## 2025-02-18 RX ADMIN — ACETAMINOPHEN 1000 MG: 500 TABLET ORAL at 00:24

## 2025-02-18 RX ADMIN — LIDOCAINE HYDROCHLORIDE AND EPINEPHRINE 20 ML: 10; 10 INJECTION, SOLUTION INFILTRATION; PERINEURAL at 00:24

## 2025-02-18 RX ADMIN — BACITRACIN: 500 OINTMENT TOPICAL at 01:14

## 2025-02-18 RX ADMIN — Medication 3 ML: at 00:38

## 2025-02-18 ASSESSMENT — ENCOUNTER SYMPTOMS
NAUSEA: 0
ABDOMINAL PAIN: 0
SORE THROAT: 0
COUGH: 0
VOMITING: 0
DIARRHEA: 0
RHINORRHEA: 0
EYE PAIN: 0
SHORTNESS OF BREATH: 0
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
## 2025-02-18 NOTE — ED NOTES
See triage note. Pt is alert and oriented x 4, RR even and unlabored, skin is warm and dry. Assesment completed and pt updated on plan of care.       Emergency Department Nursing Plan of Care       The Nursing Plan of Care is developed from the Nursing assessment and Emergency Department Attending provider initial evaluation.  The plan of care may be reviewed in the “ED Provider note”.    The Plan of Care was developed with the following considerations:   Patient / Family readiness to learn indicated by:verbalized understanding  Persons(s) to be included in education: patient  Barriers to Learning/Limitations:None    Signed     Zelda Garcia RN    2/17/2025   11:57 PM
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
## 2025-02-18 NOTE — ED NOTES
Discharge instructions were given to the patient by Zelda MARCIAL.     The patient left the Emergency Department alert and oriented and in no acute distress with 2 prescriptions. The patient was encouraged to call or return to the ED for worsening issues or problems and was encouraged to schedule a follow up appointment for continuing care.     Ambulation assessment completed before discharge.  Pt left Emergency Department ambulating at baseline with no ortho devices  Ortho device education: none    The patient verbalized understanding of discharge instructions and prescriptions, all questions were answered. The patient has no further concerns at this time.

## 2025-02-24 ENCOUNTER — HOSPITAL ENCOUNTER (EMERGENCY)
Facility: HOSPITAL | Age: 50
Discharge: HOME | End: 2025-02-24
Payer: COMMERCIAL

## 2025-02-24 VITALS
TEMPERATURE: 97.7 F | HEIGHT: 64 IN | HEART RATE: 72 BPM | SYSTOLIC BLOOD PRESSURE: 156 MMHG | OXYGEN SATURATION: 97 % | RESPIRATION RATE: 18 BRPM | BODY MASS INDEX: 34.49 KG/M2 | WEIGHT: 202 LBS | DIASTOLIC BLOOD PRESSURE: 101 MMHG

## 2025-02-24 DIAGNOSIS — R51.9 ACUTE NONINTRACTABLE HEADACHE, UNSPECIFIED HEADACHE TYPE: Primary | ICD-10-CM

## 2025-02-24 DIAGNOSIS — Z48.02 VISIT FOR SUTURE REMOVAL: ICD-10-CM

## 2025-02-24 PROCEDURE — 99283 EMERGENCY DEPT VISIT LOW MDM: CPT

## 2025-02-24 RX ORDER — BUTALBITAL, ACETAMINOPHEN AND CAFFEINE 50; 325; 40 MG/1; MG/1; MG/1
1 TABLET ORAL EVERY 4 HOURS PRN
Qty: 20 TABLET | Refills: 0 | Status: SHIPPED | OUTPATIENT
Start: 2025-02-24

## 2025-02-24 ASSESSMENT — PAIN DESCRIPTION - PAIN TYPE: TYPE: ACUTE PAIN

## 2025-02-24 ASSESSMENT — PAIN DESCRIPTION - LOCATION: LOCATION: EYE

## 2025-02-24 ASSESSMENT — PAIN SCALES - GENERAL: PAINLEVEL_OUTOF10: 5

## 2025-02-24 ASSESSMENT — PAIN DESCRIPTION - ORIENTATION: ORIENTATION: RIGHT

## 2025-02-24 ASSESSMENT — PAIN - FUNCTIONAL ASSESSMENT: PAIN_FUNCTIONAL_ASSESSMENT: 0-10

## 2025-02-24 ASSESSMENT — PAIN DESCRIPTION - FREQUENCY: FREQUENCY: CONTINUOUS

## 2025-02-24 NOTE — ED PROVIDER NOTES
ESGIC  Take 1 tablet by mouth every 4 hours as needed for Headaches            CONTINUE taking these medications      FreeStyle Kraig 2 Manati Chandrika  Blood sugar checks before meals and at bedtime and as needed     FreeStyle Kraig 2 Sensor Misc  Blood sugar checks before meals and at bedtime and as needed            ASK your doctor about these medications      acetaminophen 500 MG tablet  Commonly known as: TYLENOL  Take 1 tablet by mouth 4 times daily as needed for Pain     amLODIPine 10 MG tablet  Commonly known as: NORVASC  Take 1 tablet by mouth daily     aspirin 81 MG EC tablet  Commonly known as: CVS Aspirin Low Dose  Take 1 tablet by mouth daily     bacitracin 500 UNIT/GM ointment  Apply topically 2 times daily.     carvedilol 25 MG tablet  Commonly known as: COREG  Take 1 tablet by mouth 2 times daily (with meals)     cephALEXin 500 MG capsule  Commonly known as: Keflex  Take 1 capsule by mouth 3 times daily for 7 days     clopidogrel 75 MG tablet  Commonly known as: Plavix  Take 1 tablet by mouth daily     empagliflozin 10 MG tablet  Commonly known as: Jardiance  Take 1 tablet by mouth daily     Entresto 24-26 MG per tablet  Generic drug: sacubitril-valsartan  Take 1 tablet by mouth 2 times daily     ezetimibe 10 MG tablet  Commonly known as: ZETIA  Take 1 tablet by mouth daily     Icosapent Ethyl 1 g Caps capsule  Commonly known as: VASCEPA  Take 1 capsule by mouth 2 times daily (with meals)     ketoconazole 2 % cream  Commonly known as: NIZORAL  Apply topically 2 times a day     nitroGLYCERIN 0.4 MG SL tablet  Commonly known as: NITROSTAT  Follow up for refills! PLACE 1 TABLET UNDER THE TONGUE EVERY 5 MINUTES AS NEEDED FOR CHEST PAIN UP TO MAX OF 3 TOTAL DOSES. IF NO RELIEF AFTER 2 DOSES, CALL 911.     Ozempic (2 MG/DOSE) 8 MG/3ML Sopn sc injection  Generic drug: semaglutide (2 MG/DOSE)  Blood sugar checks before meals and at bedtime and as needed     pantoprazole 40 MG tablet  Commonly known as:

## 2025-02-24 NOTE — ED NOTES
Discharge instructions were given to the patient by Fred Workman, Student RN.  The patient left the Emergency Department alert and oriented and in no acute distress with 1 prescription(s). The patient was encouraged to call or return to the ED for worsening issues or problems and was encouraged to schedule a follow up appointment for continuing care.  The patient verbalized understanding of discharge instructions and prescriptions; all questions were answered. The patient has no further concerns at this time.

## 2025-02-24 NOTE — ED NOTES
Pt presents to ED complaining of needing a suture removal right eyebrow. Pt states that sutures were placed x 1 week ago. Pt states area is throbbing and tried tylenol with no relief.  Pt is alert and oriented x 4, RR even and unlabored, skin is warm and dry. Pt appears in NAD at this time. Assessment completed and pt updated on plan of care.  Call bell in reach.   Emergency Department Nursing Plan of Care  The Nursing Plan of Care is developed from the Nursing assessment and Emergency Department Attending provider initial evaluation.  The plan of care may be reviewed in the “ED Provider note”.  The Plan of Care was developed with the following considerations:  Patient / Family readiness to learn indicated by:Refer to Medical chart in [facility]  Persons(s) to be included in education: Refer to Medical chart in [facility]  Barriers to Learning/Limitations:Normal

## 2025-03-13 DIAGNOSIS — E11.65 TYPE 2 DIABETES MELLITUS WITH HYPERGLYCEMIA (HCC): ICD-10-CM

## 2025-03-14 RX ORDER — CARVEDILOL 25 MG/1
25 TABLET ORAL 2 TIMES DAILY WITH MEALS
Qty: 180 TABLET | Refills: 1 | Status: SHIPPED | OUTPATIENT
Start: 2025-03-14

## 2025-03-14 RX ORDER — INSULIN DEGLUDEC 200 U/ML
22 INJECTION, SOLUTION SUBCUTANEOUS
Qty: 2 ADJUSTABLE DOSE PRE-FILLED PEN SYRINGE | Refills: 11 | Status: SHIPPED | OUTPATIENT
Start: 2025-03-14

## 2025-03-14 RX ORDER — NITROGLYCERIN 0.4 MG/1
TABLET SUBLINGUAL
Qty: 25 TABLET | Refills: 0 | Status: SHIPPED | OUTPATIENT
Start: 2025-03-14

## 2025-03-14 NOTE — PROGRESS NOTES
Requested Prescriptions     Signed Prescriptions Disp Refills    carvedilol (COREG) 25 MG tablet 180 tablet 1     Sig: Take 1 tablet by mouth 2 times daily (with meals)     Future Appointments   Date Time Provider Department Center   3/31/2025  2:30 PM Adria Lee MD [facility]   9/29/2025  9:30 AM Kenya De Paz, APRN - NP [facility]

## 2025-03-14 NOTE — TELEPHONE ENCOUNTER
Requested Prescriptions     Signed Prescriptions Disp Refills    nitroGLYCERIN (NITROSTAT) 0.4 MG SL tablet 25 tablet 0     Sig: FOLLOW UP FOR REFILLS! PLACE 1 TABLET UNDER THE TONGUE EVERY 5 MINUTES AS NEEDED FOR CHEST PAIN UP TO MAX OF 3 TOTAL DOSES. IF NO RELIEF AFTER 2 DOSES, CALL 911.     Authorizing Provider: NEEL STILES     Ordering User: JUANA LOUIS     Future Appointments   Date Time Provider Department Center   3/31/2025  2:30 PM Adria Lee MD [facility]IN [facility]   9/29/2025  9:30 AM Kenya De Paz, APRN - NP [facility]

## 2025-03-18 NOTE — Clinical Note
Lesion located in the Mid Cx Ostium OM 1. Balloon inserted. Balloon inflated using multiple inflations inflation technique. Lesion #1: Pressure = 10 rei; Duration = 14 sec. Inflation 2: Pressure = 10 rei; Duration = 15 sec. Body Location Override (Optional - Billing Will Still Be Based On Selected Body Map Location If Applicable): A - right dorsal forearm Detail Level: Detailed Depth Of Biopsy: dermis Was A Bandage Applied: Yes Size Of Lesion In Cm: 0 Biopsy Type: H and E Biopsy Method: Personna blade Anesthesia Type: 1% lidocaine with epinephrine Anesthesia Volume In Cc: 0.5 Hemostasis: Aluminum Chloride Wound Care: Petrolatum Dressing: bandage Destruction After The Procedure: No Type Of Destruction Used: Curettage Curettage Text: The wound bed was treated with curettage after the biopsy was performed. Cryotherapy Text: The wound bed was treated with cryotherapy after the biopsy was performed. Electrodesiccation Text: The wound bed was treated with electrodesiccation after the biopsy was performed. Electrodesiccation And Curettage Text: The wound bed was treated with electrodesiccation and curettage after the biopsy was performed. Silver Nitrate Text: The wound bed was treated with silver nitrate after the biopsy was performed. Lab: 212 Medical Necessity Information: It is in your best interest to select a reason for this procedure from the list below. All of these items fulfill various CMS LCD requirements except the new and changing color options. Consent: Written consent was obtained and risks were reviewed including but not limited to scarring, infection, bleeding, scabbing, incomplete removal, nerve damage and allergy to anesthesia. Post-Care Instructions: I reviewed with the patient in detail post-care instructions. Keep area clean with soap and water. Apply vaseline and bandage until area is healed. Notification Instructions: Patient will be notified of biopsy results. However, patient instructed to call the office if not contacted within 2 weeks. Billing Type: Third-Party Bill Information: Selecting Yes will display possible errors in your note based on the variables you have selected. This validation is only offered as a suggestion for you. PLEASE NOTE THAT THE VALIDATION TEXT WILL BE REMOVED WHEN YOU FINALIZE YOUR NOTE. IF YOU WANT TO FAX A PRELIMINARY NOTE YOU WILL NEED TO TOGGLE THIS TO 'NO' IF YOU DO NOT WANT IT IN YOUR FAXED NOTE.

## 2025-03-27 DIAGNOSIS — E11.65 TYPE 2 DIABETES MELLITUS WITH HYPERGLYCEMIA (HCC): ICD-10-CM

## 2025-03-28 RX ORDER — INSULIN DEGLUDEC 200 U/ML
22 INJECTION, SOLUTION SUBCUTANEOUS
Qty: 2 ADJUSTABLE DOSE PRE-FILLED PEN SYRINGE | Refills: 11 | Status: SHIPPED | OUTPATIENT
Start: 2025-03-28

## 2025-03-31 ENCOUNTER — OFFICE VISIT (OUTPATIENT)
Facility: CLINIC | Age: 50
End: 2025-03-31

## 2025-03-31 VITALS
TEMPERATURE: 98.1 F | SYSTOLIC BLOOD PRESSURE: 115 MMHG | WEIGHT: 198.6 LBS | RESPIRATION RATE: 17 BRPM | HEART RATE: 84 BPM | DIASTOLIC BLOOD PRESSURE: 75 MMHG | HEIGHT: 64 IN | BODY MASS INDEX: 33.9 KG/M2 | OXYGEN SATURATION: 97 %

## 2025-03-31 DIAGNOSIS — E66.01 SEVERE OBESITY: ICD-10-CM

## 2025-03-31 DIAGNOSIS — E78.5 DYSLIPIDEMIA: ICD-10-CM

## 2025-03-31 DIAGNOSIS — I10 PRIMARY HYPERTENSION: ICD-10-CM

## 2025-03-31 DIAGNOSIS — E11.42 DIABETIC POLYNEUROPATHY ASSOCIATED WITH TYPE 2 DIABETES MELLITUS: Primary | ICD-10-CM

## 2025-03-31 RX ORDER — TADALAFIL 20 MG/1
20 TABLET ORAL DAILY PRN
Qty: 30 TABLET | Refills: 3 | Status: SHIPPED | OUTPATIENT
Start: 2025-03-31

## 2025-03-31 ASSESSMENT — PATIENT HEALTH QUESTIONNAIRE - PHQ9
1. LITTLE INTEREST OR PLEASURE IN DOING THINGS: NOT AT ALL
SUM OF ALL RESPONSES TO PHQ QUESTIONS 1-9: 0
2. FEELING DOWN, DEPRESSED OR HOPELESS: NOT AT ALL
SUM OF ALL RESPONSES TO PHQ QUESTIONS 1-9: 0

## 2025-04-01 LAB
CREAT UR-MCNC: 79.8 MG/DL
EST. AVERAGE GLUCOSE BLD GHB EST-MCNC: 263 MG/DL
HBA1C MFR BLD: 10.8 % (ref 4–5.6)
PROT UR-MCNC: 14 MG/DL (ref 0–11.9)
PROT/CREAT UR-RTO: 0.2

## 2025-04-03 LAB — APO B SERPL-MCNC: 71 MG/DL

## 2025-04-06 ENCOUNTER — RESULTS FOLLOW-UP (OUTPATIENT)
Facility: CLINIC | Age: 50
End: 2025-04-06

## 2025-04-09 NOTE — PROGRESS NOTES
Chief Complaint   Patient presents with    Diabetes    Cholesterol Problem    Hypertension     \"Have you been to the ER, urgent care clinic since your last visit?  Hospitalized since your last visit?\"    NO    “Have you seen or consulted any other health care providers outside our system since your last visit?”    NO         
use of oxygen or CPAP.  - Start using saline nose drops.    2. Diabetes mellitus.  - Has not been taking his insulin (Tresiba) for almost 2 months due to insurance issues.  - Not taking Ozempic due to a lapsed prescription.  - Informed about the irreversible nature of diabetic complications and the importance of regular follow-ups.  - Arrangements will be made to ensure he receives his insulin and Ozempic; advised to continue his current medication regimen and to schedule an appointment with an ophthalmologist.    3. Erectile dysfunction.  - A prescription for Cialis has been provided, with instructions to take half a tablet initially.  - Cautioned against the concurrent use of nitroglycerin while on this medication.  - Discussed alternative options and the importance of avoiding nitroglycerin.  - Prescription to be filled at [facility] for cost efficiency.    4. Anxiety.  - Referred to a diabetic health class to help manage his condition.  - Discussed the impact of mood on overall health and diabetes management.  - Encouraged to continue lifestyle changes such as cutting out sodas and sweet tea.  - Follow-up in 1 month to assess progress and medication adherence.    Return in about 4 weeks (around 4/28/2025).      Adria Lee MD

## 2025-04-18 ENCOUNTER — TELEPHONE (OUTPATIENT)
Facility: CLINIC | Age: 50
End: 2025-04-18

## 2025-04-30 ENCOUNTER — OFFICE VISIT (OUTPATIENT)
Facility: CLINIC | Age: 50
End: 2025-04-30
Payer: COMMERCIAL

## 2025-04-30 VITALS
BODY MASS INDEX: 34.25 KG/M2 | RESPIRATION RATE: 16 BRPM | HEIGHT: 64 IN | DIASTOLIC BLOOD PRESSURE: 73 MMHG | TEMPERATURE: 98 F | OXYGEN SATURATION: 93 % | SYSTOLIC BLOOD PRESSURE: 107 MMHG | HEART RATE: 77 BPM | WEIGHT: 200.6 LBS

## 2025-04-30 DIAGNOSIS — E11.65 UNCONTROLLED TYPE 2 DIABETES MELLITUS WITH HYPERGLYCEMIA (HCC): ICD-10-CM

## 2025-04-30 DIAGNOSIS — E66.01 SEVERE OBESITY (HCC): ICD-10-CM

## 2025-04-30 DIAGNOSIS — I10 PRIMARY HYPERTENSION: ICD-10-CM

## 2025-04-30 DIAGNOSIS — E78.5 DYSLIPIDEMIA: ICD-10-CM

## 2025-04-30 DIAGNOSIS — I50.20 HFREF (HEART FAILURE WITH REDUCED EJECTION FRACTION) (HCC): Primary | ICD-10-CM

## 2025-04-30 DIAGNOSIS — I25.10 ASHD (ARTERIOSCLEROTIC HEART DISEASE): ICD-10-CM

## 2025-04-30 PROCEDURE — 3074F SYST BP LT 130 MM HG: CPT | Performed by: INTERNAL MEDICINE

## 2025-04-30 PROCEDURE — 99214 OFFICE O/P EST MOD 30 MIN: CPT | Performed by: INTERNAL MEDICINE

## 2025-04-30 PROCEDURE — 3078F DIAST BP <80 MM HG: CPT | Performed by: INTERNAL MEDICINE

## 2025-04-30 PROCEDURE — 3046F HEMOGLOBIN A1C LEVEL >9.0%: CPT | Performed by: INTERNAL MEDICINE

## 2025-04-30 RX ORDER — INSULIN GLARGINE 100 [IU]/ML
22 INJECTION, SOLUTION SUBCUTANEOUS NIGHTLY
COMMUNITY
Start: 2025-04-18

## 2025-04-30 SDOH — ECONOMIC STABILITY: FOOD INSECURITY: WITHIN THE PAST 12 MONTHS, YOU WORRIED THAT YOUR FOOD WOULD RUN OUT BEFORE YOU GOT MONEY TO BUY MORE.: NEVER TRUE

## 2025-04-30 SDOH — ECONOMIC STABILITY: FOOD INSECURITY: WITHIN THE PAST 12 MONTHS, THE FOOD YOU BOUGHT JUST DIDN'T LAST AND YOU DIDN'T HAVE MONEY TO GET MORE.: NEVER TRUE

## 2025-04-30 ASSESSMENT — ANXIETY QUESTIONNAIRES
5. BEING SO RESTLESS THAT IT IS HARD TO SIT STILL: NOT AT ALL
GAD7 TOTAL SCORE: 0
6. BECOMING EASILY ANNOYED OR IRRITABLE: NOT AT ALL
2. NOT BEING ABLE TO STOP OR CONTROL WORRYING: NOT AT ALL
1. FEELING NERVOUS, ANXIOUS, OR ON EDGE: NOT AT ALL
IF YOU CHECKED OFF ANY PROBLEMS ON THIS QUESTIONNAIRE, HOW DIFFICULT HAVE THESE PROBLEMS MADE IT FOR YOU TO DO YOUR WORK, TAKE CARE OF THINGS AT HOME, OR GET ALONG WITH OTHER PEOPLE: NOT DIFFICULT AT ALL
7. FEELING AFRAID AS IF SOMETHING AWFUL MIGHT HAPPEN: NOT AT ALL
4. TROUBLE RELAXING: NOT AT ALL
3. WORRYING TOO MUCH ABOUT DIFFERENT THINGS: NOT AT ALL

## 2025-04-30 ASSESSMENT — PATIENT HEALTH QUESTIONNAIRE - PHQ9
SUM OF ALL RESPONSES TO PHQ QUESTIONS 1-9: 0
2. FEELING DOWN, DEPRESSED OR HOPELESS: NOT AT ALL
1. LITTLE INTEREST OR PLEASURE IN DOING THINGS: NOT AT ALL

## 2025-04-30 NOTE — PROGRESS NOTES
Chief Complaint   Patient presents with    Diabetes     Patient states he is present for a follow up.      Have you been to the ER, urgent care clinic since your last visit?  Hospitalized since your last visit?   NO    Have you seen or consulted any other health care providers outside our system since your last visit?   NO

## 2025-05-02 ENCOUNTER — OFFICE VISIT (OUTPATIENT)
Age: 50
End: 2025-05-02
Payer: COMMERCIAL

## 2025-05-02 VITALS
SYSTOLIC BLOOD PRESSURE: 110 MMHG | WEIGHT: 204.4 LBS | OXYGEN SATURATION: 96 % | HEART RATE: 71 BPM | DIASTOLIC BLOOD PRESSURE: 88 MMHG | BODY MASS INDEX: 34.89 KG/M2 | HEIGHT: 64 IN

## 2025-05-02 DIAGNOSIS — E78.5 DYSLIPIDEMIA: ICD-10-CM

## 2025-05-02 DIAGNOSIS — I50.20 HFREF (HEART FAILURE WITH REDUCED EJECTION FRACTION) (HCC): ICD-10-CM

## 2025-05-02 DIAGNOSIS — N18.32 TYPE 2 DIABETES MELLITUS WITH STAGE 3B CHRONIC KIDNEY DISEASE, WITH LONG-TERM CURRENT USE OF INSULIN (HCC): ICD-10-CM

## 2025-05-02 DIAGNOSIS — I73.9 PAD (PERIPHERAL ARTERY DISEASE): ICD-10-CM

## 2025-05-02 DIAGNOSIS — I10 PRIMARY HYPERTENSION: ICD-10-CM

## 2025-05-02 DIAGNOSIS — E11.22 TYPE 2 DIABETES MELLITUS WITH STAGE 3B CHRONIC KIDNEY DISEASE, WITH LONG-TERM CURRENT USE OF INSULIN (HCC): ICD-10-CM

## 2025-05-02 DIAGNOSIS — I25.10 ASHD (ARTERIOSCLEROTIC HEART DISEASE): ICD-10-CM

## 2025-05-02 DIAGNOSIS — I25.5 ISCHEMIC CARDIOMYOPATHY: ICD-10-CM

## 2025-05-02 DIAGNOSIS — I25.5 ISCHEMIC CARDIOMYOPATHY: Primary | ICD-10-CM

## 2025-05-02 DIAGNOSIS — Z79.4 TYPE 2 DIABETES MELLITUS WITH STAGE 3B CHRONIC KIDNEY DISEASE, WITH LONG-TERM CURRENT USE OF INSULIN (HCC): ICD-10-CM

## 2025-05-02 PROCEDURE — 93005 ELECTROCARDIOGRAM TRACING: CPT

## 2025-05-02 PROCEDURE — 93010 ELECTROCARDIOGRAM REPORT: CPT

## 2025-05-02 PROCEDURE — 3046F HEMOGLOBIN A1C LEVEL >9.0%: CPT

## 2025-05-02 PROCEDURE — 3079F DIAST BP 80-89 MM HG: CPT

## 2025-05-02 PROCEDURE — 99214 OFFICE O/P EST MOD 30 MIN: CPT

## 2025-05-02 PROCEDURE — 3074F SYST BP LT 130 MM HG: CPT

## 2025-05-02 RX ORDER — CLOTRIMAZOLE 1 %
CREAM (GRAM) TOPICAL
COMMUNITY
Start: 2025-02-15

## 2025-05-02 ASSESSMENT — PATIENT HEALTH QUESTIONNAIRE - PHQ9
SUM OF ALL RESPONSES TO PHQ QUESTIONS 1-9: 0
SUM OF ALL RESPONSES TO PHQ QUESTIONS 1-9: 0
1. LITTLE INTEREST OR PLEASURE IN DOING THINGS: NOT AT ALL
SUM OF ALL RESPONSES TO PHQ QUESTIONS 1-9: 0
SUM OF ALL RESPONSES TO PHQ QUESTIONS 1-9: 0
2. FEELING DOWN, DEPRESSED OR HOPELESS: NOT AT ALL

## 2025-05-02 NOTE — PROGRESS NOTES
1. Have you been to the ER, urgent care clinic since your last visit?  Hospitalized since your last visit?  12/4/24, [facility], acute kidney injury     2. Have you seen or consulted any other health care providers outside of the [facility] since your last visit?  Include any pap smears or colon screening.   No  
taking: Reported on 3/31/2025) 1 each 0     No current facility-administered medications on file prior to visit.       Past Medical History:   Diagnosis Date    CAD (coronary artery disease)     2 stents 2016     DM2 (diabetes mellitus, type 2) (HCC)     Headache     Hypercholesterolemia     Hypertension     Hypogonadism male     Obstructive sleep apnea     JACKY (obstructive sleep apnea)        Past Surgical History:   Procedure Laterality Date    COLONOSCOPY N/A 11/7/2023    COLORECTAL CANCER SCREENING, NOT HIGH RISK performed by Nishant Castro MD at [facility] ENDOSCOPY    FOOT DEBRIDEMENT Right 6/3/2024    RIGHT FOOT  INCISION AND DRAINAGE REQUEST 7:30  OR AFTER 4PM performed by Fallon Figueroa DPM at [facility] MAIN OR    HEENT  2013    tonsils removed       Social History     Socioeconomic History    Marital status:      Spouse name: Sara Fried    Number of children: 2    Years of education: 16    Highest education level: Bachelor's degree (e.g., BA, AB, BS)   Occupational History    Not on file   Tobacco Use    Smoking status: Never     Passive exposure: Never    Smokeless tobacco: Never   Vaping Use    Vaping status: Never Used   Substance and Sexual Activity    Alcohol use: No    Drug use: No    Sexual activity: Yes     Partners: Female   Other Topics Concern    Not on file   Social History Narrative    Go to [employer]     Social Drivers of Health     Financial Resource Strain: Low Risk  (7/8/2024)    Overall Financial Resource Strain (CARDIA)     Difficulty of Paying Living Expenses: Not hard at all   Food Insecurity: No Food Insecurity (4/30/2025)    Hunger Vital Sign     Worried About Running Out of Food in the Last Year: Never true     Ran Out of Food in the Last Year: Never true   Transportation Needs: No Transportation Needs (4/30/2025)    PRAPARE - Transportation     Lack of Transportation (Medical): No     Lack of Transportation (Non-Medical): No   Physical Activity: Inactive (6/7/2023)

## 2025-05-04 RX ORDER — SEMAGLUTIDE 2.68 MG/ML
INJECTION, SOLUTION SUBCUTANEOUS
Qty: 3 ML | Refills: 11 | Status: SHIPPED | OUTPATIENT
Start: 2025-05-04

## 2025-05-05 DIAGNOSIS — E11.65 POORLY CONTROLLED DIABETES MELLITUS (HCC): ICD-10-CM

## 2025-05-06 ENCOUNTER — RESULTS FOLLOW-UP (OUTPATIENT)
Age: 50
End: 2025-05-06

## 2025-05-06 LAB
BASOPHILS # BLD AUTO: 0.1 X10E3/UL (ref 0–0.2)
BASOPHILS NFR BLD AUTO: 1 %
BUN SERPL-MCNC: 18 MG/DL (ref 6–24)
BUN/CREAT SERPL: 16 (ref 9–20)
CALCIUM SERPL-MCNC: 9.3 MG/DL (ref 8.7–10.2)
CHLORIDE SERPL-SCNC: 104 MMOL/L (ref 96–106)
CO2 SERPL-SCNC: 19 MMOL/L (ref 20–29)
CREAT SERPL-MCNC: 1.12 MG/DL (ref 0.76–1.27)
EGFRCR SERPLBLD CKD-EPI 2021: 82 ML/MIN/1.73
EOSINOPHIL # BLD AUTO: 0.1 X10E3/UL (ref 0–0.4)
EOSINOPHIL NFR BLD AUTO: 1 %
ERYTHROCYTE [DISTWIDTH] IN BLOOD BY AUTOMATED COUNT: 14.3 % (ref 11.6–15.4)
GLUCOSE SERPL-MCNC: 102 MG/DL (ref 70–99)
HCT VFR BLD AUTO: 44.2 % (ref 37.5–51)
HGB BLD-MCNC: 14.6 G/DL (ref 13–17.7)
IMM GRANULOCYTES # BLD AUTO: 0 X10E3/UL (ref 0–0.1)
IMM GRANULOCYTES NFR BLD AUTO: 0 %
LYMPHOCYTES # BLD AUTO: 2.9 X10E3/UL (ref 0.7–3.1)
LYMPHOCYTES NFR BLD AUTO: 46 %
MCH RBC QN AUTO: 26.8 PG (ref 26.6–33)
MCHC RBC AUTO-ENTMCNC: 33 G/DL (ref 31.5–35.7)
MCV RBC AUTO: 81 FL (ref 79–97)
MONOCYTES # BLD AUTO: 0.7 X10E3/UL (ref 0.1–0.9)
MONOCYTES NFR BLD AUTO: 11 %
NEUTROPHILS # BLD AUTO: 2.6 X10E3/UL (ref 1.4–7)
NEUTROPHILS NFR BLD AUTO: 41 %
PLATELET # BLD AUTO: 197 X10E3/UL (ref 150–450)
POTASSIUM SERPL-SCNC: 4 MMOL/L (ref 3.5–5.2)
RBC # BLD AUTO: 5.44 X10E6/UL (ref 4.14–5.8)
SODIUM SERPL-SCNC: 142 MMOL/L (ref 134–144)
WBC # BLD AUTO: 6.4 X10E3/UL (ref 3.4–10.8)

## 2025-05-06 NOTE — RESULT ENCOUNTER NOTE
Dear Mr. Puente,  Good News!  Your test results are stable.   We will contact you regarding further testing and plans.   Best Regards,  TAMMI Montes De Oca

## 2025-05-07 ENCOUNTER — TELEPHONE (OUTPATIENT)
Age: 50
End: 2025-05-07

## 2025-05-07 NOTE — TELEPHONE ENCOUNTER
Sent a AppliLog message to the  patient to let him know that we are trying to get an earlier appointment with Dr. Gordon.

## 2025-05-07 NOTE — TELEPHONE ENCOUNTER
----- Message from Maritza Linton, TAMMI sent at 5/6/2025  9:48 AM EDT -----  Hey!    Dr. Gordon said to hold off on [facility] for Mr. Puente for now since his EF increased slightly - he'd like to see him back in 3 months.     Can you let Mr. Puente know his labs look fine (I sent him a MCM too) and that he should call us ASAP for concerning symptoms (apoorva with activity), but otherwise he will need to return to see Dr. Gordon in August for further eval.

## 2025-05-12 RX ORDER — SPIRONOLACTONE 25 MG/1
TABLET ORAL
Qty: 90 TABLET | Refills: 1 | Status: SHIPPED
Start: 2025-05-12 | End: 2025-05-14 | Stop reason: SDUPTHER

## 2025-05-12 NOTE — TELEPHONE ENCOUNTER
Requested Prescriptions     Signed Prescriptions Disp Refills    spironolactone (ALDACTONE) 25 MG tablet 90 tablet 1     Sig: TAKE 1 TABLET BY MOUTH ONCE DAILY.     Authorizing Provider: NEEL STILES     Ordering User: JUANA LOUIS     Future Appointments   Date Time Provider Department Center   5/29/2025  2:45 PM Adria Lee MD [facility] MAIN [facility]   6/4/2025 10:00 AM Neel Stiles MD [facility] AMB   9/29/2025  9:30 AM Kenya De Paz APRN - [facility] AMB

## 2025-05-14 DIAGNOSIS — E11.65 UNCONTROLLED TYPE 2 DIABETES MELLITUS WITH HYPERGLYCEMIA (HCC): ICD-10-CM

## 2025-05-14 RX ORDER — SPIRONOLACTONE 25 MG/1
TABLET ORAL
Qty: 90 TABLET | Refills: 1 | Status: SHIPPED | OUTPATIENT
Start: 2025-05-14

## 2025-05-14 RX ORDER — ROSUVASTATIN CALCIUM 40 MG/1
40 TABLET, COATED ORAL DAILY
Qty: 90 TABLET | Refills: 3 | Status: SHIPPED | OUTPATIENT
Start: 2025-05-14 | End: 2025-05-14

## 2025-05-14 RX ORDER — ROSUVASTATIN CALCIUM 40 MG/1
40 TABLET, COATED ORAL DAILY
Qty: 90 TABLET | Refills: 3 | Status: SHIPPED | OUTPATIENT
Start: 2025-05-14

## 2025-05-14 NOTE — TELEPHONE ENCOUNTER
Requested Prescriptions     Signed Prescriptions Disp Refills    rosuvastatin (CRESTOR) 40 MG tablet 90 tablet 3     Sig: Take 1 tablet by mouth daily     Authorizing Provider: DEISI MARI     Ordering User: JUANA LOUIS     Future Appointments   Date Time Provider Department Center   5/29/2025  2:45 PM Adria Lee MD [facility] MAIN [facility]   6/4/2025 10:00 AM Rebekah Gordon MD [facility] AMB   9/29/2025  9:30 AM Kenya De Paz APRN - NP [facility] BS AMB

## 2025-05-14 NOTE — TELEPHONE ENCOUNTER
Requested Prescriptions     Signed Prescriptions Disp Refills    spironolactone (ALDACTONE) 25 MG tablet 90 tablet 1     Sig: TAKE 1 TABLET BY MOUTH ONCE DAILY.     Authorizing Provider: NEEL STILES     Ordering User: JUANA LOUIS     Future Appointments   Date Time Provider Department Center   5/29/2025  2:45 PM Adria Lee MD [facility] MAIN [facility]   6/4/2025 10:00 AM Neel Stiles MD [facility] AMB   9/29/2025  9:30 AM Kenya De Paz APRN - [facility] AMB

## 2025-05-16 RX ORDER — SEMAGLUTIDE 2.68 MG/ML
INJECTION, SOLUTION SUBCUTANEOUS
Qty: 3 ML | Refills: 11 | Status: SHIPPED | OUTPATIENT
Start: 2025-05-16

## 2025-05-23 RX ORDER — ASPIRIN 81 MG/1
81 TABLET ORAL DAILY
Qty: 90 TABLET | Refills: 3 | Status: SHIPPED | OUTPATIENT
Start: 2025-05-23

## 2025-05-23 NOTE — TELEPHONE ENCOUNTER
Requested Prescriptions     Signed Prescriptions Disp Refills    aspirin ([facility] ASPIRIN LOW DOSE) 81 MG EC tablet 90 tablet 3     Sig: Take 1 tablet by mouth daily     Authorizing Provider: DEISI MARI     Ordering User: JUANA LOUIS     Future Appointments   Date Time Provider Department Center   5/29/2025  2:45 PM Adria Lee MD [facility] MAIN [facility]   6/4/2025 10:00 AM Rebekah Gordon MD [facility] AMB   9/29/2025  9:30 AM Kenya De Paz APRN - NP [facility] AMB

## 2025-05-30 DIAGNOSIS — I10 ESSENTIAL (PRIMARY) HYPERTENSION: ICD-10-CM

## 2025-05-30 DIAGNOSIS — E11.65 TYPE 2 DIABETES MELLITUS WITH HYPERGLYCEMIA (HCC): ICD-10-CM

## 2025-05-30 DIAGNOSIS — I25.10 ASHD (ARTERIOSCLEROTIC HEART DISEASE): ICD-10-CM

## 2025-05-30 RX ORDER — ASPIRIN 81 MG/1
81 TABLET ORAL DAILY
Qty: 90 TABLET | Refills: 3 | Status: SHIPPED | OUTPATIENT
Start: 2025-05-30

## 2025-05-30 RX ORDER — CLOPIDOGREL BISULFATE 75 MG/1
75 TABLET ORAL DAILY
Qty: 90 TABLET | Refills: 1 | Status: SHIPPED | OUTPATIENT
Start: 2025-05-30

## 2025-05-30 RX ORDER — SACUBITRIL AND VALSARTAN 24; 26 MG/1; MG/1
1 TABLET, FILM COATED ORAL 2 TIMES DAILY
Qty: 180 TABLET | Refills: 2 | Status: SHIPPED | OUTPATIENT
Start: 2025-05-30

## 2025-05-30 RX ORDER — AMLODIPINE BESYLATE 10 MG/1
10 TABLET ORAL DAILY
Qty: 90 TABLET | Refills: 1 | Status: SHIPPED | OUTPATIENT
Start: 2025-05-30

## 2025-05-30 NOTE — TELEPHONE ENCOUNTER
Requested Prescriptions     Signed Prescriptions Disp Refills    sacubitril-valsartan (ENTRESTO) 24-26 MG per tablet 180 tablet 2     Sig: Take 1 tablet by mouth 2 times daily     Authorizing Provider: DEISI MARI     Ordering User: JUANA LOUIS    empagliflozin (JARDIANCE) 10 MG tablet 90 tablet 1     Sig: Take 1 tablet by mouth daily     Authorizing Provider: DEISI MARI     Ordering User: JUANA LOUIS    clopidogrel (PLAVIX) 75 MG tablet 90 tablet 1     Sig: Take 1 tablet by mouth daily     Authorizing Provider: DEISI MARI     Ordering User: JUANA LOUIS    amLODIPine (NORVASC) 10 MG tablet 90 tablet 1     Sig: Take 1 tablet by mouth daily     Authorizing Provider: DEISI MARI     Ordering User: JUANA LOUIS    aspirin ([employer] ASPIRIN LOW DOSE) 81 MG EC tablet 90 tablet 3     Sig: Take 1 tablet by mouth daily     Authorizing Provider: DEISI MARI     Ordering User: JUANA LOUIS     Future Appointments   Date Time Provider Department Center   6/4/2025 10:00 AM Rebekah Gordon MD [facility]   7/2/2025  2:30 PM Adria Lee MD [facility] DEP   9/29/2025  9:30 AM Kenya De Paz APRN - [facility] AMB

## 2025-06-01 RX ORDER — INSULIN DEGLUDEC 200 U/ML
22 INJECTION, SOLUTION SUBCUTANEOUS
Qty: 2 ADJUSTABLE DOSE PRE-FILLED PEN SYRINGE | Refills: 11 | Status: SHIPPED | OUTPATIENT
Start: 2025-06-01

## 2025-06-04 ENCOUNTER — OFFICE VISIT (OUTPATIENT)
Age: 50
End: 2025-06-04
Payer: COMMERCIAL

## 2025-06-04 VITALS
SYSTOLIC BLOOD PRESSURE: 122 MMHG | DIASTOLIC BLOOD PRESSURE: 90 MMHG | OXYGEN SATURATION: 98 % | BODY MASS INDEX: 34.08 KG/M2 | HEART RATE: 85 BPM | HEIGHT: 64 IN | WEIGHT: 199.6 LBS

## 2025-06-04 DIAGNOSIS — I25.10 CORONARY ARTERY DISEASE INVOLVING NATIVE HEART WITHOUT ANGINA PECTORIS, UNSPECIFIED VESSEL OR LESION TYPE: ICD-10-CM

## 2025-06-04 DIAGNOSIS — E78.2 MIXED HYPERLIPIDEMIA: ICD-10-CM

## 2025-06-04 DIAGNOSIS — I73.9 PAD (PERIPHERAL ARTERY DISEASE): ICD-10-CM

## 2025-06-04 DIAGNOSIS — I50.22 CHRONIC SYSTOLIC CONGESTIVE HEART FAILURE (HCC): Primary | ICD-10-CM

## 2025-06-04 PROCEDURE — 99214 OFFICE O/P EST MOD 30 MIN: CPT | Performed by: INTERNAL MEDICINE

## 2025-06-04 PROCEDURE — 3074F SYST BP LT 130 MM HG: CPT | Performed by: INTERNAL MEDICINE

## 2025-06-04 PROCEDURE — 3080F DIAST BP >= 90 MM HG: CPT | Performed by: INTERNAL MEDICINE

## 2025-06-04 RX ORDER — FUROSEMIDE 20 MG/1
20 TABLET ORAL PRN
Qty: 60 TABLET | Refills: 3 | Status: SHIPPED | OUTPATIENT
Start: 2025-06-04

## 2025-06-04 ASSESSMENT — PATIENT HEALTH QUESTIONNAIRE - PHQ9
SUM OF ALL RESPONSES TO PHQ QUESTIONS 1-9: 0
1. LITTLE INTEREST OR PLEASURE IN DOING THINGS: NOT AT ALL
2. FEELING DOWN, DEPRESSED OR HOPELESS: NOT AT ALL

## 2025-06-04 NOTE — PROGRESS NOTES
1. Have you been to the ER, urgent care clinic since your last visit?  Hospitalized since your last visit?No    2. Have you seen or consulted any other health care providers outside of the [facility] since your last visit?  Include any pap smears or colon screening. No

## 2025-06-04 NOTE — PROGRESS NOTES
DR. Evan Tellez, NP  [facility]    [phone]            Cardiology Consult/Progress Note        Requesting/referring provider: Dr. Lee,   Reason for Consult: Coronary disease      Assessment/Plan:   1.  Coronary disease manifesting in the form of prior episodes of unstable angina.   2.  Cardiomyopathy with last LV EF in 2024 of about 35 %   3.  Hypertension   4.  Hypercholesterolemia   3/2023:  TC 92 Trig 196 HDL 45 LDL 7   5.  Metabolic syndrome elevated triglycerides   6.  Diabetes mellitus with poor control   7.  Likely peripheral arterial disease secondary to diabetes      Mr. Puente is seen for history of coronary disease and ischemic cardiomyopathy;  Since his last PCI in April 2021, he has reported significant reduction in frequency of his chest discomfort which was nonetheless atypical.  His EF was previously  severely reduced but last 43% on nuclear stress test in 2022.  Subsequently however had further drop in EF.  Required PCI to RCA for more chest discomfort in 2024.  Since then symptoms have been relatively stable with no further chest discomfort.  EF is about 35%.  He was not able to get some of his medication for about 2 months and just restarted back again 3 weeks ago.  I discussed the importance of medical compliance.  Has had some change/deterioration in his functional tolerance.  Recommend repeating echocardiogram, stress test.  This would be done when he is on at least 2 to 3 months of uninterrupted guideline directed medical therapy.  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  Will perform ABIs.  Follow-up in 3 to 4 months.      []    High complexity decision making was performed   []    Patient is at high-risk of decompensation  with multiple organ involvement         Investigations personally reviewed by me   ECG November 2020: Sinus rhythm, lateral precordial and lateral

## 2025-06-11 RX ORDER — ICOSAPENT ETHYL 1 G/1
1 CAPSULE ORAL 2 TIMES DAILY WITH MEALS
Qty: 180 CAPSULE | Refills: 2 | Status: SHIPPED | OUTPATIENT
Start: 2025-06-11

## 2025-06-11 NOTE — TELEPHONE ENCOUNTER
Requested Prescriptions     Signed Prescriptions Disp Refills    Icosapent Ethyl (VASCEPA) 1 g CAPS capsule 180 capsule 2     Sig: Take 1 capsule by mouth 2 times daily (with meals)     Authorizing Provider: DEISI MARI     Ordering User: JUANA LOUIS     Future Appointments   Date Time Provider Department Center   7/2/2025  2:30 PM Adria Lee MD [facility] MAIN [facility] ECC DEP   9/2/2025  9:00 AM BS OPAL NUCLEAR 1 [facility] BS AMB   9/2/2025  1:00 PM BS OPAL ECHO 2 [facility] BS AMB   9/29/2025  9:30 AM Kenya De Paz APRN - NP [facility] AMB   10/8/2025 10:00 AM Rebekah Gordon MD [facility] AMB

## 2025-06-23 ENCOUNTER — HOSPITAL ENCOUNTER (EMERGENCY)
Facility: HOSPITAL | Age: 50
Discharge: HOME | End: 2025-06-24
Attending: EMERGENCY MEDICINE
Payer: COMMERCIAL

## 2025-06-23 ENCOUNTER — APPOINTMENT (OUTPATIENT)
Facility: HOSPITAL | Age: 50
End: 2025-06-23
Payer: COMMERCIAL

## 2025-06-23 DIAGNOSIS — R07.9 CHEST PAIN, UNSPECIFIED TYPE: Primary | ICD-10-CM

## 2025-06-23 LAB
ALBUMIN SERPL-MCNC: 3.8 G/DL (ref 3.5–5)
ALBUMIN/GLOB SERPL: 0.9 (ref 1.1–2.2)
ALP SERPL-CCNC: 85 U/L (ref 45–117)
ALT SERPL-CCNC: 52 U/L (ref 12–78)
ANION GAP SERPL CALC-SCNC: 7 MMOL/L (ref 2–12)
AST SERPL-CCNC: 27 U/L (ref 15–37)
BASOPHILS # BLD: 0.03 K/UL (ref 0–0.1)
BASOPHILS NFR BLD: 0.5 % (ref 0–1)
BILIRUB SERPL-MCNC: 0.4 MG/DL (ref 0.2–1)
BUN SERPL-MCNC: 24 MG/DL (ref 6–20)
BUN/CREAT SERPL: 18 (ref 12–20)
CALCIUM SERPL-MCNC: 8.7 MG/DL (ref 8.5–10.1)
CHLORIDE SERPL-SCNC: 108 MMOL/L (ref 97–108)
CO2 SERPL-SCNC: 25 MMOL/L (ref 21–32)
COMMENT:: NORMAL
CREAT SERPL-MCNC: 1.34 MG/DL (ref 0.7–1.3)
DIFFERENTIAL METHOD BLD: ABNORMAL
EOSINOPHIL # BLD: 0.13 K/UL (ref 0–0.4)
EOSINOPHIL NFR BLD: 2 % (ref 0–7)
ERYTHROCYTE [DISTWIDTH] IN BLOOD BY AUTOMATED COUNT: 13.6 % (ref 11.5–14.5)
GLOBULIN SER CALC-MCNC: 4.2 G/DL (ref 2–4)
GLUCOSE SERPL-MCNC: 138 MG/DL (ref 65–100)
HCT VFR BLD AUTO: 42.9 % (ref 36.6–50.3)
HGB BLD-MCNC: 14.3 G/DL (ref 12.1–17)
IMM GRANULOCYTES # BLD AUTO: 0.01 K/UL (ref 0–0.04)
IMM GRANULOCYTES NFR BLD AUTO: 0.2 % (ref 0–0.5)
LYMPHOCYTES # BLD: 2.91 K/UL (ref 0.8–3.5)
LYMPHOCYTES NFR BLD: 45.5 % (ref 12–49)
MCH RBC QN AUTO: 26.3 PG (ref 26–34)
MCHC RBC AUTO-ENTMCNC: 33.3 G/DL (ref 30–36.5)
MCV RBC AUTO: 79 FL (ref 80–99)
MONOCYTES # BLD: 0.58 K/UL (ref 0–1)
MONOCYTES NFR BLD: 9.1 % (ref 5–13)
NEUTS SEG # BLD: 2.73 K/UL (ref 1.8–8)
NEUTS SEG NFR BLD: 42.7 % (ref 32–75)
NRBC # BLD: 0 K/UL (ref 0–0.01)
NRBC BLD-RTO: 0 PER 100 WBC
PLATELET # BLD AUTO: 209 K/UL (ref 150–400)
PMV BLD AUTO: 11.8 FL (ref 8.9–12.9)
POTASSIUM SERPL-SCNC: 3.8 MMOL/L (ref 3.5–5.1)
PROT SERPL-MCNC: 8 G/DL (ref 6.4–8.2)
RBC # BLD AUTO: 5.43 M/UL (ref 4.1–5.7)
SODIUM SERPL-SCNC: 140 MMOL/L (ref 136–145)
SPECIMEN HOLD: NORMAL
TROPONIN I SERPL HS-MCNC: 7 NG/L (ref 0–76)
WBC # BLD AUTO: 6.4 K/UL (ref 4.1–11.1)

## 2025-06-23 PROCEDURE — 93005 ELECTROCARDIOGRAM TRACING: CPT | Performed by: EMERGENCY MEDICINE

## 2025-06-23 PROCEDURE — 71046 X-RAY EXAM CHEST 2 VIEWS: CPT

## 2025-06-23 PROCEDURE — 80053 COMPREHEN METABOLIC PANEL: CPT

## 2025-06-23 PROCEDURE — 99285 EMERGENCY DEPT VISIT HI MDM: CPT

## 2025-06-23 PROCEDURE — 85025 COMPLETE CBC W/AUTO DIFF WBC: CPT

## 2025-06-23 PROCEDURE — 84484 ASSAY OF TROPONIN QUANT: CPT

## 2025-06-23 ASSESSMENT — LIFESTYLE VARIABLES
HOW OFTEN DO YOU HAVE A DRINK CONTAINING ALCOHOL: NEVER
HOW MANY STANDARD DRINKS CONTAINING ALCOHOL DO YOU HAVE ON A TYPICAL DAY: PATIENT DOES NOT DRINK

## 2025-06-23 ASSESSMENT — PAIN DESCRIPTION - PAIN TYPE: TYPE: ACUTE PAIN

## 2025-06-23 ASSESSMENT — PAIN - FUNCTIONAL ASSESSMENT
PAIN_FUNCTIONAL_ASSESSMENT: ACTIVITIES ARE NOT PREVENTED
PAIN_FUNCTIONAL_ASSESSMENT: 0-10

## 2025-06-23 ASSESSMENT — PAIN DESCRIPTION - LOCATION: LOCATION: CHEST

## 2025-06-23 ASSESSMENT — PAIN DESCRIPTION - DIRECTION: RADIATING_TOWARDS: LEFT ARM, LEFT LEG

## 2025-06-23 ASSESSMENT — PAIN DESCRIPTION - DESCRIPTORS: DESCRIPTORS: TIGHTNESS;PRESSURE

## 2025-06-23 ASSESSMENT — PAIN DESCRIPTION - ONSET: ONSET: SUDDEN

## 2025-06-23 ASSESSMENT — PAIN DESCRIPTION - FREQUENCY: FREQUENCY: INTERMITTENT

## 2025-06-23 ASSESSMENT — PAIN SCALES - GENERAL: PAINLEVEL_OUTOF10: 8

## 2025-06-23 ASSESSMENT — PAIN DESCRIPTION - ORIENTATION: ORIENTATION: LEFT

## 2025-06-24 VITALS
HEIGHT: 64 IN | HEART RATE: 79 BPM | RESPIRATION RATE: 19 BRPM | BODY MASS INDEX: 34.4 KG/M2 | WEIGHT: 201.5 LBS | DIASTOLIC BLOOD PRESSURE: 86 MMHG | OXYGEN SATURATION: 96 % | TEMPERATURE: 98 F | SYSTOLIC BLOOD PRESSURE: 119 MMHG

## 2025-06-24 LAB
EKG ATRIAL RATE: 76 BPM
EKG DIAGNOSIS: NORMAL
EKG P AXIS: 50 DEGREES
EKG P-R INTERVAL: 130 MS
EKG Q-T INTERVAL: 378 MS
EKG QRS DURATION: 96 MS
EKG QTC CALCULATION (BAZETT): 425 MS
EKG R AXIS: 18 DEGREES
EKG T AXIS: 2 DEGREES
EKG VENTRICULAR RATE: 76 BPM
TROPONIN I SERPL HS-MCNC: 6 NG/L (ref 0–76)

## 2025-06-24 PROCEDURE — 93010 ELECTROCARDIOGRAM REPORT: CPT | Performed by: INTERNAL MEDICINE

## 2025-06-24 NOTE — ED NOTES
9:07 PM  I have evaluated the patient as the Provider in Rapid Medical Evaluation (RME). I have reviewed his vital signs and the triage nurse assessment. I have talked with the patient and any available family and advised that I am the provider in triage and have ordered the appropriate study to initiate their work up based on the clinical presentation during my assessment. I have advised that the patient will be accommodated in the Main ED as soon as possible. I have also requested to contact the triage nurse or myself immediately if the patient experiences any changes in their condition during this brief waiting period.  Santosh Barger PA-C    50-year-old M PMHx MI, stent placement, CAD, followed by Cardiology, presenting to the emergency department for left-sided chest pain that began yesterday.  Patient has been trying to exercise more with walking.  Reports left-sided chest pain radiating to left upper extremity and left side intermittently.  Intermittent SOB.  Denies SOB at this time. Does not appear fluid overloaded.      Santosh Barger PA-C  06/23/25 [id number]

## 2025-06-24 NOTE — ED TRIAGE NOTES
Pt presents from home w/ CC left-sided chest pressure and tightness. Reports it started yesterday while he wasn't doing anything in particular. States it radiates mostly to left arm, but sometimes to left leg.     PMHx: heart attack

## 2025-06-24 NOTE — ED PROVIDER NOTES
[facility] EMERGENCY DEPARTMENT  EMERGENCY DEPARTMENT ENCOUNTER      Pt Name: Timur Puente  MRN: [id number]  Birthdate 1975  Date of evaluation: 6/23/2025  Provider: Cristobal Bustamante MD    CHIEF COMPLAINT       Chief Complaint   Patient presents with    Chest Pain         HISTORY OF PRESENT ILLNESS   (Location/Symptom, Timing/Onset, Context/Setting, Quality, Duration, Modifying Factors, Severity)  Note limiting factors.   50-year-old with a history of hypertension, diabetes, hyperlipidemia, coronary disease status post NSTEMI and stents, cardiomyopathy, peripheral artery disease, sleep apnea, diabetic polyneuropathy, chronic renal disease, obesity.  He presents with a 1 day history of intermittent chest pain.  He describes left-sided chest pressure.  It seems to occur randomly.  It is not associated with exertion.  It lasts for minutes at a time.  It radiates to his left arm and back.  No shortness of breath, nausea, diaphoresis.          Review of External Medical Records:     Nursing Notes were reviewed.    REVIEW OF SYSTEMS    (2-9 systems for level 4, 10 or more for level 5)     Review of Systems    Except as noted above the remainder of the review of systems was reviewed and negative.       PAST MEDICAL HISTORY     Past Medical History:   Diagnosis Date    CAD (coronary artery disease)     2 stents 2016     DM2 (diabetes mellitus, type 2) (HCC)     Headache     Hypercholesterolemia     Hypertension     Hypogonadism male     Obstructive sleep apnea     JACKY (obstructive sleep apnea)          SURGICAL HISTORY       Past Surgical History:   Procedure Laterality Date    COLONOSCOPY N/A 11/7/2023    COLORECTAL CANCER SCREENING, NOT HIGH RISK performed by Nishant Castro MD at [facility] ENDOSCOPY    FOOT DEBRIDEMENT Right 6/3/2024    RIGHT FOOT  INCISION AND DRAINAGE REQUEST 7:30  OR AFTER 4PM performed by Fallon Figueroa DPM at [facility] MAIN OR    HEENT  2013    tonsils removed         CURRENT MEDICATIONS       Previous

## 2025-06-25 DIAGNOSIS — E11.42 DIABETIC POLYNEUROPATHY ASSOCIATED WITH TYPE 2 DIABETES MELLITUS (HCC): Primary | ICD-10-CM

## 2025-06-25 RX ORDER — AMLODIPINE BESYLATE 10 MG/1
10 TABLET ORAL DAILY
Qty: 90 TABLET | Refills: 3 | Status: SHIPPED | OUTPATIENT
Start: 2025-06-25

## 2025-06-25 NOTE — TELEPHONE ENCOUNTER
Requested Prescriptions     Signed Prescriptions Disp Refills    amLODIPine (NORVASC) 10 MG tablet 90 tablet 3     Sig: Take 1 tablet by mouth daily     Authorizing Provider: DEISI MARI     Ordering User: JUANA LOUIS     Future Appointments   Date Time Provider Department Center   7/2/2025  2:30 PM Adria Lee MD [facility] MAIN [facility] ECC DEP   9/2/2025  9:00 AM [facility] OPAL NUCLEAR 1 [facility] AMB   9/2/2025  1:00 PM [facility] OPAL ECHO 2 E[facility] AMB   9/29/2025  9:30 AM Kenya De Paz APRN - NP [facility] AMB   10/8/2025 10:00 AM Rebekah Gordon MD [facility] AMB

## 2025-06-26 RX ORDER — ACYCLOVIR 400 MG/1
TABLET ORAL
Qty: 2 EACH | Refills: 11 | Status: SHIPPED | OUTPATIENT
Start: 2025-06-26

## 2025-06-26 RX ORDER — ACYCLOVIR 400 MG/1
TABLET ORAL
Qty: 1 EACH | Refills: 0 | Status: SHIPPED | OUTPATIENT
Start: 2025-06-26

## 2025-07-02 ENCOUNTER — OFFICE VISIT (OUTPATIENT)
Facility: CLINIC | Age: 50
End: 2025-07-02
Payer: COMMERCIAL

## 2025-07-02 VITALS
SYSTOLIC BLOOD PRESSURE: 128 MMHG | WEIGHT: 196 LBS | BODY MASS INDEX: 33.46 KG/M2 | TEMPERATURE: 98.5 F | OXYGEN SATURATION: 97 % | HEIGHT: 64 IN | RESPIRATION RATE: 16 BRPM | DIASTOLIC BLOOD PRESSURE: 88 MMHG | HEART RATE: 75 BPM

## 2025-07-02 DIAGNOSIS — N18.31 STAGE 3A CHRONIC KIDNEY DISEASE (HCC): ICD-10-CM

## 2025-07-02 DIAGNOSIS — E78.5 DYSLIPIDEMIA: ICD-10-CM

## 2025-07-02 DIAGNOSIS — E66.811 OBESITY (BMI 30.0-34.9): ICD-10-CM

## 2025-07-02 DIAGNOSIS — E11.65 UNCONTROLLED TYPE 2 DIABETES MELLITUS WITH HYPERGLYCEMIA (HCC): Primary | ICD-10-CM

## 2025-07-02 DIAGNOSIS — I10 PRIMARY HYPERTENSION: ICD-10-CM

## 2025-07-02 DIAGNOSIS — E11.65 UNCONTROLLED TYPE 2 DIABETES MELLITUS WITH HYPERGLYCEMIA (HCC): ICD-10-CM

## 2025-07-02 PROCEDURE — 3074F SYST BP LT 130 MM HG: CPT | Performed by: INTERNAL MEDICINE

## 2025-07-02 PROCEDURE — 3046F HEMOGLOBIN A1C LEVEL >9.0%: CPT | Performed by: INTERNAL MEDICINE

## 2025-07-02 PROCEDURE — 99214 OFFICE O/P EST MOD 30 MIN: CPT | Performed by: INTERNAL MEDICINE

## 2025-07-02 PROCEDURE — 3079F DIAST BP 80-89 MM HG: CPT | Performed by: INTERNAL MEDICINE

## 2025-07-02 SDOH — ECONOMIC STABILITY: FOOD INSECURITY: WITHIN THE PAST 12 MONTHS, YOU WORRIED THAT YOUR FOOD WOULD RUN OUT BEFORE YOU GOT MONEY TO BUY MORE.: NEVER TRUE

## 2025-07-02 SDOH — ECONOMIC STABILITY: FOOD INSECURITY: WITHIN THE PAST 12 MONTHS, THE FOOD YOU BOUGHT JUST DIDN'T LAST AND YOU DIDN'T HAVE MONEY TO GET MORE.: NEVER TRUE

## 2025-07-02 ASSESSMENT — PATIENT HEALTH QUESTIONNAIRE - PHQ9
SUM OF ALL RESPONSES TO PHQ QUESTIONS 1-9: 0
2. FEELING DOWN, DEPRESSED OR HOPELESS: NOT AT ALL
1. LITTLE INTEREST OR PLEASURE IN DOING THINGS: NOT AT ALL
SUM OF ALL RESPONSES TO PHQ QUESTIONS 1-9: 0

## 2025-07-02 NOTE — PROGRESS NOTES
Chief Complaint   Patient presents with    Diabetes     Patient states he is present for a follow up.  Have you been to the ER, urgent care clinic since your last visit?  Hospitalized since your last visit?   YES - When: approximately 2  weeks ago.  Where and Why: H chest pain.    Have you seen or consulted any other health care providers outside our system since your last visit?   NO

## 2025-07-03 LAB
CREAT UR-MCNC: 161 MG/DL
MICROALBUMIN UR-MCNC: 3 MG/DL
MICROALBUMIN/CREAT UR-RTO: 19 MG/G (ref 0–30)
SPECIMEN HOLD: NORMAL

## 2025-07-03 RX ORDER — SEMAGLUTIDE 2.68 MG/ML
INJECTION, SOLUTION SUBCUTANEOUS
Qty: 3 ML | Refills: 11 | OUTPATIENT
Start: 2025-07-03

## 2025-07-04 LAB
ANION GAP SERPL CALC-SCNC: 7 MMOL/L (ref 2–12)
BUN SERPL-MCNC: 20 MG/DL (ref 6–20)
BUN/CREAT SERPL: 15 (ref 12–20)
CALCIUM SERPL-MCNC: 9.9 MG/DL (ref 8.5–10.1)
CHLORIDE SERPL-SCNC: 109 MMOL/L (ref 97–108)
CO2 SERPL-SCNC: 25 MMOL/L (ref 21–32)
CREAT SERPL-MCNC: 1.36 MG/DL (ref 0.7–1.3)
EST. AVERAGE GLUCOSE BLD GHB EST-MCNC: 217 MG/DL
GLUCOSE SERPL-MCNC: 100 MG/DL (ref 65–100)
HBA1C MFR BLD: 9.2 % (ref 4–5.6)
POTASSIUM SERPL-SCNC: 4.4 MMOL/L (ref 3.5–5.1)
SODIUM SERPL-SCNC: 141 MMOL/L (ref 136–145)

## 2025-07-05 LAB — APO B SERPL-MCNC: 64 MG/DL

## 2025-07-10 DIAGNOSIS — E11.42 DIABETIC POLYNEUROPATHY ASSOCIATED WITH TYPE 2 DIABETES MELLITUS (HCC): ICD-10-CM

## 2025-07-10 RX ORDER — ACYCLOVIR 400 MG/1
TABLET ORAL
Qty: 2 EACH | Refills: 11 | Status: SHIPPED | OUTPATIENT
Start: 2025-07-10

## 2025-07-10 RX ORDER — ACYCLOVIR 400 MG/1
TABLET ORAL
Qty: 1 EACH | Refills: 0 | Status: SHIPPED | OUTPATIENT
Start: 2025-07-10

## 2025-07-15 DIAGNOSIS — E78.2 MIXED HYPERLIPIDEMIA: Primary | ICD-10-CM

## 2025-07-15 DIAGNOSIS — I25.5 ISCHEMIC CARDIOMYOPATHY: Primary | ICD-10-CM

## 2025-07-15 DIAGNOSIS — I50.22 CHRONIC SYSTOLIC CONGESTIVE HEART FAILURE (HCC): Primary | ICD-10-CM

## 2025-07-15 DIAGNOSIS — I10 ESSENTIAL (PRIMARY) HYPERTENSION: ICD-10-CM

## 2025-07-15 RX ORDER — SACUBITRIL AND VALSARTAN 24; 26 MG/1; MG/1
1 TABLET, FILM COATED ORAL 2 TIMES DAILY
Qty: 180 TABLET | Refills: 1 | Status: SHIPPED | OUTPATIENT
Start: 2025-07-15

## 2025-07-15 RX ORDER — ICOSAPENT ETHYL 1 G/1
1 CAPSULE ORAL 2 TIMES DAILY WITH MEALS
Qty: 180 CAPSULE | Refills: 1 | Status: SHIPPED | OUTPATIENT
Start: 2025-07-15

## 2025-07-15 NOTE — TELEPHONE ENCOUNTER
Requested Prescriptions     Signed Prescriptions Disp Refills    empagliflozin (JARDIANCE) 10 MG tablet 90 tablet 1     Sig: Take 1 tablet by mouth daily     Authorizing Provider: DEISI MARI     Ordering User: AVIVA BARTLETT     Verbal order per Deisi Mari NP.     Future Appointments   Date Time Provider Department Center   9/2/2025  9:00 AM [facility] OPAL NUCLEAR 1 [facility] BS AMB   9/2/2025  1:00 PM [facility] OPAL ECHO 2 ELOINA  AMB   9/3/2025  3:45 PM Adria Lee MD [facility] MAIN [facility]   9/29/2025  9:30 AM Kenya De Paz, APRN - NP [facility]ARY [facility] BS [facility]   10/8/2025 10:00 AM Rebekah Gordon MD [facility]  AMB

## 2025-07-15 NOTE — TELEPHONE ENCOUNTER
Requested Prescriptions     Signed Prescriptions Disp Refills    sacubitril-valsartan (ENTRESTO) 24-26 MG per tablet 180 tablet 1     Sig: Take 1 tablet by mouth 2 times daily     Authorizing Provider: DEISI MARI     Ordering User: AVIVA BARTLETT     Verbal order per Deisi Mari, NP

## 2025-07-19 ENCOUNTER — OFFICE VISIT (OUTPATIENT)
Age: 50
End: 2025-07-19

## 2025-07-19 VITALS
WEIGHT: 201 LBS | TEMPERATURE: 98.6 F | DIASTOLIC BLOOD PRESSURE: 95 MMHG | HEART RATE: 67 BPM | OXYGEN SATURATION: 99 % | SYSTOLIC BLOOD PRESSURE: 152 MMHG | BODY MASS INDEX: 34.5 KG/M2

## 2025-07-19 DIAGNOSIS — N30.00 ACUTE CYSTITIS WITHOUT HEMATURIA: Primary | ICD-10-CM

## 2025-07-19 DIAGNOSIS — Z20.2 POSSIBLE EXPOSURE TO STI: ICD-10-CM

## 2025-07-19 LAB
BILIRUBIN, URINE, POC: NEGATIVE
BLOOD URINE, POC: NORMAL
GLUCOSE URINE, POC: NORMAL
KETONES, URINE, POC: NEGATIVE
LEUKOCYTE ESTERASE, URINE, POC: NEGATIVE
NITRITE, URINE, POC: NEGATIVE
PH, URINE, POC: 5.5 (ref 4.6–8)
PROTEIN,URINE, POC: NEGATIVE
SPECIFIC GRAVITY, URINE, POC: 1.02 (ref 1–1.03)
URINALYSIS CLARITY, POC: CLEAR
URINALYSIS COLOR, POC: YELLOW
UROBILINOGEN, POC: NORMAL MG/DL

## 2025-07-19 RX ORDER — SULFAMETHOXAZOLE AND TRIMETHOPRIM 800; 160 MG/1; MG/1
1 TABLET ORAL 2 TIMES DAILY
Qty: 14 TABLET | Refills: 0 | Status: SHIPPED | OUTPATIENT
Start: 2025-07-19 | End: 2025-07-26

## 2025-07-19 NOTE — PATIENT INSTRUCTIONS
Take your antibiotics as directed. Do not stop taking them just because you feel better. You need to take the full course of antibiotics.  Drink extra water and other fluids for the next day or two. This will help make the urine less concentrated and help wash out the bacteria that are causing the infection. (If you have kidney, heart, or liver disease and have to limit fluids, talk with your doctor before you increase the amount of fluids you drink.)  Avoid drinks that are carbonated or have caffeine.   Urinate often. Try to empty your bladder each time.  If needed, take an over-the-counter pain medicine, such as acetaminophen (Tylenol), ibuprofen (Advil, Motrin), or naproxen (Aleve). Read and follow all instructions on the label, can take together for more severe pain or alternate every 4 hours.  To relieve pain, take a hot bath or lay a heating pad set on low over your lower belly or genital area. Never go to sleep with a heating pad in place.  To prevent UTIs  Drink plenty of water each day. This helps you urinate often, which clears bacteria from your system. (If you have kidney, heart, or liver disease and have to limit fluids, talk with your doctor before you increase the amount of fluids you drink.)  Urinate when you need to - avoid holding urine for long periods of time.  If you are sexually active, urinate right after you have sex.  Change sanitary pads often.  Avoid douches, bubble baths, feminine hygiene sprays, and other feminine hygiene products that have deodorants.  After you use the toilet, wipe from front to back.  Return to clinic if no improvement after 3 full days on antibiotic, new onset flank pain or fever.  Report to ER for high fever, severe flank or abdominal pain, new or significant worsening in symptoms.  Follow up with PCP in 5-7 days or for persistent or recurrent symptoms.      You  will be notified of your STI testing results. If symptoms are not improving 3-5 days follow up with PCP.

## 2025-07-19 NOTE — PROGRESS NOTES
Patient Name: Timur Puente   YOB: 1975   Patient Status: Established patient,   Chief Complaint: Urinary Frequency (Patient complains of frequent urination x 2weeks with a tingling sensation.  He would like to be checked for UTI and STD.)      ____________________________________________________________________________________________    External Records Reviewed: None    Limitation to History: None    Outside Historian: None    SUBJECTIVE/OBJECTIVE:  Timur Puente is a 50 y.o. male presents with complaint of urinary frequency and tingling.  Symptoms began 2 week(s) ago and show no change since onset.  Patient reports unprotected sex.  He denies penile discharge itching or sores.  He denies fever chills night sweats nausea vomiting diarrhea. He denies dysuria or penile pain. He has been able to urinate without difficulty.  No other acute symptoms reported at this time.          PAST MEDICAL HISTORY:   Medical: Pt  has a past medical history of CAD (coronary artery disease), DM2 (diabetes mellitus, type 2) ([facility]), Headache, Hypercholesterolemia, Hypertension, Hypogonadism male, Obstructive sleep apnea, and JACKY (obstructive sleep apnea).  Surgical: Pt  has a past surgical history that includes heent (2013); Colonoscopy (N/A, 11/7/2023); and Foot Debridement (Right, 6/3/2024).  Family: Pt family history includes Heart Disease in his father.  Social: Pt   Social History     Socioeconomic History    Marital status: Single     Spouse name: Sara Fried    Number of children: 2    Years of education: 16    Highest education level: Bachelor's degree (e.g., BA, AB, BS)   Occupational History    Not on file   Tobacco Use    Smoking status: Never     Passive exposure: Never    Smokeless tobacco: Never   Vaping Use    Vaping status: Never Used   Substance and Sexual Activity    Alcohol use: No    Drug use: No    Sexual activity: Yes     Partners: Female   Other Topics Concern    Not on file   Social History

## 2025-07-20 ENCOUNTER — RESULTS FOLLOW-UP (OUTPATIENT)
Facility: CLINIC | Age: 50
End: 2025-07-20

## 2025-07-20 LAB
BACTERIA SPEC CULT: NORMAL
SERVICE CMNT-IMP: NORMAL

## 2025-07-22 LAB
C TRACH RRNA SPEC QL NAA+PROBE: NEGATIVE
N GONORRHOEA RRNA SPEC QL NAA+PROBE: NEGATIVE
SPECIMEN SOURCE: NORMAL
T VAGINALIS RRNA SPEC QL NAA+PROBE: NEGATIVE

## 2025-07-25 DIAGNOSIS — E11.65 UNCONTROLLED TYPE 2 DIABETES MELLITUS WITH HYPERGLYCEMIA (HCC): ICD-10-CM

## 2025-08-27 DIAGNOSIS — E78.2 MIXED HYPERLIPIDEMIA: ICD-10-CM

## 2025-08-28 RX ORDER — ASPIRIN 81 MG/1
81 TABLET ORAL DAILY
Qty: 90 TABLET | Refills: 3 | Status: SHIPPED | OUTPATIENT
Start: 2025-08-28

## 2025-08-28 RX ORDER — ICOSAPENT ETHYL 1 G/1
1 CAPSULE ORAL 2 TIMES DAILY WITH MEALS
Qty: 180 CAPSULE | Refills: 1 | Status: SHIPPED | OUTPATIENT
Start: 2025-08-28

## 2025-08-28 RX ORDER — SPIRONOLACTONE 25 MG/1
TABLET ORAL
Qty: 90 TABLET | Refills: 1 | Status: SHIPPED | OUTPATIENT
Start: 2025-08-28

## 2025-08-28 RX ORDER — EZETIMIBE 10 MG/1
10 TABLET ORAL DAILY
Qty: 90 TABLET | Refills: 3 | Status: SHIPPED | OUTPATIENT
Start: 2025-08-28

## 2025-08-28 RX ORDER — ROSUVASTATIN CALCIUM 40 MG/1
40 TABLET, COATED ORAL DAILY
Qty: 90 TABLET | Refills: 3 | Status: SHIPPED | OUTPATIENT
Start: 2025-08-28

## 2025-09-02 ENCOUNTER — ANCILLARY PROCEDURE (OUTPATIENT)
Age: 50
End: 2025-09-02
Payer: COMMERCIAL

## 2025-09-02 VITALS
HEIGHT: 64 IN | DIASTOLIC BLOOD PRESSURE: 96 MMHG | BODY MASS INDEX: 34.31 KG/M2 | WEIGHT: 201 LBS | SYSTOLIC BLOOD PRESSURE: 130 MMHG | HEART RATE: 62 BPM

## 2025-09-02 DIAGNOSIS — I25.10 CAD (CORONARY ARTERY DISEASE): ICD-10-CM

## 2025-09-02 DIAGNOSIS — I21.4 NSTEMI (NON-ST ELEVATED MYOCARDIAL INFARCTION) (HCC): ICD-10-CM

## 2025-09-02 DIAGNOSIS — I25.10 CORONARY ARTERY DISEASE INVOLVING NATIVE HEART WITHOUT ANGINA PECTORIS, UNSPECIFIED VESSEL OR LESION TYPE: ICD-10-CM

## 2025-09-02 DIAGNOSIS — I50.22 CHRONIC SYSTOLIC CONGESTIVE HEART FAILURE (HCC): ICD-10-CM

## 2025-09-02 DIAGNOSIS — I42.9 CARDIOMYOPATHY (HCC): ICD-10-CM

## 2025-09-02 DIAGNOSIS — E78.5 DYSLIPIDEMIA: ICD-10-CM

## 2025-09-02 PROCEDURE — A9500 TC99M SESTAMIBI: HCPCS | Performed by: INTERNAL MEDICINE

## 2025-09-02 PROCEDURE — 78452 HT MUSCLE IMAGE SPECT MULT: CPT | Performed by: INTERNAL MEDICINE

## 2025-09-02 PROCEDURE — 93306 TTE W/DOPPLER COMPLETE: CPT | Performed by: INTERNAL MEDICINE

## 2025-09-02 RX ORDER — TETRAKIS(2-METHOXYISOBUTYLISOCYANIDE)COPPER(I) TETRAFLUOROBORATE 1 MG/ML
8 INJECTION, POWDER, LYOPHILIZED, FOR SOLUTION INTRAVENOUS
Status: COMPLETED | OUTPATIENT
Start: 2025-09-02 | End: 2025-09-02

## 2025-09-02 RX ORDER — AMINOPHYLLINE 25 MG/ML
100 INJECTION, SOLUTION INTRAVENOUS ONCE
Status: COMPLETED | OUTPATIENT
Start: 2025-09-02 | End: 2025-09-02

## 2025-09-02 RX ORDER — REGADENOSON 0.08 MG/ML
0.4 INJECTION, SOLUTION INTRAVENOUS
Status: COMPLETED | OUTPATIENT
Start: 2025-09-02 | End: 2025-09-02

## 2025-09-02 RX ORDER — TETRAKIS(2-METHOXYISOBUTYLISOCYANIDE)COPPER(I) TETRAFLUOROBORATE 1 MG/ML
24.9 INJECTION, POWDER, LYOPHILIZED, FOR SOLUTION INTRAVENOUS
Status: COMPLETED | OUTPATIENT
Start: 2025-09-02 | End: 2025-09-02

## 2025-09-02 RX ADMIN — AMINOPHYLLINE 100 MG: 25 INJECTION, SOLUTION INTRAVENOUS at 10:50

## 2025-09-02 RX ADMIN — REGADENOSON 0.4 MG: 0.08 INJECTION, SOLUTION INTRAVENOUS at 10:43

## 2025-09-02 RX ADMIN — TECHNETIUM TC-99M SESTAMIBI 24.9 MILLICURIE: 1 INJECTION INTRAVENOUS at 10:45

## 2025-09-02 RX ADMIN — TECHNETIUM TC-99M SESTAMIBI 8 MILLICURIE: 1 INJECTION INTRAVENOUS at 09:25

## 2025-09-03 ENCOUNTER — OFFICE VISIT (OUTPATIENT)
Facility: CLINIC | Age: 50
End: 2025-09-03
Payer: COMMERCIAL

## 2025-09-03 VITALS
SYSTOLIC BLOOD PRESSURE: 119 MMHG | RESPIRATION RATE: 18 BRPM | HEIGHT: 64 IN | WEIGHT: 198.2 LBS | HEART RATE: 92 BPM | DIASTOLIC BLOOD PRESSURE: 75 MMHG | BODY MASS INDEX: 33.84 KG/M2 | TEMPERATURE: 98 F | OXYGEN SATURATION: 94 %

## 2025-09-03 DIAGNOSIS — E78.5 DYSLIPIDEMIA: ICD-10-CM

## 2025-09-03 DIAGNOSIS — E66.01 SEVERE OBESITY (HCC): ICD-10-CM

## 2025-09-03 DIAGNOSIS — I10 PRIMARY HYPERTENSION: ICD-10-CM

## 2025-09-03 DIAGNOSIS — I50.20 HFREF (HEART FAILURE WITH REDUCED EJECTION FRACTION) (HCC): ICD-10-CM

## 2025-09-03 DIAGNOSIS — E11.65 UNCONTROLLED TYPE 2 DIABETES MELLITUS WITH HYPERGLYCEMIA (HCC): Primary | ICD-10-CM

## 2025-09-03 PROCEDURE — 99214 OFFICE O/P EST MOD 30 MIN: CPT | Performed by: INTERNAL MEDICINE

## 2025-09-03 PROCEDURE — 3074F SYST BP LT 130 MM HG: CPT | Performed by: INTERNAL MEDICINE

## 2025-09-03 PROCEDURE — 3078F DIAST BP <80 MM HG: CPT | Performed by: INTERNAL MEDICINE

## 2025-09-03 PROCEDURE — 3046F HEMOGLOBIN A1C LEVEL >9.0%: CPT | Performed by: INTERNAL MEDICINE

## 2025-09-03 RX ORDER — INSULIN GLARGINE 100 [IU]/ML
40 INJECTION, SOLUTION SUBCUTANEOUS NIGHTLY
Qty: 5 ADJUSTABLE DOSE PRE-FILLED PEN SYRINGE | Refills: 3 | Status: SHIPPED | OUTPATIENT
Start: 2025-09-03

## 2025-09-04 LAB
ECHO AO ASC DIAM: 3.3 CM
ECHO AO ASCENDING AORTA INDEX: 1.68 CM/M2
ECHO AO ROOT DIAM: 2.9 CM
ECHO AO ROOT INDEX: 1.48 CM/M2
ECHO AV AREA PEAK VELOCITY: 1.8 CM2
ECHO AV AREA VTI: 2.2 CM2
ECHO AV AREA/BSA PEAK VELOCITY: 0.9 CM2/M2
ECHO AV AREA/BSA VTI: 1.1 CM2/M2
ECHO AV MEAN GRADIENT: 7 MMHG
ECHO AV MEAN VELOCITY: 1.2 M/S
ECHO AV PEAK GRADIENT: 13 MMHG
ECHO AV PEAK VELOCITY: 1.8 M/S
ECHO AV VELOCITY RATIO: 0.61
ECHO AV VTI: 27.7 CM
ECHO BSA: 2.03 M2
ECHO BSA: 2.03 M2
ECHO LA DIAMETER INDEX: 1.68 CM/M2
ECHO LA DIAMETER: 3.3 CM
ECHO LA TO AORTIC ROOT RATIO: 1.14
ECHO LA VOL A-L A2C: 48 ML (ref 18–58)
ECHO LA VOL A-L A4C: 62 ML (ref 18–58)
ECHO LA VOL BP: 52 ML (ref 18–58)
ECHO LA VOL MOD A2C: 45 ML (ref 18–58)
ECHO LA VOL MOD A4C: 55 ML (ref 18–58)
ECHO LA VOL/BSA BIPLANE: 27 ML/M2 (ref 16–34)
ECHO LA VOLUME AREA LENGTH: 58 ML
ECHO LA VOLUME INDEX A-L A2C: 24 ML/M2 (ref 16–34)
ECHO LA VOLUME INDEX A-L A4C: 32 ML/M2 (ref 16–34)
ECHO LA VOLUME INDEX AREA LENGTH: 30 ML/M2 (ref 16–34)
ECHO LA VOLUME INDEX MOD A2C: 23 ML/M2 (ref 16–34)
ECHO LA VOLUME INDEX MOD A4C: 28 ML/M2 (ref 16–34)
ECHO LV E' LATERAL VELOCITY: 8 CM/S
ECHO LV E' SEPTAL VELOCITY: 7.11 CM/S
ECHO LV EDV A2C: 134 ML
ECHO LV EDV A4C: 110 ML
ECHO LV EDV BP: 126 ML (ref 67–155)
ECHO LV EDV INDEX A4C: 56 ML/M2
ECHO LV EDV INDEX BP: 64 ML/M2
ECHO LV EDV NDEX A2C: 68 ML/M2
ECHO LV EF PHYSICIAN: 50 %
ECHO LV EJECTION FRACTION A2C: 41 %
ECHO LV EJECTION FRACTION A4C: 51 %
ECHO LV EJECTION FRACTION BIPLANE: 46 % (ref 55–100)
ECHO LV ESV A2C: 79 ML
ECHO LV ESV A4C: 54 ML
ECHO LV ESV BP: 68 ML (ref 22–58)
ECHO LV ESV INDEX A2C: 40 ML/M2
ECHO LV ESV INDEX A4C: 28 ML/M2
ECHO LV ESV INDEX BP: 35 ML/M2
ECHO LV FRACTIONAL SHORTENING: 27 % (ref 28–44)
ECHO LV INTERNAL DIMENSION DIASTOLE INDEX: 2.6 CM/M2
ECHO LV INTERNAL DIMENSION DIASTOLIC: 5.1 CM (ref 4.2–5.9)
ECHO LV INTERNAL DIMENSION SYSTOLIC INDEX: 1.89 CM/M2
ECHO LV INTERNAL DIMENSION SYSTOLIC: 3.7 CM
ECHO LV IVSD: 1.2 CM (ref 0.6–1)
ECHO LV MASS 2D: 241.2 G (ref 88–224)
ECHO LV MASS INDEX 2D: 123.1 G/M2 (ref 49–115)
ECHO LV POSTERIOR WALL DIASTOLIC: 1.2 CM (ref 0.6–1)
ECHO LV RELATIVE WALL THICKNESS RATIO: 0.47
ECHO LVOT AREA: 2.8 CM2
ECHO LVOT AV VTI INDEX: 0.74
ECHO LVOT DIAM: 1.9 CM
ECHO LVOT MEAN GRADIENT: 3 MMHG
ECHO LVOT PEAK GRADIENT: 5 MMHG
ECHO LVOT PEAK VELOCITY: 1.1 M/S
ECHO LVOT STROKE VOLUME INDEX: 29.8 ML/M2
ECHO LVOT SV: 58.4 ML
ECHO LVOT VTI: 20.6 CM
ECHO MV A VELOCITY: 1.1 M/S
ECHO MV E DECELERATION TIME (DT): 163.3 MS
ECHO MV E VELOCITY: 1.04 M/S
ECHO MV E/A RATIO: 0.95
ECHO MV E/E' LATERAL: 13
ECHO MV E/E' RATIO (AVERAGED): 13.81
ECHO MV E/E' SEPTAL: 14.63
ECHO PV MAX VELOCITY: 1 M/S
ECHO PV PEAK GRADIENT: 4 MMHG
ECHO RV TAPSE: 1.6 CM (ref 1.7–?)
ECHO TV REGURGITANT MAX VELOCITY: 2.51 M/S
ECHO TV REGURGITANT PEAK GRADIENT: 25 MMHG
NUC STRESS EJECTION FRACTION: 42 %
STRESS BASELINE DIAS BP: 96 MMHG
STRESS BASELINE HR: 65 BPM
STRESS BASELINE SYS BP: 130 MMHG
STRESS O2 SAT PEAK: 99 %
STRESS O2 SAT REST: 99 %
STRESS PEAK DIAS BP: 90 MMHG
STRESS PEAK SYS BP: 136 MMHG
STRESS PERCENT HR ACHIEVED: 51 %
STRESS POST PEAK HR: 88 BPM
STRESS RATE PRESSURE PRODUCT: NORMAL BPM*MMHG
STRESS TARGET HR: 172 BPM
TID: 1.09

## 2025-09-07 ENCOUNTER — RESULTS FOLLOW-UP (OUTPATIENT)
Facility: CLINIC | Age: 50
End: 2025-09-07

## 2025-09-07 PROBLEM — I50.20 HFREF (HEART FAILURE WITH REDUCED EJECTION FRACTION) (HCC): Status: ACTIVE | Noted: 2025-09-07

## 2025-09-07 RX ORDER — LIRAGLUTIDE 6 MG/ML
0.6 INJECTION SUBCUTANEOUS DAILY
Qty: 3 ML | Refills: 0 | Status: SHIPPED | OUTPATIENT
Start: 2025-09-07

## 2025-09-07 RX ORDER — PANTOPRAZOLE SODIUM 40 MG/1
40 TABLET, DELAYED RELEASE ORAL
Qty: 90 TABLET | Refills: 3 | Status: SHIPPED | OUTPATIENT
Start: 2025-09-07

## (undated) DEVICE — SWAB CULT DBL W/O CHAR RAYON TIP AMIES GEL CLMN FOR COLL

## (undated) DEVICE — GAUZE SPONGES,12 PLY: Brand: CURITY

## (undated) DEVICE — Device: Brand: ASAHI SION BLUE

## (undated) DEVICE — Device

## (undated) DEVICE — CATHETER GUID 6FR L100CM DIA0.071IN NYL SHFT RBU4.0 W/O

## (undated) DEVICE — TOWEL SURG W17XL27IN STD BLU COT NONFENESTRATED PREWASHED

## (undated) DEVICE — SOLUTION IV 1000ML 0.9% SOD CHL

## (undated) DEVICE — AMBI/RC 1/4X6  6.4X150MM HELICAL                                    PT PERFORATON DRL: Brand: CHS

## (undated) DEVICE — ENDOSCOPIC KIT COMPLIANCE ENDOKIT

## (undated) DEVICE — STRIP WND PK W0.25INXL5YD IODO GZ TIGHTLY WVN CURAD

## (undated) DEVICE — SYRINGE ANGIO 10 CC BRL STD PRNT POLYCARB LT BLU MEDALLION

## (undated) DEVICE — INTENDED FOR TISSUE SEPARATION, AND OTHER PROCEDURES THAT REQUIRE A SHARP SURGICAL BLADE TO PUNCTURE OR CUT.: Brand: BARD-PARKER ® CARBON RIB-BACK BLADES

## (undated) DEVICE — 3M™ STERI-DRAPE™ U-DRAPE 1015: Brand: STERI-DRAPE™

## (undated) DEVICE — GLOVE SURG SZ 65 L12IN FNGR THK94MIL STD WHT LTX FREE

## (undated) DEVICE — CATH BLLN ANGIO 3X6MM SC EUPHORA RX

## (undated) DEVICE — PADDING CST 4IN STERILE --

## (undated) DEVICE — LIGHT HANDLE: Brand: DEVON

## (undated) DEVICE — CATH BLLN DIL 1.5X10MM RX -- EUPHORA

## (undated) DEVICE — ELECTRODE PT RET AD L9FT HI MOIST COND ADH HYDRGEL CORDED

## (undated) DEVICE — Z DISCONTINUED PER MEDLINE (LOW STOCK)  USE 2422770 DRAPE C ARM W54XL78IN FOR FLROSCN

## (undated) DEVICE — CUFF BLD PRSS AD CLTH SGL TB W/ BAYNT CONN ROUNDED CORNER

## (undated) DEVICE — ZIMMER® STERILE DISPOSABLE TOURNIQUET CUFF WITH PROTECTIVE SLEEVE AND PLC, DUAL PORT, SINGLE BLADDER, 18 IN. (46 CM)

## (undated) DEVICE — PREMIUM WET SKIN PREP TRAY: Brand: MEDLINE INDUSTRIES, INC.

## (undated) DEVICE — 3M™ STERI-STRIP™ REINFORCED ADHESIVE SKIN CLOSURES, R1546, 1/4 IN X 4 IN (6 MM X 100 MM), 10 STRIPS/ENVELOPE: Brand: 3M™ STERI-STRIP™

## (undated) DEVICE — VALVE INSRT TOOL ADPT MTL 9FR -- ACCESS

## (undated) DEVICE — KIT MED IMAG CNTRST AGNT W/ IOPAMIDOL REUSE

## (undated) DEVICE — BANDAGE,GAUZE,BULKEE II,4.5"X4.1YD,STRL: Brand: MEDLINE

## (undated) DEVICE — BANDAGE COMPR SELF ADH 5 YDX4 IN TAN STRL PREMIERPRO LF

## (undated) DEVICE — SOLUTION IRRIG 1000ML 0.9% SOD CHL USP POUR PLAS BTL

## (undated) DEVICE — KIT MFLD ISOLATN NACL CNTRST PRT TBNG SPIK W/ PRSS TRNSDUC

## (undated) DEVICE — Z DISCONTINUED NO SUB IDED BUCKET CAST INF CLAV STRP WHT BCKL CLSR PREM DISPOSABLE

## (undated) DEVICE — PACK PROCEDURE SURG HRT CATH

## (undated) DEVICE — 1200 GUARD II KIT W/5MM TUBE W/O VAC TUBE: Brand: GUARDIAN

## (undated) DEVICE — TR BAND RADIAL ARTERY COMPRESSION DEVICE: Brand: TR BAND

## (undated) DEVICE — STERILE POLYISOPRENE POWDER-FREE SURGICAL GLOVES: Brand: PROTEXIS

## (undated) DEVICE — 3M™ TEGADERM™ TRANSPARENT FILM DRESSING FRAME STYLE, 1626W, 4 IN X 4-3/4 IN (10 CM X 12 CM), 50/CT 4CT/CASE: Brand: 3M™ TEGADERM™

## (undated) DEVICE — BIPOLAR FORCEPS CORD,BANANA LEADS: Brand: VALLEYLAB

## (undated) DEVICE — CATH GUID COR EB30 5FR 100CM -- LAUNCHER

## (undated) DEVICE — (D)PREP SKN CHLRAPRP APPL 26ML -- CONVERT TO ITEM 371833

## (undated) DEVICE — SWAB CULT LIQ STUART AGR AERB MOD IN BRK SGL RAYON TIP PLAS 220099] BECTON DICKINSON MICRO]

## (undated) DEVICE — OCCLUSIVE GAUZE STRIP,3% BISMUTH TRIBROMOPHENATE IN PETROLATUM BLEND: Brand: XEROFORM

## (undated) DEVICE — GLIDESHEATH SLENDER ACCESS KIT: Brand: GLIDESHEATH SLENDER

## (undated) DEVICE — DRAPE PRB US TRNSDCR 6X96IN --

## (undated) DEVICE — ANGIOGRAPHIC CATHETER: Brand: IMPULSE™

## (undated) DEVICE — INFECTION CONTROL KIT SYS

## (undated) DEVICE — COPILOT BLEEDBACK CONTROL VALVE: Brand: COPILOT

## (undated) DEVICE — SET GRAV CK VLV NEEDLESS ST 3 GANGED 4WAY STPCOCK HI FLO 10

## (undated) DEVICE — KIT ANGIOGRAPHY CUST MRMC

## (undated) DEVICE — PADDING CAST 4 INX5 YD STRL

## (undated) DEVICE — SUTURE ETHLN SZ 4-0 L18IN NONABSORBABLE BLK L19MM PS-2 3/8 1667H

## (undated) DEVICE — MEDI-VAC NON-CONDUCTIVE SUCTION TUBING: Brand: CARDINAL HEALTH

## (undated) DEVICE — GLIDESHEATH SLENDER STAINLESS STEEL KIT: Brand: GLIDESHEATH SLENDER

## (undated) DEVICE — SUTURE PERMAHAND SZ 2-0 L18IN NONABSORBABLE BLK L26MM PS 1588H

## (undated) DEVICE — CATH GUID COR EB35 6FR 100CM -- LAUNCHER

## (undated) DEVICE — HI-TORQUE VERSACORE FLOPPY GUIDE WIRE SYSTEM 145 CM: Brand: HI-TORQUE VERSACORE

## (undated) DEVICE — TREK CORONARY DILATATION CATHETER 2.50 MM X 15 MM / RAPID-EXCHANGE: Brand: TREK

## (undated) DEVICE — PINNACLE INTRODUCER SHEATH: Brand: PINNACLE

## (undated) DEVICE — GLOVE SURG SZ 65 L12IN FNGR THK79MIL GRN LTX FREE

## (undated) DEVICE — GUIDEWIRE VASC L145CM 0.035IN J TIP L3MM PTFE FIX COR NAMIC

## (undated) DEVICE — SPECIAL PROCEDURE DRAPE 32" X 34": Brand: SPECIAL PROCEDURE DRAPE

## (undated) DEVICE — TUBING PRSS MON L6IN PVC M FEM CONN

## (undated) DEVICE — NEEDLE HYPO 25GA L1.5IN BVL ORIENTED ECLIPSE

## (undated) DEVICE — TOURNIQUET 12FR L7IN 3 CLR 1 SNR VENA CAVA DLP

## (undated) DEVICE — CATH BLLN ANGIO 4.50X8MM NC EUPHORIA RX

## (undated) DEVICE — EXTREMITY - SMH: Brand: MEDLINE INDUSTRIES, INC.

## (undated) DEVICE — CUSTOM KT PTCA INFL DEV K05 00052M

## (undated) DEVICE — SPONGE GZ W4XL4IN COT 12 PLY TYP VII WVN C FLD DSGN STERILE

## (undated) DEVICE — RUNTHROUGH NS EXTRA FLOPPY PTCA GUIDEWIRE: Brand: RUNTHROUGH

## (undated) DEVICE — SUTURE NONABSORBABLE MONOFILAMENT 3-0 PS-1 18 IN BLK ETHILON 1663H

## (undated) DEVICE — ANGIOGRAPHY KIT

## (undated) DEVICE — CATH GUID COR AR20 6FR 100CM -- LAUNCHER

## (undated) DEVICE — CATHETER ETER ANGIO L110CM OD5FR ID046IN L75CM 038IN 145DEG CARD

## (undated) DEVICE — TRAP ENDOSCP POLYP 2 CHMBR DRAWER TYP

## (undated) DEVICE — IV START KIT: Brand: MEDLINE

## (undated) DEVICE — CATHETER ANGIO JR4 AD 5 FRX100 CM 25 CM PERFORMA

## (undated) DEVICE — SPLINT WR POS F/ARTERIAL ACC -- BX/10

## (undated) DEVICE — SYR POWER 150ML 8IN FILL TUBE --

## (undated) DEVICE — CUSTOM KT PTCA INFL DEV K05 00053H

## (undated) DEVICE — MINI TREK CORONARY DILATATION CATHETER 2.0 MM X 12 MM / RAPID-EXCHANGE: Brand: MINI TREK

## (undated) DEVICE — CATH ANGI BLLN DIL 3.0X08MM -- NC EUPHORA

## (undated) DEVICE — GUIDEWIRE VASC L260CM 0.035IN J TIP L3MM PTFE FIX COR NAMIC

## (undated) DEVICE — SYRINGE MED 10ML LUERLOCK TIP W/O SFTY DISP

## (undated) DEVICE — BANDAGE COMPR W4INXL5YD WHT BGE POLY COT M E WRP WV HK AND

## (undated) DEVICE — KIT HND CTRL 3 W STPCOCK ROT END 54IN PREM HI PRSS TBNG AT

## (undated) DEVICE — HAND I-LF: Brand: MEDLINE INDUSTRIES, INC.

## (undated) DEVICE — KIT ACCS INTRO 4FR L10CM NDL 21GA L7CM GWIRE L40CM

## (undated) DEVICE — PAD,ABDOMINAL,5"X9",ST,LF,25/BX: Brand: MEDLINE INDUSTRIES, INC.

## (undated) DEVICE — RADIFOCUS OPTITORQUE ANGIOGRAPHIC CATHETER: Brand: OPTITORQUE

## (undated) DEVICE — PADDING CST 4INX4YD --

## (undated) DEVICE — SYR IRR BLB 2OZ DISP BLU STRL -- CONVERT TO ITEM 357637

## (undated) DEVICE — CATH GUID COR EB30 6FR 100CM -- LAUNCHER

## (undated) DEVICE — TORCON NB, ADVANTAGE CATHETER: Brand: TORCON NB

## (undated) DEVICE — HYPODERMIC SAFETY NEEDLE: Brand: MONOJECT

## (undated) DEVICE — DRAPE,EXTREMITY,89X128,STERILE: Brand: MEDLINE

## (undated) DEVICE — KERLIX BANDAGE ROLL: Brand: KERLIX